# Patient Record
Sex: MALE | Race: BLACK OR AFRICAN AMERICAN | NOT HISPANIC OR LATINO | Employment: OTHER | URBAN - METROPOLITAN AREA
[De-identification: names, ages, dates, MRNs, and addresses within clinical notes are randomized per-mention and may not be internally consistent; named-entity substitution may affect disease eponyms.]

---

## 2017-05-11 PROBLEM — K11.1 SALIVARY GLAND ENLARGEMENT: Status: ACTIVE | Noted: 2017-05-11

## 2017-10-19 PROBLEM — M50.30 DEGENERATIVE CERVICAL DISC: Status: ACTIVE | Noted: 2017-10-19

## 2017-10-31 PROBLEM — M50.30 OTHER CERVICAL DISC DEGENERATION, UNSPECIFIED CERVICAL REGION: Status: ACTIVE | Noted: 2017-10-31

## 2018-11-08 PROBLEM — M51.36 DEGENERATION OF LUMBAR INTERVERTEBRAL DISC: Status: ACTIVE | Noted: 2018-11-08

## 2018-11-15 PROBLEM — Z01.818 PREOP TESTING: Status: ACTIVE | Noted: 2018-11-15

## 2020-05-01 PROBLEM — I73.9 PAD (PERIPHERAL ARTERY DISEASE): Status: ACTIVE | Noted: 2020-05-01

## 2020-05-07 PROBLEM — R06.02 SOB (SHORTNESS OF BREATH): Status: ACTIVE | Noted: 2020-05-07

## 2020-09-24 PROBLEM — I73.9 PVD (PERIPHERAL VASCULAR DISEASE) WITH CLAUDICATION: Status: ACTIVE | Noted: 2020-09-24

## 2021-08-31 ENCOUNTER — HOSPITAL ENCOUNTER (EMERGENCY)
Facility: HOSPITAL | Age: 66
Discharge: HOME | End: 2021-08-31
Attending: EMERGENCY MEDICINE
Payer: MEDICARE

## 2021-08-31 VITALS
DIASTOLIC BLOOD PRESSURE: 91 MMHG | HEART RATE: 92 BPM | WEIGHT: 216.06 LBS | OXYGEN SATURATION: 100 % | HEIGHT: 74 IN | SYSTOLIC BLOOD PRESSURE: 129 MMHG | TEMPERATURE: 98 F | BODY MASS INDEX: 27.73 KG/M2 | RESPIRATION RATE: 16 BRPM

## 2021-08-31 DIAGNOSIS — G89.29 CHRONIC BACK PAIN, UNSPECIFIED BACK LOCATION, UNSPECIFIED BACK PAIN LATERALITY: Primary | ICD-10-CM

## 2021-08-31 DIAGNOSIS — M54.9 CHRONIC BACK PAIN, UNSPECIFIED BACK LOCATION, UNSPECIFIED BACK PAIN LATERALITY: Primary | ICD-10-CM

## 2021-08-31 PROCEDURE — 25000003 PHARM REV CODE 250: Performed by: PHYSICIAN ASSISTANT

## 2021-08-31 PROCEDURE — 99283 EMERGENCY DEPT VISIT LOW MDM: CPT

## 2021-08-31 RX ORDER — HYDROCODONE BITARTRATE AND ACETAMINOPHEN 10; 325 MG/1; MG/1
1 TABLET ORAL
Status: COMPLETED | OUTPATIENT
Start: 2021-08-31 | End: 2021-08-31

## 2021-08-31 RX ADMIN — HYDROCODONE BITARTRATE AND ACETAMINOPHEN 1 TABLET: 10; 325 TABLET ORAL at 11:08

## 2021-09-01 ENCOUNTER — HOSPITAL ENCOUNTER (EMERGENCY)
Facility: HOSPITAL | Age: 66
Discharge: HOME | End: 2021-09-01
Attending: EMERGENCY MEDICINE
Payer: MEDICARE

## 2021-09-01 VITALS
WEIGHT: 216.06 LBS | RESPIRATION RATE: 18 BRPM | BODY MASS INDEX: 27.73 KG/M2 | TEMPERATURE: 99 F | HEART RATE: 87 BPM | DIASTOLIC BLOOD PRESSURE: 84 MMHG | SYSTOLIC BLOOD PRESSURE: 124 MMHG | HEIGHT: 74 IN | OXYGEN SATURATION: 99 %

## 2021-09-01 DIAGNOSIS — G89.29 CHRONIC MIDLINE LOW BACK PAIN WITHOUT SCIATICA: Primary | ICD-10-CM

## 2021-09-01 DIAGNOSIS — M54.50 CHRONIC MIDLINE LOW BACK PAIN WITHOUT SCIATICA: Primary | ICD-10-CM

## 2021-09-01 PROCEDURE — 99283 EMERGENCY DEPT VISIT LOW MDM: CPT

## 2021-09-01 PROCEDURE — 25000003 PHARM REV CODE 250: Performed by: EMERGENCY MEDICINE

## 2021-09-01 RX ORDER — HYDROCODONE BITARTRATE AND ACETAMINOPHEN 10; 325 MG/1; MG/1
1 TABLET ORAL EVERY 6 HOURS PRN
Qty: 15 TABLET | Refills: 0 | Status: ON HOLD | OUTPATIENT
Start: 2021-09-01 | End: 2021-10-28 | Stop reason: HOSPADM

## 2021-09-01 RX ORDER — HYDROCODONE BITARTRATE AND ACETAMINOPHEN 10; 325 MG/1; MG/1
1 TABLET ORAL
Status: COMPLETED | OUTPATIENT
Start: 2021-09-01 | End: 2021-09-01

## 2021-09-01 RX ADMIN — HYDROCODONE BITARTRATE AND ACETAMINOPHEN 1 TABLET: 10; 325 TABLET ORAL at 09:09

## 2021-10-13 PROBLEM — I77.1 STENOSIS OF RIGHT SUBCLAVIAN ARTERY: Status: ACTIVE | Noted: 2021-10-13

## 2021-12-27 PROBLEM — Z87.891 HISTORY OF TOBACCO ABUSE: Status: ACTIVE | Noted: 2021-12-27

## 2021-12-27 PROBLEM — I10 PRIMARY HYPERTENSION: Status: ACTIVE | Noted: 2021-12-27

## 2023-01-01 ENCOUNTER — ANESTHESIA (OUTPATIENT)
Dept: EMERGENCY MEDICINE | Facility: HOSPITAL | Age: 68
DRG: 231 | End: 2023-01-01
Payer: MEDICARE

## 2023-01-01 ENCOUNTER — CLINICAL SUPPORT (OUTPATIENT)
Dept: CARDIOLOGY | Facility: HOSPITAL | Age: 68
DRG: 231 | End: 2023-01-01
Attending: INTERNAL MEDICINE
Payer: MEDICARE

## 2023-01-01 ENCOUNTER — HOSPITAL ENCOUNTER (INPATIENT)
Facility: HOSPITAL | Age: 68
LOS: 31 days | Discharge: NURSING, REHAB OR LONG-TERM CARE | DRG: 231 | End: 2023-02-01
Attending: EMERGENCY MEDICINE | Admitting: FAMILY MEDICINE
Payer: MEDICARE

## 2023-01-01 ENCOUNTER — HOSPITAL ENCOUNTER (EMERGENCY)
Facility: HOSPITAL | Age: 68
Discharge: ANOTHER HOSPITAL | End: 2023-01-01
Attending: EMERGENCY MEDICINE | Admitting: INTERNAL MEDICINE
Payer: MEDICARE

## 2023-01-01 ENCOUNTER — ANESTHESIA EVENT (OUTPATIENT)
Dept: EMERGENCY MEDICINE | Facility: HOSPITAL | Age: 68
DRG: 231 | End: 2023-01-01
Payer: MEDICARE

## 2023-01-01 VITALS — WEIGHT: 220 LBS | BODY MASS INDEX: 28.23 KG/M2 | HEIGHT: 74 IN

## 2023-01-01 VITALS
DIASTOLIC BLOOD PRESSURE: 40 MMHG | OXYGEN SATURATION: 63 % | SYSTOLIC BLOOD PRESSURE: 80 MMHG | TEMPERATURE: 99 F | RESPIRATION RATE: 22 BRPM | HEART RATE: 123 BPM

## 2023-01-01 DIAGNOSIS — R07.9 CHEST PAIN: ICD-10-CM

## 2023-01-01 DIAGNOSIS — R94.31 EKG ABNORMALITIES: ICD-10-CM

## 2023-01-01 DIAGNOSIS — R06.02 SOB (SHORTNESS OF BREATH): ICD-10-CM

## 2023-01-01 DIAGNOSIS — I21.01 ST ELEVATION MYOCARDIAL INFARCTION INVOLVING LEFT MAIN CORONARY ARTERY: Primary | ICD-10-CM

## 2023-01-01 DIAGNOSIS — I21.3 STEMI (ST ELEVATION MYOCARDIAL INFARCTION): ICD-10-CM

## 2023-01-01 DIAGNOSIS — I46.9 CARDIAC ARREST: Primary | ICD-10-CM

## 2023-01-01 DIAGNOSIS — I48.91 A-FIB: ICD-10-CM

## 2023-01-01 DIAGNOSIS — I21.3 ST ELEVATION MYOCARDIAL INFARCTION (STEMI), UNSPECIFIED ARTERY: ICD-10-CM

## 2023-01-01 DIAGNOSIS — Z41.9 SURGERY, ELECTIVE: ICD-10-CM

## 2023-01-01 DIAGNOSIS — I21.11 STEMI INVOLVING RIGHT CORONARY ARTERY: ICD-10-CM

## 2023-01-01 DIAGNOSIS — Z01.810 PREOP CARDIOVASCULAR EXAM: ICD-10-CM

## 2023-01-01 DIAGNOSIS — Z95.1 S/P CABG (CORONARY ARTERY BYPASS GRAFT): ICD-10-CM

## 2023-01-01 PROBLEM — E87.1 HYPONATREMIA: Status: ACTIVE | Noted: 2023-01-01

## 2023-01-01 PROBLEM — R74.01 TRANSAMINITIS: Status: ACTIVE | Noted: 2023-01-01

## 2023-01-01 PROBLEM — D72.829 LEUKOCYTOSIS: Status: ACTIVE | Noted: 2023-01-01

## 2023-01-01 PROBLEM — D64.9 ANEMIA: Status: ACTIVE | Noted: 2023-01-01

## 2023-01-01 PROBLEM — J96.02 ACUTE RESPIRATORY FAILURE WITH HYPOXIA AND HYPERCARBIA: Status: ACTIVE | Noted: 2023-01-01

## 2023-01-01 PROBLEM — J96.01 ACUTE RESPIRATORY FAILURE WITH HYPOXIA AND HYPERCARBIA: Status: ACTIVE | Noted: 2023-01-01

## 2023-01-01 LAB
ALBUMIN SERPL BCP-MCNC: 2.6 G/DL (ref 3.5–5.2)
ALBUMIN SERPL BCP-MCNC: 3.8 G/DL (ref 3.5–5.2)
ALLENS TEST: ABNORMAL
ALP SERPL-CCNC: 102 U/L (ref 55–135)
ALP SERPL-CCNC: 88 U/L (ref 55–135)
ALT SERPL W/O P-5'-P-CCNC: 278 U/L (ref 10–44)
ALT SERPL W/O P-5'-P-CCNC: 37 U/L (ref 10–44)
ANION GAP SERPL CALC-SCNC: 13 MMOL/L (ref 8–16)
ANION GAP SERPL CALC-SCNC: 15 MMOL/L (ref 8–16)
ANION GAP SERPL CALC-SCNC: 9 MMOL/L (ref 8–16)
AORTIC ROOT ANNULUS: 3.72 CM
AORTIC VALVE CUSP SEPERATION: 1.72 CM
APTT BLDCRRT: 30.3 SEC (ref 21–32)
AST SERPL-CCNC: 294 U/L (ref 10–40)
AST SERPL-CCNC: 44 U/L (ref 10–40)
AV INDEX (PROSTH): 0.54
AV MEAN GRADIENT: 4 MMHG
AV PEAK GRADIENT: 7 MMHG
AV VALVE AREA: 1.68 CM2
AV VELOCITY RATIO: 0.53
BASOPHILS # BLD AUTO: 0.03 K/UL (ref 0–0.2)
BASOPHILS NFR BLD: 0 % (ref 0–1.9)
BASOPHILS NFR BLD: 0.5 % (ref 0–1.9)
BILIRUB SERPL-MCNC: 0.3 MG/DL (ref 0.1–1)
BILIRUB SERPL-MCNC: 0.8 MG/DL (ref 0.1–1)
BNP SERPL-MCNC: 76 PG/ML (ref 0–99)
BSA FOR ECHO PROCEDURE: 2.28 M2
BUN SERPL-MCNC: 20 MG/DL (ref 8–23)
BUN SERPL-MCNC: 24 MG/DL (ref 8–23)
BUN SERPL-MCNC: 26 MG/DL (ref 8–23)
CALCIUM SERPL-MCNC: 7.3 MG/DL (ref 8.7–10.5)
CALCIUM SERPL-MCNC: 7.8 MG/DL (ref 8.7–10.5)
CALCIUM SERPL-MCNC: 8.8 MG/DL (ref 8.7–10.5)
CHLORIDE SERPL-SCNC: 100 MMOL/L (ref 95–110)
CHLORIDE SERPL-SCNC: 101 MMOL/L (ref 95–110)
CHLORIDE SERPL-SCNC: 96 MMOL/L (ref 95–110)
CO2 SERPL-SCNC: 20 MMOL/L (ref 23–29)
CO2 SERPL-SCNC: 22 MMOL/L (ref 23–29)
CO2 SERPL-SCNC: 26 MMOL/L (ref 23–29)
CREAT SERPL-MCNC: 1 MG/DL (ref 0.5–1.4)
CREAT SERPL-MCNC: 1.4 MG/DL (ref 0.5–1.4)
CREAT SERPL-MCNC: 1.4 MG/DL (ref 0.5–1.4)
CREAT SERPL-MCNC: 1.5 MG/DL (ref 0.5–1.4)
CV ECHO LV RWT: 0.77 CM
DELSYS: ABNORMAL
DIFFERENTIAL METHOD: ABNORMAL
DIFFERENTIAL METHOD: ABNORMAL
DOP CALC AO PEAK VEL: 1.36 M/S
DOP CALC AO VTI: 20.5 CM
DOP CALC LVOT AREA: 3.1 CM2
DOP CALC LVOT DIAMETER: 2 CM
DOP CALC LVOT PEAK VEL: 0.72 M/S
DOP CALC LVOT STROKE VOLUME: 34.54 CM3
DOP CALCLVOT PEAK VEL VTI: 11 CM
E WAVE DECELERATION TIME: 208.6 MSEC
E/A RATIO: 0.65
E/E' RATIO: 9 M/S
ECHO LV POSTERIOR WALL: 1.57 CM (ref 0.6–1.1)
EJECTION FRACTION: 55 %
EOSINOPHIL # BLD AUTO: 0.1 K/UL (ref 0–0.5)
EOSINOPHIL NFR BLD: 0 % (ref 0–8)
EOSINOPHIL NFR BLD: 1.3 % (ref 0–8)
ERYTHROCYTE [DISTWIDTH] IN BLOOD BY AUTOMATED COUNT: 14.3 % (ref 11.5–14.5)
ERYTHROCYTE [DISTWIDTH] IN BLOOD BY AUTOMATED COUNT: 14.4 % (ref 11.5–14.5)
ERYTHROCYTE [SEDIMENTATION RATE] IN BLOOD BY WESTERGREN METHOD: 0 MM/H
ERYTHROCYTE [SEDIMENTATION RATE] IN BLOOD BY WESTERGREN METHOD: 30 MM/H
ERYTHROCYTE [SEDIMENTATION RATE] IN BLOOD BY WESTERGREN METHOD: 540 MM/H
EST. GFR  (NO RACE VARIABLE): 51 ML/MIN/1.73 M^2
EST. GFR  (NO RACE VARIABLE): 55 ML/MIN/1.73 M^2
EST. GFR  (NO RACE VARIABLE): 55.4 ML/MIN/1.73 M^2
ESTIMATED AVG GLUCOSE: 117 MG/DL (ref 68–131)
FIO2: 100
FIO2: 50
FIO2: 80
FLOW: 15
FRACTIONAL SHORTENING: 28 % (ref 28–44)
GLUCOSE SERPL-MCNC: 114 MG/DL (ref 70–110)
GLUCOSE SERPL-MCNC: 116 MG/DL (ref 70–110)
GLUCOSE SERPL-MCNC: 122 MG/DL (ref 70–110)
GLUCOSE SERPL-MCNC: 123 MG/DL (ref 70–110)
GLUCOSE SERPL-MCNC: 147 MG/DL (ref 70–110)
GLUCOSE SERPL-MCNC: 187 MG/DL (ref 70–110)
GLUCOSE SERPL-MCNC: 316 MG/DL (ref 70–110)
GLUCOSE SERPL-MCNC: 448 MG/DL (ref 70–110)
GLUCOSE SERPL-MCNC: 463 MG/DL (ref 70–110)
HBA1C MFR BLD: 5.7 % (ref 4.5–6.2)
HCO3 UR-SCNC: 15.9 MMOL/L (ref 24–28)
HCO3 UR-SCNC: 22.1 MMOL/L (ref 24–28)
HCO3 UR-SCNC: 23.9 MMOL/L (ref 24–28)
HCO3 UR-SCNC: 28 MMOL/L (ref 24–28)
HCO3 UR-SCNC: 29.4 MMOL/L (ref 24–28)
HCT VFR BLD AUTO: 34.6 % (ref 40–54)
HCT VFR BLD AUTO: 39.2 % (ref 40–54)
HCT VFR BLD CALC: 32 %PCV (ref 36–54)
HCT VFR BLD CALC: 35 %PCV (ref 36–54)
HCT VFR BLD CALC: 37 %PCV (ref 36–54)
HCT VFR BLD CALC: 38 %PCV (ref 36–54)
HGB BLD-MCNC: 11.4 G/DL (ref 14–18)
HGB BLD-MCNC: 13 G/DL (ref 14–18)
IMM GRANULOCYTES # BLD AUTO: 0.07 K/UL (ref 0–0.04)
IMM GRANULOCYTES # BLD AUTO: ABNORMAL K/UL (ref 0–0.04)
IMM GRANULOCYTES NFR BLD AUTO: 1.3 % (ref 0–0.5)
IMM GRANULOCYTES NFR BLD AUTO: ABNORMAL % (ref 0–0.5)
INR PPP: 1.2 (ref 0.8–1.2)
INTERVENTRICULAR SEPTUM: 1.9 CM (ref 0.6–1.1)
LACTATE SERPL-SCNC: 1.9 MMOL/L (ref 0.5–1.9)
LACTATE SERPL-SCNC: 7.4 MMOL/L (ref 0.5–1.9)
LEFT INTERNAL DIMENSION IN SYSTOLE: 2.94 CM (ref 2.1–4)
LEFT VENTRICLE DIASTOLIC VOLUME INDEX: 32.11 ML/M2
LEFT VENTRICLE DIASTOLIC VOLUME: 72.56 ML
LEFT VENTRICLE MASS INDEX: 133 G/M2
LEFT VENTRICLE SYSTOLIC VOLUME INDEX: 14.7 ML/M2
LEFT VENTRICLE SYSTOLIC VOLUME: 33.22 ML
LEFT VENTRICULAR INTERNAL DIMENSION IN DIASTOLE: 4.06 CM (ref 3.5–6)
LEFT VENTRICULAR MASS: 300.15 G
LV LATERAL E/E' RATIO: 7.2 M/S
LV SEPTAL E/E' RATIO: 12 M/S
LVOT MG: 1.05 MMHG
LVOT MV: 0.47 CM/S
LYMPHOCYTES # BLD AUTO: 2.6 K/UL (ref 1–4.8)
LYMPHOCYTES NFR BLD: 15 % (ref 18–48)
LYMPHOCYTES NFR BLD: 46.4 % (ref 18–48)
MAGNESIUM SERPL-MCNC: 1.8 MG/DL (ref 1.6–2.6)
MAGNESIUM SERPL-MCNC: 2 MG/DL (ref 1.6–2.6)
MAGNESIUM SERPL-MCNC: 2.2 MG/DL (ref 1.6–2.6)
MCH RBC QN AUTO: 31 PG (ref 27–31)
MCH RBC QN AUTO: 31.3 PG (ref 27–31)
MCHC RBC AUTO-ENTMCNC: 32.9 G/DL (ref 32–36)
MCHC RBC AUTO-ENTMCNC: 33.2 G/DL (ref 32–36)
MCV RBC AUTO: 94 FL (ref 82–98)
MCV RBC AUTO: 95 FL (ref 82–98)
METAMYELOCYTES NFR BLD MANUAL: 1 %
MIN VOL: 13
MIN VOL: 15.8
MIN VOL: 16.5
MODE: ABNORMAL
MONOCYTES # BLD AUTO: 0.7 K/UL (ref 0.3–1)
MONOCYTES NFR BLD: 12 % (ref 4–15)
MONOCYTES NFR BLD: 2 % (ref 4–15)
MV PEAK A VEL: 1.11 M/S
MV PEAK E VEL: 0.72 M/S
MV STENOSIS PRESSURE HALF TIME: 60.5 MS
MV VALVE AREA P 1/2 METHOD: 3.64 CM2
NEUTROPHILS # BLD AUTO: 2.1 K/UL (ref 1.8–7.7)
NEUTROPHILS NFR BLD: 38.5 % (ref 38–73)
NEUTROPHILS NFR BLD: 72 % (ref 38–73)
NEUTS BAND NFR BLD MANUAL: 10 %
NRBC BLD-RTO: 0 /100 WBC
NRBC BLD-RTO: 0 /100 WBC
PCO2 BLDA: 34.3 MMHG (ref 35–45)
PCO2 BLDA: 56.8 MMHG (ref 35–45)
PCO2 BLDA: 57 MMHG (ref 35–45)
PCO2 BLDA: 57.7 MMHG (ref 35–45)
PCO2 BLDA: 86.1 MMHG (ref 35–45)
PEEP: 12
PEEP: 12
PEEP: 8
PEEP: 8
PH SMN: 6.88 [PH] (ref 7.35–7.45)
PH SMN: 7.19 [PH] (ref 7.35–7.45)
PH SMN: 7.3 [PH] (ref 7.35–7.45)
PH SMN: 7.32 [PH] (ref 7.35–7.45)
PH SMN: 7.45 [PH] (ref 7.35–7.45)
PHOSPHATE SERPL-MCNC: 5.5 MG/DL (ref 2.7–4.5)
PIP: 30
PIP: 39
PIP: 46
PISA TR MAX VEL: 2.33 M/S
PLATELET # BLD AUTO: 145 K/UL (ref 150–450)
PLATELET # BLD AUTO: 196 K/UL (ref 150–450)
PLATELET BLD QL SMEAR: ABNORMAL
PMV BLD AUTO: 8.8 FL (ref 9.2–12.9)
PMV BLD AUTO: 9.1 FL (ref 9.2–12.9)
PO2 BLDA: 239 MMHG (ref 80–100)
PO2 BLDA: 28 MMHG (ref 40–60)
PO2 BLDA: 353 MMHG (ref 80–100)
PO2 BLDA: 71 MMHG (ref 80–100)
PO2 BLDA: 80 MMHG (ref 80–100)
POC ACTIVATED CLOTTING TIME K: 245 SEC (ref 74–137)
POC ACTIVATED CLOTTING TIME K: 245 SEC (ref 74–137)
POC BE: -17 MMOL/L
POC BE: -6 MMOL/L
POC BE: 0 MMOL/L
POC BE: 2 MMOL/L
POC BE: 3 MMOL/L
POC IONIZED CALCIUM: 0.94 MMOL/L (ref 1.06–1.42)
POC IONIZED CALCIUM: 1.12 MMOL/L (ref 1.06–1.42)
POC IONIZED CALCIUM: 1.16 MMOL/L (ref 1.06–1.42)
POC IONIZED CALCIUM: 1.3 MMOL/L (ref 1.06–1.42)
POC SATURATED O2: 100 % (ref 95–100)
POC SATURATED O2: 100 % (ref 95–100)
POC SATURATED O2: 23 % (ref 95–100)
POC SATURATED O2: 89 % (ref 95–100)
POC SATURATED O2: 96 % (ref 95–100)
POC TCO2: 18 MMOL/L (ref 24–29)
POC TCO2: 24 MMOL/L (ref 23–27)
POC TCO2: 25 MMOL/L (ref 23–27)
POC TCO2: 30 MMOL/L (ref 23–27)
POC TCO2: 31 MMOL/L (ref 23–27)
POTASSIUM BLD-SCNC: 3.1 MMOL/L (ref 3.5–5.1)
POTASSIUM BLD-SCNC: 3.2 MMOL/L (ref 3.5–5.1)
POTASSIUM BLD-SCNC: 4.7 MMOL/L (ref 3.5–5.1)
POTASSIUM BLD-SCNC: 5 MMOL/L (ref 3.5–5.1)
POTASSIUM SERPL-SCNC: 3.5 MMOL/L (ref 3.5–5.1)
POTASSIUM SERPL-SCNC: 4.2 MMOL/L (ref 3.5–5.1)
POTASSIUM SERPL-SCNC: 5 MMOL/L (ref 3.5–5.1)
PROT SERPL-MCNC: 5.2 G/DL (ref 6–8.4)
PROT SERPL-MCNC: 7.3 G/DL (ref 6–8.4)
PROTHROMBIN TIME: 12.7 SEC (ref 9–12.5)
PV MV: 0.51 M/S
PV PEAK VELOCITY: 0.73 CM/S
RBC # BLD AUTO: 3.64 M/UL (ref 4.6–6.2)
RBC # BLD AUTO: 4.19 M/UL (ref 4.6–6.2)
SAMPLE: ABNORMAL
SAMPLE: NORMAL
SITE: ABNORMAL
SODIUM BLD-SCNC: 134 MMOL/L (ref 136–145)
SODIUM BLD-SCNC: 135 MMOL/L (ref 136–145)
SODIUM BLD-SCNC: 141 MMOL/L (ref 136–145)
SODIUM BLD-SCNC: 143 MMOL/L (ref 136–145)
SODIUM SERPL-SCNC: 131 MMOL/L (ref 136–145)
SODIUM SERPL-SCNC: 135 MMOL/L (ref 136–145)
SODIUM SERPL-SCNC: 136 MMOL/L (ref 136–145)
SP02: 10
SP02: 100
SP02: 89
TDI LATERAL: 0.1 M/S
TDI SEPTAL: 0.06 M/S
TDI: 0.08 M/S
TR MAX PG: 22 MMHG
TRICUSPID ANNULAR PLANE SYSTOLIC EXCURSION: 1.07 CM
TROPONIN I SERPL DL<=0.01 NG/ML-MCNC: 1.1 NG/ML (ref 0–0.03)
TROPONIN I SERPL HS-MCNC: 1459 PG/ML (ref 0–14.9)
TROPONIN I SERPL HS-MCNC: ABNORMAL PG/ML (ref 0–14.9)
VT: 24
VT: 540
WBC # BLD AUTO: 16.6 K/UL (ref 3.9–12.7)
WBC # BLD AUTO: 5.49 K/UL (ref 3.9–12.7)

## 2023-01-01 PROCEDURE — 99900035 HC TECH TIME PER 15 MIN (STAT)

## 2023-01-01 PROCEDURE — 92950 HEART/LUNG RESUSCITATION CPR: CPT

## 2023-01-01 PROCEDURE — 93458 L HRT ARTERY/VENTRICLE ANGIO: CPT | Mod: XU | Performed by: INTERNAL MEDICINE

## 2023-01-01 PROCEDURE — 25500020 PHARM REV CODE 255: Performed by: INTERNAL MEDICINE

## 2023-01-01 PROCEDURE — 82803 BLOOD GASES ANY COMBINATION: CPT

## 2023-01-01 PROCEDURE — 85025 COMPLETE CBC W/AUTO DIFF WBC: CPT | Performed by: EMERGENCY MEDICINE

## 2023-01-01 PROCEDURE — 82330 ASSAY OF CALCIUM: CPT

## 2023-01-01 PROCEDURE — 96374 THER/PROPH/DIAG INJ IV PUSH: CPT

## 2023-01-01 PROCEDURE — 84132 ASSAY OF SERUM POTASSIUM: CPT

## 2023-01-01 PROCEDURE — 99223 1ST HOSP IP/OBS HIGH 75: CPT | Mod: 25,,, | Performed by: INTERNAL MEDICINE

## 2023-01-01 PROCEDURE — 93010 EKG 12-LEAD: ICD-10-PCS | Mod: ,,, | Performed by: INTERNAL MEDICINE

## 2023-01-01 PROCEDURE — 63600175 PHARM REV CODE 636 W HCPCS: Performed by: EMERGENCY MEDICINE

## 2023-01-01 PROCEDURE — 36415 COLL VENOUS BLD VENIPUNCTURE: CPT | Performed by: EMERGENCY MEDICINE

## 2023-01-01 PROCEDURE — 25000003 PHARM REV CODE 250: Performed by: INTERNAL MEDICINE

## 2023-01-01 PROCEDURE — C1725 CATH, TRANSLUMIN NON-LASER: HCPCS | Performed by: INTERNAL MEDICINE

## 2023-01-01 PROCEDURE — C1894 INTRO/SHEATH, NON-LASER: HCPCS | Performed by: INTERNAL MEDICINE

## 2023-01-01 PROCEDURE — 93458 PR CATH PLACE/CORON ANGIO, IMG SUPER/INTERP,W LEFT HEART VENTRICULOGRAPHY: ICD-10-PCS | Mod: 26,59,51, | Performed by: INTERNAL MEDICINE

## 2023-01-01 PROCEDURE — 51702 INSERT TEMP BLADDER CATH: CPT

## 2023-01-01 PROCEDURE — 84484 ASSAY OF TROPONIN QUANT: CPT | Mod: 91 | Performed by: FAMILY MEDICINE

## 2023-01-01 PROCEDURE — 94760 N-INVAS EAR/PLS OXIMETRY 1: CPT

## 2023-01-01 PROCEDURE — 25000003 PHARM REV CODE 250: Performed by: EMERGENCY MEDICINE

## 2023-01-01 PROCEDURE — 85027 COMPLETE CBC AUTOMATED: CPT | Performed by: EMERGENCY MEDICINE

## 2023-01-01 PROCEDURE — 92978 ENDOLUMINL IVUS OCT C 1ST: CPT | Mod: RC,52 | Performed by: INTERNAL MEDICINE

## 2023-01-01 PROCEDURE — 36415 COLL VENOUS BLD VENIPUNCTURE: CPT | Performed by: FAMILY MEDICINE

## 2023-01-01 PROCEDURE — 99291 CRITICAL CARE FIRST HOUR: CPT | Mod: 25

## 2023-01-01 PROCEDURE — 84484 ASSAY OF TROPONIN QUANT: CPT | Performed by: EMERGENCY MEDICINE

## 2023-01-01 PROCEDURE — 31500 INSERT EMERGENCY AIRWAY: CPT

## 2023-01-01 PROCEDURE — 94003 VENT MGMT INPAT SUBQ DAY: CPT

## 2023-01-01 PROCEDURE — 63600175 PHARM REV CODE 636 W HCPCS: Performed by: INTERNAL MEDICINE

## 2023-01-01 PROCEDURE — 84100 ASSAY OF PHOSPHORUS: CPT | Performed by: NURSE PRACTITIONER

## 2023-01-01 PROCEDURE — 33210 INSERT ELECTRD/PM CATH SNGL: CPT | Performed by: INTERNAL MEDICINE

## 2023-01-01 PROCEDURE — 83735 ASSAY OF MAGNESIUM: CPT | Mod: 91 | Performed by: EMERGENCY MEDICINE

## 2023-01-01 PROCEDURE — 63600175 PHARM REV CODE 636 W HCPCS

## 2023-01-01 PROCEDURE — 82962 GLUCOSE BLOOD TEST: CPT

## 2023-01-01 PROCEDURE — 83735 ASSAY OF MAGNESIUM: CPT | Mod: 91 | Performed by: INTERNAL MEDICINE

## 2023-01-01 PROCEDURE — 36415 COLL VENOUS BLD VENIPUNCTURE: CPT | Performed by: NURSE PRACTITIONER

## 2023-01-01 PROCEDURE — 85007 BL SMEAR W/DIFF WBC COUNT: CPT | Performed by: EMERGENCY MEDICINE

## 2023-01-01 PROCEDURE — 84295 ASSAY OF SERUM SODIUM: CPT

## 2023-01-01 PROCEDURE — 99152 MOD SED SAME PHYS/QHP 5/>YRS: CPT | Mod: ,,, | Performed by: INTERNAL MEDICINE

## 2023-01-01 PROCEDURE — 99291 CRITICAL CARE FIRST HOUR: CPT | Mod: ,,, | Performed by: INTERNAL MEDICINE

## 2023-01-01 PROCEDURE — 83880 ASSAY OF NATRIURETIC PEPTIDE: CPT | Performed by: EMERGENCY MEDICINE

## 2023-01-01 PROCEDURE — C1751 CATH, INF, PER/CENT/MIDLINE: HCPCS | Performed by: INTERNAL MEDICINE

## 2023-01-01 PROCEDURE — 83036 HEMOGLOBIN GLYCOSYLATED A1C: CPT | Performed by: FAMILY MEDICINE

## 2023-01-01 PROCEDURE — 83735 ASSAY OF MAGNESIUM: CPT | Performed by: NURSE PRACTITIONER

## 2023-01-01 PROCEDURE — 80053 COMPREHEN METABOLIC PANEL: CPT | Mod: 91 | Performed by: EMERGENCY MEDICINE

## 2023-01-01 PROCEDURE — 27800903 OPTIME MED/SURG SUP & DEVICES OTHER IMPLANTS: Performed by: INTERNAL MEDICINE

## 2023-01-01 PROCEDURE — C1887 CATHETER, GUIDING: HCPCS | Performed by: INTERNAL MEDICINE

## 2023-01-01 PROCEDURE — 85610 PROTHROMBIN TIME: CPT | Performed by: EMERGENCY MEDICINE

## 2023-01-01 PROCEDURE — 94002 VENT MGMT INPAT INIT DAY: CPT

## 2023-01-01 PROCEDURE — 25000003 PHARM REV CODE 250: Performed by: FAMILY MEDICINE

## 2023-01-01 PROCEDURE — 80048 BASIC METABOLIC PNL TOTAL CA: CPT | Performed by: INTERNAL MEDICINE

## 2023-01-01 PROCEDURE — 94761 N-INVAS EAR/PLS OXIMETRY MLT: CPT

## 2023-01-01 PROCEDURE — 99291 PR CRITICAL CARE, E/M 30-74 MINUTES: ICD-10-PCS | Mod: ,,, | Performed by: INTERNAL MEDICINE

## 2023-01-01 PROCEDURE — 85014 HEMATOCRIT: CPT

## 2023-01-01 PROCEDURE — 37799 UNLISTED PX VASCULAR SURGERY: CPT

## 2023-01-01 PROCEDURE — 36415 COLL VENOUS BLD VENIPUNCTURE: CPT | Performed by: INTERNAL MEDICINE

## 2023-01-01 PROCEDURE — C9606 PERC D-E COR REVASC W AMI S: HCPCS | Mod: RC | Performed by: INTERNAL MEDICINE

## 2023-01-01 PROCEDURE — 92941 PRQ TRLML REVSC TOT OCCL AMI: CPT | Mod: RC,,, | Performed by: INTERNAL MEDICINE

## 2023-01-01 PROCEDURE — 12000002 HC ACUTE/MED SURGE SEMI-PRIVATE ROOM

## 2023-01-01 PROCEDURE — 92941 PR AMI ANY METHOD: ICD-10-PCS | Mod: RC,,, | Performed by: INTERNAL MEDICINE

## 2023-01-01 PROCEDURE — 94799 UNLISTED PULMONARY SVC/PX: CPT

## 2023-01-01 PROCEDURE — 99900026 HC AIRWAY MAINTENANCE (STAT)

## 2023-01-01 PROCEDURE — 93010 ELECTROCARDIOGRAM REPORT: CPT | Mod: ,,, | Performed by: INTERNAL MEDICINE

## 2023-01-01 PROCEDURE — 93306 TTE W/DOPPLER COMPLETE: CPT

## 2023-01-01 PROCEDURE — 99152 MOD SED SAME PHYS/QHP 5/>YRS: CPT | Performed by: INTERNAL MEDICINE

## 2023-01-01 PROCEDURE — 27000221 HC OXYGEN, UP TO 24 HOURS

## 2023-01-01 PROCEDURE — 99291 CRITICAL CARE FIRST HOUR: CPT

## 2023-01-01 PROCEDURE — 36620 INSERTION CATHETER ARTERY: CPT | Performed by: STUDENT IN AN ORGANIZED HEALTH CARE EDUCATION/TRAINING PROGRAM

## 2023-01-01 PROCEDURE — 93458 L HRT ARTERY/VENTRICLE ANGIO: CPT | Mod: 26,59,51, | Performed by: INTERNAL MEDICINE

## 2023-01-01 PROCEDURE — C1753 CATH, INTRAVAS ULTRASOUND: HCPCS | Performed by: INTERNAL MEDICINE

## 2023-01-01 PROCEDURE — 93306 ECHO (CUPID ONLY): ICD-10-PCS | Mod: 26,,, | Performed by: INTERNAL MEDICINE

## 2023-01-01 PROCEDURE — 93005 ELECTROCARDIOGRAM TRACING: CPT | Performed by: INTERNAL MEDICINE

## 2023-01-01 PROCEDURE — C1874 STENT, COATED/COV W/DEL SYS: HCPCS | Performed by: INTERNAL MEDICINE

## 2023-01-01 PROCEDURE — 83605 ASSAY OF LACTIC ACID: CPT | Mod: 91 | Performed by: INTERNAL MEDICINE

## 2023-01-01 PROCEDURE — 85730 THROMBOPLASTIN TIME PARTIAL: CPT | Performed by: EMERGENCY MEDICINE

## 2023-01-01 PROCEDURE — 20000000 HC ICU ROOM

## 2023-01-01 PROCEDURE — 83605 ASSAY OF LACTIC ACID: CPT | Performed by: EMERGENCY MEDICINE

## 2023-01-01 PROCEDURE — 99152 PR MOD CONSCIOUS SEDATION, SAME PHYS, 5+ YRS, FIRST 15 MIN: ICD-10-PCS | Mod: ,,, | Performed by: INTERNAL MEDICINE

## 2023-01-01 PROCEDURE — 93306 TTE W/DOPPLER COMPLETE: CPT | Mod: 26,,, | Performed by: INTERNAL MEDICINE

## 2023-01-01 PROCEDURE — 84484 ASSAY OF TROPONIN QUANT: CPT | Mod: 91 | Performed by: EMERGENCY MEDICINE

## 2023-01-01 PROCEDURE — 36600 WITHDRAWAL OF ARTERIAL BLOOD: CPT | Mod: 59

## 2023-01-01 PROCEDURE — 99153 MOD SED SAME PHYS/QHP EA: CPT | Performed by: INTERNAL MEDICINE

## 2023-01-01 PROCEDURE — 99223 PR INITIAL HOSPITAL CARE,LEVL III: ICD-10-PCS | Mod: 25,,, | Performed by: INTERNAL MEDICINE

## 2023-01-01 PROCEDURE — 25000003 PHARM REV CODE 250

## 2023-01-01 PROCEDURE — 93005 ELECTROCARDIOGRAM TRACING: CPT

## 2023-01-01 PROCEDURE — 99900031 HC PATIENT EDUCATION (STAT)

## 2023-01-01 PROCEDURE — 80053 COMPREHEN METABOLIC PANEL: CPT | Performed by: EMERGENCY MEDICINE

## 2023-01-01 PROCEDURE — C1769 GUIDE WIRE: HCPCS | Performed by: INTERNAL MEDICINE

## 2023-01-01 DEVICE — STENT RONYX27522UX RESOLUTE ONYX 2.75X22
Type: IMPLANTABLE DEVICE | Site: CORONARY | Status: FUNCTIONAL
Brand: RESOLUTE ONYX™

## 2023-01-01 RX ORDER — MAGNESIUM SULFATE HEPTAHYDRATE 40 MG/ML
2 INJECTION, SOLUTION INTRAVENOUS
Status: DISCONTINUED | OUTPATIENT
Start: 2023-01-01 | End: 2023-01-24

## 2023-01-01 RX ORDER — INDOMETHACIN 25 MG/1
CAPSULE ORAL
Status: COMPLETED
Start: 2023-01-01 | End: 2023-01-01

## 2023-01-01 RX ORDER — FAMOTIDINE 20 MG/1
20 TABLET, FILM COATED ORAL 2 TIMES DAILY
Status: DISCONTINUED | OUTPATIENT
Start: 2023-01-01 | End: 2023-01-29

## 2023-01-01 RX ORDER — LIDOCAINE HYDROCHLORIDE 10 MG/ML
INJECTION INFILTRATION; PERINEURAL
Status: DISCONTINUED | OUTPATIENT
Start: 2023-01-01 | End: 2023-01-01 | Stop reason: HOSPADM

## 2023-01-01 RX ORDER — MORPHINE SULFATE 4 MG/ML
4 INJECTION, SOLUTION INTRAMUSCULAR; INTRAVENOUS EVERY 4 HOURS PRN
Status: DISCONTINUED | OUTPATIENT
Start: 2023-01-01 | End: 2023-01-08

## 2023-01-01 RX ORDER — EPINEPHRINE 0.1 MG/ML
INJECTION INTRAVENOUS CODE/TRAUMA/SEDATION MEDICATION
Status: DISCONTINUED | OUTPATIENT
Start: 2023-01-01 | End: 2023-02-01 | Stop reason: HOSPADM

## 2023-01-01 RX ORDER — EPINEPHRINE 0.1 MG/ML
INJECTION INTRAVENOUS CODE/TRAUMA/SEDATION MEDICATION
Status: COMPLETED | OUTPATIENT
Start: 2023-01-01 | End: 2023-01-01

## 2023-01-01 RX ORDER — ETOMIDATE 2 MG/ML
INJECTION INTRAVENOUS CODE/TRAUMA/SEDATION MEDICATION
Status: COMPLETED | OUTPATIENT
Start: 2023-01-01 | End: 2023-01-01

## 2023-01-01 RX ORDER — NOREPINEPHRINE BITARTRATE 1 MG/ML
INJECTION, SOLUTION INTRAVENOUS
Status: DISPENSED
Start: 2023-01-01 | End: 2023-01-01

## 2023-01-01 RX ORDER — AMOXICILLIN 250 MG
1 CAPSULE ORAL 2 TIMES DAILY PRN
Status: DISCONTINUED | OUTPATIENT
Start: 2023-01-01 | End: 2023-01-24

## 2023-01-01 RX ORDER — IBUPROFEN 200 MG
16 TABLET ORAL
Status: DISCONTINUED | OUTPATIENT
Start: 2023-01-01 | End: 2023-01-24

## 2023-01-01 RX ORDER — MAGNESIUM SULFATE HEPTAHYDRATE 40 MG/ML
4 INJECTION, SOLUTION INTRAVENOUS
Status: DISCONTINUED | OUTPATIENT
Start: 2023-01-01 | End: 2023-01-24

## 2023-01-01 RX ORDER — VASOPRESSIN 20 [USP'U]/ML
INJECTION, SOLUTION INTRAMUSCULAR; SUBCUTANEOUS
Status: COMPLETED
Start: 2023-01-01 | End: 2023-01-01

## 2023-01-01 RX ORDER — CALCIUM GLUCONATE 20 MG/ML
3 INJECTION, SOLUTION INTRAVENOUS
Status: DISCONTINUED | OUTPATIENT
Start: 2023-01-01 | End: 2023-01-24

## 2023-01-01 RX ORDER — ETOMIDATE 2 MG/ML
INJECTION INTRAVENOUS
Status: DISCONTINUED
Start: 2023-01-01 | End: 2023-01-01 | Stop reason: HOSPADM

## 2023-01-01 RX ORDER — SODIUM BICARBONATE 1 MEQ/ML
SYRINGE (ML) INTRAVENOUS CODE/TRAUMA/SEDATION MEDICATION
Status: COMPLETED | OUTPATIENT
Start: 2023-01-01 | End: 2023-01-01

## 2023-01-01 RX ORDER — ROCURONIUM BROMIDE 10 MG/ML
INJECTION, SOLUTION INTRAVENOUS
Status: DISCONTINUED
Start: 2023-01-01 | End: 2023-01-01 | Stop reason: WASHOUT

## 2023-01-01 RX ORDER — ONDANSETRON 2 MG/ML
4 INJECTION INTRAMUSCULAR; INTRAVENOUS EVERY 6 HOURS PRN
Status: DISCONTINUED | OUTPATIENT
Start: 2023-01-01 | End: 2023-01-24

## 2023-01-01 RX ORDER — DOPAMINE HYDROCHLORIDE 160 MG/100ML
0-20 INJECTION, SOLUTION INTRAVENOUS CONTINUOUS
Status: DISCONTINUED | OUTPATIENT
Start: 2023-01-01 | End: 2023-01-01

## 2023-01-01 RX ORDER — POLYETHYLENE GLYCOL 3350 17 G/17G
17 POWDER, FOR SOLUTION ORAL 2 TIMES DAILY PRN
Status: DISCONTINUED | OUTPATIENT
Start: 2023-01-01 | End: 2023-01-15

## 2023-01-01 RX ORDER — NALOXONE HCL 0.4 MG/ML
0.02 VIAL (ML) INJECTION
Status: DISCONTINUED | OUTPATIENT
Start: 2023-01-01 | End: 2023-01-24

## 2023-01-01 RX ORDER — ATROPINE SULFATE 0.1 MG/ML
INJECTION INTRAVENOUS CODE/TRAUMA/SEDATION MEDICATION
Status: DISCONTINUED | OUTPATIENT
Start: 2023-01-01 | End: 2023-01-01 | Stop reason: HOSPADM

## 2023-01-01 RX ORDER — POTASSIUM CHLORIDE 29.8 MG/ML
80 INJECTION INTRAVENOUS
Status: DISCONTINUED | OUTPATIENT
Start: 2023-01-01 | End: 2023-01-24

## 2023-01-01 RX ORDER — GLUCAGON 1 MG
1 KIT INJECTION
Status: DISCONTINUED | OUTPATIENT
Start: 2023-01-01 | End: 2023-01-24

## 2023-01-01 RX ORDER — ACETAMINOPHEN 325 MG/1
650 TABLET ORAL EVERY 8 HOURS PRN
Status: DISCONTINUED | OUTPATIENT
Start: 2023-01-01 | End: 2023-02-01 | Stop reason: HOSPADM

## 2023-01-01 RX ORDER — PROPOFOL 10 MG/ML
0-50 INJECTION, EMULSION INTRAVENOUS CONTINUOUS
Status: DISCONTINUED | OUTPATIENT
Start: 2023-01-01 | End: 2023-01-08

## 2023-01-01 RX ORDER — ATROPINE SULFATE 0.1 MG/ML
INJECTION INTRAVENOUS CODE/TRAUMA/SEDATION MEDICATION
Status: DISCONTINUED | OUTPATIENT
Start: 2023-01-01 | End: 2023-02-01 | Stop reason: HOSPADM

## 2023-01-01 RX ORDER — INSULIN ASPART 100 [IU]/ML
1-10 INJECTION, SOLUTION INTRAVENOUS; SUBCUTANEOUS
Status: DISCONTINUED | OUTPATIENT
Start: 2023-01-01 | End: 2023-01-07

## 2023-01-01 RX ORDER — DOBUTAMINE HYDROCHLORIDE 400 MG/100ML
2 INJECTION INTRAVENOUS CONTINUOUS
Status: DISCONTINUED | OUTPATIENT
Start: 2023-01-01 | End: 2023-01-01

## 2023-01-01 RX ORDER — IBUPROFEN 200 MG
24 TABLET ORAL
Status: DISCONTINUED | OUTPATIENT
Start: 2023-01-01 | End: 2023-01-24

## 2023-01-01 RX ORDER — FENTANYL CITRATE 50 UG/ML
50 INJECTION, SOLUTION INTRAMUSCULAR; INTRAVENOUS
Status: DISPENSED | OUTPATIENT
Start: 2023-01-01 | End: 2023-01-01

## 2023-01-01 RX ORDER — PROPOFOL 10 MG/ML
INJECTION, EMULSION INTRAVENOUS
Status: DISPENSED
Start: 2023-01-01 | End: 2023-01-01

## 2023-01-01 RX ORDER — POTASSIUM CHLORIDE 29.8 MG/ML
40 INJECTION INTRAVENOUS
Status: DISCONTINUED | OUTPATIENT
Start: 2023-01-01 | End: 2023-01-24

## 2023-01-01 RX ORDER — CALCIUM CHLORIDE INJECTION 100 MG/ML
INJECTION, SOLUTION INTRAVENOUS CODE/TRAUMA/SEDATION MEDICATION
Status: COMPLETED | OUTPATIENT
Start: 2023-01-01 | End: 2023-01-01

## 2023-01-01 RX ORDER — ATROPINE SULFATE 0.1 MG/ML
1 INJECTION INTRAVENOUS
Status: DISCONTINUED | OUTPATIENT
Start: 2023-01-01 | End: 2023-01-01 | Stop reason: HOSPADM

## 2023-01-01 RX ORDER — NOREPINEPHRINE BITARTRATE/D5W 4MG/250ML
PLASTIC BAG, INJECTION (ML) INTRAVENOUS
Status: DISCONTINUED
Start: 2023-01-01 | End: 2023-01-01 | Stop reason: HOSPADM

## 2023-01-01 RX ORDER — SODIUM CHLORIDE 0.9 % (FLUSH) 0.9 %
10 SYRINGE (ML) INJECTION
Status: DISCONTINUED | OUTPATIENT
Start: 2023-01-01 | End: 2023-01-24

## 2023-01-01 RX ORDER — CALCIUM GLUCONATE 20 MG/ML
1 INJECTION, SOLUTION INTRAVENOUS
Status: DISCONTINUED | OUTPATIENT
Start: 2023-01-01 | End: 2023-01-24

## 2023-01-01 RX ORDER — FENTANYL CITRATE 50 UG/ML
50 INJECTION, SOLUTION INTRAMUSCULAR; INTRAVENOUS
Status: DISCONTINUED | OUTPATIENT
Start: 2023-01-01 | End: 2023-01-08

## 2023-01-01 RX ORDER — TALC
6 POWDER (GRAM) TOPICAL NIGHTLY PRN
Status: DISCONTINUED | OUTPATIENT
Start: 2023-01-01 | End: 2023-02-01 | Stop reason: HOSPADM

## 2023-01-01 RX ORDER — CALCIUM GLUCONATE 20 MG/ML
2 INJECTION, SOLUTION INTRAVENOUS
Status: DISCONTINUED | OUTPATIENT
Start: 2023-01-01 | End: 2023-01-24

## 2023-01-01 RX ORDER — SUCCINYLCHOLINE CHLORIDE 20 MG/ML
INJECTION INTRAMUSCULAR; INTRAVENOUS CODE/TRAUMA/SEDATION MEDICATION
Status: COMPLETED | OUTPATIENT
Start: 2023-01-01 | End: 2023-01-01

## 2023-01-01 RX ORDER — SODIUM CHLORIDE 9 MG/ML
INJECTION, SOLUTION INTRAVENOUS CONTINUOUS
Status: DISCONTINUED | OUTPATIENT
Start: 2023-01-01 | End: 2023-01-03

## 2023-01-01 RX ORDER — IPRATROPIUM BROMIDE AND ALBUTEROL SULFATE 2.5; .5 MG/3ML; MG/3ML
3 SOLUTION RESPIRATORY (INHALATION) EVERY 4 HOURS PRN
Status: DISCONTINUED | OUTPATIENT
Start: 2023-01-01 | End: 2023-01-12

## 2023-01-01 RX ORDER — ASPIRIN 325 MG
325 TABLET ORAL
Status: DISCONTINUED | OUTPATIENT
Start: 2023-01-01 | End: 2023-01-01 | Stop reason: HOSPADM

## 2023-01-01 RX ORDER — HYDROCODONE BITARTRATE AND ACETAMINOPHEN 5; 325 MG/1; MG/1
1 TABLET ORAL EVERY 4 HOURS PRN
Status: DISCONTINUED | OUTPATIENT
Start: 2023-01-01 | End: 2023-01-09

## 2023-01-01 RX ORDER — LORAZEPAM 2 MG/ML
1 INJECTION INTRAMUSCULAR
Status: DISCONTINUED | OUTPATIENT
Start: 2023-01-01 | End: 2023-01-01 | Stop reason: HOSPADM

## 2023-01-01 RX ORDER — HEPARIN SODIUM 10000 [USP'U]/ML
INJECTION, SOLUTION INTRAVENOUS; SUBCUTANEOUS
Status: DISCONTINUED | OUTPATIENT
Start: 2023-01-01 | End: 2023-01-01 | Stop reason: HOSPADM

## 2023-01-01 RX ORDER — ROCURONIUM BROMIDE 10 MG/ML
1 INJECTION, SOLUTION INTRAVENOUS ONCE
Status: COMPLETED | OUTPATIENT
Start: 2023-01-01 | End: 2023-01-01

## 2023-01-01 RX ORDER — DOBUTAMINE HYDROCHLORIDE 400 MG/100ML
INJECTION INTRAVENOUS
Status: DISCONTINUED | OUTPATIENT
Start: 2023-01-01 | End: 2023-01-13

## 2023-01-01 RX ORDER — POTASSIUM CHLORIDE 14.9 MG/ML
60 INJECTION INTRAVENOUS
Status: DISCONTINUED | OUTPATIENT
Start: 2023-01-01 | End: 2023-01-24

## 2023-01-01 RX ORDER — SIMETHICONE 80 MG
1 TABLET,CHEWABLE ORAL 4 TIMES DAILY PRN
Status: DISCONTINUED | OUTPATIENT
Start: 2023-01-01 | End: 2023-01-24

## 2023-01-01 RX ORDER — ASPIRIN 81 MG/1
81 TABLET ORAL DAILY
Status: DISCONTINUED | OUTPATIENT
Start: 2023-01-01 | End: 2023-01-03

## 2023-01-01 RX ORDER — PROPOFOL 10 MG/ML
INJECTION, EMULSION INTRAVENOUS
Status: DISCONTINUED
Start: 2023-01-01 | End: 2023-01-01 | Stop reason: WASHOUT

## 2023-01-01 RX ORDER — SUCCINYLCHOLINE CHLORIDE 20 MG/ML
INJECTION INTRAMUSCULAR; INTRAVENOUS
Status: DISCONTINUED
Start: 2023-01-01 | End: 2023-01-01 | Stop reason: WASHOUT

## 2023-01-01 RX ORDER — LORAZEPAM 2 MG/ML
INJECTION INTRAMUSCULAR
Status: DISCONTINUED
Start: 2023-01-01 | End: 2023-01-01 | Stop reason: HOSPADM

## 2023-01-01 RX ADMIN — FENTANYL CITRATE 50 MCG: 0.05 INJECTION, SOLUTION INTRAMUSCULAR; INTRAVENOUS at 08:01

## 2023-01-01 RX ADMIN — ETOMIDATE 20 MG: 2 INJECTION, SOLUTION INTRAVENOUS at 02:01

## 2023-01-01 RX ADMIN — ASPIRIN 325 MG ORAL TABLET 325 MG: 325 PILL ORAL at 02:01

## 2023-01-01 RX ADMIN — SODIUM BICARBONATE 50 MEQ: 84 INJECTION, SOLUTION INTRAVENOUS at 03:01

## 2023-01-01 RX ADMIN — FENTANYL CITRATE 50 MCG: 50 INJECTION INTRAMUSCULAR; INTRAVENOUS at 10:01

## 2023-01-01 RX ADMIN — EPINEPHRINE 1 MG: 0.1 INJECTION, SOLUTION ENDOTRACHEAL; INTRACARDIAC; INTRAVENOUS at 03:01

## 2023-01-01 RX ADMIN — PROPOFOL 10 MCG/KG/MIN: 10 INJECTION, EMULSION INTRAVENOUS at 09:01

## 2023-01-01 RX ADMIN — EPINEPHRINE 1 MG: 0.1 INJECTION, SOLUTION ENDOTRACHEAL; INTRACARDIAC; INTRAVENOUS at 02:01

## 2023-01-01 RX ADMIN — SODIUM BICARBONATE: 84 INJECTION, SOLUTION INTRAVENOUS at 07:01

## 2023-01-01 RX ADMIN — ATROPINE SULFATE 1 MG: 0.1 INJECTION, SOLUTION INTRAVENOUS at 01:01

## 2023-01-01 RX ADMIN — FENTANYL CITRATE 50 MCG: 0.05 INJECTION, SOLUTION INTRAMUSCULAR; INTRAVENOUS at 09:01

## 2023-01-01 RX ADMIN — PROPOFOL 50 MCG/KG/MIN: 10 INJECTION, EMULSION INTRAVENOUS at 09:01

## 2023-01-01 RX ADMIN — NOREPINEPHRINE BITARTRATE 0.02 MCG/KG/MIN: 1 INJECTION, SOLUTION, CONCENTRATE INTRAVENOUS at 02:01

## 2023-01-01 RX ADMIN — SUCCINYLCHOLINE CHLORIDE 80 MG: 20 INJECTION, SOLUTION INTRAMUSCULAR; INTRAVENOUS at 02:01

## 2023-01-01 RX ADMIN — FAMOTIDINE 20 MG: 20 TABLET ORAL at 09:01

## 2023-01-01 RX ADMIN — NOREPINEPHRINE BITARTRATE 1 MCG/KG/MIN: 1 INJECTION, SOLUTION INTRAVENOUS at 04:01

## 2023-01-01 RX ADMIN — EPINEPHRINE 0.1 MG: 0.1 INJECTION, SOLUTION ENDOTRACHEAL; INTRACARDIAC; INTRAVENOUS at 03:01

## 2023-01-01 RX ADMIN — NOREPINEPHRINE BITARTRATE 0.7 MCG/KG/MIN: 1 INJECTION, SOLUTION, CONCENTRATE INTRAVENOUS at 01:01

## 2023-01-01 RX ADMIN — PROPOFOL 50 MCG/KG/MIN: 10 INJECTION, EMULSION INTRAVENOUS at 06:01

## 2023-01-01 RX ADMIN — LORAZEPAM 1 MG: 2 INJECTION INTRAMUSCULAR; INTRAVENOUS at 02:01

## 2023-01-01 RX ADMIN — LORAZEPAM 1 MG: 2 INJECTION INTRAMUSCULAR at 02:01

## 2023-01-01 RX ADMIN — Medication 100 MG: at 04:01

## 2023-01-01 RX ADMIN — SODIUM BICARBONATE 50 MEQ: 84 INJECTION INTRAVENOUS at 02:01

## 2023-01-01 RX ADMIN — SODIUM CHLORIDE: 0.9 INJECTION, SOLUTION INTRAVENOUS at 08:01

## 2023-01-01 RX ADMIN — TICAGRELOR 90 MG: 90 TABLET ORAL at 09:01

## 2023-01-01 RX ADMIN — SODIUM CHLORIDE: 0.9 INJECTION, SOLUTION INTRAVENOUS at 01:01

## 2023-01-01 RX ADMIN — CALCIUM CHLORIDE 1 G: 100 INJECTION, SOLUTION INTRAVENOUS at 03:01

## 2023-01-01 RX ADMIN — VASOPRESSIN 20 UNITS: 20 INJECTION INTRAVENOUS at 03:01

## 2023-01-01 RX ADMIN — POTASSIUM CHLORIDE 60 MEQ: 14.9 INJECTION, SOLUTION INTRAVENOUS at 11:01

## 2023-01-01 RX ADMIN — ATROPINE SULFATE 1 MG: 0.1 INJECTION, SOLUTION INTRAVENOUS at 03:01

## 2023-01-01 RX ADMIN — SODIUM BICARBONATE: 84 INJECTION, SOLUTION INTRAVENOUS at 11:01

## 2023-01-01 RX ADMIN — SODIUM CHLORIDE 500 ML: 0.9 INJECTION, SOLUTION INTRAVENOUS at 09:01

## 2023-01-01 RX ADMIN — EPINEPHRINE 0.4 MG: 0.1 INJECTION, SOLUTION ENDOTRACHEAL; INTRACARDIAC; INTRAVENOUS at 03:01

## 2023-01-01 RX ADMIN — SUCCINYLCHOLINE CHLORIDE 20 MG: 20 INJECTION, SOLUTION INTRAMUSCULAR; INTRAVENOUS at 02:01

## 2023-01-01 RX ADMIN — DOBUTAMINE HYDROCHLORIDE 2 MCG/KG/MIN: 400 INJECTION INTRAVENOUS at 03:01

## 2023-01-01 RX ADMIN — NOREPINEPHRINE BITARTRATE 1 MCG/KG/MIN: 1 INJECTION, SOLUTION, CONCENTRATE INTRAVENOUS at 08:01

## 2023-01-01 NOTE — ED PROVIDER NOTES
Encounter Date: 1/1/2023       History     Chief Complaint   Patient presents with    Cardiac Arrest     Pt presents to ER via EMS with STEMI with cardiac arrest with compressions being initiated upon arrival to ER.      67-year-old male with a history of hypertension, hyperlipidemia, peripheral vascular disease, chronic back pain, tobacco abuse transferred from [facility] secondary to ST-elevation MI and cardiac arrest.  The patient presented there with a 2 hour history of chest pain and shortness of breath.  Initial EKG revealed ST elevation MI.  The patient was hypotensive and bradycardic which deteriorated into cardiopulmonary arrest.  He was intubated at the outside facility and resuscitated.  ROSC was achieved and the patient was transferred to this facility for Interventional Cardiology.  The patient again had cardiopulmonary arrest EN route to this facility at approximately 3:12 a.m..  He arrived with chest compressions in progress.      Review of patient's allergies indicates:  No Known Allergies  Past Medical History:   Diagnosis Date    Arthritis     Chronic back pain     Chronic neck pain     Dry gangrene     RIGHT LITTLE TOE    Hypercholesteremia     Hypertension     Insomnia     Multiple falls     S/P BACK SURGERY- 1 FALL    PAD (peripheral artery disease)     Personal history of colonic polyps     PVD (peripheral vascular disease)      Past Surgical History:   Procedure Laterality Date    ANGIOGRAPHY OF LOWER EXTREMITY N/A 09/24/2020    Procedure: Angiogram Extremity Unilateral;  Surgeon: Luke Cabello MD;  Location: [facility] CATH;  Service: Cardiology;  Laterality: N/A;    BACK SURGERY      BUNIONECTOMY Bilateral     CARPAL TUNNEL RELEASE Bilateral     CHOLECYSTECTOMY      COLONOSCOPY N/A 5/9/2022    Procedure: COLONOSCOPY;  Surgeon: Braydon Durand MD;  Location: [facility] ENDO;  Service: Endoscopy;  Laterality: N/A;    COLONOSCOPY W/ POLYPECTOMY      CORONARY ARTERY BYPASS GRAFT      CREATION OF BYPASS FROM  INTERNAL CAROTID ARTERY TO SUBCLAVIAN ARTERY Right 2021    Procedure: CREATION, BYPASS, ARTERIAL, INTERNAL CAROTID TO SUBCLAVIAN;  Surgeon: Jeovany Goins MD;  Location: [facility] OR;  Service: Vascular;  Laterality: Right;    ELBOW ARTHROPLASTY Right     ELBOW SURGERY      MINIMALLY INVASIVE FORAMINOTOMY OF  SPINE N/A 11/15/2018    Procedure: FORAMINOTOMY, SPINE, MINIMALLY INVASIVE DECOMPRESSION L4-5;  Surgeon: Iván Stevenson Jr., MD;  Location: [facility] OR;  Service: Orthopedics;  Laterality: N/A;    NECK SURGERY      acf    PERCUTANEOUS TRANSLUMINAL ANGIOPLASTY (PTA) OF PERIPHERAL VESSEL Right 2020    Procedure: PTA, PERIPHERAL VESSEL of right lower extremity;  Surgeon: Luke Cabello MD;  Location: [facility] CATH;  Service: Cardiology;  Laterality: Right;    PERCUTANEOUS TRANSLUMINAL ANGIOPLASTY (PTA) OF PERIPHERAL VESSEL Left 09/10/2020    Procedure: PTA, PERIPHERAL VESSEL, Left lower extremity;  Surgeon: Luke Cabello MD;  Location: [facility] CATH;  Service: Cardiology;  Laterality: Left;    PERCUTANEOUS TRANSLUMINAL ANGIOPLASTY (PTA) OF PERIPHERAL VESSEL Left 2021    Profunda and SFA     Family History   Problem Relation Age of Onset    COPD Mother     Hypertension Father     Heart disease Father     Heart attack Father     COPD Sister     Other Sister     Kidney failure Brother     Hypertension Brother     Diabetes Brother      Social History     Tobacco Use    Smoking status: Former     Packs/day: 2.00     Years: 60.00     Pack years: 120.00     Types: Cigarettes     Quit date:      Years since quittin.0    Smokeless tobacco: Never   Substance Use Topics    Alcohol use: No    Drug use: No     Review of Systems   Unable to perform ROS: Intubated     Physical Exam     Initial Vitals   BP Pulse Resp Temp SpO2   23 0351 23 0336 23 0451 -- 23 0451   (!) 68/44 (!) 46 (!) 24  (!) 89 %      MAP       --                Physical Exam    Nursing note and vitals  reviewed.  Constitutional: He appears well-developed and well-nourished.   Unresponsive   HENT:   Head: Normocephalic and atraumatic.   7.5 ETT, 25 cm at teeth   Eyes:   Pupils 3 mm and minimally responsive   Neck: Neck supple.   Normal range of motion.  Cardiovascular:            Compressions in progress   Pulmonary/Chest:   Ventilated with bvm   Abdominal: He exhibits distension.   Genitourinary:    Penis normal.     Musculoskeletal:         General: No edema.      Cervical back: Normal range of motion and neck supple.     Neurological:   unresponsive   Skin:   Cold extremities   Psychiatric:   Unable to assess       ED Course   Critical Care    Date/Time: 1/1/2023 5:30 AM  Performed by: Giovana Plaza MD  Authorized by: Giovana Plaza MD   Direct patient critical care time: 30 minutes  Additional history critical care time: 5 minutes  Ordering / reviewing critical care time: 5 minutes  Documentation critical care time: 5 minutes  Consulting other physicians critical care time: 10 minutes  Consult with family critical care time: 10 minutes  Total critical care time (exclusive of procedural time) : 65 minutes  Critical care time was exclusive of separately billable procedures and treating other patients and teaching time.  Critical care was necessary to treat or prevent imminent or life-threatening deterioration of the following conditions: circulatory failure, cardiac failure, shock and respiratory failure.  Critical care was time spent personally by me on the following activities: development of treatment plan with patient or surrogate, discussions with consultants, interpretation of cardiac output measurements, evaluation of patient's response to treatment, examination of patient, obtaining history from patient or surrogate, ordering and performing treatments and interventions, ordering and review of laboratory studies, ordering and review of radiographic studies, pulse oximetry, re-evaluation of  patient's condition, review of old charts, ventilator management and transcutaneous pacing.      Labs Reviewed   CBC W/ AUTO DIFFERENTIAL - Abnormal; Notable for the following components:       Result Value    WBC 16.60 (*)     RBC 3.64 (*)     Hemoglobin 11.4 (*)     Hematocrit 34.6 (*)     MCH 31.3 (*)     Platelets 145 (*)     MPV 8.8 (*)     All other components within normal limits   COMPREHENSIVE METABOLIC PANEL - Abnormal; Notable for the following components:    Sodium 131 (*)     CO2 22 (*)     Glucose 463 (*)     BUN 24 (*)     Calcium 7.8 (*)     Total Protein 5.2 (*)     Albumin 2.6 (*)      (*)      (*)     eGFR 55.4 (*)     All other components within normal limits    Narrative:      glu critical result(s) repeated. Called and verbal readback obtained   from lisa anderson rn er  by NADYA 01/01/2023 05:29   ISTAT PROCEDURE - Abnormal; Notable for the following components:    POC PH 6.875 (*)     POC PCO2 86.1 (*)     POC PO2 28 (*)     POC HCO3 15.9 (*)     POC SATURATED O2 23 (*)     POC Glucose 316 (*)     POC Sodium 135 (*)     POC TCO2 18 (*)     All other components within normal limits   ISTAT PROCEDURE - Abnormal; Notable for the following components:    POC PH 7.192 (*)     POC PCO2 57.7 (*)     POC PO2 71 (*)     POC HCO3 22.1 (*)     POC SATURATED O2 89 (*)     POC Glucose 448 (*)     POC Sodium 134 (*)     POC Hematocrit 35 (*)     All other components within normal limits   MAGNESIUM   TROPONIN I HIGH SENSITIVITY   B-TYPE NATRIURETIC PEPTIDE   LACTIC ACID, PLASMA   TROPONIN I HIGH SENSITIVITY   ISTAT CREATININE     EKG Readings: (Independently Interpreted)   Initial Reading: STEMI. Previous EKG: Compared with most recent EKG   EKG with third-degree heart block at a rate of 32 beats per minute with ST elevation in anterior inferior leads, significant change from prior     Imaging Results              X-Ray Chest AP Portable (In process)                      X-Ray Chest AP  Portable (In process)                   X-Rays:   Independently Interpreted Readings:   Chest X-Ray: Cardiomegaly with increased vascular markings, no obvious pneumothorax   Medications   atropine injection (1 mg Intravenous Given 1/1/23 0337)   EPINEPHrine 0.1 mg/mL injection (0.1 mg Intravenous Given 1/1/23 0339)   sodium bicarbonate 150 mEq in dextrose 5 % 1,150 mL infusion (has no administration in time range)   NORepinephrine 1 mg/mL injection (has no administration in time range)   NORepinephrine 1 mg/mL injection (has no administration in time range)   NORepinephrine 1 mg/mL injection (has no administration in time range)   NORepinephrine 1 mg/mL injection (has no administration in time range)   NORepinephrine 32 mg in dextrose 5 % 250 mL infusion (1 mcg/kg/min × 99.8 kg (Dosing Weight) Intravenous New Bag 1/1/23 0426)   vasopressin (PITRESSIN) 0.2 Units/mL in dextrose 5 % 100 mL infusion (has no administration in time range)   DOPamine 400 mg in dextrose 5 % 250 mL infusion (premix) (has no administration in time range)   calcium chloride 100 mg/mL (10 %) injection (1 g Intravenous Given 1/1/23 0319)   EPINEPHrine 0.1 mg/mL injection (1 mg Intravenous Given 1/1/23 0335)   sodium bicarbonate 8.4 % (1 mEq/mL) injection (50 mEq Intravenous Given 1/1/23 0351)   vasopressin (PITRESSIN) 20 unit/mL injection (20 Units  Given 1/1/23 0351)   rocuronium injection 100 mg (100 mg Intravenous Given 1/1/23 0402)     Medical Decision Making:   ED Management:  67-year-old male transferred from outside facility in cardiac arrest.  Upon arrival chest compressions were in progress.  The patient was given multiple doses of epinephrine, bicarb and calcium.  After approximately 30 minutes of ACLS we did achieve ROSC.  The patient was profoundly bradycardic and was not responsive to 2 doses of atropine so we began transcutaneously pacing him.  He was started on Levophed, vasopressin and dobutamine.  He was also started on a bicarb  drip due to his initial profound metabolic acidosis.  I was not successful at placing a left radial art line so anesthesia was consulted.  Dr. De La Garza with anesthesia was successful in placing a right-sided arterial art line.  Blood pressures were 130s to 140 systolic.  Dr. Horner with Interventional Cardiology was at bedside during the entire resuscitation.  Once the patient had an appropriate blood pressure the decision was made to transfer the patient to the cath lab for angiogram and transvenous pacemaker.  The patient's family has been updated and they are in agreement with the plan of care at this time.  I consulted Hospital Medicine for admission to the ICU.  The patient remains in critical condition.    Giovana Plaza MD  Emergency Medicine  01/01/2023 5:35 AM                            Clinical Impression:   Final diagnoses:  [I46.9] Cardiac arrest (Primary)  [I21.3] ST elevation myocardial infarction (STEMI), unspecified artery        ED Disposition Condition    Admit Stable                Giovana Plaza MD  01/01/23 0512

## 2023-01-01 NOTE — CONSULTS
Pulmonary/Critical Care Consult      Patient name: Peyman Gr  MRN: [id number]  Date: 01/01/2023    Admit Date: 1/1/2023  Consult Requested By: Claudio Hoyos MD    Reason for Consult: respiratory failure    HPI:    1/1/2023 - 68 yo initially presented with chest pain about  2 AM, had bradycardia then VTVF arrest and had prolonged code - transferred to [facility] and was in cardiac arrest at arrival after multiple rounds of meds he had ROSC and taken to cath lab.  In Cath lab had occluded RCA and had successful PCI.  ALso has LM and OM disease.  He has been very unstable and moved to ICU.  He is unresponsive and cool.  He is ventilated.  On dobutrex and levophed, having some ectopy (contacting cardiology to see if they want to start lidocaine or amiodarone, QTc is 487), he is on bicarb drip and last ABG showed adequate oxygenation but a mixed metabolic and respiratory acidosis (vent adjused and will repeat).  No family was in the waiting room.  Chart has been reviewed and case d/w nursing, respiratory and Dr Hoyos.  BY report pt was resuscitated for > 60 minutes.    Review of Systems    Review of Systems   Unable to perform ROS: Mental acuity     Past Medical History    Past Medical History:   Diagnosis Date    Arthritis     Chronic back pain     Chronic neck pain     Dry gangrene     RIGHT LITTLE TOE    Hypercholesteremia     Hypertension     Insomnia     Multiple falls     S/P BACK SURGERY- 1 FALL    PAD (peripheral artery disease)     Personal history of colonic polyps     PVD (peripheral vascular disease)        Past Surgical History    Past Surgical History:   Procedure Laterality Date    ANGIOGRAPHY OF LOWER EXTREMITY N/A 09/24/2020    Procedure: Angiogram Extremity Unilateral;  Surgeon: Luke Cabello MD;  Location: [facility];  Service: Cardiology;  Laterality: N/A;    BACK SURGERY      BUNIONECTOMY Bilateral     CARPAL TUNNEL RELEASE Bilateral     CHOLECYSTECTOMY      COLONOSCOPY N/A 5/9/2022    Procedure:  COLONOSCOPY;  Surgeon: Braydon Durand MD;  Location: [facility] ENDO;  Service: Endoscopy;  Laterality: N/A;    COLONOSCOPY W/ POLYPECTOMY      CORONARY ARTERY BYPASS GRAFT      CREATION OF BYPASS FROM INTERNAL CAROTID ARTERY TO SUBCLAVIAN ARTERY Right 12/27/2021    Procedure: CREATION, BYPASS, ARTERIAL, INTERNAL CAROTID TO SUBCLAVIAN;  Surgeon: Jeovany Goins MD;  Location: [facility] OR;  Service: Vascular;  Laterality: Right;    ELBOW ARTHROPLASTY Right     ELBOW SURGERY      MINIMALLY INVASIVE FORAMINOTOMY OF  SPINE N/A 11/15/2018    Procedure: FORAMINOTOMY, SPINE, MINIMALLY INVASIVE DECOMPRESSION L4-5;  Surgeon: Iván Stevenson Jr., MD;  Location: [facility] OR;  Service: Orthopedics;  Laterality: N/A;    NECK SURGERY      acf    PERCUTANEOUS TRANSLUMINAL ANGIOPLASTY (PTA) OF PERIPHERAL VESSEL Right 05/01/2020    Procedure: PTA, PERIPHERAL VESSEL of right lower extremity;  Surgeon: Luke Cabello MD;  Location: [facility] CATH;  Service: Cardiology;  Laterality: Right;    PERCUTANEOUS TRANSLUMINAL ANGIOPLASTY (PTA) OF PERIPHERAL VESSEL Left 09/10/2020    Procedure: PTA, PERIPHERAL VESSEL, Left lower extremity;  Surgeon: Luke Cabello MD;  Location: [facility] CATH;  Service: Cardiology;  Laterality: Left;    PERCUTANEOUS TRANSLUMINAL ANGIOPLASTY (PTA) OF PERIPHERAL VESSEL Left 07/06/2021    Profunda and SFA       Medications (scheduled):      aspirin  81 mg Oral Daily    insulin regular  5 Units Intravenous Once    NORepinephrine        NORepinephrine        NORepinephrine        NORepinephrine        ticagrelor  90 mg Oral BID       Medications (infusions):      sodium chloride 0.9% 150 mL/hr at 01/01/23 0800    DOBUTamine 10 mcg/kg/min (01/01/23 0702)    DOPamine      NORepinephrine bitartrate-D5W 1 mcg/kg/min (01/01/23 0800)    sodium bicarbonate drip      vasopressin Stopped (01/01/23 0654)       Medications (prn):     acetaminophen, albuterol-ipratropium, atropine, dextrose 10%, dextrose 10%, DOBUTamine,  "EPINEPHrine, glucagon (human recombinant), glucose, glucose, HYDROcodone-acetaminophen, insulin aspart U-100, melatonin, morphine, naloxone, ondansetron, polyethylene glycol, senna-docusate 8.6-50 mg, simethicone, sodium chloride 0.9%    Family History:   Family History   Problem Relation Age of Onset    COPD Mother     Hypertension Father     Heart disease Father     Heart attack Father     COPD Sister     Other Sister     Kidney failure Brother     Hypertension Brother     Diabetes Brother        Social History: Tobacco:   Social History     Tobacco Use   Smoking Status Former    Packs/day: 2.00    Years: 60.00    Pack years: 120.00    Types: Cigarettes    Quit date:     Years since quittin.0   Smokeless Tobacco Never                                EtOH:   Social History     Substance and Sexual Activity   Alcohol Use No                                Drugs:   Social History     Substance and Sexual Activity   Drug Use No       Physical Exam    Vital signs:  Temp:  [92.6 °F (33.7 °C)-98.6 °F (37 °C)]   Pulse:  []   Resp:  [22-30]   BP: (68-86)/(40-52)   SpO2:  [63 %-100 %]     Intake/Output:   Intake/Output Summary (Last 24 hours) at 2023 0923  Last data filed at 2023 0800  Gross per 24 hour   Intake --   Output 1500 ml   Net -1500 ml        BMI: Estimated body mass index is 28.25 kg/m² as calculated from the following:    Height as of 22: 6' 2" (1.88 m).    Weight as of this encounter: 99.8 kg (220 lb).    Physical Exam  Vitals and nursing note reviewed.   Constitutional:       General: He is not in acute distress.     Appearance: Normal appearance. He is not ill-appearing, toxic-appearing or diaphoretic.      Comments: Intubated  Nonresponsive   HENT:      Head: Normocephalic and atraumatic.      Right Ear: External ear normal.      Left Ear: External ear normal.      Nose: Nose normal.      Mouth/Throat:      Mouth: Mucous membranes are moist.      Pharynx: Oropharynx is clear. No " oropharyngeal exudate.      Comments: Intubated   Eyes:      General: No scleral icterus.        Right eye: No discharge.         Left eye: No discharge.      Extraocular Movements: Extraocular movements intact.      Conjunctiva/sclera: Conjunctivae normal.      Comments: + corneals  Pupils unrequal L larger than right and minimally to nonresponsive  + sclera edema   Neck:      Vascular: No carotid bruit.   Cardiovascular:      Rate and Rhythm: Normal rate. Rhythm irregular.      Pulses: Normal pulses.      Heart sounds: Normal heart sounds. No murmur heard.    No friction rub. No gallop.      Comments: Atrial fibrillation, + PVC  Pulmonary:      Effort: Pulmonary effort is normal. No respiratory distress.      Breath sounds: Normal breath sounds. No stridor. No wheezing, rhonchi or rales.      Comments: Ventilated   Chest:      Chest wall: No tenderness.   Abdominal:      General: Bowel sounds are normal. There is no distension.      Tenderness: There is no abdominal tenderness. There is no guarding.   Genitourinary:     Comments: Duron   Musculoskeletal:         General: No swelling. Normal range of motion.      Cervical back: Normal range of motion and neck supple. No rigidity or tenderness.      Right lower leg: No edema.      Left lower leg: No edema.   Lymphadenopathy:      Cervical: No cervical adenopathy.   Skin:     General: Skin is dry.      Coloration: Skin is not jaundiced.      Findings: No bruising.      Comments: Cool to touch   Neurological:      Comments: No response to me   + spontaneous respiratory effort  No response to pain in UE  Some movement of extremities L > R (nonpurposeful)   Psychiatric:      Comments: Not able to assess       Laboratory    Recent Labs   Lab 01/01/23  0453 01/01/23  0852   WBC 16.60*  --    RBC 3.64*  --    HGB 11.4*  --    HCT 34.6* 37   *  --    MCV 95  --    MCH 31.3*  --    MCHC 32.9  --        Recent Labs   Lab 01/01/23  0453   CALCIUM 7.8*   PROT 5.2*   NA  131*   K 5.0   CO2 22*   CL 96   BUN 24*   CREATININE 1.4   ALKPHOS 102   *   *   BILITOT 0.8       Recent Labs   Lab 01/01/23  0453   INR 1.2   APTT 30.3       Recent Labs   Lab 01/01/23  0155   TROPONINI 1.105*       Additional labs: reviewed    Microbiology:       Microbiology Results (last 7 days)       Procedure Component Value Units Date/Time    Culture, Respiratory with Gram Stain [[id number]]     Order Status: No result Specimen: Respiratory from Tracheal Aspirate             Radiology    X-Ray Chest AP Portable  Chest, single view    HISTORY: Nasogastric tube placement.    In the interval, there has been introduction of a nasogastric tube which extends into the left upper quadrant of the abdomen just beyond the GE junction. An endotracheal tube remains in place with its tip positioned well above the level of the saurav.    Bilateral interstitial and mild groundglass opacities, greater on the right are again observed. There are no new confluent infiltrates, volume loss or effusions.    The cardiomediastinal silhouette is stable.    IMPRESSION:    Interval introduction of nasogastric tube extending into the left upper abdomen with its tip just beyond the GE junction.    Otherwise stable cardiopulmonary status.    Electronically signed by:  Elizabeth Sharp MD  1/1/2023 7:01 AM CST Workstation: [id number]  X-Ray Chest AP Portable  Chest, single view    HISTORY: Cardiac arrest.    Comparison 9/9/2022.    Endotracheal tube is in place with its tip positioned well above the level of the saurav.    The heart size is within normal limits. The central pulmonary vasculature is not acutely engorged.  There is arteriosclerotic calcification within the aortic arch.    There is scattered bilateral interstitial and groundglass pulmonary opacities with upper lung zone predominance, greater on the right. No effusion or extrapulmonary air identified.    Surgical changes are noted within the cervical spine and within  the soft tissues of the right side of the neck.    IMPRESSION:    Positioning of endotracheal tube as above.    Interval development of interstitial and groundglass pulmonary opacities with upper lung zone predominance.    Electronically signed by:  Elizabeth Sharp MD  1/1/2023 6:59 AM CST Workstation: [id number]        Additional Studies    reviewed    Ventilator Information    Vent Mode: A/C  Oxygen Concentration (%):  [100] 100  Resp Rate Total:  [24 br/min-30 br/min] 30 br/min  Vt Set:  [540 mL] 540 mL  PEEP/CPAP:  [12 cmH20] 12 cmH20  Pressure Support:  [0 cmH20] 0 cmH20  Mean Airway Pressure:  [19 lxL51-99 cmH20] 22 cmH20         Recent Labs     01/01/23  0852   PH 7.300*   PCO2 57.0*   PO2 353*   HCO3 28.0   POCSATURATED 100   BE 2         Impression    Active Hospital Problems    Diagnosis  POA    *Cardiac arrest [I46.9]  Yes    Acute respiratory failure with hypoxia and hypercarbia [J96.01, J96.02]  Unknown    Leukocytosis [D72.829]  Unknown    Anemia [D64.9]  Unknown    Hyponatremia [E87.1]  Unknown    Transaminitis [R74.01]  Unknown    History of tobacco abuse [Z87.891]  Not Applicable    Primary hypertension [I10]  Yes    PVD (peripheral vascular disease) with claudication [I73.9]  Yes    PAD (peripheral artery disease) [I73.9]  Yes      Resolved Hospital Problems   No resolved problems to display.       Plan    Ventilator, adjust as needed - repeat ABG pending  May be able to stop bicarb drip later  Wean pressors as able - will start with dobutrex  Treatment of ectopy per cardiology  Hold sedatives for now to try and assess neuro response - use sedatives if needed  Follow neuro exam   Pt currently hypothermic - will follow and avoid hyperthermia  Watch renal function and LFT  Monitor electrolytes and replace via SS  Watch H/H  Elevated WBC likely related to stress - follow - no antibiotics at this time    Thank you for this consult.  I will follow with you while the patient is hospitalized.      Critical  Care Time    I have spent > 35 minutes providing critical care services for this pt for the above diagnoses.  These services have included pt evaluation, pt exam, ventilator assessment, discussions with staff, chart review, data review, note preparation and .  Medications have been reviewed and adjusted as needed.  The patient has life threatening illness with a high risk of decompensation and/or death.      Nick Padgett MD  [facility] Pulmonary/Critical Care

## 2023-01-01 NOTE — ANESTHESIA PROCEDURE NOTES
Arterial    Diagnosis: MI    Patient location during procedure: done out of OR  Procedure start time: 1/1/2023 4:20 AM  Timeout: 1/1/2023 4:20 AM  Procedure end time: 1/1/2023 4:50 AM    Staffing  Authorizing Provider: Chester De La Garza MD  Performing Provider: Chester De La Garza MD    Anesthesiologist was present at the time of the procedure.    Preanesthetic Checklist  Completed: patient identified, IV checked, site marked, risks and benefits discussed, surgical consent, monitors and equipment checked, pre-op evaluation, timeout performed and anesthesia consent givenArterial  Skin Prep: chlorhexidine gluconate  Local Infiltration: none  Orientation: right  Location: radial    Catheter Size: 20 G  Catheter placement by Ultrasound guidance. Heme positive aspiration all ports.   Vessel Caliber: medium, patent, compressibility normal  Needle advanced into vessel with real time Ultrasound guidance.  Sterile sheath used.Insertion Attempts: 1  Assessment  Dressing: secured with tape and tegaderm and sutured in place and taped  Patient: Tolerated well

## 2023-01-01 NOTE — NURSING
[id number] Received to 3022 from cath lab. Right groin. arterial and venous sheaths in place. A-line pressure bag placed to arterial sheath. IVF's at 150cc hour to venous sheath.   Intubated and unresponsive. Temp 92.7 ax.  BP labile. Titrating Levo up to maintain adequate BP. Vasopressin resumed. Dobutamine remains at 10mcgs.  Having frequent PVC's and frequent runs VT.  Updated Dr. Elizondo. Orders to wean dobutamine and give another 500cc NS bolus.    and Dr. Hoyos at bedside. Updated. ABG repeated. Fio2 decreased to 80%.

## 2023-01-01 NOTE — Clinical Note
Diagnosis: ST elevation myocardial infarction involving left main coronary artery [212024]   Admitting Provider:: KAMINI FIGUEROA [[id number]]   Future Attending Provider: IRAM CLARKE [[id number]]   Reason for IP Medical Treatment  (Clinical interventions that can only be accomplished in the IP setting? ) :: STEMI   Estimated Length of Stay:: 2 midnights   I certify that Inpatient services for greater than or equal to 2 midnights are medically necessary:: Yes   Plans for Post-Acute care--if anticipated (pick the single best option):: A. No post acute care anticipated at this time

## 2023-01-01 NOTE — CARE UPDATE
01/01/23 0852   Patient Assessment/Suction   Level of Consciousness (AVPU) unresponsive   Respiratory Effort Unlabored;Normal   Expansion/Accessory Muscles/Retractions expansion symmetric   All Lung Fields Breath Sounds clear;diminished   Rhythm/Pattern, Respiratory assisted mechanically   PRE-TX-O2   Device (Oxygen Therapy) ventilator   Oxygen Concentration (%) 100   SpO2 100 %   $ Pulse Oximetry - Multiple Charge Pulse Oximetry - Multiple   Pulse 80   Resp (!) 30   BP (!) 96/59   Vent Select   Conventional Vent Y   $ Ventilator Subsequent 1   Charged w/in last 24h YES   Preset Conventional Ventilator Settings   Vent ID [id number]   Vent Type    Ventilation Type VC   Vent Mode A/C   Humidity HME   Set Rate 30 BPM   Vt Set 540 mL   PEEP/CPAP 12 cmH20   Peak Flow 60 L/min   Peak End Inspiratory Pressure 33 cmH20   I-Trigger Type  V-TRIG   Trigger Sensitivity Flow/I-Trigger 2 L/min   Patient Ventilator Parameters   Resp Rate Total 30 br/min   Peak Airway Pressure 47 cmH20   Mean Airway Pressure 27 cmH20   Plateau Pressure 32 cmH20   Exhaled Vt 528 mL   Total Ve 16.3 L/m   I:E Ratio Measured 1.00:1   Auto PEEP 0 cmH20   Conventional Ventilator Alarms   Resp Rate High Alarm 40 br/min   Press High Alarm 60 cmH2O   Apnea Rate 10   Apnea Volume (mL) 0 mL   Apnea Oxygen Concentration  100   Apnea Flow Rate (L/min) 65   T Apnea 20 sec(s)   Ready to Wean/Extubation Screen   FIO2<=50 (chart decimal) (!) 1   MV<16L (chart vol.) (!) 16.3   PEEP <=8 (chart #) (!) 12   Ready to Wean Parameters   F/VT Ratio<105 (RSBI) (!) 56.82   Vital Capacity (mL) 0   Education   $ Education Ventilator Oxygen;Other (see comment);30 min   Code Blue/Rapid Response   $ Was an ABG obtained? Arterial Blood Draw from Existing Line;ISTAT - Blood gas;ISTAT - Calcium;ISTAT - Hematocrit;ISTAT - Potassium;ISTAT - Sodium   Labs   $ Labs Tech Time 15 min   Critical Value Communication   Date Result Received 01/01/23   Time Result Received 0852   Resulting  Department of Critical Value RESP   Who communicated critical value from resulting department? KDAO RRT   Critical Test #1 PH   Critical Test #1 Result 7.300   Critical Test #2 PCO2   Critical Test #2 Result 57.0   Physician Directive   (DECREASED PEEP TO 8, FIO2 TO 80%, REPEAT ABG IN 1 HOUR)   Respiratory Evaluation   $ Care Plan Tech Time 30 min

## 2023-01-01 NOTE — CONSULTS
[facility]  Department of Cardiology  Consult Note      PATIENT NAME: Peyman Gr    MRN: [id number]  TODAY'S DATE: 01/01/2023  ADMIT DATE: 1/1/2023                          CONSULT REQUESTED BY: Claudio Hoyos MD    SUBJECTIVE     PRINCIPAL PROBLEM: Cardiac arrest      REASON FOR CONSULT:  STEMI      HPI:  67-year-old male with past medical history of peripheral vascular disease, right subclavian stenosis, chronic smoker, hypertension, hyperlipidemia presented to oxygen [facility] with 2 hour history of chest pain, shortness of breath.  ECG showed junctional bradycardia, complete heart block with inferior and anterolateral ST elevations.  STEMI code was called to which I responded immediately and agreed to transfer the patient to [facility] cath lab for emergent cardiac catheterization.  However, patient unfortunately went into cardiac arrest at [facility] with VT, VF, asystole requiring resuscitation and intubation.  Patient was resuscitated for about 40 minutes ROSC was achieved.  Patient was moving extremities at the time as per the ER.  Patient was then transferred to [facility] ER and patient again had cardiac arrest upon arrival to the ER which required resuscitation for 30 minutes.  Patient was in PEA initially and ROSC was achieved subsequently however patient was dependent on transcutaneous pacing.  Patient blood pressure was very low and could not be obtained with a BP cuff.  Patient was unable to be transferred to the cath lab at that point due to significant hemodynamic instability. Patient was started on pressors.  A-line was placed in the ED and blood pressure eventually improved with high doses of vasopressin, norepinephrine, sodium bicarbonate and dobutamine infusions.  Discussed with patient's family members regarding the patient's critical condition.  Once the blood pressure was stabilized on the pressors, patient was  brought to the cath lab for transvenous pacemaker placement and coronary angiogram.  Benefits and risks of the procedure explained to patient's family and patient's family  agreed for the procedure.      Review of patient's allergies indicates:  No Known Allergies    Past Medical History:   Diagnosis Date    Arthritis     Chronic back pain     Chronic neck pain     Dry gangrene     RIGHT LITTLE TOE    Hypercholesteremia     Hypertension     Insomnia     Multiple falls     S/P BACK SURGERY- 1 FALL    PAD (peripheral artery disease)     Personal history of colonic polyps     PVD (peripheral vascular disease)      Past Surgical History:   Procedure Laterality Date    ANGIOGRAPHY OF LOWER EXTREMITY N/A 09/24/2020    Procedure: Angiogram Extremity Unilateral;  Surgeon: Luke Cabello MD;  Location: [facility] CATH;  Service: Cardiology;  Laterality: N/A;    BACK SURGERY      BUNIONECTOMY Bilateral     CARPAL TUNNEL RELEASE Bilateral     CHOLECYSTECTOMY      COLONOSCOPY N/A 5/9/2022    Procedure: COLONOSCOPY;  Surgeon: Braydon Durand MD;  Location: [facility] ENDO;  Service: Endoscopy;  Laterality: N/A;    COLONOSCOPY W/ POLYPECTOMY      CORONARY ARTERY BYPASS GRAFT      CREATION OF BYPASS FROM INTERNAL CAROTID ARTERY TO SUBCLAVIAN ARTERY Right 12/27/2021    Procedure: CREATION, BYPASS, ARTERIAL, INTERNAL CAROTID TO SUBCLAVIAN;  Surgeon: Jeovany Goins MD;  Location: [facility] OR;  Service: Vascular;  Laterality: Right;    ELBOW ARTHROPLASTY Right     ELBOW SURGERY      MINIMALLY INVASIVE FORAMINOTOMY OF  SPINE N/A 11/15/2018    Procedure: FORAMINOTOMY, SPINE, MINIMALLY INVASIVE DECOMPRESSION L4-5;  Surgeon: Iván Stevenson Jr., MD;  Location: [facility] OR;  Service: Orthopedics;  Laterality: N/A;    NECK SURGERY      acf    PERCUTANEOUS TRANSLUMINAL ANGIOPLASTY (PTA) OF PERIPHERAL VESSEL Right 05/01/2020    Procedure: PTA, PERIPHERAL VESSEL of right lower extremity;  Surgeon: Luke Cabello MD;  Location: [facility] CATH;   Service: Cardiology;  Laterality: Right;    PERCUTANEOUS TRANSLUMINAL ANGIOPLASTY (PTA) OF PERIPHERAL VESSEL Left 09/10/2020    Procedure: PTA, PERIPHERAL VESSEL, Left lower extremity;  Surgeon: Luke Cabello MD;  [facility];  Service: Cardiology;  Laterality: Left;    PERCUTANEOUS TRANSLUMINAL ANGIOPLASTY (PTA) OF PERIPHERAL VESSEL Left 2021    Profunda and SFA     Social History     Tobacco Use    Smoking status: Former     Packs/day: 2.00     Years: 60.00     Pack years: 120.00     Types: Cigarettes     Quit date:      Years since quittin.0    Smokeless tobacco: Never   Substance Use Topics    Alcohol use: No    Drug use: No        REVIEW OF SYSTEMS  Unable to obtain.  Patient was intubated    OBJECTIVE     VITAL SIGNS (Most Recent)  Pulse: 96 (23)  Resp: (!) 30 (23)  BP: (!) 68/44 (23 035)  SpO2: 100 % (23)    VENTILATION STATUS  Resp: (!) 30 (23)  SpO2: 100 % (23)  Vent Mode: A/C  Oxygen Concentration (%):  [100] 100  Resp Rate Total:  [24 br/min-30 br/min] 30 br/min  Vt Set:  [540 mL] 540 mL  PEEP/CPAP:  [12 cmH20] 12 cmH20  Pressure Support:  [0 cmH20] 0 cmH20  Mean Airway Pressure:  [19 ruP76-50 cmH20] 22 cmH20    I & O (Last 24H):No intake or output data in the 24 hours ending 23 0751    WEIGHTS  Wt Readings from Last 1 Encounters:   23 0341 99.8 kg (220 lb)       PHYSICAL EXAM  GENERAL: well built, well nourished, well-developed.  Intubated HEENT: Normocephalic.   NECK: No JVD.   CARDIAC: irRegular rate and rhythm, Gabriel S1, S2   CHEST ANATOMY: normal.   LUNGS: + bilateral air entry   ABDOMEN: Soft.  Normal bowel sounds.   URINARY: No cohen catheter   EXTREMITIES:  No edema    CENTRAL NERVOUS SYSTEM:  Intubated  SKIN: Skin without lesions, moist, well perfused.     HOME MEDICATIONS:  Current Facility-Administered Medications on File Prior to Encounter   Medication Dose Route Frequency Provider Last Rate Last  Admin    0.9%  NaCl infusion   Intravenous Continuous Braydon Durand MD   Stopped at 05/09/22 1011    [COMPLETED] EPINEPHrine 0.1 mg/mL injection    Code/trauma/sedation Edwardo Gonsalves III, MD   1 mg at 01/01/23 0245    [COMPLETED] etomidate injection   Intravenous Code/trauma/sedation Edwardo Gonsalves III, MD   20 mg at 01/01/23 0215    [COMPLETED] NORepinephrine 4 mg in dextrose 5 % 250 mL infusion   Intravenous Code/Trauma/sedation Continuous Edwardo Gonsalves III, MD 6.8 mL/hr at 01/01/23 0255 0.02 mcg/kg/min at 01/01/23 0255    [COMPLETED] sodium bicarbonate 8.4 % (1 mEq/mL) injection   Intravenous Code/trauma/sedation Edwardo Gonsalves III, MD   50 mEq at 01/01/23 0252    [COMPLETED] succinylcholine injection   Intravenous Code/trauma/sedation Edwardo Gonsalves III, MD   20 mg at 01/01/23 0216    [COMPLETED] aspirin tablet 325 mg  325 mg Oral ED 1 Time Peyman Gonsalves III, MD   325 mg at 01/01/23 0200    [COMPLETED] atropine injection 1 mg  1 mg Intravenous ED 1 Time Peyman Gonsalves III, MD        [DISCONTINUED] atropine injection   Intravenous Code/trauma/sedation Edwardo Gonsalves III, MD   1 mg at 01/01/23 0157    [DISCONTINUED] etomidate (AMIDATE) 2 mg/mL injection             [DISCONTINUED] lactated ringers bolus 1,000 mL  1,000 mL Intravenous ED 1 Time Peyman Gonsalves III, MD        [COMPLETED] LORazepam injection 1 mg  1 mg Intravenous ED 1 Time Peyman Gonsalves III, MD   1 mg at 01/01/23 0215    [DISCONTINUED] NORepinephrine bitartrate-D5W 4 mg/250 mL (16 mcg/mL) infusion Soln             [DISCONTINUED] propofoL (DIPRIVAN) 10 mg/mL infusion             [DISCONTINUED] rocuronium 10 mg/mL injection             [DISCONTINUED] succinylcholine (ANECTINE) 20 mg/mL injection              Current Outpatient Medications on File Prior to Encounter   Medication Sig Dispense Refill    albuterol (PROVENTIL/VENTOLIN HFA) 90 mcg/actuation inhaler Inhale 1-2 puffs into the lungs every 6  (six) hours as needed for Shortness of Breath. Rescue 8 g 0    amLODIPine (NORVASC) 10 MG tablet Take 1 tablet (10 mg total) by mouth once daily. 30 tablet 1    amLODIPine (NORVASC) 5 MG tablet Take 5 mg by mouth once daily.      aspirin 81 MG Chew Take 81 mg by mouth once daily.      carvediloL (COREG) 25 MG tablet Take 25 mg by mouth 2 (two) times daily with meals.      clopidogreL (PLAVIX) 75 mg tablet Take 75 mg by mouth once daily. On hold for sx on 12/27/21      ezetimibe (ZETIA) 10 mg tablet Take 10 mg by mouth once daily.      hydroCHLOROthiazide (HYDRODIURIL) 12.5 MG Tab Take 1 tablet (12.5 mg total) by mouth once daily. 30 tablet 1    HYDROcodone-acetaminophen (NORCO)  mg per tablet Take 1 tablet by mouth every 8 (eight) hours as needed for Pain.      mupirocin (BACTROBAN) 2 % ointment Apply topically 3 (three) times daily. 22 g 0    oxyCODONE-acetaminophen (PERCOCET) 7.5-325 mg per tablet Take 1 tablet by mouth 2 (two) times daily as needed.      traMADoL (ULTRAM) 50 mg tablet Take 50 mg by mouth every 6 (six) hours as needed.      valsartan (DIOVAN) 160 MG tablet Take 160 mg by mouth once daily.         SCHEDULED MEDS:   aspirin  81 mg Oral Daily    insulin regular  5 Units Intravenous Once    NORepinephrine        NORepinephrine        NORepinephrine        NORepinephrine        ticagrelor  90 mg Oral BID       CONTINUOUS INFUSIONS:   sodium chloride 0.9%      DOBUTamine 10 mcg/kg/min (01/01/23 0702)    DOPamine      NORepinephrine bitartrate-D5W 1.5 mcg/kg/min (01/01/23 0655)    NORepinephrine bitartrate-D5W 2 mcg/kg/min (01/01/23 0645)    sodium bicarbonate drip      vasopressin Stopped (01/01/23 0654)       PRN MEDS:atropine, DOBUTamine, EPINEPHrine, heparin (porcine), insulin regular, iohexoL, LIDOcaine HCL 10 mg/ml (1%), ticagrelor    LABS AND DIAGNOSTICS     CBC LAST 3 DAYS  Recent Labs   Lab 01/01/23  0155 01/01/23  0330 01/01/23  0451 01/01/23  0453   WBC 5.49  --   --  16.60*   RBC 4.19*   --   --  3.64*   HGB 13.0*  --   --  11.4*   HCT 39.2* 38 35* 34.6*   MCV 94  --   --  95   MCH 31.0  --   --  31.3*   MCHC 33.2  --   --  32.9   RDW 14.4  --   --  14.3     --   --  145*   MPV 9.1*  --   --  8.8*   GRAN 38.5  2.1  --   --  72.0   LYMPH 46.4  2.6  --   --  15.0*   MONO 12.0  0.7  --   --  2.0*   BASO 0.03  --   --   --    NRBC 0  --   --  0       COAGULATION LAST 3 DAYS  Recent Labs   Lab 01/01/23 0453   INR 1.2   APTT 30.3       CHEMISTRY LAST 3 DAYS  Recent Labs   Lab 01/01/23 0155 01/01/23  0330 01/01/23 0451 01/01/23 0453   *  --   --  131*   K 4.2  --   --  5.0     --   --  96   CO2 20*  --   --  22*   ANIONGAP 15  --   --  13   BUN 20  --   --  24*   CREATININE 1.4  --   --  1.4   *  --   --  463*   CALCIUM 8.8  --   --  7.8*   PH  --  6.875* 7.192*  --    MG 2.0  --   --  2.2   ALBUMIN 3.8  --   --  2.6*   PROT 7.3  --   --  5.2*   ALKPHOS 88  --   --  102   ALT 37  --   --  278*   AST 44*  --   --  294*   BILITOT 0.3  --   --  0.8       CARDIAC PROFILE LAST 3 DAYS  Recent Labs   Lab 01/01/23 0155 01/01/23 0453   BNP  --  76   TROPONINI 1.105*  --    TROPONINIHS  --  1459.0*       ENDOCRINE LAST 3 DAYS  No results for input(s): TSH, PROCAL in the last 168 hours.    LAST ARTERIAL BLOOD GAS  ABG  Recent Labs   Lab 01/01/23 0451   PH 7.192*   PO2 71*   PCO2 57.7*   HCO3 22.1*   BE -6       LAST 7 DAYS MICROBIOLOGY   Microbiology Results (last 7 days)       Procedure Component Value Units Date/Time    Culture, Respiratory with Gram Stain [[id number]]     Order Status: No result Specimen: Respiratory from Tracheal Aspirate             MOST RECENT IMAGING  X-Ray Chest AP Portable  Chest, single view    HISTORY: Nasogastric tube placement.    In the interval, there has been introduction of a nasogastric tube which extends into the left upper quadrant of the abdomen just beyond the GE junction. An endotracheal tube remains in place with its tip positioned well above  the level of the saurav.    Bilateral interstitial and mild groundglass opacities, greater on the right are again observed. There are no new confluent infiltrates, volume loss or effusions.    The cardiomediastinal silhouette is stable.    IMPRESSION:    Interval introduction of nasogastric tube extending into the left upper abdomen with its tip just beyond the GE junction.    Otherwise stable cardiopulmonary status.    Electronically signed by:  Elizabeth Sharp MD  1/1/2023 7:01 AM CST Workstation: [id number]  X-Ray Chest AP Portable  Chest, single view    HISTORY: Cardiac arrest.    Comparison 9/9/2022.    Endotracheal tube is in place with its tip positioned well above the level of the saurav.    The heart size is within normal limits. The central pulmonary vasculature is not acutely engorged.  There is arteriosclerotic calcification within the aortic arch.    There is scattered bilateral interstitial and groundglass pulmonary opacities with upper lung zone predominance, greater on the right. No effusion or extrapulmonary air identified.    Surgical changes are noted within the cervical spine and within the soft tissues of the right side of the neck.    IMPRESSION:    Positioning of endotracheal tube as above.    Interval development of interstitial and groundglass pulmonary opacities with upper lung zone predominance.    Electronically signed by:  Elizabeth Sharp MD  1/1/2023 6:59 AM CST Workstation: [id number]      ECHOCARDIOGRAM RESULTS (last 5)  No results found for this or any previous visit.      CURRENT/PREVIOUS VISIT EKG  Results for orders placed or performed during the hospital encounter of 09/09/22   EKG 12-lead    Collection Time: 09/09/22  2:42 PM    Narrative    Test Reason : R07.9,    Vent. Rate : 065 BPM     Atrial Rate : 065 BPM     P-R Int : 156 ms          QRS Dur : 084 ms      QT Int : 392 ms       P-R-T Axes : 081 060 068 degrees     QTc Int : 407 ms    Normal sinus rhythm  Normal ECG  When  compared with ECG of 21-DEC-2021 10:48,  Nonspecific T wave abnormality no longer evident in Inferior leads  Confirmed by Nick Arce MD ([id number]) on 9/9/2022 3:11:40 PM    Referred By: PHOEBE OSCAR MD           Confirmed By:Nick Arce MD           ASSESSMENT/PLAN:     Active Hospital Problems    Diagnosis    *Cardiac arrest    History of tobacco abuse    Primary hypertension    PVD (peripheral vascular disease) with claudication    PAD (peripheral artery disease)       ASSESSMENT & PLAN:   STEMI- inferior wall MI complicated by complete heart block and RV shock  Cardiac arrest status post resuscitation and intubation (patient was resuscitated for 60-70 minutes in total)  Peripheral vascular disease  Chronic smoking  Hypertension  hyperlipidemia      RECOMMENDATIONS:  -Status post coronary angiogram ->  100% thrombotic flush occlusion of RCA at the ostium (culprit for STEMI).  Discrete 70% ostial left main stenosis.  Discrete 90% OM stenosis.   -status post successful PTCA and drug eluting stent placement to ostial RCA with re-establishment of DAIN 3 flow through the vessel.  -left ventriculography showed hyperdynamic LV function.  -TVP was placed prior to angiogram  -continue aspirin, Brilinta  -IV hydration  -patient's blood pressure was improving after the PCI.  We will titrate down the pressors as tolerated.  -keep TVP for now  -vent management per [facility]  -monitor urine output, BUN creatinine, monitor access site  -monitor lactate levels  -discussed with patient's family  -medical management of the left main lesion at this point given the hyperdynamic LV function.  -monitor neurological function  -we will follow  -patient condition:  Critical            Stefany Elizondo MD  [facility]  Department of Cardiology  Date of Service: 01/01/2023  7:51 AM

## 2023-01-01 NOTE — PLAN OF CARE
Counts include 234 beds at the [facility]  Initial Discharge Assessment       Primary Care Provider: Jamal Cornejo MD    Admission Diagnosis: Cardiac arrest [I46.9]    Admission Date: 1/1/2023  Expected Discharge Date:          Payor: [employer] MANAGED MEDICARE / Plan: [employer] MEDICARE HMO / Product Type: Capitation /     Extended Emergency Contact Information  Primary Emergency Contact: Thania Gr  Address: [address] [place] [address], [place] [id number] [place] [address]  Home Phone: [phone]  Mobile Phone: [phone]  Relation: Spouse  Preferred language: English   needed? No  Secondary Emergency Contact: Rachel Flores  Home Phone: [phone]  Mobile Phone: [phone]  Relation: Daughter    Discharge Plan A: Home with family  Discharge Plan B: Home with family      [facility] - [place], [place] - [address]  [address]  [place] [address]  Phone: [phone] Fax: [phone]    [id number] - [place], [place] - [address] AT [facility]  [address]  [place] [address]  Phone: [phone] Fax: [phone]    [facility] - [facility] [address] [facility]  [address] [facility] [address]  Phone: [phone] Fax: [phone]      Initial Assessment (most recent)       Adult Discharge Assessment - 01/01/23 1448          Discharge Assessment    Assessment Type Discharge Planning Assessment     Confirmed/corrected address, phone number and insurance Yes     Confirmed Demographics Correct on Facesheet     Source of Information family;health record     Reason For Admission Cardiac arrest     People in Home spouse;grandchild(michelle)     Facility Arrived From: Home     Do you expect to return to your current living situation? Other (see comments)     Do you have help at home or someone to help you manage your care at home? Yes     Who are your caregiver(s) and their phone number(s)? Thania Gr  (Spouse)   [phone] (Mobile)     Prior to hospitilization cognitive status: Alert/Oriented     Current cognitive status: Coma/Sedated/Intubated     Equipment Currently Used at Home none     Patient currently being followed by outpatient case management? No     Do you currently have service(s) that help you manage your care at home? No     Do you take prescription medications? Yes     Do you have prescription coverage? Yes     Coverage [employer]     Do you have any problems affording any of your prescribed medications? No     Is the patient taking medications as prescribed? yes     Who is going to help you get home at discharge? Thania Gr (Spouse)   [phone] (Mobile)     How do you get to doctors appointments? car, drives self     Are you on dialysis? No     Do you take coumadin? No     Discharge Plan A Home with family     Discharge Plan B Home with family     DME Needed Upon Discharge  none     Discharge Plan discussed with: Spouse/sig other     Name(s) and Number(s) Thania Gr (Spouse)   [phone] (Mobile)

## 2023-01-01 NOTE — PLAN OF CARE
Remains intubated and sedated. Nonprod cough. Minimal oral secretions. Able to wean off Dobutrex and Vasopressin. Titrating Levo as needed.   Continues to have frequent PVC's, runs of VT, paced at times. ECHO done.   Duron intact with adequate urine output.   Potassium 3.2 on ABG lytes.  60 meq KCL rider given.   Safety maintained. Soft wrist restraints in use.

## 2023-01-01 NOTE — ED PROVIDER NOTES
Encounter Date: 1/1/2023       History     Chief Complaint   Patient presents with    Chest Pain     SOB that began 3 hours ago     Chief complaint: Shortness of breath    HPI:  67-year-old male presents with a 2 hour history of shortness of breath and chest tightness.  He denies any known history of coronary artery disease or lung disease but repeatedly request breathing treatments.  Past medical history is significant for hypertension, hyperlipidemia, diabetes and peripheral artery disease.  Reportedly has a peripheral vascular stent.  He also has chronic back pain.    Review of patient's allergies indicates:  No Known Allergies  Past Medical History:   Diagnosis Date    Arthritis     Chronic back pain     Chronic neck pain     Dry gangrene     RIGHT LITTLE TOE    Hypercholesteremia     Hypertension     Insomnia     Multiple falls     S/P BACK SURGERY- 1 FALL    PAD (peripheral artery disease)     Personal history of colonic polyps     PVD (peripheral vascular disease)      Past Surgical History:   Procedure Laterality Date    ANGIOGRAPHY OF LOWER EXTREMITY N/A 09/24/2020    Procedure: Angiogram Extremity Unilateral;  Surgeon: Luke Cabello MD;  Location: [facility] CATH;  Service: Cardiology;  Laterality: N/A;    BACK SURGERY      BUNIONECTOMY Bilateral     CARPAL TUNNEL RELEASE Bilateral     CHOLECYSTECTOMY      COLONOSCOPY N/A 5/9/2022    Procedure: COLONOSCOPY;  Surgeon: Braydon Durand MD;  Location: [facility] ENDO;  Service: Endoscopy;  Laterality: N/A;    COLONOSCOPY W/ POLYPECTOMY      CORONARY ANGIOGRAPHY N/A 1/1/2023    Procedure: ANGIOGRAM, CORONARY ARTERY;  Surgeon: Stefany Elizondo MD;  Location: [facility] CATH/EP LAB;  Service: Cardiology;  Laterality: N/A;    CORONARY ARTERY BYPASS GRAFT      CREATION OF BYPASS FROM INTERNAL CAROTID ARTERY TO SUBCLAVIAN ARTERY Right 12/27/2021    Procedure: CREATION, BYPASS, ARTERIAL, INTERNAL CAROTID TO SUBCLAVIAN;  Surgeon: Jeovany Goins MD;  Location: [facility]  OR;  Service: Vascular;  Laterality: Right;    ELBOW ARTHROPLASTY Right     ELBOW SURGERY      INSERTION, PACEMAKER, TEMPORARY TRANSVENOUS  1/1/2023    Procedure: Insertion, Pacemaker, Temporary Transvenous;  Surgeon: Stefany Elizondo MD;  Location: [facility] CATH/EP LAB;  Service: Cardiology;;    IVUS, CORONARY  1/1/2023    Procedure: IVUS, Coronary;  Surgeon: Stefany Elizondo MD;  Location: [facility] CATH/EP LAB;  Service: Cardiology;;    LEFT HEART CATHETERIZATION Left 1/1/2023    Procedure: Left heart cath;  Surgeon: Stefany Elizondo MD;  Location: [facility] CATH/EP LAB;  Service: Cardiology;  Laterality: Left;    MINIMALLY INVASIVE FORAMINOTOMY OF  SPINE N/A 11/15/2018    Procedure: FORAMINOTOMY, SPINE, MINIMALLY INVASIVE DECOMPRESSION L4-5;  Surgeon: Iván Stevenson Jr., MD;  Location: [facility] OR;  Service: Orthopedics;  Laterality: N/A;    NECK SURGERY      acf    PERCUTANEOUS CORONARY INTERVENTION, ARTERY N/A 1/1/2023    Procedure: Percutaneous coronary intervention;  Surgeon: Stefany Elizondo MD;  Location: [facility] CATH/EP LAB;  Service: Cardiology;  Laterality: N/A;    PERCUTANEOUS TRANSLUMINAL ANGIOPLASTY (PTA) OF PERIPHERAL VESSEL Right 05/01/2020    Procedure: PTA, PERIPHERAL VESSEL of right lower extremity;  Surgeon: Luke Cabello MD;  Location: [facility] CATH;  Service: Cardiology;  Laterality: Right;    PERCUTANEOUS TRANSLUMINAL ANGIOPLASTY (PTA) OF PERIPHERAL VESSEL Left 09/10/2020    Procedure: PTA, PERIPHERAL VESSEL, Left lower extremity;  Surgeon: Luke Cabello MD;  Location: [facility] CATH;  Service: Cardiology;  Laterality: Left;    PERCUTANEOUS TRANSLUMINAL ANGIOPLASTY (PTA) OF PERIPHERAL VESSEL Left 07/06/2021    Profunda and SFA     Family History   Problem Relation Age of Onset    COPD Mother     Hypertension Father     Heart disease Father     Heart attack Father     COPD Sister     Other Sister     Kidney failure Brother     Hypertension Brother     Diabetes Brother       Social History     Tobacco Use    Smoking status: Former     Packs/day: 2.00     Years: 60.00     Pack years: 120.00     Types: Cigarettes     Quit date: 2017     Years since quittin.0    Smokeless tobacco: Never   Substance Use Topics    Alcohol use: No    Drug use: No     Review of Systems   Constitutional:  Negative for activity change, appetite change, chills, fatigue and fever.   Eyes:  Negative for visual disturbance.   Respiratory:  Positive for chest tightness and shortness of breath. Negative for apnea.    Cardiovascular:  Negative for chest pain and palpitations.   Gastrointestinal:  Negative for abdominal distention, abdominal pain, nausea and vomiting.   Genitourinary:  Negative for difficulty urinating.   Musculoskeletal:  Negative for neck pain.   Skin:  Negative for pallor and rash.   Neurological:  Negative for headaches.   Hematological:  Does not bruise/bleed easily.   Psychiatric/Behavioral:  Positive for agitation.         Anxious     Physical Exam     Initial Vitals   BP Pulse Resp Temp SpO2   23 0155 23 0155 23 0158 23 0158 23 0155   (!) 75/46 (!) 34 (!) 22 98.6 °F (37 °C) (!) 91 %      MAP       --                Physical Exam    Nursing note and vitals reviewed.  Constitutional: He appears well-developed and well-nourished.   HENT:   Head: Normocephalic and atraumatic.   Eyes: Conjunctivae are normal.   Neck: Neck supple.   JVD   Normal range of motion.  Cardiovascular:  Regular rhythm and normal heart sounds.     Exam reveals no gallop and no friction rub.       No murmur heard.  Bradycardic rate   Pulmonary/Chest: Breath sounds normal. No respiratory distress. He has no wheezes. He has no rhonchi. He has no rales.   Abdominal: Abdomen is soft. He exhibits no distension. There is no abdominal tenderness.   Musculoskeletal:         General: Normal range of motion.      Cervical back: Normal range of motion and neck supple.     Neurological: He is alert  and oriented to person, place, and time.   Skin: Skin is warm and dry.   Psychiatric:   Anxious       ED Course   Intubation    Date/Time: 1/1/2023 3:21 AM  Location procedure was performed: [facility] EMERGENCY DEPARTMENT  Performed by: Peyman Gonsalves III, MD  Authorized by: Peyman Gonsalves III, MD   Indications: respiratory failure, airway protection and hypoxemia  Intubation method: direct  Patient status: paralyzed (RSI)  Preoxygenation: bag valve mask  Sedatives: etomidate  Paralytic: succinylcholine  Laryngoscope size: Mac 4  Tube size: 7.5 mm  Tube type: cuffed  Number of attempts: 1  Cricoid pressure: yes  Cords visualized: yes  Post-procedure assessment: CO2 detector  Breath sounds: rales/crackles and equal and absent over the epigastrium  ETT to lip: 23 cm  Tube secured with: ETT domínguez  Chest x-ray interpreted by: Unable to obtain verification via x-ray due to cardiac arrest.      Critical Care    Date/Time: 1/12/2023 2:36 PM  Performed by: Peyman Gonsalves III, MD  Authorized by: Peyman Gonsalves III, MD   Direct patient critical care time: 120 minutes  Total critical care time (exclusive of procedural time) : 120 minutes  Critical care was necessary to treat or prevent imminent or life-threatening deterioration of the following conditions: cardiac failure, respiratory failure and circulatory failure.  Critical care was time spent personally by me on the following activities: development of treatment plan with patient or surrogate, discussions with consultants, discussions with primary provider, evaluation of patient's response to treatment, examination of patient, obtaining history from patient or surrogate, ordering and performing treatments and interventions, ordering and review of laboratory studies, ordering and review of radiographic studies, pulse oximetry, re-evaluation of patient's condition, review of old charts and ventilator management.      Labs Reviewed   CBC W/ AUTO DIFFERENTIAL - Abnormal;  Notable for the following components:       Result Value    RBC 4.19 (*)     Hemoglobin 13.0 (*)     Hematocrit 39.2 (*)     MPV 9.1 (*)     Immature Granulocytes 1.3 (*)     Immature Grans (Abs) 0.07 (*)     All other components within normal limits   COMPREHENSIVE METABOLIC PANEL - Abnormal; Notable for the following components:    Sodium 135 (*)     CO2 20 (*)     Glucose 147 (*)     AST 44 (*)     eGFR 55 (*)     All other components within normal limits   TROPONIN I - Abnormal; Notable for the following components:    Troponin I 1.105 (*)     All other components within normal limits   PHOSPHORUS - Abnormal; Notable for the following components:    Phosphorus 5.5 (*)     All other components within normal limits   ISTAT PROCEDURE - Abnormal; Notable for the following components:    POC PH 6.970 (*)     POC PCO2 84.2 (*)     POC PO2 12 (*)     POC HCO3 19.4 (*)     POC SATURATED O2 6 (*)     POC TCO2 22 (*)     All other components within normal limits   MAGNESIUM     EKG Readings: (Independently Interpreted)   Initial Reading: STEMI. Rhythm: Sinus Bradycardia. Heart Rate: 32. Ectopy: No Ectopy. Conduction: Normal. ST Segment Elevation: II, III, AVF, V1, V2, V3, V4, V5 and V6. Axis: Normal. Clinical Impression: Sinus Bradycardia   ECG Results              EKG 12-lead (Final result)  Result time 01/03/23 15:30:30      Final result by Interface, Lab In [facility] (01/03/23 15:30:30)                   Narrative:    Test Reason : I21.3,    Vent. Rate : 032 BPM     Atrial Rate : 036 BPM     P-R Int : 000 ms          QRS Dur : 114 ms      QT Int : 526 ms       P-R-T Axes : 065 081 102 degrees     QTc Int : 383 ms    Marked sinus bradycardia with A-V dissociation and Junctional bradycardia   with Sinus/atrial capture  Incomplete right bundle branch block  ST elevation consider anterolateral injury or acute infarct  ST elevation consider inferior injury or acute infarct    ACUTE MI / STEMI    Consider right ventricular  involvement in acute inferior infarct  Abnormal ECG  When compared with ECG of 09-SEP-2022 14:42,  Junctional rhythm has replaced Sinus rhythm  Vent. rate has decreased BY  33 BPM  Incomplete right bundle branch block is now Present  Confirmed by Messi Singh MD ([id number]) on 1/3/2023 3:30:22 PM    Referred By: [email or web address]   SELF           Confirmed By:Messi Singh MD                                     EKG 12-LEAD (Final result)  Result time 01/06/23 16:18:08      Final result by Unknown User (01/06/23 16:18:08)                                      Imaging Results    None          Medications   EPINEPHrine 0.1 mg/mL injection (1 mg Intravenous Given 1/1/23 0245)   succinylcholine injection (20 mg Intravenous Given 1/1/23 0216)   etomidate injection (20 mg Intravenous Given 1/1/23 0215)   sodium bicarbonate 8.4 % (1 mEq/mL) injection (50 mEq Intravenous Given 1/1/23 0252)   NORepinephrine 4 mg in dextrose 5 % 250 mL infusion (0.02 mcg/kg/min × 90 kg (Order-Specific) Intravenous New Bag 1/1/23 0255)     Medical Decision Making:   ED Management:  67-year-old male with a history of peripheral vascular disease, diabetes hypertension hyperlipidemia presents with a 2 hour history of shortness of breath and chest tightness.  EKG reveals a STEMI in the inferior and anterior leads.  He has refractory bradycardia.  He was given 2 mg of atropine and then received epinephrine every 5 minutes.  He developed asystole than ventricular tachycardia twice which was shocked.  Transdermal pacemaker was placed.  He did have spontaneous return of circulation.  After lengthy discussion although the patient is suboptimally stabilized he is transferred to [facility] for definitive treatment in the catheterization lab.  I placed a 16 gauge needle in his right external jugular.  Other:   I have discussed this case with another health care provider.                        Clinical Impression:   Final diagnoses:  [I21.3] STEMI (ST  elevation myocardial infarction)  [I21.01] ST elevation myocardial infarction involving left main coronary artery (Primary)        ED Disposition Condition    Transfer to Another Facility                 Peyman Gonsalves III, MD  01/01/23 0324       Peyman Gonsalves III, MD  01/03/23 1943       Peyman Gonsalves III, MD  01/09/23 0149       Peyman Gonsalves III, MD  01/12/23 [id number]

## 2023-01-01 NOTE — Clinical Note
The groin was prepped. The site was prepped with ChloraPrep. The site was clipped. The patient was draped. The patient was positioned supine. The patient was secured using safety straps and to an armboard.

## 2023-01-01 NOTE — H&P
Formerly [facility], later [facility]   History & Physical     Patient Name: Peyman Gr  MRN: [id number]  Admission Date: 1/1/2023  3:17 AM  Attending Physician: Ligia Fernandez MD  Face-to-Face encounter date: 01/01/2023 5:27 AM    Patient information was obtained from patient, past medical records, ER physician, and ER records.     HISTORY OF PRESENT ILLNESS:     Peyman Gr is a 67 y.o. Black or  male   With PMH of PVD, carotid artery disease,   Tobacco abuse, HTN, HLD,  who presents with chest pain.  Transferred from [facility] for STEMI.    Onset 2 hours before presentation to OSH ER.  Pt called EMS but ambulance not available  Pt's family eventually decided to transport the pt to OSH themselves  +associated SOB  Pt arrived at OSH with STEMI and 3rd degree HB with bradycardia  No pacing was started initially, for some reason  Pt became hypotensive  Then pt had VT/VF cardiac arrest    Reportedly pt would become alert during compressions  But immediately became obtunded when compressions stopped  Pt was intubated at OSH  Pt continued with bradycardia / HB  Pacer was started, atropine given  Pt went into cardiac arrest again - I heard this during ER physician sign out to me  EMS transport to [facility] was delayed due to lack of available transport  And I am not convinced that pt had achieved ROSC by time of EMS transport here    Pt arrived at [facility] in cardiac arrest  NP who transported with pt reported PEA on monitor during transport  ACLS again restarted when pt discovered pulseless  Pt arrives with chest compressions in progress  Pt is MAEW during chest compressions  --he is confirmed pulseless otherwise when compressions stop  Pt had to be restrained   Pt given multiple rounds epinephrine  +multiple amps bicarb  +calcium chloride    ROSC achieved  Bradycardia continues; atropine given, pacing started  Pt requiring 3 pressors to maintain BP  Sedation/Paralytic given as pt is MAEW    No known  hx CAD  However multiple presentations for SOB  No cardiac workup completed previously    Pt taken to cath lab.  Family updated by me personally.    REVIEW OF SYSTEMS:     All systems reviewed and are negative except as noted per above.    PAST MEDICAL HISTORY:     Past Medical History:   Diagnosis Date    Arthritis     Chronic back pain     Chronic neck pain     Dry gangrene     RIGHT LITTLE TOE    Hypercholesteremia     Hypertension     Insomnia     Multiple falls     S/P BACK SURGERY- 1 FALL    PAD (peripheral artery disease)     Personal history of colonic polyps     PVD (peripheral vascular disease)        PAST SURGICAL HISTORY:     Past Surgical History:   Procedure Laterality Date    ANGIOGRAPHY OF LOWER EXTREMITY N/A 09/24/2020    Procedure: Angiogram Extremity Unilateral;  Surgeon: Luke Cabello MD;  Location: [facility] CATH;  Service: Cardiology;  Laterality: N/A;    BACK SURGERY      BUNIONECTOMY Bilateral     CARPAL TUNNEL RELEASE Bilateral     CHOLECYSTECTOMY      COLONOSCOPY N/A 5/9/2022    Procedure: COLONOSCOPY;  Surgeon: Braydon Durand MD;  Location: [facility] ENDO;  Service: Endoscopy;  Laterality: N/A;    COLONOSCOPY W/ POLYPECTOMY      CORONARY ARTERY BYPASS GRAFT      CREATION OF BYPASS FROM INTERNAL CAROTID ARTERY TO SUBCLAVIAN ARTERY Right 12/27/2021    Procedure: CREATION, BYPASS, ARTERIAL, INTERNAL CAROTID TO SUBCLAVIAN;  Surgeon: Jeovany Goins MD;  Location: [facility] OR;  Service: Vascular;  Laterality: Right;    ELBOW ARTHROPLASTY Right     ELBOW SURGERY      MINIMALLY INVASIVE FORAMINOTOMY OF  SPINE N/A 11/15/2018    Procedure: FORAMINOTOMY, SPINE, MINIMALLY INVASIVE DECOMPRESSION L4-5;  Surgeon: Iván Stevenson Jr., MD;  Location: [facility] OR;  Service: Orthopedics;  Laterality: N/A;    NECK SURGERY      acf    PERCUTANEOUS TRANSLUMINAL ANGIOPLASTY (PTA) OF PERIPHERAL VESSEL Right 05/01/2020    Procedure: PTA, PERIPHERAL VESSEL of right lower extremity;  Surgeon: Luke Cabello MD;   [facility];  Service: Cardiology;  Laterality: Right;    PERCUTANEOUS TRANSLUMINAL ANGIOPLASTY (PTA) OF PERIPHERAL VESSEL Left 09/10/2020    Procedure: PTA, PERIPHERAL VESSEL, Left lower extremity;  Surgeon: Luke Cabello MD;  [facility];  Service: Cardiology;  Laterality: Left;    PERCUTANEOUS TRANSLUMINAL ANGIOPLASTY (PTA) OF PERIPHERAL VESSEL Left 2021    Profunda and SFA       ALLERGIES:   Patient has no known allergies.    FAMILY HISTORY:     Family History   Problem Relation Age of Onset    COPD Mother     Hypertension Father     Heart disease Father     Heart attack Father     COPD Sister     Other Sister     Kidney failure Brother     Hypertension Brother     Diabetes Brother        SOCIAL HISTORY:     Social History     Tobacco Use    Smoking status: Former     Packs/day: 2.00     Years: 60.00     Pack years: 120.00     Types: Cigarettes     Quit date:      Years since quittin.0    Smokeless tobacco: Never   Substance Use Topics    Alcohol use: No        Social History     Substance and Sexual Activity   Sexual Activity Yes    Partners: Female        HOME MEDICATIONS:     Prior to Admission medications    Medication Sig Start Date End Date Taking? Authorizing Provider   albuterol (PROVENTIL/VENTOLIN HFA) 90 mcg/actuation inhaler Inhale 1-2 puffs into the lungs every 6 (six) hours as needed for Shortness of Breath. Rescue 22   Jerardo Perez MD   amLODIPine (NORVASC) 10 MG tablet Take 1 tablet (10 mg total) by mouth once daily. 21   Aurelio Flores PA-C   amLODIPine (NORVASC) 5 MG tablet Take 5 mg by mouth once daily.    Historical Provider   aspirin 81 MG Chew Take 81 mg by mouth once daily.    Historical Provider   carvediloL (COREG) 25 MG tablet Take 25 mg by mouth 2 (two) times daily with meals.    Historical Provider   clopidogreL (PLAVIX) 75 mg tablet Take 75 mg by mouth once daily. On hold for sx on 21    Historical Provider   ezetimibe (ZETIA)  10 mg tablet Take 10 mg by mouth once daily.    Historical Provider   hydroCHLOROthiazide (HYDRODIURIL) 12.5 MG Tab Take 1 tablet (12.5 mg total) by mouth once daily. 12/30/21   Aurelio Flores PA-C   HYDROcodone-acetaminophen (NORCO)  mg per tablet Take 1 tablet by mouth every 8 (eight) hours as needed for Pain.    Historical Provider   mupirocin (BACTROBAN) 2 % ointment Apply topically 3 (three) times daily. 2/3/22   Gabriel Monahan NP   oxyCODONE-acetaminophen (PERCOCET) 7.5-325 mg per tablet Take 1 tablet by mouth 2 (two) times daily as needed. 11/8/21   Historical Provider   traMADoL (ULTRAM) 50 mg tablet Take 50 mg by mouth every 6 (six) hours as needed. 11/22/21   Historical Provider   valsartan (DIOVAN) 160 MG tablet Take 160 mg by mouth once daily.    Historical Provider       PHYSICAL EXAM:     BP (!) 68/44   Pulse (!) 59   Resp (!) 24   Wt 99.8 kg (220 lb)   SpO2 (!) 89%   BMI 28.25 kg/m²     Gen:  OBTUNDED  HEENT:  Eyes - no pallor  External ears with no lesions  Nares patent  Mouth - lips chapped  CV: PACED  Lungs: INTUBATED ON MV  Abd: +BS, soft, NT, ND  Ext: no atrophy or edema  Skin: warm, dry  Neuro: MAEW, FIGHTING WHEN NOT SEDATED/PARALYZED   Psych: OBTUNDED    LABS AND IMAGING:     Labs Reviewed   CBC W/ AUTO DIFFERENTIAL - Abnormal; Notable for the following components:       Result Value    WBC 16.60 (*)     RBC 3.64 (*)     Hemoglobin 11.4 (*)     Hematocrit 34.6 (*)     MCH 31.3 (*)     Platelets 145 (*)     MPV 8.8 (*)     All other components within normal limits   COMPREHENSIVE METABOLIC PANEL - Abnormal; Notable for the following components:    Sodium 131 (*)     CO2 22 (*)     Glucose 463 (*)     BUN 24 (*)     Calcium 7.8 (*)     Total Protein 5.2 (*)     Albumin 2.6 (*)      (*)      (*)     eGFR 55.4 (*)     All other components within normal limits    Narrative:      trop critical result(s) repeated. Called and verbal readback   obtained from lux flores rn  cath by [facility] 01/01/2023 05:39   glu critical result(s) repeated. Called and verbal readback obtained   from lisa anderson rn er  by [facility] 01/01/2023 05:29   ISTAT PROCEDURE - Abnormal; Notable for the following components:    POC PH 6.875 (*)     POC PCO2 86.1 (*)     POC PO2 28 (*)     POC HCO3 15.9 (*)     POC SATURATED O2 23 (*)     POC Glucose 316 (*)     POC Sodium 135 (*)     POC TCO2 18 (*)     All other components within normal limits   ISTAT PROCEDURE - Abnormal; Notable for the following components:    POC PH 7.192 (*)     POC PCO2 57.7 (*)     POC PO2 71 (*)     POC HCO3 22.1 (*)     POC SATURATED O2 89 (*)     POC Glucose 448 (*)     POC Sodium 134 (*)     POC Hematocrit 35 (*)     All other components within normal limits   MAGNESIUM   TROPONIN I HIGH SENSITIVITY   ISTAT CREATININE       Imaging Results              X-Ray Chest AP Portable (In process)                      X-Ray Chest AP Portable (In process)                     ASSESSMENT & PLAN:   Peyman Gr is a 67 y.o. male admitted for    Active Hospital Problems    Diagnosis  POA    *Cardiac arrest [I46.9]  Yes    History of tobacco abuse [Z87.891]  Not Applicable    Primary hypertension [I10]  Yes    PVD (peripheral vascular disease) with claudication [I73.9]  Yes    PAD (peripheral artery disease) [I73.9]  Yes      Resolved Hospital Problems   No resolved problems to display.        Plan    STEMI  CARDIAC ARREST  3RD DEGREE HB  ACUTE HYPOXEMIC RESPIRATORY FAILURE  - rosc achieved; pt intubated on MV  - pt going to cath lab now for angiogram and temp pacemaker  - repeat EKG   - trending troponin  - monitor in ICU  - continue dobutamine, vasopressin, levophed  - lactic acid in progress  - daily labs  - asa loaded before admission by OSH  - further plan per clinical course  - cardiology following, thank you  - pulmonology / critical care consulted, thank you    Chronic conditions as noted above/below; home medications reviewed personally by me  and restarted as appropriate  Electrolyte derangement:  Trending BMP; Mg; replacement prn  DVT ppx: to be decided by cardiology after angiogram  FULL CODE    Ligia Fernandez MD  [facility] Hospitalist  01/01/2023

## 2023-01-01 NOTE — CARE UPDATE
01/01/23 0600   Patient Assessment/Suction   Level of Consciousness (AVPU) unresponsive   Respiratory Effort Unlabored   Expansion/Accessory Muscles/Retractions no use of accessory muscles   All Lung Fields Breath Sounds coarse   SAKSHI Breath Sounds coarse   LLL Breath Sounds coarse   RUL Breath Sounds coarse   RML Breath Sounds coarse   RLL Breath Sounds coarse   Rhythm/Pattern, Respiratory assisted mechanically   Cough Frequency with stimulation   Cough Type assisted   Suction Method tracheal   Suction Pressure (mmHg) -120 mmHg   $ Suction Charges Inline Suction Procedure Stat Charge   PRE-TX-O2   Device (Oxygen Therapy) ventilator   $ Is the patient on Low Flow Oxygen? Yes   Oxygen Concentration (%) 100   SpO2 100 %   Pulse Oximetry Type Continuous   $ Pulse Oximetry - Single Charge Pulse Oximetry - Single   Pulse 96   Resp (!) 30        Airway - Non-Surgical 01/01/23 0217   Placement Date/Time: 01/01/23 0217   Inserted by: MD  Airway Device Size: 7.5  Style: Cuffed  Cuff Inflated With: Air  Placement Verified By: Capnometry;Auscultation;Colorimetric EtCO2 device  Depth of Insertion (cm): 23  Secured at: Teeth  Insertion ...   Secured at 23 cm   Measured At Teeth   Secured Location Left   Secured by Commercial tube domínguez   Site Condition Cool;Dry   Status Intact;Secured   Site Assessment Clean;Dry   Cuff Volume   (mlt)   Airway Safety   Ambu bag with the patient? Yes, Adult Ambu   Is mask with the patient? Yes, Adult Mask   Vent Select   Conventional Vent Y   Charged w/in last 24h YES   Preset Conventional Ventilator Settings   Vent ID [id number]   Vent Type    Ventilation Type VC   Vent Mode A/C   Humidity HME   Set Rate 30 BPM   Vt Set 540 mL   PEEP/CPAP 12 cmH20   Pressure Support 0 cmH20   Waveform RAMP   Peak Flow 60 L/min   Plateau Set/Insp. Hold (sec) 0   Trigger Sensitivity Flow/I-Trigger 3 L/min   Patient Ventilator Parameters   Resp Rate Total 30 br/min   Peak Airway Pressure 32 cmH20   Mean Airway  Pressure 22 cmH20   Plateau Pressure 0 cmH20   Exhaled Vt 548 mL   Total Ve 16.5 L/m   I:E Ratio Measured 1.00:1   Conventional Ventilator Alarms   Alarms On Y   Ve High Alarm 25 L/min   Ve Low Alarm 2 L/min   Vt High Alarm 1200 mL   Vt Low Alarm 200 mL   Resp Rate High Alarm 0 br/min   Press High Alarm 54 cmH2O   Apnea Rate 10   Apnea Volume (mL) 0 mL   Apnea Oxygen Concentration  100   Apnea Flow Rate (L/min) 44   T Apnea 20 sec(s)      Latest Reference Range & Units 01/01/23 04:51   Sample  ARTERIAL   POC PH 7.35 - 7.45  7.192 (LL)   POC PCO2 35 - 45 mmHg 57.7 (HH)   POC PO2 80 - 100 mmHg 71 (L)   POC HCO3 24 - 28 mmol/L 22.1 (L)   POC TCO2 23 - 27 mmol/L 24   POC SATURATED O2 95 - 100 % 89 (L)   POC Ionized Calcium 1.06 - 1.42 mmol/L 1.16   POC Sodium 136 - 145 mmol/L 134 (L)   POC Potassium 3.5 - 5.1 mmol/L 5.0   Allens Test  N/A   POC Glucose 70 - 110 mg/dL 448 (H)   POC Hematocrit 36 - 54 %PCV 35 (L)   POC BE -2 to 2 mmol/L -6   FiO2  100   Vt  24   PiP  30   PEEP  12

## 2023-01-01 NOTE — Clinical Note
Levophed @ 3 mcg/kg/min   Dobutamine @ 20 mcg/kg/min  Vasopressin @ 0.04     Prior to arrival. MD jenkins

## 2023-01-01 NOTE — Clinical Note
MD aware unable to get AO blood pressure from existing radial artery line at this time, will get femoral access/.

## 2023-01-01 NOTE — Clinical Note
The catheter was inserted into the left ventricle. Hemodynamics were performed.  and Pullback was recorded.  An angiography was performed of the lv. The angiography was performed via power injection. The injected amount was 8 mL contrast at 4 mL/s.

## 2023-01-01 NOTE — PROGRESS NOTES
67-year-old male with essential hypertension, hyperlipidemia, peripheral vascular disease and tobacco use admitted earlier today after undergoing emergent coronary catheterization for inferior wall STEMI.  Presentation complicated by cardiac arrest.    Admitted earlier today by overnight provider. Patient seen in the ICU after returning from cath lab after undergoing RCA intervention/stenting.  Discussed case with intensivist.  Remains critically ill. Updated family at bedside.    Claudio Hoyos MD  Internal Medicine

## 2023-01-01 NOTE — RESPIRATORY THERAPY
VBG results:    PH      6.97  PCO2    84  PO2      12  BE       -12  HCO3   19  SPO2     6    CPR in progress @ this time

## 2023-01-01 NOTE — Clinical Note
The catheter was inserted into the and was repositioned into the ostium   right coronary artery. An angiography was performed of the right coronary arteries. Multiple views were taken. The angiography was performed via power injection. The injected amount was 6 mL contrast at 3 mL/s.

## 2023-01-02 PROBLEM — E87.1 HYPONATREMIA: Status: RESOLVED | Noted: 2023-01-01 | Resolved: 2023-01-02

## 2023-01-02 PROBLEM — R57.0 CARDIOGENIC SHOCK: Status: ACTIVE | Noted: 2023-01-02

## 2023-01-02 PROBLEM — K72.00 SHOCK LIVER: Status: ACTIVE | Noted: 2023-01-02

## 2023-01-02 PROBLEM — I21.11 STEMI INVOLVING RIGHT CORONARY ARTERY: Status: ACTIVE | Noted: 2023-01-02

## 2023-01-02 LAB
ALBUMIN SERPL BCP-MCNC: 2.8 G/DL (ref 3.5–5.2)
ALLENS TEST: ABNORMAL
ALLENS TEST: ABNORMAL
ALP SERPL-CCNC: 102 U/L (ref 55–135)
ALT SERPL W/O P-5'-P-CCNC: 271 U/L (ref 10–44)
ANION GAP SERPL CALC-SCNC: 6 MMOL/L (ref 8–16)
AST SERPL-CCNC: 513 U/L (ref 10–40)
BASOPHILS # BLD AUTO: 0.02 K/UL (ref 0–0.2)
BASOPHILS # BLD AUTO: 0.02 K/UL (ref 0–0.2)
BASOPHILS NFR BLD: 0.2 % (ref 0–1.9)
BASOPHILS NFR BLD: 0.2 % (ref 0–1.9)
BILIRUB SERPL-MCNC: 0.7 MG/DL (ref 0.1–1)
BNP SERPL-MCNC: 376 PG/ML (ref 0–99)
BUN SERPL-MCNC: 24 MG/DL (ref 8–23)
CA-I BLDV-SCNC: 0.94 MMOL/L (ref 1.06–1.42)
CA-I BLDV-SCNC: 1.04 MMOL/L (ref 1.06–1.42)
CALCIUM SERPL-MCNC: 6.9 MG/DL (ref 8.7–10.5)
CHLORIDE SERPL-SCNC: 102 MMOL/L (ref 95–110)
CO2 SERPL-SCNC: 29 MMOL/L (ref 23–29)
CREAT SERPL-MCNC: 1.2 MG/DL (ref 0.5–1.4)
DELSYS: ABNORMAL
DELSYS: ABNORMAL
DIFFERENTIAL METHOD: ABNORMAL
DIFFERENTIAL METHOD: ABNORMAL
EOSINOPHIL # BLD AUTO: 0 K/UL (ref 0–0.5)
EOSINOPHIL # BLD AUTO: 0 K/UL (ref 0–0.5)
EOSINOPHIL NFR BLD: 0 % (ref 0–8)
EOSINOPHIL NFR BLD: 0.1 % (ref 0–8)
ERYTHROCYTE [DISTWIDTH] IN BLOOD BY AUTOMATED COUNT: 14 % (ref 11.5–14.5)
ERYTHROCYTE [DISTWIDTH] IN BLOOD BY AUTOMATED COUNT: 14.2 % (ref 11.5–14.5)
ERYTHROCYTE [SEDIMENTATION RATE] IN BLOOD BY WESTERGREN METHOD: 22 MM/H
ERYTHROCYTE [SEDIMENTATION RATE] IN BLOOD BY WESTERGREN METHOD: 22 MM/H
EST. GFR  (NO RACE VARIABLE): >60 ML/MIN/1.73 M^2
FIO2: 50
FIO2: 50
GLUCOSE SERPL-MCNC: 114 MG/DL (ref 70–110)
GLUCOSE SERPL-MCNC: 125 MG/DL (ref 70–110)
GLUCOSE SERPL-MCNC: 130 MG/DL (ref 70–110)
GLUCOSE SERPL-MCNC: 135 MG/DL (ref 70–110)
GLUCOSE SERPL-MCNC: 313 MG/DL (ref 70–110)
HCO3 UR-SCNC: 29.6 MMOL/L (ref 24–28)
HCO3 UR-SCNC: 30 MMOL/L (ref 24–28)
HCT VFR BLD AUTO: 30.5 % (ref 40–54)
HCT VFR BLD AUTO: 31.3 % (ref 40–54)
HCT VFR BLD CALC: 32 %PCV (ref 36–54)
HGB BLD-MCNC: 10.4 G/DL (ref 14–18)
HGB BLD-MCNC: 10.9 G/DL (ref 14–18)
IMM GRANULOCYTES # BLD AUTO: 0.07 K/UL (ref 0–0.04)
IMM GRANULOCYTES # BLD AUTO: 0.09 K/UL (ref 0–0.04)
IMM GRANULOCYTES NFR BLD AUTO: 0.8 % (ref 0–0.5)
IMM GRANULOCYTES NFR BLD AUTO: 1 % (ref 0–0.5)
LYMPHOCYTES # BLD AUTO: 1.1 K/UL (ref 1–4.8)
LYMPHOCYTES # BLD AUTO: 1.4 K/UL (ref 1–4.8)
LYMPHOCYTES NFR BLD: 12.7 % (ref 18–48)
LYMPHOCYTES NFR BLD: 14.5 % (ref 18–48)
MAGNESIUM SERPL-MCNC: 1.9 MG/DL (ref 1.6–2.6)
MAGNESIUM SERPL-MCNC: 1.9 MG/DL (ref 1.6–2.6)
MCH RBC QN AUTO: 31 PG (ref 27–31)
MCH RBC QN AUTO: 31.7 PG (ref 27–31)
MCHC RBC AUTO-ENTMCNC: 34.1 G/DL (ref 32–36)
MCHC RBC AUTO-ENTMCNC: 34.8 G/DL (ref 32–36)
MCV RBC AUTO: 91 FL (ref 82–98)
MCV RBC AUTO: 91 FL (ref 82–98)
MIN VOL: 12
MIN VOL: 12.5
MODE: ABNORMAL
MODE: ABNORMAL
MONOCYTES # BLD AUTO: 0.8 K/UL (ref 0.3–1)
MONOCYTES # BLD AUTO: 0.9 K/UL (ref 0.3–1)
MONOCYTES NFR BLD: 9.5 % (ref 4–15)
MONOCYTES NFR BLD: 9.7 % (ref 4–15)
NEUTROPHILS # BLD AUTO: 6.6 K/UL (ref 1.8–7.7)
NEUTROPHILS # BLD AUTO: 7 K/UL (ref 1.8–7.7)
NEUTROPHILS NFR BLD: 74.7 % (ref 38–73)
NEUTROPHILS NFR BLD: 76.6 % (ref 38–73)
NRBC BLD-RTO: 0 /100 WBC
NRBC BLD-RTO: 0 /100 WBC
PCO2 BLDA: 40.2 MMHG (ref 35–45)
PCO2 BLDA: 40.3 MMHG (ref 35–45)
PEEP: 5
PEEP: 8
PH SMN: 7.48 [PH] (ref 7.35–7.45)
PH SMN: 7.48 [PH] (ref 7.35–7.45)
PIP: 31
PIP: 31
PLATELET # BLD AUTO: 138 K/UL (ref 150–450)
PLATELET # BLD AUTO: 143 K/UL (ref 150–450)
PMV BLD AUTO: 8.7 FL (ref 9.2–12.9)
PMV BLD AUTO: 9.3 FL (ref 9.2–12.9)
PO2 BLDA: 101 MMHG (ref 80–100)
PO2 BLDA: 84 MMHG (ref 80–100)
POC BE: 6 MMOL/L
POC BE: 7 MMOL/L
POC IONIZED CALCIUM: 0.98 MMOL/L (ref 1.06–1.42)
POC SATURATED O2: 97 % (ref 95–100)
POC SATURATED O2: 98 % (ref 95–100)
POC TCO2: 31 MMOL/L (ref 23–27)
POC TCO2: 31 MMOL/L (ref 23–27)
POTASSIUM BLD-SCNC: 3.1 MMOL/L (ref 3.5–5.1)
POTASSIUM SERPL-SCNC: 3 MMOL/L (ref 3.5–5.1)
POTASSIUM SERPL-SCNC: 3.3 MMOL/L (ref 3.5–5.1)
PROT SERPL-MCNC: 5.2 G/DL (ref 6–8.4)
RBC # BLD AUTO: 3.36 M/UL (ref 4.6–6.2)
RBC # BLD AUTO: 3.44 M/UL (ref 4.6–6.2)
SAMPLE: ABNORMAL
SAMPLE: ABNORMAL
SITE: ABNORMAL
SITE: ABNORMAL
SODIUM BLD-SCNC: 141 MMOL/L (ref 136–145)
SODIUM SERPL-SCNC: 137 MMOL/L (ref 136–145)
SP02: 98
SP02: 99
TROPONIN I SERPL HS-MCNC: ABNORMAL PG/ML (ref 0–14.9)
TROPONIN I SERPL HS-MCNC: ABNORMAL PG/ML (ref 0–14.9)
VT: 540
VT: 540
WBC # BLD AUTO: 8.57 K/UL (ref 3.9–12.7)
WBC # BLD AUTO: 9.36 K/UL (ref 3.9–12.7)

## 2023-01-02 PROCEDURE — 82330 ASSAY OF CALCIUM: CPT

## 2023-01-02 PROCEDURE — 99291 CRITICAL CARE FIRST HOUR: CPT | Mod: ,,, | Performed by: INTERNAL MEDICINE

## 2023-01-02 PROCEDURE — 37799 UNLISTED PX VASCULAR SURGERY: CPT

## 2023-01-02 PROCEDURE — 25000003 PHARM REV CODE 250: Performed by: INTERNAL MEDICINE

## 2023-01-02 PROCEDURE — 99900031 HC PATIENT EDUCATION (STAT)

## 2023-01-02 PROCEDURE — 63600175 PHARM REV CODE 636 W HCPCS: Performed by: INTERNAL MEDICINE

## 2023-01-02 PROCEDURE — 87070 CULTURE OTHR SPECIMN AEROBIC: CPT | Performed by: FAMILY MEDICINE

## 2023-01-02 PROCEDURE — 85025 COMPLETE CBC W/AUTO DIFF WBC: CPT | Mod: 91 | Performed by: FAMILY MEDICINE

## 2023-01-02 PROCEDURE — 87205 SMEAR GRAM STAIN: CPT | Performed by: FAMILY MEDICINE

## 2023-01-02 PROCEDURE — 25000003 PHARM REV CODE 250: Performed by: FAMILY MEDICINE

## 2023-01-02 PROCEDURE — 82803 BLOOD GASES ANY COMBINATION: CPT

## 2023-01-02 PROCEDURE — 83735 ASSAY OF MAGNESIUM: CPT | Mod: 91 | Performed by: INTERNAL MEDICINE

## 2023-01-02 PROCEDURE — 25000003 PHARM REV CODE 250

## 2023-01-02 PROCEDURE — 82330 ASSAY OF CALCIUM: CPT | Mod: 91 | Performed by: INTERNAL MEDICINE

## 2023-01-02 PROCEDURE — 99291 PR CRITICAL CARE, E/M 30-74 MINUTES: ICD-10-PCS | Mod: ,,, | Performed by: INTERNAL MEDICINE

## 2023-01-02 PROCEDURE — 87186 SC STD MICRODIL/AGAR DIL: CPT | Performed by: FAMILY MEDICINE

## 2023-01-02 PROCEDURE — 82330 ASSAY OF CALCIUM: CPT | Performed by: INTERNAL MEDICINE

## 2023-01-02 PROCEDURE — 94003 VENT MGMT INPAT SUBQ DAY: CPT

## 2023-01-02 PROCEDURE — 84484 ASSAY OF TROPONIN QUANT: CPT | Mod: 91 | Performed by: FAMILY MEDICINE

## 2023-01-02 PROCEDURE — 84132 ASSAY OF SERUM POTASSIUM: CPT

## 2023-01-02 PROCEDURE — 84132 ASSAY OF SERUM POTASSIUM: CPT | Performed by: INTERNAL MEDICINE

## 2023-01-02 PROCEDURE — 85025 COMPLETE CBC W/AUTO DIFF WBC: CPT | Performed by: INTERNAL MEDICINE

## 2023-01-02 PROCEDURE — 82962 GLUCOSE BLOOD TEST: CPT

## 2023-01-02 PROCEDURE — 83735 ASSAY OF MAGNESIUM: CPT | Performed by: FAMILY MEDICINE

## 2023-01-02 PROCEDURE — 25000242 PHARM REV CODE 250 ALT 637 W/ HCPCS: Performed by: INTERNAL MEDICINE

## 2023-01-02 PROCEDURE — 94761 N-INVAS EAR/PLS OXIMETRY MLT: CPT

## 2023-01-02 PROCEDURE — 99233 SBSQ HOSP IP/OBS HIGH 50: CPT | Mod: ,,, | Performed by: INTERNAL MEDICINE

## 2023-01-02 PROCEDURE — 99900026 HC AIRWAY MAINTENANCE (STAT)

## 2023-01-02 PROCEDURE — 80053 COMPREHEN METABOLIC PANEL: CPT | Performed by: FAMILY MEDICINE

## 2023-01-02 PROCEDURE — 87147 CULTURE TYPE IMMUNOLOGIC: CPT | Performed by: FAMILY MEDICINE

## 2023-01-02 PROCEDURE — 84484 ASSAY OF TROPONIN QUANT: CPT | Performed by: FAMILY MEDICINE

## 2023-01-02 PROCEDURE — 94640 AIRWAY INHALATION TREATMENT: CPT

## 2023-01-02 PROCEDURE — S4991 NICOTINE PATCH NONLEGEND: HCPCS | Performed by: INTERNAL MEDICINE

## 2023-01-02 PROCEDURE — 99900035 HC TECH TIME PER 15 MIN (STAT)

## 2023-01-02 PROCEDURE — 83880 ASSAY OF NATRIURETIC PEPTIDE: CPT | Performed by: INTERNAL MEDICINE

## 2023-01-02 PROCEDURE — 94799 UNLISTED PULMONARY SVC/PX: CPT

## 2023-01-02 PROCEDURE — 20000000 HC ICU ROOM

## 2023-01-02 PROCEDURE — 87077 CULTURE AEROBIC IDENTIFY: CPT | Performed by: FAMILY MEDICINE

## 2023-01-02 PROCEDURE — 84295 ASSAY OF SERUM SODIUM: CPT

## 2023-01-02 PROCEDURE — 99233 PR SUBSEQUENT HOSPITAL CARE,LEVL III: ICD-10-PCS | Mod: ,,, | Performed by: INTERNAL MEDICINE

## 2023-01-02 PROCEDURE — 85014 HEMATOCRIT: CPT

## 2023-01-02 PROCEDURE — 27000221 HC OXYGEN, UP TO 24 HOURS

## 2023-01-02 RX ORDER — MUPIROCIN 20 MG/G
OINTMENT TOPICAL 2 TIMES DAILY
Status: COMPLETED | OUTPATIENT
Start: 2023-01-02 | End: 2023-01-06

## 2023-01-02 RX ORDER — FENTANYL CITRATE-0.9 % NACL/PF 10 MCG/ML
0-250 PLASTIC BAG, INJECTION (ML) INTRAVENOUS CONTINUOUS
Status: DISCONTINUED | OUTPATIENT
Start: 2023-01-02 | End: 2023-01-08

## 2023-01-02 RX ORDER — CHLORHEXIDINE GLUCONATE ORAL RINSE 1.2 MG/ML
15 SOLUTION DENTAL 2 TIMES DAILY
Status: COMPLETED | OUTPATIENT
Start: 2023-01-02 | End: 2023-01-06

## 2023-01-02 RX ORDER — LEVALBUTEROL INHALATION SOLUTION 0.63 MG/3ML
0.63 SOLUTION RESPIRATORY (INHALATION)
Status: DISCONTINUED | OUTPATIENT
Start: 2023-01-02 | End: 2023-01-12

## 2023-01-02 RX ORDER — IBUPROFEN 200 MG
1 TABLET ORAL DAILY
Status: DISCONTINUED | OUTPATIENT
Start: 2023-01-02 | End: 2023-01-24

## 2023-01-02 RX ADMIN — NICOTINE 1 PATCH: 14 PATCH, EXTENDED RELEASE TRANSDERMAL at 12:01

## 2023-01-02 RX ADMIN — FENTANYL CITRATE 50 MCG: 50 INJECTION INTRAMUSCULAR; INTRAVENOUS at 05:01

## 2023-01-02 RX ADMIN — PROPOFOL 50 MCG/KG/MIN: 10 INJECTION, EMULSION INTRAVENOUS at 12:01

## 2023-01-02 RX ADMIN — MUPIROCIN 2 G: 20 OINTMENT TOPICAL at 12:01

## 2023-01-02 RX ADMIN — FENTANYL CITRATE 500 MCG/HR: 50 INJECTION INTRAVENOUS at 05:01

## 2023-01-02 RX ADMIN — MUPIROCIN 1 G: 20 OINTMENT TOPICAL at 09:01

## 2023-01-02 RX ADMIN — LEVALBUTEROL HYDROCHLORIDE 0.63 MG: 0.63 SOLUTION RESPIRATORY (INHALATION) at 08:01

## 2023-01-02 RX ADMIN — FENTANYL CITRATE 50 MCG/HR: 50 INJECTION INTRAVENOUS at 11:01

## 2023-01-02 RX ADMIN — CHLORHEXIDINE GLUCONATE 15 ML: 1.2 RINSE ORAL at 09:01

## 2023-01-02 RX ADMIN — LEVALBUTEROL HYDROCHLORIDE 0.63 MG: 0.63 SOLUTION RESPIRATORY (INHALATION) at 03:01

## 2023-01-02 RX ADMIN — FAMOTIDINE 20 MG: 20 TABLET ORAL at 09:01

## 2023-01-02 RX ADMIN — POTASSIUM CHLORIDE 60 MEQ: 14.9 INJECTION, SOLUTION INTRAVENOUS at 05:01

## 2023-01-02 RX ADMIN — PROPOFOL 50 MCG/KG/MIN: 10 INJECTION, EMULSION INTRAVENOUS at 03:01

## 2023-01-02 RX ADMIN — CHLORHEXIDINE GLUCONATE 15 ML: 1.2 RINSE ORAL at 12:01

## 2023-01-02 RX ADMIN — FAMOTIDINE 20 MG: 20 TABLET ORAL at 08:01

## 2023-01-02 RX ADMIN — SODIUM CHLORIDE: 0.9 INJECTION, SOLUTION INTRAVENOUS at 02:01

## 2023-01-02 RX ADMIN — PROPOFOL 50 MCG/KG/MIN: 10 INJECTION, EMULSION INTRAVENOUS at 06:01

## 2023-01-02 RX ADMIN — PROPOFOL 50 MCG/KG/MIN: 10 INJECTION, EMULSION INTRAVENOUS at 10:01

## 2023-01-02 RX ADMIN — FENTANYL CITRATE 50 MCG: 50 INJECTION INTRAMUSCULAR; INTRAVENOUS at 12:01

## 2023-01-02 RX ADMIN — SODIUM CHLORIDE: 0.9 INJECTION, SOLUTION INTRAVENOUS at 11:01

## 2023-01-02 RX ADMIN — ASPIRIN 81 MG: 81 TABLET, COATED ORAL at 08:01

## 2023-01-02 RX ADMIN — PROPOFOL 50 MCG/KG/MIN: 10 INJECTION, EMULSION INTRAVENOUS at 07:01

## 2023-01-02 RX ADMIN — SODIUM CHLORIDE: 0.9 INJECTION, SOLUTION INTRAVENOUS at 06:01

## 2023-01-02 RX ADMIN — FENTANYL CITRATE 50 MCG/HR: 50 INJECTION INTRAVENOUS at 07:01

## 2023-01-02 RX ADMIN — FENTANYL CITRATE 50 MCG: 50 INJECTION INTRAMUSCULAR; INTRAVENOUS at 03:01

## 2023-01-02 RX ADMIN — TICAGRELOR 90 MG: 90 TABLET ORAL at 09:01

## 2023-01-02 RX ADMIN — CALCIUM GLUCONATE 3 G: 20 INJECTION, SOLUTION INTRAVENOUS at 08:01

## 2023-01-02 RX ADMIN — CALCIUM GLUCONATE 1 G: 20 INJECTION, SOLUTION INTRAVENOUS at 06:01

## 2023-01-02 RX ADMIN — TICAGRELOR 90 MG: 90 TABLET ORAL at 08:01

## 2023-01-02 RX ADMIN — PROPOFOL 50 MCG/KG/MIN: 10 INJECTION, EMULSION INTRAVENOUS at 09:01

## 2023-01-02 RX ADMIN — NOREPINEPHRINE BITARTRATE 0.04 MCG/KG/MIN: 1 INJECTION, SOLUTION, CONCENTRATE INTRAVENOUS at 11:01

## 2023-01-02 RX ADMIN — MAGNESIUM SULFATE HEPTAHYDRATE 2 G: 40 INJECTION, SOLUTION INTRAVENOUS at 12:01

## 2023-01-02 RX ADMIN — SODIUM BICARBONATE: 84 INJECTION, SOLUTION INTRAVENOUS at 03:01

## 2023-01-02 NOTE — NURSING
Frequent high peak pressures. Abdominal breathing. Suctioning thick, pink tinged/tan secretions. Requiring increased sedation.   Updated . Orders for Xoponex tx's TID and max Fentanyl drip to 500 mcgs/hr.   Versed drip if needed.

## 2023-01-02 NOTE — PROGRESS NOTES
[facility] Medicine  Progress Note    Patient name: Peyman Gr  MRN: [id number]  Admit Date: 1/1/2023   LOS: 1 day     SUBJECTIVE:     Principal problem: STEMI involving right coronary artery    Interval History:  No acute overnight events reported.  Meds critically ill and sedated.  Last night he required initiation of fentanyl due to pain/agitation.  Telemetry notable for frequent PVCs and intermittent nonsustained ventricular tachycardia.    Summary:   67-year-old male with peripheral vascular disease, essential hypertension, hyperlipidemia and ongoing tobacco use presented to outside facility with 2 hour onset of chest pain and shortness of breath.  EKG revealed inferior and anterolateral STEMI.  Quickly degenerated into cardiac arrest with runs of VT/VF and asystole requiring resuscitation for about 40 minutes.  He was subsequently intubated and transferred to our ED. En route he developed cardiac arrest again (PEA) which was continued into our ED and was resuscitated for another 30 minutes.  He required transcutaneous pacing.  He was taken emergently to the catheterization lab and underwent PCI with stenting of RCA which was felt to be the culprit lesion.  He was also found to have left main stenosis of about 70%.  A transvenous pacemaker was also placed.  Currently admitted to the ICU for post cardiac arrest care.  He remains intubated and critically ill.    Scheduled Meds:   aspirin  81 mg Oral Daily    chlorhexidine  15 mL Mouth/Throat BID    famotidine  20 mg Per OG tube BID    insulin regular  5 Units Intravenous Once    mupirocin   Nasal BID    ticagrelor  90 mg Oral BID     Continuous Infusions:   sodium chloride 0.9% 150 mL/hr at 01/02/23 0253    DOBUTamine Stopped (01/01/23 0945)    fentanyl 100 mcg/hr (01/02/23 0758)    NORepinephrine bitartrate-D5W 0.12 mcg/kg/min (01/02/23 0759)    propofoL 50 mcg/kg/min (01/02/23 0730)    vasopressin Stopped (01/01/23 0945)     PRN  Meds:acetaminophen, albuterol-ipratropium, atropine, calcium gluconate IVPB, calcium gluconate IVPB, calcium gluconate IVPB, dextrose 10%, dextrose 10%, DOBUTamine, EPINEPHrine, [] fentaNYL **FOLLOWED BY** fentaNYL, glucagon (human recombinant), glucose, glucose, HYDROcodone-acetaminophen, insulin aspart U-100, magnesium sulfate IVPB, magnesium sulfate IVPB, melatonin, morphine, naloxone, ondansetron, polyethylene glycol, potassium chloride in water **AND** potassium chloride in water **AND** potassium chloride in water, senna-docusate 8.6-50 mg, simethicone, sodium chloride 0.9%, sodium phosphate IVPB, sodium phosphate IVPB, sodium phosphate IVPB    Review of patient's allergies indicates:  No Known Allergies    Review of Systems:  Unable to obtain due to clinical condition  OBJECTIVE:     Vital Signs (Most Recent)  Temp: 99.5 °F (37.5 °C) (23 0715)  Pulse: 75 (23)  Resp: 18 (23)  BP: 138/69 (23)  SpO2: 99 % (23)    Vital Signs Range (Last 24H):  Temp:  [92.2 °F (33.4 °C)-99.5 °F (37.5 °C)]   Pulse:  []   Resp:  [8-31]   BP: ()/()   SpO2:  [93 %-100 %]   Arterial Line BP: ()/(44-85)     I & O (Last 24H):  Intake/Output Summary (Last 24 hours) at 2023 1027  Last data filed at 2023 1027  Gross per 24 hour   Intake 8068.1 ml   Output 2153 ml   Net 5915.1 ml       Physical Exam:  General: Patient intubated and sedated.  Appears stated age  Eyes: No conjunctival injection. No scleral icterus.  Scleral edema noted  ENT:  ET and OG tubes noted.  No discharge from ears. No nasal discharge.   Neck: Supple, trachea midline. No JVD  CVS: RRR. No LE edema BL  Lungs:  Mechanical breath sounds  Abdomen:  Soft, nontender and nondistended.  No organomegaly  Neuro:  Sedated.  Responds to painful stimuli  Skin:  No rash or erythema noted  : Duron catheter with yellow urine    Laboratory:  I have reviewed all pertinent lab results within the  past 24 hours.  CBC:   Recent Labs   Lab 01/02/23  0432   WBC 9.36   RBC 3.36*   HGB 10.4*   HCT 30.5*   *   MCV 91   MCH 31.0   MCHC 34.1     CMP:   Recent Labs   Lab 01/02/23  0432   *   CALCIUM 6.9*   ALBUMIN 2.8*   PROT 5.2*      K 3.0*   CO2 29      BUN 24*   CREATININE 1.2   ALKPHOS 102   *   *   BILITOT 0.7     Cardiac markers:   Recent Labs   Lab 01/01/23  0155   TROPONINI 1.105*       Diagnostic Results:  Labs: Reviewed  X-Ray: Reviewed    ASSESSMENT/PLAN:         Active Hospital Problems    Diagnosis  POA    *STEMI involving right coronary artery [I21.11]  Yes    Shock liver [K72.00]  Yes    Cardiac arrest [I46.9]  Yes    Acute respiratory failure with hypoxia and hypercarbia [J96.01, J96.02]  Yes    Leukocytosis [D72.829]  Yes    Anemia [D64.9]  Yes    History of tobacco abuse [Z87.891]  Not Applicable    Primary hypertension [I10]  Yes    PVD (peripheral vascular disease) with claudication [I73.9]  Yes    PAD (peripheral artery disease) [I73.9]  Yes      Resolved Hospital Problems    Diagnosis Date Resolved POA    Hyponatremia [E87.1] 01/02/2023 Yes         Plan:   Cardiac arrest secondary to STEMI involving RCA   Status post emergent cardiac catheterization with PCI and stenting   On aspirin and ticagrelor for dual antiplatelet therapy  Continue post cardiac arrest care in ICU   On norepinephrine for pressor support; wean as tolerated   Telemetry monitoring; high-risk for reperfusion arrhythmias  Mechanical ventilation for respiratory failure; appreciate input from pulmonology   Sedation as needed to maintain RASS of -2  Monitor electrolytes and replete per protocol   Monitoring function, urine output and avoid nephrotoxic agents, NSAIDs and iodinated contrast  Echocardiogram noted      Patient is at high risk for clinical decline.  Updated family members at bedside yesterday    VTE Risk Mitigation (From admission, onward)           Ordered     IP VTE HIGH RISK  PATIENT  Once         01/01/23 0817     Place sequential compression device  Until discontinued         01/01/23 0817                        Department Hospital Medicine  [facility]  Claudio Hoyos MD  Date of service: 01/02/2023

## 2023-01-02 NOTE — PROGRESS NOTES
Pulmonary/Critical Care  Progress Note      Patient name: Peyman Gr  MRN: [id number]  Date: 01/02/2023    Admit Date: 1/1/2023  Consult Requested By: Claudio Hoyos MD    Reason for Consult: respiratory failure    HPI:    1/1/2023 - 68 yo initially presented with chest pain about  2 AM, had bradycardia then VTVF arrest and had prolonged code - transferred to [facility] and was in cardiac arrest at arrival after multiple rounds of meds he had ROSC and taken to cath lab.  In Cath lab had occluded RCA and had successful PCI.  ALso has LM and OM disease.  He has been very unstable and moved to ICU.  He is unresponsive and cool.  He is ventilated.  On dobutrex and levophed, having some ectopy (contacting cardiology to see if they want to start lidocaine or amiodarone, QTc is 487), he is on bicarb drip and last ABG showed adequate oxygenation but a mixed metabolic and respiratory acidosis (vent adjused and will repeat).  No family was in the waiting room.  Chart has been reviewed and case d/w nursing, respiratory and Dr Hoyos.  BY report pt was resuscitated for > 60 minutes.    1/2/2023 - Remains critically ill, has been agitated at times and has needed increased sedation, he has not followed any commands.  Rhythm seems more stable but he has had ectopy.  Temp had increased from his hypothermia yesterday.  Electrolytes replaced by SS.  LFT have increased.  ABG this AM shows alkalosis (on bicarb drip).  CXR reviewed.  ECHO noted.    Review of Systems    Review of Systems   Unable to perform ROS: Mental acuity     Past Medical History    Past Medical History:   Diagnosis Date    Arthritis     Chronic back pain     Chronic neck pain     Dry gangrene     RIGHT LITTLE TOE    Hypercholesteremia     Hypertension     Insomnia     Multiple falls     S/P BACK SURGERY- 1 FALL    PAD (peripheral artery disease)     Personal history of colonic polyps     PVD (peripheral vascular disease)        Past Surgical History    Past Surgical  History:   Procedure Laterality Date    ANGIOGRAPHY OF LOWER EXTREMITY N/A 09/24/2020    Procedure: Angiogram Extremity Unilateral;  Surgeon: Luke Cabello MD;  Location: [facility] CATH;  Service: Cardiology;  Laterality: N/A;    BACK SURGERY      BUNIONECTOMY Bilateral     CARPAL TUNNEL RELEASE Bilateral     CHOLECYSTECTOMY      COLONOSCOPY N/A 5/9/2022    Procedure: COLONOSCOPY;  Surgeon: Braydon Durand MD;  Location: [facility] ENDO;  Service: Endoscopy;  Laterality: N/A;    COLONOSCOPY W/ POLYPECTOMY      CORONARY ARTERY BYPASS GRAFT      CREATION OF BYPASS FROM INTERNAL CAROTID ARTERY TO SUBCLAVIAN ARTERY Right 12/27/2021    Procedure: CREATION, BYPASS, ARTERIAL, INTERNAL CAROTID TO SUBCLAVIAN;  Surgeon: Jeovany Goins MD;  Location: [facility] OR;  Service: Vascular;  Laterality: Right;    ELBOW ARTHROPLASTY Right     ELBOW SURGERY      MINIMALLY INVASIVE FORAMINOTOMY OF  SPINE N/A 11/15/2018    Procedure: FORAMINOTOMY, SPINE, MINIMALLY INVASIVE DECOMPRESSION L4-5;  Surgeon: Iván Stevenson Jr., MD;  Location: [facility] OR;  Service: Orthopedics;  Laterality: N/A;    NECK SURGERY      acf    PERCUTANEOUS TRANSLUMINAL ANGIOPLASTY (PTA) OF PERIPHERAL VESSEL Right 05/01/2020    Procedure: PTA, PERIPHERAL VESSEL of right lower extremity;  Surgeon: Luke Cabello MD;  Location: [facility] CATH;  Service: Cardiology;  Laterality: Right;    PERCUTANEOUS TRANSLUMINAL ANGIOPLASTY (PTA) OF PERIPHERAL VESSEL Left 09/10/2020    Procedure: PTA, PERIPHERAL VESSEL, Left lower extremity;  Surgeon: Luke Cabello MD;  Location: [facility] CATH;  Service: Cardiology;  Laterality: Left;    PERCUTANEOUS TRANSLUMINAL ANGIOPLASTY (PTA) OF PERIPHERAL VESSEL Left 07/06/2021    Profunda and SFA       Medications (scheduled):      aspirin  81 mg Oral Daily    chlorhexidine  15 mL Mouth/Throat BID    famotidine  20 mg Per OG tube BID    insulin regular  5 Units Intravenous Once    mupirocin   Nasal BID    ticagrelor  90 mg Oral BID        Medications (infusions):      sodium chloride 0.9% 150 mL/hr at 23 1035    DOBUTamine Stopped (23)    fentanyl 150 mcg/hr (23)    NORepinephrine bitartrate-D5W 0.06 mcg/kg/min (23 1030)    propofoL 50 mcg/kg/min (23)    vasopressin Stopped (23)       Medications (prn):     acetaminophen, albuterol-ipratropium, atropine, calcium gluconate IVPB, calcium gluconate IVPB, calcium gluconate IVPB, dextrose 10%, dextrose 10%, DOBUTamine, EPINEPHrine, [] fentaNYL **FOLLOWED BY** fentaNYL, glucagon (human recombinant), glucose, glucose, HYDROcodone-acetaminophen, insulin aspart U-100, magnesium sulfate IVPB, magnesium sulfate IVPB, melatonin, morphine, naloxone, ondansetron, polyethylene glycol, potassium chloride in water **AND** potassium chloride in water **AND** potassium chloride in water, senna-docusate 8.6-50 mg, simethicone, sodium chloride 0.9%, sodium phosphate IVPB, sodium phosphate IVPB, sodium phosphate IVPB    Family History:   Family History   Problem Relation Age of Onset    COPD Mother     Hypertension Father     Heart disease Father     Heart attack Father     COPD Sister     Other Sister     Kidney failure Brother     Hypertension Brother     Diabetes Brother        Social History: Tobacco:   Social History     Tobacco Use   Smoking Status Former    Packs/day: 2.00    Years: 60.00    Pack years: 120.00    Types: Cigarettes    Quit date:     Years since quittin.0   Smokeless Tobacco Never                                EtOH:   Social History     Substance and Sexual Activity   Alcohol Use No                                Drugs:   Social History     Substance and Sexual Activity   Drug Use No       Physical Exam    Vital signs:  Temp:  [92.2 °F (33.4 °C)-99.5 °F (37.5 °C)]   Pulse:  []   Resp:  [8-31]   BP: ()/()   SpO2:  [93 %-100 %]   Arterial Line BP: ()/(44-85)     Intake/Output:   Intake/Output Summary  "(Last 24 hours) at 1/2/2023 1048  Last data filed at 1/2/2023 1036  Gross per 24 hour   Intake 9403.64 ml   Output 2153 ml   Net 7250.64 ml          BMI: Estimated body mass index is 29 kg/m² as calculated from the following:    Height as of an earlier encounter on 1/1/23: 6' 2.02" (1.88 m).    Weight as of this encounter: 102.5 kg (225 lb 15.5 oz).    Physical Exam  Vitals and nursing note reviewed.   Constitutional:       General: He is not in acute distress.     Appearance: Normal appearance. He is not ill-appearing, toxic-appearing or diaphoretic.      Comments: Intubated  Nonresponsive   HENT:      Head: Normocephalic and atraumatic.      Right Ear: External ear normal.      Left Ear: External ear normal.      Nose: Nose normal.      Mouth/Throat:      Mouth: Mucous membranes are moist.      Pharynx: Oropharynx is clear. No oropharyngeal exudate.      Comments: Intubated   Eyes:      General: No scleral icterus.        Right eye: No discharge.         Left eye: No discharge.      Extraocular Movements: Extraocular movements intact.      Conjunctiva/sclera: Conjunctivae normal.      Comments: + corneals  Pupils unrequal L larger than right and minimally to nonresponsive  + sclera edema   Neck:      Vascular: No carotid bruit.   Cardiovascular:      Rate and Rhythm: Normal rate. Rhythm irregular.      Pulses: Normal pulses.      Heart sounds: Normal heart sounds. No murmur heard.    No friction rub. No gallop.      Comments: Atrial fibrillation, + PVC  Pulmonary:      Effort: Pulmonary effort is normal. No respiratory distress.      Breath sounds: Normal breath sounds. No stridor. No wheezing, rhonchi or rales.      Comments: Ventilated   Chest:      Chest wall: No tenderness.   Abdominal:      General: Bowel sounds are normal. There is no distension.      Tenderness: There is no abdominal tenderness. There is no guarding.   Genitourinary:     Comments: Oliverio   Musculoskeletal:         General: No swelling. " Normal range of motion.      Cervical back: Normal range of motion and neck supple. No rigidity or tenderness.      Right lower leg: No edema.      Left lower leg: No edema.   Lymphadenopathy:      Cervical: No cervical adenopathy.   Skin:     General: Skin is dry.      Coloration: Skin is not jaundiced.      Findings: No bruising.      Comments: Cool to touch   Neurological:      Comments: No response to me   + spontaneous respiratory effort  No response to pain in UE  Some movement of extremities L > R (nonpurposeful)   Psychiatric:      Comments: Not able to assess       Laboratory    Recent Labs   Lab 01/02/23  0432   WBC 9.36   RBC 3.36*   HGB 10.4*   HCT 30.5*   *   MCV 91   MCH 31.0   MCHC 34.1         Recent Labs   Lab 01/02/23 0432   CALCIUM 6.9*   PROT 5.2*      K 3.0*   CO2 29      BUN 24*   CREATININE 1.2   ALKPHOS 102   *   *   BILITOT 0.7         No results for input(s): PT, INR, APTT in the last 24 hours.      No results for input(s): CPK, CPKMB, TROPONINI, MB in the last 24 hours.      Additional labs: reviewed    Microbiology:       Microbiology Results (last 7 days)       Procedure Component Value Units Date/Time    Culture, Respiratory with Gram Stain [[id number]]     Order Status: No result Specimen: Respiratory from Tracheal Aspirate             Radiology    X-Ray Chest AP Portable  HISTORY: resp fail    COMPARISON:January 1, 2023    FINDINGS:    Distal tip of ET tube resides at the medial ends of clavicles and the NG tube passes to the stomach.    Cardiomediastinal silhouette appears upper normal in size magnified by the poor inspiratory effort. Tortuosity thoracic aorta as a manifestation of systemic hypertension.    Diffuse interstitial opacities and ground glass opacity changes are again reestablished with minimal improvement upon comparison. No evidence of confluent infiltrate or significant pleural effusion or atelectasis.    Postoperative changes of  previous anterior cervical fusion as identified on the uppermost field of view and previous right carotid endarterectomy.    IMPRESSION:Stable appearance the chest when compared to previous. All support devices appear stable.    Electronically signed by:  Solo Harris MD  1/2/2023 6:54 AM CST Workstation: [id number]        Additional Studies    reviewed    Ventilator Information    Vent Mode: A/C  Oxygen Concentration (%):  [50-60] 50  Resp Rate Total:  [22 br/min-37 br/min] 23 br/min  Vt Set:  [540 mL] 540 mL  PEEP/CPAP:  [8 cmH20] 8 cmH20  Mean Airway Pressure:  [11 syN26-50 cmH20] 16 cmH20         Recent Labs     01/02/23  0417   PH 7.476*   PCO2 40.2   PO2 101*   HCO3 29.6*   POCSATURATED 98   BE 6           Impression    Active Hospital Problems    Diagnosis  POA    *STEMI involving right coronary artery [I21.11]  Yes    Shock liver [K72.00]  Yes    Cardiogenic shock [R57.0]  Yes    Cardiac arrest [I46.9]  Yes    Acute respiratory failure with hypoxia and hypercarbia [J96.01, J96.02]  Yes    Leukocytosis [D72.829]  Yes    Anemia [D64.9]  Yes    History of tobacco abuse [Z87.891]  Not Applicable    Primary hypertension [I10]  Yes    PVD (peripheral vascular disease) with claudication [I73.9]  Yes    PAD (peripheral artery disease) [I73.9]  Yes      Resolved Hospital Problems    Diagnosis Date Resolved POA    Hyponatremia [E87.1] 01/02/2023 Yes       Plan    Ventilator, adjust as needed - not ready to consider wenaing  Stop bicarb drip  Wean pressors as able   Treatment of ectopy per cardiology  Sedate as needed  - will need to decrease at times to assess neuro status  Follow neuro exam   Watch temperaure  Watch renal function and LFT  Monitor electrolytes and replace via SS  Watch H/H    Thank you for this consult.  I will follow with you while the patient is hospitalized.      Critical Care Time    I have spent > 35 minutes providing critical care services for this pt for the above diagnoses.  These services  have included pt evaluation, pt exam, ventilator assessment, discussions with staff, chart review, data review, note preparation and .  Medications have been reviewed and adjusted as needed.  The patient has life threatening illness with a high risk of decompensation and/or death.      Nick Padgett MD  [facility] Pulmonary/Critical Care

## 2023-01-02 NOTE — PROGRESS NOTES
"[facility]  Adult Nutrition   Progress Note (Initial Assessment)     SUMMARY     Recommendations  Recommendation/Intervention: 1) If patient remains NPO for another 24hrs, recommend starting enteral feeds. 2) RD to monitor days NPO, vent status, labs, weight and will make recommendations PRN.  Goals: Patient to receive some form of nutrition within 24-48hrs  Nutrition Goal Status: new    Dietitian Rounds Brief  Patient is intubated and sedated on Propofol. NPO x 1 day.     Diet order:   Current Diet Order: NPO     Evaluation of Received Nutrient/Fluid Intake  Other Calories (kcal): 789 (Propofol at 29.9ml/hr)  Energy Calories Required: not meeting needs  Protein Required: not meeting needs  Fluid Required: meeting needs     % Intake of Estimated Energy Needs: 25 - 50 % (Propofol)  % Meal Intake: NPO      Intake/Output Summary (Last 24 hours) at 1/2/2023 1621  Last data filed at 1/2/2023 1540  Gross per 24 hour   Intake 9853.64 ml   Output 2172 ml   Net 7681.64 ml        Anthropometrics  Temp: 98.1 °F (36.7 °C)  Height: 6' 2" (188 cm)  Height (inches): 74 in  Weight Method: Bed Scale  Weight: 102.5 kg (225 lb 15.5 oz)  Weight (lb): 225.97 lb  Ideal Body Weight (IBW), Male: 190 lb  % Ideal Body Weight, Male (lb): 115.8 %  BMI (Calculated): 29  BMI Grade: 25 - 29.9 - overweight       Estimated/Assessed Needs  Weight Used For Calorie Calculations: 103 kg (227 lb 1.2 oz)  Energy Calorie Requirements (kcal): 3572-8769 (20-25)  Energy Need Method: Kcal/kg  Protein Requirements: 124-155gm (1.2-1.5gm/kg)  Weight Used For Protein Calculations: 103 kg (227 lb 1.2 oz)     Estimated Fluid Requirement Method: RDA Method  RDA Method (mL): 2060       Reason for Assessment  Reason For Assessment: identified at risk by screening criteria  Diagnosis:  (STEMI involving right coronary artery)  Relevant Medical History: peripheral vascular disease, essential hypertension, hyperlipidemia and ongoing tobacco " use    Nutrition/Diet History  Food Allergies: NKFA  Factors Affecting Nutritional Intake: NPO, on mechanical ventilation    Nutrition Risk Screen  Nutrition Risk Screen: no indicators present     MST Score: 0  Have you recently lost weight without trying?: No  Weight loss score: 0  Have you been eating poorly because of a decreased appetite?: No  Appetite score: 0       Weight History:  Wt Readings from Last 10 Encounters:   23 102.5 kg (225 lb 15.5 oz)   23 99.8 kg (220 lb)   22 99.8 kg (220 lb)   22 95.7 kg (211 lb)   22 93.4 kg (206 lb)   21 93.7 kg (206 lb 8 oz)   21 98 kg (216 lb)   21 93 kg (205 lb)   10/21/21 98 kg (216 lb)   21 98 kg (216 lb 0.8 oz)        Lab/Procedures/Meds: Pertinent Labs/Meds Reviewed    Medications:Pertinent Medications Reviewed  Scheduled Meds:   aspirin  81 mg Oral Daily    chlorhexidine  15 mL Mouth/Throat BID    famotidine  20 mg Per OG tube BID    insulin regular  5 Units Intravenous Once    levalbuterol  0.63 mg Nebulization TID WAKE    mupirocin   Nasal BID    nicotine  1 patch Transdermal Daily    ticagrelor  90 mg Oral BID     Continuous Infusions:   sodium chloride 0.9% 100 mL/hr at 23 1107    DOBUTamine Stopped (23 0945)    fentanyl 350 mcg/hr (23 1449)    NORepinephrine bitartrate-D5W 0.04 mcg/kg/min (23 1237)    propofoL 50 mcg/kg/min (23 1233)    vasopressin Stopped (23 0945)     PRN Meds:.acetaminophen, albuterol-ipratropium, atropine, calcium gluconate IVPB, calcium gluconate IVPB, calcium gluconate IVPB, dextrose 10%, dextrose 10%, DOBUTamine, EPINEPHrine, [] fentaNYL **FOLLOWED BY** fentaNYL, glucagon (human recombinant), glucose, glucose, HYDROcodone-acetaminophen, insulin aspart U-100, magnesium sulfate IVPB, magnesium sulfate IVPB, melatonin, morphine, naloxone, ondansetron, polyethylene glycol, potassium chloride in water **AND** potassium chloride in water **AND**  potassium chloride in water, senna-docusate 8.6-50 mg, simethicone, sodium chloride 0.9%, sodium phosphate IVPB, sodium phosphate IVPB, sodium phosphate IVPB    Labs: Pertinent Labs Reviewed  Clinical Chemistry:  Recent Labs   Lab 01/01/23 0155 01/01/23 0453 01/02/23 0432 01/02/23  1400   *   < > 137  --    K 4.2   < > 3.0* 3.3*      < > 102  --    CO2 20*   < > 29  --    *   < > 135*  --    BUN 20   < > 24*  --    CREATININE 1.4   < > 1.2  --    CALCIUM 8.8   < > 6.9*  --    PROT 7.3   < > 5.2*  --    ALBUMIN 3.8   < > 2.8*  --    BILITOT 0.3   < > 0.7  --    ALKPHOS 88   < > 102  --    AST 44*   < > 513*  --    ALT 37   < > 271*  --    ANIONGAP 15   < > 6*  --    MG 2.0   < > 1.9 1.9   PHOS 5.5*  --   --   --     < > = values in this interval not displayed.     CBC:   Recent Labs   Lab 01/02/23 0432   WBC 9.36   RBC 3.36*   HGB 10.4*   HCT 30.5*   *   MCV 91   MCH 31.0   MCHC 34.1     Lipid Panel:  No results for input(s): CHOL, HDL, LDLCALC, TRIG, CHOLHDL in the last 168 hours.  Cardiac Profile:  Recent Labs   Lab 01/01/23 0155 01/01/23 0453 01/02/23  1400   BNP  --  76 376*   TROPONINI 1.105*  --   --      Inflammatory Labs:  No results for input(s): CRP in the last 168 hours.  Diabetes:  Recent Labs   Lab 01/01/23  0848   HGBA1C 5.7     Thyroid & Parathyroid:  No results for input(s): TSH, FREET4, X6DWRKV, E0WMPSM, THYROIDAB in the last 168 hours.    Monitor and Evaluation  Food and Nutrient Intake: energy intake  Food and Nutrient Adminstration: diet order  Anthropometric Measurements: weight change  Biochemical Data, Medical Tests and Procedures: electrolyte and renal panel, gastrointestinal profile, glucose/endocrine profile  Nutrition-Focused Physical Findings: overall appearance     Nutrition Risk  Level of Risk/Frequency of Follow-up: high     Nutrition Follow-Up  RD Follow-up?: Yes      Shelley Beebe RD 01/02/2023 4:21 PM

## 2023-01-02 NOTE — PLAN OF CARE
Remains intubated and sedated. Sedation vacation this am. Attempts to open eyes and turns head when name called, but flailing all exts and not following commands. Abdominal breathing and increased peak pressures at times. Diprivan and Fentanyl at max dose now. OGT intact to LIWS. Duron intact with dark yellow urine.   Right groin venous sheath intact. Site intact.  TV Pacer in place, set at 60. SR on monitor,few short runs of VT noted. paced intermittently.   Potassium and Calcium replaced per ss order.   Soft wrist restraints in use. Safety maintained.

## 2023-01-02 NOTE — PLAN OF CARE
01/02/23 0730   Patient Assessment/Suction   Level of Consciousness (AVPU) alert   Respiratory Effort Unlabored   Expansion/Accessory Muscles/Retractions no use of accessory muscles   All Lung Fields Breath Sounds coarse;Anterior:;Lateral:;diminished   SAKSHI Breath Sounds coarse   LLL Breath Sounds diminished   RUL Breath Sounds coarse   RML Breath Sounds coarse   RLL Breath Sounds diminished   Rhythm/Pattern, Respiratory assisted mechanically   Cough Frequency infrequent   Cough Type assisted   Suction Method oral;tracheal   Suction Pressure (mmHg) -120 mmHg   $ Suction Charges Inline Suction Procedure Stat Charge   Secretions Amount moderate   Secretions Color red-streaked;yellow;white   PRE-TX-O2   Device (Oxygen Therapy) ventilator   $ Is the patient on Low Flow Oxygen? Yes   Oxygen Concentration (%) 50   SpO2 98 %   Pulse Oximetry Type Continuous   $ Pulse Oximetry - Multiple Charge Pulse Oximetry - Multiple   Pulse 88   Resp (!) 30   BP (!) 147/83   Vent Select   Conventional Vent Y   $ Ventilator Subsequent 1   Charged w/in last 24h YES   Preset Conventional Ventilator Settings   Vent ID [id number]   Vent Type    Ventilation Type VC   Vent Mode A/C   Set Rate 22 BPM   Vt Set 540 mL   PEEP/CPAP 5 cmH20   Peak Flow 60 L/min   Peak End Inspiratory Pressure 29 cmH20   I-Trigger Type  V-TRIG   Trigger Sensitivity Flow/I-Trigger 2 L/min   Patient Ventilator Parameters   Resp Rate Total 34 br/min   Peak Airway Pressure 29 cmH20   Mean Airway Pressure 13 cmH20   Plateau Pressure 28 cmH20   Exhaled Vt 87 mL   Total Ve 9.58 L/m   I:E Ratio Measured 3.00:1   Auto PEEP 20 cmH20   Conventional Ventilator Alarms   Alarms On Y   Ve High Alarm 25 L/min   Ve Low Alarm 5 L/min   Vt High Alarm 1200 mL   Vt Low Alarm 200 mL   Resp Rate High Alarm 40 br/min   Press High Alarm 60 cmH2O   Apnea Rate 10   Apnea Volume (mL) 0 mL   Apnea Oxygen Concentration  100   Apnea Flow Rate (L/min) 65   T Apnea 20 sec(s)   IHI Ventilator  Associated Pneumonia Bundle (Required)   Daily Awakening Trials Performed Not applicable   Head of Bed Elevated  HOB 30   Oral Care Mouth swabbed;Mouth suctioned;Oral suctioning prior to turn  (Swabbed mouth found tooth on end of swab, placed in cup by nurse)   Vent Circut Breaks Minimized Yes   Ready to Wean/Extubation Screen   FIO2<=50 (chart decimal) 0.5   MV<16L (chart vol.) 9.58   PEEP <=8 (chart #) 5   Ready to Wean Parameters   F/VT Ratio<105 (RSBI) 344.83   Vital Capacity (mL) 0   Education   $ Education Ventilator Oxygen;15 min   Respiratory Evaluation   $ Care Plan Tech Time 15 min   $ Eval/Re-eval Charges Evaluation   Evaluation For New Orders

## 2023-01-03 LAB
ALBUMIN SERPL BCP-MCNC: 2.6 G/DL (ref 3.5–5.2)
ALLENS TEST: ABNORMAL
ALLENS TEST: ABNORMAL
ALP SERPL-CCNC: 90 U/L (ref 55–135)
ALT SERPL W/O P-5'-P-CCNC: 187 U/L (ref 10–44)
ANION GAP SERPL CALC-SCNC: 6 MMOL/L (ref 8–16)
AST SERPL-CCNC: 205 U/L (ref 10–40)
BASOPHILS # BLD AUTO: 0.02 K/UL (ref 0–0.2)
BASOPHILS NFR BLD: 0.2 % (ref 0–1.9)
BILIRUB SERPL-MCNC: 0.7 MG/DL (ref 0.1–1)
BUN SERPL-MCNC: 19 MG/DL (ref 8–23)
CALCIUM SERPL-MCNC: 7.6 MG/DL (ref 8.7–10.5)
CHLORIDE SERPL-SCNC: 103 MMOL/L (ref 95–110)
CO2 SERPL-SCNC: 26 MMOL/L (ref 23–29)
CREAT SERPL-MCNC: 1.1 MG/DL (ref 0.5–1.4)
DELSYS: ABNORMAL
DIFFERENTIAL METHOD: ABNORMAL
EOSINOPHIL # BLD AUTO: 0 K/UL (ref 0–0.5)
EOSINOPHIL NFR BLD: 0 % (ref 0–8)
ERYTHROCYTE [DISTWIDTH] IN BLOOD BY AUTOMATED COUNT: 15.2 % (ref 11.5–14.5)
ERYTHROCYTE [SEDIMENTATION RATE] IN BLOOD BY WESTERGREN METHOD: 20 MM/H
EST. GFR  (NO RACE VARIABLE): >60 ML/MIN/1.73 M^2
FIO2: 50
GLUCOSE SERPL-MCNC: 105 MG/DL (ref 70–110)
GLUCOSE SERPL-MCNC: 106 MG/DL (ref 70–110)
GLUCOSE SERPL-MCNC: 109 MG/DL (ref 70–110)
GLUCOSE SERPL-MCNC: 126 MG/DL (ref 70–110)
HCO3 UR-SCNC: 19.4 MMOL/L (ref 24–28)
HCO3 UR-SCNC: 28.1 MMOL/L (ref 24–28)
HCT VFR BLD AUTO: 29 % (ref 40–54)
HCT VFR BLD CALC: 30 %PCV (ref 36–54)
HGB BLD-MCNC: 9.4 G/DL (ref 14–18)
IMM GRANULOCYTES # BLD AUTO: 0.08 K/UL (ref 0–0.04)
IMM GRANULOCYTES NFR BLD AUTO: 0.9 % (ref 0–0.5)
LYMPHOCYTES # BLD AUTO: 1 K/UL (ref 1–4.8)
LYMPHOCYTES NFR BLD: 10.8 % (ref 18–48)
MAGNESIUM SERPL-MCNC: 2 MG/DL (ref 1.6–2.6)
MCH RBC QN AUTO: 31.1 PG (ref 27–31)
MCHC RBC AUTO-ENTMCNC: 32.4 G/DL (ref 32–36)
MCV RBC AUTO: 95 FL (ref 82–98)
MIN VOL: 10.9
MODE: ABNORMAL
MONOCYTES # BLD AUTO: 0.8 K/UL (ref 0.3–1)
MONOCYTES NFR BLD: 8.5 % (ref 4–15)
MRSA SCREEN BY PCR: NEGATIVE
NEUTROPHILS # BLD AUTO: 7.2 K/UL (ref 1.8–7.7)
NEUTROPHILS NFR BLD: 79.6 % (ref 38–73)
NRBC BLD-RTO: 0 /100 WBC
PCO2 BLDA: 48.5 MMHG (ref 35–45)
PCO2 BLDA: 84.2 MMHG (ref 35–45)
PEEP: 5
PH SMN: 6.97 [PH] (ref 7.35–7.45)
PH SMN: 7.37 [PH] (ref 7.35–7.45)
PIP: 33
PLATELET # BLD AUTO: 116 K/UL (ref 150–450)
PMV BLD AUTO: 9.7 FL (ref 9.2–12.9)
PO2 BLDA: 12 MMHG (ref 80–100)
PO2 BLDA: 65 MMHG (ref 80–100)
POC BE: -12 MMOL/L
POC BE: 3 MMOL/L
POC IONIZED CALCIUM: 1.15 MMOL/L (ref 1.06–1.42)
POC SATURATED O2: 6 % (ref 95–100)
POC SATURATED O2: 91 % (ref 95–100)
POC TCO2: 22 MMOL/L (ref 23–27)
POC TCO2: 30 MMOL/L (ref 23–27)
POTASSIUM BLD-SCNC: 3.9 MMOL/L (ref 3.5–5.1)
POTASSIUM SERPL-SCNC: 3.7 MMOL/L (ref 3.5–5.1)
POTASSIUM SERPL-SCNC: 4.6 MMOL/L (ref 3.5–5.1)
PROCALCITONIN SERPL IA-MCNC: 3.13 NG/ML (ref 0–0.5)
PROT SERPL-MCNC: 5.4 G/DL (ref 6–8.4)
RBC # BLD AUTO: 3.02 M/UL (ref 4.6–6.2)
SAMPLE: ABNORMAL
SAMPLE: ABNORMAL
SITE: ABNORMAL
SITE: ABNORMAL
SODIUM BLD-SCNC: 139 MMOL/L (ref 136–145)
SODIUM SERPL-SCNC: 135 MMOL/L (ref 136–145)
SP02: 92
VT: 520
WBC # BLD AUTO: 9.04 K/UL (ref 3.9–12.7)

## 2023-01-03 PROCEDURE — 94799 UNLISTED PULMONARY SVC/PX: CPT

## 2023-01-03 PROCEDURE — 25000003 PHARM REV CODE 250: Performed by: INTERNAL MEDICINE

## 2023-01-03 PROCEDURE — 80053 COMPREHEN METABOLIC PANEL: CPT | Performed by: FAMILY MEDICINE

## 2023-01-03 PROCEDURE — 87040 BLOOD CULTURE FOR BACTERIA: CPT | Mod: 59 | Performed by: INTERNAL MEDICINE

## 2023-01-03 PROCEDURE — 84132 ASSAY OF SERUM POTASSIUM: CPT | Performed by: INTERNAL MEDICINE

## 2023-01-03 PROCEDURE — 63600175 PHARM REV CODE 636 W HCPCS: Performed by: INTERNAL MEDICINE

## 2023-01-03 PROCEDURE — 20000000 HC ICU ROOM

## 2023-01-03 PROCEDURE — 87641 MR-STAPH DNA AMP PROBE: CPT | Performed by: INTERNAL MEDICINE

## 2023-01-03 PROCEDURE — 99233 SBSQ HOSP IP/OBS HIGH 50: CPT | Mod: ,,, | Performed by: INTERNAL MEDICINE

## 2023-01-03 PROCEDURE — 84145 PROCALCITONIN (PCT): CPT | Performed by: INTERNAL MEDICINE

## 2023-01-03 PROCEDURE — 27000221 HC OXYGEN, UP TO 24 HOURS

## 2023-01-03 PROCEDURE — 99233 PR SUBSEQUENT HOSPITAL CARE,LEVL III: ICD-10-PCS | Mod: ,,, | Performed by: INTERNAL MEDICINE

## 2023-01-03 PROCEDURE — 94003 VENT MGMT INPAT SUBQ DAY: CPT

## 2023-01-03 PROCEDURE — 25000003 PHARM REV CODE 250: Performed by: FAMILY MEDICINE

## 2023-01-03 PROCEDURE — 85025 COMPLETE CBC W/AUTO DIFF WBC: CPT | Performed by: FAMILY MEDICINE

## 2023-01-03 PROCEDURE — 94761 N-INVAS EAR/PLS OXIMETRY MLT: CPT

## 2023-01-03 PROCEDURE — 25000003 PHARM REV CODE 250

## 2023-01-03 PROCEDURE — 94640 AIRWAY INHALATION TREATMENT: CPT

## 2023-01-03 PROCEDURE — 99291 PR CRITICAL CARE, E/M 30-74 MINUTES: ICD-10-PCS | Mod: ,,, | Performed by: INTERNAL MEDICINE

## 2023-01-03 PROCEDURE — 99291 CRITICAL CARE FIRST HOUR: CPT | Mod: ,,, | Performed by: INTERNAL MEDICINE

## 2023-01-03 PROCEDURE — 83735 ASSAY OF MAGNESIUM: CPT | Performed by: FAMILY MEDICINE

## 2023-01-03 PROCEDURE — 99900026 HC AIRWAY MAINTENANCE (STAT)

## 2023-01-03 PROCEDURE — S4991 NICOTINE PATCH NONLEGEND: HCPCS | Performed by: INTERNAL MEDICINE

## 2023-01-03 PROCEDURE — 99900035 HC TECH TIME PER 15 MIN (STAT)

## 2023-01-03 PROCEDURE — 25000242 PHARM REV CODE 250 ALT 637 W/ HCPCS: Performed by: INTERNAL MEDICINE

## 2023-01-03 PROCEDURE — 99900031 HC PATIENT EDUCATION (STAT)

## 2023-01-03 RX ORDER — CEFEPIME HYDROCHLORIDE 1 G/50ML
2 INJECTION, SOLUTION INTRAVENOUS
Status: DISCONTINUED | OUTPATIENT
Start: 2023-01-03 | End: 2023-01-04

## 2023-01-03 RX ORDER — ENOXAPARIN SODIUM 100 MG/ML
40 INJECTION SUBCUTANEOUS
Status: DISCONTINUED | OUTPATIENT
Start: 2023-01-03 | End: 2023-01-07

## 2023-01-03 RX ORDER — NAPROXEN SODIUM 220 MG/1
81 TABLET, FILM COATED ORAL DAILY
Status: DISCONTINUED | OUTPATIENT
Start: 2023-01-03 | End: 2023-01-29

## 2023-01-03 RX ADMIN — VANCOMYCIN HYDROCHLORIDE 1750 MG: 500 INJECTION, POWDER, LYOPHILIZED, FOR SOLUTION INTRAVENOUS at 10:01

## 2023-01-03 RX ADMIN — FENTANYL CITRATE 50 MCG/HR: 50 INJECTION INTRAVENOUS at 04:01

## 2023-01-03 RX ADMIN — PROPOFOL 50 MCG/KG/MIN: 10 INJECTION, EMULSION INTRAVENOUS at 05:01

## 2023-01-03 RX ADMIN — NICOTINE 1 PATCH: 14 PATCH, EXTENDED RELEASE TRANSDERMAL at 09:01

## 2023-01-03 RX ADMIN — LEVALBUTEROL HYDROCHLORIDE 0.63 MG: 0.63 SOLUTION RESPIRATORY (INHALATION) at 08:01

## 2023-01-03 RX ADMIN — PROPOFOL 50 MCG/KG/MIN: 10 INJECTION, EMULSION INTRAVENOUS at 06:01

## 2023-01-03 RX ADMIN — FENTANYL CITRATE 500 MCG/HR: 50 INJECTION INTRAVENOUS at 09:01

## 2023-01-03 RX ADMIN — ACETAMINOPHEN 650 MG: 325 TABLET ORAL at 09:01

## 2023-01-03 RX ADMIN — VANCOMYCIN HYDROCHLORIDE 2000 MG: 500 INJECTION, POWDER, LYOPHILIZED, FOR SOLUTION INTRAVENOUS at 11:01

## 2023-01-03 RX ADMIN — MIDAZOLAM HYDROCHLORIDE 1 MG/HR: 5 INJECTION, SOLUTION INTRAMUSCULAR; INTRAVENOUS at 03:01

## 2023-01-03 RX ADMIN — ASPIRIN 81 MG CHEWABLE TABLET 81 MG: 81 TABLET CHEWABLE at 10:01

## 2023-01-03 RX ADMIN — FENTANYL CITRATE 500 MCG/HR: 50 INJECTION INTRAVENOUS at 08:01

## 2023-01-03 RX ADMIN — CEFEPIME HYDROCHLORIDE 2 G: 2 INJECTION, SOLUTION INTRAVENOUS at 10:01

## 2023-01-03 RX ADMIN — CHLORHEXIDINE GLUCONATE 15 ML: 1.2 RINSE ORAL at 09:01

## 2023-01-03 RX ADMIN — SODIUM CHLORIDE: 0.9 INJECTION, SOLUTION INTRAVENOUS at 04:01

## 2023-01-03 RX ADMIN — METHYLPREDNISOLONE SODIUM SUCCINATE 40 MG: 40 INJECTION, POWDER, FOR SOLUTION INTRAMUSCULAR; INTRAVENOUS at 09:01

## 2023-01-03 RX ADMIN — PROPOFOL 50 MCG/KG/MIN: 10 INJECTION, EMULSION INTRAVENOUS at 03:01

## 2023-01-03 RX ADMIN — CHLORHEXIDINE GLUCONATE 15 ML: 1.2 RINSE ORAL at 08:01

## 2023-01-03 RX ADMIN — PROPOFOL 50 MCG/KG/MIN: 10 INJECTION, EMULSION INTRAVENOUS at 09:01

## 2023-01-03 RX ADMIN — LEVALBUTEROL HYDROCHLORIDE 0.63 MG: 0.63 SOLUTION RESPIRATORY (INHALATION) at 07:01

## 2023-01-03 RX ADMIN — PROPOFOL 50 MCG/KG/MIN: 10 INJECTION, EMULSION INTRAVENOUS at 12:01

## 2023-01-03 RX ADMIN — TICAGRELOR 90 MG: 90 TABLET ORAL at 09:01

## 2023-01-03 RX ADMIN — PROPOFOL 50 MCG/KG/MIN: 10 INJECTION, EMULSION INTRAVENOUS at 08:01

## 2023-01-03 RX ADMIN — PROPOFOL 50 MCG/KG/MIN: 10 INJECTION, EMULSION INTRAVENOUS at 11:01

## 2023-01-03 RX ADMIN — TICAGRELOR 90 MG: 90 TABLET ORAL at 08:01

## 2023-01-03 RX ADMIN — MUPIROCIN 1 G: 20 OINTMENT TOPICAL at 09:01

## 2023-01-03 RX ADMIN — LEVALBUTEROL HYDROCHLORIDE 0.63 MG: 0.63 SOLUTION RESPIRATORY (INHALATION) at 01:01

## 2023-01-03 RX ADMIN — POTASSIUM CHLORIDE 40 MEQ: 29.8 INJECTION, SOLUTION INTRAVENOUS at 10:01

## 2023-01-03 RX ADMIN — MUPIROCIN 1 G: 20 OINTMENT TOPICAL at 08:01

## 2023-01-03 RX ADMIN — FAMOTIDINE 20 MG: 20 TABLET ORAL at 09:01

## 2023-01-03 RX ADMIN — ENOXAPARIN SODIUM 40 MG: 100 INJECTION SUBCUTANEOUS at 10:01

## 2023-01-03 RX ADMIN — PROPOFOL 50 MCG/KG/MIN: 10 INJECTION, EMULSION INTRAVENOUS at 02:01

## 2023-01-03 RX ADMIN — METHYLPREDNISOLONE SODIUM SUCCINATE 40 MG: 40 INJECTION, POWDER, FOR SOLUTION INTRAMUSCULAR; INTRAVENOUS at 03:01

## 2023-01-03 RX ADMIN — CEFEPIME HYDROCHLORIDE 2 G: 2 INJECTION, SOLUTION INTRAVENOUS at 09:01

## 2023-01-03 RX ADMIN — FENTANYL CITRATE 500 MCG/HR: 50 INJECTION INTRAVENOUS at 03:01

## 2023-01-03 RX ADMIN — FAMOTIDINE 20 MG: 20 TABLET ORAL at 08:01

## 2023-01-03 RX ADMIN — ACETAMINOPHEN 650 MG: 325 TABLET ORAL at 04:01

## 2023-01-03 NOTE — RESPIRATORY THERAPY
01/02/23 1915   Patient Assessment/Suction   Level of Consciousness (AVPU) responds to pain   Respiratory Effort Unlabored   Expansion/Accessory Muscles/Retractions no use of accessory muscles   All Lung Fields Breath Sounds coarse;diminished   Rhythm/Pattern, Respiratory assisted mechanically   Cough Frequency with stimulation   Cough Type assisted   Suction Method oral;tracheal   Suction Pressure (mmHg) -120 mmHg   $ Suction Charges Inline Suction Procedure Stat Charge   Secretions Amount small   Secretions Color yellow   Secretions Characteristics thick   PRE-TX-O2   Device (Oxygen Therapy) ventilator   $ Is the patient on Low Flow Oxygen? Yes   Oxygen Concentration (%) 50   SpO2 (!) 91 %   Pulse Oximetry Type Continuous   $ Pulse Oximetry - Multiple Charge Pulse Oximetry - Multiple   Pulse 76   Resp (!) 22   /65        Airway - Non-Surgical 01/01/23 0217   Placement Date/Time: 01/01/23 0217   Inserted by: MD  Airway Device Size: 7.5  Style: Cuffed  Cuff Inflated With: Air  Placement Verified By: Capnometry;Auscultation;Colorimetric EtCO2 device  Depth of Insertion (cm): 23  Secured at: Teeth  Insertion ...   Secured at 23 cm   Measured At Teeth   Secured Location Center   Secured by Commercial tube domínguez   Bite Block center;secure and patent   Status Intact;Secured;Patent   Airway Safety   Ambu bag with the patient? Yes, Adult Ambu   Is mask with the patient? Yes, Adult Mask   Suction set is at the bedside? Yes   Respiratory Interventions   Airway/Ventilation Management airway patency maintained   Vent Select   Conventional Vent Y   Charged w/in last 24h YES   Preset Conventional Ventilator Settings   Vent ID [id number]   Vent Type    Ventilation Type VC   Vent Mode A/C   Humidity HME   Set Rate 20 BPM   Vt Set 540 mL   PEEP/CPAP 5 cmH20   Peak Flow 60 L/min   Peak End Inspiratory Pressure 40 cmH20   I-Trigger Type  V-TRIG   Trigger Sensitivity Flow/I-Trigger 2 L/min   Patient Ventilator Parameters   Resp  Rate Total 23 br/min   Peak Airway Pressure 43 cmH20   Mean Airway Pressure 15 cmH20   Plateau Pressure 56 cmH20   Exhaled Vt 925 mL   Total Ve 12.4 L/m   I:E Ratio Measured 1:2.10   Auto PEEP 20 cmH20   Tubing ID (mm) 7.5 mm   Tube Type ET   Conventional Ventilator Alarms   Alarms On Y   Resp Rate High Alarm 40 br/min   Press High Alarm 62 cmH2O   Apnea Rate 22   Apnea Volume (mL) 0 mL   Apnea Oxygen Concentration  100   Apnea Flow Rate (L/min) 65   T Apnea 20 sec(s)   IHI Ventilator Associated Pneumonia Bundle (Required)   Daily Awakening Trials Performed Yes   Daily Assessment of Readiness to Extubate Yes   Oral Care Teeth brushed;Mouth swabbed;Mouth moisturizer;Mouth suctioned;with mouthwash   Vent Circut Breaks Minimized Yes   Ready to Wean/Extubation Screen   FIO2<=50 (chart decimal) 0.5   MV<16L (chart vol.) 12.4   PEEP <=8 (chart #) 5   Ready to Wean Parameters   F/VT Ratio<105 (RSBI) (!) 23.78   Vital Capacity (mL) 0   Education   $ Education Bronchodilator;DME Nebulizer;Ventilator Oxygen;15 min;Suction   Respiratory Evaluation   $ Care Plan Tech Time 15 min

## 2023-01-03 NOTE — PROGRESS NOTES
VANCOMYCIN PHARMACOKINETIC NOTE:  Vancomycin Day # 1    Objective/Assessment:    Diagnosis/Indication for Vancomycin: Pneumonia     67 y.o., male; Actual Body Weight = 105.4 kg (232 lb 5.8 oz).    The patient has the following labs:  1/3/2023 Estimated Creatinine Clearance: 85.5 mL/min (based on SCr of 1.1 mg/dL). Lab Results   Component Value Date    BUN 19 01/03/2023     Lab Results   Component Value Date    WBC 9.04 01/03/2023          Plan:  Adjust vancomycin dose and/or frequency based on the patient's actual weight and renal function:  Initiate Vancomycin 2000 mg IV once , then 1750 mg ivpb every 12 hours.  Orders have been entered into patient's chart.    Vancomycin dose = 16.7 mg/kg actual body weight    Vancomycin trough level has been ordered for 1/4 at 22:00..    Pharmacy will manage vancomycin therapy, monitor serum vancomycin levels, monitor renal function and adjust regimen as necessary.  Thank you for allowing us to participate in this patient's care.     Tova Mckeon 1/3/2023 10:00 AM  Department of Pharmacy  Ext [phone]

## 2023-01-03 NOTE — PROGRESS NOTES
[facility]  Department of Cardiology  Consult Note      PATIENT NAME: Peyman Gr    MRN: [id number]  TODAY'S DATE: 01/02/2023  ADMIT DATE: 1/1/2023                          CONSULT REQUESTED BY: Claudio Hoyos MD    SUBJECTIVE     PRINCIPAL PROBLEM: STEMI involving right coronary artery    Interval history:  01/02/2023:  Seen and examined patient in the ICU today.  Off bicarb drip.  Currently on 2 sedatives and Levophed.     REASON FOR CONSULT:  STEMI      HPI:  67-year-old male with past medical history of peripheral vascular disease, right subclavian stenosis, chronic smoker, hypertension, hyperlipidemia presented to oxygen [facility] with 2 hour history of chest pain, shortness of breath.  ECG showed junctional bradycardia, complete heart block with inferior and anterolateral ST elevations.  STEMI code was called to which I responded immediately and agreed to transfer the patient to [facility] cath lab for emergent cardiac catheterization.  However, patient unfortunately went into cardiac arrest at [facility] ER with VT, VF, asystole requiring resuscitation and intubation.  Patient was resuscitated for about 40 minutes ROSC was achieved.  Patient was moving extremities at the time as per the ER.  Patient was then transferred to [facility] ER and patient again had cardiac arrest upon arrival to the ER which required resuscitation for 30 minutes.  Patient was in PEA initially and ROSC was achieved subsequently however patient was dependent on transcutaneous pacing.  Patient blood pressure was very low and could not be obtained with a BP cuff.  Patient was unable to be transferred to the cath lab at that point due to significant hemodynamic instability. Patient was started on pressors.  A-line was placed in the ED and blood pressure eventually improved with high doses of vasopressin, norepinephrine, sodium bicarbonate and dobutamine infusions.   Discussed with patient's family members regarding the patient's critical condition.  Once the blood pressure was stabilized on the pressors, patient was brought to the cath lab for transvenous pacemaker placement and coronary angiogram.  Benefits and risks of the procedure explained to patient's family and patient's family  agreed for the procedure.      Review of patient's allergies indicates:  No Known Allergies    Past Medical History:   Diagnosis Date    Arthritis     Chronic back pain     Chronic neck pain     Dry gangrene     RIGHT LITTLE TOE    Hypercholesteremia     Hypertension     Insomnia     Multiple falls     S/P BACK SURGERY- 1 FALL    PAD (peripheral artery disease)     Personal history of colonic polyps     PVD (peripheral vascular disease)      Past Surgical History:   Procedure Laterality Date    ANGIOGRAPHY OF LOWER EXTREMITY N/A 09/24/2020    Procedure: Angiogram Extremity Unilateral;  Surgeon: Luke Cabello MD;  Location: [facility] CATH;  Service: Cardiology;  Laterality: N/A;    BACK SURGERY      BUNIONECTOMY Bilateral     CARPAL TUNNEL RELEASE Bilateral     CHOLECYSTECTOMY      COLONOSCOPY N/A 5/9/2022    Procedure: COLONOSCOPY;  Surgeon: Braydon Durand MD;  Location: [facility] ENDO;  Service: Endoscopy;  Laterality: N/A;    COLONOSCOPY W/ POLYPECTOMY      CORONARY ARTERY BYPASS GRAFT      CREATION OF BYPASS FROM INTERNAL CAROTID ARTERY TO SUBCLAVIAN ARTERY Right 12/27/2021    Procedure: CREATION, BYPASS, ARTERIAL, INTERNAL CAROTID TO SUBCLAVIAN;  Surgeon: Jeovany Goins MD;  Location: [facility] OR;  Service: Vascular;  Laterality: Right;    ELBOW ARTHROPLASTY Right     ELBOW SURGERY      MINIMALLY INVASIVE FORAMINOTOMY OF  SPINE N/A 11/15/2018    Procedure: FORAMINOTOMY, SPINE, MINIMALLY INVASIVE DECOMPRESSION L4-5;  Surgeon: Iván Stevenson Jr., MD;  Location: [facility] OR;  Service: Orthopedics;  Laterality: N/A;    NECK SURGERY      acf    PERCUTANEOUS TRANSLUMINAL ANGIOPLASTY (PTA) OF  PERIPHERAL VESSEL Right 2020    Procedure: PTA, PERIPHERAL VESSEL of right lower extremity;  Surgeon: Luke Cabello MD;  Location: Replaced by [facility] CATH;  Service: Cardiology;  Laterality: Right;    PERCUTANEOUS TRANSLUMINAL ANGIOPLASTY (PTA) OF PERIPHERAL VESSEL Left 09/10/2020    Procedure: PTA, PERIPHERAL VESSEL, Left lower extremity;  Surgeon: Luke Cabello MD;  Location: Replaced by [facility] CATH;  Service: Cardiology;  Laterality: Left;    PERCUTANEOUS TRANSLUMINAL ANGIOPLASTY (PTA) OF PERIPHERAL VESSEL Left 2021    Profunda and SFA     Social History     Tobacco Use    Smoking status: Former     Packs/day: 2.00     Years: 60.00     Pack years: 120.00     Types: Cigarettes     Quit date:      Years since quittin.0    Smokeless tobacco: Never   Substance Use Topics    Alcohol use: No    Drug use: No        REVIEW OF SYSTEMS  Unable to obtain.  Patient was intubated    OBJECTIVE     VITAL SIGNS (Most Recent)  Temp: 99.2 °F (37.3 °C) (23)  Pulse: 80 (23)  Resp: 20 (23)  BP: 128/62 (23)  SpO2: (!) 92 % (23)    VENTILATION STATUS  Resp: 20 (23)  SpO2: (!) 92 % (23)  Vent Mode: A/C  Oxygen Concentration (%):  [50] 50  Resp Rate Total:  [20 br/min-34 br/min] 22 br/min  Vt Set:  [540 mL] 540 mL  PEEP/CPAP:  [5 cmH20-8 cmH20] 5 cmH20  Mean Airway Pressure:  [11 nqG83-00 cmH20] 13 cmH20    I & O (Last 24H):  Intake/Output Summary (Last 24 hours) at 2023  Last data filed at 2023 1900  Gross per 24 hour   Intake 7278.97 ml   Output 1847 ml   Net 5431.97 ml       WEIGHTS  Wt Readings from Last 1 Encounters:   23 0400 102.5 kg (225 lb 15.5 oz)   23 0053 102.5 kg (225 lb 15.5 oz)   23 0800 99.8 kg (220 lb 0.3 oz)   23 0341 99.8 kg (220 lb)       PHYSICAL EXAM  GENERAL: well built, well nourished, well-developed.  Intubated HEENT: Normocephalic.   NECK: No JVD.   CARDIAC: Regular rate and rhythm, S1, S2  regular  CHEST ANATOMY: normal.   LUNGS: + bilateral air entry   ABDOMEN: Soft.  Normal bowel sounds.   URINARY: No cohen catheter   EXTREMITIES:  No edema    CENTRAL NERVOUS SYSTEM:  Intubated  SKIN: Skin without lesions, moist, well perfused.     HOME MEDICATIONS:  Current Facility-Administered Medications on File Prior to Encounter   Medication Dose Route Frequency Provider Last Rate Last Admin    0.9%  NaCl infusion   Intravenous Continuous Braydon Durand MD   Stopped at 05/09/22 1011     Current Outpatient Medications on File Prior to Encounter   Medication Sig Dispense Refill    albuterol (PROVENTIL/VENTOLIN HFA) 90 mcg/actuation inhaler Inhale 1-2 puffs into the lungs every 6 (six) hours as needed for Shortness of Breath. Rescue 8 g 0    amLODIPine (NORVASC) 10 MG tablet Take 1 tablet (10 mg total) by mouth once daily. 30 tablet 1    amLODIPine (NORVASC) 5 MG tablet Take 5 mg by mouth once daily.      aspirin 81 MG Chew Take 81 mg by mouth once daily.      carvediloL (COREG) 25 MG tablet Take 25 mg by mouth 2 (two) times daily with meals.      clopidogreL (PLAVIX) 75 mg tablet Take 75 mg by mouth once daily. On hold for sx on 12/27/21      ezetimibe (ZETIA) 10 mg tablet Take 10 mg by mouth once daily.      hydroCHLOROthiazide (HYDRODIURIL) 12.5 MG Tab Take 1 tablet (12.5 mg total) by mouth once daily. 30 tablet 1    HYDROcodone-acetaminophen (NORCO)  mg per tablet Take 1 tablet by mouth every 8 (eight) hours as needed for Pain.      mupirocin (BACTROBAN) 2 % ointment Apply topically 3 (three) times daily. 22 g 0    oxyCODONE-acetaminophen (PERCOCET) 7.5-325 mg per tablet Take 1 tablet by mouth 2 (two) times daily as needed.      traMADoL (ULTRAM) 50 mg tablet Take 50 mg by mouth every 6 (six) hours as needed.      valsartan (DIOVAN) 160 MG tablet Take 160 mg by mouth once daily.         SCHEDULED MEDS:   aspirin  81 mg Oral Daily    chlorhexidine  15 mL Mouth/Throat BID    famotidine  20 mg Per OG  tube BID    insulin regular  5 Units Intravenous Once    levalbuterol  0.63 mg Nebulization TID WAKE    mupirocin   Nasal BID    nicotine  1 patch Transdermal Daily    ticagrelor  90 mg Oral BID       CONTINUOUS INFUSIONS:   sodium chloride 0.9% 100 mL/hr at 23    DOBUTamine Stopped (23)    fentanyl 500 mcg/hr (23)    midazolam      NORepinephrine bitartrate-D5W 0.04 mcg/kg/min (23)    propofoL 50 mcg/kg/min (23)    vasopressin Stopped (23)       PRN MEDS:acetaminophen, albuterol-ipratropium, atropine, calcium gluconate IVPB, calcium gluconate IVPB, calcium gluconate IVPB, dextrose 10%, dextrose 10%, DOBUTamine, EPINEPHrine, [] fentaNYL **FOLLOWED BY** fentaNYL, glucagon (human recombinant), glucose, glucose, HYDROcodone-acetaminophen, insulin aspart U-100, magnesium sulfate IVPB, magnesium sulfate IVPB, melatonin, midazolam, morphine, naloxone, ondansetron, polyethylene glycol, potassium chloride in water **AND** potassium chloride in water **AND** potassium chloride in water, senna-docusate 8.6-50 mg, simethicone, sodium chloride 0.9%, sodium phosphate IVPB, sodium phosphate IVPB, sodium phosphate IVPB    LABS AND DIAGNOSTICS     CBC LAST 3 DAYS  Recent Labs   Lab 23  0155 23  0330 23  0453 23  0852 23  0118 23  0417 23  0432   WBC 5.49  --  16.60*  --  8.57  --  9.36   RBC 4.19*  --  3.64*  --  3.44*  --  3.36*   HGB 13.0*  --  11.4*  --  10.9*  --  10.4*   HCT 39.2*   < > 34.6*   < > 31.3* 32* 30.5*   MCV 94  --  95  --  91  --  91   MCH 31.0  --  31.3*  --  31.7*  --  31.0   MCHC 33.2  --  32.9  --  34.8  --  34.1   RDW 14.4  --  14.3  --  14.2  --  14.0     --  145*  --  143*  --  138*   MPV 9.1*  --  8.8*  --  8.7*  --  9.3   GRAN 38.5  2.1  --  72.0  --  76.6*  6.6  --  74.7*  7.0   LYMPH 46.4  2.6  --  15.0*  --  12.7*  1.1  --  14.5*  1.4   MONO 12.0  0.7  --  2.0*  --  9.7  0.8   --  9.5  0.9   BASO 0.03  --   --   --  0.02  --  0.02   NRBC 0  --  0  --  0  --  0    < > = values in this interval not displayed.       COAGULATION LAST 3 DAYS  Recent Labs   Lab 01/01/23  0453   INR 1.2   APTT 30.3       CHEMISTRY LAST 3 DAYS  Recent Labs   Lab 01/01/23  0155 01/01/23  0330 01/01/23  0453 01/01/23  0852 01/01/23  1815 01/01/23  1831 01/02/23  0417 01/02/23  0432 01/02/23  1210 01/02/23  1400   *  --  131*  --   --  136  --  137  --   --    K 4.2  --  5.0  --   --  3.5  --  3.0*  --  3.3*     --  96  --   --  101  --  102  --   --    CO2 20*  --  22*  --   --  26  --  29  --   --    ANIONGAP 15  --  13  --   --  9  --  6*  --   --    BUN 20  --  24*  --   --  26*  --  24*  --   --    CREATININE 1.4  --  1.4  --   --  1.5*  --  1.2  --   --    *  --  463*  --   --  114*  --  135*  --   --    CALCIUM 8.8  --  7.8*  --   --  7.3*  --  6.9*  --   --    PH  --    < >  --    < > 7.451*  --  7.476*  --  7.480*  --    MG 2.0  --  2.2  --   --  1.8  --  1.9  --  1.9   ALBUMIN 3.8  --  2.6*  --   --   --   --  2.8*  --   --    PROT 7.3  --  5.2*  --   --   --   --  5.2*  --   --    ALKPHOS 88  --  102  --   --   --   --  102  --   --    ALT 37  --  278*  --   --   --   --  271*  --   --    AST 44*  --  294*  --   --   --   --  513*  --   --    BILITOT 0.3  --  0.8  --   --   --   --  0.7  --   --     < > = values in this interval not displayed.       CARDIAC PROFILE LAST 3 DAYS  Recent Labs   Lab 01/01/23  0155 01/01/23  0453 01/01/23  0848 01/01/23  1831 01/02/23  0118 01/02/23  0546 01/02/23  1400   BNP  --  76  --   --   --   --  376*   TROPONINI 1.105*  --   --   --   --   --   --    TROPONINIHS  --  1459.0*   < > 616659.7* 378380.9* 208174.9*  --     < > = values in this interval not displayed.       ENDOCRINE LAST 3 DAYS  No results for input(s): TSH, PROCAL in the last 168 hours.    LAST ARTERIAL BLOOD GAS  ABG  Recent Labs   Lab 01/02/23  1210   PH 7.480*   PO2 84   PCO2 40.3    HCO3 30.0*   BE 7       LAST 7 DAYS MICROBIOLOGY   Microbiology Results (last 7 days)       Procedure Component Value Units Date/Time    Culture, Respiratory with Gram Stain [[id number]] Collected: 01/02/23 1431    Order Status: Sent Specimen: Respiratory from Tracheal Aspirate Updated: 01/02/23 1436            MOST RECENT IMAGING  X-Ray Chest AP Portable  HISTORY: resp fail    COMPARISON:January 1, 2023    FINDINGS:    Distal tip of ET tube resides at the medial ends of clavicles and the NG tube passes to the stomach.    Cardiomediastinal silhouette appears upper normal in size magnified by the poor inspiratory effort. Tortuosity thoracic aorta as a manifestation of systemic hypertension.    Diffuse interstitial opacities and ground glass opacity changes are again reestablished with minimal improvement upon comparison. No evidence of confluent infiltrate or significant pleural effusion or atelectasis.    Postoperative changes of previous anterior cervical fusion as identified on the uppermost field of view and previous right carotid endarterectomy.    IMPRESSION:Stable appearance the chest when compared to previous. All support devices appear stable.    Electronically signed by:  Solo Harris MD  1/2/2023 6:54 AM CST Workstation: [id number]      ECHOCARDIOGRAM RESULTS (last 5)  No results found for this or any previous visit.      CURRENT/PREVIOUS VISIT EKG  Results for orders placed or performed during the hospital encounter of 01/01/23   EKG 12-LEAD on arrival to floor    Collection Time: 01/01/23  9:59 AM    Narrative    Test Reason : I21.3,    Vent. Rate : 070 BPM     Atrial Rate : 000 BPM     P-R Int : 000 ms          QRS Dur : 078 ms      QT Int : 450 ms       P-R-T Axes : 000 027 -68 degrees     QTc Int : 486 ms    Atrial fibrillation with occasional ventricular-paced complexes and with  premature ventricular or aberrantly conducted complexes  Inferior infarct ,age undetermined  Abnormal ECG  When  compared with ECG of 01-JAN-2023 09:57,  Previous ECG has undetermined rhythm, needs review    Referred By: IRENE MOSER           Confirmed By:            ASSESSMENT/PLAN:     Active Hospital Problems    Diagnosis    *STEMI involving right coronary artery    Shock liver    Cardiogenic shock    Cardiac arrest    Acute respiratory failure with hypoxia and hypercarbia    Leukocytosis    Anemia    History of tobacco abuse    Primary hypertension    PVD (peripheral vascular disease) with claudication    PAD (peripheral artery disease)       ASSESSMENT & PLAN:   STEMI- inferior wall MI complicated by complete heart block and RV shock  Cardiac arrest status post resuscitation and intubation (patient was resuscitated for 60-70 minutes in total)  Peripheral vascular disease  Chronic smoking  Hypertension  hyperlipidemia      RECOMMENDATIONS:  -Status post coronary angiogram ->  100% thrombotic flush occlusion of RCA at the ostium (culprit for STEMI).  Discrete 70% ostial left main stenosis.  Discrete 90% OM stenosis.   -status post successful PTCA and drug eluting stent placement to ostial RCA with re-establishment of DAIN 3 flow through the vessel.  -left ventriculography showed hyperdynamic LV function.  -TVP was placed prior to angiogram  -continue aspirin, Brilinta  -IV hydration, serum creatinine stable, decent urine output  -patient is requiring much less pressor support compared to yesterday.  Currently on Levophed  -keep TVP for now  -vent management per [facility]  -lactate levels normalized  -discussed with patient's family  -medical management of the left main lesion at this point given the preserved LV function.  -monitor neurological function  -we will follow  -patient condition:  Critical            Stefany Elizondo MD  [facility]  Department of Cardiology  Date of Service: 01/02/2023

## 2023-01-03 NOTE — PROGRESS NOTES
[facility]  Progress Note    Patient name: Peyman Gr  MRN: [id number]  Admit Date: 1/1/2023   LOS: 2 days     SUBJECTIVE:     Principal problem: STEMI involving right coronary artery    Interval History:  Febrile this morning with temp of 101.3F. Requiring increasing sedation due to ventilator dyssynchrony. Thick yellow secretions from nose. Urine output is diminished.     Summary:   67-year-old male with peripheral vascular disease, essential hypertension, hyperlipidemia and ongoing tobacco use presented to outside facility with 2 hour onset of chest pain and shortness of breath.  EKG revealed inferior and anterolateral STEMI.  Quickly degenerated into cardiac arrest with runs of VT/VF and asystole requiring resuscitation for about 40 minutes.  He was subsequently intubated and transferred to our ED. En route he developed cardiac arrest again (PEA) which was continued into our ED and was resuscitated for another 30 minutes.  He required transcutaneous pacing.  He was taken emergently to the catheterization lab and underwent PCI with stenting of RCA which was felt to be the culprit lesion.  He was also found to have left main stenosis of about 70%.  A transvenous pacemaker was also placed.  Currently admitted to the ICU for post cardiac arrest care.  He remains intubated and critically ill. Hospital stay complicated by fever; initiated on broad spectrum antibiotics.     Scheduled Meds:   aspirin  81 mg Oral Daily    ceFEPime (MAXIPIME) IVPB  2 g Intravenous Q12H    chlorhexidine  15 mL Mouth/Throat BID    enoxparin  40 mg Subcutaneous Q24H    famotidine  20 mg Per OG tube BID    insulin regular  5 Units Intravenous Once    levalbuterol  0.63 mg Nebulization TID WAKE    mupirocin   Nasal BID    nicotine  1 patch Transdermal Daily    ticagrelor  90 mg Oral BID     Continuous Infusions:   DOBUTamine Stopped (01/01/23 0945)    fentanyl 500 mcg/hr (01/03/23 0908)    midazolam       NORepinephrine bitartrate-D5W 0.04 mcg/kg/min (23)    propofoL 50 mcg/kg/min (23)    vasopressin Stopped (23)     PRN Meds:acetaminophen, albuterol-ipratropium, atropine, calcium gluconate IVPB, calcium gluconate IVPB, calcium gluconate IVPB, dextrose 10%, dextrose 10%, DOBUTamine, EPINEPHrine, [] fentaNYL **FOLLOWED BY** fentaNYL, glucagon (human recombinant), glucose, glucose, HYDROcodone-acetaminophen, insulin aspart U-100, magnesium sulfate IVPB, magnesium sulfate IVPB, melatonin, midazolam, morphine, naloxone, ondansetron, polyethylene glycol, potassium chloride in water **AND** potassium chloride in water **AND** potassium chloride in water, senna-docusate 8.6-50 mg, simethicone, sodium chloride 0.9%, sodium phosphate IVPB, sodium phosphate IVPB, sodium phosphate IVPB, Pharmacy to dose Vancomycin consult **AND** vancomycin - pharmacy to dose    Review of patient's allergies indicates:  No Known Allergies    Review of Systems:  Unable to obtain due to clinical condition  OBJECTIVE:     Vital Signs (Most Recent)  Temp: (!) 101.3 °F (38.5 °C) (23 08)  Pulse: 76 (23)  Resp: 20 (23)  BP: 123/63 (23 0530)  SpO2: 95 % (23)    Vital Signs Range (Last 24H):  Temp:  [98.1 °F (36.7 °C)-101.3 °F (38.5 °C)]   Pulse:  [65-88]   Resp:  [15-32]   BP: ()/(54-88)   SpO2:  [88 %-99 %]   Arterial Line BP: (100-150)/(49-65)     I & O (Last 24H):  Intake/Output Summary (Last 24 hours) at 1/3/2023 0934  Last data filed at 1/3/2023 0933  Gross per 24 hour   Intake 5187.66 ml   Output 1177 ml   Net 4010.66 ml       Physical Exam:  General: Patient intubated and sedated.  Appears stated age  Eyes: No conjunctival injection. No scleral icterus.  Scleral edema noted  ENT:  ET and OG tubes noted.  No discharge from ears. Thick yellow nasal secretions from left nare  Neck: Supple, trachea midline. No JVD  CVS: RRR. No LE edema BL  Lungs:   Mechanical breath sounds  Abdomen:  Soft, nontender and nondistended.  No organomegaly  Neuro:  Sedated.  Responds to painful stimuli  Skin:  No rash or erythema noted  : Duron catheter with yellow urine    Laboratory:  I have reviewed all pertinent lab results within the past 24 hours.  CBC:   Recent Labs   Lab 01/03/23 0347 01/03/23  0417   WBC 9.04  --    RBC 3.02*  --    HGB 9.4*  --    HCT 29.0* 30*   *  --    MCV 95  --    MCH 31.1*  --    MCHC 32.4  --      CMP:   Recent Labs   Lab 01/03/23 0347      CALCIUM 7.6*   ALBUMIN 2.6*   PROT 5.4*   *   K 3.7   CO2 26      BUN 19   CREATININE 1.1   ALKPHOS 90   *   *   BILITOT 0.7     Cardiac markers:   Recent Labs   Lab 01/01/23  0155   TROPONINI 1.105*       Diagnostic Results:  Labs: Reviewed  X-Ray: Reviewed    X-Ray Chest AP Portable [[id number]] Resulted: 01/03/23 0558   Order Status: Completed Updated: 01/03/23 0600   Narrative:     EXAMINATION:   XR CHEST AP PORTABLE     CLINICAL HISTORY:   Respiratory failure     COMPARISON:   January 2, 2023     FINDINGS:   Heart size is upper normal.  The aortic arch is calcified.  Endotracheal tube tip is at the level of the clavicles.  Nasogastric tube extends to the stomach.     Pulmonary vascular congestion is similar to the prior study, with slightly increasing ground-glass opacity throughout the right lung.  There are no significant effusions.  There is no pneumothorax.    Impression:       1. Radiographic findings suggesting congestive failure and mild pulmonary edema, with slightly increasing ground-glass opacity on the right compared to January 2.       Electronically signed by: Juan Daniel Sun MD   Date: 01/03/2023   Time: 05:58       ASSESSMENT/PLAN:         Active Hospital Problems    Diagnosis  POA    *STEMI involving right coronary artery [I21.11]  Yes    Shock liver [K72.00]  Yes    Cardiogenic shock [R57.0]  Yes    Cardiac arrest [I46.9]  Yes    Acute  respiratory failure with hypoxia and hypercarbia [J96.01, J96.02]  Yes    Leukocytosis [D72.829]  Yes    Anemia [D64.9]  Yes    History of tobacco abuse [Z87.891]  Not Applicable    Primary hypertension [I10]  Yes    PVD (peripheral vascular disease) with claudication [I73.9]  Yes    PAD (peripheral artery disease) [I73.9]  Yes      Resolved Hospital Problems    Diagnosis Date Resolved POA    Hyponatremia [E87.1] 01/02/2023 Yes         Plan:   Cardiac arrest secondary to STEMI involving RCA   Status post emergent cardiac catheterization with PCI and stenting   On aspirin and ticagrelor for dual antiplatelet therapy  Continue post cardiac arrest care in ICU   On norepinephrine for pressor support; wean as tolerated   Telemetry monitoring; high-risk for reperfusion arrhythmias  Mechanical ventilation for respiratory failure; appreciate input from pulmonology   Sedation as needed to maintain RASS of -2  Monitor electrolytes and replete per protocol   CXR with worsening alveolar edema and in conjunction with elevated BNP; will hold off on further IV fluids  Check procalcitonin; obtain resp and blood cultures. Empiric vancomycin and cefepime   Monitoring function, urine output and avoid nephrotoxic agents, NSAIDs and iodinated contrast  Echocardiogram noted      Patient is at high risk for clinical decline    VTE Risk Mitigation (From admission, onward)           Ordered     enoxaparin injection 40 mg  Every 24 hours (non-standard times)         01/03/23 0934     IP VTE HIGH RISK PATIENT  Once         01/01/23 0817     Place sequential compression device  Until discontinued         01/01/23 0817                        Department Hospital Medicine  [facility]  Claudio Hoyos MD  Date of service: 01/03/2023

## 2023-01-03 NOTE — PROGRESS NOTES
Pulmonary/Critical Care  Progress Note      Patient name: Peyman Gr  MRN: [id number]  Date: 01/03/2023    Admit Date: 1/1/2023  Consult Requested By: Claudio Hoyos MD    Reason for Consult: respiratory failure    HPI:    1/1/2023 - 68 yo initially presented with chest pain about  2 AM, had bradycardia then VTVF arrest and had prolonged code - transferred to [facility] and was in cardiac arrest at arrival after multiple rounds of meds he had ROSC and taken to cath lab.  In Cath lab had occluded RCA and had successful PCI.  ALso has LM and OM disease.  He has been very unstable and moved to ICU.  He is unresponsive and cool.  He is ventilated.  On dobutrex and levophed, having some ectopy (contacting cardiology to see if they want to start lidocaine or amiodarone, QTc is 487), he is on bicarb drip and last ABG showed adequate oxygenation but a mixed metabolic and respiratory acidosis (vent adjused and will repeat).  No family was in the waiting room.  Chart has been reviewed and case d/w nursing, respiratory and Dr Hoyos.  BY report pt was resuscitated for > 60 minutes.    1/2/2023 - Remains critically ill, has been agitated at times and has needed increased sedation, he has not followed any commands.  Rhythm seems more stable but he has had ectopy.  Temp had increased from his hypothermia yesterday.  Electrolytes replaced by SS.  LFT have increased.  ABG this AM shows alkalosis (on bicarb drip).  CXR reviewed.  ECHO noted.    1/3/2023 - Remains critically ill, gets agitated at times and has required sedation.  He does not respond to voice or pain but does have spontaneous respirations and movement.  Pupils are sluggish to NR, minimal corneal reflex.  Had temp this AM.  CXR noted.    Review of Systems    Review of Systems   Unable to perform ROS: Mental acuity     Past Medical History    Past Medical History:   Diagnosis Date    Arthritis     Chronic back pain     Chronic neck pain     Dry gangrene     RIGHT LITTLE  TOE    Hypercholesteremia     Hypertension     Insomnia     Multiple falls     S/P BACK SURGERY- 1 FALL    PAD (peripheral artery disease)     Personal history of colonic polyps     PVD (peripheral vascular disease)        Past Surgical History    Past Surgical History:   Procedure Laterality Date    ANGIOGRAPHY OF LOWER EXTREMITY N/A 09/24/2020    Procedure: Angiogram Extremity Unilateral;  Surgeon: Luke Cabello MD;  Location: [facility] CATH;  Service: Cardiology;  Laterality: N/A;    BACK SURGERY      BUNIONECTOMY Bilateral     CARPAL TUNNEL RELEASE Bilateral     CHOLECYSTECTOMY      COLONOSCOPY N/A 5/9/2022    Procedure: COLONOSCOPY;  Surgeon: Braydon Durand MD;  Location: [facility] ENDO;  Service: Endoscopy;  Laterality: N/A;    COLONOSCOPY W/ POLYPECTOMY      CORONARY ARTERY BYPASS GRAFT      CREATION OF BYPASS FROM INTERNAL CAROTID ARTERY TO SUBCLAVIAN ARTERY Right 12/27/2021    Procedure: CREATION, BYPASS, ARTERIAL, INTERNAL CAROTID TO SUBCLAVIAN;  Surgeon: Jeovany Goins MD;  Location: [facility] OR;  Service: Vascular;  Laterality: Right;    ELBOW ARTHROPLASTY Right     ELBOW SURGERY      MINIMALLY INVASIVE FORAMINOTOMY OF  SPINE N/A 11/15/2018    Procedure: FORAMINOTOMY, SPINE, MINIMALLY INVASIVE DECOMPRESSION L4-5;  Surgeon: Iván Stevenson Jr., MD;  Location: [facility] OR;  Service: Orthopedics;  Laterality: N/A;    NECK SURGERY      acf    PERCUTANEOUS TRANSLUMINAL ANGIOPLASTY (PTA) OF PERIPHERAL VESSEL Right 05/01/2020    Procedure: PTA, PERIPHERAL VESSEL of right lower extremity;  Surgeon: Luke Cabello MD;  Location: [facility] CATH;  Service: Cardiology;  Laterality: Right;    PERCUTANEOUS TRANSLUMINAL ANGIOPLASTY (PTA) OF PERIPHERAL VESSEL Left 09/10/2020    Procedure: PTA, PERIPHERAL VESSEL, Left lower extremity;  Surgeon: Luke Cabello MD;  Location: [facility] CATH;  Service: Cardiology;  Laterality: Left;    PERCUTANEOUS TRANSLUMINAL ANGIOPLASTY (PTA) OF PERIPHERAL VESSEL Left 07/06/2021    Profunda  and SFA       Medications (scheduled):      aspirin  81 mg Oral Daily    ceFEPime (MAXIPIME) IVPB  2 g Intravenous Q12H    chlorhexidine  15 mL Mouth/Throat BID    enoxparin  40 mg Subcutaneous Q24H    famotidine  20 mg Per OG tube BID    insulin regular  5 Units Intravenous Once    levalbuterol  0.63 mg Nebulization TID WAKE    mupirocin   Nasal BID    nicotine  1 patch Transdermal Daily    ticagrelor  90 mg Oral BID    vancomycin (VANCOCIN) IVPB  1,750 mg Intravenous Q12H       Medications (infusions):      DOBUTamine Stopped (23)    fentanyl 500 mcg/hr (23 0908)    midazolam      NORepinephrine bitartrate-D5W 0.05 mcg/kg/min (23 1055)    propofoL 50 mcg/kg/min (23 1109)    vasopressin Stopped (23)       Medications (prn):     acetaminophen, albuterol-ipratropium, atropine, calcium gluconate IVPB, calcium gluconate IVPB, calcium gluconate IVPB, dextrose 10%, dextrose 10%, DOBUTamine, EPINEPHrine, [] fentaNYL **FOLLOWED BY** fentaNYL, glucagon (human recombinant), glucose, glucose, HYDROcodone-acetaminophen, insulin aspart U-100, magnesium sulfate IVPB, magnesium sulfate IVPB, melatonin, midazolam, morphine, naloxone, ondansetron, polyethylene glycol, potassium chloride in water **AND** potassium chloride in water **AND** potassium chloride in water, senna-docusate 8.6-50 mg, simethicone, sodium chloride 0.9%, sodium phosphate IVPB, sodium phosphate IVPB, sodium phosphate IVPB, Pharmacy to dose Vancomycin consult **AND** vancomycin - pharmacy to dose    Family History:   Family History   Problem Relation Age of Onset    COPD Mother     Hypertension Father     Heart disease Father     Heart attack Father     COPD Sister     Other Sister     Kidney failure Brother     Hypertension Brother     Diabetes Brother        Social History: Tobacco:   Social History     Tobacco Use   Smoking Status Former    Packs/day: 2.00    Years: 60.00    Pack years: 120.00    Types:  "Cigarettes    Quit date: 2017    Years since quittin.0   Smokeless Tobacco Never                                EtOH:   Social History     Substance and Sexual Activity   Alcohol Use No                                Drugs:   Social History     Substance and Sexual Activity   Drug Use No       Physical Exam    Vital signs:  Temp:  [98.1 °F (36.7 °C)-101.3 °F (38.5 °C)]   Pulse:  [61-88]   Resp:  [15-25]   BP: ()/(54-96)   SpO2:  [88 %-96 %]   Arterial Line BP: (100-214)/(49-83)     Intake/Output:   Intake/Output Summary (Last 24 hours) at 1/3/2023 1401  Last data filed at 1/3/2023 1232  Gross per 24 hour   Intake 3402.12 ml   Output 1080 ml   Net 2322.12 ml          BMI: Estimated body mass index is 29.82 kg/m² as calculated from the following:    Height as of an earlier encounter on 23: 6' 2.02" (1.88 m).    Weight as of this encounter: 105.4 kg (232 lb 5.8 oz).    Physical Exam  Vitals and nursing note reviewed.   Constitutional:       General: He is not in acute distress.     Appearance: Normal appearance. He is not ill-appearing, toxic-appearing or diaphoretic.      Comments: Intubated  Nonresponsive   HENT:      Head: Normocephalic and atraumatic.      Right Ear: External ear normal.      Left Ear: External ear normal.      Nose: Nose normal.      Mouth/Throat:      Mouth: Mucous membranes are moist.      Pharynx: Oropharynx is clear. No oropharyngeal exudate.      Comments: Intubated   Eyes:      General: No scleral icterus.        Right eye: No discharge.         Left eye: No discharge.      Extraocular Movements: Extraocular movements intact.      Conjunctiva/sclera: Conjunctivae normal.      Comments: + corneals  Pupils unrequal L larger than right and minimally to nonresponsive  + sclera edema   Neck:      Vascular: No carotid bruit.   Cardiovascular:      Rate and Rhythm: Normal rate. Rhythm irregular.      Pulses: Normal pulses.      Heart sounds: Normal heart sounds. No murmur heard.    No " friction rub. No gallop.      Comments: NSR  Pulmonary:      Effort: Pulmonary effort is normal. No respiratory distress.      Breath sounds: Normal breath sounds. No stridor. No wheezing, rhonchi or rales.      Comments: Ventilated   Chest:      Chest wall: No tenderness.   Abdominal:      General: Bowel sounds are normal. There is no distension.      Tenderness: There is no abdominal tenderness. There is no guarding.   Genitourinary:     Comments: Duron   Musculoskeletal:         General: No swelling. Normal range of motion.      Cervical back: Normal range of motion and neck supple. No rigidity or tenderness.      Right lower leg: No edema.      Left lower leg: No edema.   Lymphadenopathy:      Cervical: No cervical adenopathy.   Skin:     General: Skin is dry.      Coloration: Skin is not jaundiced.      Findings: No bruising.      Comments: Cool to touch   Neurological:      Comments: No response to me   + spontaneous respiratory effort  No response to pain in UE  Some movement of extremities L > R (nonpurposeful)   Psychiatric:      Comments: Not able to assess       Laboratory    Recent Labs   Lab 01/03/23 0347 01/03/23  0417   WBC 9.04  --    RBC 3.02*  --    HGB 9.4*  --    HCT 29.0* 30*   *  --    MCV 95  --    MCH 31.1*  --    MCHC 32.4  --          Recent Labs   Lab 01/03/23 0347   CALCIUM 7.6*   PROT 5.4*   *   K 3.7   CO2 26      BUN 19   CREATININE 1.1   ALKPHOS 90   *   *   BILITOT 0.7         No results for input(s): PT, INR, APTT in the last 24 hours.      No results for input(s): CPK, CPKMB, TROPONINI, MB in the last 24 hours.      Additional labs: reviewed    Microbiology:       Microbiology Results (last 7 days)       Procedure Component Value Units Date/Time    Blood culture [[id number]] Collected: 01/03/23 0954    Order Status: Sent Specimen: Blood Updated: 01/03/23 0959    Blood culture [[id number]] Collected: 01/03/23 0954    Order Status: Sent Specimen:  Blood Updated: 01/03/23 0959    Culture, Respiratory with Gram Stain [[id number]] Collected: 01/02/23 1431    Order Status: Completed Specimen: Respiratory from Tracheal Aspirate Updated: 01/03/23 0701     Respiratory Culture Reduced normal respiratory lorenzo     Gram Stain (Respiratory) <10 epithelial cells per low power field.     Gram Stain (Respiratory) Few WBC's     Gram Stain (Respiratory) Moderate Gram positive cocci            Radiology    X-Ray Chest AP Portable  Narrative: EXAMINATION:  XR CHEST AP PORTABLE    CLINICAL HISTORY:  Respiratory failure    COMPARISON:  January 2, 2023    FINDINGS:  Heart size is upper normal.  The aortic arch is calcified.  Endotracheal tube tip is at the level of the clavicles.  Nasogastric tube extends to the stomach.    Pulmonary vascular congestion is similar to the prior study, with slightly increasing ground-glass opacity throughout the right lung.  There are no significant effusions.  There is no pneumothorax.  Impression: 1. Radiographic findings suggesting congestive failure and mild pulmonary edema, with slightly increasing ground-glass opacity on the right compared to January 2.    Electronically signed by: Juan Daniel Sun MD  Date:    01/03/2023  Time:    05:58        Additional Studies    reviewed    Ventilator Information    Vent Mode: A/C  Oxygen Concentration (%):  [50-60] 60  Resp Rate Total:  [20 br/min-33 br/min] 20 br/min  Vt Set:  [540 mL] 540 mL  PEEP/CPAP:  [5 cmH20] 5 cmH20  Mean Airway Pressure:  [12 cmH20-15 cmH20] 14 cmH20         Recent Labs     01/03/23  0417   PH 7.372   PCO2 48.5*   PO2 65*   HCO3 28.1*   POCSATURATED 91*   BE 3           Impression    Active Hospital Problems    Diagnosis  POA    *STEMI involving right coronary artery [I21.11]  Yes    Shock liver [K72.00]  Yes    Cardiogenic shock [R57.0]  Yes    Cardiac arrest [I46.9]  Yes    Acute respiratory failure with hypoxia and hypercarbia [J96.01, J96.02]  Yes    Leukocytosis [D72.829]   Yes    Anemia [D64.9]  Yes    History of tobacco abuse [Z87.891]  Not Applicable    Primary hypertension [I10]  Yes    PVD (peripheral vascular disease) with claudication [I73.9]  Yes    PAD (peripheral artery disease) [I73.9]  Yes      Resolved Hospital Problems    Diagnosis Date Resolved POA    Hyponatremia [E87.1] 01/02/2023 Yes       Plan    Ventilator, adjust as needed - not ready to consider weaning  Wean pressors as able   Ectopy is better  Sedate as needed  - will need to decrease at times to assess neuro status  Follow neuro exam - if not improved by tomorrow will consult neuro to evaluate  Antibiotics started for fever  Check MRSA screen and if negative will stop vancomycin  Watch renal function and LFT  Monitor electrolytes and replace via SS  Watch H/H  Add steroids for wheezing  Tried to contact family, no answer    Thank you for this consult.  I will follow with you while the patient is hospitalized.      Critical Care Time    I have spent > 35 minutes providing critical care services for this pt for the above diagnoses.  These services have included pt evaluation, pt exam, ventilator assessment, discussions with staff, chart review, data review, note preparation and .  Medications have been reviewed and adjusted as needed.  The patient has life threatening illness with a high risk of decompensation and/or death.      Nick Padgett MD  [facility] Pulmonary/Critical Care

## 2023-01-03 NOTE — PLAN OF CARE
01/03/23 0753   Patient Assessment/Suction   Level of Consciousness (AVPU) responds to pain   Respiratory Effort Unlabored   Expansion/Accessory Muscles/Retractions no use of accessory muscles   All Lung Fields Breath Sounds coarse   LLL Breath Sounds diminished   RLL Breath Sounds diminished   Rhythm/Pattern, Respiratory assisted mechanically   Cough Frequency with stimulation   PRE-TX-O2   Device (Oxygen Therapy) ventilator   Oxygen Concentration (%) 60   SpO2 95 %   Pulse Oximetry Type Continuous   $ Pulse Oximetry - Multiple Charge Pulse Oximetry - Multiple   Pulse 76   Resp 20   Aerosol Therapy   $ Aerosol Therapy Charges Aerosol Treatment   Daily Review of Necessity (SVN) completed   Respiratory Treatment Status (SVN) given   Treatment Route (SVN) in-line   Patient Position (SVN) HOB elevated   Post Treatment Assessment (SVN) breath sounds improved   Signs of Intolerance (SVN) none   Breath Sounds Post-Respiratory Treatment   Throughout All Fields Post-Treatment All Fields   Throughout All Fields Post-Treatment aeration increased   Post-treatment Heart Rate (beats/min) 77   Post-treatment Resp Rate (breaths/min) 20        Airway - Non-Surgical 01/01/23 0217   Placement Date/Time: 01/01/23 0217   Inserted by: MD  Airway Device Size: 7.5  Style: Cuffed  Cuff Inflated With: Air  Placement Verified By: Capnometry;Auscultation;Colorimetric EtCO2 device  Depth of Insertion (cm): 23  Secured at: Teeth  Insertion ...   Secured at 23 cm   Measured At Teeth   Secured Location Center   Secured by Commercial tube domínguez   Bite Block center;secure and patent   Vent Select   Conventional Vent Y   $ Ventilator Subsequent 1   Charged w/in last 24h YES   Preset Conventional Ventilator Settings   Vent ID [id number]   Vent Type    Ventilation Type VC   Vent Mode A/C   Set Rate 20 BPM   Vt Set 540 mL   PEEP/CPAP 5 cmH20   Peak Flow 60 L/min   Peak End Inspiratory Pressure 18 cmH20   I-Trigger Type  V-TRIG   Trigger Sensitivity  Flow/I-Trigger 2 L/min   Patient Ventilator Parameters   Resp Rate Total 20 br/min   Peak Airway Pressure 28 cmH20   Mean Airway Pressure 12 cmH20   Plateau Pressure 56 cmH20   Exhaled Vt 556 mL   Total Ve 11.1 L/m   I:E Ratio Measured 1:2.10   Auto PEEP 20 cmH20   Conventional Ventilator Alarms   Resp Rate High Alarm 40 br/min   Press High Alarm 62 cmH2O   Apnea Rate 22   Apnea Volume (mL) 0 mL   Apnea Oxygen Concentration  100   Apnea Flow Rate (L/min) 65   T Apnea 20 sec(s)   IHI Ventilator Associated Pneumonia Bundle (Required)   Head of Bed Elevated  HOB less than 20   Oral Care Mouth swabbed;Mouth moisturizer   Vent Circut Breaks Minimized Yes   Ready to Wean/Extubation Screen   FIO2<=50 (chart decimal) (!) 0.6   MV<16L (chart vol.) 11.1   PEEP <=8 (chart #) 5   Ready to Wean Parameters   F/VT Ratio<105 (RSBI) (!) 35.97   Vital Capacity (mL) 0   Education   $ Education Bronchodilator;15 min   Respiratory Evaluation   $ Care Plan Tech Time 15 min   $ Eval/Re-eval Charges Evaluation   Evaluation For New Orders

## 2023-01-03 NOTE — PROGRESS NOTES
[facility]  Department of Cardiology  Consult Note      PATIENT NAME: Peyman Gr    MRN: [id number]  TODAY'S DATE: 01/03/2023  ADMIT DATE: 1/1/2023                          CONSULT REQUESTED BY: Claudio Hoyos MD    SUBJECTIVE     PRINCIPAL PROBLEM: STEMI involving right coronary artery    Interval history:  01/03/2023:    Patient remains intubated and sedated on mechanical ventilation. FIO2 is 60% with 5 PEEP. RN attempted to wean sedation but patient keeps triggering the vent.  He has cough and pupillary reflex.  RN states she has seen him move all 4 extremities but not to command.  He remains unresponsive. He is on Levophed, propofol, fentanyl, and vasopressin. He was intermittently paced for approximately 5 min this morning when he was coughing and gagging on the vent. He remains in critical condition.  UOP is 275 today.       01/02/2023:  Seen and examined patient in the ICU today.  Off bicarb drip.  Currently on 2 sedatives and Levophed.     REASON FOR CONSULT:  STEMI      HPI:  67-year-old male with past medical history of peripheral vascular disease, right subclavian stenosis, chronic smoker, hypertension, hyperlipidemia presented to oxygen [facility] with 2 hour history of chest pain, shortness of breath.  ECG showed junctional bradycardia, complete heart block with inferior and anterolateral ST elevations.  STEMI code was called to which I responded immediately and agreed to transfer the patient to [facility] cath lab for emergent cardiac catheterization.  However, patient unfortunately went into cardiac arrest at [facility] with VT, VF, asystole requiring resuscitation and intubation.  Patient was resuscitated for about 40 minutes ROSC was achieved.  Patient was moving extremities at the time as per the ER.  Patient was then transferred to [facility] ER and patient again had cardiac arrest upon arrival to the ER which required  resuscitation for 30 minutes.  Patient was in PEA initially and ROSC was achieved subsequently however patient was dependent on transcutaneous pacing.  Patient blood pressure was very low and could not be obtained with a BP cuff.  Patient was unable to be transferred to the cath lab at that point due to significant hemodynamic instability. Patient was started on pressors.  A-line was placed in the ED and blood pressure eventually improved with high doses of vasopressin, norepinephrine, sodium bicarbonate and dobutamine infusions.  Discussed with patient's family members regarding the patient's critical condition.  Once the blood pressure was stabilized on the pressors, patient was brought to the cath lab for transvenous pacemaker placement and coronary angiogram.  Benefits and risks of the procedure explained to patient's family and patient's family  agreed for the procedure.      Review of patient's allergies indicates:  No Known Allergies    Past Medical History:   Diagnosis Date    Arthritis     Chronic back pain     Chronic neck pain     Dry gangrene     RIGHT LITTLE TOE    Hypercholesteremia     Hypertension     Insomnia     Multiple falls     S/P BACK SURGERY- 1 FALL    PAD (peripheral artery disease)     Personal history of colonic polyps     PVD (peripheral vascular disease)      Past Surgical History:   Procedure Laterality Date    ANGIOGRAPHY OF LOWER EXTREMITY N/A 09/24/2020    Procedure: Angiogram Extremity Unilateral;  Surgeon: Luke Cabello MD;  Location: [facility] CATH;  Service: Cardiology;  Laterality: N/A;    BACK SURGERY      BUNIONECTOMY Bilateral     CARPAL TUNNEL RELEASE Bilateral     CHOLECYSTECTOMY      COLONOSCOPY N/A 5/9/2022    Procedure: COLONOSCOPY;  Surgeon: Braydon Durand MD;  Location: [facility] ENDO;  Service: Endoscopy;  Laterality: N/A;    COLONOSCOPY W/ POLYPECTOMY      CORONARY ARTERY BYPASS GRAFT      CREATION OF BYPASS FROM INTERNAL CAROTID ARTERY TO SUBCLAVIAN ARTERY Right  2021    Procedure: CREATION, BYPASS, ARTERIAL, INTERNAL CAROTID TO SUBCLAVIAN;  Surgeon: Jeovany Goins MD;  Location: Counts include 234 beds at the [facility] OR;  Service: Vascular;  Laterality: Right;    ELBOW ARTHROPLASTY Right     ELBOW SURGERY      MINIMALLY INVASIVE FORAMINOTOMY OF  SPINE N/A 11/15/2018    Procedure: FORAMINOTOMY, SPINE, MINIMALLY INVASIVE DECOMPRESSION L4-5;  Surgeon: Iván Stevenson Jr., MD;  Location: Counts include 234 beds at the [facility] OR;  Service: Orthopedics;  Laterality: N/A;    NECK SURGERY      acf    PERCUTANEOUS TRANSLUMINAL ANGIOPLASTY (PTA) OF PERIPHERAL VESSEL Right 2020    Procedure: PTA, PERIPHERAL VESSEL of right lower extremity;  Surgeon: Luke Cabello MD;  Location: Counts include 234 beds at the [facility] CATH;  Service: Cardiology;  Laterality: Right;    PERCUTANEOUS TRANSLUMINAL ANGIOPLASTY (PTA) OF PERIPHERAL VESSEL Left 09/10/2020    Procedure: PTA, PERIPHERAL VESSEL, Left lower extremity;  Surgeon: Luke Cabello MD;  Location: Counts include 234 beds at the [facility] CATH;  Service: Cardiology;  Laterality: Left;    PERCUTANEOUS TRANSLUMINAL ANGIOPLASTY (PTA) OF PERIPHERAL VESSEL Left 2021    Pro[employer] and SFA     Social History     Tobacco Use    Smoking status: Former     Packs/day: 2.00     Years: 60.00     Pack years: 120.00     Types: Cigarettes     Quit date:      Years since quittin.0    Smokeless tobacco: Never   Substance Use Topics    Alcohol use: No    Drug use: No        REVIEW OF SYSTEMS  Unable to obtain.  Patient was intubated    OBJECTIVE     VITAL SIGNS (Most Recent)  Temp: (!) 101.1 °F (38.4 °C) (23 1000)  Pulse: 76 (23 1200)  Resp: 20 (23 1200)  BP: 114/61 (23 1145)  SpO2: (!) 94 % (23 1200)    VENTILATION STATUS  Resp: 20 (23 1200)  SpO2: (!) 94 % (23 1200)  Vent Mode: A/C  Oxygen Concentration (%):  [50-60] 60  Resp Rate Total:  [20 br/min-33 br/min] 20 br/min  Vt Set:  [540 mL] 540 mL  PEEP/CPAP:  [5 cmH20] 5 cmH20  Mean Airway Pressure:  [12 cmH20-15 cmH20] 12 cmH20    I & O (Last  24H):  Intake/Output Summary (Last 24 hours) at 1/3/2023 1225  Last data filed at 1/3/2023 0933  Gross per 24 hour   Intake 3852.12 ml   Output 1042 ml   Net 2810.12 ml       WEIGHTS  Wt Readings from Last 1 Encounters:   01/03/23 0615 105.4 kg (232 lb 5.8 oz)   01/02/23 0400 102.5 kg (225 lb 15.5 oz)   01/02/23 0053 102.5 kg (225 lb 15.5 oz)   01/01/23 0800 99.8 kg (220 lb 0.3 oz)   01/01/23 0341 99.8 kg (220 lb)       PHYSICAL EXAM  GENERAL: well built, well nourished, well-developed.  Intubated, sedated. Unresponsive to physical and verbal stimuli  HEENT: Normocephalic.   NECK: No JVD.   CARDIAC: Regular rate and rhythm, S1, S2 regular  CHEST ANATOMY: normal.   LUNGS: + bilateral air entry   ABDOMEN: Soft.  Normal bowel sounds.   URINARY: cohen  EXTREMITIES:  No edema    CENTRAL NERVOUS SYSTEM:  Intubated, sedated. Unresponsive to physical and verbal stimuli  SKIN: Skin without lesions, moist, well perfused.     HOME MEDICATIONS:  Current Facility-Administered Medications on File Prior to Encounter   Medication Dose Route Frequency Provider Last Rate Last Admin    0.9%  NaCl infusion   Intravenous Continuous Braydon Durand MD   Stopped at 05/09/22 1011     Current Outpatient Medications on File Prior to Encounter   Medication Sig Dispense Refill    albuterol (PROVENTIL/VENTOLIN HFA) 90 mcg/actuation inhaler Inhale 1-2 puffs into the lungs every 6 (six) hours as needed for Shortness of Breath. Rescue 8 g 0    amLODIPine (NORVASC) 10 MG tablet Take 1 tablet (10 mg total) by mouth once daily. 30 tablet 1    amLODIPine (NORVASC) 5 MG tablet Take 5 mg by mouth once daily.      aspirin 81 MG Chew Take 81 mg by mouth once daily.      carvediloL (COREG) 25 MG tablet Take 25 mg by mouth 2 (two) times daily with meals.      clopidogreL (PLAVIX) 75 mg tablet Take 75 mg by mouth once daily. On hold for sx on 12/27/21      ezetimibe (ZETIA) 10 mg tablet Take 10 mg by mouth once daily.      hydroCHLOROthiazide  (HYDRODIURIL) 12.5 MG Tab Take 1 tablet (12.5 mg total) by mouth once daily. 30 tablet 1    HYDROcodone-acetaminophen (NORCO)  mg per tablet Take 1 tablet by mouth every 8 (eight) hours as needed for Pain.      mupirocin (BACTROBAN) 2 % ointment Apply topically 3 (three) times daily. 22 g 0    oxyCODONE-acetaminophen (PERCOCET) 7.5-325 mg per tablet Take 1 tablet by mouth 2 (two) times daily as needed.      traMADoL (ULTRAM) 50 mg tablet Take 50 mg by mouth every 6 (six) hours as needed.      valsartan (DIOVAN) 160 MG tablet Take 160 mg by mouth once daily.         SCHEDULED MEDS:   aspirin  81 mg Oral Daily    ceFEPime (MAXIPIME) IVPB  2 g Intravenous Q12H    chlorhexidine  15 mL Mouth/Throat BID    enoxparin  40 mg Subcutaneous Q24H    famotidine  20 mg Per OG tube BID    insulin regular  5 Units Intravenous Once    levalbuterol  0.63 mg Nebulization TID WAKE    mupirocin   Nasal BID    nicotine  1 patch Transdermal Daily    ticagrelor  90 mg Oral BID    vancomycin (VANCOCIN) IVPB  1,750 mg Intravenous Q12H    vancomycin (VANCOCIN) IVPB  2,000 mg Intravenous Once       CONTINUOUS INFUSIONS:   DOBUTamine Stopped (23)    fentanyl 500 mcg/hr (23 0908)    midazolam      NORepinephrine bitartrate-D5W 0.05 mcg/kg/min (23 1055)    propofoL 50 mcg/kg/min (23 1109)    vasopressin Stopped (23)       PRN MEDS:acetaminophen, albuterol-ipratropium, atropine, calcium gluconate IVPB, calcium gluconate IVPB, calcium gluconate IVPB, dextrose 10%, dextrose 10%, DOBUTamine, EPINEPHrine, [] fentaNYL **FOLLOWED BY** fentaNYL, glucagon (human recombinant), glucose, glucose, HYDROcodone-acetaminophen, insulin aspart U-100, magnesium sulfate IVPB, magnesium sulfate IVPB, melatonin, midazolam, morphine, naloxone, ondansetron, polyethylene glycol, potassium chloride in water **AND** potassium chloride in water **AND** potassium chloride in water, senna-docusate 8.6-50 mg, simethicone,  sodium chloride 0.9%, sodium phosphate IVPB, sodium phosphate IVPB, sodium phosphate IVPB, Pharmacy to dose Vancomycin consult **AND** vancomycin - pharmacy to dose    LABS AND DIAGNOSTICS     CBC LAST 3 DAYS  Recent Labs   Lab 01/02/23  0118 01/02/23 0417 01/02/23 0432 01/03/23 0347 01/03/23 0417   WBC 8.57  --  9.36 9.04  --    RBC 3.44*  --  3.36* 3.02*  --    HGB 10.9*  --  10.4* 9.4*  --    HCT 31.3*   < > 30.5* 29.0* 30*   MCV 91  --  91 95  --    MCH 31.7*  --  31.0 31.1*  --    MCHC 34.8  --  34.1 32.4  --    RDW 14.2  --  14.0 15.2*  --    *  --  138* 116*  --    MPV 8.7*  --  9.3 9.7  --    GRAN 76.6*  6.6  --  74.7*  7.0 79.6*  7.2  --    LYMPH 12.7*  1.1  --  14.5*  1.4 10.8*  1.0  --    MONO 9.7  0.8  --  9.5  0.9 8.5  0.8  --    BASO 0.02  --  0.02 0.02  --    NRBC 0  --  0 0  --     < > = values in this interval not displayed.       COAGULATION LAST 3 DAYS  Recent Labs   Lab 01/01/23 0453   INR 1.2   APTT 30.3       CHEMISTRY LAST 3 DAYS  Recent Labs   Lab 01/01/23  0453 01/01/23  0852 01/01/23  1831 01/02/23  0417 01/02/23 0432 01/02/23  1210 01/02/23  1400 01/03/23 0347 01/03/23 0417   *  --  136  --  137  --   --  135*  --    K 5.0  --  3.5  --  3.0*  --  3.3* 3.7  --    CL 96  --  101  --  102  --   --  103  --    CO2 22*  --  26  --  29  --   --  26  --    ANIONGAP 13  --  9  --  6*  --   --  6*  --    BUN 24*  --  26*  --  24*  --   --  19  --    CREATININE 1.4  --  1.5*  --  1.2  --   --  1.1  --    *  --  114*  --  135*  --   --  105  --    CALCIUM 7.8*  --  7.3*  --  6.9*  --   --  7.6*  --    PH  --    < >  --  7.476*  --  7.480*  --   --  7.372   MG 2.2  --  1.8  --  1.9  --  1.9 2.0  --    ALBUMIN 2.6*  --   --   --  2.8*  --   --  2.6*  --    PROT 5.2*  --   --   --  5.2*  --   --  5.4*  --    ALKPHOS 102  --   --   --  102  --   --  90  --    *  --   --   --  271*  --   --  187*  --    *  --   --   --  513*  --   --  205*  --    BILITOT  0.8  --   --   --  0.7  --   --  0.7  --     < > = values in this interval not displayed.       CARDIAC PROFILE LAST 3 DAYS  Recent Labs   Lab 01/01/23  0155 01/01/23  0453 01/01/23  0848 01/01/23  1831 01/02/23  0118 01/02/23  0546 01/02/23  1400   BNP  --  76  --   --   --   --  376*   TROPONINI 1.105*  --   --   --   --   --   --    TROPONINIHS  --  1459.0*   < > 289536.7* 572081.9* 070479.9*  --     < > = values in this interval not displayed.       ENDOCRINE LAST 3 DAYS  Recent Labs   Lab 01/03/23  0954   PROCAL 3.13*       LAST ARTERIAL BLOOD GAS  ABG  Recent Labs   Lab 01/03/23  0417   PH 7.372   PO2 65*   PCO2 48.5*   HCO3 28.1*   BE 3       LAST 7 DAYS MICROBIOLOGY   Microbiology Results (last 7 days)       Procedure Component Value Units Date/Time    Blood culture [[id number]] Collected: 01/03/23 0954    Order Status: Sent Specimen: Blood Updated: 01/03/23 0959    Blood culture [[id number]] Collected: 01/03/23 0954    Order Status: Sent Specimen: Blood Updated: 01/03/23 0959    Culture, Respiratory with Gram Stain [[id number]] Collected: 01/02/23 1431    Order Status: Completed Specimen: Respiratory from Tracheal Aspirate Updated: 01/03/23 0701     Respiratory Culture Reduced normal respiratory lorenzo     Gram Stain (Respiratory) <10 epithelial cells per low power field.     Gram Stain (Respiratory) Few WBC's     Gram Stain (Respiratory) Moderate Gram positive cocci            MOST RECENT IMAGING  X-Ray Chest AP Portable  Narrative: EXAMINATION:  XR CHEST AP PORTABLE    CLINICAL HISTORY:  Respiratory failure    COMPARISON:  January 2, 2023    FINDINGS:  Heart size is upper normal.  The aortic arch is calcified.  Endotracheal tube tip is at the level of the clavicles.  Nasogastric tube extends to the stomach.    Pulmonary vascular congestion is similar to the prior study, with slightly increasing ground-glass opacity throughout the right lung.  There are no significant effusions.  There is no  pneumothorax.  Impression: 1. Radiographic findings suggesting congestive failure and mild pulmonary edema, with slightly increasing ground-glass opacity on the right compared to January 2.    Electronically signed by: Juan Daniel Sun MD  Date:    01/03/2023  Time:    05:58      ECHOCARDIOGRAM RESULTS (last 5)  No results found for this or any previous visit.      CURRENT/PREVIOUS VISIT EKG  Results for orders placed or performed during the hospital encounter of 01/01/23   EKG 12-LEAD on arrival to floor    Collection Time: 01/01/23  9:59 AM    Narrative    Test Reason : I21.3,    Vent. Rate : 070 BPM     Atrial Rate : 000 BPM     P-R Int : 000 ms          QRS Dur : 078 ms      QT Int : 450 ms       P-R-T Axes : 000 027 -68 degrees     QTc Int : 486 ms    Atrial fibrillation with occasional ventricular-paced complexes and with  premature ventricular or aberrantly conducted complexes  Inferior infarct ,age undetermined  Abnormal ECG  When compared with ECG of 01-JAN-2023 09:57,  Previous ECG has undetermined rhythm, needs review    Referred By: IRENE MOSER           Confirmed By:            ASSESSMENT/PLAN:     Active Hospital Problems    Diagnosis    *STEMI involving right coronary artery    Shock liver    Cardiogenic shock    Cardiac arrest    Acute respiratory failure with hypoxia and hypercarbia    Leukocytosis    Anemia    History of tobacco abuse    Primary hypertension    PVD (peripheral vascular disease) with claudication    PAD (peripheral artery disease)       ASSESSMENT & PLAN:   STEMI- inferior wall MI complicated by complete heart block and RV shock  Cardiac arrest status post resuscitation and intubation (patient was resuscitated for 60-70 minutes in total)  Peripheral vascular disease  Chronic smoking  Hypertension  hyperlipidemia      RECOMMENDATIONS:  Coronary angiogram on 1/1/23 showed 100% thrombotic flush occlusion of RCA at the ostium (culprit for STEMI).  Discrete 70% ostial left main  stenosis.  Discrete 90% OM stenosis. Status post successful PTCA and drug eluting stent placement to ostial RCA with re-establishment of DAIN 3 flow through the vessel.  Left ventriculography showed hyperdynamic LV function.  TVP remains in place.  Patient was paced for approximately 5 min this morning when coughing and gagging on ventilator. Possible vasovagal response. If no bradycardia tonight, will consider dc temporary pacer tomorrow.   Continue aspirin 81 mg daily, Brilinta 90mg BID.   Wean from pressors when stable.   Low urinary output at this time. 275ml since midnight. Continue to closely monitor I's & O's. Continue to monitor renal function. Cr. 1.1 this am.   Patient remains in critical condition. Appreciate Critical Care's management and input.   Will continue to follow.       Mitra Moore NP  [facility]  Department of Cardiology  Date of Service: 01/03/2023         I have personally interviewed and examined the patient, I have reviewed the Nurse Practitioner's history and physical, assessment, and plan. I agree with the findings and plan.    1. Patient is still on ventilator unresponsive, patient's status has not changed significantly.  2. Continue aspirin and Brilinta.  3. Currently on pressor supports continue same.  And patient is on empiric vancomycin and cefepime for suspected sepsis.    4. Patient intermittently goes into paced rhythm.  Especially when he becomes hypotensive in starts coughing.      Sarthak Cardona M.D.  [facility]  Department of Cardiology  Date of Service: 01/03/2023

## 2023-01-04 PROBLEM — J15.211: Status: ACTIVE | Noted: 2023-01-04

## 2023-01-04 LAB
ALBUMIN SERPL BCP-MCNC: 2.3 G/DL (ref 3.5–5.2)
ALLENS TEST: ABNORMAL
ALP SERPL-CCNC: 81 U/L (ref 55–135)
ALT SERPL W/O P-5'-P-CCNC: 127 U/L (ref 10–44)
ANION GAP SERPL CALC-SCNC: 6 MMOL/L (ref 8–16)
AST SERPL-CCNC: 96 U/L (ref 10–40)
BACTERIA SPEC AEROBE CULT: ABNORMAL
BACTERIA SPEC AEROBE CULT: ABNORMAL
BASOPHILS # BLD AUTO: 0.01 K/UL (ref 0–0.2)
BASOPHILS NFR BLD: 0.1 % (ref 0–1.9)
BILIRUB SERPL-MCNC: 0.7 MG/DL (ref 0.1–1)
BUN SERPL-MCNC: 19 MG/DL (ref 8–23)
CALCIUM SERPL-MCNC: 8 MG/DL (ref 8.7–10.5)
CHLORIDE SERPL-SCNC: 104 MMOL/L (ref 95–110)
CO2 SERPL-SCNC: 25 MMOL/L (ref 23–29)
CREAT SERPL-MCNC: 1 MG/DL (ref 0.5–1.4)
DELSYS: ABNORMAL
DIFFERENTIAL METHOD: ABNORMAL
EOSINOPHIL # BLD AUTO: 0 K/UL (ref 0–0.5)
EOSINOPHIL NFR BLD: 0 % (ref 0–8)
ERYTHROCYTE [DISTWIDTH] IN BLOOD BY AUTOMATED COUNT: 14.9 % (ref 11.5–14.5)
ERYTHROCYTE [SEDIMENTATION RATE] IN BLOOD BY WESTERGREN METHOD: 20 MM/H
EST. GFR  (NO RACE VARIABLE): >60 ML/MIN/1.73 M^2
FIO2: 70
GLUCOSE SERPL-MCNC: 132 MG/DL (ref 70–110)
GLUCOSE SERPL-MCNC: 132 MG/DL (ref 70–110)
GLUCOSE SERPL-MCNC: 146 MG/DL (ref 70–110)
GLUCOSE SERPL-MCNC: 156 MG/DL (ref 70–110)
GLUCOSE SERPL-MCNC: 165 MG/DL (ref 70–110)
GRAM STN SPEC: ABNORMAL
HCO3 UR-SCNC: 26.6 MMOL/L (ref 24–28)
HCT VFR BLD AUTO: 30.3 % (ref 40–54)
HCT VFR BLD CALC: 30 %PCV (ref 36–54)
HGB BLD-MCNC: 9.8 G/DL (ref 14–18)
IMM GRANULOCYTES # BLD AUTO: 0.08 K/UL (ref 0–0.04)
IMM GRANULOCYTES NFR BLD AUTO: 1 % (ref 0–0.5)
LYMPHOCYTES # BLD AUTO: 0.2 K/UL (ref 1–4.8)
LYMPHOCYTES NFR BLD: 2.9 % (ref 18–48)
MAGNESIUM SERPL-MCNC: 2.1 MG/DL (ref 1.6–2.6)
MCH RBC QN AUTO: 31.3 PG (ref 27–31)
MCHC RBC AUTO-ENTMCNC: 32.3 G/DL (ref 32–36)
MCV RBC AUTO: 97 FL (ref 82–98)
MIN VOL: 11
MODE: ABNORMAL
MONOCYTES # BLD AUTO: 0.3 K/UL (ref 0.3–1)
MONOCYTES NFR BLD: 3.9 % (ref 4–15)
NEUTROPHILS # BLD AUTO: 7.4 K/UL (ref 1.8–7.7)
NEUTROPHILS NFR BLD: 92.1 % (ref 38–73)
NRBC BLD-RTO: 0 /100 WBC
PCO2 BLDA: 50.2 MMHG (ref 35–45)
PEEP: 5
PH SMN: 7.33 [PH] (ref 7.35–7.45)
PIP: 24
PLATELET # BLD AUTO: 131 K/UL (ref 150–450)
PMV BLD AUTO: 9.8 FL (ref 9.2–12.9)
PO2 BLDA: 81 MMHG (ref 80–100)
POC BE: 1 MMOL/L
POC IONIZED CALCIUM: 1.2 MMOL/L (ref 1.06–1.42)
POC SATURATED O2: 95 % (ref 95–100)
POC TCO2: 28 MMOL/L (ref 23–27)
POTASSIUM BLD-SCNC: 4.9 MMOL/L (ref 3.5–5.1)
POTASSIUM SERPL-SCNC: 4.8 MMOL/L (ref 3.5–5.1)
PROT SERPL-MCNC: 5.6 G/DL (ref 6–8.4)
RBC # BLD AUTO: 3.13 M/UL (ref 4.6–6.2)
SAMPLE: ABNORMAL
SITE: ABNORMAL
SODIUM BLD-SCNC: 136 MMOL/L (ref 136–145)
SODIUM SERPL-SCNC: 135 MMOL/L (ref 136–145)
SP02: 98
VT: 540
WBC # BLD AUTO: 8.05 K/UL (ref 3.9–12.7)

## 2023-01-04 PROCEDURE — 94640 AIRWAY INHALATION TREATMENT: CPT

## 2023-01-04 PROCEDURE — 31500 INSERT EMERGENCY AIRWAY: CPT

## 2023-01-04 PROCEDURE — 63600175 PHARM REV CODE 636 W HCPCS: Performed by: INTERNAL MEDICINE

## 2023-01-04 PROCEDURE — 99900031 HC PATIENT EDUCATION (STAT)

## 2023-01-04 PROCEDURE — 84132 ASSAY OF SERUM POTASSIUM: CPT

## 2023-01-04 PROCEDURE — 51702 INSERT TEMP BLADDER CATH: CPT

## 2023-01-04 PROCEDURE — 99233 SBSQ HOSP IP/OBS HIGH 50: CPT | Mod: ,,, | Performed by: INTERNAL MEDICINE

## 2023-01-04 PROCEDURE — 80053 COMPREHEN METABOLIC PANEL: CPT | Performed by: FAMILY MEDICINE

## 2023-01-04 PROCEDURE — 20000000 HC ICU ROOM

## 2023-01-04 PROCEDURE — 99900035 HC TECH TIME PER 15 MIN (STAT)

## 2023-01-04 PROCEDURE — 99291 CRITICAL CARE FIRST HOUR: CPT | Mod: ,,, | Performed by: INTERNAL MEDICINE

## 2023-01-04 PROCEDURE — 25000003 PHARM REV CODE 250

## 2023-01-04 PROCEDURE — 94003 VENT MGMT INPAT SUBQ DAY: CPT

## 2023-01-04 PROCEDURE — 25000242 PHARM REV CODE 250 ALT 637 W/ HCPCS: Performed by: INTERNAL MEDICINE

## 2023-01-04 PROCEDURE — 27000221 HC OXYGEN, UP TO 24 HOURS

## 2023-01-04 PROCEDURE — 99233 PR SUBSEQUENT HOSPITAL CARE,LEVL III: ICD-10-PCS | Mod: ,,, | Performed by: INTERNAL MEDICINE

## 2023-01-04 PROCEDURE — 94610 INTRAPULM SURFACTANT ADMN: CPT

## 2023-01-04 PROCEDURE — 99291 PR CRITICAL CARE, E/M 30-74 MINUTES: ICD-10-PCS | Mod: ,,, | Performed by: INTERNAL MEDICINE

## 2023-01-04 PROCEDURE — 85014 HEMATOCRIT: CPT

## 2023-01-04 PROCEDURE — 94761 N-INVAS EAR/PLS OXIMETRY MLT: CPT

## 2023-01-04 PROCEDURE — 25000003 PHARM REV CODE 250: Performed by: INTERNAL MEDICINE

## 2023-01-04 PROCEDURE — 95819 EEG AWAKE AND ASLEEP: CPT

## 2023-01-04 PROCEDURE — 37799 UNLISTED PX VASCULAR SURGERY: CPT

## 2023-01-04 PROCEDURE — 84295 ASSAY OF SERUM SODIUM: CPT

## 2023-01-04 PROCEDURE — 82330 ASSAY OF CALCIUM: CPT

## 2023-01-04 PROCEDURE — 83735 ASSAY OF MAGNESIUM: CPT | Performed by: FAMILY MEDICINE

## 2023-01-04 PROCEDURE — S4991 NICOTINE PATCH NONLEGEND: HCPCS | Performed by: INTERNAL MEDICINE

## 2023-01-04 PROCEDURE — 85025 COMPLETE CBC W/AUTO DIFF WBC: CPT | Performed by: FAMILY MEDICINE

## 2023-01-04 PROCEDURE — 36620 INSERTION CATHETER ARTERY: CPT

## 2023-01-04 PROCEDURE — 99900026 HC AIRWAY MAINTENANCE (STAT)

## 2023-01-04 PROCEDURE — 82803 BLOOD GASES ANY COMBINATION: CPT

## 2023-01-04 PROCEDURE — 63600175 PHARM REV CODE 636 W HCPCS: Performed by: FAMILY MEDICINE

## 2023-01-04 RX ORDER — HYDRALAZINE HYDROCHLORIDE 20 MG/ML
10 INJECTION INTRAMUSCULAR; INTRAVENOUS EVERY 8 HOURS PRN
Status: DISCONTINUED | OUTPATIENT
Start: 2023-01-04 | End: 2023-01-10

## 2023-01-04 RX ADMIN — MIDAZOLAM HYDROCHLORIDE 1 MG/HR: 5 INJECTION, SOLUTION INTRAMUSCULAR; INTRAVENOUS at 04:01

## 2023-01-04 RX ADMIN — ASPIRIN 81 MG CHEWABLE TABLET 81 MG: 81 TABLET CHEWABLE at 09:01

## 2023-01-04 RX ADMIN — PROPOFOL 50 MCG/KG/MIN: 10 INJECTION, EMULSION INTRAVENOUS at 03:01

## 2023-01-04 RX ADMIN — CEFEPIME HYDROCHLORIDE 2 G: 2 INJECTION, SOLUTION INTRAVENOUS at 10:01

## 2023-01-04 RX ADMIN — PROPOFOL 50 MCG/KG/MIN: 10 INJECTION, EMULSION INTRAVENOUS at 08:01

## 2023-01-04 RX ADMIN — FAMOTIDINE 20 MG: 20 TABLET ORAL at 09:01

## 2023-01-04 RX ADMIN — METHYLPREDNISOLONE SODIUM SUCCINATE 40 MG: 40 INJECTION, POWDER, FOR SOLUTION INTRAMUSCULAR; INTRAVENOUS at 05:01

## 2023-01-04 RX ADMIN — METHYLPREDNISOLONE SODIUM SUCCINATE 40 MG: 40 INJECTION, POWDER, FOR SOLUTION INTRAMUSCULAR; INTRAVENOUS at 02:01

## 2023-01-04 RX ADMIN — FENTANYL CITRATE 500 MCG/HR: 50 INJECTION INTRAVENOUS at 03:01

## 2023-01-04 RX ADMIN — ENOXAPARIN SODIUM 40 MG: 100 INJECTION SUBCUTANEOUS at 10:01

## 2023-01-04 RX ADMIN — LEVALBUTEROL HYDROCHLORIDE 0.63 MG: 0.63 SOLUTION RESPIRATORY (INHALATION) at 02:01

## 2023-01-04 RX ADMIN — LEVALBUTEROL HYDROCHLORIDE 0.63 MG: 0.63 SOLUTION RESPIRATORY (INHALATION) at 07:01

## 2023-01-04 RX ADMIN — FENTANYL CITRATE 500 MCG/HR: 50 INJECTION INTRAVENOUS at 02:01

## 2023-01-04 RX ADMIN — HYDRALAZINE HYDROCHLORIDE 10 MG: 20 INJECTION INTRAMUSCULAR; INTRAVENOUS at 02:01

## 2023-01-04 RX ADMIN — NICOTINE 1 PATCH: 14 PATCH, EXTENDED RELEASE TRANSDERMAL at 09:01

## 2023-01-04 RX ADMIN — CEFTRIAXONE 1 G: 1 INJECTION, SOLUTION INTRAVENOUS at 02:01

## 2023-01-04 RX ADMIN — PROPOFOL 48.43 MCG/KG/MIN: 10 INJECTION, EMULSION INTRAVENOUS at 09:01

## 2023-01-04 RX ADMIN — METHYLPREDNISOLONE SODIUM SUCCINATE 40 MG: 40 INJECTION, POWDER, FOR SOLUTION INTRAMUSCULAR; INTRAVENOUS at 09:01

## 2023-01-04 RX ADMIN — CHLORHEXIDINE GLUCONATE 15 ML: 1.2 RINSE ORAL at 09:01

## 2023-01-04 RX ADMIN — TICAGRELOR 90 MG: 90 TABLET ORAL at 09:01

## 2023-01-04 RX ADMIN — PROPOFOL 50 MCG/KG/MIN: 10 INJECTION, EMULSION INTRAVENOUS at 05:01

## 2023-01-04 RX ADMIN — PROPOFOL 50 MCG/KG/MIN: 10 INJECTION, EMULSION INTRAVENOUS at 06:01

## 2023-01-04 RX ADMIN — INSULIN ASPART 1 UNITS: 100 INJECTION, SOLUTION INTRAVENOUS; SUBCUTANEOUS at 02:01

## 2023-01-04 RX ADMIN — FENTANYL CITRATE 500 MCG/HR: 50 INJECTION INTRAVENOUS at 09:01

## 2023-01-04 RX ADMIN — MUPIROCIN 1 G: 20 OINTMENT TOPICAL at 09:01

## 2023-01-04 RX ADMIN — PROPOFOL 50 MCG/KG/MIN: 10 INJECTION, EMULSION INTRAVENOUS at 11:01

## 2023-01-04 NOTE — NURSING
Back to room after ct of head complete.call Dr Hoyos and asked for something for high BP.hydralazine obtained.

## 2023-01-04 NOTE — PLAN OF CARE
Problem: Infection  Goal: Absence of Infection Signs and Symptoms  Outcome: Ongoing, Progressing     Problem: Adult Inpatient Plan of Care  Goal: Plan of Care Review  Outcome: Ongoing, Progressing  Goal: Patient-Specific Goal (Individualized)  Outcome: Ongoing, Progressing  Goal: Absence of Hospital-Acquired Illness or Injury  Outcome: Ongoing, Progressing  Goal: Optimal Comfort and Wellbeing  Outcome: Ongoing, Progressing  Goal: Readiness for Transition of Care  Outcome: Ongoing, Progressing     Problem: Communication Impairment (Mechanical Ventilation, Invasive)  Goal: Effective Communication  Outcome: Ongoing, Progressing     Problem: Device-Related Complication Risk (Mechanical Ventilation, Invasive)  Goal: Optimal Device Function  Outcome: Ongoing, Progressing     Problem: Inability to Wean (Mechanical Ventilation, Invasive)  Goal: Mechanical Ventilation Liberation  Outcome: Ongoing, Progressing     Problem: Nutrition Impairment (Mechanical Ventilation, Invasive)  Goal: Optimal Nutrition Delivery  Outcome: Ongoing, Progressing     Problem: Skin and Tissue Injury (Mechanical Ventilation, Invasive)  Goal: Absence of Device-Related Skin and Tissue Injury  Outcome: Ongoing, Progressing     Problem: Ventilator-Induced Lung Injury (Mechanical Ventilation, Invasive)  Goal: Absence of Ventilator-Induced Lung Injury  Outcome: Ongoing, Progressing     Problem: Communication Impairment (Artificial Airway)  Goal: Effective Communication  Outcome: Ongoing, Progressing     Problem: Device-Related Complication Risk (Artificial Airway)  Goal: Optimal Device Function  Outcome: Ongoing, Progressing     Problem: Skin and Tissue Injury (Artificial Airway)  Goal: Absence of Device-Related Skin or Tissue Injury  Outcome: Ongoing, Progressing     Problem: Noninvasive Ventilation Acute  Goal: Effective Unassisted Ventilation and Oxygenation  Outcome: Ongoing, Progressing     Problem: Skin Injury Risk Increased  Goal: Skin Health and  Integrity  Outcome: Ongoing, Progressing     Problem: Fall Injury Risk  Goal: Absence of Fall and Fall-Related Injury  Outcome: Ongoing, Progressing     Problem: Restraint, Nonbehavioral (Nonviolent)  Goal: Absence of Harm or Injury  Outcome: Ongoing, Progressing

## 2023-01-04 NOTE — PLAN OF CARE
Problem: Nutrition Impairment (Mechanical Ventilation, Invasive)  Goal: Optimal Nutrition Delivery  Intervention: Optimize Nutrition Delivery  Flowsheets (Taken 1/4/2023 1618)  Nutrition Support Management: (TF ordered.) --

## 2023-01-04 NOTE — RESPIRATORY THERAPY
01/03/23 2027   Patient Assessment/Suction   Level of Consciousness (AVPU) responds to pain   Respiratory Effort Unlabored   Expansion/Accessory Muscles/Retractions no use of accessory muscles   All Lung Fields Breath Sounds Anterior:;coarse;diminished   Rhythm/Pattern, Respiratory assisted mechanically   Cough Frequency no cough   Cough Type assisted   Suction Method oral;tracheal   Suction Pressure (mmHg) -120 mmHg   $ Suction Charges Inline Suction Procedure Stat Charge   Secretions Amount large   Secretions Color creamy;pink   Secretions Characteristics thick   $ Swab or suction? Suction   Aspirate Toleration JOSE (no adverse reactions)   PRE-TX-O2   Device (Oxygen Therapy) ventilator   Oxygen Concentration (%) 70   SpO2 96 %   Pulse Oximetry Type Continuous   $ Pulse Oximetry - Multiple Charge Pulse Oximetry - Multiple   Pulse 75   Resp 20   /66   Aerosol Therapy   $ Aerosol Therapy Charges Aerosol Treatment   Daily Review of Necessity (SVN) completed   Respiratory Treatment Status (SVN) given   Treatment Route (SVN) in-line;ventilator   Patient Position (SVN) HOB elevated   Signs of Intolerance (SVN) none   Breath Sounds Post-Respiratory Treatment   Post-treatment Heart Rate (beats/min) 75   Post-treatment Resp Rate (breaths/min) 20        Airway - Non-Surgical 01/01/23 0217   Placement Date/Time: 01/01/23 0217   Inserted by: MD  Airway Device Size: 7.5  Style: Cuffed  Cuff Inflated With: Air  Placement Verified By: Capnometry;Auscultation;Colorimetric EtCO2 device  Depth of Insertion (cm): 23  Secured at: Teeth  Insertion ...   Secured at 23 cm   Measured At Teeth   Secured Location Center   Secured by Commercial tube domínguez   Bite Block secure and patent   Site Condition Cool;Dry   Status Intact;Secured;Patent   Site Assessment Clean;Dry   Airway Safety   Ambu bag with the patient? Yes, Adult Ambu   Is mask with the patient? Yes, Adult Mask   Suction set is at the bedside? Yes   Respiratory  Interventions   Airway/Ventilation Management airway patency maintained   Vent Select   Conventional Vent Y   Charged w/in last 24h YES   Preset Conventional Ventilator Settings   Vent ID [id number]   Vent Type    Ventilation Type VC   Vent Mode A/C   Humidity HME   Set Rate 20 BPM   Vt Set 540 mL   PEEP/CPAP 5 cmH20   Peak Flow 60 L/min   Peak End Inspiratory Pressure 17 cmH20   I-Trigger Type  V-TRIG   Trigger Sensitivity Flow/I-Trigger 2 L/min   Patient Ventilator Parameters   Resp Rate Total 20 br/min   Peak Airway Pressure 24 cmH20   Mean Airway Pressure 11 cmH20   Plateau Pressure 56 cmH20   Exhaled Vt 546 mL   Total Ve 10.9 L/m   I:E Ratio Measured 1:2.10   Auto PEEP 20 cmH20   Conventional Ventilator Alarms   Alarms On Y   Resp Rate High Alarm 40 br/min   Press High Alarm 62 cmH2O   Apnea Rate 22   Apnea Volume (mL) 0 mL   Apnea Oxygen Concentration  100   Apnea Flow Rate (L/min) 65   T Apnea 20 sec(s)   IHI Ventilator Associated Pneumonia Bundle (Required)   Head of Bed Elevated  HOB less than 20   Oral Care Mouth suctioned;Mouth moisturizer;Mouth swabbed;Lip moisturizer applied   Vent Circut Breaks Minimized Yes   Ready to Wean/Extubation Screen   FIO2<=50 (chart decimal) (!) 0.7   MV<16L (chart vol.) 10.9   PEEP <=8 (chart #) 5   Ready to Wean Parameters   F/VT Ratio<105 (RSBI) (!) 36.63   Vital Capacity (mL) 0   Education   $ Education Bronchodilator;DME Nebulizer;Suction;Ventilator Oxygen;15 min   Respiratory Evaluation   $ Care Plan Tech Time 15 min   $ Eval/Re-eval Charges Re-evaluation

## 2023-01-04 NOTE — PLAN OF CARE
01/04/23 1023   Discharge Reassessment   Assessment Type Discharge Planning Reassessment   Did the patient's condition or plan change since previous assessment? No   Discharge Plan discussed with: Spouse/sig other   Name(s) and Number(s) Thania Gr [phone]   Communicated TOBIAS with patient/caregiver Date not available/Unable to determine   Discharge Plan A Home with family   Discharge Plan B Long-term acute care facility (LTAC);Rehab;Skilled Nursing Facility   DME Needed Upon Discharge  other (see comments)  (TBD)   Discharge Barriers Identified None   Why the patient remains in the hospital Requires continued medical care   Post-Acute Status   Discharge Delays None known at this time

## 2023-01-04 NOTE — CONSULTS
[facility]  Neurology Consult    Patient Name: Peyman Gr  MRN: [id number]  : 1955  TODAY'S DATE: 2023  ADMIT DATE: 2023  3:17 AM                                          CONSULTED PROVIDER: Brigette Welsh MD, Neurologist. Cellphone: [phone]  CONSULT REQUESTED BY: Claudio Hoyos MD     Chief Complaint   Patient presents with    Cardiac Arrest     Pt presents to ER via EMS with STEMI with cardiac arrest with compressions being initiated upon arrival to ER.        HPI per EMR:  67-year-old male with peripheral vascular disease, essential hypertension, hyperlipidemia and ongoing tobacco use presented to outside facility with 2 hour onset of chest pain and shortness of breath.  EKG revealed inferior and anterolateral STEMI.  Quickly degenerated into cardiac arrest with runs of VT/VF and asystole requiring resuscitation for about 40 minutes.  He was subsequently intubated and transferred to our ED. En route he developed cardiac arrest again (PEA) which was continued into our ED and was resuscitated for another 30 minutes.  He required transcutaneous pacing.  He was taken emergently to the catheterization lab and underwent PCI with stenting of RCA which was felt to be the culprit lesion.  He was also found to have left main stenosis of about 70%.  A transvenous pacemaker was also placed.  Currently admitted to the ICU for post cardiac arrest care.  He remains intubated and critically ill. Hospital stay complicated by fever; initiated on broad spectrum antibiotics.  Respiratory culture growing MSSA.    Neurology Consult:  Patient was seen and examined by me today.  He is a 67-year-old man with history of peripheral vascular disease, hypertension, hyperlipidemia and tobacco abuse who presented to outside facility with 2 hours of chest pain and shortness of breath, EKG showed STEMI then patient degenerated intracardiac arrest with rounds of V-tach and VFib as well as asystole  requiring prolonged resuscitation for at least 40 minutes.  He was intubated and then transferred to our facility for escalation of care.  On route to the hospital he developed PA arrest again and was resuscitated for another 30 minutes, and required transcutaneous pacing.  He was then taken to the cath lab and underwent PCI with stenting of right coronary artery, was then admitted to the ICU for management.  Patient is still intubated, unresponsive, so neurology consulted for neuro prognostication.    Review of Systems:    Unable to obtain in intubated and unresponsive patient.    Past Medical History:  has a past medical history of Arthritis, Chronic back pain, Chronic neck pain, Dry gangrene, Hypercholesteremia, Hypertension, Insomnia, Multiple falls, PAD (peripheral artery disease), Personal history of colonic polyps, and PVD (peripheral vascular disease).    Past Surgical History:  has a past surgical history that includes Elbow Arthroplasty (Right); Carpal tunnel release (Bilateral); Neck surgery; Elbow surgery; Cholecystectomy; Colonoscopy w/ polypectomy; minimally invasive foraminotomy of  spine (N/A, 11/15/2018); Back surgery; Percutaneous transluminal angioplasty (PTA) of peripheral vessel (Right, 05/01/2020); Percutaneous transluminal angioplasty (PTA) of peripheral vessel (Left, 09/10/2020); Angiography of lower extremity (N/A, 09/24/2020); Bunionectomy (Bilateral); Percutaneous transluminal angioplasty (PTA) of peripheral vessel (Left, 07/06/2021); Creation of bypass from internal carotid artery to subclavian artery (Right, 12/27/2021); Coronary artery bypass graft; and Colonoscopy (N/A, 5/9/2022).    Family Medical History: family history includes COPD in his mother and sister; Diabetes in his brother; Heart attack in his father; Heart disease in his father; Hypertension in his brother and father; Kidney failure in his brother; Other in his sister.    Social History:  reports that he quit smoking about  6 years ago. His smoking use included cigarettes. He has a 120.00 pack-year smoking history. He has never used smokeless tobacco. He reports that he does not drink alcohol and does not use drugs.    PCP: Jamal Cornejo MD    Allergies: Review of patient's allergies indicates:  No Known Allergies    Medications:   Current Facility-Administered Medications on File Prior to Encounter   Medication Dose Route Frequency Provider Last Rate Last Admin    0.9%  NaCl infusion   Intravenous Continuous Braydon Durand MD   Stopped at 05/09/22 1011     Current Outpatient Medications on File Prior to Encounter   Medication Sig Dispense Refill    albuterol (PROVENTIL/VENTOLIN HFA) 90 mcg/actuation inhaler Inhale 1-2 puffs into the lungs every 6 (six) hours as needed for Shortness of Breath. Rescue 8 g 0    amLODIPine (NORVASC) 10 MG tablet Take 1 tablet (10 mg total) by mouth once daily. 30 tablet 1    amLODIPine (NORVASC) 5 MG tablet Take 5 mg by mouth once daily.      aspirin 81 MG Chew Take 81 mg by mouth once daily.      carvediloL (COREG) 25 MG tablet Take 25 mg by mouth 2 (two) times daily with meals.      clopidogreL (PLAVIX) 75 mg tablet Take 75 mg by mouth once daily. On hold for sx on 12/27/21      ezetimibe (ZETIA) 10 mg tablet Take 10 mg by mouth once daily.      hydroCHLOROthiazide (HYDRODIURIL) 12.5 MG Tab Take 1 tablet (12.5 mg total) by mouth once daily. 30 tablet 1    HYDROcodone-acetaminophen (NORCO)  mg per tablet Take 1 tablet by mouth every 8 (eight) hours as needed for Pain.      mupirocin (BACTROBAN) 2 % ointment Apply topically 3 (three) times daily. 22 g 0    oxyCODONE-acetaminophen (PERCOCET) 7.5-325 mg per tablet Take 1 tablet by mouth 2 (two) times daily as needed.      traMADoL (ULTRAM) 50 mg tablet Take 50 mg by mouth every 6 (six) hours as needed.      valsartan (DIOVAN) 160 MG tablet Take 160 mg by mouth once daily.         Scheduled Meds:   aspirin  81 mg Oral Daily    cefTRIAXone  (ROCEPHIN) IVPB  1 g Intravenous Q24H    chlorhexidine  15 mL Mouth/Throat BID    enoxparin  40 mg Subcutaneous Q24H    famotidine  20 mg Per OG tube BID    insulin regular  5 Units Intravenous Once    levalbuterol  0.63 mg Nebulization TID WAKE    methylPREDNISolone sodium succinate injection  40 mg Intravenous Q8H    mupirocin   Nasal BID    nicotine  1 patch Transdermal Daily    ticagrelor  90 mg Oral BID     Continuous Infusions:   DOBUTamine Stopped (23 0945)    fentanyl 500 mcg/hr (23 1524)    midazolam 1 mg/hr (23 1640)    NORepinephrine bitartrate-D5W Stopped (23 0500)    propofoL 50 mcg/kg/min (23 1721)    vasopressin Stopped (23)     PRN Meds:.acetaminophen, albuterol-ipratropium, atropine, calcium gluconate IVPB, calcium gluconate IVPB, calcium gluconate IVPB, dextrose 10%, dextrose 10%, DOBUTamine, EPINEPHrine, [] fentaNYL **FOLLOWED BY** fentaNYL, glucagon (human recombinant), glucose, glucose, hydrALAZINE, HYDROcodone-acetaminophen, insulin aspart U-100, magnesium sulfate IVPB, magnesium sulfate IVPB, melatonin, midazolam, morphine, naloxone, ondansetron, polyethylene glycol, potassium chloride in water **AND** potassium chloride in water **AND** potassium chloride in water, senna-docusate 8.6-50 mg, simethicone, sodium chloride 0.9%, sodium phosphate IVPB, sodium phosphate IVPB, sodium phosphate IVPB    Physical Exam  Current Vitals:  Vitals:    23 1031   BP: 127/69   Pulse: 61   Resp: (!) 24   Temp:      General appearance:  Intubated, unresponsive  Cardiovascular:  Regular rhythm, paced  Pulmonary:  Intubated, on mechanical ventilation  GI:  OG tube in place  MSK:  Increased tone throughout  Skin:  No lesions in exposed skin    NEUROLOGICAL EXAM:    Mental status:  Unresponsive, when sedation was weaned of he seemed to open eyes spontaneously partially but did not track around the room, and was also seen having some twitching of the forehead  concerning for myoclonus.          Intubated:  Yes          Sedated:  Yes  Response to noxious stimulation:  Mild flexion to intense painful stimulation on lower extremities, does not respond in upper  Breathes above ventilator:  Yes  Opens eyes:  Partially  Pupils: symmetric          Left: reactive          Right: reactive  Eye movements: No pathologic movements such as nystagmus, ocular bobbing, dipping or roving.  Corneal reflex:  Present  Oculocephalic reflex:  Present  Cough:  Present  Gag:  Absent  Motor exam:  Increased muscle tone in upper and lower extremities, no abnormal movements in extremities there is myoclonus observed in forehead.  Triple flexion to pain in lower extremities.  No response in upper extremities.  DTRs:  1+ throughout.    Laboratory Data & Studies    Recent Labs   Lab 01/02/23 0432 01/03/23 0347 01/04/23 0345   WBC 9.36 9.04 8.05   HGB 10.4* 9.4* 9.8*   * 116* 131*   MCV 91 95 97       Recent Labs   Lab 01/01/23  0155 01/01/23  0453 01/02/23  0432 01/02/23  1400 01/03/23 0347 01/03/23  1643 01/04/23 0345   *   < > 137  --  135*  --  135*   K 4.2   < > 3.0* 3.3* 3.7 4.6 4.8      < > 102  --  103  --  104   CO2 20*   < > 29  --  26  --  25   BUN 20   < > 24*  --  19  --  19   *   < > 135*  --  105  --  146*   CALCIUM 8.8   < > 6.9*  --  7.6*  --  8.0*   MG 2.0   < > 1.9 1.9 2.0  --  2.1   PHOS 5.5*  --   --   --   --   --   --     < > = values in this interval not displayed.       Recent Labs   Lab 01/02/23  0432 01/03/23 0347 01/04/23 0345   PROT 5.2* 5.4* 5.6*   ALBUMIN 2.8* 2.6* 2.3*   BILITOT 0.7 0.7 0.7   * 205* 96*   * 187* 127*   ALKPHOS 102 90 81       Recent Labs   Lab 01/01/23  0453   INR 1.2   APTT 30.3       Recent Labs   Lab 01/01/23  0848   HGBA1C 5.7         Microbiology:  Microbiology Results (last 7 days)       Procedure Component Value Units Date/Time    Blood culture [[id number]] Collected: 01/03/23 0954    Order Status:  Completed Specimen: Blood Updated: 01/04/23 1032     Blood Culture, Routine No Growth to date      No Growth to date    Blood culture [[id number]] Collected: 01/03/23 0954    Order Status: Completed Specimen: Blood Updated: 01/04/23 1032     Blood Culture, Routine No Growth to date      No Growth to date    Culture, Respiratory with Gram Stain [[id number]]  (Abnormal)  (Susceptibility) Collected: 01/02/23 1431    Order Status: Completed Specimen: Respiratory from Tracheal Aspirate Updated: 01/04/23 0856     Respiratory Culture Reduced normal respiratory lorenzo      STAPHYLOCOCCUS AUREUS  Moderate       Gram Stain (Respiratory) <10 epithelial cells per low power field.     Gram Stain (Respiratory) Few WBC's     Gram Stain (Respiratory) Moderate Gram positive cocci    MRSA Screen by PCR [[id number]] Collected: 01/03/23 1412    Order Status: Completed Specimen: Nasopharyngeal Swab from Nasal Updated: 01/03/23 1554     MRSA SCREEN BY PCR Negative              Imaging:  X-Ray Chest AP Portable    Result Date: 1/4/2023  HISTORY: Respiratory failure, cardiac arrest. FINDINGS: Portable chest radiograph at 0521 hours compared to 01/03/2023 shows ET and NG tubes, and transvenous pacing lead via IVC approach, unchanged in position. The cardiomediastinal silhouette and pulmonary vasculature are stable, with aortic vascular calcifications. The lungs are symmetrically expanded, with mild scattered reticulonodular densities in both lungs, similar to the prior exam. There is no new pleural or parenchymal abnormality. No pneumothorax. IMPRESSION: No significant interval change. Electronically signed by:  Hema Tafoya MD  1/4/2023 6:52 AM CST Workstation: [id number]    X-Ray Chest AP Portable    Result Date: 1/3/2023  EXAMINATION: XR CHEST AP PORTABLE CLINICAL HISTORY: Respiratory failure COMPARISON: January 2, 2023 FINDINGS: Heart size is upper normal.  The aortic arch is calcified.  Endotracheal tube tip is at the level of the  clavicles.  Nasogastric tube extends to the stomach. Pulmonary vascular congestion is similar to the prior study, with slightly increasing ground-glass opacity throughout the right lung.  There are no significant effusions.  There is no pneumothorax.     1. Radiographic findings suggesting congestive failure and mild pulmonary edema, with slightly increasing ground-glass opacity on the right compared to January 2. Electronically signed by: Juan Daniel Sun MD Date:    01/03/2023 Time:    05:58    X-Ray Chest AP Portable    Result Date: 1/2/2023  HISTORY: resp fail COMPARISON:January 1, 2023 FINDINGS: Distal tip of ET tube resides at the medial ends of clavicles and the NG tube passes to the stomach. Cardiomediastinal silhouette appears upper normal in size magnified by the poor inspiratory effort. Tortuosity thoracic aorta as a manifestation of systemic hypertension. Diffuse interstitial opacities and ground glass opacity changes are again reestablished with minimal improvement upon comparison. No evidence of confluent infiltrate or significant pleural effusion or atelectasis. Postoperative changes of previous anterior cervical fusion as identified on the uppermost field of view and previous right carotid endarterectomy. IMPRESSION:Stable appearance the chest when compared to previous. All support devices appear stable. Electronically signed by:  Solo Harris MD  1/2/2023 6:54 AM CST Workstation: [id number]    X-Ray Chest AP Portable    Result Date: 1/1/2023  Chest, single view HISTORY: Nasogastric tube placement. In the interval, there has been introduction of a nasogastric tube which extends into the left upper quadrant of the abdomen just beyond the GE junction. An endotracheal tube remains in place with its tip positioned well above the level of the saurav. Bilateral interstitial and mild groundglass opacities, greater on the right are again observed. There are no new confluent infiltrates, volume loss or  effusions. The cardiomediastinal silhouette is stable. IMPRESSION: Interval introduction of nasogastric tube extending into the left upper abdomen with its tip just beyond the GE junction. Otherwise stable cardiopulmonary status. Electronically signed by:  Elizabeth Sharp MD  1/1/2023 7:01 AM CST Workstation: [id number]    X-Ray Chest AP Portable    Result Date: 1/1/2023  Chest, single view HISTORY: Cardiac arrest. Comparison 9/9/2022. Endotracheal tube is in place with its tip positioned well above the level of the saurav. The heart size is within normal limits. The central pulmonary vasculature is not acutely engorged.  There is arteriosclerotic calcification within the aortic arch. There is scattered bilateral interstitial and groundglass pulmonary opacities with upper lung zone predominance, greater on the right. No effusion or extrapulmonary air identified. Surgical changes are noted within the cervical spine and within the soft tissues of the right side of the neck. IMPRESSION: Positioning of endotracheal tube as above. Interval development of interstitial and groundglass pulmonary opacities with upper lung zone predominance. Electronically signed by:  Elizabeth Sharp MD  1/1/2023 6:59 AM [facility] Workstation: [id number]    Echo Saline Bubble? No    Result Date: 1/1/2023  · The estimated ejection fraction is 55%. · The left ventricle is normal in size with concentric hypertrophy. · Mild to moderate tricuspid regurgitation. · Moderate right ventricular enlargement with moderately reduced right ventricular systolic function. · Grade I left ventricular diastolic dysfunction. · There is mild aortic valve stenosis. · Aortic valve area is 1.68 cm2; peak velocity is 1.36 m/s; mean gradient is 4 mmHg. · There is abnormal septal wall motion consistent with right ventricular pacemaker. · There are segmental left ventricular wall motion abnormalities.          Assessment and Plan:    Concern for anoxic brain injury status post  cardiac arrest with prolonged resuscitation  STEMI status post PCI and stenting  V-tach and VFib requiring external pacing  67-year-old man with history of peripheral vascular disease, hypertension, hyperlipidemia and tobacco abuse who presented to outside facility with 2 hours of chest pain and shortness of breath, EKG showed STEMI then patient degenerated into cardiac arrest with rounds of V-tach and VFib as well as asystole requiring prolonged resuscitation Patient is still intubated, unresponsive, so neurology consulted for neuro prognostication. He was not cooled.  When sedation was weaned down, patient became agitated and hypertensive, but he is still unresponsive.  - admitted to the ICU, with q.4 hours neuro checks, continuous telemetry monitoring, external pacer present  - patient is at least 72 hours post arrest at this time, still unresponsive but requiring sedation for hypertension and agitation  - will attempt to wean off sedation as much as possible to better assess neurological status  - EEG was performed and showed suppression of voltage concerning for anoxic brain injury, but patient was on high dose of sedatives at the time, so will consider repeating study  - CT brain showed some scattered hypodensities in cortical and subcortical areas concerning for stroke or white matter disease.  No evidence of severe edema or effacement of ventricles was seen.  Patient can not undergo MRI in the setting of external pacer present.  - management of infectious and metabolic derangements as per primary team and Cardiology  - seizure precautions while inpatient, we will consider starting Keppra patient persists with myoclonus  - will continue to follow along    DVT prophylaxis with chemo/SCD prophylaxis  Extensively discussed lifestyle modifications as prophylactic measures for stroke prevention including smoking cessation, adequate blood pressure management, healthy diet and regular exercise.    [phone] 3 days from discharge.     Stroke education was provided including stroke risk factors modification and any acute neurological changes including weakness, confusion, visual changes to come straight to the ER.     All questions were answered.                              Thank you kindly for including us in the care of this patient. Please do not hesitate to contact us with any questions.       Critical Care:  64 minutes of critical care time has been spent evaluating with the patient. Time includes chart review not limited to diagnostic imaging, labs, and vitals, patient assessment, discussion with family and nursing, current order evaluations, and new order entries.      Brigette Welsh MD  Neurology

## 2023-01-04 NOTE — CONSULTS
"[facility]  Adult Nutrition   Consult Note (Nutrition Support Management)    SUMMARY     Recommendations  Recommendation/Intervention:   1) Start TF with Vital HP, goal rate at 55ml/hr to provide 1320kcal, 116gm Protein and 1104ml free fl.   2) Water flushes: 30ml every 4 hours.   3) RD will monitor TF rate/tolerance, propofol rate, labs, weight, NPO/vent status and will make recommendations PRN.    Goals: Patient will tolerate TF at goal rate  Nutrition Goal Status: new  Communication of RD Recs: reviewed with physician    Dietitian Rounds Brief  Patient remains intubated and sedated, NPO x 2 days. MD agreed to start TF.     Diet order:   Current Diet Order: NPO     Evaluation of Received Nutrient/Fluid Intake  Other Calories (kcal): 789 (Propofol at 29.9ml/hr)  Energy Calories Required: not meeting needs  Protein Required: not meeting needs  Fluid Required: meeting needs     % Intake of Estimated Energy Needs: 25 - 50 %  % Meal Intake: NPO      Intake/Output Summary (Last 24 hours) at 1/4/2023 1616  Last data filed at 1/4/2023 0600  Gross per 24 hour   Intake 750 ml   Output 1805 ml   Net -1055 ml        Anthropometrics  Temp: 97.3 °F (36.3 °C)  Height: 6' 2" (188 cm)  Height (inches): 74 in  Weight Method: Bed Scale  Weight: 105.4 kg (232 lb 5.8 oz)  Weight (lb): 232.37 lb  Ideal Body Weight (IBW), Male: 190 lb  % Ideal Body Weight, Male (lb): 115.8 %  BMI (Calculated): 29.8  BMI Grade: 25 - 29.9 - overweight       Estimated/Assessed Needs  Weight Used For Calorie Calculations: 103 kg (227 lb 1.2 oz)  Energy Calorie Requirements (kcal): 5355-6687 (20-25)  Energy Need Method: Kcal/kg  Protein Requirements: 124-155gm (1.2-1.5gm/kg)  Weight Used For Protein Calculations: 103 kg (227 lb 1.2 oz)     Estimated Fluid Requirement Method: RDA Method  RDA Method (mL): 2060       Reason for Assessment  Reason For Assessment: consult, new tube feeding  Diagnosis:  (STEMI involving right coronary " artery)  Relevant Medical History: peripheral vascular disease, essential hypertension, hyperlipidemia and ongoing tobacco use    Nutrition/Diet History  Food Allergies: NKFA  Factors Affecting Nutritional Intake: NPO, on mechanical ventilation    Nutrition Risk Screen  Nutrition Risk Screen: tube feeding or parenteral nutrition     MST Score: 0  Have you recently lost weight without trying?: No  Weight loss score: 0  Have you been eating poorly because of a decreased appetite?: No  Appetite score: 0       Weight History:  Wt Readings from Last 10 Encounters:   23 105.4 kg (232 lb 5.8 oz)   23 99.8 kg (220 lb)   22 99.8 kg (220 lb)   22 95.7 kg (211 lb)   22 93.4 kg (206 lb)   21 93.7 kg (206 lb 8 oz)   21 98 kg (216 lb)   21 93 kg (205 lb)   10/21/21 98 kg (216 lb)   21 98 kg (216 lb 0.8 oz)        Lab/Procedures/Meds: Pertinent Labs/Meds Reviewed    Medications:Pertinent Medications Reviewed  Scheduled Meds:   aspirin  81 mg Oral Daily    cefTRIAXone (ROCEPHIN) IVPB  1 g Intravenous Q24H    chlorhexidine  15 mL Mouth/Throat BID    enoxparin  40 mg Subcutaneous Q24H    famotidine  20 mg Per OG tube BID    insulin regular  5 Units Intravenous Once    levalbuterol  0.63 mg Nebulization TID WAKE    methylPREDNISolone sodium succinate injection  40 mg Intravenous Q8H    mupirocin   Nasal BID    nicotine  1 patch Transdermal Daily    ticagrelor  90 mg Oral BID     Continuous Infusions:   DOBUTamine Stopped (23)    fentanyl 500 mcg/hr (23 1524)    midazolam 2 mg/hr (23 0600)    NORepinephrine bitartrate-D5W Stopped (23 0500)    propofoL 50 mcg/kg/min (23 1138)    vasopressin Stopped (23)     PRN Meds:.acetaminophen, albuterol-ipratropium, atropine, calcium gluconate IVPB, calcium gluconate IVPB, calcium gluconate IVPB, dextrose 10%, dextrose 10%, DOBUTamine, EPINEPHrine, [] fentaNYL **FOLLOWED BY** fentaNYL,  glucagon (human recombinant), glucose, glucose, hydrALAZINE, HYDROcodone-acetaminophen, insulin aspart U-100, magnesium sulfate IVPB, magnesium sulfate IVPB, melatonin, midazolam, morphine, naloxone, ondansetron, polyethylene glycol, potassium chloride in water **AND** potassium chloride in water **AND** potassium chloride in water, senna-docusate 8.6-50 mg, simethicone, sodium chloride 0.9%, sodium phosphate IVPB, sodium phosphate IVPB, sodium phosphate IVPB    Labs: Pertinent Labs Reviewed  Clinical Chemistry:  Recent Labs   Lab 01/01/23  0155 01/01/23 0453 01/04/23 0345   *   < > 135*   K 4.2   < > 4.8      < > 104   CO2 20*   < > 25   *   < > 146*   BUN 20   < > 19   CREATININE 1.4   < > 1.0   CALCIUM 8.8   < > 8.0*   PROT 7.3   < > 5.6*   ALBUMIN 3.8   < > 2.3*   BILITOT 0.3   < > 0.7   ALKPHOS 88   < > 81   AST 44*   < > 96*   ALT 37   < > 127*   ANIONGAP 15   < > 6*   MG 2.0   < > 2.1   PHOS 5.5*  --   --     < > = values in this interval not displayed.     CBC:   Recent Labs   Lab 01/04/23 0345 01/04/23  0346   WBC 8.05  --    RBC 3.13*  --    HGB 9.8*  --    HCT 30.3* 30*   *  --    MCV 97  --    MCH 31.3*  --    MCHC 32.3  --      Lipid Panel:  No results for input(s): CHOL, HDL, LDLCALC, TRIG, CHOLHDL in the last 168 hours.  Cardiac Profile:  Recent Labs   Lab 01/01/23  0155 01/01/23 0453 01/02/23  1400   BNP  --  76 376*   TROPONINI 1.105*  --   --      Inflammatory Labs:  No results for input(s): CRP in the last 168 hours.  Diabetes:  Recent Labs   Lab 01/01/23  0848   HGBA1C 5.7     Thyroid & Parathyroid:  No results for input(s): TSH, FREET4, H4DSRZC, X3TRCQC, THYROIDAB in the last 168 hours.    Monitor and Evaluation  Food and Nutrient Intake: enteral nutrition intake  Food and Nutrient Adminstration: enteral and parenteral nutrition administration  Anthropometric Measurements: weight change  Biochemical Data, Medical Tests and Procedures: electrolyte and renal panel,  gastrointestinal profile, glucose/endocrine profile  Nutrition-Focused Physical Findings: overall appearance     Nutrition Risk  Level of Risk/Frequency of Follow-up: high     Nutrition Follow-Up  RD Follow-up?: Yes      Shelley Beebe RD 01/04/2023 4:16 PM

## 2023-01-04 NOTE — PROCEDURES
EEG REPORT    NAME: Peyman Gr  : 1955  MRN: [id number]    DATE of EE2023    CLINICAL INDICATION: This is a 67 y.o. male with history of cardiac arrest with prolonged resuscitation being evaluated for anoxic brain injury.    MEDICATIONS:  Scheduled Meds:   aspirin  81 mg Oral Daily    cefTRIAXone (ROCEPHIN) IVPB  1 g Intravenous Q24H    chlorhexidine  15 mL Mouth/Throat BID    enoxparin  40 mg Subcutaneous Q24H    famotidine  20 mg Per OG tube BID    insulin regular  5 Units Intravenous Once    levalbuterol  0.63 mg Nebulization TID WAKE    methylPREDNISolone sodium succinate injection  40 mg Intravenous Q8H    mupirocin   Nasal BID    nicotine  1 patch Transdermal Daily    ticagrelor  90 mg Oral BID     Continuous Infusions:   DOBUTamine Stopped (23 0945)    fentanyl 500 mcg/hr (23 0936)    midazolam 2 mg/hr (23 0600)    NORepinephrine bitartrate-D5W Stopped (23 0500)    propofoL 50 mcg/kg/min (23 1138)    vasopressin Stopped (23)     PRN Meds:.acetaminophen, albuterol-ipratropium, atropine, calcium gluconate IVPB, calcium gluconate IVPB, calcium gluconate IVPB, dextrose 10%, dextrose 10%, DOBUTamine, EPINEPHrine, [] fentaNYL **FOLLOWED BY** fentaNYL, glucagon (human recombinant), glucose, glucose, HYDROcodone-acetaminophen, insulin aspart U-100, magnesium sulfate IVPB, magnesium sulfate IVPB, melatonin, midazolam, morphine, naloxone, ondansetron, polyethylene glycol, potassium chloride in water **AND** potassium chloride in water **AND** potassium chloride in water, senna-docusate 8.6-50 mg, simethicone, sodium chloride 0.9%, sodium phosphate IVPB, sodium phosphate IVPB, sodium phosphate IVPB    EEG DESCRIPTION:  A 16-channel EEG was performed with electrode placement in 10/20 International System. Longitudinal bipolar and referential montages were utilized in analysis. Total duration of this study was 25 minutes.    This is a technically adequate  study with minor muscle and motion artifacts.    The background consists of a suppressed low-voltage delta rhythm, with no posterior dominant rhythm seen. Stage II sleep structures are not seen.     Photic stimulation is performed with no abnormal reactivity noted. Hyperventilation is not performed.     No epileptiform discharges or seizures are captured.    Impression:  This is a severely abnormal EEG due to diffuse voltage suppression and slowing with no reactivity concerning for anoxic brain injury in patient with history of cardiac arrest. Further clinical correlation is advised.      Brigette Welsh MD  Neurology

## 2023-01-04 NOTE — PROGRESS NOTES
Pulmonary/Critical Care  Progress Note      Patient name: Peyman Gr  MRN: [id number]  Date: 01/04/2023    Admit Date: 1/1/2023  Consult Requested By: Claudio Hoyos MD    Reason for Consult: respiratory failure    HPI:    1/1/2023 - 66 yo initially presented with chest pain about  2 AM, had bradycardia then VTVF arrest and had prolonged code - transferred to [facility] and was in cardiac arrest at arrival after multiple rounds of meds he had ROSC and taken to cath lab.  In Cath lab had occluded RCA and had successful PCI.  ALso has LM and OM disease.  He has been very unstable and moved to ICU.  He is unresponsive and cool.  He is ventilated.  On dobutrex and levophed, having some ectopy (contacting cardiology to see if they want to start lidocaine or amiodarone, QTc is 487), he is on bicarb drip and last ABG showed adequate oxygenation but a mixed metabolic and respiratory acidosis (vent adjused and will repeat).  No family was in the waiting room.  Chart has been reviewed and case d/w nursing, respiratory and Dr Hoyos.  BY report pt was resuscitated for > 60 minutes.    1/2/2023 - Remains critically ill, has been agitated at times and has needed increased sedation, he has not followed any commands.  Rhythm seems more stable but he has had ectopy.  Temp had increased from his hypothermia yesterday.  Electrolytes replaced by SS.  LFT have increased.  ABG this AM shows alkalosis (on bicarb drip).  CXR reviewed.  ECHO noted.    1/3/2023 - Remains critically ill, gets agitated at times and has required sedation.  He does not respond to voice or pain but does have spontaneous respirations and movement.  Pupils are sluggish to NR, minimal corneal reflex.  Had temp this AM.  CXR noted.    1/4/2023 - Had issues yesterday - HTN, bradycardia, a ? Of seizure activity and I was not notified of any of this.  Overnight things have been more stable.  He remains unresponsive.  Temp has decreased, renal function good, LFT better.   I tried to call family yesterday but got no answer.  Family has been at bedside per nursing.  SC + for stap    Review of Systems    Review of Systems   Unable to perform ROS: Mental acuity     Past Medical History    Past Medical History:   Diagnosis Date    Arthritis     Chronic back pain     Chronic neck pain     Dry gangrene     RIGHT LITTLE TOE    Hypercholesteremia     Hypertension     Insomnia     Multiple falls     S/P BACK SURGERY- 1 FALL    PAD (peripheral artery disease)     Personal history of colonic polyps     PVD (peripheral vascular disease)        Past Surgical History    Past Surgical History:   Procedure Laterality Date    ANGIOGRAPHY OF LOWER EXTREMITY N/A 09/24/2020    Procedure: Angiogram Extremity Unilateral;  Surgeon: Luke Cabello MD;  Location: [facility] CATH;  Service: Cardiology;  Laterality: N/A;    BACK SURGERY      BUNIONECTOMY Bilateral     CARPAL TUNNEL RELEASE Bilateral     CHOLECYSTECTOMY      COLONOSCOPY N/A 5/9/2022    Procedure: COLONOSCOPY;  Surgeon: Braydon Durand MD;  Location: [facility] ENDO;  Service: Endoscopy;  Laterality: N/A;    COLONOSCOPY W/ POLYPECTOMY      CORONARY ARTERY BYPASS GRAFT      CREATION OF BYPASS FROM INTERNAL CAROTID ARTERY TO SUBCLAVIAN ARTERY Right 12/27/2021    Procedure: CREATION, BYPASS, ARTERIAL, INTERNAL CAROTID TO SUBCLAVIAN;  Surgeon: Jeovany Goins MD;  Location: [facility] OR;  Service: Vascular;  Laterality: Right;    ELBOW ARTHROPLASTY Right     ELBOW SURGERY      MINIMALLY INVASIVE FORAMINOTOMY OF  SPINE N/A 11/15/2018    Procedure: FORAMINOTOMY, SPINE, MINIMALLY INVASIVE DECOMPRESSION L4-5;  Surgeon: Iván Stevenson Jr., MD;  Location: [facility] OR;  Service: Orthopedics;  Laterality: N/A;    NECK SURGERY      acf    PERCUTANEOUS TRANSLUMINAL ANGIOPLASTY (PTA) OF PERIPHERAL VESSEL Right 05/01/2020    Procedure: PTA, PERIPHERAL VESSEL of right lower extremity;  Surgeon: Luke Cabello MD;  Location: [facility] CATH;  Service: Cardiology;   Laterality: Right;    PERCUTANEOUS TRANSLUMINAL ANGIOPLASTY (PTA) OF PERIPHERAL VESSEL Left 09/10/2020    Procedure: PTA, PERIPHERAL VESSEL, Left lower extremity;  Surgeon: Luke Cabello MD;  Location: [facility] CATH;  Service: Cardiology;  Laterality: Left;    PERCUTANEOUS TRANSLUMINAL ANGIOPLASTY (PTA) OF PERIPHERAL VESSEL Left 2021    Profunda and SFA       Medications (scheduled):      aspirin  81 mg Oral Daily    ceFEPime (MAXIPIME) IVPB  2 g Intravenous Q12H    chlorhexidine  15 mL Mouth/Throat BID    enoxparin  40 mg Subcutaneous Q24H    famotidine  20 mg Per OG tube BID    insulin regular  5 Units Intravenous Once    levalbuterol  0.63 mg Nebulization TID WAKE    methylPREDNISolone sodium succinate injection  40 mg Intravenous Q8H    mupirocin   Nasal BID    nicotine  1 patch Transdermal Daily    ticagrelor  90 mg Oral BID       Medications (infusions):      DOBUTamine Stopped (23)    fentanyl 500 mcg/hr (23 0936)    midazolam 2 mg/hr (23 0600)    NORepinephrine bitartrate-D5W Stopped (23 0500)    propofoL 48.43 mcg/kg/min (23 0914)    vasopressin Stopped (23)       Medications (prn):     acetaminophen, albuterol-ipratropium, atropine, calcium gluconate IVPB, calcium gluconate IVPB, calcium gluconate IVPB, dextrose 10%, dextrose 10%, DOBUTamine, EPINEPHrine, [] fentaNYL **FOLLOWED BY** fentaNYL, glucagon (human recombinant), glucose, glucose, HYDROcodone-acetaminophen, insulin aspart U-100, magnesium sulfate IVPB, magnesium sulfate IVPB, melatonin, midazolam, morphine, naloxone, ondansetron, polyethylene glycol, potassium chloride in water **AND** potassium chloride in water **AND** potassium chloride in water, senna-docusate 8.6-50 mg, simethicone, sodium chloride 0.9%, sodium phosphate IVPB, sodium phosphate IVPB, sodium phosphate IVPB    Family History:   Family History   Problem Relation Age of Onset    COPD Mother     Hypertension Father     Heart  "disease Father     Heart attack Father     COPD Sister     Other Sister     Kidney failure Brother     Hypertension Brother     Diabetes Brother        Social History: Tobacco:   Social History     Tobacco Use   Smoking Status Former    Packs/day: 2.00    Years: 60.00    Pack years: 120.00    Types: Cigarettes    Quit date:     Years since quittin.0   Smokeless Tobacco Never                                EtOH:   Social History     Substance and Sexual Activity   Alcohol Use No                                Drugs:   Social History     Substance and Sexual Activity   Drug Use No       Physical Exam    Vital signs:  Temp:  [97 °F (36.1 °C)-101.1 °F (38.4 °C)]   Pulse:  [60-96]   Resp:  [11-24]   BP: (108-225)/(58-96)   SpO2:  [90 %-100 %]   Arterial Line BP: (102-214)/(49-83)     Intake/Output:   Intake/Output Summary (Last 24 hours) at 2023 0944  Last data filed at 2023 0600  Gross per 24 hour   Intake 750 ml   Output 1925 ml   Net -1175 ml          BMI: Estimated body mass index is 29.82 kg/m² as calculated from the following:    Height as of an earlier encounter on 23: 6' 2.02" (1.88 m).    Weight as of this encounter: 105.4 kg (232 lb 5.8 oz).    Physical Exam  Vitals and nursing note reviewed.   Constitutional:       General: He is not in acute distress.     Appearance: Normal appearance. He is not ill-appearing, toxic-appearing or diaphoretic.      Comments: Intubated  Nonresponsive   HENT:      Head: Normocephalic and atraumatic.      Right Ear: External ear normal.      Left Ear: External ear normal.      Nose: Nose normal.      Mouth/Throat:      Mouth: Mucous membranes are moist.      Pharynx: Oropharynx is clear. No oropharyngeal exudate.      Comments: Intubated   Eyes:      General: No scleral icterus.        Right eye: No discharge.         Left eye: No discharge.      Extraocular Movements: Extraocular movements intact.      Conjunctiva/sclera: Conjunctivae normal.      Comments: + " corneals  Pupils unrequal L larger than right and minimally to nonresponsive  + sclera edema   Neck:      Vascular: No carotid bruit.   Cardiovascular:      Rate and Rhythm: Normal rate. Rhythm irregular.      Pulses: Normal pulses.      Heart sounds: Normal heart sounds. No murmur heard.    No friction rub. No gallop.      Comments: NSR  Pulmonary:      Effort: Pulmonary effort is normal. No respiratory distress.      Breath sounds: Normal breath sounds. No stridor. No wheezing, rhonchi or rales.      Comments: Ventilated   Chest:      Chest wall: No tenderness.   Abdominal:      General: Bowel sounds are normal. There is no distension.      Tenderness: There is no abdominal tenderness. There is no guarding.   Genitourinary:     Comments: Duron   Musculoskeletal:         General: No swelling. Normal range of motion.      Cervical back: Normal range of motion and neck supple. No rigidity or tenderness.      Right lower leg: No edema.      Left lower leg: No edema.   Lymphadenopathy:      Cervical: No cervical adenopathy.   Skin:     General: Skin is dry.      Coloration: Skin is not jaundiced.      Findings: No bruising.      Comments: Cool to touch   Neurological:      Comments: No response to me   + spontaneous respiratory effort  No response to pain in UE  Some movement of extremities L > R (nonpurposeful)   Psychiatric:      Comments: Not able to assess       Laboratory    Recent Labs   Lab 01/04/23  0345 01/04/23  0346   WBC 8.05  --    RBC 3.13*  --    HGB 9.8*  --    HCT 30.3* 30*   *  --    MCV 97  --    MCH 31.3*  --    MCHC 32.3  --          Recent Labs   Lab 01/04/23  0345   CALCIUM 8.0*   PROT 5.6*   *   K 4.8   CO2 25      BUN 19   CREATININE 1.0   ALKPHOS 81   *   AST 96*   BILITOT 0.7         No results for input(s): PT, INR, APTT in the last 24 hours.      No results for input(s): CPK, CPKMB, TROPONINI, MB in the last 24 hours.      Additional labs:  reviewed    Microbiology:       Microbiology Results (last 7 days)       Procedure Component Value Units Date/Time    Culture, Respiratory with Gram Stain [[id number]]  (Abnormal)  (Susceptibility) Collected: 01/02/23 1431    Order Status: Completed Specimen: Respiratory from Tracheal Aspirate Updated: 01/04/23 0856     Respiratory Culture Reduced normal respiratory lorenzo      STAPHYLOCOCCUS AUREUS  Moderate       Gram Stain (Respiratory) <10 epithelial cells per low power field.     Gram Stain (Respiratory) Few WBC's     Gram Stain (Respiratory) Moderate Gram positive cocci    Blood culture [[id number]] Collected: 01/03/23 0954    Order Status: Completed Specimen: Blood Updated: 01/03/23 1717     Blood Culture, Routine No Growth to date    Blood culture [[id number]] Collected: 01/03/23 0954    Order Status: Completed Specimen: Blood Updated: 01/03/23 1717     Blood Culture, Routine No Growth to date    MRSA Screen by PCR [[id number]] Collected: 01/03/23 1412    Order Status: Completed Specimen: Nasopharyngeal Swab from Nasal Updated: 01/03/23 1554     MRSA SCREEN BY PCR Negative            Radiology    X-Ray Chest AP Portable  HISTORY: Respiratory failure, cardiac arrest.    FINDINGS: Portable chest radiograph at 0521 hours compared to 01/03/2023 shows ET and NG tubes, and transvenous pacing lead via IVC approach, unchanged in position. The cardiomediastinal silhouette and pulmonary vasculature are stable, with aortic vascular calcifications.    The lungs are symmetrically expanded, with mild scattered reticulonodular densities in both lungs, similar to the prior exam. There is no new pleural or parenchymal abnormality. No pneumothorax.    IMPRESSION: No significant interval change.    Electronically signed by:  Hema Tafoya MD  1/4/2023 6:52 AM CST Workstation: [id number]        Additional Studies    reviewed    Ventilator Information    Vent Mode: A/C  Oxygen Concentration (%):  [] 70  Resp Rate Total:  [20  br/min-24 br/min] 24 br/min  Vt Set:  [540 mL] 540 mL  PEEP/CPAP:  [5 cmH20] 5 cmH20  Mean Airway Pressure:  [11 mrA23-40 cmH20] 13 cmH20         Recent Labs     01/04/23  0346   PH 7.332*   PCO2 50.2*   PO2 81   HCO3 26.6   POCSATURATED 95   BE 1           Impression    Active Hospital Problems    Diagnosis  POA    *STEMI involving right coronary artery [I21.11]  Yes    Shock liver [K72.00]  Yes    Cardiogenic shock [R57.0]  Yes    Cardiac arrest [I46.9]  Yes    Acute respiratory failure with hypoxia and hypercarbia [J96.01, J96.02]  Yes    Leukocytosis [D72.829]  Yes    Anemia [D64.9]  Yes    History of tobacco abuse [Z87.891]  Not Applicable    Primary hypertension [I10]  Yes    PVD (peripheral vascular disease) with claudication [I73.9]  Yes    PAD (peripheral artery disease) [I73.9]  Yes      Resolved Hospital Problems    Diagnosis Date Resolved POA    Hyponatremia [E87.1] 01/02/2023 Yes       Plan    Ventilator, adjust as needed - not ready to consider weaning  Wean pressors as able   Sedate as needed  - will need to decrease at times to assess neuro status  Follow neuro exam - not improved and now ? Of seizure activity - will order EEG, CT head and neurlogy consult - I suspect that neuro prognosis will not be good  Continue antibiotics  Watch renal function and LFT  Monitor electrolytes and replace via SS  Watch H/H  Added steroids for wheezing  Prognosis seems poor but need to continue to follow neuro status    Thank you for this consult.  I will follow with you while the patient is hospitalized.      Critical Care Time    I have spent > 35 minutes providing critical care services for this pt for the above diagnoses.  These services have included pt evaluation, pt exam, ventilator assessment, discussions with staff, chart review, data review, note preparation and .  Medications have been reviewed and adjusted as needed.  The patient has life threatening illness with a high risk of decompensation  and/or death.      Nick Padgett MD  [facility] Pulmonary/Critical Care

## 2023-01-04 NOTE — PROGRESS NOTES
[facility]  Department of Cardiology  Consult Note      PATIENT NAME: Peyman Gr    MRN: [id number]  TODAY'S DATE: 01/04/2023  ADMIT DATE: 1/1/2023                          CONSULT REQUESTED BY: Claudio Hoyos MD    SUBJECTIVE     PRINCIPAL PROBLEM: STEMI involving right coronary artery    Interval history:    1/4/23:  Patient seen lying in bed on sedation this morning.  No distress noted.  Vitals were stable at that time.  Of note the patient has become hypertensive and tachycardic this afternoon.  Patient is currently receiving full sedation due to jerking and sweating.    01/03/2023:    Patient remains intubated and sedated on mechanical ventilation. FIO2 is 60% with 5 PEEP. RN attempted to wean sedation but patient keeps triggering the vent.  He has cough and pupillary reflex.  RN states she has seen him move all 4 extremities but not to command.  He remains unresponsive. He is on Levophed, propofol, fentanyl, and vasopressin. He was intermittently paced for approximately 5 min this morning when he was coughing and gagging on the vent. He remains in critical condition.  UOP is 275 today.       01/02/2023:  Seen and examined patient in the ICU today.  Off bicarb drip.  Currently on 2 sedatives and Levophed.     REASON FOR CONSULT:  STEMI      HPI:  67-year-old male with past medical history of peripheral vascular disease, right subclavian stenosis, chronic smoker, hypertension, hyperlipidemia presented to oxygen [facility] with 2 hour history of chest pain, shortness of breath.  ECG showed junctional bradycardia, complete heart block with inferior and anterolateral ST elevations.  STEMI code was called to which I responded immediately and agreed to transfer the patient to [facility] cath lab for emergent cardiac catheterization.  However, patient unfortunately went into cardiac arrest at [facility] with VT, VF, asystole requiring resuscitation and  intubation.  Patient was resuscitated for about 40 minutes ROSC was achieved.  Patient was moving extremities at the time as per the ER.  Patient was then transferred to [facility] ER and patient again had cardiac arrest upon arrival to the ER which required resuscitation for 30 minutes.  Patient was in PEA initially and ROSC was achieved subsequently however patient was dependent on transcutaneous pacing.  Patient blood pressure was very low and could not be obtained with a BP cuff.  Patient was unable to be transferred to the cath lab at that point due to significant hemodynamic instability. Patient was started on pressors.  A-line was placed in the ED and blood pressure eventually improved with high doses of vasopressin, norepinephrine, sodium bicarbonate and dobutamine infusions.  Discussed with patient's family members regarding the patient's critical condition.  Once the blood pressure was stabilized on the pressors, patient was brought to the cath lab for transvenous pacemaker placement and coronary angiogram.  Benefits and risks of the procedure explained to patient's family and patient's family  agreed for the procedure.      Review of patient's allergies indicates:  No Known Allergies    Past Medical History:   Diagnosis Date    Arthritis     Chronic back pain     Chronic neck pain     Dry gangrene     RIGHT LITTLE TOE    Hypercholesteremia     Hypertension     Insomnia     Multiple falls     S/P BACK SURGERY- 1 FALL    PAD (peripheral artery disease)     Personal history of colonic polyps     PVD (peripheral vascular disease)      Past Surgical History:   Procedure Laterality Date    ANGIOGRAPHY OF LOWER EXTREMITY N/A 09/24/2020    Procedure: Angiogram Extremity Unilateral;  Surgeon: Luke Cabello MD;  Location: [facility] CATH;  Service: Cardiology;  Laterality: N/A;    BACK SURGERY      BUNIONECTOMY Bilateral     CARPAL TUNNEL RELEASE Bilateral     CHOLECYSTECTOMY      COLONOSCOPY N/A  5/9/2022    Procedure: COLONOSCOPY;  Surgeon: Braydon Durand MD;  Location: [facility] ENDO;  Service: Endoscopy;  Laterality: N/A;    COLONOSCOPY W/ POLYPECTOMY      CORONARY ANGIOGRAPHY N/A 1/1/2023    Procedure: ANGIOGRAM, CORONARY ARTERY;  Surgeon: Stefany Elizondo MD;  Location: [facility] CATH/EP LAB;  Service: Cardiology;  Laterality: N/A;    CORONARY ARTERY BYPASS GRAFT      CREATION OF BYPASS FROM INTERNAL CAROTID ARTERY TO SUBCLAVIAN ARTERY Right 12/27/2021    Procedure: CREATION, BYPASS, ARTERIAL, INTERNAL CAROTID TO SUBCLAVIAN;  Surgeon: Jeovany Goins MD;  Location: [facility] OR;  Service: Vascular;  Laterality: Right;    ELBOW ARTHROPLASTY Right     ELBOW SURGERY      INSERTION, PACEMAKER, TEMPORARY TRANSVENOUS  1/1/2023    Procedure: Insertion, Pacemaker, Temporary Transvenous;  Surgeon: Stefany Elizondo MD;  Location: [facility] CATH/EP LAB;  Service: Cardiology;;    IVUS, CORONARY  1/1/2023    Procedure: IVUS, Coronary;  Surgeon: Stefany Elizondo MD;  Location: [facility] CATH/EP LAB;  Service: Cardiology;;    LEFT HEART CATHETERIZATION Left 1/1/2023    Procedure: Left heart cath;  Surgeon: Stefany Elizondo MD;  Location: [facility] CATH/EP LAB;  Service: Cardiology;  Laterality: Left;    MINIMALLY INVASIVE FORAMINOTOMY OF  SPINE N/A 11/15/2018    Procedure: FORAMINOTOMY, SPINE, MINIMALLY INVASIVE DECOMPRESSION L4-5;  Surgeon: Iván Stevenson Jr., MD;  Location: [facility] OR;  Service: Orthopedics;  Laterality: N/A;    NECK SURGERY      acf    PERCUTANEOUS CORONARY INTERVENTION, ARTERY N/A 1/1/2023    Procedure: Percutaneous coronary intervention;  Surgeon: Stefany Elizondo MD;  Location: [facility] CATH/EP LAB;  Service: Cardiology;  Laterality: N/A;    PERCUTANEOUS TRANSLUMINAL ANGIOPLASTY (PTA) OF PERIPHERAL VESSEL Right 05/01/2020    Procedure: PTA, PERIPHERAL VESSEL of right lower extremity;  Surgeon: Luke Cabello MD;  Location: [facility] CATH;  Service: Cardiology;  Laterality:  Right;    PERCUTANEOUS TRANSLUMINAL ANGIOPLASTY (PTA) OF PERIPHERAL VESSEL Left 09/10/2020    Procedure: PTA, PERIPHERAL VESSEL, Left lower extremity;  Surgeon: Luke Cabello MD;  Location: [facility] CATH;  Service: Cardiology;  Laterality: Left;    PERCUTANEOUS TRANSLUMINAL ANGIOPLASTY (PTA) OF PERIPHERAL VESSEL Left 2021    Profunda and SFA     Social History     Tobacco Use    Smoking status: Former     Packs/day: 2.00     Years: 60.00     Pack years: 120.00     Types: Cigarettes     Quit date: 2017     Years since quittin.0    Smokeless tobacco: Never   Substance Use Topics    Alcohol use: No    Drug use: No        REVIEW OF SYSTEMS  Unable to obtain.  Patient was intubated    OBJECTIVE     VITAL SIGNS (Most Recent)  Temp: 97.3 °F (36.3 °C) (23 150)  Pulse: 105 (23 153)  Resp: 14 (23)  BP: (!) 157/75 (23 1216)  SpO2: 96 % (23 153)    VENTILATION STATUS  Resp: 14 (23 153)  SpO2: 96 % (23 153)  Vent Mode: A/C  Oxygen Concentration (%):  [] 80  Resp Rate Total:  [20 br/min-39 br/min] 30 br/min  Vt Set:  [540 mL] 540 mL  PEEP/CPAP:  [5 cmH20] 5 cmH20  Mean Airway Pressure:  [6 vuB44-60 cmH20] 6 cmH20    I & O (Last 24H):  Intake/Output Summary (Last 24 hours) at 2023 1546  Last data filed at 2023 0600  Gross per 24 hour   Intake 750 ml   Output 1805 ml   Net -1055 ml         WEIGHTS  Wt Readings from Last 1 Encounters:   23 0615 105.4 kg (232 lb 5.8 oz)   23 0400 102.5 kg (225 lb 15.5 oz)   23 0053 102.5 kg (225 lb 15.5 oz)   23 0800 99.8 kg (220 lb 0.3 oz)   23 0341 99.8 kg (220 lb)       PHYSICAL EXAM  GENERAL: well built, well nourished, well-developed.  Intubated, sedated. Unresponsive to physical and verbal stimuli  HEENT: Normocephalic.   NECK: No JVD.   CARDIAC: Regular rate and rhythm, S1, S2 regular  CHEST ANATOMY: normal.   LUNGS: + bilateral air entry   ABDOMEN: Soft.  Normal bowel sounds.   URINARY:  cohen  EXTREMITIES:  No edema    CENTRAL NERVOUS SYSTEM:  Intubated, sedated. Unresponsive to physical and verbal stimuli  SKIN: Skin without lesions, moist, well perfused.     HOME MEDICATIONS:  Current Facility-Administered Medications on File Prior to Encounter   Medication Dose Route Frequency Provider Last Rate Last Admin    0.9%  NaCl infusion   Intravenous Continuous Braydon Durand MD   Stopped at 05/09/22 1011     Current Outpatient Medications on File Prior to Encounter   Medication Sig Dispense Refill    albuterol (PROVENTIL/VENTOLIN HFA) 90 mcg/actuation inhaler Inhale 1-2 puffs into the lungs every 6 (six) hours as needed for Shortness of Breath. Rescue 8 g 0    amLODIPine (NORVASC) 10 MG tablet Take 1 tablet (10 mg total) by mouth once daily. 30 tablet 1    amLODIPine (NORVASC) 5 MG tablet Take 5 mg by mouth once daily.      aspirin 81 MG Chew Take 81 mg by mouth once daily.      carvediloL (COREG) 25 MG tablet Take 25 mg by mouth 2 (two) times daily with meals.      clopidogreL (PLAVIX) 75 mg tablet Take 75 mg by mouth once daily. On hold for sx on 12/27/21      ezetimibe (ZETIA) 10 mg tablet Take 10 mg by mouth once daily.      hydroCHLOROthiazide (HYDRODIURIL) 12.5 MG Tab Take 1 tablet (12.5 mg total) by mouth once daily. 30 tablet 1    HYDROcodone-acetaminophen (NORCO)  mg per tablet Take 1 tablet by mouth every 8 (eight) hours as needed for Pain.      mupirocin (BACTROBAN) 2 % ointment Apply topically 3 (three) times daily. 22 g 0    oxyCODONE-acetaminophen (PERCOCET) 7.5-325 mg per tablet Take 1 tablet by mouth 2 (two) times daily as needed.      traMADoL (ULTRAM) 50 mg tablet Take 50 mg by mouth every 6 (six) hours as needed.      valsartan (DIOVAN) 160 MG tablet Take 160 mg by mouth once daily.         SCHEDULED MEDS:   aspirin  81 mg Oral Daily    cefTRIAXone (ROCEPHIN) IVPB  1 g Intravenous Q24H    chlorhexidine  15 mL Mouth/Throat BID    enoxparin  40 mg Subcutaneous Q24H     famotidine  20 mg Per OG tube BID    insulin regular  5 Units Intravenous Once    levalbuterol  0.63 mg Nebulization TID WAKE    methylPREDNISolone sodium succinate injection  40 mg Intravenous Q8H    mupirocin   Nasal BID    nicotine  1 patch Transdermal Daily    ticagrelor  90 mg Oral BID       CONTINUOUS INFUSIONS:   DOBUTamine Stopped (23 0945)    fentanyl 500 mcg/hr (23 1524)    midazolam 2 mg/hr (23 0600)    NORepinephrine bitartrate-D5W Stopped (23 0500)    propofoL 50 mcg/kg/min (23 1138)    vasopressin Stopped (23 0945)       PRN MEDS:acetaminophen, albuterol-ipratropium, atropine, calcium gluconate IVPB, calcium gluconate IVPB, calcium gluconate IVPB, dextrose 10%, dextrose 10%, DOBUTamine, EPINEPHrine, [] fentaNYL **FOLLOWED BY** fentaNYL, glucagon (human recombinant), glucose, glucose, hydrALAZINE, HYDROcodone-acetaminophen, insulin aspart U-100, magnesium sulfate IVPB, magnesium sulfate IVPB, melatonin, midazolam, morphine, naloxone, ondansetron, polyethylene glycol, potassium chloride in water **AND** potassium chloride in water **AND** potassium chloride in water, senna-docusate 8.6-50 mg, simethicone, sodium chloride 0.9%, sodium phosphate IVPB, sodium phosphate IVPB, sodium phosphate IVPB    LABS AND DIAGNOSTICS     CBC LAST 3 DAYS  Recent Labs   Lab 23  0432 23  0347 23  0417 23  0345 23  0346   WBC 9.36 9.04  --  8.05  --    RBC 3.36* 3.02*  --  3.13*  --    HGB 10.4* 9.4*  --  9.8*  --    HCT 30.5* 29.0* 30* 30.3* 30*   MCV 91 95  --  97  --    MCH 31.0 31.1*  --  31.3*  --    MCHC 34.1 32.4  --  32.3  --    RDW 14.0 15.2*  --  14.9*  --    * 116*  --  131*  --    MPV 9.3 9.7  --  9.8  --    GRAN 74.7*  7.0 79.6*  7.2  --  92.1*  7.4  --    LYMPH 14.5*  1.4 10.8*  1.0  --  2.9*  0.2*  --    MONO 9.5  0.9 8.5  0.8  --  3.9*  0.3  --    BASO 0.02 0.02  --  0.01  --    NRBC 0 0  --  0  --          COAGULATION  LAST 3 DAYS  Recent Labs   Lab 01/01/23  0453   INR 1.2   APTT 30.3         CHEMISTRY LAST 3 DAYS  Recent Labs   Lab 01/02/23  0432 01/02/23  1210 01/02/23  1400 01/03/23  0347 01/03/23  0417 01/03/23  1643 01/04/23  0345 01/04/23  0346     --   --  135*  --   --  135*  --    K 3.0*  --  3.3* 3.7  --  4.6 4.8  --      --   --  103  --   --  104  --    CO2 29  --   --  26  --   --  25  --    ANIONGAP 6*  --   --  6*  --   --  6*  --    BUN 24*  --   --  19  --   --  19  --    CREATININE 1.2  --   --  1.1  --   --  1.0  --    *  --   --  105  --   --  146*  --    CALCIUM 6.9*  --   --  7.6*  --   --  8.0*  --    PH  --  7.480*  --   --  7.372  --   --  7.332*   MG 1.9  --  1.9 2.0  --   --  2.1  --    ALBUMIN 2.8*  --   --  2.6*  --   --  2.3*  --    PROT 5.2*  --   --  5.4*  --   --  5.6*  --    ALKPHOS 102  --   --  90  --   --  81  --    *  --   --  187*  --   --  127*  --    *  --   --  205*  --   --  96*  --    BILITOT 0.7  --   --  0.7  --   --  0.7  --          CARDIAC PROFILE LAST 3 DAYS  Recent Labs   Lab 01/01/23  0155 01/01/23  0453 01/01/23  0848 01/01/23  1831 01/02/23  0118 01/02/23  0546 01/02/23  1400   BNP  --  76  --   --   --   --  376*   TROPONINI 1.105*  --   --   --   --   --   --    TROPONINIHS  --  1459.0*   < > 566721.7* 142795.9* 252893.9*  --     < > = values in this interval not displayed.         ENDOCRINE LAST 3 DAYS  Recent Labs   Lab 01/03/23 0954   PROCAL 3.13*         LAST ARTERIAL BLOOD GAS  ABG  Recent Labs   Lab 01/04/23  0346   PH 7.332*   PO2 81   PCO2 50.2*   HCO3 26.6   BE 1         LAST 7 DAYS MICROBIOLOGY   Microbiology Results (last 7 days)       Procedure Component Value Units Date/Time    Blood culture [[id number]] Collected: 01/03/23 0954    Order Status: Completed Specimen: Blood Updated: 01/04/23 1032     Blood Culture, Routine No Growth to date      No Growth to date    Blood culture [[id number]] Collected: 01/03/23 0954    Order Status:  Completed Specimen: Blood Updated: 01/04/23 1032     Blood Culture, Routine No Growth to date      No Growth to date    Culture, Respiratory with Gram Stain [[id number]]  (Abnormal)  (Susceptibility) Collected: 01/02/23 1431    Order Status: Completed Specimen: Respiratory from Tracheal Aspirate Updated: 01/04/23 0856     Respiratory Culture Reduced normal respiratory lorenzo      STAPHYLOCOCCUS AUREUS  Moderate       Gram Stain (Respiratory) <10 epithelial cells per low power field.     Gram Stain (Respiratory) Few WBC's     Gram Stain (Respiratory) Moderate Gram positive cocci    MRSA Screen by PCR [[id number]] Collected: 01/03/23 1412    Order Status: Completed Specimen: Nasopharyngeal Swab from Nasal Updated: 01/03/23 1554     MRSA SCREEN BY PCR Negative            MOST RECENT IMAGING  CT Head Without Contrast  All CT scans at this facility used dose modulation, iterative reconstruction and/or weight-based dosing when appropriate to reduce radiation doses  as low as reasonably achievable.    CLINICAL INFORMATION:  s/p code, r/o anoxic injury    FINDINGS: The ventricles and sulci are normal in size and configuration for age. There is no hemorrhage, mass or midline shift. There are no extra-axial fluid collections. The gray-white differentiation is maintained.    There is pansinusitis. Patient is intubated. The mastoid air cells are clear. There is diffuse subcutaneous edema within the scalp.    IMPRESSION: No acute intracranial process    Pansinusitis    Diffuse scalp edema    Electronically signed by:  Malu River MD  1/4/2023 2:37 PM CST Workstation: [id number]  X-Ray Chest AP Portable  HISTORY: Respiratory failure, cardiac arrest.    FINDINGS: Portable chest radiograph at 0521 hours compared to 01/03/2023 shows ET and NG tubes, and transvenous pacing lead via IVC approach, unchanged in position. The cardiomediastinal silhouette and pulmonary vasculature are stable, with aortic vascular calcifications.    The  lungs are symmetrically expanded, with mild scattered reticulonodular densities in both lungs, similar to the prior exam. There is no new pleural or parenchymal abnormality. No pneumothorax.    IMPRESSION: No significant interval change.    Electronically signed by:  Hema aTfoya MD  1/4/2023 6:52 AM [facility] Workstation: [id number]      ECHOCARDIOGRAM RESULTS (last 5)  No results found for this or any previous visit.      CURRENT/PREVIOUS VISIT EKG  Results for orders placed or performed during the hospital encounter of 01/01/23   EKG 12-LEAD on arrival to floor    Collection Time: 01/01/23  9:59 AM    Narrative    Test Reason : I21.3,    Vent. Rate : 070 BPM     Atrial Rate : 000 BPM     P-R Int : 000 ms          QRS Dur : 078 ms      QT Int : 450 ms       P-R-T Axes : 000 027 -68 degrees     QTc Int : 486 ms    Atrial fibrillation with occasional ventricular-paced complexes and with  premature ventricular or aberrantly conducted complexes  Inferior infarct ,age undetermined  Abnormal ECG  When compared with ECG of 01-JAN-2023 09:57,  Previous ECG has undetermined rhythm, needs review    Referred By: IRENE MOSER           Confirmed By:            ASSESSMENT/PLAN:     Active Hospital Problems    Diagnosis    *STEMI involving right coronary artery    Pneumonia of right lung due to methicillin susceptible Staphylococcus aureus (MSSA)    Shock liver    Cardiogenic shock    Cardiac arrest    Acute respiratory failure with hypoxia and hypercarbia    Leukocytosis    Anemia    History of tobacco abuse    Primary hypertension    PVD (peripheral vascular disease) with claudication    PAD (peripheral artery disease)       ASSESSMENT & PLAN:   STEMI- inferior wall MI complicated by complete heart block and RV shock  Cardiac arrest status post resuscitation and intubation (patient was resuscitated for 60-70 minutes in total)  Peripheral vascular disease  Chronic  smoking  Hypertension  hyperlipidemia      RECOMMENDATIONS:  Continue Brilinta 90 mg p.o. b.i.d. and aspirin 81 mg p.o. daily.  Continue sedation as needed and as indicated.  Appreciate Neurology input.  Consider adding metoprolol IV p.r.n. for systolic blood pressure greater than 170 and or heart rate greater than 110.  Will follow.    Mary Lou Turpin NP  [facility]  Department of Cardiology  Date of Service: 01/04/2023         I have personally interviewed and examined the patient, I have reviewed the Nurse Practitioner's history and physical, assessment, and plan. I agree with the findings and plan.  1. Patient had EEG and had a severely abnormal EEG due to diffuse voltage suppression and slowing with no reactivity concerning for anoxic brain injury in the patient with history of cardiac arrest-as per eeg report per Neurology.  2. Continue current management.  Including Brilinta and aspirin.  3. Will use IV metoprolol to control heart rate.      Sarthak Cardona M.D.  [facility]  Department of Cardiology  Date of Service: 01/03/2023

## 2023-01-04 NOTE — NURSING
All sedation on at this time, due to jerking and sweating , , bp 200's, will decrease when pt more controlled.

## 2023-01-04 NOTE — PROGRESS NOTES
Therapy with Vancomycin complete and / or consult / order discontinued by Dr. Hoyos on 1/4/2023 @ 0841     Pharmacy will sign off, please re-consult as needed.    Miquel Grier 1/4/2023 8:49 AM  Dept of Pharmacy  Ext [phone]

## 2023-01-04 NOTE — PROGRESS NOTES
[facility]  Progress Note    Patient name: Peyman Gr  MRN: [id number]  Admit Date: 1/1/2023   LOS: 3 days     SUBJECTIVE:     Principal problem: STEMI involving right coronary artery    Interval History:  No acute overnight events reported.  Low-grade fever overnight.  Also patient having intermittent spontaneous spells of bradycardia couple with hypertensive crisis which resolved spontaneously.  Also having intermittent extremity jerking.    Summary:   67-year-old male with peripheral vascular disease, essential hypertension, hyperlipidemia and ongoing tobacco use presented to outside facility with 2 hour onset of chest pain and shortness of breath.  EKG revealed inferior and anterolateral STEMI.  Quickly degenerated into cardiac arrest with runs of VT/VF and asystole requiring resuscitation for about 40 minutes.  He was subsequently intubated and transferred to our ED. En route he developed cardiac arrest again (PEA) which was continued into our ED and was resuscitated for another 30 minutes.  He required transcutaneous pacing.  He was taken emergently to the catheterization lab and underwent PCI with stenting of RCA which was felt to be the culprit lesion.  He was also found to have left main stenosis of about 70%.  A transvenous pacemaker was also placed.  Currently admitted to the ICU for post cardiac arrest care.  He remains intubated and critically ill. Hospital stay complicated by fever; initiated on broad spectrum antibiotics.  Respiratory culture growing MSSA.    Scheduled Meds:   aspirin  81 mg Oral Daily    ceFEPime (MAXIPIME) IVPB  2 g Intravenous Q12H    chlorhexidine  15 mL Mouth/Throat BID    enoxparin  40 mg Subcutaneous Q24H    famotidine  20 mg Per OG tube BID    insulin regular  5 Units Intravenous Once    levalbuterol  0.63 mg Nebulization TID WAKE    methylPREDNISolone sodium succinate injection  40 mg Intravenous Q8H    mupirocin   Nasal BID    nicotine  1  patch Transdermal Daily    ticagrelor  90 mg Oral BID     Continuous Infusions:   DOBUTamine Stopped (23)    fentanyl 500 mcg/hr (23 0936)    midazolam 2 mg/hr (23 0600)    NORepinephrine bitartrate-D5W Stopped (23 0500)    propofoL 48.43 mcg/kg/min (2314)    vasopressin Stopped (23)     PRN Meds:acetaminophen, albuterol-ipratropium, atropine, calcium gluconate IVPB, calcium gluconate IVPB, calcium gluconate IVPB, dextrose 10%, dextrose 10%, DOBUTamine, EPINEPHrine, [] fentaNYL **FOLLOWED BY** fentaNYL, glucagon (human recombinant), glucose, glucose, HYDROcodone-acetaminophen, insulin aspart U-100, magnesium sulfate IVPB, magnesium sulfate IVPB, melatonin, midazolam, morphine, naloxone, ondansetron, polyethylene glycol, potassium chloride in water **AND** potassium chloride in water **AND** potassium chloride in water, senna-docusate 8.6-50 mg, simethicone, sodium chloride 0.9%, sodium phosphate IVPB, sodium phosphate IVPB, sodium phosphate IVPB    Review of patient's allergies indicates:  No Known Allergies    Review of Systems:  Unable to obtain due to clinical condition  OBJECTIVE:     Vital Signs (Most Recent)  Temp: 97 °F (36.1 °C) (23 0700)  Pulse: 61 (23 1031)  Resp: (!) 24 (23 103)  BP: 127/69 (23 1031)  SpO2: 97 % (23 103)    Vital Signs Range (Last 24H):  Temp:  [97 °F (36.1 °C)-101.1 °F (38.4 °C)]   Pulse:  [60-96]   Resp:  [11-24]   BP: (112-149)/(58-86)   SpO2:  [90 %-100 %]   Arterial Line BP: (103-156)/(51-72)     I & O (Last 24H):  Intake/Output Summary (Last 24 hours) at 2023 1058  Last data filed at 2023 0600  Gross per 24 hour   Intake 750 ml   Output 1925 ml   Net -1175 ml         Physical Exam:  General: Patient intubated and sedated.  Appears stated age  Eyes: No conjunctival injection. No scleral icterus.  Scleral edema noted  ENT:  ET and OG tubes noted.  No discharge from ears. Thick yellow nasal  secretions from left nare  Neck: Supple, trachea midline. No JVD  CVS: RRR. No LE edema BL  Lungs:  Mechanical breath sounds  Abdomen:  Soft, nontender and nondistended.  No organomegaly  Neuro:  Sedated.  Responds to painful stimuli  Skin:  No rash or erythema noted  : Duron catheter with yellow urine    Laboratory:  I have reviewed all pertinent lab results within the past 24 hours.  CBC:   Recent Labs   Lab 01/04/23 0345 01/04/23 0346   WBC 8.05  --    RBC 3.13*  --    HGB 9.8*  --    HCT 30.3* 30*   *  --    MCV 97  --    MCH 31.3*  --    MCHC 32.3  --        CMP:   Recent Labs   Lab 01/04/23 0345   *   CALCIUM 8.0*   ALBUMIN 2.3*   PROT 5.6*   *   K 4.8   CO2 25      BUN 19   CREATININE 1.0   ALKPHOS 81   *   AST 96*   BILITOT 0.7       Cardiac markers:   Recent Labs   Lab 01/01/23  0155   TROPONINI 1.105*         Diagnostic Results:  Labs: Reviewed  X-Ray: Reviewed    X-Ray Chest AP Portable [[id number]] Resulted: 01/03/23 0558   Order Status: Completed Updated: 01/03/23 0600   Narrative:     EXAMINATION:   XR CHEST AP PORTABLE     CLINICAL HISTORY:   Respiratory failure     COMPARISON:   January 2, 2023     FINDINGS:   Heart size is upper normal.  The aortic arch is calcified.  Endotracheal tube tip is at the level of the clavicles.  Nasogastric tube extends to the stomach.     Pulmonary vascular congestion is similar to the prior study, with slightly increasing ground-glass opacity throughout the right lung.  There are no significant effusions.  There is no pneumothorax.    Impression:       1. Radiographic findings suggesting congestive failure and mild pulmonary edema, with slightly increasing ground-glass opacity on the right compared to January 2.       Electronically signed by: Juan Daniel Sun MD   Date: 01/03/2023   Time: 05:58       ASSESSMENT/PLAN:         Active Hospital Problems    Diagnosis  POA    *STEMI involving right coronary artery [I21.11]  Yes     Shock liver [K72.00]  Yes    Cardiogenic shock [R57.0]  Yes    Cardiac arrest [I46.9]  Yes    Acute respiratory failure with hypoxia and hypercarbia [J96.01, J96.02]  Yes    Leukocytosis [D72.829]  Yes    Anemia [D64.9]  Yes    History of tobacco abuse [Z87.891]  Not Applicable    Primary hypertension [I10]  Yes    PVD (peripheral vascular disease) with claudication [I73.9]  Yes    PAD (peripheral artery disease) [I73.9]  Yes      Resolved Hospital Problems    Diagnosis Date Resolved POA    Hyponatremia [E87.1] 01/02/2023 Yes         Plan:   Cardiac arrest secondary to STEMI involving RCA   Status post emergent cardiac catheterization with PCI and stenting   On aspirin and ticagrelor for dual antiplatelet therapy  Continue post cardiac arrest care in ICU   On norepinephrine for pressor support; wean as tolerated   Telemetry monitoring; high-risk for reperfusion arrhythmias  Mechanical ventilation for respiratory failure; appreciate input from pulmonology   Sedation as needed to maintain RASS of -2  Monitor electrolytes and replete per protocol   MSSA pneumonia - D/c vancomycin. Continue cefepime   Monitoring function, urine output and avoid nephrotoxic agents, NSAIDs and iodinated contrast  Echocardiogram noted  Neurology consultation - for neuroprognostication. EEG and CT head     Patient is at high risk for clinical decline.  Updated family at bedside    VTE Risk Mitigation (From admission, onward)           Ordered     enoxaparin injection 40 mg  Every 24 hours (non-standard times)         01/03/23 0934     IP VTE HIGH RISK PATIENT  Once         01/01/23 0817     Place sequential compression device  Until discontinued         01/01/23 0817                        Department Hospital Medicine  [facility] hospital  Claudio Hoyos MD  Date of service: 01/04/2023

## 2023-01-04 NOTE — PLAN OF CARE
Patient remains intubated at this time--FiO2 ranging from 60 to 100% depending on patient condition to maintain SpO2 >92%; currently on 70%. Copious secretions noted and frequent suctioning/oral care provided. PERRLA noted. Movement noted to all extremities and they all withdrawal from pain if sedation is weaned/held. Does not follow commands at this time. Rhythmic movement noted to lower extremities at times today--EEG ordered/pending.    NSR on monitor for most of shift. Patient had 2 episodes today of SBP 200s, HR 50s requiring pacemaker to pace 100%, and SpO2 85-90%, rhythmic movement noted to lower extremities as well. Each episode lasted approx 5 minutes and vitals returned to baseline. Patient is not currently pacing; however pacemaker wires remain connected to pacemaker box. Soha ZAFAR at bedside for first event. This RN notified cardiology of events as well   Versed infusion initiated after second episode due to increased WOB and patient grimacing/appearing uncomfortable--this did seem to improve with addition of versed infusion. Rhythmic movement to lower extremities subsided as well. Updated Soha ZAFAR of patient condition and EEG was ordered at this time. Updated Herberth ZAFAR of patient status as well, she spoke with family at the bedside regarding patient condition.    Tmax: 101.3--PRN tylenol, ice packs, fan utilized.    OG tube to LIWS--minimal output. Duron in place--490cc urine measured. No BM this shift--hypoactive bowel sounds noted to all quadrants.    PIV x3, right groin venous sheath, right radial arterial line maintained. Infusions: propofol , fentanyl, versed, levophed (ranging from off to max dose 0.07mcg/kg/min).  Potassium replaced today. Started on vancomycin and cefepime.      POC reviewed with the patient's family at bedside and they verbalized understanding. All comments and concerns addressed. Bed locked in lowest position with bed alarm set, call light within reach. Bed in reverse  trendelenburg to maintain HOB approx 30 degrees due to right groin sheath present. Safety precautions maintained.

## 2023-01-04 NOTE — CARE UPDATE
01/04/23 0734   Patient Assessment/Suction   Level of Consciousness (AVPU) responds to pain   Respiratory Effort Unlabored   Expansion/Accessory Muscles/Retractions no use of accessory muscles   All Lung Fields Breath Sounds coarse   Rhythm/Pattern, Respiratory assisted mechanically   Cough Frequency with stimulation   Cough Type assisted   Suction Method nasal;oral;tracheal   Suction Pressure (mmHg) -120 mmHg   $ Suction Charges Inline Suction Procedure Stat Charge   Secretions Amount small   Secretions Color creamy;white   Secretions Characteristics thick   PRE-TX-O2   Device (Oxygen Therapy) ventilator   Oxygen Concentration (%) 70   SpO2 99 %   Pulse Oximetry Type Continuous   $ Pulse Oximetry - Multiple Charge Pulse Oximetry - Multiple   Pulse 62   Resp (!) 24   BP (!) 149/81   Positioning   Body Position supine   Head of Bed (HOB) Positioning HOB flat   Positioning/Transfer Devices pillows;in use   Aerosol Therapy   $ Aerosol Therapy Charges Aerosol Treatment   Daily Review of Necessity (SVN) completed   Respiratory Treatment Status (SVN) given   Treatment Route (SVN) in-line;ventilator   Patient Position (SVN) other (see comments)  (HOB Flat)   Post Treatment Assessment (SVN) increased aeration   Signs of Intolerance (SVN) none   Breath Sounds Post-Respiratory Treatment   Throughout All Fields Post-Treatment All Fields   Throughout All Fields Post-Treatment aeration increased   Post-treatment Heart Rate (beats/min) 63   Post-treatment Resp Rate (breaths/min) 24   Vent Select   Charged w/in last 24h YES   Preset Conventional Ventilator Settings   Ventilation Type VC   Vent Mode A/C   Set Rate 24 BPM   Vt Set 540 mL   PEEP/CPAP 5 cmH20   Peak Flow 60 L/min   Peak End Inspiratory Pressure 19 cmH20   I-Trigger Type  V-TRIG   Trigger Sensitivity Flow/I-Trigger 2 L/min   Patient Ventilator Parameters   Resp Rate Total 24 br/min   Peak Airway Pressure 25 cmH20   Mean Airway Pressure 13 cmH20   Plateau Pressure 56  cmH20   Exhaled Vt 557 mL   Total Ve 13.4 L/m   I:E Ratio Measured 1:1.60   Auto PEEP 20 cmH20   Conventional Ventilator Alarms   Resp Rate High Alarm 45 br/min   Press High Alarm 60 cmH2O   Apnea Rate 24   Apnea Volume (mL) 1 mL   Apnea Oxygen Concentration  100   Apnea Flow Rate (L/min) 60   T Apnea 20 sec(s)   Ready to Wean/Extubation Screen   FIO2<=50 (chart decimal) (!) 0.7   MV<16L (chart vol.) 13.4   PEEP <=8 (chart #) 5   Ready to Wean Parameters   F/VT Ratio<105 (RSBI) (!) 43.09   Vital Capacity (mL) 0

## 2023-01-05 LAB
ALBUMIN SERPL BCP-MCNC: 2.4 G/DL (ref 3.5–5.2)
ALLENS TEST: ABNORMAL
ALP SERPL-CCNC: 80 U/L (ref 55–135)
ALT SERPL W/O P-5'-P-CCNC: 97 U/L (ref 10–44)
ANION GAP SERPL CALC-SCNC: 6 MMOL/L (ref 8–16)
AST SERPL-CCNC: 55 U/L (ref 10–40)
BASOPHILS # BLD AUTO: 0.01 K/UL (ref 0–0.2)
BASOPHILS NFR BLD: 0.1 % (ref 0–1.9)
BILIRUB SERPL-MCNC: 0.5 MG/DL (ref 0.1–1)
BUN SERPL-MCNC: 26 MG/DL (ref 8–23)
CALCIUM SERPL-MCNC: 8.1 MG/DL (ref 8.7–10.5)
CATH EF QUANTITATIVE: 65 %
CHLORIDE SERPL-SCNC: 106 MMOL/L (ref 95–110)
CO2 SERPL-SCNC: 24 MMOL/L (ref 23–29)
CREAT SERPL-MCNC: 0.9 MG/DL (ref 0.5–1.4)
DELSYS: ABNORMAL
DIFFERENTIAL METHOD: ABNORMAL
EOSINOPHIL # BLD AUTO: 0 K/UL (ref 0–0.5)
EOSINOPHIL NFR BLD: 0 % (ref 0–8)
ERYTHROCYTE [DISTWIDTH] IN BLOOD BY AUTOMATED COUNT: 14.5 % (ref 11.5–14.5)
ERYTHROCYTE [SEDIMENTATION RATE] IN BLOOD BY WESTERGREN METHOD: 24 MM/H
EST. GFR  (NO RACE VARIABLE): >60 ML/MIN/1.73 M^2
FIO2: 80
GLUCOSE SERPL-MCNC: 119 MG/DL (ref 70–110)
GLUCOSE SERPL-MCNC: 120 MG/DL (ref 70–110)
GLUCOSE SERPL-MCNC: 126 MG/DL (ref 70–110)
GLUCOSE SERPL-MCNC: 140 MG/DL (ref 70–110)
HCO3 UR-SCNC: 25.2 MMOL/L (ref 24–28)
HCT VFR BLD AUTO: 30.3 % (ref 40–54)
HCT VFR BLD CALC: 32 %PCV (ref 36–54)
HGB BLD-MCNC: 10.4 G/DL (ref 14–18)
IMM GRANULOCYTES # BLD AUTO: 0.14 K/UL (ref 0–0.04)
IMM GRANULOCYTES NFR BLD AUTO: 1.3 % (ref 0–0.5)
LYMPHOCYTES # BLD AUTO: 0.4 K/UL (ref 1–4.8)
LYMPHOCYTES NFR BLD: 3.5 % (ref 18–48)
MAGNESIUM SERPL-MCNC: 2.2 MG/DL (ref 1.6–2.6)
MCH RBC QN AUTO: 31.7 PG (ref 27–31)
MCHC RBC AUTO-ENTMCNC: 34.3 G/DL (ref 32–36)
MCV RBC AUTO: 94 FL (ref 82–98)
MIN VOL: 13.3
MODE: ABNORMAL
MONOCYTES # BLD AUTO: 0.9 K/UL (ref 0.3–1)
MONOCYTES NFR BLD: 8.6 % (ref 4–15)
NEUTROPHILS # BLD AUTO: 9.5 K/UL (ref 1.8–7.7)
NEUTROPHILS NFR BLD: 86.5 % (ref 38–73)
NRBC BLD-RTO: 0 /100 WBC
PCO2 BLDA: 42.1 MMHG (ref 35–45)
PEEP: 5
PH SMN: 7.38 [PH] (ref 7.35–7.45)
PIP: 42
PLATELET # BLD AUTO: 176 K/UL (ref 150–450)
PMV BLD AUTO: 9.9 FL (ref 9.2–12.9)
PO2 BLDA: 72 MMHG (ref 80–100)
POC BE: 0 MMOL/L
POC IONIZED CALCIUM: 1.24 MMOL/L (ref 1.06–1.42)
POC SATURATED O2: 94 % (ref 95–100)
POC TCO2: 26 MMOL/L (ref 23–27)
POTASSIUM BLD-SCNC: 4.3 MMOL/L (ref 3.5–5.1)
POTASSIUM SERPL-SCNC: 4.2 MMOL/L (ref 3.5–5.1)
PROT SERPL-MCNC: 5.8 G/DL (ref 6–8.4)
RBC # BLD AUTO: 3.28 M/UL (ref 4.6–6.2)
SAMPLE: ABNORMAL
SITE: ABNORMAL
SODIUM BLD-SCNC: 138 MMOL/L (ref 136–145)
SODIUM SERPL-SCNC: 136 MMOL/L (ref 136–145)
SP02: 97
VT: 540
WBC # BLD AUTO: 10.98 K/UL (ref 3.9–12.7)

## 2023-01-05 PROCEDURE — 99232 SBSQ HOSP IP/OBS MODERATE 35: CPT | Mod: ,,, | Performed by: INTERNAL MEDICINE

## 2023-01-05 PROCEDURE — 99291 PR CRITICAL CARE, E/M 30-74 MINUTES: ICD-10-PCS | Mod: ,,, | Performed by: INTERNAL MEDICINE

## 2023-01-05 PROCEDURE — 25000003 PHARM REV CODE 250: Performed by: INTERNAL MEDICINE

## 2023-01-05 PROCEDURE — 37799 UNLISTED PX VASCULAR SURGERY: CPT

## 2023-01-05 PROCEDURE — 20000000 HC ICU ROOM

## 2023-01-05 PROCEDURE — 82803 BLOOD GASES ANY COMBINATION: CPT

## 2023-01-05 PROCEDURE — 99900026 HC AIRWAY MAINTENANCE (STAT)

## 2023-01-05 PROCEDURE — 84132 ASSAY OF SERUM POTASSIUM: CPT

## 2023-01-05 PROCEDURE — 80053 COMPREHEN METABOLIC PANEL: CPT | Performed by: FAMILY MEDICINE

## 2023-01-05 PROCEDURE — 83735 ASSAY OF MAGNESIUM: CPT | Performed by: FAMILY MEDICINE

## 2023-01-05 PROCEDURE — S4991 NICOTINE PATCH NONLEGEND: HCPCS | Performed by: INTERNAL MEDICINE

## 2023-01-05 PROCEDURE — 99900035 HC TECH TIME PER 15 MIN (STAT)

## 2023-01-05 PROCEDURE — 94003 VENT MGMT INPAT SUBQ DAY: CPT

## 2023-01-05 PROCEDURE — 25000242 PHARM REV CODE 250 ALT 637 W/ HCPCS: Performed by: INTERNAL MEDICINE

## 2023-01-05 PROCEDURE — 84295 ASSAY OF SERUM SODIUM: CPT

## 2023-01-05 PROCEDURE — 94799 UNLISTED PULMONARY SVC/PX: CPT

## 2023-01-05 PROCEDURE — 85014 HEMATOCRIT: CPT

## 2023-01-05 PROCEDURE — 99900031 HC PATIENT EDUCATION (STAT)

## 2023-01-05 PROCEDURE — 63600175 PHARM REV CODE 636 W HCPCS: Performed by: INTERNAL MEDICINE

## 2023-01-05 PROCEDURE — 82330 ASSAY OF CALCIUM: CPT

## 2023-01-05 PROCEDURE — 85025 COMPLETE CBC W/AUTO DIFF WBC: CPT | Performed by: FAMILY MEDICINE

## 2023-01-05 PROCEDURE — 25000003 PHARM REV CODE 250

## 2023-01-05 PROCEDURE — 99232 PR SUBSEQUENT HOSPITAL CARE,LEVL II: ICD-10-PCS | Mod: ,,, | Performed by: INTERNAL MEDICINE

## 2023-01-05 PROCEDURE — 94640 AIRWAY INHALATION TREATMENT: CPT

## 2023-01-05 PROCEDURE — 99291 CRITICAL CARE FIRST HOUR: CPT | Mod: ,,, | Performed by: INTERNAL MEDICINE

## 2023-01-05 PROCEDURE — 94761 N-INVAS EAR/PLS OXIMETRY MLT: CPT

## 2023-01-05 PROCEDURE — 27000221 HC OXYGEN, UP TO 24 HOURS

## 2023-01-05 RX ADMIN — PROPOFOL 50 MCG/KG/MIN: 10 INJECTION, EMULSION INTRAVENOUS at 03:01

## 2023-01-05 RX ADMIN — LEVALBUTEROL HYDROCHLORIDE 0.63 MG: 0.63 SOLUTION RESPIRATORY (INHALATION) at 02:01

## 2023-01-05 RX ADMIN — PROPOFOL 50 MCG/KG/MIN: 10 INJECTION, EMULSION INTRAVENOUS at 09:01

## 2023-01-05 RX ADMIN — ASPIRIN 81 MG CHEWABLE TABLET 81 MG: 81 TABLET CHEWABLE at 08:01

## 2023-01-05 RX ADMIN — HYDRALAZINE HYDROCHLORIDE 10 MG: 20 INJECTION INTRAMUSCULAR; INTRAVENOUS at 10:01

## 2023-01-05 RX ADMIN — NICOTINE 1 PATCH: 14 PATCH, EXTENDED RELEASE TRANSDERMAL at 08:01

## 2023-01-05 RX ADMIN — MUPIROCIN 1 G: 20 OINTMENT TOPICAL at 09:01

## 2023-01-05 RX ADMIN — PROPOFOL 50 MCG/KG/MIN: 10 INJECTION, EMULSION INTRAVENOUS at 06:01

## 2023-01-05 RX ADMIN — HYDRALAZINE HYDROCHLORIDE 10 MG: 20 INJECTION INTRAMUSCULAR; INTRAVENOUS at 12:01

## 2023-01-05 RX ADMIN — PROPOFOL 50 MCG/KG/MIN: 10 INJECTION, EMULSION INTRAVENOUS at 12:01

## 2023-01-05 RX ADMIN — LEVALBUTEROL HYDROCHLORIDE 0.63 MG: 0.63 SOLUTION RESPIRATORY (INHALATION) at 07:01

## 2023-01-05 RX ADMIN — MUPIROCIN 1 G: 20 OINTMENT TOPICAL at 08:01

## 2023-01-05 RX ADMIN — ENOXAPARIN SODIUM 40 MG: 100 INJECTION SUBCUTANEOUS at 10:01

## 2023-01-05 RX ADMIN — PROPOFOL 46 MCG/KG/MIN: 10 INJECTION, EMULSION INTRAVENOUS at 01:01

## 2023-01-05 RX ADMIN — FENTANYL CITRATE 200 MCG/HR: 50 INJECTION INTRAVENOUS at 11:01

## 2023-01-05 RX ADMIN — METHYLPREDNISOLONE SODIUM SUCCINATE 40 MG: 40 INJECTION, POWDER, FOR SOLUTION INTRAMUSCULAR; INTRAVENOUS at 06:01

## 2023-01-05 RX ADMIN — CEFTRIAXONE 1 G: 1 INJECTION, SOLUTION INTRAVENOUS at 02:01

## 2023-01-05 RX ADMIN — FENTANYL CITRATE 250 MCG/HR: 50 INJECTION INTRAVENOUS at 10:01

## 2023-01-05 RX ADMIN — CHLORHEXIDINE GLUCONATE 15 ML: 1.2 RINSE ORAL at 08:01

## 2023-01-05 RX ADMIN — METHYLPREDNISOLONE SODIUM SUCCINATE 40 MG: 40 INJECTION, POWDER, FOR SOLUTION INTRAMUSCULAR; INTRAVENOUS at 02:01

## 2023-01-05 RX ADMIN — PROPOFOL 44 MCG/KG/MIN: 10 INJECTION, EMULSION INTRAVENOUS at 01:01

## 2023-01-05 RX ADMIN — FENTANYL CITRATE 300 MCG/HR: 50 INJECTION INTRAVENOUS at 04:01

## 2023-01-05 RX ADMIN — FAMOTIDINE 20 MG: 20 TABLET ORAL at 08:01

## 2023-01-05 RX ADMIN — TICAGRELOR 90 MG: 90 TABLET ORAL at 09:01

## 2023-01-05 RX ADMIN — PROPOFOL 50 MCG/KG/MIN: 10 INJECTION, EMULSION INTRAVENOUS at 08:01

## 2023-01-05 RX ADMIN — METHYLPREDNISOLONE SODIUM SUCCINATE 40 MG: 40 INJECTION, POWDER, FOR SOLUTION INTRAMUSCULAR; INTRAVENOUS at 09:01

## 2023-01-05 RX ADMIN — CHLORHEXIDINE GLUCONATE 15 ML: 1.2 RINSE ORAL at 09:01

## 2023-01-05 RX ADMIN — FAMOTIDINE 20 MG: 20 TABLET ORAL at 09:01

## 2023-01-05 RX ADMIN — PROPOFOL 20 MCG/KG/MIN: 10 INJECTION, EMULSION INTRAVENOUS at 11:01

## 2023-01-05 RX ADMIN — TICAGRELOR 90 MG: 90 TABLET ORAL at 08:01

## 2023-01-05 NOTE — PROGRESS NOTES
[facility]  Neurology Progress Note    Patient Name: Peyman Gr  MRN: [id number]  : 1955  TODAY'S DATE: 2023  ADMIT DATE: 2023  3:17 AM                                          CONSULTED PROVIDER: Brigette Welsh MD, Neurologist. Cellphone: [phone]  CONSULT REQUESTED BY: Claudio Hoyos MD     Chief Complaint   Patient presents with    Cardiac Arrest     Pt presents to ER via EMS with STEMI with cardiac arrest with compressions being initiated upon arrival to ER.        HPI per EMR:  67-year-old male with peripheral vascular disease, essential hypertension, hyperlipidemia and ongoing tobacco use presented to outside facility with 2 hour onset of chest pain and shortness of breath.  EKG revealed inferior and anterolateral STEMI.  Quickly degenerated into cardiac arrest with runs of VT/VF and asystole requiring resuscitation for about 40 minutes.  He was subsequently intubated and transferred to our ED. En route he developed cardiac arrest again (PEA) which was continued into our ED and was resuscitated for another 30 minutes.  He required transcutaneous pacing.  He was taken emergently to the catheterization lab and underwent PCI with stenting of RCA which was felt to be the culprit lesion.  He was also found to have left main stenosis of about 70%.  A transvenous pacemaker was also placed.  Currently admitted to the ICU for post cardiac arrest care.  He remains intubated and critically ill. Hospital stay complicated by fever; initiated on broad spectrum antibiotics.  Respiratory culture growing MSSA.    Neurology Consult:  Patient was seen and examined by me today.  He is a 67-year-old man with history of peripheral vascular disease, hypertension, hyperlipidemia and tobacco abuse who presented to outside facility with 2 hours of chest pain and shortness of breath, EKG showed STEMI then patient degenerated intracardiac arrest with rounds of V-tach and VFib as well as  "asystole requiring prolonged resuscitation for at least 40 minutes.  He was intubated and then transferred to our facility for escalation of care.  On route to the hospital he developed PA arrest again and was resuscitated for another 30 minutes, and required transcutaneous pacing.  He was then taken to the cath lab and underwent PCI with stenting of right coronary artery, was then admitted to the ICU for management.  Patient is still intubated, unresponsive, so neurology consulted for neuro prognostication.    23: Patient was seen and examined by me today. Sedation has been off since last night, and patient is opening eyes spontaneously and moving all extremities. He was able to follow the command "move your feet" 3 times, which shows some purposeful movement. Sedation had to be restarted in the setting of tachycardia and hypertension due to agitation.    Scheduled Meds:   aspirin  81 mg Oral Daily    cefTRIAXone (ROCEPHIN) IVPB  1 g Intravenous Q24H    chlorhexidine  15 mL Mouth/Throat BID    enoxparin  40 mg Subcutaneous Q24H    famotidine  20 mg Per OG tube BID    insulin regular  5 Units Intravenous Once    levalbuterol  0.63 mg Nebulization TID WAKE    methylPREDNISolone sodium succinate injection  40 mg Intravenous Q8H    mupirocin   Nasal BID    nicotine  1 patch Transdermal Daily    ticagrelor  90 mg Oral BID     Continuous Infusions:   DOBUTamine Stopped (23)    fentanyl 300 mcg/hr (23)    midazolam Stopped (23 1901)    NORepinephrine bitartrate-D5W Stopped (23 0500)    propofoL 50 mcg/kg/min (23)    vasopressin Stopped (23)     PRN Meds:.acetaminophen, albuterol-ipratropium, atropine, calcium gluconate IVPB, calcium gluconate IVPB, calcium gluconate IVPB, dextrose 10%, dextrose 10%, DOBUTamine, EPINEPHrine, [] fentaNYL **FOLLOWED BY** fentaNYL, glucagon (human recombinant), glucose, glucose, hydrALAZINE, HYDROcodone-acetaminophen, insulin " "aspart U-100, magnesium sulfate IVPB, magnesium sulfate IVPB, melatonin, midazolam, morphine, naloxone, ondansetron, polyethylene glycol, potassium chloride in water **AND** potassium chloride in water **AND** potassium chloride in water, senna-docusate 8.6-50 mg, simethicone, sodium chloride 0.9%, sodium phosphate IVPB, sodium phosphate IVPB, sodium phosphate IVPB    Physical Exam  Current Vitals:  Vitals:    01/05/23 0746   BP:    Pulse: 67   Resp: (!) 24   Temp:      General appearance:  Intubated, minimally responsive  Cardiovascular:  Regular rhythm, paced, tachycardic  Pulmonary:  Intubated, on mechanical ventilation  GI:  OG tube in place  MSK:  Increased tone throughout  Skin:  No lesions in exposed skin    NEUROLOGICAL EXAM:    Mental status:  minimally responsive, when sedation was weaned of he seemed to open eyes spontaneously but not tracking. No myoclonus seen on exam. Followed the command "move your feet" 3 times, which seems purposeful movement.          Intubated:  Yes          Sedated:  Yes  Response to noxious stimulation:  moving all extremities spontaneously intermittently, moves feet to command but does not follow others.  Breathes above ventilator:  Yes  Opens eyes:  yes  Pupils: symmetric          Left: reactive          Right: reactive  Eye movements: No pathologic movements such as nystagmus, ocular bobbing, dipping or roving.  Corneal reflex:  Present  Oculocephalic reflex:  Present  Cough:  Present  Gag:  present  Motor exam:  Increased muscle tone in upper and lower extremities, no abnormal movements in extremities . Moving all extremities intermittently spontaneously, able to move feet to command.  DTRs:  1+ throughout.    Laboratory Data & Studies    Recent Labs   Lab 01/03/23  0347 01/04/23  0345 01/05/23  0430   WBC 9.04 8.05 10.98   HGB 9.4* 9.8* 10.4*   * 131* 176   MCV 95 97 94         Recent Labs   Lab 01/01/23  0155 01/01/23  0453 01/03/23  0347 01/03/23  1643 " 01/04/23  0345 01/05/23  0430   *   < > 135*  --  135* 136   K 4.2   < > 3.7 4.6 4.8 4.2      < > 103  --  104 106   CO2 20*   < > 26  --  25 24   BUN 20   < > 19  --  19 26*   *   < > 105  --  146* 119*   CALCIUM 8.8   < > 7.6*  --  8.0* 8.1*   MG 2.0   < > 2.0  --  2.1 2.2   PHOS 5.5*  --   --   --   --   --     < > = values in this interval not displayed.         Recent Labs   Lab 01/03/23 0347 01/04/23 0345 01/05/23  0430   PROT 5.4* 5.6* 5.8*   ALBUMIN 2.6* 2.3* 2.4*   BILITOT 0.7 0.7 0.5   * 96* 55*   * 127* 97*   ALKPHOS 90 81 80         Recent Labs   Lab 01/01/23  0453   INR 1.2   APTT 30.3         Recent Labs   Lab 01/01/23  0848   HGBA1C 5.7           Microbiology:  Microbiology Results (last 7 days)       Procedure Component Value Units Date/Time    Blood culture [[id number]] Collected: 01/03/23 0954    Order Status: Completed Specimen: Blood Updated: 01/04/23 1032     Blood Culture, Routine No Growth to date      No Growth to date    Blood culture [[id number]] Collected: 01/03/23 0954    Order Status: Completed Specimen: Blood Updated: 01/04/23 1032     Blood Culture, Routine No Growth to date      No Growth to date    Culture, Respiratory with Gram Stain [[id number]]  (Abnormal)  (Susceptibility) Collected: 01/02/23 1431    Order Status: Completed Specimen: Respiratory from Tracheal Aspirate Updated: 01/04/23 0856     Respiratory Culture Reduced normal respiratory lorenzo      STAPHYLOCOCCUS AUREUS  Moderate       Gram Stain (Respiratory) <10 epithelial cells per low power field.     Gram Stain (Respiratory) Few WBC's     Gram Stain (Respiratory) Moderate Gram positive cocci    MRSA Screen by PCR [[id number]] Collected: 01/03/23 1412    Order Status: Completed Specimen: Nasopharyngeal Swab from Nasal Updated: 01/03/23 1554     MRSA SCREEN BY PCR Negative              Imaging:  X-Ray Chest AP Portable    Result Date: 1/4/2023  HISTORY: Respiratory failure, cardiac arrest.  FINDINGS: Portable chest radiograph at 0521 hours compared to 01/03/2023 shows ET and NG tubes, and transvenous pacing lead via IVC approach, unchanged in position. The cardiomediastinal silhouette and pulmonary vasculature are stable, with aortic vascular calcifications. The lungs are symmetrically expanded, with mild scattered reticulonodular densities in both lungs, similar to the prior exam. There is no new pleural or parenchymal abnormality. No pneumothorax. IMPRESSION: No significant interval change. Electronically signed by:  Hema Tafoya MD  1/4/2023 6:52 AM CST Workstation: [id number]    X-Ray Chest AP Portable    Result Date: 1/3/2023  EXAMINATION: XR CHEST AP PORTABLE CLINICAL HISTORY: Respiratory failure COMPARISON: January 2, 2023 FINDINGS: Heart size is upper normal.  The aortic arch is calcified.  Endotracheal tube tip is at the level of the clavicles.  Nasogastric tube extends to the stomach. Pulmonary vascular congestion is similar to the prior study, with slightly increasing ground-glass opacity throughout the right lung.  There are no significant effusions.  There is no pneumothorax.     1. Radiographic findings suggesting congestive failure and mild pulmonary edema, with slightly increasing ground-glass opacity on the right compared to January 2. Electronically signed by: Juan Daniel Sun MD Date:    01/03/2023 Time:    05:58    X-Ray Chest AP Portable    Result Date: 1/2/2023  HISTORY: resp fail COMPARISON:January 1, 2023 FINDINGS: Distal tip of ET tube resides at the medial ends of clavicles and the NG tube passes to the stomach. Cardiomediastinal silhouette appears upper normal in size magnified by the poor inspiratory effort. Tortuosity thoracic aorta as a manifestation of systemic hypertension. Diffuse interstitial opacities and ground glass opacity changes are again reestablished with minimal improvement upon comparison. No evidence of confluent infiltrate or significant pleural effusion  or atelectasis. Postoperative changes of previous anterior cervical fusion as identified on the uppermost field of view and previous right carotid endarterectomy. IMPRESSION:Stable appearance the chest when compared to previous. All support devices appear stable. Electronically signed by:  Solo Harris MD  1/2/2023 6:54 AM CST Workstation: [id number]    X-Ray Chest AP Portable    Result Date: 1/1/2023  Chest, single view HISTORY: Nasogastric tube placement. In the interval, there has been introduction of a nasogastric tube which extends into the left upper quadrant of the abdomen just beyond the GE junction. An endotracheal tube remains in place with its tip positioned well above the level of the saurav. Bilateral interstitial and mild groundglass opacities, greater on the right are again observed. There are no new confluent infiltrates, volume loss or effusions. The cardiomediastinal silhouette is stable. IMPRESSION: Interval introduction of nasogastric tube extending into the left upper abdomen with its tip just beyond the GE junction. Otherwise stable cardiopulmonary status. Electronically signed by:  Elizabeth Sharp MD  1/1/2023 7:01 AM CST Workstation: [id number]    X-Ray Chest AP Portable    Result Date: 1/1/2023  Chest, single view HISTORY: Cardiac arrest. Comparison 9/9/2022. Endotracheal tube is in place with its tip positioned well above the level of the saurav. The heart size is within normal limits. The central pulmonary vasculature is not acutely engorged.  There is arteriosclerotic calcification within the aortic arch. There is scattered bilateral interstitial and groundglass pulmonary opacities with upper lung zone predominance, greater on the right. No effusion or extrapulmonary air identified. Surgical changes are noted within the cervical spine and within the soft tissues of the right side of the neck. IMPRESSION: Positioning of endotracheal tube as above. Interval development of interstitial and  groundglass pulmonary opacities with upper lung zone predominance. Electronically signed by:  Elizabeth Sharp MD  1/1/2023 6:59 AM CST Workstation: [id number]    Echo Saline Bubble? No    Result Date: 1/1/2023  · The estimated ejection fraction is 55%. · The left ventricle is normal in size with concentric hypertrophy. · Mild to moderate tricuspid regurgitation. · Moderate right ventricular enlargement with moderately reduced right ventricular systolic function. · Grade I left ventricular diastolic dysfunction. · There is mild aortic valve stenosis. · Aortic valve area is 1.68 cm2; peak velocity is 1.36 m/s; mean gradient is 4 mmHg. · There is abnormal septal wall motion consistent with right ventricular pacemaker. · There are segmental left ventricular wall motion abnormalities.          Assessment and Plan:    Concern for anoxic brain injury status post cardiac arrest with prolonged resuscitation  STEMI status post PCI and stenting  V-tach and VFib requiring external pacing  67-year-old man with history of peripheral vascular disease, hypertension, hyperlipidemia and tobacco abuse who presented to outside facility with 2 hours of chest pain and shortness of breath, EKG showed STEMI then patient degenerated into cardiac arrest with rounds of V-tach and VFib as well as asystole requiring prolonged resuscitation. Patient was still intubated, unresponsive, so neurology consulted for neuro prognostication. He was not cooled.  When sedation was weaned down, patient became agitated and hypertensive, but his examination seems improved from yesterday.   - admitted to the ICU, with q.4 hours neuro checks, continuous telemetry monitoring, external pacer present  - patient is at least 72 hours post arrest at this time, exam slowly improving, but requiring sedation for hypertension and agitation  - will attempt to wean off sedation as much as possible to better assess neurological status daily  - EEG was performed and showed  suppression of voltage concerning for anoxic brain injury, but patient was on high dose of sedatives at the time, so will consider repeating study if no further improvement seen.  - CT brain showed some scattered hypodensities in cortical and subcortical areas concerning for stroke or white matter disease.  No evidence of severe edema or effacement of ventricles was seen.  Patient can not undergo MRI in the setting of external pacer present.  - management of infectious and metabolic derangements as per primary team and Cardiology  - seizure precautions while inpatient  - Neuro prognosis is indeterminate at this time, but patient has improved on examination, so will reassess daily and document progress. However, prognosis is still guarded.  - will continue to follow along    DVT prophylaxis with chemo/SCD prophylaxis      [phone] 3 days from discharge.       All nursing questions were answered.                              Thank you kindly for including us in the care of this patient. Please do not hesitate to contact us with any questions.       Critical Care:  45 minutes of critical care time has been spent evaluating with the patient. Time includes chart review not limited to diagnostic imaging, labs, and vitals, patient assessment, discussion with family and nursing, current order evaluations, and new order entries.      Brigette Welsh MD  Neurology

## 2023-01-05 NOTE — PROGRESS NOTES
[facility] Medicine  Progress Note    Patient name: Peyman Gr  MRN: [id number]  Admit Date: 1/1/2023   LOS: 4 days     SUBJECTIVE:     Principal problem: STEMI involving right coronary artery    Interval History:  No acute overnight events reported.  Yesterday sedation was briefly turned off for obtaining EEG however patient became tachycardic.  Having spontaneous jerking movements involving lower extremities; no purposeful movements noted.    Summary:   67-year-old male with peripheral vascular disease, essential hypertension, hyperlipidemia and ongoing tobacco use presented to outside facility with 2 hour onset of chest pain and shortness of breath.  EKG revealed inferior and anterolateral STEMI.  Quickly degenerated into cardiac arrest with runs of VT/VF and asystole requiring resuscitation for about 40 minutes.  He was subsequently intubated and transferred to our ED. En route he developed cardiac arrest again (PEA) which was continued into our ED and was resuscitated for another 30 minutes.  He required transcutaneous pacing.  He was taken emergently to the catheterization lab and underwent PCI with stenting of RCA which was felt to be the culprit lesion.  He was also found to have left main stenosis of about 70%.  A transvenous pacemaker was also placed.  Currently admitted to the ICU for post cardiac arrest care.  He remains intubated and critically ill. Hospital stay complicated by fever; initiated on broad spectrum antibiotics.  Respiratory culture growing MSSA - de-escalated to ceftriaxone.    Scheduled Meds:   aspirin  81 mg Oral Daily    cefTRIAXone (ROCEPHIN) IVPB  1 g Intravenous Q24H    chlorhexidine  15 mL Mouth/Throat BID    enoxparin  40 mg Subcutaneous Q24H    famotidine  20 mg Per OG tube BID    insulin regular  5 Units Intravenous Once    levalbuterol  0.63 mg Nebulization TID WAKE    methylPREDNISolone sodium succinate injection  40 mg Intravenous Q8H    mupirocin    Nasal BID    nicotine  1 patch Transdermal Daily    ticagrelor  90 mg Oral BID     Continuous Infusions:   DOBUTamine Stopped (23 0945)    fentanyl 200 mcg/hr (23 112)    midazolam Stopped (23 190)    NORepinephrine bitartrate-D5W Stopped (23 0500)    propofoL 20 mcg/kg/min (23 1128)    vasopressin Stopped (23)     PRN Meds:acetaminophen, albuterol-ipratropium, atropine, calcium gluconate IVPB, calcium gluconate IVPB, calcium gluconate IVPB, dextrose 10%, dextrose 10%, DOBUTamine, EPINEPHrine, [] fentaNYL **FOLLOWED BY** fentaNYL, glucagon (human recombinant), glucose, glucose, hydrALAZINE, HYDROcodone-acetaminophen, insulin aspart U-100, magnesium sulfate IVPB, magnesium sulfate IVPB, melatonin, midazolam, morphine, naloxone, ondansetron, polyethylene glycol, potassium chloride in water **AND** potassium chloride in water **AND** potassium chloride in water, senna-docusate 8.6-50 mg, simethicone, sodium chloride 0.9%, sodium phosphate IVPB, sodium phosphate IVPB, sodium phosphate IVPB    Review of patient's allergies indicates:  No Known Allergies    Review of Systems:  Unable to obtain due to clinical condition  OBJECTIVE:     Vital Signs (Most Recent)  Temp: 97.8 °F (36.6 °C) (23 0701)  Pulse: (!) 145 (23)  Resp: (!) 28 (23)  BP: 134/69 (23 0422)  SpO2: (!) 92 % (23)    Vital Signs Range (Last 24H):  Temp:  [97.3 °F (36.3 °C)-98.8 °F (37.1 °C)]   Pulse:  []   Resp:  [14-56]   BP: (134)/(69)   SpO2:  [89 %-100 %]   Arterial Line BP: (116-256)/()     I & O (Last 24H):  Intake/Output Summary (Last 24 hours) at 2023 1403  Last data filed at 2023 0613  Gross per 24 hour   Intake 2357.18 ml   Output 2235 ml   Net 122.18 ml         Physical Exam:  General: Patient intubated and sedated.  Appears stated age  Eyes: No conjunctival injection. No scleral icterus.  Scleral edema noted  ENT:  ET and OG tubes  noted.  No discharge from ears. Nasal secretions  Neck: Supple, trachea midline. No JVD  CVS: RRR. No LE edema BL  Lungs:  Mechanical breath sounds  Abdomen:  Soft, nontender and nondistended.  No organomegaly  Neuro:  Sedated.  Responds to painful stimuli  Skin:  No rash or erythema noted  : Duron catheter with yellow urine    Laboratory:  I have reviewed all pertinent lab results within the past 24 hours.  CBC:   Recent Labs   Lab 01/05/23  0430   WBC 10.98   RBC 3.28*   HGB 10.4*   HCT 30.3*      MCV 94   MCH 31.7*   MCHC 34.3       CMP:   Recent Labs   Lab 01/05/23  0430   *   CALCIUM 8.1*   ALBUMIN 2.4*   PROT 5.8*      K 4.2   CO2 24      BUN 26*   CREATININE 0.9   ALKPHOS 80   ALT 97*   AST 55*   BILITOT 0.5       Cardiac markers:   Recent Labs   Lab 01/01/23  0155   TROPONINI 1.105*         Diagnostic Results:  Labs: Reviewed  X-Ray: Reviewed    Procedure Component Value Units Date/Time   X-Ray Chest AP Portable [[id number]] Collected: 01/05/23 0417   Order Status: Completed Updated: 01/05/23 0649   Narrative:     Reason: eval and compare to previous evals.     FINDINGS:     Portable chest with comparison chest x-ray January 4, 2023 show unchanged cardiomediastinal silhouette. There is unchanged prominence of the central hilar vascular structures with bilateral interstitial lung opacities. No new confluent airspace opacity. No pneumothorax. There is blunting of the left costophrenic angle.   Nasogastric tube and endotracheal tube are unchanged. No acute osseous abnormality.     IMPRESSION:     Blunting of left costophrenic angle could reflect a small pleural effusion or atelectasis. Otherwise no significant change from prior exam.     Electronically signed by:  Julio C Davis DO  1/5/2023 6:47 AM CST Workstation: [id number]       ASSESSMENT/PLAN:         Active Hospital Problems    Diagnosis  POA    *STEMI involving right coronary artery [I21.11]  Yes    Pneumonia of right lung  due to methicillin susceptible Staphylococcus aureus (MSSA) [J15.211]  No    Shock liver [K72.00]  Yes    Cardiogenic shock [R57.0]  Yes    Cardiac arrest [I46.9]  Yes    Acute respiratory failure with hypoxia and hypercarbia [J96.01, J96.02]  Yes    Leukocytosis [D72.829]  Yes    Anemia [D64.9]  Yes    History of tobacco abuse [Z87.891]  Not Applicable    Primary hypertension [I10]  Yes    PVD (peripheral vascular disease) with claudication [I73.9]  Yes    PAD (peripheral artery disease) [I73.9]  Yes      Resolved Hospital Problems    Diagnosis Date Resolved POA    Hyponatremia [E87.1] 01/02/2023 Yes         Plan:   Cardiac arrest secondary to STEMI involving RCA   Status post emergent cardiac catheterization with PCI and stenting   On aspirin and ticagrelor for dual antiplatelet therapy  Continue post cardiac arrest care in ICU   Telemetry monitoring; high-risk for reperfusion arrhythmias  Mechanical ventilation for respiratory failure; appreciate input from pulmonology   Sedation as needed to maintain RASS of -2  Monitor electrolytes and replete per protocol   MSSA pneumonia - deescalate antibiotics to ceftriaxone  Monitor urine output and avoid nephrotoxic agents, NSAIDs and iodinated contrast  Echocardiogram noted - normal LVEF and grade 1 diastolic dysfunction  Neurology consultation - for neuroprognostication.  EEG and CT head noted.  No purposeful movements noted.    Patient is at high risk for clinical decline.  Updated family at bedside and discussed poor prognosis.    VTE Risk Mitigation (From admission, onward)           Ordered     enoxaparin injection 40 mg  Every 24 hours (non-standard times)         01/03/23 0934     IP VTE HIGH RISK PATIENT  Once         01/01/23 0817     Place sequential compression device  Until discontinued         01/01/23 0817                        Department Hospital Medicine  [facility]  Claudio Hoyos MD  Date of service: 01/05/2023

## 2023-01-05 NOTE — PROGRESS NOTES
"[facility]  Adult Nutrition   Progress Note (Nutrition Support Management)    SUMMARY     Recommendations  Recommendation/Intervention: 1) Continue current TF regimen. 2) RD will continue to monitor TF rate/tolerance, propofol rate, labs, weight, NPO/vent status and will make recommendations PRN.  Goals: Patient will tolerate TF at goal rate  Nutrition Goal Status: goal met  Communication of RD Recs: reviewed with physician    Dietitian Rounds Brief  Patient remains intubated and sedated. TF running at goal rate.     Diet order:   Current Diet Order: NPO      Current Nutrition Support Formula Ordered: Vital High Protein  Current Nutrition Support Rate Ordered: 55 (ml)  Current Nutrition Support Frequency Ordered: ml/hr    Evaluation of Received Nutrient/Fluid Intake  Enteral Calories (kcal): 1320  Enteral Protein (gm): 116  Enteral (Free Water) Fluid (mL): 1104  Free Water Flush Fluid (mL): 180  Other Calories (kcal): 789 (Propofol at 29.9ml/hr)  Energy Calories Required: meeting needs  Protein Required: meeting needs  Fluid Required: meeting needs  Tolerance: tolerating     % Intake of Estimated Energy Needs: 75 - 100 %  % Meal Intake: NPO      Intake/Output Summary (Last 24 hours) at 1/5/2023 1520  Last data filed at 1/5/2023 0613  Gross per 24 hour   Intake 2257.18 ml   Output 2235 ml   Net 22.18 ml        Anthropometrics  Temp: 97.8 °F (36.6 °C)  Height: 6' 2" (188 cm)  Height (inches): 74 in  Weight Method: Bed Scale  Weight: 115.3 kg (254 lb 3.1 oz)  Weight (lb): 254.19 lb  Ideal Body Weight (IBW), Male: 190 lb  % Ideal Body Weight, Male (lb): 115.8 %  BMI (Calculated): 32.6  BMI Grade: 25 - 29.9 - overweight       Estimated/Assessed Needs  Weight Used For Calorie Calculations: 103 kg (227 lb 1.2 oz)  Energy Calorie Requirements (kcal): 8215-2004 (20-25)  Energy Need Method: Kcal/kg  Protein Requirements: 124-155gm (1.2-1.5gm/kg)  Weight Used For Protein Calculations: 103 kg (227 lb 1.2 oz)   "   Estimated Fluid Requirement Method: RDA Method  RDA Method (mL): 2060       Reason for Assessment  Reason For Assessment: RD follow-up  Diagnosis:  (STEMI involving right coronary artery)  Relevant Medical History: peripheral vascular disease, essential hypertension, hyperlipidemia and ongoing tobacco use    Nutrition/Diet History  Food Allergies: NKFA  Factors Affecting Nutritional Intake: NPO, on mechanical ventilation    Nutrition Risk Screen  Nutrition Risk Screen: tube feeding or parenteral nutrition     MST Score: 0  Have you recently lost weight without trying?: No  Weight loss score: 0  Have you been eating poorly because of a decreased appetite?: No  Appetite score: 0       Weight History:  Wt Readings from Last 10 Encounters:   01/05/23 115.3 kg (254 lb 3.1 oz)   01/01/23 99.8 kg (220 lb)   09/09/22 99.8 kg (220 lb)   05/06/22 95.7 kg (211 lb)   01/31/22 93.4 kg (206 lb)   12/29/21 93.7 kg (206 lb 8 oz)   12/21/21 98 kg (216 lb)   12/01/21 93 kg (205 lb)   10/21/21 98 kg (216 lb)   09/01/21 98 kg (216 lb 0.8 oz)        Lab/Procedures/Meds: Pertinent Labs/Meds Reviewed    Medications:Pertinent Medications Reviewed  Scheduled Meds:   aspirin  81 mg Oral Daily    cefTRIAXone (ROCEPHIN) IVPB  1 g Intravenous Q24H    chlorhexidine  15 mL Mouth/Throat BID    enoxparin  40 mg Subcutaneous Q24H    famotidine  20 mg Per OG tube BID    insulin regular  5 Units Intravenous Once    levalbuterol  0.63 mg Nebulization TID WAKE    methylPREDNISolone sodium succinate injection  40 mg Intravenous Q8H    mupirocin   Nasal BID    nicotine  1 patch Transdermal Daily    ticagrelor  90 mg Oral BID     Continuous Infusions:   DOBUTamine Stopped (01/01/23 0945)    fentanyl 200 mcg/hr (01/05/23 1121)    midazolam Stopped (01/04/23 1901)    NORepinephrine bitartrate-D5W Stopped (01/04/23 0500)    propofoL 50 mcg/kg/min (01/05/23 1510)    vasopressin Stopped (01/01/23 0945)     PRN Meds:.acetaminophen, albuterol-ipratropium,  atropine, calcium gluconate IVPB, calcium gluconate IVPB, calcium gluconate IVPB, dextrose 10%, dextrose 10%, DOBUTamine, EPINEPHrine, [] fentaNYL **FOLLOWED BY** fentaNYL, glucagon (human recombinant), glucose, glucose, hydrALAZINE, HYDROcodone-acetaminophen, insulin aspart U-100, magnesium sulfate IVPB, magnesium sulfate IVPB, melatonin, midazolam, morphine, naloxone, ondansetron, polyethylene glycol, potassium chloride in water **AND** potassium chloride in water **AND** potassium chloride in water, senna-docusate 8.6-50 mg, simethicone, sodium chloride 0.9%, sodium phosphate IVPB, sodium phosphate IVPB, sodium phosphate IVPB    Labs: Pertinent Labs Reviewed  Clinical Chemistry:  Recent Labs   Lab 23  043   *   < > 136   K 4.2   < > 4.2      < > 106   CO2 20*   < > 24   *   < > 119*   BUN 20   < > 26*   CREATININE 1.4   < > 0.9   CALCIUM 8.8   < > 8.1*   PROT 7.3   < > 5.8*   ALBUMIN 3.8   < > 2.4*   BILITOT 0.3   < > 0.5   ALKPHOS 88   < > 80   AST 44*   < > 55*   ALT 37   < > 97*   ANIONGAP 15   < > 6*   MG 2.0   < > 2.2   PHOS 5.5*  --   --     < > = values in this interval not displayed.     CBC:   Recent Labs   Lab 23  0430   WBC 10.98   RBC 3.28*   HGB 10.4*   HCT 30.3*      MCV 94   MCH 31.7*   MCHC 34.3     Lipid Panel:  No results for input(s): CHOL, HDL, LDLCALC, TRIG, CHOLHDL in the last 168 hours.  Cardiac Profile:  Recent Labs   Lab 23  1400   BNP  --  76 376*   TROPONINI 1.105*  --   --      Inflammatory Labs:  No results for input(s): CRP in the last 168 hours.  Diabetes:  Recent Labs   Lab 23  0848   HGBA1C 5.7     Thyroid & Parathyroid:  No results for input(s): TSH, FREET4, U6KMRCL, M0FDFZM, THYROIDAB in the last 168 hours.    Monitor and Evaluation  Food and Nutrient Intake: enteral nutrition intake  Food and Nutrient Adminstration: enteral and parenteral nutrition  administration  Anthropometric Measurements: weight change  Biochemical Data, Medical Tests and Procedures: electrolyte and renal panel, gastrointestinal profile, glucose/endocrine profile  Nutrition-Focused Physical Findings: overall appearance     Nutrition Risk  Level of Risk/Frequency of Follow-up: high     Nutrition Follow-Up  RD Follow-up?: Yes      Shelley Beebe RD 01/05/2023 3:20 PM

## 2023-01-05 NOTE — CARE UPDATE
01/04/23 1944   Patient Assessment/Suction   Level of Consciousness (AVPU) responds to pain   Respiratory Effort Unlabored   Expansion/Accessory Muscles/Retractions no use of accessory muscles   All Lung Fields Breath Sounds Anterior:;coarse;diminished   Rhythm/Pattern, Respiratory assisted mechanically   Cough Frequency infrequent   Cough Type assisted   Suction Method oral;tracheal   Suction Pressure (mmHg) -120 mmHg   $ Suction Charges Inline Suction Procedure Stat Charge   Secretions Amount moderate   Secretions Color creamy;tan   Secretions Characteristics thick   Skin Integrity   $ Wound Care Tech Time 15 min   Area Observed Upper lip;Lower lip;Corner lip   Skin Appearance without discoloration   PRE-TX-O2   Device (Oxygen Therapy) ventilator   $ Is the patient on Low Flow Oxygen? Yes   Oxygen Concentration (%) 80   SpO2 96 %   Pulse Oximetry Type Continuous   $ Pulse Oximetry - Multiple Charge Pulse Oximetry - Multiple   Pulse 68   Resp (!) 22   Aerosol Therapy   $ Aerosol Therapy Charges Aerosol Treatment   Daily Review of Necessity (SVN) completed   Respiratory Treatment Status (SVN) given   Treatment Route (SVN) in-line   Patient Position (SVN) semi-Doan's   Post Treatment Assessment (SVN) increased aeration   Signs of Intolerance (SVN) none   Breath Sounds Post-Respiratory Treatment   Post-treatment Heart Rate (beats/min) 64   Post-treatment Resp Rate (breaths/min) 22   Vent Select   Conventional Vent Y   Charged w/in last 24h YES   Preset Conventional Ventilator Settings   Vent ID [id number]   Vent Type    Ventilation Type VC   Vent Mode A/C   Humidity HME   Set Rate 24 BPM   Vt Set 540 mL   PEEP/CPAP 5 cmH20   Peak Flow 60 L/min   Peak End Inspiratory Pressure 17 cmH20   I-Trigger Type  V-TRIG   Trigger Sensitivity Flow/I-Trigger 2 L/min   Patient Ventilator Parameters   Resp Rate Total 24 br/min   Peak Airway Pressure 24 cmH20   Mean Airway Pressure 12 cmH20   Plateau Pressure 56 cmH20   Exhaled Vt  546 mL   Total Ve 13.1 L/m   I:E Ratio Measured 1:1.60   Auto PEEP 20 cmH20   Tubing ID (mm) 7.5 mm   Tube Type ET   Conventional Ventilator Alarms   Alarms On Y   Resp Rate High Alarm 45 br/min   Press High Alarm 60 cmH2O   Apnea Rate 24   Apnea Volume (mL) 1 mL   Apnea Oxygen Concentration  100   Apnea Flow Rate (L/min) 60   T Apnea 20 sec(s)   IHI Ventilator Associated Pneumonia Bundle (Required)   Head of Bed Elevated  HOB 30   Oral Care Mouth suctioned;Mouth swabbed   Ready to Wean/Extubation Screen   FIO2<=50 (chart decimal) (!) 0.8   MV<16L (chart vol.) 13.1   PEEP <=8 (chart #) 5   Ready to Wean Parameters   F/VT Ratio<105 (RSBI) (!) 40.29   Vital Capacity (mL) 0   Education   $ Education Bronchodilator;Suction;Ventilator Oxygen;15 min  (oral care)   Respiratory Evaluation   $ Care Plan Tech Time 15 min

## 2023-01-05 NOTE — CARE UPDATE
01/05/23 0720   Patient Assessment/Suction   Level of Consciousness (AVPU) responds to pain   Respiratory Effort Labored   Expansion/Accessory Muscles/Retractions no use of accessory muscles   All Lung Fields Breath Sounds coarse   Rhythm/Pattern, Respiratory assisted mechanically   Cough Frequency infrequent   Cough Type assisted   Suction Method nasal;oral;tracheal   Suction Pressure (mmHg) -120 mmHg   $ Suction Charges Inline Suction Procedure Stat Charge   Secretions Amount small   Secretions Color creamy   Secretions Characteristics thick   PRE-TX-O2   Device (Oxygen Therapy) ventilator   Oxygen Concentration (%) 80   SpO2 99 %   Pulse Oximetry Type Continuous   $ Pulse Oximetry - Multiple Charge Pulse Oximetry - Multiple   Pulse 61   Resp (!) 24   Aerosol Therapy   $ Aerosol Therapy Charges Aerosol Treatment   Daily Review of Necessity (SVN) completed   Respiratory Treatment Status (SVN) given   Treatment Route (SVN) in-line;ventilator   Patient Position (SVN) semi-Doan's   Post Treatment Assessment (SVN) increased aeration   Signs of Intolerance (SVN) none   Breath Sounds Post-Respiratory Treatment   Throughout All Fields Post-Treatment All Fields   Throughout All Fields Post-Treatment aeration increased   Post-treatment Heart Rate (beats/min) 67   Post-treatment Resp Rate (breaths/min) 26        Airway - Non-Surgical 01/01/23 0217   Placement Date/Time: 01/01/23 0217   Inserted by: MD  Airway Device Size: 7.5  Style: Cuffed  Cuff Inflated With: Air  Placement Verified By: Capnometry;Auscultation;Colorimetric EtCO2 device  Depth of Insertion (cm): 23  Secured at: Teeth  Insertion ...   Secured at 25 cm   Measured At Lips   Secured Location Center   Secured by Commercial tube domínguez   Bite Block secure and patent   Site Condition Cool;Dry   Status Intact;Secured;Patent   Site Assessment Clean;Dry   General Safety Checklist   Safety Promotion/Fall Prevention side rails raised   Airway Safety   Ambu bag  with the patient? Yes, Adult Ambu   Is mask with the patient? Yes, Adult Mask   Suction set is at the bedside? Yes   Equipment Change   $ RT Equipment HME   Closed Suction System Changed? Yes   Vent Select   Conventional Vent Y   $ Ventilator Subsequent 1   Charged w/in last 24h YES   Preset Conventional Ventilator Settings   Ventilation Type VC   Vent Mode A/C   Humidity HME   Set Rate 24 BPM   Vt Set 540 mL   PEEP/CPAP 5 cmH20   Waveform RAMP   Peak Flow 75 L/min   Peak End Inspiratory Pressure 18 cmH20   I-Trigger Type  V-TRIG   Trigger Sensitivity Flow/I-Trigger 2 L/min   Patient Ventilator Parameters   Resp Rate Total 24 br/min   Peak Airway Pressure 30 cmH20   Mean Airway Pressure 12 cmH20   Plateau Pressure 56 cmH20   Exhaled Vt 552 mL   Total Ve 13.2 L/m   I:E Ratio Measured 1:2.20   Auto PEEP 20 cmH20   Conventional Ventilator Alarms   Alarms On Y   Ve High Alarm 25 L/min   Ve Low Alarm 3 L/min   Vt High Alarm 1200 mL   Vt Low Alarm 200 mL   Resp Rate High Alarm 45 br/min   Press High Alarm 65 cmH2O   Apnea Rate 24   Apnea Volume (mL) 1 mL   Apnea Oxygen Concentration  100   Apnea Flow Rate (L/min) 60   T Apnea 20 sec(s)   IHI Ventilator Associated Pneumonia Bundle (Required)   Head of Bed Elevated  HOB 30   Oral Care Mouth suctioned;with mouthwash   Vent Circut Breaks Minimized Yes   Ready to Wean/Extubation Screen   FIO2<=50 (chart decimal) (!) 0.8   MV<16L (chart vol.) 13.2   PEEP <=8 (chart #) 5   Ready to Wean Parameters   F/VT Ratio<105 (RSBI) (!) 43.48   Vital Capacity (mL) 0

## 2023-01-05 NOTE — PROGRESS NOTES
[facility]  Department of Cardiology  Consult Note      PATIENT NAME: Peyman Gr    MRN: [id number]  TODAY'S DATE: 01/05/2023  ADMIT DATE: 1/1/2023                          CONSULT REQUESTED BY: Claudio Hoyos MD    SUBJECTIVE     PRINCIPAL PROBLEM: STEMI involving right coronary artery    Interval history:    1/5/23:  Patient seen lying in bed off sedation. Patient noted to be moving and appeared agitated. Neuro at bedside for exam. He is hypertensive and tachycardic without sedation.     1/4/23:  Patient seen lying in bed on sedation this morning.  No distress noted.  Vitals were stable at that time.  Of note the patient has become hypertensive and tachycardic this afternoon.  Patient is currently receiving full sedation due to jerking and sweating.    01/03/2023:    Patient remains intubated and sedated on mechanical ventilation. FIO2 is 60% with 5 PEEP. RN attempted to wean sedation but patient keeps triggering the vent.  He has cough and pupillary reflex.  RN states she has seen him move all 4 extremities but not to command.  He remains unresponsive. He is on Levophed, propofol, fentanyl, and vasopressin. He was intermittently paced for approximately 5 min this morning when he was coughing and gagging on the vent. He remains in critical condition.  UOP is 275 today.       01/02/2023:  Seen and examined patient in the ICU today.  Off bicarb drip.  Currently on 2 sedatives and Levophed.     REASON FOR CONSULT:  STEMI      HPI:  67-year-old male with past medical history of peripheral vascular disease, right subclavian stenosis, chronic smoker, hypertension, hyperlipidemia presented to oxygen [facility] with 2 hour history of chest pain, shortness of breath.  ECG showed junctional bradycardia, complete heart block with inferior and anterolateral ST elevations.  STEMI code was called to which I responded immediately and agreed to transfer the patient to [facility]  cath lab for emergent cardiac catheterization.  However, patient unfortunately went into cardiac arrest at [facility] with VT, VF, asystole requiring resuscitation and intubation.  Patient was resuscitated for about 40 minutes ROSC was achieved.  Patient was moving extremities at the time as per the ER.  Patient was then transferred to [facility] ER and patient again had cardiac arrest upon arrival to the ER which required resuscitation for 30 minutes.  Patient was in PEA initially and ROSC was achieved subsequently however patient was dependent on transcutaneous pacing.  Patient blood pressure was very low and could not be obtained with a BP cuff.  Patient was unable to be transferred to the cath lab at that point due to significant hemodynamic instability. Patient was started on pressors.  A-line was placed in the ED and blood pressure eventually improved with high doses of vasopressin, norepinephrine, sodium bicarbonate and dobutamine infusions.  Discussed with patient's family members regarding the patient's critical condition.  Once the blood pressure was stabilized on the pressors, patient was brought to the cath lab for transvenous pacemaker placement and coronary angiogram.  Benefits and risks of the procedure explained to patient's family and patient's family  agreed for the procedure.      Review of patient's allergies indicates:  No Known Allergies    Past Medical History:   Diagnosis Date    Arthritis     Chronic back pain     Chronic neck pain     Dry gangrene     RIGHT LITTLE TOE    Hypercholesteremia     Hypertension     Insomnia     Multiple falls     S/P BACK SURGERY- 1 FALL    PAD (peripheral artery disease)     Personal history of colonic polyps     PVD (peripheral vascular disease)      Past Surgical History:   Procedure Laterality Date    ANGIOGRAPHY OF LOWER EXTREMITY N/A 09/24/2020    Procedure: Angiogram Extremity Unilateral;  Surgeon: Luke Cabello MD;  Location:  [facility] CATH;  Service: Cardiology;  Laterality: N/A;    BACK SURGERY      BUNIONECTOMY Bilateral     CARPAL TUNNEL RELEASE Bilateral     CHOLECYSTECTOMY      COLONOSCOPY N/A 5/9/2022    Procedure: COLONOSCOPY;  Surgeon: Braydon Durand MD;  Location: [facility] ENDO;  Service: Endoscopy;  Laterality: N/A;    COLONOSCOPY W/ POLYPECTOMY      CORONARY ANGIOGRAPHY N/A 1/1/2023    Procedure: ANGIOGRAM, CORONARY ARTERY;  Surgeon: Stefany Elizondo MD;  Location: [facility] CATH/EP LAB;  Service: Cardiology;  Laterality: N/A;    CORONARY ARTERY BYPASS GRAFT      CREATION OF BYPASS FROM INTERNAL CAROTID ARTERY TO SUBCLAVIAN ARTERY Right 12/27/2021    Procedure: CREATION, BYPASS, ARTERIAL, INTERNAL CAROTID TO SUBCLAVIAN;  Surgeon: Jeovany Goins MD;  Location: [facility] OR;  Service: Vascular;  Laterality: Right;    ELBOW ARTHROPLASTY Right     ELBOW SURGERY      INSERTION, PACEMAKER, TEMPORARY TRANSVENOUS  1/1/2023    Procedure: Insertion, Pacemaker, Temporary Transvenous;  Surgeon: Stefany Elizondo MD;  Location: [facility] CATH/EP LAB;  Service: Cardiology;;    IVUS, CORONARY  1/1/2023    Procedure: IVUS, Coronary;  Surgeon: Stefany Elizondo MD;  Location: [facility] CATH/EP LAB;  Service: Cardiology;;    LEFT HEART CATHETERIZATION Left 1/1/2023    Procedure: Left heart cath;  Surgeon: Stefany Elizondo MD;  Location: [facility] CATH/EP LAB;  Service: Cardiology;  Laterality: Left;    MINIMALLY INVASIVE FORAMINOTOMY OF  SPINE N/A 11/15/2018    Procedure: FORAMINOTOMY, SPINE, MINIMALLY INVASIVE DECOMPRESSION L4-5;  Surgeon: Iván Stevenson Jr., MD;  Location: [facility] OR;  Service: Orthopedics;  Laterality: N/A;    NECK SURGERY      acf    PERCUTANEOUS CORONARY INTERVENTION, ARTERY N/A 1/1/2023    Procedure: Percutaneous coronary intervention;  Surgeon: Stefany Elizondo MD;  Location: [facility] CATH/EP LAB;  Service: Cardiology;  Laterality: N/A;    PERCUTANEOUS TRANSLUMINAL ANGIOPLASTY (PTA) OF PERIPHERAL VESSEL  Right 2020    Procedure: PTA, PERIPHERAL VESSEL of right lower extremity;  Surgeon: Luke Cabello MD;  Location: [facility] CATH;  Service: Cardiology;  Laterality: Right;    PERCUTANEOUS TRANSLUMINAL ANGIOPLASTY (PTA) OF PERIPHERAL VESSEL Left 09/10/2020    Procedure: PTA, PERIPHERAL VESSEL, Left lower extremity;  Surgeon: Luke Cabello MD;  Location: [facility] CATH;  Service: Cardiology;  Laterality: Left;    PERCUTANEOUS TRANSLUMINAL ANGIOPLASTY (PTA) OF PERIPHERAL VESSEL Left 2021    Profunda and SFA     Social History     Tobacco Use    Smoking status: Former     Packs/day: 2.00     Years: 60.00     Pack years: 120.00     Types: Cigarettes     Quit date: 2017     Years since quittin.0    Smokeless tobacco: Never   Substance Use Topics    Alcohol use: No    Drug use: No        REVIEW OF SYSTEMS  Unable to obtain.  Patient was intubated    OBJECTIVE     VITAL SIGNS (Most Recent)  Temp: 97.8 °F (36.6 °C) (23 0701)  Pulse: (!) 145 (23 112)  Resp: (!) 28 (23)  BP: 134/69 (23 0422)  SpO2: (!) 92 % (23)    VENTILATION STATUS  Resp: (!) 28 (23)  SpO2: (!) 92 % (23)  Vent Mode: A/C  Oxygen Concentration (%):  [70-80] 80  Resp Rate Total:  [24 br/min-39 br/min] 28 br/min  Vt Set:  [540 mL] 540 mL  PEEP/CPAP:  [5 cmH20] 5 cmH20  Mean Airway Pressure:  [3.5 epR91-94 cmH20] 3.5 cmH20    I & O (Last 24H):  Intake/Output Summary (Last 24 hours) at 2023 1301  Last data filed at 2023 0613  Gross per 24 hour   Intake 2357.18 ml   Output 2235 ml   Net 122.18 ml         WEIGHTS  Wt Readings from Last 1 Encounters:   23 0500 115.3 kg (254 lb 3.1 oz)   23 0615 105.4 kg (232 lb 5.8 oz)   23 0400 102.5 kg (225 lb 15.5 oz)   23 0053 102.5 kg (225 lb 15.5 oz)   23 0800 99.8 kg (220 lb 0.3 oz)   23 0341 99.8 kg (220 lb)       PHYSICAL EXAM  GENERAL: well built, well nourished, well-developed.  Intubated  NECK: No JVD.    CARDIAC: Regular rate and rhythm, S1, S2 regular  CHEST ANATOMY: normal.   LUNGS: + bilateral air entry   ABDOMEN: Soft.  Normal bowel sounds.   URINARY: cohen  EXTREMITIES:  No edema    SKIN: Skin without lesions, moist, well perfused.     HOME MEDICATIONS:  Current Facility-Administered Medications on File Prior to Encounter   Medication Dose Route Frequency Provider Last Rate Last Admin    0.9%  NaCl infusion   Intravenous Continuous Braydon Durand MD   Stopped at 05/09/22 1011     Current Outpatient Medications on File Prior to Encounter   Medication Sig Dispense Refill    albuterol (PROVENTIL/VENTOLIN HFA) 90 mcg/actuation inhaler Inhale 1-2 puffs into the lungs every 6 (six) hours as needed for Shortness of Breath. Rescue 8 g 0    amLODIPine (NORVASC) 10 MG tablet Take 1 tablet (10 mg total) by mouth once daily. 30 tablet 1    amLODIPine (NORVASC) 5 MG tablet Take 5 mg by mouth once daily.      aspirin 81 MG Chew Take 81 mg by mouth once daily.      carvediloL (COREG) 25 MG tablet Take 25 mg by mouth 2 (two) times daily with meals.      clopidogreL (PLAVIX) 75 mg tablet Take 75 mg by mouth once daily. On hold for sx on 12/27/21      ezetimibe (ZETIA) 10 mg tablet Take 10 mg by mouth once daily.      hydroCHLOROthiazide (HYDRODIURIL) 12.5 MG Tab Take 1 tablet (12.5 mg total) by mouth once daily. 30 tablet 1    HYDROcodone-acetaminophen (NORCO)  mg per tablet Take 1 tablet by mouth every 8 (eight) hours as needed for Pain.      mupirocin (BACTROBAN) 2 % ointment Apply topically 3 (three) times daily. 22 g 0    oxyCODONE-acetaminophen (PERCOCET) 7.5-325 mg per tablet Take 1 tablet by mouth 2 (two) times daily as needed.      traMADoL (ULTRAM) 50 mg tablet Take 50 mg by mouth every 6 (six) hours as needed.      valsartan (DIOVAN) 160 MG tablet Take 160 mg by mouth once daily.         SCHEDULED MEDS:   aspirin  81 mg Oral Daily    cefTRIAXone (ROCEPHIN) IVPB  1 g Intravenous Q24H    chlorhexidine  15  mL Mouth/Throat BID    enoxparin  40 mg Subcutaneous Q24H    famotidine  20 mg Per OG tube BID    insulin regular  5 Units Intravenous Once    levalbuterol  0.63 mg Nebulization TID WAKE    methylPREDNISolone sodium succinate injection  40 mg Intravenous Q8H    mupirocin   Nasal BID    nicotine  1 patch Transdermal Daily    ticagrelor  90 mg Oral BID       CONTINUOUS INFUSIONS:   DOBUTamine Stopped (23 0945)    fentanyl 200 mcg/hr (23 112)    midazolam Stopped (23 190)    NORepinephrine bitartrate-D5W Stopped (23 0500)    propofoL 20 mcg/kg/min (23 1128)    vasopressin Stopped (23)       PRN MEDS:acetaminophen, albuterol-ipratropium, atropine, calcium gluconate IVPB, calcium gluconate IVPB, calcium gluconate IVPB, dextrose 10%, dextrose 10%, DOBUTamine, EPINEPHrine, [] fentaNYL **FOLLOWED BY** fentaNYL, glucagon (human recombinant), glucose, glucose, hydrALAZINE, HYDROcodone-acetaminophen, insulin aspart U-100, magnesium sulfate IVPB, magnesium sulfate IVPB, melatonin, midazolam, morphine, naloxone, ondansetron, polyethylene glycol, potassium chloride in water **AND** potassium chloride in water **AND** potassium chloride in water, senna-docusate 8.6-50 mg, simethicone, sodium chloride 0.9%, sodium phosphate IVPB, sodium phosphate IVPB, sodium phosphate IVPB    LABS AND DIAGNOSTICS     CBC LAST 3 DAYS  Recent Labs   Lab 23  0347 23  0417 23  0345 23  0346 23  0428 23  0430   WBC 9.04  --  8.05  --   --  10.98   RBC 3.02*  --  3.13*  --   --  3.28*   HGB 9.4*  --  9.8*  --   --  10.4*   HCT 29.0*   < > 30.3* 30* 32* 30.3*   MCV 95  --  97  --   --  94   MCH 31.1*  --  31.3*  --   --  31.7*   MCHC 32.4  --  32.3  --   --  34.3   RDW 15.2*  --  14.9*  --   --  14.5   *  --  131*  --   --  176   MPV 9.7  --  9.8  --   --  9.9   GRAN 79.6*  7.2  --  92.1*  7.4  --   --  86.5*  9.5*   LYMPH 10.8*  1.0  --  2.9*  0.2*  --   --   3.5*  0.4*   MONO 8.5  0.8  --  3.9*  0.3  --   --  8.6  0.9   BASO 0.02  --  0.01  --   --  0.01   NRBC 0  --  0  --   --  0    < > = values in this interval not displayed.         COAGULATION LAST 3 DAYS  Recent Labs   Lab 01/01/23 0453   INR 1.2   APTT 30.3         CHEMISTRY LAST 3 DAYS  Recent Labs   Lab 01/03/23  0347 01/03/23  0417 01/03/23  1643 01/04/23  0345 01/04/23  0346 01/05/23  0428 01/05/23  0430   *  --   --  135*  --   --  136   K 3.7  --  4.6 4.8  --   --  4.2     --   --  104  --   --  106   CO2 26  --   --  25  --   --  24   ANIONGAP 6*  --   --  6*  --   --  6*   BUN 19  --   --  19  --   --  26*   CREATININE 1.1  --   --  1.0  --   --  0.9     --   --  146*  --   --  119*   CALCIUM 7.6*  --   --  8.0*  --   --  8.1*   PH  --  7.372  --   --  7.332* 7.385  --    MG 2.0  --   --  2.1  --   --  2.2   ALBUMIN 2.6*  --   --  2.3*  --   --  2.4*   PROT 5.4*  --   --  5.6*  --   --  5.8*   ALKPHOS 90  --   --  81  --   --  80   *  --   --  127*  --   --  97*   *  --   --  96*  --   --  55*   BILITOT 0.7  --   --  0.7  --   --  0.5         CARDIAC PROFILE LAST 3 DAYS  Recent Labs   Lab 01/01/23  0155 01/01/23  0453 01/01/23  0848 01/01/23  1831 01/02/23  0118 01/02/23  0546 01/02/23  1400   BNP  --  76  --   --   --   --  376*   TROPONINI 1.105*  --   --   --   --   --   --    TROPONINIHS  --  1459.0*   < > 946100.7* 212841.9* 617162.9*  --     < > = values in this interval not displayed.         ENDOCRINE LAST 3 DAYS  Recent Labs   Lab 01/03/23  0954   PROCAL 3.13*         LAST ARTERIAL BLOOD GAS  ABG  Recent Labs   Lab 01/05/23  0428   PH 7.385   PO2 72*   PCO2 42.1   HCO3 25.2   BE 0         LAST 7 DAYS MICROBIOLOGY   Microbiology Results (last 7 days)       Procedure Component Value Units Date/Time    Blood culture [[id number]] Collected: 01/03/23 0954    Order Status: Completed Specimen: Blood Updated: 01/05/23 1032     Blood Culture, Routine No Growth to date       No Growth to date      No Growth to date    Blood culture [[id number]] Collected: 01/03/23 0954    Order Status: Completed Specimen: Blood Updated: 01/05/23 1032     Blood Culture, Routine No Growth to date      No Growth to date      No Growth to date    Culture, Respiratory with Gram Stain [[id number]]  (Abnormal)  (Susceptibility) Collected: 01/02/23 1431    Order Status: Completed Specimen: Respiratory from Tracheal Aspirate Updated: 01/04/23 0856     Respiratory Culture Reduced normal respiratory lorenzo      STAPHYLOCOCCUS AUREUS  Moderate       Gram Stain (Respiratory) <10 epithelial cells per low power field.     Gram Stain (Respiratory) Few WBC's     Gram Stain (Respiratory) Moderate Gram positive cocci    MRSA Screen by PCR [[id number]] Collected: 01/03/23 1412    Order Status: Completed Specimen: Nasopharyngeal Swab from Nasal Updated: 01/03/23 1554     MRSA SCREEN BY PCR Negative            MOST RECENT IMAGING  X-Ray Chest AP Portable  Reason: eval and compare to previous evals.    FINDINGS:    Portable chest with comparison chest x-ray January 4, 2023 show unchanged cardiomediastinal silhouette. There is unchanged prominence of the central hilar vascular structures with bilateral interstitial lung opacities. No new confluent airspace opacity. No pneumothorax. There is blunting of the left costophrenic angle.  Nasogastric tube and endotracheal tube are unchanged. No acute osseous abnormality.    IMPRESSION:    Blunting of left costophrenic angle could reflect a small pleural effusion or atelectasis. Otherwise no significant change from prior exam.    Electronically signed by:  Julio C Davis DO  1/5/2023 6:47 AM CST Workstation: [id number]      ECHOCARDIOGRAM RESULTS (last 5)  No results found for this or any previous visit.      CURRENT/PREVIOUS VISIT EKG  Results for orders placed or performed during the hospital encounter of 01/01/23   EKG 12-LEAD on arrival to floor    Collection Time: 01/01/23   9:59 AM    Narrative    Test Reason : I21.3,    Vent. Rate : 070 BPM     Atrial Rate : 000 BPM     P-R Int : 000 ms          QRS Dur : 078 ms      QT Int : 450 ms       P-R-T Axes : 000 027 -68 degrees     QTc Int : 486 ms    Atrial fibrillation with occasional ventricular-paced complexes and with  premature ventricular or aberrantly conducted complexes  Inferior infarct ,age undetermined  Abnormal ECG  When compared with ECG of 01-JAN-2023 09:57,  Previous ECG has undetermined rhythm, needs review  Confirmed by Sarthak Cardona MD ([id number]) on 1/4/2023 6:57:56 PM    Referred By: IRENE MOSER           Confirmed By:Sarthak Cardona MD           ASSESSMENT/PLAN:     Active Hospital Problems    Diagnosis    *STEMI involving right coronary artery    Pneumonia of right lung due to methicillin susceptible Staphylococcus aureus (MSSA)    Shock liver    Cardiogenic shock    Cardiac arrest    Acute respiratory failure with hypoxia and hypercarbia    Leukocytosis    Anemia    History of tobacco abuse    Primary hypertension    PVD (peripheral vascular disease) with claudication    PAD (peripheral artery disease)       ASSESSMENT & PLAN:   STEMI- inferior wall MI complicated by complete heart block and RV shock  Cardiac arrest status post resuscitation and intubation (patient was resuscitated for 60-70 minutes in total)  Peripheral vascular disease  Chronic smoking  Hypertension  hyperlipidemia      RECOMMENDATIONS:    Continue DAPT with Brilinta 90 mg po BID and aspirin 81 mg po daily.   Resume sedation as he is very agitated. Appreciate neuro input.   Continue IV metoprolol PRN. Can add esmolol drip if needed.   Plan to remove temp pacer tomorrow as he has not required it much.     Mary Lou Turpin NP  [facility]  Department of Cardiology  Date of Service: 01/05/2023         I have personally interviewed and examined the patient, I have reviewed the Nurse Practitioner's history and physical, assessment, and plan. I  agree with the findings and plan.  1. As a neurologist was examining him patient seemed to respond to verbal commands.  Was able to move his right leg on command.  2. Patient has remained in sinus rhythm not requiring pacemaker.      Sarthak Cardona M.D.  [facility]  Department of Cardiology  Date of Service: 01/03/2023

## 2023-01-05 NOTE — PROGRESS NOTES
Pulmonary/Critical Care  Progress Note      Patient name: Peyman Gr  MRN: [id number]  Date: 01/05/2023    Admit Date: 1/1/2023  Consult Requested By: Claudio Hoyos MD    Reason for Consult: respiratory failure    HPI:    1/1/2023 - 68 yo initially presented with chest pain about  2 AM, had bradycardia then VTVF arrest and had prolonged code - transferred to [facility] and was in cardiac arrest at arrival after multiple rounds of meds he had ROSC and taken to cath lab.  In Cath lab had occluded RCA and had successful PCI.  ALso has LM and OM disease.  He has been very unstable and moved to ICU.  He is unresponsive and cool.  He is ventilated.  On dobutrex and levophed, having some ectopy (contacting cardiology to see if they want to start lidocaine or amiodarone, QTc is 487), he is on bicarb drip and last ABG showed adequate oxygenation but a mixed metabolic and respiratory acidosis (vent adjused and will repeat).  No family was in the waiting room.  Chart has been reviewed and case d/w nursing, respiratory and Dr Hoyos.  BY report pt was resuscitated for > 60 minutes.    1/2/2023 - Remains critically ill, has been agitated at times and has needed increased sedation, he has not followed any commands.  Rhythm seems more stable but he has had ectopy.  Temp had increased from his hypothermia yesterday.  Electrolytes replaced by SS.  LFT have increased.  ABG this AM shows alkalosis (on bicarb drip).  CXR reviewed.  ECHO noted.    1/3/2023 - Remains critically ill, gets agitated at times and has required sedation.  He does not respond to voice or pain but does have spontaneous respirations and movement.  Pupils are sluggish to NR, minimal corneal reflex.  Had temp this AM.  CXR noted.    1/4/2023 - Had issues yesterday - HTN, bradycardia, a ? Of seizure activity and I was not notified of any of this.  Overnight things have been more stable.  He remains unresponsive.  Temp has decreased, renal function good, LFT better.   I tried to call family yesterday but got no answer.  Family has been at bedside per nursing.  SC + for [facility]    1/5/2023 - Remains critically ill and is not responsive to me.  When sedation decreased he does have some movement (not purposeful).  EEG report reviewed and neuro note seen.  Neuro issues preclude any thoughts of weaning ventilator support  Tachycardic today.  O2 needs have increased.    Review of Systems    Review of Systems   Unable to perform ROS: Mental acuity     Past Medical History    Past Medical History:   Diagnosis Date    Arthritis     Chronic back pain     Chronic neck pain     Dry gangrene     RIGHT LITTLE TOE    Hypercholesteremia     Hypertension     Insomnia     Multiple falls     S/P BACK SURGERY- 1 FALL    PAD (peripheral artery disease)     Personal history of colonic polyps     PVD (peripheral vascular disease)        Past Surgical History    Past Surgical History:   Procedure Laterality Date    ANGIOGRAPHY OF LOWER EXTREMITY N/A 09/24/2020    Procedure: Angiogram Extremity Unilateral;  Surgeon: Luke Cabello MD;  [facility];  Service: Cardiology;  Laterality: N/A;    BACK SURGERY      BUNIONECTOMY Bilateral     CARPAL TUNNEL RELEASE Bilateral     CHOLECYSTECTOMY      COLONOSCOPY N/A 5/9/2022    Procedure: COLONOSCOPY;  Surgeon: Braydon Durand MD;  [facility] ENDO;  Service: Endoscopy;  Laterality: N/A;    COLONOSCOPY W/ POLYPECTOMY      CORONARY ANGIOGRAPHY N/A 1/1/2023    Procedure: ANGIOGRAM, CORONARY ARTERY;  Surgeon: Stefany Elizondo MD;  Location: [facility] CATH/EP LAB;  Service: Cardiology;  Laterality: N/A;    CORONARY ARTERY BYPASS GRAFT      CREATION OF BYPASS FROM INTERNAL CAROTID ARTERY TO SUBCLAVIAN ARTERY Right 12/27/2021    Procedure: CREATION, BYPASS, ARTERIAL, INTERNAL CAROTID TO SUBCLAVIAN;  Surgeon: Jeovany Goins MD;  [facility];  Service: Vascular;  Laterality: Right;    ELBOW ARTHROPLASTY Right     ELBOW SURGERY       INSERTION, PACEMAKER, TEMPORARY TRANSVENOUS  1/1/2023    Procedure: Insertion, Pacemaker, Temporary Transvenous;  Surgeon: Stefany Elizondo MD;  Location: [facility] CATH/EP LAB;  Service: Cardiology;;    IVUS, CORONARY  1/1/2023    Procedure: IVUS, Coronary;  Surgeon: Stefany Elizondo MD;  Location: [facility] CATH/EP LAB;  Service: Cardiology;;    LEFT HEART CATHETERIZATION Left 1/1/2023    Procedure: Left heart cath;  Surgeon: Stefany Elizondo MD;  Location: [facility] CATH/EP LAB;  Service: Cardiology;  Laterality: Left;    MINIMALLY INVASIVE FORAMINOTOMY OF  SPINE N/A 11/15/2018    Procedure: FORAMINOTOMY, SPINE, MINIMALLY INVASIVE DECOMPRESSION L4-5;  Surgeon: Iván Stevenson Jr., MD;  Location: [facility] OR;  Service: Orthopedics;  Laterality: N/A;    NECK SURGERY      acf    PERCUTANEOUS CORONARY INTERVENTION, ARTERY N/A 1/1/2023    Procedure: Percutaneous coronary intervention;  Surgeon: Stefany Elizondo MD;  Location: [facility] CATH/EP LAB;  Service: Cardiology;  Laterality: N/A;    PERCUTANEOUS TRANSLUMINAL ANGIOPLASTY (PTA) OF PERIPHERAL VESSEL Right 05/01/2020    Procedure: PTA, PERIPHERAL VESSEL of right lower extremity;  Surgeon: Luke Cabello MD;  Location: [facility] CATH;  Service: Cardiology;  Laterality: Right;    PERCUTANEOUS TRANSLUMINAL ANGIOPLASTY (PTA) OF PERIPHERAL VESSEL Left 09/10/2020    Procedure: PTA, PERIPHERAL VESSEL, Left lower extremity;  Surgeon: Luke Cabello MD;  Location: [facility] CATH;  Service: Cardiology;  Laterality: Left;    PERCUTANEOUS TRANSLUMINAL ANGIOPLASTY (PTA) OF PERIPHERAL VESSEL Left 07/06/2021    Profunda and SFA       Medications (scheduled):      aspirin  81 mg Oral Daily    cefTRIAXone (ROCEPHIN) IVPB  1 g Intravenous Q24H    chlorhexidine  15 mL Mouth/Throat BID    enoxparin  40 mg Subcutaneous Q24H    famotidine  20 mg Per OG tube BID    insulin regular  5 Units Intravenous Once    levalbuterol  0.63 mg Nebulization TID WAKE    methylPREDNISolone  sodium succinate injection  40 mg Intravenous Q8H    mupirocin   Nasal BID    nicotine  1 patch Transdermal Daily    ticagrelor  90 mg Oral BID       Medications (infusions):      DOBUTamine Stopped (23)    fentanyl 200 mcg/hr (23 112)    midazolam Stopped (23 190)    NORepinephrine bitartrate-D5W Stopped (23 0500)    propofoL 20 mcg/kg/min (23 1128)    vasopressin Stopped (23)       Medications (prn):     acetaminophen, albuterol-ipratropium, atropine, calcium gluconate IVPB, calcium gluconate IVPB, calcium gluconate IVPB, dextrose 10%, dextrose 10%, DOBUTamine, EPINEPHrine, [] fentaNYL **FOLLOWED BY** fentaNYL, glucagon (human recombinant), glucose, glucose, hydrALAZINE, HYDROcodone-acetaminophen, insulin aspart U-100, magnesium sulfate IVPB, magnesium sulfate IVPB, melatonin, midazolam, morphine, naloxone, ondansetron, polyethylene glycol, potassium chloride in water **AND** potassium chloride in water **AND** potassium chloride in water, senna-docusate 8.6-50 mg, simethicone, sodium chloride 0.9%, sodium phosphate IVPB, sodium phosphate IVPB, sodium phosphate IVPB    Family History:   Family History   Problem Relation Age of Onset    COPD Mother     Hypertension Father     Heart disease Father     Heart attack Father     COPD Sister     Other Sister     Kidney failure Brother     Hypertension Brother     Diabetes Brother        Social History: Tobacco:   Social History     Tobacco Use   Smoking Status Former    Packs/day: 2.00    Years: 60.00    Pack years: 120.00    Types: Cigarettes    Quit date:     Years since quittin.0   Smokeless Tobacco Never                                EtOH:   Social History     Substance and Sexual Activity   Alcohol Use No                                Drugs:   Social History     Substance and Sexual Activity   Drug Use No       Physical Exam    Vital signs:  Temp:  [97.3 °F (36.3 °C)-98.8 °F (37.1 °C)]   Pulse:   "[]   Resp:  [14-56]   BP: (134)/(69)   SpO2:  [89 %-100 %]   Arterial Line BP: (116-256)/()     Intake/Output:   Intake/Output Summary (Last 24 hours) at 1/5/2023 1256  Last data filed at 1/5/2023 0613  Gross per 24 hour   Intake 2357.18 ml   Output 2235 ml   Net 122.18 ml          BMI: Estimated body mass index is 32.62 kg/m² as calculated from the following:    Height as of an earlier encounter on 1/1/23: 6' 2.02" (1.88 m).    Weight as of this encounter: 115.3 kg (254 lb 3.1 oz).    Physical Exam  Vitals and nursing note reviewed.   Constitutional:       General: He is not in acute distress.     Appearance: Normal appearance. He is not ill-appearing, toxic-appearing or diaphoretic.      Comments: Intubated  Nonresponsive   HENT:      Head: Normocephalic and atraumatic.      Right Ear: External ear normal.      Left Ear: External ear normal.      Nose: Nose normal.      Mouth/Throat:      Mouth: Mucous membranes are moist.      Pharynx: Oropharynx is clear. No oropharyngeal exudate.      Comments: Intubated   Eyes:      General: No scleral icterus.        Right eye: No discharge.         Left eye: No discharge.      Extraocular Movements: Extraocular movements intact.      Conjunctiva/sclera: Conjunctivae normal.      Comments: + corneals  Pupils unrequal L larger than right and minimally to nonresponsive  + sclera edema   Neck:      Vascular: No carotid bruit.   Cardiovascular:      Rate and Rhythm: Normal rate. Rhythm irregular.      Pulses: Normal pulses.      Heart sounds: Normal heart sounds. No murmur heard.    No friction rub. No gallop.      Comments: NSR  Pulmonary:      Effort: Pulmonary effort is normal. No respiratory distress.      Breath sounds: Normal breath sounds. No stridor. No wheezing, rhonchi or rales.      Comments: Ventilated   Chest:      Chest wall: No tenderness.   Abdominal:      General: Bowel sounds are normal. There is no distension.      Tenderness: There is no abdominal " tenderness. There is no guarding.   Genitourinary:     Comments: Duron   Musculoskeletal:         General: No swelling. Normal range of motion.      Cervical back: Normal range of motion and neck supple. No rigidity or tenderness.      Right lower leg: No edema.      Left lower leg: No edema.   Lymphadenopathy:      Cervical: No cervical adenopathy.   Skin:     General: Skin is dry.      Coloration: Skin is not jaundiced.      Findings: No bruising.      Comments: Cool to touch   Neurological:      Comments: No response to me   + spontaneous respiratory effort  No response to pain in UE  Some movement of extremities L > R (nonpurposeful)   Psychiatric:      Comments: Not able to assess       Laboratory    Recent Labs   Lab 01/05/23  0430   WBC 10.98   RBC 3.28*   HGB 10.4*   HCT 30.3*      MCV 94   MCH 31.7*   MCHC 34.3         Recent Labs   Lab 01/05/23 0430   CALCIUM 8.1*   PROT 5.8*      K 4.2   CO2 24      BUN 26*   CREATININE 0.9   ALKPHOS 80   ALT 97*   AST 55*   BILITOT 0.5         No results for input(s): PT, INR, APTT in the last 24 hours.      No results for input(s): CPK, CPKMB, TROPONINI, MB in the last 24 hours.      Additional labs: reviewed    Microbiology:       Microbiology Results (last 7 days)       Procedure Component Value Units Date/Time    Blood culture [[id number]] Collected: 01/03/23 0954    Order Status: Completed Specimen: Blood Updated: 01/05/23 1032     Blood Culture, Routine No Growth to date      No Growth to date      No Growth to date    Blood culture [[id number]] Collected: 01/03/23 0954    Order Status: Completed Specimen: Blood Updated: 01/05/23 1032     Blood Culture, Routine No Growth to date      No Growth to date      No Growth to date    Culture, Respiratory with Gram Stain [[id number]]  (Abnormal)  (Susceptibility) Collected: 01/02/23 1431    Order Status: Completed Specimen: Respiratory from Tracheal Aspirate Updated: 01/04/23 0856     Respiratory Culture  Reduced normal respiratory lorenzo      STAPHYLOCOCCUS AUREUS  Moderate       Gram Stain (Respiratory) <10 epithelial cells per low power field.     Gram Stain (Respiratory) Few WBC's     Gram Stain (Respiratory) Moderate Gram positive cocci    MRSA Screen by PCR [[id number]] Collected: 01/03/23 1412    Order Status: Completed Specimen: Nasopharyngeal Swab from Nasal Updated: 01/03/23 1554     MRSA SCREEN BY PCR Negative            Radiology    X-Ray Chest AP Portable  Reason: eval and compare to previous evals.    FINDINGS:    Portable chest with comparison chest x-ray January 4, 2023 show unchanged cardiomediastinal silhouette. There is unchanged prominence of the central hilar vascular structures with bilateral interstitial lung opacities. No new confluent airspace opacity. No pneumothorax. There is blunting of the left costophrenic angle.  Nasogastric tube and endotracheal tube are unchanged. No acute osseous abnormality.    IMPRESSION:    Blunting of left costophrenic angle could reflect a small pleural effusion or atelectasis. Otherwise no significant change from prior exam.    Electronically signed by:  Julio C Davis DO  1/5/2023 6:47 AM CST Workstation: [id number]        Additional Studies    reviewed    Ventilator Information    Vent Mode: A/C  Oxygen Concentration (%):  [70-80] 80  Resp Rate Total:  [24 br/min-39 br/min] 28 br/min  Vt Set:  [540 mL] 540 mL  PEEP/CPAP:  [5 cmH20] 5 cmH20  Mean Airway Pressure:  [3.5 feA79-03 cmH20] 3.5 cmH20         Recent Labs     01/05/23  0428   PH 7.385   PCO2 42.1   PO2 72*   HCO3 25.2   POCSATURATED 94*   BE 0           Impression    Active Hospital Problems    Diagnosis  POA    *STEMI involving right coronary artery [I21.11]  Yes    Pneumonia of right lung due to methicillin susceptible Staphylococcus aureus (MSSA) [J15.211]  No    Shock liver [K72.00]  Yes    Cardiogenic shock [R57.0]  Yes    Cardiac arrest [I46.9]  Yes    Acute respiratory failure with hypoxia and  hypercarbia [J96.01, J96.02]  Yes    Leukocytosis [D72.829]  Yes    Anemia [D64.9]  Yes    History of tobacco abuse [Z87.891]  Not Applicable    Primary hypertension [I10]  Yes    PVD (peripheral vascular disease) with claudication [I73.9]  Yes    PAD (peripheral artery disease) [I73.9]  Yes      Resolved Hospital Problems    Diagnosis Date Resolved POA    Hyponatremia [E87.1] 01/02/2023 Yes       Plan    Ventilator, adjust as needed - not ready to consider weaning, decrease O2 as able   Wean pressors as able   Sedate as needed  - try to minimize to help with neuro evaluation  EEG noted and neuro following  Continue antibiotics  Watch renal function and LFT  Monitor electrolytes and replace via SS  Watch H/H  Added steroids for wheezing  Prognosis is poor but need to continue to follow neuro status    Thank you for this consult.  I will follow with you while the patient is hospitalized.      Critical Care Time    I have spent > 35 minutes providing critical care services for this pt for the above diagnoses.  These services have included pt evaluation, pt exam, ventilator assessment, discussions with staff, chart review, data review, note preparation and .  Medications have been reviewed and adjusted as needed.  The patient has life threatening illness with a high risk of decompensation and/or death.      Nick Padgett MD  [facility] Pulmonary/Critical Care

## 2023-01-06 LAB
ALBUMIN SERPL BCP-MCNC: 2.3 G/DL (ref 3.5–5.2)
ALLENS TEST: ABNORMAL
ALP SERPL-CCNC: 69 U/L (ref 55–135)
ALT SERPL W/O P-5'-P-CCNC: 76 U/L (ref 10–44)
ANION GAP SERPL CALC-SCNC: 5 MMOL/L (ref 8–16)
AST SERPL-CCNC: 40 U/L (ref 10–40)
BASOPHILS # BLD AUTO: 0.02 K/UL (ref 0–0.2)
BASOPHILS NFR BLD: 0.2 % (ref 0–1.9)
BILIRUB SERPL-MCNC: 0.6 MG/DL (ref 0.1–1)
BUN SERPL-MCNC: 36 MG/DL (ref 8–23)
CALCIUM SERPL-MCNC: 8.1 MG/DL (ref 8.7–10.5)
CHLORIDE SERPL-SCNC: 107 MMOL/L (ref 95–110)
CO2 SERPL-SCNC: 25 MMOL/L (ref 23–29)
CREAT SERPL-MCNC: 1 MG/DL (ref 0.5–1.4)
DELSYS: ABNORMAL
DIFFERENTIAL METHOD: ABNORMAL
EOSINOPHIL # BLD AUTO: 0 K/UL (ref 0–0.5)
EOSINOPHIL NFR BLD: 0 % (ref 0–8)
ERYTHROCYTE [DISTWIDTH] IN BLOOD BY AUTOMATED COUNT: 14.6 % (ref 11.5–14.5)
ERYTHROCYTE [SEDIMENTATION RATE] IN BLOOD BY WESTERGREN METHOD: 24 MM/H
EST. GFR  (NO RACE VARIABLE): >60 ML/MIN/1.73 M^2
FIO2: 70
GLUCOSE SERPL-MCNC: 114 MG/DL (ref 70–110)
GLUCOSE SERPL-MCNC: 134 MG/DL (ref 70–110)
GLUCOSE SERPL-MCNC: 137 MG/DL (ref 70–110)
HCO3 UR-SCNC: 25.5 MMOL/L (ref 24–28)
HCT VFR BLD AUTO: 29.1 % (ref 40–54)
HCT VFR BLD CALC: 29 %PCV (ref 36–54)
HGB BLD-MCNC: 9.7 G/DL (ref 14–18)
IMM GRANULOCYTES # BLD AUTO: 0.21 K/UL (ref 0–0.04)
IMM GRANULOCYTES NFR BLD AUTO: 2 % (ref 0–0.5)
LYMPHOCYTES # BLD AUTO: 0.4 K/UL (ref 1–4.8)
LYMPHOCYTES NFR BLD: 4.1 % (ref 18–48)
MAGNESIUM SERPL-MCNC: 2.3 MG/DL (ref 1.6–2.6)
MCH RBC QN AUTO: 31.3 PG (ref 27–31)
MCHC RBC AUTO-ENTMCNC: 33.3 G/DL (ref 32–36)
MCV RBC AUTO: 94 FL (ref 82–98)
MIN VOL: 14.3
MODE: ABNORMAL
MONOCYTES # BLD AUTO: 0.6 K/UL (ref 0.3–1)
MONOCYTES NFR BLD: 6.1 % (ref 4–15)
NEUTROPHILS # BLD AUTO: 9.1 K/UL (ref 1.8–7.7)
NEUTROPHILS NFR BLD: 87.6 % (ref 38–73)
NRBC BLD-RTO: 0 /100 WBC
PCO2 BLDA: 41.2 MMHG (ref 35–45)
PEEP: 5
PH SMN: 7.4 [PH] (ref 7.35–7.45)
PIP: 26
PLATELET # BLD AUTO: 189 K/UL (ref 150–450)
PMV BLD AUTO: 9.3 FL (ref 9.2–12.9)
PO2 BLDA: 103 MMHG (ref 80–100)
POC BE: 1 MMOL/L
POC IONIZED CALCIUM: 1.22 MMOL/L (ref 1.06–1.42)
POC SATURATED O2: 98 % (ref 95–100)
POC TCO2: 27 MMOL/L (ref 23–27)
POTASSIUM BLD-SCNC: 4.3 MMOL/L (ref 3.5–5.1)
POTASSIUM SERPL-SCNC: 4.2 MMOL/L (ref 3.5–5.1)
PROT SERPL-MCNC: 5.5 G/DL (ref 6–8.4)
RBC # BLD AUTO: 3.1 M/UL (ref 4.6–6.2)
SAMPLE: ABNORMAL
SITE: ABNORMAL
SODIUM BLD-SCNC: 140 MMOL/L (ref 136–145)
SODIUM SERPL-SCNC: 137 MMOL/L (ref 136–145)
SP02: 100
VT: 540
WBC # BLD AUTO: 10.33 K/UL (ref 3.9–12.7)

## 2023-01-06 PROCEDURE — 80053 COMPREHEN METABOLIC PANEL: CPT | Performed by: FAMILY MEDICINE

## 2023-01-06 PROCEDURE — 99232 PR SUBSEQUENT HOSPITAL CARE,LEVL II: ICD-10-PCS | Mod: ,,, | Performed by: INTERNAL MEDICINE

## 2023-01-06 PROCEDURE — 82330 ASSAY OF CALCIUM: CPT

## 2023-01-06 PROCEDURE — 63600175 PHARM REV CODE 636 W HCPCS: Performed by: INTERNAL MEDICINE

## 2023-01-06 PROCEDURE — 94761 N-INVAS EAR/PLS OXIMETRY MLT: CPT

## 2023-01-06 PROCEDURE — 99900026 HC AIRWAY MAINTENANCE (STAT)

## 2023-01-06 PROCEDURE — 82803 BLOOD GASES ANY COMBINATION: CPT

## 2023-01-06 PROCEDURE — 25000003 PHARM REV CODE 250: Performed by: INTERNAL MEDICINE

## 2023-01-06 PROCEDURE — 84295 ASSAY OF SERUM SODIUM: CPT

## 2023-01-06 PROCEDURE — 94640 AIRWAY INHALATION TREATMENT: CPT

## 2023-01-06 PROCEDURE — S4991 NICOTINE PATCH NONLEGEND: HCPCS | Performed by: INTERNAL MEDICINE

## 2023-01-06 PROCEDURE — 85014 HEMATOCRIT: CPT

## 2023-01-06 PROCEDURE — 94799 UNLISTED PULMONARY SVC/PX: CPT

## 2023-01-06 PROCEDURE — 99291 PR CRITICAL CARE, E/M 30-74 MINUTES: ICD-10-PCS | Mod: ,,, | Performed by: INTERNAL MEDICINE

## 2023-01-06 PROCEDURE — 20000000 HC ICU ROOM

## 2023-01-06 PROCEDURE — 99900031 HC PATIENT EDUCATION (STAT)

## 2023-01-06 PROCEDURE — 84132 ASSAY OF SERUM POTASSIUM: CPT

## 2023-01-06 PROCEDURE — 94003 VENT MGMT INPAT SUBQ DAY: CPT

## 2023-01-06 PROCEDURE — 99291 CRITICAL CARE FIRST HOUR: CPT | Mod: ,,, | Performed by: INTERNAL MEDICINE

## 2023-01-06 PROCEDURE — 25000003 PHARM REV CODE 250

## 2023-01-06 PROCEDURE — 99900035 HC TECH TIME PER 15 MIN (STAT)

## 2023-01-06 PROCEDURE — 83735 ASSAY OF MAGNESIUM: CPT | Performed by: FAMILY MEDICINE

## 2023-01-06 PROCEDURE — 99232 SBSQ HOSP IP/OBS MODERATE 35: CPT | Mod: ,,, | Performed by: INTERNAL MEDICINE

## 2023-01-06 PROCEDURE — 85025 COMPLETE CBC W/AUTO DIFF WBC: CPT | Performed by: FAMILY MEDICINE

## 2023-01-06 PROCEDURE — 37799 UNLISTED PX VASCULAR SURGERY: CPT

## 2023-01-06 PROCEDURE — 25000242 PHARM REV CODE 250 ALT 637 W/ HCPCS: Performed by: INTERNAL MEDICINE

## 2023-01-06 RX ADMIN — PROPOFOL 50 MCG/KG/MIN: 10 INJECTION, EMULSION INTRAVENOUS at 03:01

## 2023-01-06 RX ADMIN — CHLORHEXIDINE GLUCONATE 15 ML: 1.2 RINSE ORAL at 08:01

## 2023-01-06 RX ADMIN — MUPIROCIN 1 G: 20 OINTMENT TOPICAL at 08:01

## 2023-01-06 RX ADMIN — LEVALBUTEROL HYDROCHLORIDE 0.63 MG: 0.63 SOLUTION RESPIRATORY (INHALATION) at 02:01

## 2023-01-06 RX ADMIN — NICOTINE 1 PATCH: 14 PATCH, EXTENDED RELEASE TRANSDERMAL at 08:01

## 2023-01-06 RX ADMIN — FAMOTIDINE 20 MG: 20 TABLET ORAL at 08:01

## 2023-01-06 RX ADMIN — LEVALBUTEROL HYDROCHLORIDE 0.63 MG: 0.63 SOLUTION RESPIRATORY (INHALATION) at 07:01

## 2023-01-06 RX ADMIN — CEFTRIAXONE 1 G: 1 INJECTION, SOLUTION INTRAVENOUS at 02:01

## 2023-01-06 RX ADMIN — PROPOFOL 50 MCG/KG/MIN: 10 INJECTION, EMULSION INTRAVENOUS at 06:01

## 2023-01-06 RX ADMIN — PROPOFOL 50 MCG/KG/MIN: 10 INJECTION, EMULSION INTRAVENOUS at 12:01

## 2023-01-06 RX ADMIN — PROPOFOL 50 MCG/KG/MIN: 10 INJECTION, EMULSION INTRAVENOUS at 08:01

## 2023-01-06 RX ADMIN — FENTANYL CITRATE 250 MCG/HR: 50 INJECTION INTRAVENOUS at 06:01

## 2023-01-06 RX ADMIN — PROPOFOL 50 MCG/KG/MIN: 10 INJECTION, EMULSION INTRAVENOUS at 09:01

## 2023-01-06 RX ADMIN — METHYLPREDNISOLONE SODIUM SUCCINATE 40 MG: 40 INJECTION, POWDER, FOR SOLUTION INTRAMUSCULAR; INTRAVENOUS at 02:01

## 2023-01-06 RX ADMIN — ASPIRIN 81 MG CHEWABLE TABLET 81 MG: 81 TABLET CHEWABLE at 08:01

## 2023-01-06 RX ADMIN — PROPOFOL 50 MCG/KG/MIN: 10 INJECTION, EMULSION INTRAVENOUS at 02:01

## 2023-01-06 RX ADMIN — FENTANYL CITRATE 250 MCG/HR: 50 INJECTION INTRAVENOUS at 08:01

## 2023-01-06 RX ADMIN — ENOXAPARIN SODIUM 40 MG: 100 INJECTION SUBCUTANEOUS at 10:01

## 2023-01-06 RX ADMIN — TICAGRELOR 90 MG: 90 TABLET ORAL at 08:01

## 2023-01-06 RX ADMIN — METHYLPREDNISOLONE SODIUM SUCCINATE 40 MG: 40 INJECTION, POWDER, FOR SOLUTION INTRAMUSCULAR; INTRAVENOUS at 05:01

## 2023-01-06 RX ADMIN — METHYLPREDNISOLONE SODIUM SUCCINATE 40 MG: 40 INJECTION, POWDER, FOR SOLUTION INTRAMUSCULAR; INTRAVENOUS at 09:01

## 2023-01-06 NOTE — PROGRESS NOTES
[facility] Medicine  Progress Note    Patient name: Peyman Gr  MRN: [id number]  Admit Date: 1/1/2023   LOS: 5 days     SUBJECTIVE:     Principal problem: STEMI involving right coronary artery    Interval History:  No acute overnight events reported.  FiO2 requirements improving.  Having spontaneous movements involving lower extremities when off sedation but nothing purposeful.    Summary:   67-year-old male with peripheral vascular disease, essential hypertension, hyperlipidemia and ongoing tobacco use presented to outside facility with 2 hour onset of chest pain and shortness of breath.  EKG revealed inferior and anterolateral STEMI.  Quickly degenerated into cardiac arrest with runs of VT/VF and asystole requiring resuscitation for about 40 minutes.  He was subsequently intubated and transferred to our ED. En route he developed cardiac arrest again (PEA) which was continued into our ED and was resuscitated for another 30 minutes.  He required transcutaneous pacing.  He was taken emergently to the catheterization lab and underwent PCI with stenting of RCA which was felt to be the culprit lesion.  He was also found to have left main stenosis of about 70%.  A transvenous pacemaker was also placed.  Currently admitted to the ICU for post cardiac arrest care.  He remains intubated and critically ill. Hospital stay complicated by fever; initiated on broad spectrum antibiotics.  Respiratory culture growing MSSA - de-escalated to ceftriaxone.    Scheduled Meds:   aspirin  81 mg Oral Daily    cefTRIAXone (ROCEPHIN) IVPB  1 g Intravenous Q24H    chlorhexidine  15 mL Mouth/Throat BID    enoxparin  40 mg Subcutaneous Q24H    famotidine  20 mg Per OG tube BID    insulin regular  5 Units Intravenous Once    levalbuterol  0.63 mg Nebulization TID WAKE    methylPREDNISolone sodium succinate injection  40 mg Intravenous Q8H    mupirocin   Nasal BID    nicotine  1 patch Transdermal Daily    ticagrelor  90  mg Oral BID     Continuous Infusions:   DOBUTamine Stopped (23 0945)    fentanyl 250 mcg/hr (23 0849)    midazolam Stopped (23 1901)    NORepinephrine bitartrate-D5W Stopped (23 0500)    propofoL 50 mcg/kg/min (23 1424)    vasopressin Stopped (23 0945)     PRN Meds:acetaminophen, albuterol-ipratropium, atropine, calcium gluconate IVPB, calcium gluconate IVPB, calcium gluconate IVPB, dextrose 10%, dextrose 10%, DOBUTamine, EPINEPHrine, [] fentaNYL **FOLLOWED BY** fentaNYL, glucagon (human recombinant), glucose, glucose, hydrALAZINE, HYDROcodone-acetaminophen, insulin aspart U-100, magnesium sulfate IVPB, magnesium sulfate IVPB, melatonin, midazolam, morphine, naloxone, ondansetron, polyethylene glycol, potassium chloride in water **AND** potassium chloride in water **AND** potassium chloride in water, senna-docusate 8.6-50 mg, simethicone, sodium chloride 0.9%, sodium phosphate IVPB, sodium phosphate IVPB, sodium phosphate IVPB    Review of patient's allergies indicates:  No Known Allergies    Review of Systems:  Unable to obtain due to clinical condition  OBJECTIVE:     Vital Signs (Most Recent)  Temp: 98.4 °F (36.9 °C) (23 1101)  Pulse: (!) 57 (23 1417)  Resp: (!) 24 (23 1417)  BP: 134/66 (23 1400)  SpO2: 95 % (23 1417)    Vital Signs Range (Last 24H):  Temp:  [98.2 °F (36.8 °C)-98.4 °F (36.9 °C)]   Pulse:  []   Resp:  [11-42]   BP: (122-196)/(63-95)   SpO2:  [93 %-100 %]   Arterial Line BP: (110-169)/(53-87)     I & O (Last 24H):  Intake/Output Summary (Last 24 hours) at 2023 1620  Last data filed at 2023 0701  Gross per 24 hour   Intake 3471.89 ml   Output 1950 ml   Net 1521.89 ml         Physical Exam:  General: Patient intubated and sedated.  Appears stated age  Eyes: No conjunctival injection. No scleral icterus.  Scleral edema noted  ENT:  ET and OG tubes noted.  No discharge from ears or nose  Neck: Supple, trachea  midline. No JVD  CVS: RRR. No LE edema BL  Lungs:  Mechanical breath sounds  Abdomen:  Soft, nontender and nondistended.  No organomegaly  Neuro:  Sedated.  Responds to painful stimuli  Skin:  No rash or erythema noted  : Duron catheter with yellow urine    Laboratory:  I have reviewed all pertinent lab results within the past 24 hours.  CBC:   Recent Labs   Lab 01/06/23  0328 01/06/23  0430   WBC 10.33  --    RBC 3.10*  --    HGB 9.7*  --    HCT 29.1* 29*     --    MCV 94  --    MCH 31.3*  --    MCHC 33.3  --        CMP:   Recent Labs   Lab 01/06/23 0328   *   CALCIUM 8.1*   ALBUMIN 2.3*   PROT 5.5*      K 4.2   CO2 25      BUN 36*   CREATININE 1.0   ALKPHOS 69   ALT 76*   AST 40   BILITOT 0.6       Cardiac markers:   Recent Labs   Lab 01/01/23  0155   TROPONINI 1.105*         Diagnostic Results:  Labs: Reviewed  X-Ray: Reviewed    X-Ray Chest AP Portable [[id number]] Resulted: 01/06/23 0639   Order Status: Completed Updated: 01/06/23 0642   Narrative:     EXAMINATION:   XR CHEST AP PORTABLE     CLINICAL HISTORY:   eval and compare to previous evals.;  coronary artery disease, STEMI     FINDINGS:   Portable chest at 439 compared with 01/05/2023 shows unchanged endotracheal tube and enteric catheter.  Cardiomediastinal silhouette unchanged.  Increased degree of patient rotation is noted.     Development of left basilar pleuroparenchymal opacity is evident.  Hazy opacity overlying the right lower lung zones suggest layering pleural effusion.  Central pulmonary vasculature is unchanged.  No pneumothorax.    Impression:       Developing left basilar pleuroparenchymal opacity either due to atelectasis, consolidation, pleural effusion, or some combination thereof.       Electronically signed by: Devendra Cassidy MD   Date: 01/06/2023   Time: 06:39       ASSESSMENT/PLAN:         Active Hospital Problems    Diagnosis  POA    *STEMI involving right coronary artery [I21.11]  Yes    Pneumonia of right  lung due to methicillin susceptible Staphylococcus aureus (MSSA) [J15.211]  No    Shock liver [K72.00]  Yes    Cardiogenic shock [R57.0]  Yes    Cardiac arrest [I46.9]  Yes    Acute respiratory failure with hypoxia and hypercarbia [J96.01, J96.02]  Yes    Leukocytosis [D72.829]  Yes    Anemia [D64.9]  Yes    History of tobacco abuse [Z87.891]  Not Applicable    Primary hypertension [I10]  Yes    PVD (peripheral vascular disease) with claudication [I73.9]  Yes    PAD (peripheral artery disease) [I73.9]  Yes      Resolved Hospital Problems    Diagnosis Date Resolved POA    Hyponatremia [E87.1] 01/02/2023 Yes         Plan:   Cardiac arrest secondary to STEMI involving RCA complicated by complete heart block  Status post emergent cardiac catheterization with PCI and stenting  - 01/01/2023  On aspirin and ticagrelor for dual antiplatelet therapy  Continue post cardiac arrest care in ICU   Mechanical ventilation for respiratory failure; appreciate input from pulmonology   Sedation as needed to maintain RASS of -2  Monitor electrolytes and replete per protocol   MSSA pneumonia - deescalate antibiotics to ceftriaxone  Monitor urine output and avoid nephrotoxic agents, NSAIDs and iodinated contrast  Echocardiogram noted - normal LVEF and grade 1 diastolic dysfunction  Neurology following for neuroprognostication.  EEG and CT head noted.  No purposeful movements noted.      VTE Risk Mitigation (From admission, onward)           Ordered     enoxaparin injection 40 mg  Every 24 hours (non-standard times)         01/03/23 0934     IP VTE HIGH RISK PATIENT  Once         01/01/23 0817     Place sequential compression device  Until discontinued         01/01/23 0817                        Department Hospital Medicine  [facility]  Claudio Hoyos MD  Date of service: 01/06/2023

## 2023-01-06 NOTE — PROGRESS NOTES
[facility]  Department of Cardiology  Progress Note      PATIENT NAME: Peyman Gr    MRN: [id number]  TODAY'S DATE: 01/06/2023  ADMIT DATE: 1/1/2023                          CONSULT REQUESTED BY: Claudio Hoyos MD    SUBJECTIVE     PRINCIPAL PROBLEM: STEMI involving right coronary artery    Interval history:    1/6/23:  Patient lying in bed intubated and sedated. Vitals are stable.     1/5/23:  Patient seen lying in bed off sedation. Patient noted to be moving and appeared agitated. Neuro at bedside for exam. He is hypertensive and tachycardic without sedation.     1/4/23:  Patient seen lying in bed on sedation this morning.  No distress noted.  Vitals were stable at that time.  Of note the patient has become hypertensive and tachycardic this afternoon.  Patient is currently receiving full sedation due to jerking and sweating.    01/03/2023:    Patient remains intubated and sedated on mechanical ventilation. FIO2 is 60% with 5 PEEP. RN attempted to wean sedation but patient keeps triggering the vent.  He has cough and pupillary reflex.  RN states she has seen him move all 4 extremities but not to command.  He remains unresponsive. He is on Levophed, propofol, fentanyl, and vasopressin. He was intermittently paced for approximately 5 min this morning when he was coughing and gagging on the vent. He remains in critical condition.  UOP is 275 today.       01/02/2023:  Seen and examined patient in the ICU today.  Off bicarb drip.  Currently on 2 sedatives and Levophed.     REASON FOR CONSULT:  STEMI      HPI:  67-year-old male with past medical history of peripheral vascular disease, right subclavian stenosis, chronic smoker, hypertension, hyperlipidemia presented to oxygen [facility] with 2 hour history of chest pain, shortness of breath.  ECG showed junctional bradycardia, complete heart block with inferior and anterolateral ST elevations.  STEMI code was called to which I responded  immediately and agreed to transfer the patient to [facility] cath lab for emergent cardiac catheterization.  However, patient unfortunately went into cardiac arrest at [facility] with VT, VF, asystole requiring resuscitation and intubation.  Patient was resuscitated for about 40 minutes ROSC was achieved.  Patient was moving extremities at the time as per the ER.  Patient was then transferred to [facility] ER and patient again had cardiac arrest upon arrival to the ER which required resuscitation for 30 minutes.  Patient was in PEA initially and ROSC was achieved subsequently however patient was dependent on transcutaneous pacing.  Patient blood pressure was very low and could not be obtained with a BP cuff.  Patient was unable to be transferred to the cath lab at that point due to significant hemodynamic instability. Patient was started on pressors.  A-line was placed in the ED and blood pressure eventually improved with high doses of vasopressin, norepinephrine, sodium bicarbonate and dobutamine infusions.  Discussed with patient's family members regarding the patient's critical condition.  Once the blood pressure was stabilized on the pressors, patient was brought to the cath lab for transvenous pacemaker placement and coronary angiogram.  Benefits and risks of the procedure explained to patient's family and patient's family  agreed for the procedure.      Review of patient's allergies indicates:  No Known Allergies    Past Medical History:   Diagnosis Date    Arthritis     Chronic back pain     Chronic neck pain     Dry gangrene     RIGHT LITTLE TOE    Hypercholesteremia     Hypertension     Insomnia     Multiple falls     S/P BACK SURGERY- 1 FALL    PAD (peripheral artery disease)     Personal history of colonic polyps     PVD (peripheral vascular disease)      Past Surgical History:   Procedure Laterality Date    ANGIOGRAPHY OF LOWER EXTREMITY N/A 09/24/2020     Procedure: Angiogram Extremity Unilateral;  Surgeon: Luke Cabello MD;  Location: [facility] CATH;  Service: Cardiology;  Laterality: N/A;    BACK SURGERY      BUNIONECTOMY Bilateral     CARPAL TUNNEL RELEASE Bilateral     CHOLECYSTECTOMY      COLONOSCOPY N/A 5/9/2022    Procedure: COLONOSCOPY;  Surgeon: Braydon Durand MD;  Location: [facility] ENDO;  Service: Endoscopy;  Laterality: N/A;    COLONOSCOPY W/ POLYPECTOMY      CORONARY ANGIOGRAPHY N/A 1/1/2023    Procedure: ANGIOGRAM, CORONARY ARTERY;  Surgeon: Stefany Elizondo MD;  Location: [facility] CATH/EP LAB;  Service: Cardiology;  Laterality: N/A;    CORONARY ARTERY BYPASS GRAFT      CREATION OF BYPASS FROM INTERNAL CAROTID ARTERY TO SUBCLAVIAN ARTERY Right 12/27/2021    Procedure: CREATION, BYPASS, ARTERIAL, INTERNAL CAROTID TO SUBCLAVIAN;  Surgeon: Jeovany Goins MD;  Location: [facility] OR;  Service: Vascular;  Laterality: Right;    ELBOW ARTHROPLASTY Right     ELBOW SURGERY      INSERTION, PACEMAKER, TEMPORARY TRANSVENOUS  1/1/2023    Procedure: Insertion, Pacemaker, Temporary Transvenous;  Surgeon: Stefany Elizondo MD;  Location: [facility] CATH/EP LAB;  Service: Cardiology;;    IVUS, CORONARY  1/1/2023    Procedure: IVUS, Coronary;  Surgeon: Stefany Elizondo MD;  Location: [facility] CATH/EP LAB;  Service: Cardiology;;    LEFT HEART CATHETERIZATION Left 1/1/2023    Procedure: Left heart cath;  Surgeon: Stefany Elizondo MD;  Location: [facility] CATH/EP LAB;  Service: Cardiology;  Laterality: Left;    MINIMALLY INVASIVE FORAMINOTOMY OF  SPINE N/A 11/15/2018    Procedure: FORAMINOTOMY, SPINE, MINIMALLY INVASIVE DECOMPRESSION L4-5;  Surgeon: Iván Stevenson Jr., MD;  Location: [facility] OR;  Service: Orthopedics;  Laterality: N/A;    NECK SURGERY      acf    PERCUTANEOUS CORONARY INTERVENTION, ARTERY N/A 1/1/2023    Procedure: Percutaneous coronary intervention;  Surgeon: Stefany Elizondo MD;  Location: [facility] CATH/EP LAB;  Service: Cardiology;   Laterality: N/A;    PERCUTANEOUS TRANSLUMINAL ANGIOPLASTY (PTA) OF PERIPHERAL VESSEL Right 2020    Procedure: PTA, PERIPHERAL VESSEL of right lower extremity;  Surgeon: Luke Cabello MD;  Location: [facility] CATH;  Service: Cardiology;  Laterality: Right;    PERCUTANEOUS TRANSLUMINAL ANGIOPLASTY (PTA) OF PERIPHERAL VESSEL Left 09/10/2020    Procedure: PTA, PERIPHERAL VESSEL, Left lower extremity;  Surgeon: Luke Cabello MD;  Location: [facility] CATH;  Service: Cardiology;  Laterality: Left;    PERCUTANEOUS TRANSLUMINAL ANGIOPLASTY (PTA) OF PERIPHERAL VESSEL Left 2021    Profunda and SFA     Social History     Tobacco Use    Smoking status: Former     Packs/day: 2.00     Years: 60.00     Pack years: 120.00     Types: Cigarettes     Quit date:      Years since quittin.0    Smokeless tobacco: Never   Substance Use Topics    Alcohol use: No    Drug use: No        REVIEW OF SYSTEMS  Unable to obtain.  Patient was intubated    OBJECTIVE     VITAL SIGNS (Most Recent)  Temp: 98.2 °F (36.8 °C) (23 0701)  Pulse: 63 (23 1024)  Resp: (!) 21 (23 1024)  BP: 139/79 (23 0900)  SpO2: 98 % (23 1024)    VENTILATION STATUS  Resp: (!) 21 (23 1024)  SpO2: 98 % (23 1024)  Vent Mode: A/C  Oxygen Concentration (%):  [70-80] 70  Resp Rate Total:  [24 br/min-35 br/min] 24 br/min  Vt Set:  [540 mL] 540 mL  PEEP/CPAP:  [5 cmH20] 5 cmH20  Mean Airway Pressure:  [2.9 ocV99-48 cmH20] 10 cmH20    I & O (Last 24H):  Intake/Output Summary (Last 24 hours) at 2023 1056  Last data filed at 2023 0601  Gross per 24 hour   Intake 3441.89 ml   Output 1950 ml   Net 1491.89 ml         WEIGHTS  Wt Readings from Last 1 Encounters:   23 0400 117.6 kg (259 lb 4.2 oz)   23 0500 115.3 kg (254 lb 3.1 oz)   23 0615 105.4 kg (232 lb 5.8 oz)   23 0400 102.5 kg (225 lb 15.5 oz)   23 0053 102.5 kg (225 lb 15.5 oz)   23 0800 99.8 kg (220 lb 0.3 oz)   23 0341 99.8 kg  (220 lb)       PHYSICAL EXAM  GENERAL: well built, well nourished, well-developed.  Intubated  NECK: No JVD.   CARDIAC: Regular rate and rhythm, S1, S2 regular  CHEST ANATOMY: normal.   LUNGS: + bilateral air entry   ABDOMEN: Soft.  Normal bowel sounds.   URINARY: cohen  EXTREMITIES:  No edema    SKIN: Skin without lesions, moist, well perfused.     HOME MEDICATIONS:  Current Facility-Administered Medications on File Prior to Encounter   Medication Dose Route Frequency Provider Last Rate Last Admin    0.9%  NaCl infusion   Intravenous Continuous Braydon Durand MD   Stopped at 05/09/22 1011     Current Outpatient Medications on File Prior to Encounter   Medication Sig Dispense Refill    albuterol (PROVENTIL/VENTOLIN HFA) 90 mcg/actuation inhaler Inhale 1-2 puffs into the lungs every 6 (six) hours as needed for Shortness of Breath. Rescue 8 g 0    amLODIPine (NORVASC) 10 MG tablet Take 1 tablet (10 mg total) by mouth once daily. 30 tablet 1    amLODIPine (NORVASC) 5 MG tablet Take 5 mg by mouth once daily.      aspirin 81 MG Chew Take 81 mg by mouth once daily.      carvediloL (COREG) 25 MG tablet Take 25 mg by mouth 2 (two) times daily with meals.      clopidogreL (PLAVIX) 75 mg tablet Take 75 mg by mouth once daily. On hold for sx on 12/27/21      ezetimibe (ZETIA) 10 mg tablet Take 10 mg by mouth once daily.      hydroCHLOROthiazide (HYDRODIURIL) 12.5 MG Tab Take 1 tablet (12.5 mg total) by mouth once daily. 30 tablet 1    HYDROcodone-acetaminophen (NORCO)  mg per tablet Take 1 tablet by mouth every 8 (eight) hours as needed for Pain.      mupirocin (BACTROBAN) 2 % ointment Apply topically 3 (three) times daily. 22 g 0    oxyCODONE-acetaminophen (PERCOCET) 7.5-325 mg per tablet Take 1 tablet by mouth 2 (two) times daily as needed.      traMADoL (ULTRAM) 50 mg tablet Take 50 mg by mouth every 6 (six) hours as needed.      valsartan (DIOVAN) 160 MG tablet Take 160 mg by mouth once daily.         SCHEDULED  MEDS:   aspirin  81 mg Oral Daily    cefTRIAXone (ROCEPHIN) IVPB  1 g Intravenous Q24H    chlorhexidine  15 mL Mouth/Throat BID    enoxparin  40 mg Subcutaneous Q24H    famotidine  20 mg Per OG tube BID    insulin regular  5 Units Intravenous Once    levalbuterol  0.63 mg Nebulization TID WAKE    methylPREDNISolone sodium succinate injection  40 mg Intravenous Q8H    mupirocin   Nasal BID    nicotine  1 patch Transdermal Daily    ticagrelor  90 mg Oral BID       CONTINUOUS INFUSIONS:   DOBUTamine Stopped (23)    fentanyl 250 mcg/hr (23 0849)    midazolam Stopped (23 190)    NORepinephrine bitartrate-D5W Stopped (23 0500)    propofoL 50 mcg/kg/min (23)    vasopressin Stopped (23)       PRN MEDS:acetaminophen, albuterol-ipratropium, atropine, calcium gluconate IVPB, calcium gluconate IVPB, calcium gluconate IVPB, dextrose 10%, dextrose 10%, DOBUTamine, EPINEPHrine, [] fentaNYL **FOLLOWED BY** fentaNYL, glucagon (human recombinant), glucose, glucose, hydrALAZINE, HYDROcodone-acetaminophen, insulin aspart U-100, magnesium sulfate IVPB, magnesium sulfate IVPB, melatonin, midazolam, morphine, naloxone, ondansetron, polyethylene glycol, potassium chloride in water **AND** potassium chloride in water **AND** potassium chloride in water, senna-docusate 8.6-50 mg, simethicone, sodium chloride 0.9%, sodium phosphate IVPB, sodium phosphate IVPB, sodium phosphate IVPB    LABS AND DIAGNOSTICS     CBC LAST 3 DAYS  Recent Labs   Lab 23  0345 23  0346 23  0430 23  0328 23  0430   WBC 8.05  --  10.98 10.33  --    RBC 3.13*  --  3.28* 3.10*  --    HGB 9.8*  --  10.4* 9.7*  --    HCT 30.3*   < > 30.3* 29.1* 29*   MCV 97  --  94 94  --    MCH 31.3*  --  31.7* 31.3*  --    MCHC 32.3  --  34.3 33.3  --    RDW 14.9*  --  14.5 14.6*  --    *  --  176 189  --    MPV 9.8  --  9.9 9.3  --    GRAN 92.1*  7.4  --  86.5*  9.5* 87.6*  9.1*  --     LYMPH 2.9*  0.2*  --  3.5*  0.4* 4.1*  0.4*  --    MONO 3.9*  0.3  --  8.6  0.9 6.1  0.6  --    BASO 0.01  --  0.01 0.02  --    NRBC 0  --  0 0  --     < > = values in this interval not displayed.         COAGULATION LAST 3 DAYS  Recent Labs   Lab 01/01/23 0453   INR 1.2   APTT 30.3         CHEMISTRY LAST 3 DAYS  Recent Labs   Lab 01/04/23 0345 01/04/23 0346 01/05/23 0428 01/05/23 0430 01/06/23 0328 01/06/23 0430   *  --   --  136 137  --    K 4.8  --   --  4.2 4.2  --      --   --  106 107  --    CO2 25  --   --  24 25  --    ANIONGAP 6*  --   --  6* 5*  --    BUN 19  --   --  26* 36*  --    CREATININE 1.0  --   --  0.9 1.0  --    *  --   --  119* 137*  --    CALCIUM 8.0*  --   --  8.1* 8.1*  --    PH  --  7.332* 7.385  --   --  7.399   MG 2.1  --   --  2.2 2.3  --    ALBUMIN 2.3*  --   --  2.4* 2.3*  --    PROT 5.6*  --   --  5.8* 5.5*  --    ALKPHOS 81  --   --  80 69  --    *  --   --  97* 76*  --    AST 96*  --   --  55* 40  --    BILITOT 0.7  --   --  0.5 0.6  --          CARDIAC PROFILE LAST 3 DAYS  Recent Labs   Lab 01/01/23  0155 01/01/23  0453 01/01/23  0848 01/01/23  1831 01/02/23  0118 01/02/23  0546 01/02/23  1400   BNP  --  76  --   --   --   --  376*   TROPONINI 1.105*  --   --   --   --   --   --    TROPONINIHS  --  1459.0*   < > 419762.7* 289344.9* 410115.9*  --     < > = values in this interval not displayed.         ENDOCRINE LAST 3 DAYS  Recent Labs   Lab 01/03/23  0954   PROCAL 3.13*         LAST ARTERIAL BLOOD GAS  ABG  Recent Labs   Lab 01/06/23  0430   PH 7.399   PO2 103*   PCO2 41.2   HCO3 25.5   BE 1         LAST 7 DAYS MICROBIOLOGY   Microbiology Results (last 7 days)       Procedure Component Value Units Date/Time    Blood culture [[id number]] Collected: 01/03/23 0954    Order Status: Completed Specimen: Blood Updated: 01/06/23 1032     Blood Culture, Routine No Growth to date      No Growth to date      No Growth to date      No Growth to date     Blood culture [[id number]] Collected: 01/03/23 0954    Order Status: Completed Specimen: Blood Updated: 01/06/23 1032     Blood Culture, Routine No Growth to date      No Growth to date      No Growth to date      No Growth to date    Culture, Respiratory with Gram Stain [[id number]]  (Abnormal)  (Susceptibility) Collected: 01/02/23 1431    Order Status: Completed Specimen: Respiratory from Tracheal Aspirate Updated: 01/04/23 0856     Respiratory Culture Reduced normal respiratory lorenzo      STAPHYLOCOCCUS AUREUS  Moderate       Gram Stain (Respiratory) <10 epithelial cells per low power field.     Gram Stain (Respiratory) Few WBC's     Gram Stain (Respiratory) Moderate Gram positive cocci    MRSA Screen by PCR [[id number]] Collected: 01/03/23 1412    Order Status: Completed Specimen: Nasopharyngeal Swab from Nasal Updated: 01/03/23 1554     MRSA SCREEN BY PCR Negative            MOST RECENT IMAGING  X-Ray Chest AP Portable  Narrative: EXAMINATION:  XR CHEST AP PORTABLE    CLINICAL HISTORY:  eval and compare to previous evals.;  coronary artery disease, STEMI    FINDINGS:  Portable chest at 439 compared with 01/05/2023 shows unchanged endotracheal tube and enteric catheter.  Cardiomediastinal silhouette unchanged.  Increased degree of patient rotation is noted.    Development of left basilar pleuroparenchymal opacity is evident.  Hazy opacity overlying the right lower lung zones suggest layering pleural effusion.  Central pulmonary vasculature is unchanged.  No pneumothorax.  Impression: Developing left basilar pleuroparenchymal opacity either due to atelectasis, consolidation, pleural effusion, or some combination thereof.    Electronically signed by: Devendra Cassidy MD  Date:    01/06/2023  Time:    06:39      ECHOCARDIOGRAM RESULTS (last 5)  No results found for this or any previous visit.      CURRENT/PREVIOUS VISIT EKG  Results for orders placed or performed during the hospital encounter of 01/01/23   EKG 12-LEAD on  arrival to floor    Collection Time: 01/01/23  9:59 AM    Narrative    Test Reason : I21.3,    Vent. Rate : 070 BPM     Atrial Rate : 000 BPM     P-R Int : 000 ms          QRS Dur : 078 ms      QT Int : 450 ms       P-R-T Axes : 000 027 -68 degrees     QTc Int : 486 ms    Atrial fibrillation with occasional ventricular-paced complexes and with  premature ventricular or aberrantly conducted complexes  Inferior infarct ,age undetermined  Abnormal ECG  When compared with ECG of 01-JAN-2023 09:57,  Previous ECG has undetermined rhythm, needs review  Confirmed by Sarthak Cardona MD ([id number]) on 1/4/2023 6:57:56 PM    Referred By: IRENE MOSER           Confirmed By:Sarthak Cardona MD           ASSESSMENT/PLAN:     Active Hospital Problems    Diagnosis    *STEMI involving right coronary artery    Pneumonia of right lung due to methicillin susceptible Staphylococcus aureus (MSSA)    Shock liver    Cardiogenic shock    Cardiac arrest    Acute respiratory failure with hypoxia and hypercarbia    Leukocytosis    Anemia    History of tobacco abuse    Primary hypertension    PVD (peripheral vascular disease) with claudication    PAD (peripheral artery disease)       ASSESSMENT & PLAN:   STEMI- inferior wall MI complicated by complete heart block and RV shock  Cardiac arrest status post resuscitation and intubation (patient was resuscitated for 60-70 minutes in total)  Peripheral vascular disease  Chronic smoking  Hypertension  hyperlipidemia      RECOMMENDATIONS:    Discussed with nurse to decrease pacer settings to 40 bpm. Will monitor for a bit longer then remove pacer if he is not requiring it.   Continue DAPT with Brilinta 90 mg po BID and aspirin 81 mg po daily without interruption due to new stent placement.   Resume home valsartan prior to removing sedation to assist with BP control.   Dr. Saleh to follow this weekend.     Mary Lou Turpin NP  [facility]  Department of Cardiology  Date of Service: 01/06/2023          I have personally interviewed and examined the patient, I have reviewed the Nurse Practitioner's history and physical, assessment, and plan. I agree with the findings and plan.    1. Patient has not had any more episodes of sinus pauses.  Consideration to remove the temporary transvenous pacemaker.      Sarthak Cardona M.D.  [facility]  Department of Cardiology  Date of Service: 01/03/2023

## 2023-01-06 NOTE — NURSING
Diprivan paused for wake-up/neuro exam. After approx 20 min, pt opened eyes spontaneously and attempted to track me, however, seems to have a left gaze preference. When asked to follow commands, pt was unable to do so- if anything, he may have SLIGHTLY squeezed my hand with his left hand x 2. Pupils equal/round/reactive. Minimal response to noxious stimuli in BLE at this time. Gag/cough/corneals positive. Breathing spontaneously over vent. Sedation restarted at previous settings due to vent asynchrony, hypertension, tachycardia, and agitation.

## 2023-01-06 NOTE — PROGRESS NOTES
[facility]  Neurology Progress Note    Patient Name: Peyman Gr  MRN: [id number]  : 1955  TODAY'S DATE: 2023  ADMIT DATE: 2023  3:17 AM                                          CONSULTED PROVIDER: Brigette Welsh MD, Neurologist. Cellphone: [phone]  CONSULT REQUESTED BY: Claudio Hoyos MD     Chief Complaint   Patient presents with    Cardiac Arrest     Pt presents to ER via EMS with STEMI with cardiac arrest with compressions being initiated upon arrival to ER.        HPI per EMR:  67-year-old male with peripheral vascular disease, essential hypertension, hyperlipidemia and ongoing tobacco use presented to outside facility with 2 hour onset of chest pain and shortness of breath.  EKG revealed inferior and anterolateral STEMI.  Quickly degenerated into cardiac arrest with runs of VT/VF and asystole requiring resuscitation for about 40 minutes.  He was subsequently intubated and transferred to our ED. En route he developed cardiac arrest again (PEA) which was continued into our ED and was resuscitated for another 30 minutes.  He required transcutaneous pacing.  He was taken emergently to the catheterization lab and underwent PCI with stenting of RCA which was felt to be the culprit lesion.  He was also found to have left main stenosis of about 70%.  A transvenous pacemaker was also placed.  Currently admitted to the ICU for post cardiac arrest care.  He remains intubated and critically ill. Hospital stay complicated by fever; initiated on broad spectrum antibiotics.  Respiratory culture growing MSSA.    Neurology Consult:  Patient was seen and examined by me today.  He is a 67-year-old man with history of peripheral vascular disease, hypertension, hyperlipidemia and tobacco abuse who presented to outside facility with 2 hours of chest pain and shortness of breath, EKG showed STEMI then patient degenerated intracardiac arrest with rounds of V-tach and VFib as well as  "asystole requiring prolonged resuscitation for at least 40 minutes.  He was intubated and then transferred to our facility for escalation of care.  On route to the hospital he developed PA arrest again and was resuscitated for another 30 minutes, and required transcutaneous pacing.  He was then taken to the cath lab and underwent PCI with stenting of right coronary artery, was then admitted to the ICU for management.  Patient is still intubated, unresponsive, so neurology consulted for neuro prognostication.    1/5/23: Patient was seen and examined by me today. Sedation has been off since last night, and patient is opening eyes spontaneously and moving all extremities. He was able to follow the command "move your feet" 3 times, which shows some purposeful movement. Sedation had to be restarted in the setting of tachycardia and hypertension due to agitation.    1/6/23: Patient was seen and examined by me today. When sedation was off, he seemed to regard his daughter when she was calling for him. He squeezed with his left hand to command, but seems right side is weak, and he has preference for the left side. He also wiggled his feet.     Scheduled Meds:   aspirin  81 mg Oral Daily    cefTRIAXone (ROCEPHIN) IVPB  1 g Intravenous Q24H    chlorhexidine  15 mL Mouth/Throat BID    enoxparin  40 mg Subcutaneous Q24H    famotidine  20 mg Per OG tube BID    insulin regular  5 Units Intravenous Once    levalbuterol  0.63 mg Nebulization TID WAKE    methylPREDNISolone sodium succinate injection  40 mg Intravenous Q8H    mupirocin   Nasal BID    nicotine  1 patch Transdermal Daily    ticagrelor  90 mg Oral BID     Continuous Infusions:   DOBUTamine Stopped (01/01/23 0945)    fentanyl 250 mcg/hr (01/06/23 0849)    midazolam Stopped (01/04/23 1901)    NORepinephrine bitartrate-D5W Stopped (01/04/23 0500)    propofoL 50 mcg/kg/min (01/06/23 0831)    vasopressin Stopped (01/01/23 0945)     PRN Meds:.acetaminophen, " "albuterol-ipratropium, atropine, calcium gluconate IVPB, calcium gluconate IVPB, calcium gluconate IVPB, dextrose 10%, dextrose 10%, DOBUTamine, EPINEPHrine, [] fentaNYL **FOLLOWED BY** fentaNYL, glucagon (human recombinant), glucose, glucose, hydrALAZINE, HYDROcodone-acetaminophen, insulin aspart U-100, magnesium sulfate IVPB, magnesium sulfate IVPB, melatonin, midazolam, morphine, naloxone, ondansetron, polyethylene glycol, potassium chloride in water **AND** potassium chloride in water **AND** potassium chloride in water, senna-docusate 8.6-50 mg, simethicone, sodium chloride 0.9%, sodium phosphate IVPB, sodium phosphate IVPB, sodium phosphate IVPB    Physical Exam  Current Vitals:  Vitals:    23 0930   BP:    Pulse: 74   Resp: 17   Temp:      General appearance:  Intubated, minimally responsive when sedation on  Cardiovascular:  Regular rhythm, paced, tachycardic  Pulmonary:  Intubated, on mechanical ventilation  GI:  OG tube in place  MSK:  Increased tone throughout  Skin:  No lesions in exposed skin    NEUROLOGICAL EXAM:    Mental status:  minimally responsive, when sedation was weaned off he opened eyes spontaneously but not tracking. However, seemed to turn head around when his daughter called him. No myoclonus seen on exam. Followed the commands "move your feet" and "squeeze my hand" 2 times each.          Intubated:  Yes          Sedated:  Yes  Response to noxious stimulation:  moving all extremities spontaneously intermittently more the left side than the right, moves feet and squeezing with left hand to command.  Breathes above ventilator:  Yes  Opens eyes:  yes  Pupils: symmetric          Left: reactive          Right: reactive  Eye movements: No pathologic movements such as nystagmus, ocular bobbing, dipping or roving.  Corneal reflex:  Present  Oculocephalic reflex:  Present  Cough:  Present  Gag:  present  Motor exam:  Increased muscle tone in upper and lower extremities, no abnormal " movements in extremities . Moving all extremities intermittently spontaneously, able to move feet to command.  DTRs:  1+ throughout.    Laboratory Data & Studies    Recent Labs   Lab 01/04/23  0345 01/05/23  0430 01/06/23  0328   WBC 8.05 10.98 10.33   HGB 9.8* 10.4* 9.7*   * 176 189   MCV 97 94 94         Recent Labs   Lab 01/01/23  0155 01/01/23  0453 01/04/23  0345 01/05/23  0430 01/06/23  0328   *   < > 135* 136 137   K 4.2   < > 4.8 4.2 4.2      < > 104 106 107   CO2 20*   < > 25 24 25   BUN 20   < > 19 26* 36*   *   < > 146* 119* 137*   CALCIUM 8.8   < > 8.0* 8.1* 8.1*   MG 2.0   < > 2.1 2.2 2.3   PHOS 5.5*  --   --   --   --     < > = values in this interval not displayed.         Recent Labs   Lab 01/04/23  0345 01/05/23  0430 01/06/23 0328   PROT 5.6* 5.8* 5.5*   ALBUMIN 2.3* 2.4* 2.3*   BILITOT 0.7 0.5 0.6   AST 96* 55* 40   * 97* 76*   ALKPHOS 81 80 69         Recent Labs   Lab 01/01/23  0453   INR 1.2   APTT 30.3         Recent Labs   Lab 01/01/23  0848   HGBA1C 5.7           Microbiology:  Microbiology Results (last 7 days)       Procedure Component Value Units Date/Time    Blood culture [[id number]] Collected: 01/03/23 0954    Order Status: Completed Specimen: Blood Updated: 01/05/23 1032     Blood Culture, Routine No Growth to date      No Growth to date      No Growth to date    Blood culture [[id number]] Collected: 01/03/23 0954    Order Status: Completed Specimen: Blood Updated: 01/05/23 1032     Blood Culture, Routine No Growth to date      No Growth to date      No Growth to date    Culture, Respiratory with Gram Stain [[id number]]  (Abnormal)  (Susceptibility) Collected: 01/02/23 1431    Order Status: Completed Specimen: Respiratory from Tracheal Aspirate Updated: 01/04/23 0856     Respiratory Culture Reduced normal respiratory lorenzo      STAPHYLOCOCCUS AUREUS  Moderate       Gram Stain (Respiratory) <10 epithelial cells per low power field.     Gram Stain  (Respiratory) Few WBC's     Gram Stain (Respiratory) Moderate Gram positive cocci    MRSA Screen by PCR [[id number]] Collected: 01/03/23 1412    Order Status: Completed Specimen: Nasopharyngeal Swab from Nasal Updated: 01/03/23 1554     MRSA SCREEN BY PCR Negative              Imaging:  X-Ray Chest AP Portable    Result Date: 1/4/2023  HISTORY: Respiratory failure, cardiac arrest. FINDINGS: Portable chest radiograph at 0521 hours compared to 01/03/2023 shows ET and NG tubes, and transvenous pacing lead via IVC approach, unchanged in position. The cardiomediastinal silhouette and pulmonary vasculature are stable, with aortic vascular calcifications. The lungs are symmetrically expanded, with mild scattered reticulonodular densities in both lungs, similar to the prior exam. There is no new pleural or parenchymal abnormality. No pneumothorax. IMPRESSION: No significant interval change. Electronically signed by:  Hema Tafoya MD  1/4/2023 6:52 AM CST Workstation: [id number]    X-Ray Chest AP Portable    Result Date: 1/3/2023  EXAMINATION: XR CHEST AP PORTABLE CLINICAL HISTORY: Respiratory failure COMPARISON: January 2, 2023 FINDINGS: Heart size is upper normal.  The aortic arch is calcified.  Endotracheal tube tip is at the level of the clavicles.  Nasogastric tube extends to the stomach. Pulmonary vascular congestion is similar to the prior study, with slightly increasing ground-glass opacity throughout the right lung.  There are no significant effusions.  There is no pneumothorax.     1. Radiographic findings suggesting congestive failure and mild pulmonary edema, with slightly increasing ground-glass opacity on the right compared to January 2. Electronically signed by: Juan Daniel Sun MD Date:    01/03/2023 Time:    05:58    X-Ray Chest AP Portable    Result Date: 1/2/2023  HISTORY: resp fail COMPARISON:January 1, 2023 FINDINGS: Distal tip of ET tube resides at the medial ends of clavicles and the NG tube passes to  the stomach. Cardiomediastinal silhouette appears upper normal in size magnified by the poor inspiratory effort. Tortuosity thoracic aorta as a manifestation of systemic hypertension. Diffuse interstitial opacities and ground glass opacity changes are again reestablished with minimal improvement upon comparison. No evidence of confluent infiltrate or significant pleural effusion or atelectasis. Postoperative changes of previous anterior cervical fusion as identified on the uppermost field of view and previous right carotid endarterectomy. IMPRESSION:Stable appearance the chest when compared to previous. All support devices appear stable. Electronically signed by:  Solo Harris MD  1/2/2023 6:54 AM CST Workstation: [id number]    X-Ray Chest AP Portable    Result Date: 1/1/2023  Chest, single view HISTORY: Nasogastric tube placement. In the interval, there has been introduction of a nasogastric tube which extends into the left upper quadrant of the abdomen just beyond the GE junction. An endotracheal tube remains in place with its tip positioned well above the level of the saurav. Bilateral interstitial and mild groundglass opacities, greater on the right are again observed. There are no new confluent infiltrates, volume loss or effusions. The cardiomediastinal silhouette is stable. IMPRESSION: Interval introduction of nasogastric tube extending into the left upper abdomen with its tip just beyond the GE junction. Otherwise stable cardiopulmonary status. Electronically signed by:  Elizabeth Sharp MD  1/1/2023 7:01 AM CST Workstation: [id number]    X-Ray Chest AP Portable    Result Date: 1/1/2023  Chest, single view HISTORY: Cardiac arrest. Comparison 9/9/2022. Endotracheal tube is in place with its tip positioned well above the level of the saurav. The heart size is within normal limits. The central pulmonary vasculature is not acutely engorged.  There is arteriosclerotic calcification within the aortic arch. There is  scattered bilateral interstitial and groundglass pulmonary opacities with upper lung zone predominance, greater on the right. No effusion or extrapulmonary air identified. Surgical changes are noted within the cervical spine and within the soft tissues of the right side of the neck. IMPRESSION: Positioning of endotracheal tube as above. Interval development of interstitial and groundglass pulmonary opacities with upper lung zone predominance. Electronically signed by:  Elizabeth Sharp MD  1/1/2023 6:59 AM CST Workstation: [id number]    Echo Saline Bubble? No    Result Date: 1/1/2023  · The estimated ejection fraction is 55%. · The left ventricle is normal in size with concentric hypertrophy. · Mild to moderate tricuspid regurgitation. · Moderate right ventricular enlargement with moderately reduced right ventricular systolic function. · Grade I left ventricular diastolic dysfunction. · There is mild aortic valve stenosis. · Aortic valve area is 1.68 cm2; peak velocity is 1.36 m/s; mean gradient is 4 mmHg. · There is abnormal septal wall motion consistent with right ventricular pacemaker. · There are segmental left ventricular wall motion abnormalities.          Assessment and Plan:    Concern for anoxic brain injury status post cardiac arrest with prolonged resuscitation  STEMI status post PCI and stenting  V-tach and VFib requiring external pacing  67-year-old man with history of peripheral vascular disease, hypertension, hyperlipidemia and tobacco abuse who presented to outside facility with 2 hours of chest pain and shortness of breath, EKG showed STEMI then patient degenerated into cardiac arrest with rounds of V-tach and VFib as well as asystole requiring prolonged resuscitation. Patient was still intubated, unresponsive, so neurology consulted for neuro prognostication. He was not cooled.  When sedation was weaned down, patient became agitated and hypertensive, but his neurological examination showed some  improvement.  - admitted to the ICU, with q.4 hours neuro checks, continuous telemetry monitoring, external pacer present  - patient is at least 72 hours post arrest at this time, exam slowly improving, but requiring sedation for hypertension and agitation  - will attempt to wean off sedation as much as possible to better assess neurological status daily  - EEG was performed and showed suppression of voltage concerning for anoxic brain injury, but patient was on high dose of sedatives at the time, so will consider repeating study if no further improvement seen.  - CT brain showed some scattered hypodensities in cortical and subcortical areas concerning for stroke or white matter disease.  No evidence of severe edema or effacement of ventricles was seen.  Patient can not undergo MRI in the setting of external pacer present.  - management of infectious and metabolic derangements as per primary team and Cardiology  - seizure precautions while inpatient  - Neuro prognosis is indeterminate at this time, but patient has improved on examination, so will reassess daily and document progress. However, prognosis is still guarded.  - will continue to follow along    DVT prophylaxis with chemo/SCD prophylaxis      [phone] within 3 days from discharge.       All nursing questions were answered.                              Thank you kindly for including us in the care of this patient. Please do not hesitate to contact us with any questions.       Critical Care:  42 minutes of critical care time has been spent evaluating with the patient. Time includes chart review not limited to diagnostic imaging, labs, and vitals, patient assessment, discussion with family and nursing, current order evaluations, and new order entries.      Brigette Welsh MD  Neurology

## 2023-01-06 NOTE — PROGRESS NOTES
Pulmonary/Critical Care  Progress Note      Patient name: Peyman Gr  MRN: [id number]  Date: 01/06/2023    Admit Date: 1/1/2023  Consult Requested By: Claudio Hoyos MD    Reason for Consult: respiratory failure    HPI:    1/1/2023 - 68 yo initially presented with chest pain about  2 AM, had bradycardia then VTVF arrest and had prolonged code - transferred to [facility] and was in cardiac arrest at arrival after multiple rounds of meds he had ROSC and taken to cath lab.  In Cath lab had occluded RCA and had successful PCI.  ALso has LM and OM disease.  He has been very unstable and moved to ICU.  He is unresponsive and cool.  He is ventilated.  On dobutrex and levophed, having some ectopy (contacting cardiology to see if they want to start lidocaine or amiodarone, QTc is 487), he is on bicarb drip and last ABG showed adequate oxygenation but a mixed metabolic and respiratory acidosis (vent adjused and will repeat).  No family was in the waiting room.  Chart has been reviewed and case d/w nursing, respiratory and Dr Hoyos.  BY report pt was resuscitated for > 60 minutes.    1/2/2023 - Remains critically ill, has been agitated at times and has needed increased sedation, he has not followed any commands.  Rhythm seems more stable but he has had ectopy.  Temp had increased from his hypothermia yesterday.  Electrolytes replaced by SS.  LFT have increased.  ABG this AM shows alkalosis (on bicarb drip).  CXR reviewed.  ECHO noted.    1/3/2023 - Remains critically ill, gets agitated at times and has required sedation.  He does not respond to voice or pain but does have spontaneous respirations and movement.  Pupils are sluggish to NR, minimal corneal reflex.  Had temp this AM.  CXR noted.    1/4/2023 - Had issues yesterday - HTN, bradycardia, a ? Of seizure activity and I was not notified of any of this.  Overnight things have been more stable.  He remains unresponsive.  Temp has decreased, renal function good, LFT better.   I tried to call family yesterday but got no answer.  Family has been at bedside per nursing.  SC + for [facility]    1/5/2023 - Remains critically ill and is not responsive to me.  When sedation decreased he does have some movement (not purposeful).  EEG report reviewed and neuro note seen.  Neuro issues preclude any thoughts of weaning ventilator support  Tachycardic today.  O2 needs have increased.    1/6/23: Off sedation for an hour today and moved spontaneously, shaking head around, but did not appear to follow commands. Oxygen requirements going down.    Review of Systems    Review of Systems   Unable to perform ROS: Mental acuity     Past Medical History    Past Medical History:   Diagnosis Date    Arthritis     Chronic back pain     Chronic neck pain     Dry gangrene     RIGHT LITTLE TOE    Hypercholesteremia     Hypertension     Insomnia     Multiple falls     S/P BACK SURGERY- 1 FALL    PAD (peripheral artery disease)     Personal history of colonic polyps     PVD (peripheral vascular disease)        Past Surgical History    Past Surgical History:   Procedure Laterality Date    ANGIOGRAPHY OF LOWER EXTREMITY N/A 09/24/2020    Procedure: Angiogram Extremity Unilateral;  Surgeon: Luke Cabello MD;  Location: [facility] CATH;  Service: Cardiology;  Laterality: N/A;    BACK SURGERY      BUNIONECTOMY Bilateral     CARPAL TUNNEL RELEASE Bilateral     CHOLECYSTECTOMY      COLONOSCOPY N/A 5/9/2022    Procedure: COLONOSCOPY;  Surgeon: Braydon Durand MD;  Location: [facility] ENDO;  Service: Endoscopy;  Laterality: N/A;    COLONOSCOPY W/ POLYPECTOMY      CORONARY ANGIOGRAPHY N/A 1/1/2023    Procedure: ANGIOGRAM, CORONARY ARTERY;  Surgeon: Stefany Elizondo MD;  Location: [facility] CATH/EP LAB;  Service: Cardiology;  Laterality: N/A;    CORONARY ARTERY BYPASS GRAFT      CREATION OF BYPASS FROM INTERNAL CAROTID ARTERY TO SUBCLAVIAN ARTERY Right 12/27/2021    Procedure: CREATION, BYPASS, ARTERIAL, INTERNAL CAROTID TO  SUBCLAVIAN;  Surgeon: Jeovany Goins MD;  Location: [facility] OR;  Service: Vascular;  Laterality: Right;    ELBOW ARTHROPLASTY Right     ELBOW SURGERY      INSERTION, PACEMAKER, TEMPORARY TRANSVENOUS  1/1/2023    Procedure: Insertion, Pacemaker, Temporary Transvenous;  Surgeon: Stefany Elizondo MD;  Location: [facility] CATH/EP LAB;  Service: Cardiology;;    IVUS, CORONARY  1/1/2023    Procedure: IVUS, Coronary;  Surgeon: Stefany Elizondo MD;  Location: [facility] CATH/EP LAB;  Service: Cardiology;;    LEFT HEART CATHETERIZATION Left 1/1/2023    Procedure: Left heart cath;  Surgeon: Stefany Elizondo MD;  Location: [facility] CATH/EP LAB;  Service: Cardiology;  Laterality: Left;    MINIMALLY INVASIVE FORAMINOTOMY OF  SPINE N/A 11/15/2018    Procedure: FORAMINOTOMY, SPINE, MINIMALLY INVASIVE DECOMPRESSION L4-5;  Surgeon: Iván Stevenson Jr., MD;  Location: [facility] OR;  Service: Orthopedics;  Laterality: N/A;    NECK SURGERY      acf    PERCUTANEOUS CORONARY INTERVENTION, ARTERY N/A 1/1/2023    Procedure: Percutaneous coronary intervention;  Surgeon: Stefany Elizondo MD;  Location: [facility] CATH/EP LAB;  Service: Cardiology;  Laterality: N/A;    PERCUTANEOUS TRANSLUMINAL ANGIOPLASTY (PTA) OF PERIPHERAL VESSEL Right 05/01/2020    Procedure: PTA, PERIPHERAL VESSEL of right lower extremity;  Surgeon: Luke Cabello MD;  Location: [facility] CATH;  Service: Cardiology;  Laterality: Right;    PERCUTANEOUS TRANSLUMINAL ANGIOPLASTY (PTA) OF PERIPHERAL VESSEL Left 09/10/2020    Procedure: PTA, PERIPHERAL VESSEL, Left lower extremity;  Surgeon: Luke Cabello MD;  Location: [facility] CATH;  Service: Cardiology;  Laterality: Left;    PERCUTANEOUS TRANSLUMINAL ANGIOPLASTY (PTA) OF PERIPHERAL VESSEL Left 07/06/2021    Profunda and SFA       Medications (scheduled):      aspirin  81 mg Oral Daily    cefTRIAXone (ROCEPHIN) IVPB  1 g Intravenous Q24H    chlorhexidine  15 mL Mouth/Throat BID    enoxparin  40 mg Subcutaneous  Q24H    famotidine  20 mg Per OG tube BID    insulin regular  5 Units Intravenous Once    levalbuterol  0.63 mg Nebulization TID WAKE    methylPREDNISolone sodium succinate injection  40 mg Intravenous Q8H    mupirocin   Nasal BID    nicotine  1 patch Transdermal Daily    ticagrelor  90 mg Oral BID       Medications (infusions):      DOBUTamine Stopped (23)    fentanyl 250 mcg/hr (23 0849)    midazolam Stopped (23 1901)    NORepinephrine bitartrate-D5W Stopped (23 0500)    propofoL 50 mcg/kg/min (23 0831)    vasopressin Stopped (23)       Medications (prn):     acetaminophen, albuterol-ipratropium, atropine, calcium gluconate IVPB, calcium gluconate IVPB, calcium gluconate IVPB, dextrose 10%, dextrose 10%, DOBUTamine, EPINEPHrine, [] fentaNYL **FOLLOWED BY** fentaNYL, glucagon (human recombinant), glucose, glucose, hydrALAZINE, HYDROcodone-acetaminophen, insulin aspart U-100, magnesium sulfate IVPB, magnesium sulfate IVPB, melatonin, midazolam, morphine, naloxone, ondansetron, polyethylene glycol, potassium chloride in water **AND** potassium chloride in water **AND** potassium chloride in water, senna-docusate 8.6-50 mg, simethicone, sodium chloride 0.9%, sodium phosphate IVPB, sodium phosphate IVPB, sodium phosphate IVPB    Family History:   Family History   Problem Relation Age of Onset    COPD Mother     Hypertension Father     Heart disease Father     Heart attack Father     COPD Sister     Other Sister     Kidney failure Brother     Hypertension Brother     Diabetes Brother        Social History: Tobacco:   Social History     Tobacco Use   Smoking Status Former    Packs/day: 2.00    Years: 60.00    Pack years: 120.00    Types: Cigarettes    Quit date:     Years since quittin.0   Smokeless Tobacco Never                                EtOH:   Social History     Substance and Sexual Activity   Alcohol Use No                                Drugs:  "  Social History     Substance and Sexual Activity   Drug Use No       Physical Exam    Vital signs:  Temp:  [98.2 °F (36.8 °C)-98.7 °F (37.1 °C)]   Pulse:  []   Resp:  [12-42]   BP: (122-196)/(62-95)   SpO2:  [89 %-100 %]   Arterial Line BP: (110-234)/(53-98)     Intake/Output:   Intake/Output Summary (Last 24 hours) at 1/6/2023 1043  Last data filed at 1/6/2023 0601  Gross per 24 hour   Intake 3441.89 ml   Output 1950 ml   Net 1491.89 ml          BMI: Estimated body mass index is 33.27 kg/m² as calculated from the following:    Height as of an earlier encounter on 1/1/23: 6' 2.02" (1.88 m).    Weight as of this encounter: 117.6 kg (259 lb 4.2 oz).    PHYSICAL EXAM  GENERAL:  sedated, intubated  HEENT: PERRLA.  NECK: No cervical adenopathy palpated.  HEART: Regular rate and rhythm. No murmur or gallop auscultated.  LUNGS: Clear to auscultation. Lung excursion symmetrical.   ABDOMEN: Bowel sounds present. Soft, non-tender, non-distended.  EXTREMITIES: Normal muscle tone and joint movement, no cyanosis or clubbing.   LYMPHATICS: No adenopathy palpated, no edema.  SKIN: Dry, intact, no lesions.   NEURO: sedated on vent; opens eyes and moves head to sternal rub      Laboratory    Recent Labs   Lab 01/06/23  0328 01/06/23  0430   WBC 10.33  --    RBC 3.10*  --    HGB 9.7*  --    HCT 29.1* 29*     --    MCV 94  --    MCH 31.3*  --    MCHC 33.3  --          Recent Labs   Lab 01/06/23  0328   CALCIUM 8.1*   PROT 5.5*      K 4.2   CO2 25      BUN 36*   CREATININE 1.0   ALKPHOS 69   ALT 76*   AST 40   BILITOT 0.6         No results for input(s): PT, INR, APTT in the last 24 hours.      No results for input(s): CPK, CPKMB, TROPONINI, MB in the last 24 hours.      Additional labs: reviewed    Microbiology:       Microbiology Results (last 7 days)       Procedure Component Value Units Date/Time    Blood culture [[id number]] Collected: 01/03/23 0954    Order Status: Completed Specimen: Blood Updated: " 01/06/23 1032     Blood Culture, Routine No Growth to date      No Growth to date      No Growth to date      No Growth to date    Blood culture [[id number]] Collected: 01/03/23 0954    Order Status: Completed Specimen: Blood Updated: 01/06/23 1032     Blood Culture, Routine No Growth to date      No Growth to date      No Growth to date      No Growth to date    Culture, Respiratory with Gram Stain [[id number]]  (Abnormal)  (Susceptibility) Collected: 01/02/23 1431    Order Status: Completed Specimen: Respiratory from Tracheal Aspirate Updated: 01/04/23 0856     Respiratory Culture Reduced normal respiratory lorenzo      STAPHYLOCOCCUS AUREUS  Moderate       Gram Stain (Respiratory) <10 epithelial cells per low power field.     Gram Stain (Respiratory) Few WBC's     Gram Stain (Respiratory) Moderate Gram positive cocci    MRSA Screen by PCR [[id number]] Collected: 01/03/23 1412    Order Status: Completed Specimen: Nasopharyngeal Swab from Nasal Updated: 01/03/23 1554     MRSA SCREEN BY PCR Negative            Radiology    X-Ray Chest AP Portable  Narrative: EXAMINATION:  XR CHEST AP PORTABLE    CLINICAL HISTORY:  eval and compare to previous evals.;  coronary artery disease, STEMI    FINDINGS:  Portable chest at 439 compared with 01/05/2023 shows unchanged endotracheal tube and enteric catheter.  Cardiomediastinal silhouette unchanged.  Increased degree of patient rotation is noted.    Development of left basilar pleuroparenchymal opacity is evident.  Hazy opacity overlying the right lower lung zones suggest layering pleural effusion.  Central pulmonary vasculature is unchanged.  No pneumothorax.  Impression: Developing left basilar pleuroparenchymal opacity either due to atelectasis, consolidation, pleural effusion, or some combination thereof.    Electronically signed by: Devendra Cassidy MD  Date:    01/06/2023  Time:    06:39        Additional Studies    reviewed    Ventilator Information    Vent Mode: A/C  Oxygen  Concentration (%):  [70-80] 70  Resp Rate Total:  [24 br/min-35 br/min] 24 br/min  Vt Set:  [540 mL] 540 mL  PEEP/CPAP:  [5 cmH20] 5 cmH20  Mean Airway Pressure:  [2.9 yuS66-41 cmH20] 10 cmH20         Recent Labs     01/06/23  0430   PH 7.399   PCO2 41.2   PO2 103*   HCO3 25.5   POCSATURATED 98   BE 1           Impression    Active Hospital Problems    Diagnosis  POA    *STEMI involving right coronary artery [I21.11]  Yes    Pneumonia of right lung due to methicillin susceptible Staphylococcus aureus (MSSA) [J15.211]  No    Shock liver [K72.00]  Yes    Cardiogenic shock [R57.0]  Yes    Cardiac arrest [I46.9]  Yes    Acute respiratory failure with hypoxia and hypercarbia [J96.01, J96.02]  Yes    Leukocytosis [D72.829]  Yes    Anemia [D64.9]  Yes    History of tobacco abuse [Z87.891]  Not Applicable    Primary hypertension [I10]  Yes    PVD (peripheral vascular disease) with claudication [I73.9]  Yes    PAD (peripheral artery disease) [I73.9]  Yes      Resolved Hospital Problems    Diagnosis Date Resolved POA    Hyponatremia [E87.1] 01/02/2023 Yes   EEG on 1/4 showing voltage suppression and slowing.  CT head on 1/4 w/o acute abnormality.    Plan    Ventilator, adjust as needed - not ready to consider weaning, decrease O2 as able   SAT/SBT in AM  Wean pressors as able   Sedate as needed  - try to minimize to help with neuro evaluation  Neuro following  Continue ceftriaxone  Watch renal function and LFT  Monitor electrolytes and replace via SS  Watch H/H  Added steroids for wheezing  Prognosis is poor but need to continue to follow neuro status    Upon my evaluation, this patient had a high probability of imminent or life-threatening deterioration, which required my direct attention, intervention, and personal management.    I have personally provided 35 minutes of critical care time exclusive of time spent on separately billable procedures. Over 50% of the time of this encounter was spent in direct care at the  bedside. Time includes review of laboratory data, radiology results, discussion with consultants, and monitoring for potential decompensation. Interventions were performed as documented above.    Jasvir Nails MD  Pulmonary and Critical Care Medicine  Formerly [facility]  Date of Service: 01/06/2023  10:44 AM

## 2023-01-06 NOTE — NURSING
Diprivan paused for neuro assessment (Dr. Manzo to return when pt is more awake). Family at bedside; update given.

## 2023-01-06 NOTE — RESPIRATORY THERAPY
01/1955   Patient Assessment/Suction   Level of Consciousness (AVPU) responds to voice   Respiratory Effort Normal;Unlabored   Expansion/Accessory Muscles/Retractions no use of accessory muscles   All Lung Fields Breath Sounds equal bilaterally;coarse   Rhythm/Pattern, Respiratory assisted mechanically   Cough Frequency with stimulation   Cough Type assisted   Suction Method tracheal   $ Suction Charges Inline Suction Procedure Stat Charge   Secretions Amount small   Secretions Color cloudy;white   Secretions Characteristics thick   PRE-TX-O2   Device (Oxygen Therapy) ventilator   Oxygen Concentration (%) 70   SpO2 97 %   Pulse Oximetry Type Continuous   $ Pulse Oximetry - Multiple Charge Pulse Oximetry - Multiple   Pulse 82   Resp (!) 26   Aerosol Therapy   $ Aerosol Therapy Charges Aerosol Treatment   Daily Review of Necessity (SVN) completed   Respiratory Treatment Status (SVN) given   Treatment Route (SVN) in-line;ventilator   Patient Position (SVN) semi-Doan's   Post Treatment Assessment (SVN) breath sounds improved;congestion decreased   Signs of Intolerance (SVN) none   Breath Sounds Post-Respiratory Treatment   Post-treatment Heart Rate (beats/min) 81   Post-treatment Resp Rate (breaths/min) 29        Airway - Non-Surgical 01/01/23 0217   Placement Date/Time: 01/01/23 0217   Inserted by: MD  Airway Device Size: 7.5  Style: Cuffed  Cuff Inflated With: Air  Placement Verified By: Capnometry;Auscultation;Colorimetric EtCO2 device  Depth of Insertion (cm): 23  Secured at: Teeth  Insertion ...   Secured at 25 cm   Measured At Lips   Secured Location Left   Secured by Commercial tube domínguez   Bite Block none   Site Condition Cool;Dry   Status Intact;Secured;Patent   Site Assessment Clean;Dry   Cuff Volume   (MLT)   Airway Safety   Ambu bag with the patient? Yes, Adult Ambu   Is mask with the patient? Yes, Adult Mask   Vent Select   Conventional Vent Y   Charged w/in last 24h YES   Preset Conventional  Ventilator Settings   Vent ID [id number]   Vent Type    Ventilation Type VC   Vent Mode A/C   Humidity HME   Set Rate 24 BPM   Vt Set 540 mL   PEEP/CPAP 5 cmH20   Peak Flow 75 L/min   Peak End Inspiratory Pressure 25 cmH20   I-Trigger Type  V-TRIG   Trigger Sensitivity Flow/I-Trigger 2 L/min   Patient Ventilator Parameters   Resp Rate Total 24 br/min   Peak Airway Pressure 27 cmH20   Mean Airway Pressure 11 cmH20   Plateau Pressure 56 cmH20   Exhaled Vt 563 mL   Total Ve 13.6 L/m   I:E Ratio Measured 1:2.20   Auto PEEP 20 cmH20   Tubing ID (mm) 7.5 mm   Tube Type ET   Conventional Ventilator Alarms   Alarms On Y   Resp Rate High Alarm 45 br/min   Press High Alarm 65 cmH2O   Apnea Rate 24   Apnea Volume (mL) 1 mL   Apnea Oxygen Concentration  100   Apnea Flow Rate (L/min) 60   T Apnea 20 sec(s)   Ready to Wean/Extubation Screen   FIO2<=50 (chart decimal) (!) 0.7   MV<16L (chart vol.) 13.6   PEEP <=8 (chart #) 5   Ready to Wean Parameters   F/VT Ratio<105 (RSBI) (!) 46.18   Vital Capacity (mL) 0   Education   $ Education Bronchodilator;Suction;Ventilator Oxygen;15 min   Respiratory Evaluation   $ Care Plan Tech Time 15 min   $ Eval/Re-eval Charges Re-evaluation   Evaluation For Re-Eval 5+ day

## 2023-01-06 NOTE — CARE UPDATE
01/06/23 0748   Patient Assessment/Suction   Level of Consciousness (AVPU) responds to pain   Respiratory Effort Normal   Expansion/Accessory Muscles/Retractions no use of accessory muscles   All Lung Fields Breath Sounds coarse   Rhythm/Pattern, Respiratory assisted mechanically   Cough Frequency with stimulation   Cough Type assisted   Suction Method oral;tracheal   Suction Pressure (mmHg) -120 mmHg   $ Suction Charges Inline Suction Procedure Stat Charge   Secretions Amount moderate   Secretions Color brown   Secretions Characteristics thick   PRE-TX-O2   Device (Oxygen Therapy) ventilator   Oxygen Concentration (%) 70   SpO2 97 %   Pulse Oximetry Type Continuous   $ Pulse Oximetry - Multiple Charge Pulse Oximetry - Multiple   Pulse 92   Resp (!) 32   Aerosol Therapy   $ Aerosol Therapy Charges Aerosol Treatment   Daily Review of Necessity (SVN) completed   Respiratory Treatment Status (SVN) given   Treatment Route (SVN) in-line;ventilator   Patient Position (SVN) semi-Doan's   Post Treatment Assessment (SVN) increased aeration   Signs of Intolerance (SVN) none   Breath Sounds Post-Respiratory Treatment   Throughout All Fields Post-Treatment All Fields   Throughout All Fields Post-Treatment aeration increased   Post-treatment Heart Rate (beats/min) 104   Post-treatment Resp Rate (breaths/min) 31        Airway - Non-Surgical 01/01/23 0217   Placement Date/Time: 01/01/23 0217   Inserted by: MD  Airway Device Size: 7.5  Style: Cuffed  Cuff Inflated With: Air  Placement Verified By: Capnometry;Auscultation;Colorimetric EtCO2 device  Depth of Insertion (cm): 23  Secured at: Teeth  Insertion ...   Secured at 25 cm   Measured At Lips   Secured Location Center   Secured by Commercial tube domínguez   Bite Block center   Site Condition Cool;Dry   Status Intact;Secured;Patent   Site Assessment Clean;Dry   General Safety Checklist   Safety Promotion/Fall Prevention side rails raised   Airway Safety   Ambu bag with the  patient? Yes, Adult Ambu   Is mask with the patient? Yes, Adult Mask   Suction set is at the bedside? Yes   Vent Select   Conventional Vent Y   $ Ventilator Subsequent 1   Charged w/in last 24h YES   Preset Conventional Ventilator Settings   Vent ID [id number]   Vent Type    Ventilation Type VC   Vent Mode A/C   Humidity HME   Set Rate 24 BPM   Vt Set 540 mL   PEEP/CPAP 5 cmH20   Waveform RAMP   Peak Flow 75 L/min   Peak End Inspiratory Pressure 17 cmH20   I-Trigger Type  V-TRIG   Trigger Sensitivity Flow/I-Trigger 2 L/min   Patient Ventilator Parameters   Resp Rate Total 32 br/min   Peak Airway Pressure 19 cmH20   Mean Airway Pressure 2.9 cmH20   Plateau Pressure 56 cmH20   Exhaled Vt 215 mL   Total Ve 16 L/m   I:E Ratio Measured 2.20:1   Auto PEEP 20 cmH20   Conventional Ventilator Alarms   Alarms On Y   Ve High Alarm 25 L/min   Ve Low Alarm 3 L/min   Vt High Alarm 1200 mL   Vt Low Alarm 200 mL   Resp Rate High Alarm 45 br/min   Press High Alarm 65 cmH2O   Apnea Rate 24   Apnea Volume (mL) 1 mL   Apnea Oxygen Concentration  100   Apnea Flow Rate (L/min) 60   T Apnea 20 sec(s)   IHI Ventilator Associated Pneumonia Bundle (Required)   Head of Bed Elevated  HOB 30   Oral Care Mouth suctioned;with mouthwash   Vent Circut Breaks Minimized Yes   Ready to Wean/Extubation Screen   FIO2<=50 (chart decimal) (!) 0.7   MV<16L (chart vol.) 16   PEEP <=8 (chart #) 5   Ready to Wean Parameters   F/VT Ratio<105 (RSBI) 148.84   Vital Capacity (mL) 0

## 2023-01-07 LAB
ALBUMIN SERPL BCP-MCNC: 2.3 G/DL (ref 3.5–5.2)
ALLENS TEST: ABNORMAL
ALP SERPL-CCNC: 65 U/L (ref 55–135)
ALT SERPL W/O P-5'-P-CCNC: 60 U/L (ref 10–44)
ANION GAP SERPL CALC-SCNC: 5 MMOL/L (ref 8–16)
AST SERPL-CCNC: 28 U/L (ref 10–40)
BASOPHILS # BLD AUTO: 0.02 K/UL (ref 0–0.2)
BASOPHILS NFR BLD: 0.2 % (ref 0–1.9)
BILIRUB SERPL-MCNC: 0.5 MG/DL (ref 0.1–1)
BUN SERPL-MCNC: 48 MG/DL (ref 8–23)
CALCIUM SERPL-MCNC: 8.1 MG/DL (ref 8.7–10.5)
CHLORIDE SERPL-SCNC: 106 MMOL/L (ref 95–110)
CO2 SERPL-SCNC: 26 MMOL/L (ref 23–29)
CREAT SERPL-MCNC: 0.8 MG/DL (ref 0.5–1.4)
DELSYS: ABNORMAL
DIFFERENTIAL METHOD: ABNORMAL
EOSINOPHIL # BLD AUTO: 0 K/UL (ref 0–0.5)
EOSINOPHIL NFR BLD: 0 % (ref 0–8)
ERYTHROCYTE [DISTWIDTH] IN BLOOD BY AUTOMATED COUNT: 14.6 % (ref 11.5–14.5)
ERYTHROCYTE [SEDIMENTATION RATE] IN BLOOD BY WESTERGREN METHOD: 22 MM/H
EST. GFR  (NO RACE VARIABLE): >60 ML/MIN/1.73 M^2
FIO2: 60
GLUCOSE SERPL-MCNC: 115 MG/DL (ref 70–110)
GLUCOSE SERPL-MCNC: 115 MG/DL (ref 70–110)
HCO3 UR-SCNC: 27.2 MMOL/L (ref 24–28)
HCT VFR BLD AUTO: 30.6 % (ref 40–54)
HCT VFR BLD CALC: 31 %PCV (ref 36–54)
HGB BLD-MCNC: 10.3 G/DL (ref 14–18)
IMM GRANULOCYTES # BLD AUTO: 0.58 K/UL (ref 0–0.04)
IMM GRANULOCYTES NFR BLD AUTO: 4.9 % (ref 0–0.5)
LYMPHOCYTES # BLD AUTO: 0.6 K/UL (ref 1–4.8)
LYMPHOCYTES NFR BLD: 5.3 % (ref 18–48)
MAGNESIUM SERPL-MCNC: 2.4 MG/DL (ref 1.6–2.6)
MCH RBC QN AUTO: 31.5 PG (ref 27–31)
MCHC RBC AUTO-ENTMCNC: 33.7 G/DL (ref 32–36)
MCV RBC AUTO: 94 FL (ref 82–98)
MIN VOL: 12
MODE: ABNORMAL
MONOCYTES # BLD AUTO: 1 K/UL (ref 0.3–1)
MONOCYTES NFR BLD: 8.1 % (ref 4–15)
NEUTROPHILS # BLD AUTO: 9.6 K/UL (ref 1.8–7.7)
NEUTROPHILS NFR BLD: 81.5 % (ref 38–73)
NRBC BLD-RTO: 0 /100 WBC
PCO2 BLDA: 43 MMHG (ref 35–45)
PEEP: 5
PH SMN: 7.41 [PH] (ref 7.35–7.45)
PIP: 33
PLATELET # BLD AUTO: 215 K/UL (ref 150–450)
PLATELET BLD QL SMEAR: ABNORMAL
PMV BLD AUTO: 9.5 FL (ref 9.2–12.9)
PO2 BLDA: 78 MMHG (ref 80–100)
POC BE: 3 MMOL/L
POC IONIZED CALCIUM: 1.22 MMOL/L (ref 1.06–1.42)
POC SATURATED O2: 95 % (ref 95–100)
POC TCO2: 28 MMOL/L (ref 23–27)
POTASSIUM BLD-SCNC: 4.6 MMOL/L (ref 3.5–5.1)
POTASSIUM SERPL-SCNC: 4.6 MMOL/L (ref 3.5–5.1)
PROT SERPL-MCNC: 5.7 G/DL (ref 6–8.4)
RBC # BLD AUTO: 3.27 M/UL (ref 4.6–6.2)
SAMPLE: ABNORMAL
SITE: ABNORMAL
SODIUM BLD-SCNC: 140 MMOL/L (ref 136–145)
SODIUM SERPL-SCNC: 137 MMOL/L (ref 136–145)
SP02: 97
VT: 540
WBC # BLD AUTO: 11.79 K/UL (ref 3.9–12.7)

## 2023-01-07 PROCEDURE — 83735 ASSAY OF MAGNESIUM: CPT | Performed by: FAMILY MEDICINE

## 2023-01-07 PROCEDURE — 25000003 PHARM REV CODE 250: Performed by: FAMILY MEDICINE

## 2023-01-07 PROCEDURE — 80053 COMPREHEN METABOLIC PANEL: CPT | Performed by: FAMILY MEDICINE

## 2023-01-07 PROCEDURE — 94761 N-INVAS EAR/PLS OXIMETRY MLT: CPT

## 2023-01-07 PROCEDURE — 94799 UNLISTED PULMONARY SVC/PX: CPT

## 2023-01-07 PROCEDURE — 82330 ASSAY OF CALCIUM: CPT

## 2023-01-07 PROCEDURE — 99900031 HC PATIENT EDUCATION (STAT)

## 2023-01-07 PROCEDURE — 20000000 HC ICU ROOM

## 2023-01-07 PROCEDURE — 84295 ASSAY OF SERUM SODIUM: CPT

## 2023-01-07 PROCEDURE — S4991 NICOTINE PATCH NONLEGEND: HCPCS | Performed by: INTERNAL MEDICINE

## 2023-01-07 PROCEDURE — 94640 AIRWAY INHALATION TREATMENT: CPT

## 2023-01-07 PROCEDURE — 63600175 PHARM REV CODE 636 W HCPCS: Performed by: INTERNAL MEDICINE

## 2023-01-07 PROCEDURE — 94003 VENT MGMT INPAT SUBQ DAY: CPT

## 2023-01-07 PROCEDURE — 25000003 PHARM REV CODE 250: Performed by: INTERNAL MEDICINE

## 2023-01-07 PROCEDURE — 99900035 HC TECH TIME PER 15 MIN (STAT)

## 2023-01-07 PROCEDURE — 85025 COMPLETE CBC W/AUTO DIFF WBC: CPT | Performed by: FAMILY MEDICINE

## 2023-01-07 PROCEDURE — 82803 BLOOD GASES ANY COMBINATION: CPT

## 2023-01-07 PROCEDURE — 99900026 HC AIRWAY MAINTENANCE (STAT)

## 2023-01-07 PROCEDURE — 84132 ASSAY OF SERUM POTASSIUM: CPT

## 2023-01-07 PROCEDURE — 25000242 PHARM REV CODE 250 ALT 637 W/ HCPCS: Performed by: INTERNAL MEDICINE

## 2023-01-07 PROCEDURE — 99291 CRITICAL CARE FIRST HOUR: CPT | Mod: ,,, | Performed by: INTERNAL MEDICINE

## 2023-01-07 PROCEDURE — 85014 HEMATOCRIT: CPT

## 2023-01-07 PROCEDURE — 37799 UNLISTED PX VASCULAR SURGERY: CPT

## 2023-01-07 PROCEDURE — 27000221 HC OXYGEN, UP TO 24 HOURS

## 2023-01-07 PROCEDURE — 99291 PR CRITICAL CARE, E/M 30-74 MINUTES: ICD-10-PCS | Mod: ,,, | Performed by: INTERNAL MEDICINE

## 2023-01-07 RX ORDER — CHLORHEXIDINE GLUCONATE ORAL RINSE 1.2 MG/ML
15 SOLUTION DENTAL 2 TIMES DAILY
Status: DISCONTINUED | OUTPATIENT
Start: 2023-01-08 | End: 2023-02-01 | Stop reason: HOSPADM

## 2023-01-07 RX ORDER — ENOXAPARIN SODIUM 100 MG/ML
40 INJECTION SUBCUTANEOUS
Status: DISCONTINUED | OUTPATIENT
Start: 2023-01-07 | End: 2023-01-19

## 2023-01-07 RX ORDER — DEXMEDETOMIDINE HYDROCHLORIDE 4 UG/ML
0-1.4 INJECTION, SOLUTION INTRAVENOUS CONTINUOUS
Status: DISCONTINUED | OUTPATIENT
Start: 2023-01-07 | End: 2023-01-09

## 2023-01-07 RX ADMIN — LEVALBUTEROL HYDROCHLORIDE 0.63 MG: 0.63 SOLUTION RESPIRATORY (INHALATION) at 07:01

## 2023-01-07 RX ADMIN — TICAGRELOR 90 MG: 90 TABLET ORAL at 10:01

## 2023-01-07 RX ADMIN — FAMOTIDINE 20 MG: 20 TABLET ORAL at 10:01

## 2023-01-07 RX ADMIN — FENTANYL CITRATE 50 MCG: 50 INJECTION INTRAMUSCULAR; INTRAVENOUS at 09:01

## 2023-01-07 RX ADMIN — LEVALBUTEROL HYDROCHLORIDE 0.63 MG: 0.63 SOLUTION RESPIRATORY (INHALATION) at 01:01

## 2023-01-07 RX ADMIN — FENTANYL CITRATE 50 MCG: 50 INJECTION INTRAMUSCULAR; INTRAVENOUS at 07:01

## 2023-01-07 RX ADMIN — POLYETHYLENE GLYCOL 3350 17 G: 17 POWDER, FOR SOLUTION ORAL at 10:01

## 2023-01-07 RX ADMIN — METHYLPREDNISOLONE SODIUM SUCCINATE 40 MG: 40 INJECTION, POWDER, FOR SOLUTION INTRAMUSCULAR; INTRAVENOUS at 03:01

## 2023-01-07 RX ADMIN — PROPOFOL 40 MCG/KG/MIN: 10 INJECTION, EMULSION INTRAVENOUS at 06:01

## 2023-01-07 RX ADMIN — METHYLPREDNISOLONE SODIUM SUCCINATE 40 MG: 40 INJECTION, POWDER, FOR SOLUTION INTRAMUSCULAR; INTRAVENOUS at 06:01

## 2023-01-07 RX ADMIN — FENTANYL CITRATE 250 MCG/HR: 50 INJECTION INTRAVENOUS at 05:01

## 2023-01-07 RX ADMIN — PROPOFOL 45 MCG/KG/MIN: 10 INJECTION, EMULSION INTRAVENOUS at 10:01

## 2023-01-07 RX ADMIN — CEFTRIAXONE 1 G: 1 INJECTION, SOLUTION INTRAVENOUS at 02:01

## 2023-01-07 RX ADMIN — HYDRALAZINE HYDROCHLORIDE 10 MG: 20 INJECTION INTRAMUSCULAR; INTRAVENOUS at 11:01

## 2023-01-07 RX ADMIN — PROPOFOL 50 MCG/KG/MIN: 10 INJECTION, EMULSION INTRAVENOUS at 03:01

## 2023-01-07 RX ADMIN — DEXMEDETOMIDINE HYDROCHLORIDE 1.4 MCG/KG/HR: 4 INJECTION, SOLUTION INTRAVENOUS at 10:01

## 2023-01-07 RX ADMIN — TICAGRELOR 90 MG: 90 TABLET ORAL at 08:01

## 2023-01-07 RX ADMIN — ASPIRIN 81 MG CHEWABLE TABLET 81 MG: 81 TABLET CHEWABLE at 10:01

## 2023-01-07 RX ADMIN — FENTANYL CITRATE 200 MCG/HR: 50 INJECTION INTRAVENOUS at 10:01

## 2023-01-07 RX ADMIN — FAMOTIDINE 20 MG: 20 TABLET ORAL at 08:01

## 2023-01-07 RX ADMIN — PROPOFOL 50 MCG/KG/MIN: 10 INJECTION, EMULSION INTRAVENOUS at 12:01

## 2023-01-07 RX ADMIN — FENTANYL CITRATE 50 MCG: 50 INJECTION INTRAMUSCULAR; INTRAVENOUS at 11:01

## 2023-01-07 RX ADMIN — DEXMEDETOMIDINE HYDROCHLORIDE 1.2 MCG/KG/HR: 4 INJECTION, SOLUTION INTRAVENOUS at 08:01

## 2023-01-07 RX ADMIN — MIDAZOLAM HYDROCHLORIDE 3 MG/HR: 5 INJECTION, SOLUTION INTRAMUSCULAR; INTRAVENOUS at 04:01

## 2023-01-07 RX ADMIN — FENTANYL CITRATE 50 MCG: 50 INJECTION INTRAMUSCULAR; INTRAVENOUS at 10:01

## 2023-01-07 RX ADMIN — DEXMEDETOMIDINE HYDROCHLORIDE 0.2 MCG/KG/HR: 4 INJECTION, SOLUTION INTRAVENOUS at 05:01

## 2023-01-07 RX ADMIN — NICOTINE 1 PATCH: 14 PATCH, EXTENDED RELEASE TRANSDERMAL at 10:01

## 2023-01-07 RX ADMIN — FENTANYL CITRATE 200 MCG/HR: 50 INJECTION INTRAVENOUS at 04:01

## 2023-01-07 RX ADMIN — ENOXAPARIN SODIUM 40 MG: 100 INJECTION SUBCUTANEOUS at 10:01

## 2023-01-07 RX ADMIN — FENTANYL CITRATE 250 MCG/HR: 50 INJECTION INTRAVENOUS at 06:01

## 2023-01-07 RX ADMIN — FENTANYL CITRATE 50 MCG: 50 INJECTION INTRAMUSCULAR; INTRAVENOUS at 08:01

## 2023-01-07 NOTE — PROGRESS NOTES
[facility] Medicine  Progress Note    Patient name: Peyman Gr  MRN: [id number]  Admit Date: 1/1/2023   LOS: 6 days     SUBJECTIVE:     Principal problem: STEMI involving right coronary artery    Interval History:  No acute overnight events reported.  Stable ventilator requirements.  Reportedly patient had purposeful left upper extremity movement.     Summary:   67-year-old male with peripheral vascular disease, essential hypertension, hyperlipidemia and ongoing tobacco use presented to outside facility with 2 hour onset of chest pain and shortness of breath.  EKG revealed inferior and anterolateral STEMI.  Quickly degenerated into cardiac arrest with runs of VT/VF and asystole requiring resuscitation for about 40 minutes.  He was subsequently intubated and transferred to our ED. En route he developed cardiac arrest again (PEA) which was continued into our ED and was resuscitated for another 30 minutes.  He required transcutaneous pacing.  He was taken emergently to the catheterization lab and underwent PCI with stenting of RCA which was felt to be the culprit lesion.  He was also found to have left main stenosis of about 70%.  A transvenous pacemaker was also placed.  Currently admitted to the ICU for post cardiac arrest care.  He remains intubated and critically ill. Hospital stay complicated by fever; initiated on broad spectrum antibiotics.  Respiratory culture growing MSSA - de-escalated to ceftriaxone.    Scheduled Meds:   aspirin  81 mg Oral Daily    cefTRIAXone (ROCEPHIN) IVPB  1 g Intravenous Q24H    [START ON 1/8/2023] chlorhexidine  15 mL Mouth/Throat BID    enoxparin  40 mg Subcutaneous Q24H    famotidine  20 mg Per OG tube BID    levalbuterol  0.63 mg Nebulization TID WAKE    methylPREDNISolone sodium succinate injection  40 mg Intravenous Q8H    nicotine  1 patch Transdermal Daily    ticagrelor  90 mg Oral BID     Continuous Infusions:   DOBUTamine Stopped (01/01/23 0945)     fentanyl 200 mcg/hr (23 1028)    midazolam 3 mg/hr (23 0620)    NORepinephrine bitartrate-D5W Stopped (23 0500)    propofoL 45 mcg/kg/min (23 1027)    vasopressin Stopped (23 0945)     PRN Meds:acetaminophen, albuterol-ipratropium, atropine, calcium gluconate IVPB, calcium gluconate IVPB, calcium gluconate IVPB, dextrose 10%, dextrose 10%, DOBUTamine, EPINEPHrine, [] fentaNYL **FOLLOWED BY** fentaNYL, glucagon (human recombinant), glucose, glucose, hydrALAZINE, HYDROcodone-acetaminophen, magnesium sulfate IVPB, magnesium sulfate IVPB, melatonin, midazolam, morphine, naloxone, ondansetron, polyethylene glycol, potassium chloride in water **AND** potassium chloride in water **AND** potassium chloride in water, senna-docusate 8.6-50 mg, simethicone, sodium chloride 0.9%, sodium phosphate IVPB, sodium phosphate IVPB, sodium phosphate IVPB    Review of patient's allergies indicates:  No Known Allergies    Review of Systems:  Unable to obtain due to clinical condition    OBJECTIVE:     Vital Signs (Most Recent)  Temp: 98.2 °F (36.8 °C) (23 1101)  Pulse: (!) 48 (23 1353)  Resp: (!) 22 (23 1353)  BP: (!) 155/81 (23 1300)  SpO2: 97 % (23 1353)    Vital Signs Range (Last 24H):  Temp:  [97.7 °F (36.5 °C)-98.3 °F (36.8 °C)]   Pulse:  []   Resp:  [10-42]   BP: (126-189)/(67-85)   SpO2:  [90 %-100 %]   Arterial Line BP: (108-227)/(51-97)     I & O (Last 24H):  Intake/Output Summary (Last 24 hours) at 2023 1522  Last data filed at 2023 0655  Gross per 24 hour   Intake 2007.75 ml   Output 1350 ml   Net 657.75 ml         Physical Exam:  General: Patient intubated and sedated.  Appears stated age  Eyes: No conjunctival injection. No scleral icterus.  Scleral edema noted  ENT:  ET and OG tubes noted.  No discharge from ears or nose  Neck: Supple, trachea midline. No JVD  CVS:  Bradycardic.  1+ pitting edema of lower extremities  Lungs:  Mechanical breath  sounds  Abdomen:  Soft, nontender and nondistended.  No organomegaly  Neuro:  Sedated.  Pupils are equal, round and minimally reactive to light.  Skin:  No rash or erythema noted  : Duron catheter with yellow urine    Laboratory:  I have reviewed all pertinent lab results within the past 24 hours.  CBC:   Recent Labs   Lab 01/07/23 0345 01/07/23  0358   WBC 11.79  --    RBC 3.27*  --    HGB 10.3*  --    HCT 30.6* 31*     --    MCV 94  --    MCH 31.5*  --    MCHC 33.7  --        CMP:   Recent Labs   Lab 01/07/23  0345   *   CALCIUM 8.1*   ALBUMIN 2.3*   PROT 5.7*      K 4.6   CO2 26      BUN 48*   CREATININE 0.8   ALKPHOS 65   ALT 60*   AST 28   BILITOT 0.5       Cardiac markers:   Recent Labs   Lab 01/01/23  0155   TROPONINI 1.105*         Diagnostic Results:  Labs: Reviewed  X-Ray: Reviewed    X-Ray Chest AP Portable [[id number]] Collected: 01/07/23 0535   Order Status: Completed Updated: 01/07/23 0712   Narrative:     HISTORY: eval and compare to previous evals.     COMPARISON:1/6/2023     FINDINGS: Tip of ET tube resides at the medial ends of clavicles in optimal position. The NG tube passes into the stomach. The heart is prominent size with moderate biventricular dominance. Patient is slightly shifted towards the left.     Persistent homogeneous airlessness barely in the basilar segments is noted unchanged upon comparison with the changes in the left is attributed the R2 to layering pleural effusion. Persistent prominence of the central pulmonary vascularity. No evidence of pneumothorax. There are clips in the right supraclavicular region noted.     IMPRESSION:   1. Stable appearance the chest when compared to previous.   2. Homogeneous airlessness left lung base secondary pleural effusion, atelectasis, pneumonic infiltrate, or combination thereof.     Electronically signed by:  Solo Harris MD  1/7/2023 7:10 AM CST Workstation: [id number]       ASSESSMENT/PLAN:         Active  Hospital Problems    Diagnosis  POA    *STEMI involving right coronary artery [I21.11]  Yes    Pneumonia of right lung due to methicillin susceptible Staphylococcus aureus (MSSA) [J15.211]  No    Shock liver [K72.00]  Yes    Cardiogenic shock [R57.0]  Yes    Cardiac arrest [I46.9]  Yes    Acute respiratory failure with hypoxia and hypercarbia [J96.01, J96.02]  Yes    Leukocytosis [D72.829]  Yes    Anemia [D64.9]  Yes    History of tobacco abuse [Z87.891]  Not Applicable    Primary hypertension [I10]  Yes    PVD (peripheral vascular disease) with claudication [I73.9]  Yes    PAD (peripheral artery disease) [I73.9]  Yes      Resolved Hospital Problems    Diagnosis Date Resolved POA    Hyponatremia [E87.1] 01/02/2023 Yes         Plan:   Cardiac arrest secondary to STEMI involving RCA complicated by complete heart block  Status post emergent cardiac catheterization with PCI and stenting  - 01/01/2023  On aspirin and ticagrelor for dual antiplatelet therapy  Continue post cardiac arrest care in ICU   Mechanical ventilation for respiratory failure; appreciate input from pulmonology   Sedation as needed to maintain RASS of -2  Monitor electrolytes and replete per protocol   MSSA pneumonia - deescalate antibiotics to ceftriaxone  Monitor urine output and avoid nephrotoxic agents, NSAIDs and iodinated contrast  Echocardiogram noted - normal LVEF and grade 1 diastolic dysfunction  Neurology following for neuroprognostication.  EEG and CT head noted.  No purposeful movements noted. Plan for MRI brain once off temporary pacemaker     Prognosis remains guarded     VTE Risk Mitigation (From admission, onward)           Ordered     enoxaparin injection 40 mg  Every 24 hours (non-standard times)         01/07/23 0902     IP VTE HIGH RISK PATIENT  Once         01/01/23 0817     Place sequential compression device  Until discontinued         01/01/23 0817                        Department Hospital Medicine  [facility]  [facility]  Claudio Hoyos MD  Date of service: 01/07/2023

## 2023-01-07 NOTE — RESPIRATORY THERAPY
01/06/23 1916   Patient Assessment/Suction   Level of Consciousness (AVPU) responds to voice   Respiratory Effort Normal;Unlabored   Expansion/Accessory Muscles/Retractions no use of accessory muscles   All Lung Fields Breath Sounds equal bilaterally;coarse   Rhythm/Pattern, Respiratory assisted mechanically   Cough Frequency with stimulation   Cough Type assisted   Suction Method tracheal   $ Suction Charges Inline Suction Procedure Stat Charge   Secretions Amount moderate   Secretions Color capri   Secretions Characteristics thick   Skin Integrity   $ Wound Care Tech Time 15 min   Area Observed Left;Right;Cheek;Upper lip;Lower lip;Corner lip   Skin Appearance without discoloration   PRE-TX-O2   Device (Oxygen Therapy) ventilator   Oxygen Concentration (%) 60   SpO2 95 %   Pulse Oximetry Type Continuous   $ Pulse Oximetry - Multiple Charge Pulse Oximetry - Multiple   Pulse 64   Resp (!) 24   Aerosol Therapy   $ Aerosol Therapy Charges Aerosol Treatment   Daily Review of Necessity (SVN) completed   Respiratory Treatment Status (SVN) given   Treatment Route (SVN) in-line;ventilator   Patient Position (SVN) semi-Doan's   Post Treatment Assessment (SVN) breath sounds unchanged   Signs of Intolerance (SVN) none   Breath Sounds Post-Respiratory Treatment   Post-treatment Heart Rate (beats/min) 62   Post-treatment Resp Rate (breaths/min) 26        Airway - Non-Surgical 01/01/23 0217   Placement Date/Time: 01/01/23 0217   Inserted by: MD  Airway Device Size: 7.5  Style: Cuffed  Cuff Inflated With: Air  Placement Verified By: Capnometry;Auscultation;Colorimetric EtCO2 device  Depth of Insertion (cm): 23  Secured at: Teeth  Insertion ...   Secured at 25 cm   Measured At Lips   Secured Location Left   Secured by Commercial tube domínguez   Bite Block right   Site Condition Cool;Dry   Status Intact;Secured;Patent   Site Assessment Clean;Dry   Cuff Volume   (MLT)   Airway Safety   Ambu bag with the patient? Yes, Adult Ambu    Is mask with the patient? Yes, Adult Mask   Respiratory Interventions   Airway/Ventilation Management airway patency maintained   Vent Select   Conventional Vent Y   Charged w/in last 24h YES   Preset Conventional Ventilator Settings   Vent ID [id number]   Vent Type    Ventilation Type VC   Vent Mode A/C   Humidity HME   Set Rate 22 BPM   Vt Set 540 mL   PEEP/CPAP 5 cmH20   Peak Flow 75 L/min   Peak End Inspiratory Pressure 21 cmH20   I-Trigger Type  V-TRIG   Trigger Sensitivity Flow/I-Trigger 2 L/min   Patient Ventilator Parameters   Resp Rate Total 26 br/min   Peak Airway Pressure 36 cmH20   Mean Airway Pressure 11 cmH20   Plateau Pressure 56 cmH20   Exhaled Vt 924 mL   Total Ve 14.6 L/m   I:E Ratio Measured 1:2.50   Auto PEEP 20 cmH20   Conventional Ventilator Alarms   Alarms On Y   Resp Rate High Alarm 45 br/min   Press High Alarm 65 cmH2O   Apnea Rate 24   Apnea Volume (mL) 1 mL   Apnea Oxygen Concentration  100   Apnea Flow Rate (L/min) 60   T Apnea 20 sec(s)   Ready to Wean/Extubation Screen   FIO2<=50 (chart decimal) (!) 0.6   MV<16L (chart vol.) 14.6   PEEP <=8 (chart #) 5   Ready to Wean Parameters   F/VT Ratio<105 (RSBI) (!) 25.97   Vital Capacity (mL) 0   Education   $ Education Bronchodilator;Suction;Ventilator Oxygen;15 min   Respiratory Evaluation   $ Care Plan Tech Time 15 min   $ Eval/Re-eval Charges Re-evaluation   Evaluation For Re-Eval 5+ day

## 2023-01-07 NOTE — PLAN OF CARE
01/07/23 0721   Patient Assessment/Suction   Level of Consciousness (AVPU) unresponsive   Respiratory Effort Unlabored   Expansion/Accessory Muscles/Retractions no use of accessory muscles   All Lung Fields Breath Sounds coarse   SAKSHI Breath Sounds coarse   LLL Breath Sounds coarse;diminished   RUL Breath Sounds coarse   RML Breath Sounds coarse   RLL Breath Sounds coarse   Rhythm/Pattern, Respiratory assisted mechanically   Cough Frequency no cough   Suction Method oral;tracheal   Suction Pressure (mmHg) -120 mmHg   $ Suction Charges Inline Suction Procedure Stat Charge   Secretions Amount small   Secretions Color clear   Secretions Characteristics thin   PRE-TX-O2   Device (Oxygen Therapy) ventilator   $ Is the patient on Low Flow Oxygen? Yes   Oxygen Concentration (%) 60   SpO2 100 %   Pulse Oximetry Type Continuous   $ Pulse Oximetry - Multiple Charge Pulse Oximetry - Multiple   Pulse (!) 48   Resp (!) 22   Aerosol Therapy   $ Aerosol Therapy Charges Aerosol Treatment   Daily Review of Necessity (SVN) completed   Respiratory Treatment Status (SVN) given   Treatment Route (SVN) in-line;ventilator   Patient Position (SVN) semi-Doan's   Post Treatment Assessment (SVN) breath sounds improved   Signs of Intolerance (SVN) none   Breath Sounds Post-Respiratory Treatment   Throughout All Fields Post-Treatment All Fields   Throughout All Fields Post-Treatment aeration increased   Post-treatment Heart Rate (beats/min) 50   Post-treatment Resp Rate (breaths/min) 22        Airway - Non-Surgical 01/01/23 0217   Placement Date/Time: 01/01/23 0217   Inserted by: MD  Airway Device Size: 7.5  Style: Cuffed  Cuff Inflated With: Air  Placement Verified By: Capnometry;Auscultation;Colorimetric EtCO2 device  Depth of Insertion (cm): 23  Secured at: Teeth  Insertion ...   Secured at 25 cm   Measured At Lips   Secured Location Right   Secured by Commercial tube domínguez   Bite Block right   Site Condition Cool;Dry   Status  Intact;Secured;Patent   Site Assessment Clean;Dry   General Safety Checklist   Safety Promotion/Fall Prevention side rails raised   Airway Safety   Ambu bag with the patient? Yes, Adult Ambu   Is mask with the patient? Yes, Adult Mask   Suction set is at the bedside? Yes   Vent Select   Conventional Vent Y   $ Ventilator Subsequent 1   Charged w/in last 24h YES   Preset Conventional Ventilator Settings   Vent ID [id number]   Vent Type    Ventilation Type VC   Vent Mode A/C   Humidity HME   Set Rate 22 BPM   Vt Set 540 mL   PEEP/CPAP 5 cmH20   Peak Flow 75 L/min   Peak End Inspiratory Pressure 19 cmH20   I-Trigger Type  V-TRIG   Trigger Sensitivity Flow/I-Trigger 2 L/min   Patient Ventilator Parameters   Resp Rate Total 22 br/min   Peak Airway Pressure 31 cmH20   Mean Airway Pressure 12 cmH20   Plateau Pressure 56 cmH20   Exhaled Vt 550 mL   Total Ve 12.1 L/m   I:E Ratio Measured 1:2.50   Auto PEEP 20 cmH20   Conventional Ventilator Alarms   Alarms On Y   Ve High Alarm 25 L/min   Ve Low Alarm 4 L/min   Vt High Alarm 1200 mL   Vt Low Alarm 200 mL   Resp Rate High Alarm 45 br/min   Press High Alarm 65 cmH2O   Apnea Rate 24   Apnea Volume (mL) 1 mL   Apnea Oxygen Concentration  100   Apnea Flow Rate (L/min) 60   T Apnea 20 sec(s)   IHI Ventilator Associated Pneumonia Bundle (Required)   Head of Bed Elevated  HOB 30   Oral Care Mouth suctioned   Vent Circut Breaks Minimized Yes   Ready to Wean/Extubation Screen   FIO2<=50 (chart decimal) (!) 0.6   MV<16L (chart vol.) 12.1   PEEP <=8 (chart #) 5   Ready to Wean Parameters   F/VT Ratio<105 (RSBI) (!) 40   Vital Capacity (mL) 0   Education   $ Education Bronchodilator;15 min   Respiratory Evaluation   $ Care Plan Tech Time 15 min   $ Eval/Re-eval Charges Re-evaluation   Evaluation For Re-Eval 5+ day

## 2023-01-07 NOTE — PROGRESS NOTES
[facility]  Department of Cardiology  Progress Note      PATIENT NAME: Peyman Gr    MRN: [id number]  TODAY'S DATE: 01/07/2023  ADMIT DATE: 1/1/2023                          CONSULT REQUESTED BY: Claudio Hoyos MD    SUBJECTIVE     PRINCIPAL PROBLEM: STEMI involving right coronary artery    Interval history:  1/7/23  Patient was extubated today around noon.  He is on BiPAP arousable and responsive to commands.  Cardene drip has been started due to hypertension.  Nurse reports patient moves all extremities with right-sided weakness noted.      1/6/23:  Patient lying in bed intubated and sedated. Vitals are stable.     1/5/23:  Patient seen lying in bed off sedation. Patient noted to be moving and appeared agitated. Neuro at bedside for exam. He is hypertensive and tachycardic without sedation.     1/4/23:  Patient seen lying in bed on sedation this morning.  No distress noted.  Vitals were stable at that time.  Of note the patient has become hypertensive and tachycardic this afternoon.  Patient is currently receiving full sedation due to jerking and sweating.    01/03/2023:    Patient remains intubated and sedated on mechanical ventilation. FIO2 is 60% with 5 PEEP. RN attempted to wean sedation but patient keeps triggering the vent.  He has cough and pupillary reflex.  RN states she has seen him move all 4 extremities but not to command.  He remains unresponsive. He is on Levophed, propofol, fentanyl, and vasopressin. He was intermittently paced for approximately 5 min this morning when he was coughing and gagging on the vent. He remains in critical condition.  UOP is 275 today.       01/02/2023:  Seen and examined patient in the ICU today.  Off bicarb drip.  Currently on 2 sedatives and Levophed.     REASON FOR CONSULT:  STEMI      HPI:  67-year-old male with past medical history of peripheral vascular disease, right subclavian stenosis, chronic smoker, hypertension, hyperlipidemia presented to  oxygen [facility] with 2 hour history of chest pain, shortness of breath.  ECG showed junctional bradycardia, complete heart block with inferior and anterolateral ST elevations.  STEMI code was called to which I responded immediately and agreed to transfer the patient to [facility] cath lab for emergent cardiac catheterization.  However, patient unfortunately went into cardiac arrest at [facility] ER with VT, VF, asystole requiring resuscitation and intubation.  Patient was resuscitated for about 40 minutes ROSC was achieved.  Patient was moving extremities at the time as per the ER.  Patient was then transferred to [facility] ER and patient again had cardiac arrest upon arrival to the ER which required resuscitation for 30 minutes.  Patient was in PEA initially and ROSC was achieved subsequently however patient was dependent on transcutaneous pacing.  Patient blood pressure was very low and could not be obtained with a BP cuff.  Patient was unable to be transferred to the cath lab at that point due to significant hemodynamic instability. Patient was started on pressors.  A-line was placed in the ED and blood pressure eventually improved with high doses of vasopressin, norepinephrine, sodium bicarbonate and dobutamine infusions.  Discussed with patient's family members regarding the patient's critical condition.  Once the blood pressure was stabilized on the pressors, patient was brought to the cath lab for transvenous pacemaker placement and coronary angiogram.  Benefits and risks of the procedure explained to patient's family and patient's family  agreed for the procedure.      Review of patient's allergies indicates:  No Known Allergies    Past Medical History:   Diagnosis Date    Arthritis     Chronic back pain     Chronic neck pain     Dry gangrene     RIGHT LITTLE TOE    Hypercholesteremia     Hypertension     Insomnia     Multiple falls     S/P BACK  SURGERY- 1 FALL    PAD (peripheral artery disease)     Personal history of colonic polyps     PVD (peripheral vascular disease)      Past Surgical History:   Procedure Laterality Date    ANGIOGRAPHY OF LOWER EXTREMITY N/A 09/24/2020    Procedure: Angiogram Extremity Unilateral;  Surgeon: Luke Cabello MD;  Location: [facility] CATH;  Service: Cardiology;  Laterality: N/A;    BACK SURGERY      BUNIONECTOMY Bilateral     CARPAL TUNNEL RELEASE Bilateral     CHOLECYSTECTOMY      COLONOSCOPY N/A 5/9/2022    Procedure: COLONOSCOPY;  Surgeon: Braydon Durand MD;  Location: [facility] ENDO;  Service: Endoscopy;  Laterality: N/A;    COLONOSCOPY W/ POLYPECTOMY      CORONARY ANGIOGRAPHY N/A 1/1/2023    Procedure: ANGIOGRAM, CORONARY ARTERY;  Surgeon: Stefany Elizondo MD;  Location: [facility] CATH/EP LAB;  Service: Cardiology;  Laterality: N/A;    CORONARY ARTERY BYPASS GRAFT      CREATION OF BYPASS FROM INTERNAL CAROTID ARTERY TO SUBCLAVIAN ARTERY Right 12/27/2021    Procedure: CREATION, BYPASS, ARTERIAL, INTERNAL CAROTID TO SUBCLAVIAN;  Surgeon: Jeovany Goins MD;  Location: [facility] OR;  Service: Vascular;  Laterality: Right;    ELBOW ARTHROPLASTY Right     ELBOW SURGERY      INSERTION, PACEMAKER, TEMPORARY TRANSVENOUS  1/1/2023    Procedure: Insertion, Pacemaker, Temporary Transvenous;  Surgeon: Stefany Elizondo MD;  Location: [facility] CATH/EP LAB;  Service: Cardiology;;    IVUS, CORONARY  1/1/2023    Procedure: IVUS, Coronary;  Surgeon: Stefany Elizondo MD;  Location: [facility] CATH/EP LAB;  Service: Cardiology;;    LEFT HEART CATHETERIZATION Left 1/1/2023    Procedure: Left heart cath;  Surgeon: Stefany Elizondo MD;  Location: [facility] CATH/EP LAB;  Service: Cardiology;  Laterality: Left;    MINIMALLY INVASIVE FORAMINOTOMY OF  SPINE N/A 11/15/2018    Procedure: FORAMINOTOMY, SPINE, MINIMALLY INVASIVE DECOMPRESSION L4-5;  Surgeon: Iván Stevenson Jr., MD;  Location: [facility] OR;  Service: Orthopedics;   Laterality: N/A;    NECK SURGERY      acf    PERCUTANEOUS CORONARY INTERVENTION, ARTERY N/A 2023    Procedure: Percutaneous coronary intervention;  Surgeon: Stefany Elizondo MD;  Location: [facility] CATH/EP LAB;  Service: Cardiology;  Laterality: N/A;    PERCUTANEOUS TRANSLUMINAL ANGIOPLASTY (PTA) OF PERIPHERAL VESSEL Right 2020    Procedure: PTA, PERIPHERAL VESSEL of right lower extremity;  Surgeon: Luke Cabello MD;  Location: [facility] CATH;  Service: Cardiology;  Laterality: Right;    PERCUTANEOUS TRANSLUMINAL ANGIOPLASTY (PTA) OF PERIPHERAL VESSEL Left 09/10/2020    Procedure: PTA, PERIPHERAL VESSEL, Left lower extremity;  Surgeon: Luke Cabello MD;  Location: [facility]placed [facility] CATH;  Service: Cardiology;  Laterality: Left;    PERCUTANEOUS TRANSLUMINAL ANGIOPLASTY (PTA) OF PERIPHERAL VESSEL Left 2021    Profunda and SFA     Social History     Tobacco Use    Smoking status: Former     Packs/day: 2.00     Years: 60.00     Pack years: 120.00     Types: Cigarettes     Quit date:      Years since quittin.0    Smokeless tobacco: Never   Substance Use Topics    Alcohol use: No    Drug use: No        REVIEW OF SYSTEMS  CV:  Denies chest pain  Respiratory: wife states he feels like he isn't getting enough air on bipap  Ext: swelling    OBJECTIVE     VITAL SIGNS (Most Recent)  Temp: 97.7 °F (36.5 °C) (23 2300)  Pulse: (!) 51 (23 08)  Resp: (!) 25 (23)  BP: (!) 152/75 (23)  SpO2: 98 % (23)    VENTILATION STATUS  Resp: (!) 25 (23)  SpO2: 98 % (23)  Vent Mode: A/C  Oxygen Concentration (%):  [] 60  Resp Rate Total:  [22 br/min-42 br/min] 22 br/min  Vt Set:  [540 mL] 540 mL  PEEP/CPAP:  [5 cmH20] 5 cmH20  Mean Airway Pressure:  [7.9 cmH20-15 cmH20] 12 cmH20    I & O (Last 24H):  Intake/Output Summary (Last 24 hours) at 2023 1051  Last data filed at 2023 0655  Gross per 24 hour   Intake 2007.75 ml   Output 1350 ml   Net 657.75 ml          WEIGHTS  Wt Readings from Last 1 Encounters:   01/07/23 0400 115.4 kg (254 lb 6.6 oz)   01/06/23 0400 117.6 kg (259 lb 4.2 oz)   01/05/23 0500 115.3 kg (254 lb 3.1 oz)   01/03/23 0615 105.4 kg (232 lb 5.8 oz)   01/02/23 0400 102.5 kg (225 lb 15.5 oz)   01/02/23 0053 102.5 kg (225 lb 15.5 oz)   01/01/23 0800 99.8 kg (220 lb 0.3 oz)   01/01/23 0341 99.8 kg (220 lb)       PHYSICAL EXAM  GENERAL: well built, well nourished, well-developed. Resting w/o distress on bipap  NECK: No JVD.   CARDIAC: Regular rate and rhythm, S1, S2 regular; No paced beats noted  CHEST ANATOMY: normal.   LUNGS: CTA  ABDOMEN: Soft.  Normal bowel sounds.   URINARY: cohen  EXTREMITIES: right groin pacer site soft, no swelling   SKIN: Skin without lesions, moist, well perfused.     HOME MEDICATIONS:  Current Facility-Administered Medications on File Prior to Encounter   Medication Dose Route Frequency Provider Last Rate Last Admin    0.9%  NaCl infusion   Intravenous Continuous Braydon Durand MD   Stopped at 05/09/22 1011     Current Outpatient Medications on File Prior to Encounter   Medication Sig Dispense Refill    albuterol (PROVENTIL/VENTOLIN HFA) 90 mcg/actuation inhaler Inhale 1-2 puffs into the lungs every 6 (six) hours as needed for Shortness of Breath. Rescue 8 g 0    amLODIPine (NORVASC) 10 MG tablet Take 1 tablet (10 mg total) by mouth once daily. 30 tablet 1    amLODIPine (NORVASC) 5 MG tablet Take 5 mg by mouth once daily.      aspirin 81 MG Chew Take 81 mg by mouth once daily.      carvediloL (COREG) 25 MG tablet Take 25 mg by mouth 2 (two) times daily with meals.      clopidogreL (PLAVIX) 75 mg tablet Take 75 mg by mouth once daily. On hold for sx on 12/27/21      ezetimibe (ZETIA) 10 mg tablet Take 10 mg by mouth once daily.      hydroCHLOROthiazide (HYDRODIURIL) 12.5 MG Tab Take 1 tablet (12.5 mg total) by mouth once daily. 30 tablet 1    HYDROcodone-acetaminophen (NORCO)  mg per tablet Take 1 tablet by mouth  every 8 (eight) hours as needed for Pain.      mupirocin (BACTROBAN) 2 % ointment Apply topically 3 (three) times daily. 22 g 0    oxyCODONE-acetaminophen (PERCOCET) 7.5-325 mg per tablet Take 1 tablet by mouth 2 (two) times daily as needed.      traMADoL (ULTRAM) 50 mg tablet Take 50 mg by mouth every 6 (six) hours as needed.      valsartan (DIOVAN) 160 MG tablet Take 160 mg by mouth once daily.         SCHEDULED MEDS:   aspirin  81 mg Oral Daily    cefTRIAXone (ROCEPHIN) IVPB  1 g Intravenous Q24H    [START ON 2023] chlorhexidine  15 mL Mouth/Throat BID    enoxparin  40 mg Subcutaneous Q24H    famotidine  20 mg Per OG tube BID    levalbuterol  0.63 mg Nebulization TID WAKE    methylPREDNISolone sodium succinate injection  40 mg Intravenous Q8H    nicotine  1 patch Transdermal Daily    ticagrelor  90 mg Oral BID       CONTINUOUS INFUSIONS:   DOBUTamine Stopped (2345)    fentanyl 200 mcg/hr (23 1028)    midazolam 3 mg/hr (23 0620)    NORepinephrine bitartrate-D5W Stopped (23 0500)    propofoL 45 mcg/kg/min (23 1027)    vasopressin Stopped (23)       PRN MEDS:acetaminophen, albuterol-ipratropium, atropine, calcium gluconate IVPB, calcium gluconate IVPB, calcium gluconate IVPB, dextrose 10%, dextrose 10%, DOBUTamine, EPINEPHrine, [] fentaNYL **FOLLOWED BY** fentaNYL, glucagon (human recombinant), glucose, glucose, hydrALAZINE, HYDROcodone-acetaminophen, magnesium sulfate IVPB, magnesium sulfate IVPB, melatonin, midazolam, morphine, naloxone, ondansetron, polyethylene glycol, potassium chloride in water **AND** potassium chloride in water **AND** potassium chloride in water, senna-docusate 8.6-50 mg, simethicone, sodium chloride 0.9%, sodium phosphate IVPB, sodium phosphate IVPB, sodium phosphate IVPB    LABS AND DIAGNOSTICS     CBC LAST 3 DAYS  Recent Labs   Lab 23  0430 23  0328 23  0430 23  0345 23  0358   WBC 10.98 10.33  --  11.79   --    RBC 3.28* 3.10*  --  3.27*  --    HGB 10.4* 9.7*  --  10.3*  --    HCT 30.3* 29.1* 29* 30.6* 31*   MCV 94 94  --  94  --    MCH 31.7* 31.3*  --  31.5*  --    MCHC 34.3 33.3  --  33.7  --    RDW 14.5 14.6*  --  14.6*  --     189  --  215  --    MPV 9.9 9.3  --  9.5  --    GRAN 86.5*  9.5* 87.6*  9.1*  --  81.5*  9.6*  --    LYMPH 3.5*  0.4* 4.1*  0.4*  --  5.3*  0.6*  --    MONO 8.6  0.9 6.1  0.6  --  8.1  1.0  --    BASO 0.01 0.02  --  0.02  --    NRBC 0 0  --  0  --          COAGULATION LAST 3 DAYS  Recent Labs   Lab 01/01/23  0453   INR 1.2   APTT 30.3         CHEMISTRY LAST 3 DAYS  Recent Labs   Lab 01/05/23  0428 01/05/23  0430 01/06/23  0328 01/06/23  0430 01/07/23  0345 01/07/23  0358   NA  --  136 137  --  137  --    K  --  4.2 4.2  --  4.6  --    CL  --  106 107  --  106  --    CO2  --  24 25  --  26  --    ANIONGAP  --  6* 5*  --  5*  --    BUN  --  26* 36*  --  48*  --    CREATININE  --  0.9 1.0  --  0.8  --    GLU  --  119* 137*  --  115*  --    CALCIUM  --  8.1* 8.1*  --  8.1*  --    PH 7.385  --   --  7.399  --  7.409   MG  --  2.2 2.3  --  2.4  --    ALBUMIN  --  2.4* 2.3*  --  2.3*  --    PROT  --  5.8* 5.5*  --  5.7*  --    ALKPHOS  --  80 69  --  65  --    ALT  --  97* 76*  --  60*  --    AST  --  55* 40  --  28  --    BILITOT  --  0.5 0.6  --  0.5  --          CARDIAC PROFILE LAST 3 DAYS  Recent Labs   Lab 01/01/23  0155 01/01/23  0453 01/01/23  0848 01/01/23  1831 01/02/23  0118 01/02/23  0546 01/02/23  1400   BNP  --  76  --   --   --   --  376*   TROPONINI 1.105*  --   --   --   --   --   --    TROPONINIHS  --  1459.0*   < > 347613.7* 372898.9* 264480.9*  --     < > = values in this interval not displayed.         ENDOCRINE LAST 3 DAYS  Recent Labs   Lab 01/03/23  0954   PROCAL 3.13*         LAST ARTERIAL BLOOD GAS  ABG  Recent Labs   Lab 01/07/23  0358   PH 7.409   PO2 78*   PCO2 43.0   HCO3 27.2   BE 3         LAST 7 DAYS MICROBIOLOGY   Microbiology Results (last 7 days)        Procedure Component Value Units Date/Time    Blood culture [[id number]] Collected: 01/03/23 0954    Order Status: Completed Specimen: Blood Updated: 01/07/23 1032     Blood Culture, Routine No Growth to date      No Growth to date      No Growth to date      No Growth to date      No Growth to date    Blood culture [[id number]] Collected: 01/03/23 0954    Order Status: Completed Specimen: Blood Updated: 01/07/23 1032     Blood Culture, Routine No Growth to date      No Growth to date      No Growth to date      No Growth to date      No Growth to date    Culture, Respiratory with Gram Stain [[id number]]  (Abnormal)  (Susceptibility) Collected: 01/02/23 1431    Order Status: Completed Specimen: Respiratory from Tracheal Aspirate Updated: 01/04/23 0856     Respiratory Culture Reduced normal respiratory lorenzo      STAPHYLOCOCCUS AUREUS  Moderate       Gram Stain (Respiratory) <10 epithelial cells per low power field.     Gram Stain (Respiratory) Few WBC's     Gram Stain (Respiratory) Moderate Gram positive cocci    MRSA Screen by PCR [[id number]] Collected: 01/03/23 1412    Order Status: Completed Specimen: Nasopharyngeal Swab from Nasal Updated: 01/03/23 1554     MRSA SCREEN BY PCR Negative            MOST RECENT IMAGING  X-Ray Chest AP Portable  HISTORY: eval and compare to previous evals.    COMPARISON:1/6/2023    FINDINGS: Tip of ET tube resides at the medial ends of clavicles in optimal position. The NG tube passes into the stomach. The heart is prominent size with moderate biventricular dominance. Patient is slightly shifted towards the left.    Persistent homogeneous airlessness barely in the basilar segments is noted unchanged upon comparison with the changes in the left is attributed the R2 to layering pleural effusion. Persistent prominence of the central pulmonary vascularity. No evidence of pneumothorax. There are clips in the right supraclavicular region noted.    IMPRESSION:  1. Stable appearance the  chest when compared to previous.  2. Homogeneous airlessness left lung base secondary pleural effusion, atelectasis, pneumonic infiltrate, or combination thereof.    Electronically signed by:  Solo Harris MD  1/7/2023 7:10 AM [facility] Workstation: [id number]      ECHOCARDIOGRAM RESULTS (last 5)  No results found for this or any previous visit.      CURRENT/PREVIOUS VISIT EKG  Results for orders placed or performed during the hospital encounter of 01/01/23   EKG 12-LEAD on arrival to floor    Collection Time: 01/01/23  9:59 AM    Narrative    Test Reason : I21.3,    Vent. Rate : 070 BPM     Atrial Rate : 000 BPM     P-R Int : 000 ms          QRS Dur : 078 ms      QT Int : 450 ms       P-R-T Axes : 000 027 -68 degrees     QTc Int : 486 ms    Atrial fibrillation with occasional ventricular-paced complexes and with  premature ventricular or aberrantly conducted complexes  Inferior infarct ,age undetermined  Abnormal ECG  When compared with ECG of 01-JAN-2023 09:57,  Previous ECG has undetermined rhythm, needs review  Confirmed by Sarthak Cardona MD ([id number]) on 1/4/2023 6:57:56 PM    Referred By: IRENE MOSER           Confirmed By:Sarthak Cardona MD           ASSESSMENT/PLAN:     Active Hospital Problems    Diagnosis    *STEMI involving right coronary artery    Pneumonia of right lung due to methicillin susceptible Staphylococcus aureus (MSSA)    Shock liver    Cardiogenic shock    Cardiac arrest    Acute respiratory failure with hypoxia and hypercarbia    Leukocytosis    Anemia    History of tobacco abuse    Primary hypertension    PVD (peripheral vascular disease) with claudication    PAD (peripheral artery disease)       ASSESSMENT & PLAN:   STEMI- inferior wall MI complicated by complete heart block and RV shock  Cardiac arrest status post resuscitation and intubation (patient was resuscitated for 60-70 minutes in total)  Peripheral vascular disease  Chronic  smoking  Hypertension  hyperlipidemia      RECOMMENDATIONS:    Temporary pacer removal per Dr. Saleh  Continue DAPT with Brilinta 90 mg po BID and aspirin 81 mg po daily without interruption due to new stent placement.   3.  Cardene drip started for hypertension    Jes Tay NP  [facility]  Department of Cardiology  Date of Service: 01/07/2023         I have personally interviewed and examined the patient, I have reviewed the Nurse Practitioner's history and physical, assessment, and plan. I agree with the findings and plan.  PATIENT IS EXTUBATED FOLLOWING COMMANDS.  HE HAS RIGHT-SIDED WEAKNESS  HE IS ON CARDENE FOR HYPERTENSION   HE HAS GENERALIZED EDEMA IN HIS EXTREMITIES  HE TEMPORARY PACEMAKER WAS REMOVED  GROIN SITE IS STABLE    Dr. Dougie Saleh M.D.  [facility]  Department of Cardiology  Date of Service: 01/03/2023

## 2023-01-07 NOTE — PROGRESS NOTES
Pulmonary/Critical Care  Progress Note      Patient name: Peyman Gr  MRN: [id number]  Date: 01/07/2023    Admit Date: 1/1/2023  Consult Requested By: Claudio Hoyos MD    Reason for Consult: respiratory failure    HPI:    1/1/2023 - 66 yo initially presented with chest pain about  2 AM, had bradycardia then VTVF arrest and had prolonged code - transferred to [facility] and was in cardiac arrest at arrival after multiple rounds of meds he had ROSC and taken to cath lab.  In Cath lab had occluded RCA and had successful PCI.  ALso has LM and OM disease.  He has been very unstable and moved to ICU.  He is unresponsive and cool.  He is ventilated.  On dobutrex and levophed, having some ectopy (contacting cardiology to see if they want to start lidocaine or amiodarone, QTc is 487), he is on bicarb drip and last ABG showed adequate oxygenation but a mixed metabolic and respiratory acidosis (vent adjused and will repeat).  No family was in the waiting room.  Chart has been reviewed and case d/w nursing, respiratory and Dr Hoyos.  BY report pt was resuscitated for > 60 minutes.    1/2/2023 - Remains critically ill, has been agitated at times and has needed increased sedation, he has not followed any commands.  Rhythm seems more stable but he has had ectopy.  Temp had increased from his hypothermia yesterday.  Electrolytes replaced by SS.  LFT have increased.  ABG this AM shows alkalosis (on bicarb drip).  CXR reviewed.  ECHO noted.    1/3/2023 - Remains critically ill, gets agitated at times and has required sedation.  He does not respond to voice or pain but does have spontaneous respirations and movement.  Pupils are sluggish to NR, minimal corneal reflex.  Had temp this AM.  CXR noted.    1/4/2023 - Had issues yesterday - HTN, bradycardia, a ? Of seizure activity and I was not notified of any of this.  Overnight things have been more stable.  He remains unresponsive.  Temp has decreased, renal function good, LFT better.   I tried to call family yesterday but got no answer.  Family has been at bedside per nursing.  SC + for [facility]    1/5/2023 - Remains critically ill and is not responsive to me.  When sedation decreased he does have some movement (not purposeful).  EEG report reviewed and neuro note seen.  Neuro issues preclude any thoughts of weaning ventilator support  Tachycardic today.  O2 needs have increased.    1/6/23: Off sedation for an hour today and moved spontaneously, shaking head around, but did not appear to follow commands. Oxygen requirements going down.    1/7/23: Off sedation for >30 minutes, he is moving his head spontaneously and may attend to his name, but hard to tell. Does not follow commands. He is respiring well on PS 5/5. Will keep on pressure support and Precedex with fentanyl boluses for now.    Review of Systems    Review of Systems   Unable to perform ROS: Mental acuity     Past Medical History    Past Medical History:   Diagnosis Date    Arthritis     Chronic back pain     Chronic neck pain     Dry gangrene     RIGHT LITTLE TOE    Hypercholesteremia     Hypertension     Insomnia     Multiple falls     S/P BACK SURGERY- 1 FALL    PAD (peripheral artery disease)     Personal history of colonic polyps     PVD (peripheral vascular disease)        Past Surgical History    Past Surgical History:   Procedure Laterality Date    ANGIOGRAPHY OF LOWER EXTREMITY N/A 09/24/2020    Procedure: Angiogram Extremity Unilateral;  Surgeon: Luke Cabello MD;  Location: [facility] CATH;  Service: Cardiology;  Laterality: N/A;    BACK SURGERY      BUNIONECTOMY Bilateral     CARPAL TUNNEL RELEASE Bilateral     CHOLECYSTECTOMY      COLONOSCOPY N/A 5/9/2022    Procedure: COLONOSCOPY;  Surgeon: Braydon Durand MD;  Location: [facility] ENDO;  Service: Endoscopy;  Laterality: N/A;    COLONOSCOPY W/ POLYPECTOMY      CORONARY ANGIOGRAPHY N/A 1/1/2023    Procedure: ANGIOGRAM, CORONARY ARTERY;  Surgeon: Stefany Elizondo MD;   Location: [facility] CATH/EP LAB;  Service: Cardiology;  Laterality: N/A;    CORONARY ARTERY BYPASS GRAFT      CREATION OF BYPASS FROM INTERNAL CAROTID ARTERY TO SUBCLAVIAN ARTERY Right 12/27/2021    Procedure: CREATION, BYPASS, ARTERIAL, INTERNAL CAROTID TO SUBCLAVIAN;  Surgeon: Jeovany Goins MD;  Location: [facility] OR;  Service: Vascular;  Laterality: Right;    ELBOW ARTHROPLASTY Right     ELBOW SURGERY      INSERTION, PACEMAKER, TEMPORARY TRANSVENOUS  1/1/2023    Procedure: Insertion, Pacemaker, Temporary Transvenous;  Surgeon: Stefany Elizondo MD;  Location: [facility] CATH/EP LAB;  Service: Cardiology;;    IVUS, CORONARY  1/1/2023    Procedure: IVUS, Coronary;  Surgeon: Stefany Elizondo MD;  Location: [facility] CATH/EP LAB;  Service: Cardiology;;    LEFT HEART CATHETERIZATION Left 1/1/2023    Procedure: Left heart cath;  Surgeon: Stefany Elizondo MD;  Location: [facility] CATH/EP LAB;  Service: Cardiology;  Laterality: Left;    MINIMALLY INVASIVE FORAMINOTOMY OF  SPINE N/A 11/15/2018    Procedure: FORAMINOTOMY, SPINE, MINIMALLY INVASIVE DECOMPRESSION L4-5;  Surgeon: Iván Stevenson Jr., MD;  Location: [facility] OR;  Service: Orthopedics;  Laterality: N/A;    NECK SURGERY      acf    PERCUTANEOUS CORONARY INTERVENTION, ARTERY N/A 1/1/2023    Procedure: Percutaneous coronary intervention;  Surgeon: Stefany Elizondo MD;  Location: [facility] CATH/EP LAB;  Service: Cardiology;  Laterality: N/A;    PERCUTANEOUS TRANSLUMINAL ANGIOPLASTY (PTA) OF PERIPHERAL VESSEL Right 05/01/2020    Procedure: PTA, PERIPHERAL VESSEL of right lower extremity;  Surgeon: Luke Cabello MD;  Location: [facility] CATH;  Service: Cardiology;  Laterality: Right;    PERCUTANEOUS TRANSLUMINAL ANGIOPLASTY (PTA) OF PERIPHERAL VESSEL Left 09/10/2020    Procedure: PTA, PERIPHERAL VESSEL, Left lower extremity;  Surgeon: Luke Cabello MD;  Location: [facility] CATH;  Service: Cardiology;  Laterality: Left;    PERCUTANEOUS TRANSLUMINAL ANGIOPLASTY  (PTA) OF PERIPHERAL VESSEL Left 2021    Profunda and SFA       Medications (scheduled):      aspirin  81 mg Oral Daily    cefTRIAXone (ROCEPHIN) IVPB  1 g Intravenous Q24H    [START ON 2023] chlorhexidine  15 mL Mouth/Throat BID    enoxparin  40 mg Subcutaneous Q24H    famotidine  20 mg Per OG tube BID    levalbuterol  0.63 mg Nebulization TID WAKE    methylPREDNISolone sodium succinate injection  40 mg Intravenous Q8H    nicotine  1 patch Transdermal Daily    ticagrelor  90 mg Oral BID       Medications (infusions):      DOBUTamine Stopped (2345)    fentanyl 200 mcg/hr (23 1028)    midazolam 3 mg/hr (23 0620)    NORepinephrine bitartrate-D5W Stopped (23 0500)    propofoL 50 mcg/kg/min (23 1537)    vasopressin Stopped (23)       Medications (prn):     acetaminophen, albuterol-ipratropium, atropine, calcium gluconate IVPB, calcium gluconate IVPB, calcium gluconate IVPB, dextrose 10%, dextrose 10%, DOBUTamine, EPINEPHrine, [] fentaNYL **FOLLOWED BY** fentaNYL, glucagon (human recombinant), glucose, glucose, hydrALAZINE, HYDROcodone-acetaminophen, magnesium sulfate IVPB, magnesium sulfate IVPB, melatonin, midazolam, morphine, naloxone, ondansetron, polyethylene glycol, potassium chloride in water **AND** potassium chloride in water **AND** potassium chloride in water, senna-docusate 8.6-50 mg, simethicone, sodium chloride 0.9%, sodium phosphate IVPB, sodium phosphate IVPB, sodium phosphate IVPB    Family History:   Family History   Problem Relation Age of Onset    COPD Mother     Hypertension Father     Heart disease Father     Heart attack Father     COPD Sister     Other Sister     Kidney failure Brother     Hypertension Brother     Diabetes Brother        Social History: Tobacco:   Social History     Tobacco Use   Smoking Status Former    Packs/day: 2.00    Years: 60.00    Pack years: 120.00    Types: Cigarettes    Quit date:     Years since quitting:  "6.0   Smokeless Tobacco Never                                EtOH:   Social History     Substance and Sexual Activity   Alcohol Use No                                Drugs:   Social History     Substance and Sexual Activity   Drug Use No       Physical Exam    Vital signs:  Temp:  [97.7 °F (36.5 °C)-98.3 °F (36.8 °C)]   Pulse:  [48-67]   Resp:  [10-42]   BP: (126-177)/(67-85)   SpO2:  [92 %-100 %]   Arterial Line BP: (108-162)/(51-83)     Intake/Output:   Intake/Output Summary (Last 24 hours) at 1/7/2023 1639  Last data filed at 1/7/2023 0655  Gross per 24 hour   Intake 2007.75 ml   Output 1350 ml   Net 657.75 ml          BMI: Estimated body mass index is 32.65 kg/m² as calculated from the following:    Height as of an earlier encounter on 1/1/23: 6' 2.02" (1.88 m).    Weight as of this encounter: 115.4 kg (254 lb 6.6 oz).    PHYSICAL EXAM  GENERAL:  sedated, intubated  HEENT: PERRLA.  NECK: No cervical adenopathy palpated.  HEART: Regular rate and rhythm. No murmur or gallop auscultated.  LUNGS: Clear to auscultation. Lung excursion symmetrical.   ABDOMEN: Bowel sounds present. Soft, non-tender, non-distended.  EXTREMITIES: Normal muscle tone and joint movement, no cyanosis or clubbing.   LYMPHATICS: No adenopathy palpated, no edema.  SKIN: Dry, intact, no lesions.   NEURO: sedated on vent; opens eyes and moves head to sternal rub      Laboratory    Recent Labs   Lab 01/07/23 0345 01/07/23  0358   WBC 11.79  --    RBC 3.27*  --    HGB 10.3*  --    HCT 30.6* 31*     --    MCV 94  --    MCH 31.5*  --    MCHC 33.7  --          Recent Labs   Lab 01/07/23  0345   CALCIUM 8.1*   PROT 5.7*      K 4.6   CO2 26      BUN 48*   CREATININE 0.8   ALKPHOS 65   ALT 60*   AST 28   BILITOT 0.5         No results for input(s): PT, INR, APTT in the last 24 hours.      No results for input(s): CPK, CPKMB, TROPONINI, MB in the last 24 hours.      Additional labs: reviewed    Microbiology:       Microbiology Results " (last 7 days)       Procedure Component Value Units Date/Time    Blood culture [[id number]] Collected: 01/03/23 0954    Order Status: Completed Specimen: Blood Updated: 01/07/23 1032     Blood Culture, Routine No Growth to date      No Growth to date      No Growth to date      No Growth to date      No Growth to date    Blood culture [[id number]] Collected: 01/03/23 0954    Order Status: Completed Specimen: Blood Updated: 01/07/23 1032     Blood Culture, Routine No Growth to date      No Growth to date      No Growth to date      No Growth to date      No Growth to date    Culture, Respiratory with Gram Stain [[id number]]  (Abnormal)  (Susceptibility) Collected: 01/02/23 1431    Order Status: Completed Specimen: Respiratory from Tracheal Aspirate Updated: 01/04/23 0856     Respiratory Culture Reduced normal respiratory lorenzo      STAPHYLOCOCCUS AUREUS  Moderate       Gram Stain (Respiratory) <10 epithelial cells per low power field.     Gram Stain (Respiratory) Few WBC's     Gram Stain (Respiratory) Moderate Gram positive cocci    MRSA Screen by PCR [[id number]] Collected: 01/03/23 1412    Order Status: Completed Specimen: Nasopharyngeal Swab from Nasal Updated: 01/03/23 1554     MRSA SCREEN BY PCR Negative            Radiology    X-Ray Chest AP Portable  HISTORY: eval and compare to previous evals.    COMPARISON:1/6/2023    FINDINGS: Tip of ET tube resides at the medial ends of clavicles in optimal position. The NG tube passes into the stomach. The heart is prominent size with moderate biventricular dominance. Patient is slightly shifted towards the left.    Persistent homogeneous airlessness barely in the basilar segments is noted unchanged upon comparison with the changes in the left is attributed the R2 to layering pleural effusion. Persistent prominence of the central pulmonary vascularity. No evidence of pneumothorax. There are clips in the right supraclavicular region noted.    IMPRESSION:  1. Stable appearance  the chest when compared to previous.  2. Homogeneous airlessness left lung base secondary pleural effusion, atelectasis, pneumonic infiltrate, or combination thereof.    Electronically signed by:  Solo Harris MD  1/7/2023 7:10 AM CST Workstation: [id number]        Additional Studies    reviewed    Ventilator Information    Vent Mode: A/C  Oxygen Concentration (%):  [60] 60  Resp Rate Total:  [22 br/min-31 br/min] 22 br/min  Vt Set:  [540 mL] 540 mL  PEEP/CPAP:  [5 cmH20] 5 cmH20  Mean Airway Pressure:  [11 cmH20-15 cmH20] 12 cmH20         Recent Labs     01/07/23  0358   PH 7.409   PCO2 43.0   PO2 78*   HCO3 27.2   POCSATURATED 95   BE 3           Impression    Active Hospital Problems    Diagnosis  POA    *STEMI involving right coronary artery [I21.11]  Yes    Pneumonia of right lung due to methicillin susceptible Staphylococcus aureus (MSSA) [J15.211]  No    Shock liver [K72.00]  Yes    Cardiogenic shock [R57.0]  Yes    Cardiac arrest [I46.9]  Yes    Acute respiratory failure with hypoxia and hypercarbia [J96.01, J96.02]  Yes    Leukocytosis [D72.829]  Yes    Anemia [D64.9]  Yes    History of tobacco abuse [Z87.891]  Not Applicable    Primary hypertension [I10]  Yes    PVD (peripheral vascular disease) with claudication [I73.9]  Yes    PAD (peripheral artery disease) [I73.9]  Yes      Resolved Hospital Problems    Diagnosis Date Resolved POA    Hyponatremia [E87.1] 01/02/2023 Yes   EEG on 1/4 showing voltage suppression and slowing.  CT head on 1/4 w/o acute abnormality.    Plan    Maintain pressure support vent w/ Precedex gtt and fentanyl pushes overnight  if possible.  If needed, restart AC/VC and increase sedation as needed.  Ventilator, adjust as needed   SAT/SBT in AM  Wean pressors as able   Sedate as needed  - try to minimize to help with neuro evaluation  Neuro following  Continue ceftriaxone  Watch renal function and LFT  Monitor electrolytes and replace via SS  Watch H/H  steroids for  wheezing  Prognosis is poor but need to continue to follow neuro status    Upon my evaluation, this patient had a high probability of imminent or life-threatening deterioration, which required my direct attention, intervention, and personal management.    I have personally provided 35 minutes of critical care time exclusive of time spent on separately billable procedures. Over 50% of the time of this encounter was spent in direct care at the bedside. Time includes review of laboratory data, radiology results, discussion with consultants, and monitoring for potential decompensation. Interventions were performed as documented above.    Jasvir Nails MD  Pulmonary and Critical Care Medicine  [facility]  Date of Service: 01/07/2023  5:52 PM

## 2023-01-08 LAB
ALBUMIN SERPL BCP-MCNC: 2.4 G/DL (ref 3.5–5.2)
ALLENS TEST: ABNORMAL
ALLENS TEST: ABNORMAL
ALP SERPL-CCNC: 68 U/L (ref 55–135)
ALT SERPL W/O P-5'-P-CCNC: 50 U/L (ref 10–44)
ANION GAP SERPL CALC-SCNC: 6 MMOL/L (ref 8–16)
AST SERPL-CCNC: 24 U/L (ref 10–40)
BACTERIA BLD CULT: NORMAL
BACTERIA BLD CULT: NORMAL
BASOPHILS # BLD AUTO: ABNORMAL K/UL (ref 0–0.2)
BASOPHILS NFR BLD: 0 % (ref 0–1.9)
BILIRUB SERPL-MCNC: 0.4 MG/DL (ref 0.1–1)
BUN SERPL-MCNC: 47 MG/DL (ref 8–23)
CALCIUM SERPL-MCNC: 8.3 MG/DL (ref 8.7–10.5)
CHLORIDE SERPL-SCNC: 110 MMOL/L (ref 95–110)
CO2 SERPL-SCNC: 25 MMOL/L (ref 23–29)
CREAT SERPL-MCNC: 0.9 MG/DL (ref 0.5–1.4)
DELSYS: ABNORMAL
DELSYS: ABNORMAL
DIFFERENTIAL METHOD: ABNORMAL
EOSINOPHIL # BLD AUTO: ABNORMAL K/UL (ref 0–0.5)
EOSINOPHIL NFR BLD: 0 % (ref 0–8)
EP: 6
ERYTHROCYTE [DISTWIDTH] IN BLOOD BY AUTOMATED COUNT: 14.4 % (ref 11.5–14.5)
ERYTHROCYTE [SEDIMENTATION RATE] IN BLOOD BY WESTERGREN METHOD: 14 MM/H
ERYTHROCYTE [SEDIMENTATION RATE] IN BLOOD BY WESTERGREN METHOD: 22 MM/H
EST. GFR  (NO RACE VARIABLE): >60 ML/MIN/1.73 M^2
FIO2: 60
FIO2: 60
GLUCOSE SERPL-MCNC: 109 MG/DL (ref 70–110)
GLUCOSE SERPL-MCNC: 112 MG/DL (ref 70–110)
HCO3 UR-SCNC: 25.6 MMOL/L (ref 24–28)
HCO3 UR-SCNC: 27 MMOL/L (ref 24–28)
HCT VFR BLD AUTO: 35.8 % (ref 40–54)
HCT VFR BLD CALC: 36 %PCV (ref 36–54)
HGB BLD-MCNC: 12 G/DL (ref 14–18)
IMM GRANULOCYTES # BLD AUTO: ABNORMAL K/UL (ref 0–0.04)
IMM GRANULOCYTES NFR BLD AUTO: ABNORMAL % (ref 0–0.5)
IP: 14
LYMPHOCYTES # BLD AUTO: ABNORMAL K/UL (ref 1–4.8)
LYMPHOCYTES NFR BLD: 7 % (ref 18–48)
MAGNESIUM SERPL-MCNC: 2.3 MG/DL (ref 1.6–2.6)
MCH RBC QN AUTO: 30.8 PG (ref 27–31)
MCHC RBC AUTO-ENTMCNC: 33.5 G/DL (ref 32–36)
MCV RBC AUTO: 92 FL (ref 82–98)
METAMYELOCYTES NFR BLD MANUAL: 1 %
MIN VOL: 13.1
MIN VOL: 24
MODE: ABNORMAL
MODE: ABNORMAL
MONOCYTES # BLD AUTO: ABNORMAL K/UL (ref 0.3–1)
MONOCYTES NFR BLD: 5 % (ref 4–15)
NEUTROPHILS NFR BLD: 83 % (ref 38–73)
NEUTS BAND NFR BLD MANUAL: 4 %
NRBC BLD-RTO: 0 /100 WBC
PCO2 BLDA: 33.7 MMHG (ref 35–45)
PCO2 BLDA: 36.3 MMHG (ref 35–45)
PEEP: 5
PH SMN: 7.46 [PH] (ref 7.35–7.45)
PH SMN: 7.51 [PH] (ref 7.35–7.45)
PIP: 26
PLATELET # BLD AUTO: 245 K/UL (ref 150–450)
PLATELET BLD QL SMEAR: ABNORMAL
PMV BLD AUTO: 9.2 FL (ref 9.2–12.9)
PO2 BLDA: 103 MMHG (ref 80–100)
PO2 BLDA: 69 MMHG (ref 80–100)
POC BE: 2 MMOL/L
POC BE: 4 MMOL/L
POC IONIZED CALCIUM: 1.22 MMOL/L (ref 1.06–1.42)
POC SATURATED O2: 94 % (ref 95–100)
POC SATURATED O2: 99 % (ref 95–100)
POC TCO2: 27 MMOL/L (ref 23–27)
POC TCO2: 28 MMOL/L (ref 23–27)
POTASSIUM BLD-SCNC: 3.9 MMOL/L (ref 3.5–5.1)
POTASSIUM SERPL-SCNC: 4 MMOL/L (ref 3.5–5.1)
PROT SERPL-MCNC: 6.1 G/DL (ref 6–8.4)
RBC # BLD AUTO: 3.89 M/UL (ref 4.6–6.2)
SAMPLE: ABNORMAL
SAMPLE: ABNORMAL
SITE: ABNORMAL
SITE: ABNORMAL
SODIUM BLD-SCNC: 143 MMOL/L (ref 136–145)
SODIUM SERPL-SCNC: 141 MMOL/L (ref 136–145)
SP02: 100
SP02: 96
SPONT RATE: 36
VT: 540
WBC # BLD AUTO: 13.16 K/UL (ref 3.9–12.7)

## 2023-01-08 PROCEDURE — 99900035 HC TECH TIME PER 15 MIN (STAT)

## 2023-01-08 PROCEDURE — 84132 ASSAY OF SERUM POTASSIUM: CPT

## 2023-01-08 PROCEDURE — 99291 CRITICAL CARE FIRST HOUR: CPT | Mod: ,,, | Performed by: INTERNAL MEDICINE

## 2023-01-08 PROCEDURE — 99233 PR SUBSEQUENT HOSPITAL CARE,LEVL III: ICD-10-PCS | Mod: ,,, | Performed by: GENERAL PRACTICE

## 2023-01-08 PROCEDURE — 82803 BLOOD GASES ANY COMBINATION: CPT

## 2023-01-08 PROCEDURE — 27000221 HC OXYGEN, UP TO 24 HOURS

## 2023-01-08 PROCEDURE — 25000003 PHARM REV CODE 250: Performed by: FAMILY MEDICINE

## 2023-01-08 PROCEDURE — 99900031 HC PATIENT EDUCATION (STAT)

## 2023-01-08 PROCEDURE — 94640 AIRWAY INHALATION TREATMENT: CPT

## 2023-01-08 PROCEDURE — 94660 CPAP INITIATION&MGMT: CPT

## 2023-01-08 PROCEDURE — 85014 HEMATOCRIT: CPT

## 2023-01-08 PROCEDURE — 94003 VENT MGMT INPAT SUBQ DAY: CPT

## 2023-01-08 PROCEDURE — 37799 UNLISTED PX VASCULAR SURGERY: CPT

## 2023-01-08 PROCEDURE — 85027 COMPLETE CBC AUTOMATED: CPT | Performed by: FAMILY MEDICINE

## 2023-01-08 PROCEDURE — 85007 BL SMEAR W/DIFF WBC COUNT: CPT | Performed by: FAMILY MEDICINE

## 2023-01-08 PROCEDURE — S4991 NICOTINE PATCH NONLEGEND: HCPCS | Performed by: INTERNAL MEDICINE

## 2023-01-08 PROCEDURE — 94799 UNLISTED PULMONARY SVC/PX: CPT

## 2023-01-08 PROCEDURE — 82330 ASSAY OF CALCIUM: CPT

## 2023-01-08 PROCEDURE — 99233 SBSQ HOSP IP/OBS HIGH 50: CPT | Mod: ,,, | Performed by: GENERAL PRACTICE

## 2023-01-08 PROCEDURE — 94761 N-INVAS EAR/PLS OXIMETRY MLT: CPT

## 2023-01-08 PROCEDURE — 25000003 PHARM REV CODE 250: Performed by: INTERNAL MEDICINE

## 2023-01-08 PROCEDURE — 83735 ASSAY OF MAGNESIUM: CPT | Performed by: FAMILY MEDICINE

## 2023-01-08 PROCEDURE — 80053 COMPREHEN METABOLIC PANEL: CPT | Performed by: FAMILY MEDICINE

## 2023-01-08 PROCEDURE — 99291 PR CRITICAL CARE, E/M 30-74 MINUTES: ICD-10-PCS | Mod: ,,, | Performed by: INTERNAL MEDICINE

## 2023-01-08 PROCEDURE — 20000000 HC ICU ROOM

## 2023-01-08 PROCEDURE — 63600175 PHARM REV CODE 636 W HCPCS: Performed by: INTERNAL MEDICINE

## 2023-01-08 PROCEDURE — 25000242 PHARM REV CODE 250 ALT 637 W/ HCPCS: Performed by: INTERNAL MEDICINE

## 2023-01-08 PROCEDURE — 99900026 HC AIRWAY MAINTENANCE (STAT)

## 2023-01-08 PROCEDURE — 84295 ASSAY OF SERUM SODIUM: CPT

## 2023-01-08 RX ORDER — HYDROCHLOROTHIAZIDE 12.5 MG/1
12.5 TABLET ORAL DAILY
Status: DISCONTINUED | OUTPATIENT
Start: 2023-01-09 | End: 2023-01-12

## 2023-01-08 RX ORDER — FUROSEMIDE 10 MG/ML
20 INJECTION INTRAMUSCULAR; INTRAVENOUS ONCE
Status: COMPLETED | OUTPATIENT
Start: 2023-01-08 | End: 2023-01-08

## 2023-01-08 RX ORDER — HYDROCODONE BITARTRATE AND ACETAMINOPHEN 10; 325 MG/1; MG/1
1 TABLET ORAL EVERY 8 HOURS PRN
Status: DISCONTINUED | OUTPATIENT
Start: 2023-01-08 | End: 2023-01-09

## 2023-01-08 RX ORDER — NICARDIPINE HYDROCHLORIDE 0.2 MG/ML
0-15 INJECTION INTRAVENOUS CONTINUOUS
Status: DISCONTINUED | OUTPATIENT
Start: 2023-01-08 | End: 2023-01-11

## 2023-01-08 RX ORDER — MORPHINE SULFATE 2 MG/ML
2 INJECTION, SOLUTION INTRAMUSCULAR; INTRAVENOUS
Status: DISCONTINUED | OUTPATIENT
Start: 2023-01-08 | End: 2023-01-13

## 2023-01-08 RX ORDER — MORPHINE SULFATE 2 MG/ML
2 INJECTION, SOLUTION INTRAMUSCULAR; INTRAVENOUS EVERY 4 HOURS PRN
Status: DISCONTINUED | OUTPATIENT
Start: 2023-01-08 | End: 2023-01-08

## 2023-01-08 RX ORDER — AMLODIPINE BESYLATE 5 MG/1
10 TABLET ORAL DAILY
Status: DISCONTINUED | OUTPATIENT
Start: 2023-01-08 | End: 2023-01-14

## 2023-01-08 RX ORDER — CARVEDILOL 25 MG/1
25 TABLET ORAL 2 TIMES DAILY WITH MEALS
Status: DISCONTINUED | OUTPATIENT
Start: 2023-01-08 | End: 2023-01-25

## 2023-01-08 RX ADMIN — HYDRALAZINE HYDROCHLORIDE 10 MG: 20 INJECTION INTRAMUSCULAR; INTRAVENOUS at 10:01

## 2023-01-08 RX ADMIN — FENTANYL CITRATE 50 MCG: 50 INJECTION INTRAMUSCULAR; INTRAVENOUS at 12:01

## 2023-01-08 RX ADMIN — LEVALBUTEROL HYDROCHLORIDE 0.63 MG: 0.63 SOLUTION RESPIRATORY (INHALATION) at 08:01

## 2023-01-08 RX ADMIN — MORPHINE SULFATE 2 MG: 2 INJECTION, SOLUTION INTRAMUSCULAR; INTRAVENOUS at 01:01

## 2023-01-08 RX ADMIN — FAMOTIDINE 20 MG: 20 TABLET ORAL at 08:01

## 2023-01-08 RX ADMIN — PROPOFOL 50 MCG/KG/MIN: 10 INJECTION, EMULSION INTRAVENOUS at 07:01

## 2023-01-08 RX ADMIN — METHYLPREDNISOLONE SODIUM SUCCINATE 40 MG: 40 INJECTION, POWDER, FOR SOLUTION INTRAMUSCULAR; INTRAVENOUS at 08:01

## 2023-01-08 RX ADMIN — NICOTINE 1 PATCH: 14 PATCH, EXTENDED RELEASE TRANSDERMAL at 08:01

## 2023-01-08 RX ADMIN — MORPHINE SULFATE 2 MG: 2 INJECTION, SOLUTION INTRAMUSCULAR; INTRAVENOUS at 11:01

## 2023-01-08 RX ADMIN — DEXMEDETOMIDINE HYDROCHLORIDE 1.4 MCG/KG/HR: 4 INJECTION, SOLUTION INTRAVENOUS at 10:01

## 2023-01-08 RX ADMIN — MORPHINE SULFATE 2 MG: 2 INJECTION, SOLUTION INTRAMUSCULAR; INTRAVENOUS at 08:01

## 2023-01-08 RX ADMIN — FUROSEMIDE 20 MG: 10 INJECTION, SOLUTION INTRAMUSCULAR; INTRAVENOUS at 01:01

## 2023-01-08 RX ADMIN — TICAGRELOR 90 MG: 90 TABLET ORAL at 08:01

## 2023-01-08 RX ADMIN — HYDROCODONE BITARTRATE AND ACETAMINOPHEN 1 TABLET: 10; 325 TABLET ORAL at 07:01

## 2023-01-08 RX ADMIN — CHLORHEXIDINE GLUCONATE 15 ML: 1.2 RINSE ORAL at 08:01

## 2023-01-08 RX ADMIN — HYDRALAZINE HYDROCHLORIDE 10 MG: 20 INJECTION INTRAMUSCULAR; INTRAVENOUS at 08:01

## 2023-01-08 RX ADMIN — HYDROCODONE BITARTRATE AND ACETAMINOPHEN 1 TABLET: 5; 325 TABLET ORAL at 10:01

## 2023-01-08 RX ADMIN — MORPHINE SULFATE 2 MG: 2 INJECTION, SOLUTION INTRAMUSCULAR; INTRAVENOUS at 05:01

## 2023-01-08 RX ADMIN — PROPOFOL 50 MCG/KG/MIN: 10 INJECTION, EMULSION INTRAVENOUS at 04:01

## 2023-01-08 RX ADMIN — FENTANYL CITRATE 50 MCG: 50 INJECTION INTRAMUSCULAR; INTRAVENOUS at 08:01

## 2023-01-08 RX ADMIN — DEXMEDETOMIDINE HYDROCHLORIDE 1.4 MCG/KG/HR: 4 INJECTION, SOLUTION INTRAVENOUS at 07:01

## 2023-01-08 RX ADMIN — DEXMEDETOMIDINE HYDROCHLORIDE 1.4 MCG/KG/HR: 4 INJECTION, SOLUTION INTRAVENOUS at 01:01

## 2023-01-08 RX ADMIN — DEXMEDETOMIDINE HYDROCHLORIDE 1.4 MCG/KG/HR: 4 INJECTION, SOLUTION INTRAVENOUS at 04:01

## 2023-01-08 RX ADMIN — LEVALBUTEROL HYDROCHLORIDE 0.63 MG: 0.63 SOLUTION RESPIRATORY (INHALATION) at 03:01

## 2023-01-08 RX ADMIN — DEXMEDETOMIDINE HYDROCHLORIDE 1.4 MCG/KG/HR: 4 INJECTION, SOLUTION INTRAVENOUS at 02:01

## 2023-01-08 RX ADMIN — PROPOFOL 50 MCG/KG/MIN: 10 INJECTION, EMULSION INTRAVENOUS at 10:01

## 2023-01-08 RX ADMIN — ENOXAPARIN SODIUM 40 MG: 100 INJECTION SUBCUTANEOUS at 09:01

## 2023-01-08 RX ADMIN — NICARDIPINE HYDROCHLORIDE 5 MG/HR: 0.2 INJECTION INTRAVENOUS at 12:01

## 2023-01-08 RX ADMIN — DEXMEDETOMIDINE HYDROCHLORIDE 1.4 MCG/KG/HR: 4 INJECTION, SOLUTION INTRAVENOUS at 09:01

## 2023-01-08 RX ADMIN — PROPOFOL 50 MCG/KG/MIN: 10 INJECTION, EMULSION INTRAVENOUS at 01:01

## 2023-01-08 RX ADMIN — NICARDIPINE HYDROCHLORIDE 10 MG/HR: 0.2 INJECTION INTRAVENOUS at 01:01

## 2023-01-08 RX ADMIN — MORPHINE SULFATE 2 MG: 2 INJECTION, SOLUTION INTRAMUSCULAR; INTRAVENOUS at 03:01

## 2023-01-08 RX ADMIN — DEXMEDETOMIDINE HYDROCHLORIDE 1.4 MCG/KG/HR: 4 INJECTION, SOLUTION INTRAVENOUS at 05:01

## 2023-01-08 RX ADMIN — CEFTRIAXONE 1 G: 1 INJECTION, SOLUTION INTRAVENOUS at 01:01

## 2023-01-08 RX ADMIN — ASPIRIN 81 MG CHEWABLE TABLET 81 MG: 81 TABLET CHEWABLE at 08:01

## 2023-01-08 NOTE — RESPIRATORY THERAPY
01/07/23 1946   Patient Assessment/Suction   Level of Consciousness (AVPU) responds to voice   Respiratory Effort Normal;Unlabored   Expansion/Accessory Muscles/Retractions no use of accessory muscles   All Lung Fields Breath Sounds equal bilaterally;diminished;coarse   Rhythm/Pattern, Respiratory assisted mechanically   Cough Frequency with stimulation   Cough Type assisted   Suction Method tracheal   $ Suction Charges Inline Suction Procedure Stat Charge   Secretions Amount small   Secretions Color capri;pale;yellow   Secretions Characteristics thick   Skin Integrity   $ Wound Care Tech Time 15 min   Area Observed Left;Right;Cheek;Upper lip;Lower lip;Corner lip   Skin Appearance without discoloration   PRE-TX-O2   Device (Oxygen Therapy) ventilator   Oxygen Concentration (%) 60   SpO2 97 %   Pulse Oximetry Type Continuous   $ Pulse Oximetry - Multiple Charge Pulse Oximetry - Multiple   Pulse 63   Resp 19   Aerosol Therapy   $ Aerosol Therapy Charges Aerosol Treatment   Daily Review of Necessity (SVN) completed   Respiratory Treatment Status (SVN) given   Treatment Route (SVN) in-line;ventilator   Patient Position (SVN) semi-Doan's   Post Treatment Assessment (SVN) breath sounds unchanged   Signs of Intolerance (SVN) none   Breath Sounds Post-Respiratory Treatment   Post-treatment Heart Rate (beats/min) 62   Post-treatment Resp Rate (breaths/min) 20        Airway - Non-Surgical 01/01/23 0217   Placement Date/Time: 01/01/23 0217   Inserted by: MD  Airway Device Size: 7.5  Style: Cuffed  Cuff Inflated With: Air  Placement Verified By: Capnometry;Auscultation;Colorimetric EtCO2 device  Depth of Insertion (cm): 23  Secured at: Teeth  Insertion ...   Secured at 25 cm   Measured At Lips   Secured Location Center   Secured by Commercial tube domínguez   Bite Block center   Site Condition Cool;Dry   Status Intact;Secured;Patent   Site Assessment Clean;Dry   Cuff Volume   (MLT)   Airway Safety   Ambu bag with the patient?  HISTORY OF PRESENT ILLNESS:  Tiffanie Summers is an 87-year-old woman that I was asked to see at the request of Dr. 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  The patient has a previous history of stage II breast cancer treated in 1994 with a lumpectomy, chemotherapy and radiation for 10 years.  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  She underwent a resection by Dr. Anuel Hollis in [place].  I saw her at that time for a postoperative wound infection.  She has done well over the past 10 years with no evidence of recurrence; however, she recently noticed a change in her scar.  She had a mammogram and ultrasound, which demonstrated a 2.5 cm mass just below her skin.  A biopsy was performed, which revealed a high-grade spindle cell neoplasm.  She had a chest x-ray, which did not show any evidence of metastatic disease.  Presently, she does not really have much symptoms from this mass.      PAST MEDICAL HISTORY:  Negative for cardiac, pulmonary, renal or hepatic diseases.  She does not have diabetes.  She has intermittent hypertension.      PHYSICAL EXAMINATION:  She is a well-appearing woman in no apparent distress.  Examination of her left chest reveals radiation changes and breast surgery changes secondary to reconstruction.  She does have a 2.5 cm, visible and palpable mass just below her skin, which is close to her ribs and chest wall.      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      PLAN:  I have recommended a CT scan of her chest to determine the depth of invasion and help plan her surgery.  In addition, I will have a better idea of her lung metastasis.  I will have her see Plastic Surgery as well.      TT:  30 minutes.  CT:  20 minutes.      cc:   Cherrie Sandra MD   [facility]   [address] [place] [id number]      Eduin Mills MD   [facility]    [address] [place] [address]  [id number]        Yes, Adult Ambu   Is mask with the patient? Yes, Adult Mask   Respiratory Interventions   Airway/Ventilation Management airway patency maintained   Vent Select   Conventional Vent Y   Charged w/in last 24h YES   Preset Conventional Ventilator Settings   Vent ID [id number]   Vent Type    Ventilation Type VC   Vent Mode Spont   Humidity HME   PEEP/CPAP 5 cmH20   Pressure Support 5 cmH20   Peak End Inspiratory Pressure 12 cmH20   Insp Rise Time  70 %   I-Trigger Type  V-TRIG   Trigger Sensitivity Flow/I-Trigger 3 L/min   Patient Ventilator Parameters   Resp Rate Total 18 br/min   Peak Airway Pressure 12 cmH20   Mean Airway Pressure 7 cmH20   Plateau Pressure 56 cmH20   Exhaled Vt 858 mL   Total Ve 10.6 L/m   Spont Ve 10.6 L   I:E Ratio Measured 1:2.30   Auto PEEP 20 cmH20   Tubing ID (mm) 7.5 mm   Tube Type ET   Inspired Tidal Volume (VTI) 1 mL   Conventional Ventilator Alarms   Alarms On Y   Resp Rate High Alarm 45 br/min   Press High Alarm 65 cmH2O   Apnea Rate 24   Apnea Volume (mL) 1 mL   Apnea Oxygen Concentration  100   Apnea Flow Rate (L/min) 60   T Apnea 20 sec(s)   Ready to Wean/Extubation Screen   FIO2<=50 (chart decimal) (!) 0.6   MV<16L (chart vol.) 10.6   PEEP <=8 (chart #) 5   Ready to Wean Parameters   F/VT Ratio<105 (RSBI) (!) 22.14   Vital Capacity (mL) 0   Education   $ Education Bronchodilator;Suction;Ventilator Oxygen;15 min   Respiratory Evaluation   $ Care Plan Tech Time 15 min   $ Eval/Re-eval Charges Re-evaluation   Evaluation For New Orders

## 2023-01-08 NOTE — NURSING
Cardiology notified that pt's HOB is elevated > 15 degrees despite having transvenous pacer, due to the fact that he is on spontaneous breathing trial

## 2023-01-08 NOTE — PROGRESS NOTES
Pulmonary/Critical Care  Progress Note      Patient name: Peyman Gr  MRN: [id number]  Date: 01/08/2023    Admit Date: 1/1/2023  Consult Requested By: Claudio Hoyos MD    Reason for Consult: respiratory failure    HPI:    1/1/2023 - 68 yo initially presented with chest pain about  2 AM, had bradycardia then VTVF arrest and had prolonged code - transferred to [facility] and was in cardiac arrest at arrival after multiple rounds of meds he had ROSC and taken to cath lab.  In Cath lab had occluded RCA and had successful PCI.  ALso has LM and OM disease.  He has been very unstable and moved to ICU.  He is unresponsive and cool.  He is ventilated.  On dobutrex and levophed, having some ectopy (contacting cardiology to see if they want to start lidocaine or amiodarone, QTc is 487), he is on bicarb drip and last ABG showed adequate oxygenation but a mixed metabolic and respiratory acidosis (vent adjused and will repeat).  No family was in the waiting room.  Chart has been reviewed and case d/w nursing, respiratory and Dr Hoyos.  BY report pt was resuscitated for > 60 minutes.    1/2/2023 - Remains critically ill, has been agitated at times and has needed increased sedation, he has not followed any commands.  Rhythm seems more stable but he has had ectopy.  Temp had increased from his hypothermia yesterday.  Electrolytes replaced by SS.  LFT have increased.  ABG this AM shows alkalosis (on bicarb drip).  CXR reviewed.  ECHO noted.    1/3/2023 - Remains critically ill, gets agitated at times and has required sedation.  He does not respond to voice or pain but does have spontaneous respirations and movement.  Pupils are sluggish to NR, minimal corneal reflex.  Had temp this AM.  CXR noted.    1/4/2023 - Had issues yesterday - HTN, bradycardia, a ? Of seizure activity and I was not notified of any of this.  Overnight things have been more stable.  He remains unresponsive.  Temp has decreased, renal function good, LFT better.   I tried to call family yesterday but got no answer.  Family has been at bedside per nursing.  SC + for [facility]    1/5/2023 - Remains critically ill and is not responsive to me.  When sedation decreased he does have some movement (not purposeful).  EEG report reviewed and neuro note seen.  Neuro issues preclude any thoughts of weaning ventilator support  Tachycardic today.  O2 needs have increased.    1/6/23: Off sedation for an hour today and moved spontaneously, shaking head around, but did not appear to follow commands. Oxygen requirements going down.    1/7/23: Off sedation for >30 minutes, he is moving his head spontaneously and may attend to his name, but hard to tell. Does not follow commands. He is respiring well on PS 5/5. Will keep on pressure support and Precedex with fentanyl boluses for now.    1/8/23: Doing well off sedation, following commands, intact respiratory drive, ventilating well. Extubating now.    Review of Systems    Review of Systems   Unable to perform ROS: Mental acuity     Past Medical History    Past Medical History:   Diagnosis Date    Arthritis     Chronic back pain     Chronic neck pain     Dry gangrene     RIGHT LITTLE TOE    Hypercholesteremia     Hypertension     Insomnia     Multiple falls     S/P BACK SURGERY- 1 FALL    PAD (peripheral artery disease)     Personal history of colonic polyps     PVD (peripheral vascular disease)        Past Surgical History    Past Surgical History:   Procedure Laterality Date    ANGIOGRAPHY OF LOWER EXTREMITY N/A 09/24/2020    Procedure: Angiogram Extremity Unilateral;  Surgeon: Luke Cabello MD;  Location: [facility] CATH;  Service: Cardiology;  Laterality: N/A;    BACK SURGERY      BUNIONECTOMY Bilateral     CARPAL TUNNEL RELEASE Bilateral     CHOLECYSTECTOMY      COLONOSCOPY N/A 5/9/2022    Procedure: COLONOSCOPY;  Surgeon: Braydon Durand MD;  Location: [facility] ENDO;  Service: Endoscopy;  Laterality: N/A;    COLONOSCOPY W/ POLYPECTOMY       CORONARY ANGIOGRAPHY N/A 1/1/2023    Procedure: ANGIOGRAM, CORONARY ARTERY;  Surgeon: Stefany Elizondo MD;  Location: [facility] CATH/EP LAB;  Service: Cardiology;  Laterality: N/A;    CORONARY ARTERY BYPASS GRAFT      CREATION OF BYPASS FROM INTERNAL CAROTID ARTERY TO SUBCLAVIAN ARTERY Right 12/27/2021    Procedure: CREATION, BYPASS, ARTERIAL, INTERNAL CAROTID TO SUBCLAVIAN;  Surgeon: Jeovany Goins MD;  Location: [facility] OR;  Service: Vascular;  Laterality: Right;    ELBOW ARTHROPLASTY Right     ELBOW SURGERY      INSERTION, PACEMAKER, TEMPORARY TRANSVENOUS  1/1/2023    Procedure: Insertion, Pacemaker, Temporary Transvenous;  Surgeon: Stefany Elizondo MD;  Location: [facility] CATH/EP LAB;  Service: Cardiology;;    IVUS, CORONARY  1/1/2023    Procedure: IVUS, Coronary;  Surgeon: Stefany Elizondo MD;  Location: [facility] CATH/EP LAB;  Service: Cardiology;;    LEFT HEART CATHETERIZATION Left 1/1/2023    Procedure: Left heart cath;  Surgeon: Stefany Elizondo MD;  Location: [facility] CATH/EP LAB;  Service: Cardiology;  Laterality: Left;    MINIMALLY INVASIVE FORAMINOTOMY OF  SPINE N/A 11/15/2018    Procedure: FORAMINOTOMY, SPINE, MINIMALLY INVASIVE DECOMPRESSION L4-5;  Surgeon: Iván Stevenson Jr., MD;  Location: [facility] OR;  Service: Orthopedics;  Laterality: N/A;    NECK SURGERY      acf    PERCUTANEOUS CORONARY INTERVENTION, ARTERY N/A 1/1/2023    Procedure: Percutaneous coronary intervention;  Surgeon: Stefany Elizondo MD;  Location: [facility] CATH/EP LAB;  Service: Cardiology;  Laterality: N/A;    PERCUTANEOUS TRANSLUMINAL ANGIOPLASTY (PTA) OF PERIPHERAL VESSEL Right 05/01/2020    Procedure: PTA, PERIPHERAL VESSEL of right lower extremity;  Surgeon: Luke Cabello MD;  Location: [facility] CATH;  Service: Cardiology;  Laterality: Right;    PERCUTANEOUS TRANSLUMINAL ANGIOPLASTY (PTA) OF PERIPHERAL VESSEL Left 09/10/2020    Procedure: PTA, PERIPHERAL VESSEL, Left lower extremity;  Surgeon: Luke BEY  MD Destiney;  Location: [facility] CATH;  Service: Cardiology;  Laterality: Left;    PERCUTANEOUS TRANSLUMINAL ANGIOPLASTY (PTA) OF PERIPHERAL VESSEL Left 2021    Profunda and SFA       Medications (scheduled):      aspirin  81 mg Oral Daily    cefTRIAXone (ROCEPHIN) IVPB  1 g Intravenous Q24H    chlorhexidine  15 mL Mouth/Throat BID    enoxparin  40 mg Subcutaneous Q24H    famotidine  20 mg Per OG tube BID    levalbuterol  0.63 mg Nebulization TID WAKE    methylPREDNISolone sodium succinate injection  40 mg Intravenous Daily    nicotine  1 patch Transdermal Daily    ticagrelor  90 mg Oral BID       Medications (infusions):      dexmedetomidine (PRECEDEX) infusion 1.4 mcg/kg/hr (23 1009)    DOBUTamine Stopped (23 0945)    fentanyl Stopped (23 1805)    midazolam 3 mg/hr (23 1600)    NORepinephrine bitartrate-D5W Stopped (23 0500)    propofoL 50 mcg/kg/min (23 1007)    vasopressin Stopped (23 0945)       Medications (prn):     acetaminophen, albuterol-ipratropium, atropine, calcium gluconate IVPB, calcium gluconate IVPB, calcium gluconate IVPB, dextrose 10%, dextrose 10%, DOBUTamine, EPINEPHrine, [] fentaNYL **FOLLOWED BY** fentaNYL, glucagon (human recombinant), glucose, glucose, hydrALAZINE, HYDROcodone-acetaminophen, magnesium sulfate IVPB, magnesium sulfate IVPB, melatonin, midazolam, morphine, naloxone, ondansetron, polyethylene glycol, potassium chloride in water **AND** potassium chloride in water **AND** potassium chloride in water, senna-docusate 8.6-50 mg, simethicone, sodium chloride 0.9%, sodium phosphate IVPB, sodium phosphate IVPB, sodium phosphate IVPB    Family History:   Family History   Problem Relation Age of Onset    COPD Mother     Hypertension Father     Heart disease Father     Heart attack Father     COPD Sister     Other Sister     Kidney failure Brother     Hypertension Brother     Diabetes Brother        Social History: Tobacco:   Social History  "    Tobacco Use   Smoking Status Former    Packs/day: 2.00    Years: 60.00    Pack years: 120.00    Types: Cigarettes    Quit date: 2017    Years since quittin.0   Smokeless Tobacco Never                                EtOH:   Social History     Substance and Sexual Activity   Alcohol Use No                                Drugs:   Social History     Substance and Sexual Activity   Drug Use No       Physical Exam    Vital signs:  Temp:  [98.1 °F (36.7 °C)-98.8 °F (37.1 °C)]   Pulse:  [48-94]   Resp:  [5-40]   BP: (141-213)/()   SpO2:  [91 %-98 %]   Arterial Line BP: (121-332)/()     Intake/Output:   Intake/Output Summary (Last 24 hours) at 2023 1228  Last data filed at 2023 0701  Gross per 24 hour   Intake 1632.57 ml   Output 2650 ml   Net -1017.43 ml          BMI: Estimated body mass index is 32.65 kg/m² as calculated from the following:    Height as of an earlier encounter on 23: 6' 2.02" (1.88 m).    Weight as of this encounter: 115.4 kg (254 lb 6.6 oz).    PHYSICAL EXAM  GENERAL:   intubated; moving, following commands  HEENT: PERRLA.  NECK: No cervical adenopathy palpated.  HEART: Regular rate and rhythm. No murmur or gallop auscultated.  LUNGS: Clear to auscultation. Lung excursion symmetrical.   ABDOMEN: Bowel sounds present. Soft, non-tender, non-distended.  EXTREMITIES: Normal muscle tone and joint movement, no cyanosis or clubbing.   LYMPHATICS: No adenopathy palpated, no edema.  SKIN: Dry, intact, no lesions.   NEURO: following commands, lifts head off bed      Laboratory    Recent Labs   Lab 23  0346 23  0513   WBC 13.16*  --    RBC 3.89*  --    HGB 12.0*  --    HCT 35.8* 36     --    MCV 92  --    MCH 30.8  --    MCHC 33.5  --          Recent Labs   Lab 23  0346   CALCIUM 8.3*   PROT 6.1      K 4.0   CO2 25      BUN 47*   CREATININE 0.9   ALKPHOS 68   ALT 50*   AST 24   BILITOT 0.4         No results for input(s): PT, INR, APTT in the last " 24 hours.      No results for input(s): CPK, CPKMB, TROPONINI, MB in the last 24 hours.      Additional labs: reviewed    Microbiology:       Microbiology Results (last 7 days)       Procedure Component Value Units Date/Time    Blood culture [[id number]] Collected: 01/03/23 0954    Order Status: Completed Specimen: Blood Updated: 01/08/23 1032     Blood Culture, Routine No growth after 5 days.    Blood culture [[id number]] Collected: 01/03/23 0954    Order Status: Completed Specimen: Blood Updated: 01/08/23 1032     Blood Culture, Routine No growth after 5 days.    Culture, Respiratory with Gram Stain [[id number]]  (Abnormal)  (Susceptibility) Collected: 01/02/23 1431    Order Status: Completed Specimen: Respiratory from Tracheal Aspirate Updated: 01/04/23 0856     Respiratory Culture Reduced normal respiratory lorenzo      STAPHYLOCOCCUS AUREUS  Moderate       Gram Stain (Respiratory) <10 epithelial cells per low power field.     Gram Stain (Respiratory) Few WBC's     Gram Stain (Respiratory) Moderate Gram positive cocci    MRSA Screen by PCR [[id number]] Collected: 01/03/23 1412    Order Status: Completed Specimen: Nasopharyngeal Swab from Nasal Updated: 01/03/23 1554     MRSA SCREEN BY PCR Negative            Radiology    X-Ray Chest AP Portable  HISTORY: eval and compare to previous evals.    COMPARISON:1/6/2023    FINDINGS: Tip of ET tube resides at the medial ends of clavicles in optimal position. The NG tube passes into the stomach. The heart is prominent size with moderate biventricular dominance. Patient is slightly shifted towards the left.    Persistent homogeneous airlessness barely in the basilar segments is noted unchanged upon comparison with the changes in the left is attributed the R2 to layering pleural effusion. Persistent prominence of the central pulmonary vascularity. No evidence of pneumothorax. There are clips in the right supraclavicular region noted.    IMPRESSION:  1. Stable appearance the chest  when compared to previous.  2. Homogeneous airlessness left lung base secondary pleural effusion, atelectasis, pneumonic infiltrate, or combination thereof.    Electronically signed by:  Solo Harris MD  1/7/2023 7:10 AM CST Workstation: [id number]        Additional Studies    reviewed    Ventilator Information    Vent Mode: A/C  Oxygen Concentration (%):  [50-60] 50  Resp Rate Total:  [16 br/min-43 br/min] 26 br/min  Vt Set:  [540 mL] 540 mL  PEEP/CPAP:  [5 cmH20] 5 cmH20  Pressure Support:  [5 cmH20] 5 cmH20  Mean Airway Pressure:  [6.8 rbS36-68 cmH20] 12 cmH20         Recent Labs     01/08/23  0513   PH 7.455*   PCO2 36.3   PO2 69*   HCO3 25.6   POCSATURATED 94*   BE 2           Impression    Active Hospital Problems    Diagnosis  POA    *STEMI involving right coronary artery [I21.11]  Yes    Pneumonia of right lung due to methicillin susceptible Staphylococcus aureus (MSSA) [J15.211]  No    Shock liver [K72.00]  Yes    Cardiogenic shock [R57.0]  Yes    Cardiac arrest [I46.9]  Yes    Acute respiratory failure with hypoxia and hypercarbia [J96.01, J96.02]  Yes    Leukocytosis [D72.829]  Yes    Anemia [D64.9]  Yes    History of tobacco abuse [Z87.891]  Not Applicable    Primary hypertension [I10]  Yes    PVD (peripheral vascular disease) with claudication [I73.9]  Yes    PAD (peripheral artery disease) [I73.9]  Yes      Resolved Hospital Problems    Diagnosis Date Resolved POA    Hyponatremia [E87.1] 01/02/2023 Yes   EEG on 1/4 showing voltage suppression and slowing.  CT head on 1/4 w/o acute abnormality.  #Severe hypertension  #Pulmonary edema  #Chronic pain and opiate use    Extubate now  Start BiPAP  Furosemide 20 mg IV now  Nicardipine drip, wean as tolerated for SBP <165  Restart home carvedilol and amlodipine today  Restart home hydrochlorothiazide tomorrow  Home percocet restarted  PRN IV morphine 2 mg  Neuro following  Continue ceftriaxone through today  Watch renal function and LFT  Monitor  electrolytes and replace via SS  Watch H/H  steroids for wheezing      Upon my evaluation, this patient had a high probability of imminent or life-threatening deterioration, which required my direct attention, intervention, and personal management.    I have personally provided 35 minutes of critical care time exclusive of time spent on separately billable procedures. Over 50% of the time of this encounter was spent in direct care at the bedside. Time includes review of laboratory data, radiology results, discussion with consultants, and monitoring for potential decompensation. Interventions were performed as documented above.    Jasvir Nails MD  Pulmonary and Critical Care Medicine  [facility] / [facility]  Date of Service: 01/08/2023  5:52 PM

## 2023-01-08 NOTE — PROGRESS NOTES
[facility] Medicine  Progress Note    Patient name: Peyman Gr  MRN: [id number]  Admit Date: 1/1/2023   LOS: 7 days     SUBJECTIVE:     Principal problem: STEMI involving right coronary artery    Interval History:  Several intermittent runs of VT last night.  He also became bradycardic into the 40s.  He was able to follow commands last night as per report.  He is currently back on sedation secondary to increased work of breathing.     Summary:   67-year-old male with peripheral vascular disease, essential hypertension, hyperlipidemia and ongoing tobacco use presented to outside facility with 2 hour onset of chest pain and shortness of breath.  EKG revealed inferior and anterolateral STEMI.  Quickly degenerated into cardiac arrest with runs of VT/VF and asystole requiring resuscitation for about 40 minutes.  He was subsequently intubated and transferred to our ED. En route he developed cardiac arrest again (PEA) which was continued into our ED and was resuscitated for another 30 minutes.  He required transcutaneous pacing.  He was taken emergently to the catheterization lab and underwent PCI with stenting of RCA which was felt to be the culprit lesion.  He was also found to have left main stenosis of about 70%.  A transvenous pacemaker was also placed.  Currently admitted to the ICU for post cardiac arrest care.  He remains intubated and critically ill. Hospital stay complicated by fever; initiated on broad spectrum antibiotics.  Respiratory culture growing MSSA - de-escalated to ceftriaxone.    Scheduled Meds:   aspirin  81 mg Oral Daily    cefTRIAXone (ROCEPHIN) IVPB  1 g Intravenous Q24H    chlorhexidine  15 mL Mouth/Throat BID    enoxparin  40 mg Subcutaneous Q24H    famotidine  20 mg Per OG tube BID    levalbuterol  0.63 mg Nebulization TID WAKE    methylPREDNISolone sodium succinate injection  40 mg Intravenous Daily    nicotine  1 patch Transdermal Daily    ticagrelor  90 mg Oral BID      Continuous Infusions:   dexmedetomidine (PRECEDEX) infusion 1.4 mcg/kg/hr (23)    DOBUTamine Stopped (23 0945)    fentanyl Stopped (23 1805)    midazolam 3 mg/hr (23 1600)    NORepinephrine bitartrate-D5W Stopped (23 0500)    propofoL 50 mcg/kg/min (23)    vasopressin Stopped (23)     PRN Meds:acetaminophen, albuterol-ipratropium, atropine, calcium gluconate IVPB, calcium gluconate IVPB, calcium gluconate IVPB, dextrose 10%, dextrose 10%, DOBUTamine, EPINEPHrine, [] fentaNYL **FOLLOWED BY** fentaNYL, glucagon (human recombinant), glucose, glucose, hydrALAZINE, HYDROcodone-acetaminophen, magnesium sulfate IVPB, magnesium sulfate IVPB, melatonin, midazolam, morphine, naloxone, ondansetron, polyethylene glycol, potassium chloride in water **AND** potassium chloride in water **AND** potassium chloride in water, senna-docusate 8.6-50 mg, simethicone, sodium chloride 0.9%, sodium phosphate IVPB, sodium phosphate IVPB, sodium phosphate IVPB    Review of patient's allergies indicates:  No Known Allergies    Review of Systems:  Unable to obtain due to clinical condition    OBJECTIVE:     Vital Signs (Most Recent)  Temp: 98.7 °F (37.1 °C) (23 2300)  Pulse: (!) 49 (23)  Resp: (!) 25 (23)  BP: (!) 191/93 (23)  SpO2: 98 % (23)    Vital Signs Range (Last 24H):  Temp:  [98.1 °F (36.7 °C)-98.7 °F (37.1 °C)]   Pulse:  [48-94]   Resp:  [5-40]   BP: (141-213)/()   SpO2:  [91 %-98 %]   Arterial Line BP: (121-332)/()     I & O (Last 24H):  Intake/Output Summary (Last 24 hours) at 2023 0840  Last data filed at 2023 0553  Gross per 24 hour   Intake 1572.57 ml   Output 2650 ml   Net -1077.43 ml         Physical Exam:  General: Patient intubated and sedated.  Appears stated age  Eyes: No conjunctival injection. No scleral icterus.  Scleral edema noted  ENT:  ET and OG tubes noted.  No discharge from ears  or nose  Neck: Supple, trachea midline. No JVD  CVS:  Bradycardic.  1+ pitting edema of lower extremities  Lungs:  Mechanical breath sounds  Abdomen:  Soft, nontender and nondistended.  No organomegaly  Neuro:  Sedated.  Pupils are equal, round and minimally reactive to light.  Skin:  No rash or erythema noted  : Duron catheter with yellow urine    Laboratory:  I have reviewed all pertinent lab results within the past 24 hours.  CBC:   Recent Labs   Lab 01/08/23  0346 01/08/23  0513   WBC 13.16*  --    RBC 3.89*  --    HGB 12.0*  --    HCT 35.8* 36     --    MCV 92  --    MCH 30.8  --    MCHC 33.5  --        CMP:   Recent Labs   Lab 01/08/23  0346      CALCIUM 8.3*   ALBUMIN 2.4*   PROT 6.1      K 4.0   CO2 25      BUN 47*   CREATININE 0.9   ALKPHOS 68   ALT 50*   AST 24   BILITOT 0.4             Diagnostic Results:  Labs: Reviewed      ASSESSMENT/PLAN:     Active Hospital Problems    Diagnosis  POA    *STEMI involving right coronary artery [I21.11]  Yes    Pneumonia of right lung due to methicillin susceptible Staphylococcus aureus (MSSA) [J15.211]  No    Shock liver [K72.00]  Yes    Cardiogenic shock [R57.0]  Yes    Cardiac arrest [I46.9]  Yes    Acute respiratory failure with hypoxia and hypercarbia [J96.01, J96.02]  Yes    Leukocytosis [D72.829]  Yes    Anemia [D64.9]  Yes    History of tobacco abuse [Z87.891]  Not Applicable    Primary hypertension [I10]  Yes    PVD (peripheral vascular disease) with claudication [I73.9]  Yes    PAD (peripheral artery disease) [I73.9]  Yes      Resolved Hospital Problems    Diagnosis Date Resolved POA    Hyponatremia [E87.1] 01/02/2023 Yes         Plan:   Cardiac arrest secondary to STEMI involving RCA complicated by complete heart block  Status post emergent cardiac catheterization with PCI and stenting  - 01/01/2023  On aspirin and ticagrelor for dual antiplatelet therapy  Continue post cardiac arrest care in ICU   Mechanical ventilation for  respiratory failure; appreciate input from pulmonology   Sedation as needed to maintain RASS of 0  Monitor electrolytes and replete per protocol   MSSA pneumonia - deescalate antibiotics to ceftriaxone - last dose today  Monitor urine output and avoid nephrotoxic agents, NSAIDs and iodinated contrast  Echocardiogram noted - normal LVEF and grade 1 diastolic dysfunction  Neurology following for neuroprognostication.  EEG and CT head noted. Plan for MRI brain once off temporary pacemaker     Prognosis remains guarded     VTE Risk Mitigation (From admission, onward)           Ordered     enoxaparin injection 40 mg  Every 24 hours (non-standard times)         01/07/23 0902     IP VTE HIGH RISK PATIENT  Once         01/01/23 0817     Place sequential compression device  Until discontinued         01/01/23 0817                        Department Hospital Medicine  [facility]  Claudio Hoyos MD  Date of service: 01/08/2023

## 2023-01-08 NOTE — NURSING
Pt extubated to NC, then HFNC, and eventually to Bipap due to tachypnea, decreased sats, and hypertension. Pt resting well on bipap; 2 mg morphine given and available prn for SOB/agitation/etc. Pt following commands and answering questions appropriately. Right sided hemiparesis still noted, but hard to get a good assessment due to pt's deconditioning.

## 2023-01-08 NOTE — PLAN OF CARE
01/08/23 0839   PRE-TX-O2   Device (Oxygen Therapy) ventilator   Oxygen Concentration (%) 50   SpO2 97 %   Pulse (!) 57   Resp (!) 30   Aerosol Therapy   $ Aerosol Therapy Charges Aerosol Treatment   Daily Review of Necessity (SVN) completed   Respiratory Treatment Status (SVN) given   Treatment Route (SVN) ventilator   Patient Position (SVN) semi-Doan's   Post Treatment Assessment (SVN) breath sounds improved   Signs of Intolerance (SVN) none   Breath Sounds Post-Respiratory Treatment   Throughout All Fields Post-Treatment All Fields   Throughout All Fields Post-Treatment aeration increased   Post-treatment Heart Rate (beats/min) 64   Post-treatment Resp Rate (breaths/min) 20        Airway - Non-Surgical 01/01/23 0217   Placement Date/Time: 01/01/23 0217   Inserted by: MD  Airway Device Size: 7.5  Style: Cuffed  Cuff Inflated With: Air  Placement Verified By: Capnometry;Auscultation;Colorimetric EtCO2 device  Depth of Insertion (cm): 23  Secured at: Teeth  Insertion ...   Secured at 25 cm   Measured At Lips   Secured Location Center   Secured by Commercial tube domínguez   Tube Securement Device Changed? Yes   Bite Block center   Site Condition Cool   Status Intact;Secured;Patent   Site Assessment Clean   Cuff Pressure   (mlt)   General Safety Checklist   Safety Promotion/Fall Prevention side rails raised   Airway Safety   Ambu bag with the patient? Yes, Adult Ambu   Is mask with the patient? Yes, Adult Mask   Suction set is at the bedside? Yes   Vent Select   Conventional Vent Y   $ Ventilator Subsequent 1   Charged w/in last 24h YES   Preset Conventional Ventilator Settings   Vent ID [id number]   Vent Type    Ventilation Type VC   Vent Mode A/C   Set Rate 22 BPM   Vt Set 540 mL   PEEP/CPAP 5 cmH20   Peak Flow 75 L/min   Peak End Inspiratory Pressure 24 cmH20   I-Trigger Type  V-TRIG   Trigger Sensitivity Flow/I-Trigger 3 L/min   Patient Ventilator Parameters   Resp Rate Total 25 br/min   Peak Airway Pressure 27  cmH20   Mean Airway Pressure 12 cmH20   Plateau Pressure 56 cmH20   Exhaled Vt 332 mL   Total Ve 13.1 L/m   I:E Ratio Measured 1:1.10   Auto PEEP 20 cmH20   Tubing ID (mm) 7.5 mm   Conventional Ventilator Alarms   Alarms On Y   Ve High Alarm 25 L/min   Ve Low Alarm 5 L/min   Vt High Alarm 1200 mL   Vt Low Alarm 200 mL   Resp Rate High Alarm 45 br/min   Press High Alarm 65 cmH2O   Apnea Rate 24   Apnea Volume (mL) 1 mL   Apnea Oxygen Concentration  100   Apnea Flow Rate (L/min) 60   T Apnea 20 sec(s)   IHI Ventilator Associated Pneumonia Bundle (Required)   Daily Awakening Trials Performed Yes   Head of Bed Elevated  HOB 30   Oral Care Mouth moisturizer;Mouth swabbed   Vent Circut Breaks Minimized Yes   Ready to Wean/Extubation Screen   FIO2<=50 (chart decimal) 0.5   MV<16L (chart vol.) 13.1   PEEP <=8 (chart #) 5   Ready to Wean Parameters   F/VT Ratio<105 (RSBI) (!) 90.36   Vital Capacity (mL) 0   Education   $ Education Bronchodilator;15 min   Respiratory Evaluation   $ Care Plan Tech Time 15 min   $ Eval/Re-eval Charges Re-evaluation

## 2023-01-08 NOTE — NURSING
While on vent setting of spontaneous with Precedex at 1.4ml/hr and hourly bolus of Fentanyl, pt was able to follow commands to open eyes, raise eye brows, and weakly wiggle several finger bilaterally, raise MARIUSZ arms weakly as well.  Pt was unable to raise MARIUSZ legs nor wiggle toes.     Pt had several runs of Vt and one episode where the external pacemaker set on 40 was required to pace.    Pt tiring from WOB, tachypenia, HTN, arrhthymias,and agitation requiring pt to be placed back on ac/vc mode and sedation restarted.      Monitoring to continue.

## 2023-01-08 NOTE — PROGRESS NOTES
[facility]  Department of Cardiology  Progress Note    PATIENT NAME: Peyman Gr  MRN: [id number]  TODAY'S DATE: 01/07/2023  ADMIT DATE: 1/1/2023    SUBJECTIVE     PRINCIPLE PROBLEM: STEMI involving right coronary artery    INTERVAL HISTORY:    1/6/23:  Patient lying in bed intubated and sedated. Vitals are stable.      1/5/23:  Patient seen lying in bed off sedation. Patient noted to be moving and appeared agitated. Neuro at bedside for exam. He is hypertensive and tachycardic without sedation.      1/4/23:  Patient seen lying in bed on sedation this morning.  No distress noted.  Vitals were stable at that time.  Of note the patient has become hypertensive and tachycardic this afternoon.  Patient is currently receiving full sedation due to jerking and sweating.     01/03/2023:     Patient remains intubated and sedated on mechanical ventilation. FIO2 is 60% with 5 PEEP. RN attempted to wean sedation but patient keeps triggering the vent.  He has cough and pupillary reflex.  RN states she has seen him move all 4 extremities but not to command.  He remains unresponsive. He is on Levophed, propofol, fentanyl, and vasopressin. He was intermittently paced for approximately 5 min this morning when he was coughing and gagging on the vent. He remains in critical condition.  UOP is 275 today.         01/02/2023:  Seen and examined patient in the ICU today.  Off bicarb drip.  Currently on 2 sedatives and Levophed.      REASON FOR CONSULT:  STEMI        HPI:  67-year-old male with past medical history of peripheral vascular disease, right subclavian stenosis, chronic smoker, hypertension, hyperlipidemia presented to oxygen [facility] with 2 hour history of chest pain, shortness of breath.  ECG showed junctional bradycardia, complete heart block with inferior and anterolateral ST elevations.  STEMI code was called to which I responded immediately and agreed to transfer the patient to [place]  [facility] cath lab for emergent cardiac catheterization.  However, patient unfortunately went into cardiac arrest at [facility] ER with VT, VF, asystole requiring resuscitation and intubation.  Patient was resuscitated for about 40 minutes ROSC was achieved.  Patient was moving extremities at the time as per the ER.  Patient was then transferred to [facility] ER and patient again had cardiac arrest upon arrival to the ER which required resuscitation for 30 minutes.  Patient was in PEA initially and ROSC was achieved subsequently however patient was dependent on transcutaneous pacing.  Patient blood pressure was very low and could not be obtained with a BP cuff.  Patient was unable to be transferred to the cath lab at that point due to significant hemodynamic instability. Patient was started on pressors.  A-line was placed in the ED and blood pressure eventually improved with high doses of vasopressin, norepinephrine, sodium bicarbonate and dobutamine infusions.  Discussed with patient's family members regarding the patient's critical condition.  Once the blood pressure was stabilized on the pressors, patient was brought to the cath lab for transvenous pacemaker placement and coronary angiogram.  Benefits and risks of the procedure explained to patient's family and patient's family  agreed for the procedure.            1/7/2023  PATIENT REMAINS INTUBATED OCCASIONALLY HAS TEMPORARY PACEMAKER PACING  SOME MOVEMENT OF THE LEFT SIDE BUT NONE ON THE RIGHT  HE IS ON NO DRIPS ATTEMPTING TO GIVE PRECEDEX FOR SPONTANEOUS BREATHING TRIAL  VITAL SIGNS OTHERWISE STABLE    Review of patient's allergies indicates:  No Known Allergies    REVIEW OF SYSTEMS  CARDIOVASCULAR:  NOT OBTAINABLE OCCASIONALLY PACING  OBJECTIVE     VITAL SIGNS (Most Recent)  Temp: 98.1 °F (36.7 °C) (01/07/23 1900)  Pulse: 63 (01/07/23 1946)  Resp: 19 (01/07/23 1946)  BP: (!) 196/98 (01/07/23 1900)  SpO2: 97 % (01/07/23  1946)    VENTILATION STATUS  Resp: 19 (01/07/23 1946)  SpO2: 97 % (01/07/23 1946)  Vent Mode: Spont  Oxygen Concentration (%):  [50-60] 60  Resp Rate Total:  [16 br/min-43 br/min] 18 br/min  Vt Set:  [540 mL] 540 mL  PEEP/CPAP:  [5 cmH20] 5 cmH20  Pressure Support:  [5 cmH20] 5 cmH20  Mean Airway Pressure:  [7 cmH20-15 cmH20] 7 cmH20    I & O (Last 24H):  Intake/Output Summary (Last 24 hours) at 1/7/2023 2123  Last data filed at 1/7/2023 1817  Gross per 24 hour   Intake 1327.5 ml   Output 1000 ml   Net 327.5 ml       WEIGHTS  Wt Readings from Last 3 Encounters:   01/07/23 0400 115.4 kg (254 lb 6.6 oz)   01/06/23 0400 117.6 kg (259 lb 4.2 oz)   01/05/23 0500 115.3 kg (254 lb 3.1 oz)   01/03/23 0615 105.4 kg (232 lb 5.8 oz)   01/02/23 0400 102.5 kg (225 lb 15.5 oz)   01/02/23 0053 102.5 kg (225 lb 15.5 oz)   01/01/23 0800 99.8 kg (220 lb 0.3 oz)   01/01/23 0341 99.8 kg (220 lb)   01/01/23 1706 99.8 kg (220 lb)   09/09/22 1447 99.8 kg (220 lb)       PHYSICAL EXAM  CONSTITUTIONAL: Well built, well nourished in no apparent distress  NECK: no carotid bruit, no JVD  LUNGS: CTA  CHEST WALL: no tenderness  HEART: regular rate and rhythm, S1, S2 normal, no murmur, click, rub or gallop   ABDOMEN: soft, non-tender; bowel sounds normal; no masses,  no organomegaly  EXTREMITIES: Extremities normal, no edema  NEURO: AAO X 3    SCHEDULED MEDS:   aspirin  81 mg Oral Daily    cefTRIAXone (ROCEPHIN) IVPB  1 g Intravenous Q24H    [START ON 1/8/2023] chlorhexidine  15 mL Mouth/Throat BID    enoxparin  40 mg Subcutaneous Q24H    famotidine  20 mg Per OG tube BID    levalbuterol  0.63 mg Nebulization TID WAKE    [START ON 1/8/2023] methylPREDNISolone sodium succinate injection  40 mg Intravenous Daily    nicotine  1 patch Transdermal Daily    ticagrelor  90 mg Oral BID       CONTINUOUS INFUSIONS:   dexmedetomidine (PRECEDEX) infusion 1.4 mcg/kg/hr (01/07/23 2030)    DOBUTamine Stopped (01/01/23 0910)    fentanyl Stopped (01/07/23 9865)     midazolam 3 mg/hr (23 1600)    NORepinephrine bitartrate-D5W Stopped (23 0500)    propofoL 50 mcg/kg/min (23 1537)    vasopressin Stopped (23 0945)       PRN MEDS:acetaminophen, albuterol-ipratropium, atropine, calcium gluconate IVPB, calcium gluconate IVPB, calcium gluconate IVPB, dextrose 10%, dextrose 10%, DOBUTamine, EPINEPHrine, [] fentaNYL **FOLLOWED BY** fentaNYL, glucagon (human recombinant), glucose, glucose, hydrALAZINE, HYDROcodone-acetaminophen, magnesium sulfate IVPB, magnesium sulfate IVPB, melatonin, midazolam, morphine, naloxone, ondansetron, polyethylene glycol, potassium chloride in water **AND** potassium chloride in water **AND** potassium chloride in water, senna-docusate 8.6-50 mg, simethicone, sodium chloride 0.9%, sodium phosphate IVPB, sodium phosphate IVPB, sodium phosphate IVPB    LABS AND DIAGNOSTICS     CBC LAST 3 DAYS  Recent Labs   Lab 23  0430 23  0328 23  0430 23  0345 23  0358   WBC 10.98 10.33  --  11.79  --    RBC 3.28* 3.10*  --  3.27*  --    HGB 10.4* 9.7*  --  10.3*  --    HCT 30.3* 29.1* 29* 30.6* 31*   MCV 94 94  --  94  --    MCH 31.7* 31.3*  --  31.5*  --    MCHC 34.3 33.3  --  33.7  --    RDW 14.5 14.6*  --  14.6*  --     189  --  215  --    MPV 9.9 9.3  --  9.5  --    GRAN 86.5*  9.5* 87.6*  9.1*  --  81.5*  9.6*  --    LYMPH 3.5*  0.4* 4.1*  0.4*  --  5.3*  0.6*  --    MONO 8.6  0.9 6.1  0.6  --  8.1  1.0  --    BASO 0.01 0.02  --  0.02  --    NRBC 0 0  --  0  --        COAGULATION LAST 3 DAYS  Recent Labs   Lab 23  0453   INR 1.2   APTT 30.3       CHEMISTRY LAST 3 DAYS  Recent Labs   Lab 23  0428 23  0430 23  0328 23  0430 23  0345 23  0358   NA  --  136 137  --  137  --    K  --  4.2 4.2  --  4.6  --    CL  --  106 107  --  106  --    CO2  --  24 25  --  26  --    ANIONGAP  --  6* 5*  --  5*  --    BUN  --  26* 36*  --  48*  --    CREATININE  --  0.9  1.0  --  0.8  --    GLU  --  119* 137*  --  115*  --    CALCIUM  --  8.1* 8.1*  --  8.1*  --    PH 7.385  --   --  7.399  --  7.409   MG  --  2.2 2.3  --  2.4  --    ALBUMIN  --  2.4* 2.3*  --  2.3*  --    PROT  --  5.8* 5.5*  --  5.7*  --    ALKPHOS  --  80 69  --  65  --    ALT  --  97* 76*  --  60*  --    AST  --  55* 40  --  28  --    BILITOT  --  0.5 0.6  --  0.5  --        CARDIAC PROFILE LAST 3 DAYS  Recent Labs   Lab 01/01/23  0155 01/01/23  0453 01/02/23  1400   BNP  --  76 376*   TROPONINI 1.105*  --   --        ENDOCRINE LAST 3 DAYS  Recent Labs   Lab 01/03/23  0954   PROCAL 3.13*       LAST ARTERIAL BLOOD GAS  ABG  Recent Labs   Lab 01/07/23  0358   PH 7.409   PO2 78*   PCO2 43.0   HCO3 27.2   BE 3       LAST 7 DAYS MICROBIOLOGY   Microbiology Results (last 7 days)       Procedure Component Value Units Date/Time    Blood culture [[id number]] Collected: 01/03/23 0954    Order Status: Completed Specimen: Blood Updated: 01/07/23 1032     Blood Culture, Routine No Growth to date      No Growth to date      No Growth to date      No Growth to date      No Growth to date    Blood culture [[id number]] Collected: 01/03/23 0954    Order Status: Completed Specimen: Blood Updated: 01/07/23 1032     Blood Culture, Routine No Growth to date      No Growth to date      No Growth to date      No Growth to date      No Growth to date    Culture, Respiratory with Gram Stain [[id number]]  (Abnormal)  (Susceptibility) Collected: 01/02/23 1431    Order Status: Completed Specimen: Respiratory from Tracheal Aspirate Updated: 01/04/23 0856     Respiratory Culture Reduced normal respiratory lorenzo      STAPHYLOCOCCUS AUREUS  Moderate       Gram Stain (Respiratory) <10 epithelial cells per low power field.     Gram Stain (Respiratory) Few WBC's     Gram Stain (Respiratory) Moderate Gram positive cocci    MRSA Screen by PCR [[id number]] Collected: 01/03/23 1412    Order Status: Completed Specimen: Nasopharyngeal Swab from Nasal  Updated: 01/03/23 1554     MRSA SCREEN BY PCR Negative            MOST RECENT IMAGING  X-Ray Chest AP Portable  HISTORY: eval and compare to previous evals.    COMPARISON:1/6/2023    FINDINGS: Tip of ET tube resides at the medial ends of clavicles in optimal position. The NG tube passes into the stomach. The heart is prominent size with moderate biventricular dominance. Patient is slightly shifted towards the left.    Persistent homogeneous airlessness barely in the basilar segments is noted unchanged upon comparison with the changes in the left is attributed the R2 to layering pleural effusion. Persistent prominence of the central pulmonary vascularity. No evidence of pneumothorax. There are clips in the right supraclavicular region noted.    IMPRESSION:  1. Stable appearance the chest when compared to previous.  2. Homogeneous airlessness left lung base secondary pleural effusion, atelectasis, pneumonic infiltrate, or combination thereof.    Electronically signed by:  Solo Harris MD  1/7/2023 7:10 AM CST Workstation: [id number]      [facility]  Results for orders placed during the hospital encounter of 01/01/23    Echo Saline Bubble? No    Interpretation Summary  · The estimated ejection fraction is 55%.  · The left ventricle is normal in size with concentric hypertrophy.  · Mild to moderate tricuspid regurgitation.  · Moderate right ventricular enlargement with moderately reduced right ventricular systolic function.  · Grade I left ventricular diastolic dysfunction.  · There is mild aortic valve stenosis.  · Aortic valve area is 1.68 cm2; peak velocity is 1.36 m/s; mean gradient is 4 mmHg.  · There is abnormal septal wall motion consistent with right ventricular pacemaker.  · There are segmental left ventricular wall motion abnormalities.      CURRENT/PREVIOUS VISIT EKG  Results for orders placed or performed during the hospital encounter of 01/01/23   EKG 12-LEAD on arrival to floor    Collection Time:  01/01/23  9:59 AM    Narrative    Test Reason : I21.3,    Vent. Rate : 070 BPM     Atrial Rate : 000 BPM     P-R Int : 000 ms          QRS Dur : 078 ms      QT Int : 450 ms       P-R-T Axes : 000 027 -68 degrees     QTc Int : 486 ms    Atrial fibrillation with occasional ventricular-paced complexes and with  premature ventricular or aberrantly conducted complexes  Inferior infarct ,age undetermined  Abnormal ECG  When compared with ECG of 01-JAN-2023 09:57,  Previous ECG has undetermined rhythm, needs review  Confirmed by Sarthak Cardona MD ([id number]) on 1/4/2023 6:57:56 PM    Referred By: IRENE MOSER           Confirmed By:Sarthak Cardona MD       ASSESSMENT/PLAN:     Active Hospital Problems    Diagnosis    *STEMI involving right coronary artery    Pneumonia of right lung due to methicillin susceptible Staphylococcus aureus (MSSA)    Shock liver    Cardiogenic shock    Cardiac arrest    Acute respiratory failure with hypoxia and hypercarbia    Leukocytosis    Anemia    History of tobacco abuse    Primary hypertension    PVD (peripheral vascular disease) with claudication    PAD (peripheral artery disease)       ASSESSMENT & PLAN:   STATUS POST MYOCARDIAL INFARCTION INVOLVING RIGHT CORONARY  BRADYCARDIA WITH TEMPORARY PACEMAKER IN PLACE  CARDIOGENIC SHOCK, PE  ANOXIC ENCEPHALOPATHY  RESPIRATORY FAILURE  RECOMMENDATIONS:  CONTINUE TEMPORARY PACEMAKER TODAY  WEANING FOR VENT PER PULMONARY  BLOOD PRESSURE MANAGEMENT PER PULMONARY    DISCUSSED WITH FAMILY AT THE BEDSIDE NEEDS MRI FOR ANOXIC ENCEPHALOPATHY        Dougie Saleh MD  [facility]  Department of Cardiology  Date of Service: 01/07/2023  9:23 PM

## 2023-01-08 NOTE — PT/OT/SLP PROGRESS
Speech Language Pathology      Peyman Gr  MRN: [id number]    Order received same day as extubation after extended intubation. Pt not appropriate for swallowing evaluation w/in 24 hours of extubation after prolonged intubation. Will follow-up 1/9/23.

## 2023-01-08 NOTE — CARE UPDATE
Pt placed back on AC mode and sedation due to anxiety, increased B/P, tachypneic and increased WOB.

## 2023-01-09 LAB
ALBUMIN SERPL BCP-MCNC: 2.4 G/DL (ref 3.5–5.2)
ALLENS TEST: ABNORMAL
ALP SERPL-CCNC: 80 U/L (ref 55–135)
ALT SERPL W/O P-5'-P-CCNC: 52 U/L (ref 10–44)
ANION GAP SERPL CALC-SCNC: 9 MMOL/L (ref 8–16)
AST SERPL-CCNC: 27 U/L (ref 10–40)
BASOPHILS # BLD AUTO: ABNORMAL K/UL (ref 0–0.2)
BASOPHILS NFR BLD: 0 % (ref 0–1.9)
BILIRUB SERPL-MCNC: 0.9 MG/DL (ref 0.1–1)
BUN SERPL-MCNC: 44 MG/DL (ref 8–23)
CALCIUM SERPL-MCNC: 8.2 MG/DL (ref 8.7–10.5)
CHLORIDE SERPL-SCNC: 107 MMOL/L (ref 95–110)
CO2 SERPL-SCNC: 24 MMOL/L (ref 23–29)
CREAT SERPL-MCNC: 0.7 MG/DL (ref 0.5–1.4)
DELSYS: ABNORMAL
DIFFERENTIAL METHOD: ABNORMAL
EOSINOPHIL # BLD AUTO: ABNORMAL K/UL (ref 0–0.5)
EOSINOPHIL NFR BLD: 1 % (ref 0–8)
ERYTHROCYTE [DISTWIDTH] IN BLOOD BY AUTOMATED COUNT: 14.6 % (ref 11.5–14.5)
EST. GFR  (NO RACE VARIABLE): >60 ML/MIN/1.73 M^2
FLOW: 7
GLUCOSE SERPL-MCNC: 98 MG/DL (ref 70–110)
HCO3 UR-SCNC: 25.5 MMOL/L (ref 24–28)
HCT VFR BLD AUTO: 37.6 % (ref 40–54)
HGB BLD-MCNC: 12.8 G/DL (ref 14–18)
IMM GRANULOCYTES # BLD AUTO: ABNORMAL K/UL (ref 0–0.04)
IMM GRANULOCYTES NFR BLD AUTO: ABNORMAL % (ref 0–0.5)
LYMPHOCYTES # BLD AUTO: ABNORMAL K/UL (ref 1–4.8)
LYMPHOCYTES NFR BLD: 9 % (ref 18–48)
MAGNESIUM SERPL-MCNC: 2.2 MG/DL (ref 1.6–2.6)
MCH RBC QN AUTO: 30.8 PG (ref 27–31)
MCHC RBC AUTO-ENTMCNC: 34 G/DL (ref 32–36)
MCV RBC AUTO: 91 FL (ref 82–98)
METAMYELOCYTES NFR BLD MANUAL: 2 %
MODE: ABNORMAL
MONOCYTES # BLD AUTO: ABNORMAL K/UL (ref 0.3–1)
MONOCYTES NFR BLD: 7 % (ref 4–15)
NEUTROPHILS NFR BLD: 77 % (ref 38–73)
NEUTS BAND NFR BLD MANUAL: 4 %
NRBC BLD-RTO: 0 /100 WBC
PCO2 BLDA: 29.7 MMHG (ref 35–45)
PH SMN: 7.54 [PH] (ref 7.35–7.45)
PLATELET # BLD AUTO: 261 K/UL (ref 150–450)
PLATELET BLD QL SMEAR: ABNORMAL
PMV BLD AUTO: 9.3 FL (ref 9.2–12.9)
PO2 BLDA: 59 MMHG (ref 80–100)
POC BE: 3 MMOL/L
POC SATURATED O2: 93 % (ref 95–100)
POC TCO2: 26 MMOL/L (ref 23–27)
POTASSIUM SERPL-SCNC: 3.7 MMOL/L (ref 3.5–5.1)
PROT SERPL-MCNC: 6.2 G/DL (ref 6–8.4)
RBC # BLD AUTO: 4.15 M/UL (ref 4.6–6.2)
SAMPLE: ABNORMAL
SITE: ABNORMAL
SODIUM SERPL-SCNC: 140 MMOL/L (ref 136–145)
WBC # BLD AUTO: 11.66 K/UL (ref 3.9–12.7)

## 2023-01-09 PROCEDURE — 99291 PR CRITICAL CARE, E/M 30-74 MINUTES: ICD-10-PCS | Mod: ,,, | Performed by: INTERNAL MEDICINE

## 2023-01-09 PROCEDURE — 97530 THERAPEUTIC ACTIVITIES: CPT

## 2023-01-09 PROCEDURE — 99233 SBSQ HOSP IP/OBS HIGH 50: CPT | Mod: ,,, | Performed by: INTERNAL MEDICINE

## 2023-01-09 PROCEDURE — 99900031 HC PATIENT EDUCATION (STAT)

## 2023-01-09 PROCEDURE — 37799 UNLISTED PX VASCULAR SURGERY: CPT

## 2023-01-09 PROCEDURE — 99900035 HC TECH TIME PER 15 MIN (STAT)

## 2023-01-09 PROCEDURE — 82803 BLOOD GASES ANY COMBINATION: CPT

## 2023-01-09 PROCEDURE — 94660 CPAP INITIATION&MGMT: CPT

## 2023-01-09 PROCEDURE — 63600175 PHARM REV CODE 636 W HCPCS: Performed by: INTERNAL MEDICINE

## 2023-01-09 PROCEDURE — 25000242 PHARM REV CODE 250 ALT 637 W/ HCPCS: Performed by: INTERNAL MEDICINE

## 2023-01-09 PROCEDURE — 99233 PR SUBSEQUENT HOSPITAL CARE,LEVL III: ICD-10-PCS | Mod: ,,, | Performed by: INTERNAL MEDICINE

## 2023-01-09 PROCEDURE — 92610 EVALUATE SWALLOWING FUNCTION: CPT

## 2023-01-09 PROCEDURE — 63600175 PHARM REV CODE 636 W HCPCS: Performed by: FAMILY MEDICINE

## 2023-01-09 PROCEDURE — 85007 BL SMEAR W/DIFF WBC COUNT: CPT | Performed by: FAMILY MEDICINE

## 2023-01-09 PROCEDURE — 25000003 PHARM REV CODE 250: Performed by: INTERNAL MEDICINE

## 2023-01-09 PROCEDURE — 20000000 HC ICU ROOM

## 2023-01-09 PROCEDURE — 94761 N-INVAS EAR/PLS OXIMETRY MLT: CPT

## 2023-01-09 PROCEDURE — 97161 PT EVAL LOW COMPLEX 20 MIN: CPT

## 2023-01-09 PROCEDURE — 99291 CRITICAL CARE FIRST HOUR: CPT | Mod: ,,, | Performed by: INTERNAL MEDICINE

## 2023-01-09 PROCEDURE — 27000221 HC OXYGEN, UP TO 24 HOURS

## 2023-01-09 PROCEDURE — S4991 NICOTINE PATCH NONLEGEND: HCPCS | Performed by: INTERNAL MEDICINE

## 2023-01-09 PROCEDURE — 80053 COMPREHEN METABOLIC PANEL: CPT | Performed by: FAMILY MEDICINE

## 2023-01-09 PROCEDURE — 85027 COMPLETE CBC AUTOMATED: CPT | Performed by: FAMILY MEDICINE

## 2023-01-09 PROCEDURE — 94640 AIRWAY INHALATION TREATMENT: CPT

## 2023-01-09 PROCEDURE — 83735 ASSAY OF MAGNESIUM: CPT | Performed by: FAMILY MEDICINE

## 2023-01-09 RX ORDER — OXYCODONE AND ACETAMINOPHEN 7.5; 325 MG/1; MG/1
1 TABLET ORAL EVERY 4 HOURS PRN
Status: DISCONTINUED | OUTPATIENT
Start: 2023-01-09 | End: 2023-01-13

## 2023-01-09 RX ADMIN — TICAGRELOR 90 MG: 90 TABLET ORAL at 08:01

## 2023-01-09 RX ADMIN — CHLORHEXIDINE GLUCONATE 15 ML: 1.2 RINSE ORAL at 08:01

## 2023-01-09 RX ADMIN — CEFTRIAXONE 1 G: 1 INJECTION, SOLUTION INTRAVENOUS at 12:01

## 2023-01-09 RX ADMIN — CARVEDILOL 25 MG: 25 TABLET, FILM COATED ORAL at 08:01

## 2023-01-09 RX ADMIN — LEVALBUTEROL HYDROCHLORIDE 0.63 MG: 0.63 SOLUTION RESPIRATORY (INHALATION) at 01:01

## 2023-01-09 RX ADMIN — ENOXAPARIN SODIUM 40 MG: 100 INJECTION SUBCUTANEOUS at 09:01

## 2023-01-09 RX ADMIN — DEXMEDETOMIDINE HYDROCHLORIDE 1.4 MCG/KG/HR: 4 INJECTION, SOLUTION INTRAVENOUS at 12:01

## 2023-01-09 RX ADMIN — DEXMEDETOMIDINE HYDROCHLORIDE 1 MCG/KG/HR: 4 INJECTION, SOLUTION INTRAVENOUS at 05:01

## 2023-01-09 RX ADMIN — OXYCODONE AND ACETAMINOPHEN 1 TABLET: 7.5; 325 TABLET ORAL at 08:01

## 2023-01-09 RX ADMIN — METHYLPREDNISOLONE SODIUM SUCCINATE 40 MG: 40 INJECTION, POWDER, FOR SOLUTION INTRAMUSCULAR; INTRAVENOUS at 08:01

## 2023-01-09 RX ADMIN — NICOTINE 1 PATCH: 14 PATCH, EXTENDED RELEASE TRANSDERMAL at 08:01

## 2023-01-09 RX ADMIN — FAMOTIDINE 20 MG: 20 TABLET ORAL at 08:01

## 2023-01-09 RX ADMIN — DEXMEDETOMIDINE HYDROCHLORIDE 1 MCG/KG/HR: 4 INJECTION, SOLUTION INTRAVENOUS at 09:01

## 2023-01-09 RX ADMIN — ONDANSETRON 4 MG: 2 INJECTION INTRAMUSCULAR; INTRAVENOUS at 11:01

## 2023-01-09 RX ADMIN — MORPHINE SULFATE 2 MG: 2 INJECTION, SOLUTION INTRAMUSCULAR; INTRAVENOUS at 01:01

## 2023-01-09 RX ADMIN — ASPIRIN 81 MG CHEWABLE TABLET 81 MG: 81 TABLET CHEWABLE at 08:01

## 2023-01-09 RX ADMIN — AMLODIPINE BESYLATE 10 MG: 5 TABLET ORAL at 08:01

## 2023-01-09 RX ADMIN — HYDRALAZINE HYDROCHLORIDE 10 MG: 20 INJECTION INTRAMUSCULAR; INTRAVENOUS at 05:01

## 2023-01-09 RX ADMIN — OXYCODONE AND ACETAMINOPHEN 1 TABLET: 7.5; 325 TABLET ORAL at 12:01

## 2023-01-09 RX ADMIN — CARVEDILOL 25 MG: 25 TABLET, FILM COATED ORAL at 04:01

## 2023-01-09 RX ADMIN — MORPHINE SULFATE 2 MG: 2 INJECTION, SOLUTION INTRAMUSCULAR; INTRAVENOUS at 05:01

## 2023-01-09 RX ADMIN — MORPHINE SULFATE 2 MG: 2 INJECTION, SOLUTION INTRAMUSCULAR; INTRAVENOUS at 09:01

## 2023-01-09 RX ADMIN — HYDROCODONE BITARTRATE AND ACETAMINOPHEN 1 TABLET: 10; 325 TABLET ORAL at 03:01

## 2023-01-09 RX ADMIN — HYDROCHLOROTHIAZIDE 12.5 MG: 12.5 TABLET ORAL at 08:01

## 2023-01-09 RX ADMIN — OXYCODONE AND ACETAMINOPHEN 1 TABLET: 7.5; 325 TABLET ORAL at 04:01

## 2023-01-09 RX ADMIN — POTASSIUM CHLORIDE 40 MEQ: 29.8 INJECTION, SOLUTION INTRAVENOUS at 04:01

## 2023-01-09 RX ADMIN — DEXMEDETOMIDINE HYDROCHLORIDE 1.4 MCG/KG/HR: 4 INJECTION, SOLUTION INTRAVENOUS at 02:01

## 2023-01-09 RX ADMIN — LEVALBUTEROL HYDROCHLORIDE 0.63 MG: 0.63 SOLUTION RESPIRATORY (INHALATION) at 07:01

## 2023-01-09 RX ADMIN — LEVALBUTEROL HYDROCHLORIDE 0.63 MG: 0.63 SOLUTION RESPIRATORY (INHALATION) at 08:01

## 2023-01-09 NOTE — NURSING
Patient received oral care and tolerated sips of water and PO meds well. Patient placed back on BiPAP

## 2023-01-09 NOTE — RESPIRATORY THERAPY
01/08/23 2000   Patient Assessment/Suction   Level of Consciousness (AVPU) responds to voice   Respiratory Effort Unlabored   Expansion/Accessory Muscles/Retractions no use of accessory muscles   All Lung Fields Breath Sounds coarse;diminished   Skin Integrity   $ Wound Care Tech Time 15 min   Area Observed Bridge of nose   Skin Appearance without discoloration   PRE-TX-O2   Device (Oxygen Therapy) BIPAP   $ Is the patient on Low Flow Oxygen? Yes   Oxygen Concentration (%) 50   SpO2 99 %   Pulse Oximetry Type Continuous   $ Pulse Oximetry - Multiple Charge Pulse Oximetry - Multiple   Pulse 61   Resp 18   Aerosol Therapy   $ Aerosol Therapy Charges Aerosol Treatment   Daily Review of Necessity (SVN) completed   Respiratory Treatment Status (SVN) given   Treatment Route (SVN) in-line  (BIPAP)   Patient Position (SVN) semi-Doan's   Post Treatment Assessment (SVN) breath sounds unchanged   Signs of Intolerance (SVN) none   Breath Sounds Post-Respiratory Treatment   Throughout All Fields Post-Treatment All Fields   Throughout All Fields Post-Treatment aeration increased   Post-treatment Heart Rate (beats/min) 57   Post-treatment Resp Rate (breaths/min) 28   Ready to Wean/Extubation Screen   FIO2<=50 (chart decimal) 0.5   Preset CPAP/BiPAP Settings   Mode Of Delivery BiPAP   $ Initial CPAP/BiPAP Setup? No   $ Is patient using? Yes   Size of Mask Extra Large   Sized Appropriately? Yes   Equipment Type V60   Ipap 12   EPAP (cm H2O) 6   Pressure Support (cm H2O) 6   Set Rate (Breaths/Min) 20   ITime (sec) 0.85   Rise Time (sec) 2   Patient CPAP/BiPAP Settings   RR Total (Breaths/Min) 29   Tidal Volume (mL) 712   VE Minute Ventilation (L/min) 26.6 L/min   Peak Inspiratory Pressure (cm H2O) 14   TiTOT (%) 35   Total Leak (L/Min) 22   Patient Trigger - ST Mode Only (%) 88   Education   $ Education BiPAP;Bronchodilator;DME Nebulizer;DME Oxygen;15 min   Respiratory Evaluation   $ Care Plan Tech Time 15 min

## 2023-01-09 NOTE — PROGRESS NOTES
[facility] Medicine  Progress Note    Patient name: Peyman Gr  MRN: [id number]  Admit Date: 1/1/2023   LOS: 8 days     SUBJECTIVE:     Principal problem: STEMI involving right coronary artery    Interval History:  Successful extubated yesterday afternoon and was on BiPAP last night.  Reports acute on chronic back pain.  Generalized weakness noted.  Following simple commands.  Vital signs notable for elevated blood pressure.    Summary:   67-year-old male with peripheral vascular disease, essential hypertension, hyperlipidemia and ongoing tobacco use presented to outside facility with 2 hour onset of chest pain and shortness of breath.  EKG revealed inferior and anterolateral STEMI.  Quickly degenerated into cardiac arrest with runs of VT/VF and asystole requiring resuscitation for about 40 minutes.  He was subsequently intubated and transferred to our ED. En route he developed cardiac arrest again (PEA) which was continued into our ED and was resuscitated for another 30 minutes.  He required transcutaneous pacing.  He was taken emergently to the catheterization lab and underwent PCI with stenting of RCA which was felt to be the culprit lesion.  He was also found to have left main stenosis of about 70%.  A transvenous pacemaker was also placed.  Currently admitted to the ICU for post cardiac arrest care.  He remains intubated and critically ill. Hospital stay complicated by fever; initiated on broad spectrum antibiotics.  Respiratory culture growing MSSA - de-escalated to ceftriaxone.  Extubated successfully on 1/8.    Scheduled Meds:   amLODIPine  10 mg Oral Daily    aspirin  81 mg Oral Daily    carvediloL  25 mg Oral BID WM    chlorhexidine  15 mL Mouth/Throat BID    enoxparin  40 mg Subcutaneous Q24H    famotidine  20 mg Per OG tube BID    hydroCHLOROthiazide  12.5 mg Oral Daily    levalbuterol  0.63 mg Nebulization TID WAKE    nicotine  1 patch Transdermal Daily    ticagrelor  90 mg  Oral BID     Continuous Infusions:   DOBUTamine Stopped (01/01/23 0945)    midazolam Stopped (01/07/23 1830)    nicardipine Stopped (01/09/23 0432)     PRN Meds:acetaminophen, albuterol-ipratropium, atropine, calcium gluconate IVPB, calcium gluconate IVPB, calcium gluconate IVPB, dextrose 10%, dextrose 10%, DOBUTamine, EPINEPHrine, glucagon (human recombinant), glucose, glucose, hydrALAZINE, magnesium sulfate IVPB, magnesium sulfate IVPB, melatonin, midazolam, morphine, naloxone, ondansetron, oxyCODONE-acetaminophen, polyethylene glycol, potassium chloride in water **AND** potassium chloride in water **AND** potassium chloride in water, senna-docusate 8.6-50 mg, simethicone, sodium chloride 0.9%, sodium phosphate IVPB, sodium phosphate IVPB, sodium phosphate IVPB    Review of patient's allergies indicates:  No Known Allergies    Review of Systems:  Limited due to clinical condition.  Endorses back pain.    OBJECTIVE:     Vital Signs (Most Recent)  Temp: 97.8 °F (36.6 °C) (01/09/23 1103)  Pulse: 76 (01/09/23 1353)  Resp: 15 (01/09/23 1353)  BP: (!) 140/72 (01/09/23 1301)  SpO2: (!) 94 % (01/09/23 1353)    Vital Signs Range (Last 24H):  Temp:  [97.8 °F (36.6 °C)-98.4 °F (36.9 °C)]   Pulse:  [57-83]   Resp:  [7-59]   BP: (125-214)/()   SpO2:  [91 %-99 %]   Arterial Line BP: (111-206)/()     I & O (Last 24H):  Intake/Output Summary (Last 24 hours) at 1/9/2023 1440  Last data filed at 1/9/2023 0604  Gross per 24 hour   Intake 1413.78 ml   Output 4550 ml   Net -3136.22 ml         Physical Exam:  General: Patient alert.  No acute distress.  Eyes: No conjunctival injection. No scleral icterus  ENT:  Hearing intact.  No discharge from ears or nose  Neck: Supple, trachea midline. No JVD  CVS:  Regular in rate and rhythm.  2+ pitting edema of upper extremities   Lungs:  No tachypnea or accessory muscle use.  Diminished breath sounds at the bases  Abdomen:  Soft, nontender and nondistended.  No organomegaly  Neuro:   Alert.  Follows simple commands.  Skin:  No rash or erythema noted  : Duron catheter with yellow urine    Laboratory:  I have reviewed all pertinent lab results within the past 24 hours.  CBC:   Recent Labs   Lab 01/09/23 0310   WBC 11.66   RBC 4.15*   HGB 12.8*   HCT 37.6*      MCV 91   MCH 30.8   MCHC 34.0       CMP:   Recent Labs   Lab 01/09/23 0310   GLU 98   CALCIUM 8.2*   ALBUMIN 2.4*   PROT 6.2      K 3.7   CO2 24      BUN 44*   CREATININE 0.7   ALKPHOS 80   ALT 52*   AST 27   BILITOT 0.9       No results for input(s): CKMB, CPKMB, TROPONINT, TROPONINI, MYOGLOBIN in the last 168 hours.      Diagnostic Results:  Labs: Reviewed      ASSESSMENT/PLAN:     Active Hospital Problems    Diagnosis  POA    *STEMI involving right coronary artery [I21.11]  Yes    Pneumonia of right lung due to methicillin susceptible Staphylococcus aureus (MSSA) [J15.211]  No    Shock liver [K72.00]  Yes    Cardiogenic shock [R57.0]  Yes    Cardiac arrest [I46.9]  Yes    Acute respiratory failure with hypoxia and hypercarbia [J96.01, J96.02]  Yes    Leukocytosis [D72.829]  Yes    Anemia [D64.9]  Yes    History of tobacco abuse [Z87.891]  Not Applicable    Primary hypertension [I10]  Yes    PVD (peripheral vascular disease) with claudication [I73.9]  Yes    PAD (peripheral artery disease) [I73.9]  Yes      Resolved Hospital Problems    Diagnosis Date Resolved POA    Hyponatremia [E87.1] 01/02/2023 Yes         Plan:   Cardiac arrest secondary to STEMI involving RCA complicated by complete heart block  Status post emergent cardiac catheterization with PCI and stenting  - 01/01/2023  On aspirin and ticagrelor for dual antiplatelet therapy  Continue post cardiac arrest care in ICU   Mechanical ventilation for respiratory failure; successfully extubated on 01/08  Monitor electrolytes and replete per protocol   MSSA pneumonia - s/p ceftriaxone  Monitor urine output and avoid nephrotoxic agents, NSAIDs and iodinated  contrast  Echocardiogram noted - normal LVEF and grade 1 diastolic dysfunction  Nicardipine drip for hypertensive emergency.  Restart home carvedilol, HCTZ and amlodipine    PT/OT/SLP eval   NPO pending further recommendations     VTE Risk Mitigation (From admission, onward)           Ordered     enoxaparin injection 40 mg  Every 24 hours (non-standard times)         01/07/23 0902     IP VTE HIGH RISK PATIENT  Once         01/01/23 0817     Place sequential compression device  Until discontinued         01/01/23 0817                        Department Hospital Medicine  [facility]  Claudio Hoyos MD  Date of service: 01/09/2023

## 2023-01-09 NOTE — NURSING
Patient resting quietly on BiPAP, respirations even and non labored. Will continue to monitor closely.

## 2023-01-09 NOTE — PT/OT/SLP EVAL
Physical Therapy Evaluation    Patient Name:  Peyman Gr   MRN:  [id number]    Recommendations:     Discharge Recommendations: rehabilitation facility   Discharge Equipment Recommendations:  (TBD at next level of care)   Barriers to discharge:  Increased caregiver buren of care    Assessment:     Peyman Gr is a 67 y.o. male admitted with a medical diagnosis of STEMI involving right coronary artery.  He presents with the following impairments/functional limitations: weakness, impaired endurance, impaired self care skills, impaired functional mobility, gait instability, impaired balance, decreased lower extremity function, decreased upper extremity function, decreased safety awareness, impaired cardiopulmonary response to activity .    Pt presented with HOB slightly elevated. He was alert and oriented x3 but  has no recall of events leading to this admit.  Pt has decreased insight . His speech was muffled but he expressed his needs. He requested his suction device  twice and used it with Min  A as pt has decreased strength and ROM  to his dominant R hand . Pt t/f'ed supine to sit with Mod/MaxA requiring Mod A for balance as pt leaned to the R. He participated in sitting balance training requiring verbal and tactile cueing.He returned to supine with Max A x2.     Rehab Prognosis: Good; patient would benefit from acute skilled PT services to address these deficits and reach maximum level of function.    Recent Surgery: Procedure(s) (LRB):  Left heart cath (Left)  Insertion, Pacemaker, Temporary Transvenous  ANGIOGRAM, CORONARY ARTERY (N/A)  Percutaneous coronary intervention (N/A)  IVUS, Coronary 8 Days Post-Op    Plan:     During this hospitalization, patient to be seen daily to address the identified rehab impairments via gait training, therapeutic activities, therapeutic exercises and progress toward the following goals:    Plan of Care Expires:  02/09/23    Subjective     Chief Complaint:  "weakness  Patient/Family Comments/goals: " to walk."  Pain/Comfort:  Pain Rating 1: 0/10    Patients cultural, spiritual, [employer]al conflicts given the current situation:      Living Environment:  Pt lives at home with his wife in a H w/o entry steps  Prior to admission, patients level of function was independent .  Equipment used at home: none.  DME owned (not currently used): none.  Upon discharge, patient will have assistance from his wife.    Objective:     Communicated with nurse prior to session.  Patient found HOB elevated with bed alarm, arterial line, peripheral IV, telemetry  upon PT entry to room.    General Precautions: Standard, aspiration, fall  Orthopedic Precautions:    Braces:    Respiratory Status: Nasal cannula, flow 10  L/min    Exams:  Cognitive Exam:  Patient is oriented to Person, Place, and Time  RLE ROM: WFL for PROM  RLE Strength: 2+/5 exceopt for hip flexion 2-/5  LLE ROM: WFL for PROM   LLE Strength: 2+/5 except for hip flexion 2-/5    Functional Mobility:  Bed Mobility:     Rolling Left:  moderate assistance  Rolling Right: moderate assistance  Supine to Sit: moderate assistance and maximal assistance  Sit to Supine: maximal assistance and of 2 persons  Balance: Min/Mod A for sitting balance      AM-PAC 6 CLICK MOBILITY  Total Score:8       Treatment & Education:  Pt was educated on the role of PT  for assessment, treatment with emphasis on safety and increased mobility and D/C  recommendations.     Patient left HOB elevated with all lines intact, call button in reach, and bed alarm on.    GOALS:   Multidisciplinary Problems       Physical Therapy Goals          Problem: Physical Therapy    Goal Priority Disciplines Outcome Goal Variances Interventions   Physical Therapy Goal     PT, PT/OT      Description: Goals to be met by: 2023     Patient will increase functional independence with mobility by performin. Supine to sit with MInimal Assistance  2. Sit to stand transfer " with Moderate Assistance  3. Bed to chair with Mod A  w/ol AD  3. Gait  x 10  feet with Minimal Assistance using Rolling Walker.                          History:     Past Medical History:   Diagnosis Date    Arthritis     Chronic back pain     Chronic neck pain     Dry gangrene     RIGHT LITTLE TOE    Hypercholesteremia     Hypertension     Insomnia     Multiple falls     S/P BACK SURGERY- 1 FALL    PAD (peripheral artery disease)     Personal history of colonic polyps     PVD (peripheral vascular disease)        Past Surgical History:   Procedure Laterality Date    ANGIOGRAPHY OF LOWER EXTREMITY N/A 09/24/2020    Procedure: Angiogram Extremity Unilateral;  Surgeon: Luke Cabello MD;  Location: [facility] CATH;  Service: Cardiology;  Laterality: N/A;    BACK SURGERY      BUNIONECTOMY Bilateral     CARPAL TUNNEL RELEASE Bilateral     CHOLECYSTECTOMY      COLONOSCOPY N/A 5/9/2022    Procedure: COLONOSCOPY;  Surgeon: Braydon Durand MD;  Location: [facility] ENDO;  Service: Endoscopy;  Laterality: N/A;    COLONOSCOPY W/ POLYPECTOMY      CORONARY ANGIOGRAPHY N/A 1/1/2023    Procedure: ANGIOGRAM, CORONARY ARTERY;  Surgeon: Stefany Elizondo MD;  Location: [facility] CATH/EP LAB;  Service: Cardiology;  Laterality: N/A;    CORONARY ARTERY BYPASS GRAFT      CREATION OF BYPASS FROM INTERNAL CAROTID ARTERY TO SUBCLAVIAN ARTERY Right 12/27/2021    Procedure: CREATION, BYPASS, ARTERIAL, INTERNAL CAROTID TO SUBCLAVIAN;  Surgeon: Jeovany Goins MD;  Location: [facility] OR;  Service: Vascular;  Laterality: Right;    ELBOW ARTHROPLASTY Right     ELBOW SURGERY      INSERTION, PACEMAKER, TEMPORARY TRANSVENOUS  1/1/2023    Procedure: Insertion, Pacemaker, Temporary Transvenous;  Surgeon: Stefany Elizondo MD;  Location: [facility] CATH/EP LAB;  Service: Cardiology;;    IVUS, CORONARY  1/1/2023    Procedure: IVUS, Coronary;  Surgeon: Stefany Elizondo MD;  Location: [facility] CATH/EP LAB;  Service: Cardiology;;    LEFT HEART  CATHETERIZATION Left 1/1/2023    Procedure: Left heart cath;  Surgeon: Stefany Elizondo MD;  Location: [facility] CATH/EP LAB;  Service: Cardiology;  Laterality: Left;    MINIMALLY INVASIVE FORAMINOTOMY OF  SPINE N/A 11/15/2018    Procedure: FORAMINOTOMY, SPINE, MINIMALLY INVASIVE DECOMPRESSION L4-5;  Surgeon: Iván Stevenson Jr., MD;  Location: [facility] OR;  Service: Orthopedics;  Laterality: N/A;    NECK SURGERY      acf    PERCUTANEOUS CORONARY INTERVENTION, ARTERY N/A 1/1/2023    Procedure: Percutaneous coronary intervention;  Surgeon: Stefany Elizondo MD;  Location: [facility] CATH/EP LAB;  Service: Cardiology;  Laterality: N/A;    PERCUTANEOUS TRANSLUMINAL ANGIOPLASTY (PTA) OF PERIPHERAL VESSEL Right 05/01/2020    Procedure: PTA, PERIPHERAL VESSEL of right lower extremity;  Surgeon: Luke Cabello MD;  Location: [facility] CATH;  Service: Cardiology;  Laterality: Right;    PERCUTANEOUS TRANSLUMINAL ANGIOPLASTY (PTA) OF PERIPHERAL VESSEL Left 09/10/2020    Procedure: PTA, PERIPHERAL VESSEL, Left lower extremity;  Surgeon: Luke Cabello MD;  Location: [facility] CATH;  Service: Cardiology;  Laterality: Left;    PERCUTANEOUS TRANSLUMINAL ANGIOPLASTY (PTA) OF PERIPHERAL VESSEL Left 07/06/2021    Pro[employer]da and SFA       Time Tracking:     PT Received On: 01/09/23  PT Start Time: 1313     PT Stop Time: 1344  PT Total Time (min): 31 min     Billable Minutes: Evaluation 7 minutes  and Therapeutic Activity 24 minutes      01/09/2023

## 2023-01-09 NOTE — NURSING
Patient assessment completed at this time. No needs or complaints. Patient voiced understanding of room and call light. Will continue to monitor closely. Removed BiPAP to check patient's ability to swallow per NP request. Patient passed and able to swallow pills. PO meds given and patient voiced understanding of BiPAP compliance. Patient placed back on BiPAP.

## 2023-01-09 NOTE — PHYSICIAN QUERY
PT Name: Peyman Gr  MR #: [id number]     DOCUMENTATION CLARIFICATION     CDS/: Livier Garza               Contact information:[phone] or through epic messenger  This form is a permanent document in the medical record.     Query Date: January 9, 2023    By submitting this query, we are merely seeking further clarification of documentation.  Please utilize your independent clinical judgment when addressing the question(s) below.    The Medical Record contains the following   Indicators Supporting Clinical Findings Location in Medical Record    BNP BNP 76 on 1/1   on 1/3 Labs EPIC    EF/Echo Echocardiogram noted - normal LVEF and grade 1 diastolic dysfunction  Hospitalist's progress note 1/5    Radiology findings Chest xray 1/3-  Radiographic findings suggesting congestive failure and mild pulmonary edema, with slightly increasing ground-glass opacity on the right compared to January 2. Radiology Epic 1/3    Subjective/Objective Respiratory Conditions Acute Respiratory failure, Cardiopulmonary arrest HP    Recent/Current MI STEMI 1/1 HP    Edema, JVD Pulmonary edema  Pulmonlogy progress note 1/8    Diuretics/Meds Furosemide 20 mg IV now  Pulmonology progress note 1/8         Provider, please specify the diagnosis associated with the above clinical findings.    [ x  ]  Acute Diastolic Heart Failure (HFpEF) - new diagnosis   [   ]  Acute Pulmonary Edema, DISAGREE WITH HEART FAILURE DIAGNOSIS   [   ]       [   ] Acute Pulmonary Edema, Non-cardiogenic, Pt DOES have chronic diastolic heart failure    CHRONIC Pulmonary Edema     Please document in your progress notes daily for the duration of treatment until resolved and include in your discharge summary.

## 2023-01-09 NOTE — NURSING
Patient resting quietly on BiPAP, respirations even and non labored. Will continue to monitor closely. Patient refused bath at this time.

## 2023-01-09 NOTE — PROGRESS NOTES
[facility]  Neurology Progress Note    Patient Name: Peyman Gr  MRN: [id number]  : 1955  TODAY'S DATE: 2023  ADMIT DATE: 2023  3:17 AM                                          CONSULTED PROVIDER: Brigette Welsh MD, Neurologist. Cellphone: [phone]  CONSULT REQUESTED BY: Claudio Hoyos MD     Chief Complaint   Patient presents with    Cardiac Arrest     Pt presents to ER via EMS with STEMI with cardiac arrest with compressions being initiated upon arrival to ER.        HPI per EMR:  67-year-old male with peripheral vascular disease, essential hypertension, hyperlipidemia and ongoing tobacco use presented to outside facility with 2 hour onset of chest pain and shortness of breath.  EKG revealed inferior and anterolateral STEMI.  Quickly degenerated into cardiac arrest with runs of VT/VF and asystole requiring resuscitation for about 40 minutes.  He was subsequently intubated and transferred to our ED. En route he developed cardiac arrest again (PEA) which was continued into our ED and was resuscitated for another 30 minutes.  He required transcutaneous pacing.  He was taken emergently to the catheterization lab and underwent PCI with stenting of RCA which was felt to be the culprit lesion.  He was also found to have left main stenosis of about 70%.  A transvenous pacemaker was also placed.  Currently admitted to the ICU for post cardiac arrest care.  He remains intubated and critically ill. Hospital stay complicated by fever; initiated on broad spectrum antibiotics.  Respiratory culture growing MSSA.    Neurology Consult:  Patient was seen and examined by me today.  He is a 67-year-old man with history of peripheral vascular disease, hypertension, hyperlipidemia and tobacco abuse who presented to outside facility with 2 hours of chest pain and shortness of breath, EKG showed STEMI then patient degenerated intracardiac arrest with rounds of V-tach and VFib as well as  "asystole requiring prolonged resuscitation for at least 40 minutes.  He was intubated and then transferred to our facility for escalation of care.  On route to the hospital he developed PA arrest again and was resuscitated for another 30 minutes, and required transcutaneous pacing.  He was then taken to the cath lab and underwent PCI with stenting of right coronary artery, was then admitted to the ICU for management.  Patient is still intubated, unresponsive, so neurology consulted for neuro prognostication.    1/5/23: Patient was seen and examined by me today. Sedation has been off since last night, and patient is opening eyes spontaneously and moving all extremities. He was able to follow the command "move your feet" 3 times, which shows some purposeful movement. Sedation had to be restarted in the setting of tachycardia and hypertension due to agitation.    1/6/23: Patient was seen and examined by me today. When sedation was off, he seemed to regard his daughter when she was calling for him. He squeezed with his left hand to command, but seems right side is weak, and he has preference for the left side. He also wiggled his feet.     1/8/23: Patient was seen and examined by me today. He was extubated today, still requiring BiPAP. However, he follows commands, answers questions and was seen moving all extremities.    Scheduled Meds:   amLODIPine  10 mg Oral Daily    aspirin  81 mg Oral Daily    carvediloL  25 mg Oral BID WM    cefTRIAXone (ROCEPHIN) IVPB  1 g Intravenous Q24H    chlorhexidine  15 mL Mouth/Throat BID    enoxparin  40 mg Subcutaneous Q24H    famotidine  20 mg Per OG tube BID    [START ON 1/9/2023] hydroCHLOROthiazide  12.5 mg Oral Daily    levalbuterol  0.63 mg Nebulization TID WAKE    methylPREDNISolone sodium succinate injection  40 mg Intravenous Daily    nicotine  1 patch Transdermal Daily    ticagrelor  90 mg Oral BID     Continuous Infusions:   dexmedetomidine (PRECEDEX) infusion 1.4 mcg/kg/hr " (01/08/23 2142)    DOBUTamine Stopped (01/01/23 0945)    midazolam Stopped (01/07/23 1830)    nicardipine Stopped (01/08/23 1815)     PRN Meds:.acetaminophen, albuterol-ipratropium, atropine, calcium gluconate IVPB, calcium gluconate IVPB, calcium gluconate IVPB, dextrose 10%, dextrose 10%, DOBUTamine, EPINEPHrine, glucagon (human recombinant), glucose, glucose, hydrALAZINE, HYDROcodone-acetaminophen, HYDROcodone-acetaminophen, magnesium sulfate IVPB, magnesium sulfate IVPB, melatonin, midazolam, morphine, naloxone, ondansetron, polyethylene glycol, potassium chloride in water **AND** potassium chloride in water **AND** potassium chloride in water, senna-docusate 8.6-50 mg, simethicone, sodium chloride 0.9%, sodium phosphate IVPB, sodium phosphate IVPB, sodium phosphate IVPB    Physical Exam  Current Vitals:  Vitals:    01/08/23 2256   BP:    Pulse:    Resp: (!) 23   Temp:      General appearance: Awake but drowsy, on BiPAP  Cardiovascular:  Regular rhythm, paced, tachycardic  Pulmonary:  on BiPAP  GI:  abdomen soft  MSK:  normal tone throughout  Skin:  No lesions in exposed skin    NEUROLOGICAL EXAM:    Mental status:  awake but drowsy, able to say name and answer simple questions. Currently on BiPAP after extubation.          Intubated:  no          Sedated:  no  Response to noxious stimulation:  moving all extremities spontaneously, also moves to command  Opens eyes:  yes  Pupils: symmetric          Left: reactive          Right: reactive  Eye movements: No pathologic movements such as nystagmus, ocular bobbing, dipping or roving.  Corneal reflex:  Present  Oculocephalic reflex:  Present  Cough:  Present  Gag:  present  Motor exam: no abnormal movements in extremities . Moving all extremities intermittently spontaneously, able to move to command.  DTRs:  1+ throughout.    Laboratory Data & Studies    Recent Labs   Lab 01/06/23  0328 01/07/23  0345 01/08/23  0346   WBC 10.33 11.79 13.16*   HGB 9.7* 10.3* 12.0*     215 245   MCV 94 94 92         Recent Labs   Lab 01/06/23  0328 01/07/23 0345 01/08/23  0346    137 141   K 4.2 4.6 4.0    106 110   CO2 25 26 25   BUN 36* 48* 47*   * 115* 109   CALCIUM 8.1* 8.1* 8.3*   MG 2.3 2.4 2.3         Recent Labs   Lab 01/06/23 0328 01/07/23 0345 01/08/23 0346   PROT 5.5* 5.7* 6.1   ALBUMIN 2.3* 2.3* 2.4*   BILITOT 0.6 0.5 0.4   AST 40 28 24   ALT 76* 60* 50*   ALKPHOS 69 65 68         No results for input(s): LABPT, INR, APTT in the last 168 hours.      No results for input(s): HGBA1C, CHOL, TRIG, LDLCALC, HDL, TSH in the last 168 hours.        Microbiology:  Microbiology Results (last 7 days)       Procedure Component Value Units Date/Time    Blood culture [[id number]] Collected: 01/03/23 0954    Order Status: Completed Specimen: Blood Updated: 01/08/23 1032     Blood Culture, Routine No growth after 5 days.    Blood culture [[id number]] Collected: 01/03/23 0954    Order Status: Completed Specimen: Blood Updated: 01/08/23 1032     Blood Culture, Routine No growth after 5 days.    Culture, Respiratory with Gram Stain [[id number]]  (Abnormal)  (Susceptibility) Collected: 01/02/23 1431    Order Status: Completed Specimen: Respiratory from Tracheal Aspirate Updated: 01/04/23 0856     Respiratory Culture Reduced normal respiratory lorenzo      STAPHYLOCOCCUS AUREUS  Moderate       Gram Stain (Respiratory) <10 epithelial cells per low power field.     Gram Stain (Respiratory) Few WBC's     Gram Stain (Respiratory) Moderate Gram positive cocci    MRSA Screen by PCR [[id number]] Collected: 01/03/23 1412    Order Status: Completed Specimen: Nasopharyngeal Swab from Nasal Updated: 01/03/23 1554     MRSA SCREEN BY PCR Negative              Imaging:  X-Ray Chest AP Portable    Result Date: 1/4/2023  HISTORY: Respiratory failure, cardiac arrest. FINDINGS: Portable chest radiograph at 0521 hours compared to 01/03/2023 shows ET and NG tubes, and transvenous pacing lead via IVC  approach, unchanged in position. The cardiomediastinal silhouette and pulmonary vasculature are stable, with aortic vascular calcifications. The lungs are symmetrically expanded, with mild scattered reticulonodular densities in both lungs, similar to the prior exam. There is no new pleural or parenchymal abnormality. No pneumothorax. IMPRESSION: No significant interval change. Electronically signed by:  Hema Tafoya MD  1/4/2023 6:52 AM CST Workstation: [id number]    X-Ray Chest AP Portable    Result Date: 1/3/2023  EXAMINATION: XR CHEST AP PORTABLE CLINICAL HISTORY: Respiratory failure COMPARISON: January 2, 2023 FINDINGS: Heart size is upper normal.  The aortic arch is calcified.  Endotracheal tube tip is at the level of the clavicles.  Nasogastric tube extends to the stomach. Pulmonary vascular congestion is similar to the prior study, with slightly increasing ground-glass opacity throughout the right lung.  There are no significant effusions.  There is no pneumothorax.     1. Radiographic findings suggesting congestive failure and mild pulmonary edema, with slightly increasing ground-glass opacity on the right compared to January 2. Electronically signed by: Juan Daniel Sun MD Date:    01/03/2023 Time:    05:58    X-Ray Chest AP Portable    Result Date: 1/2/2023  HISTORY: resp fail COMPARISON:January 1, 2023 FINDINGS: Distal tip of ET tube resides at the medial ends of clavicles and the NG tube passes to the stomach. Cardiomediastinal silhouette appears upper normal in size magnified by the poor inspiratory effort. Tortuosity thoracic aorta as a manifestation of systemic hypertension. Diffuse interstitial opacities and ground glass opacity changes are again reestablished with minimal improvement upon comparison. No evidence of confluent infiltrate or significant pleural effusion or atelectasis. Postoperative changes of previous anterior cervical fusion as identified on the uppermost field of view and previous  right carotid endarterectomy. IMPRESSION:Stable appearance the chest when compared to previous. All support devices appear stable. Electronically signed by:  Solo Harris MD  1/2/2023 6:54 AM CST Workstation: [id number]    X-Ray Chest AP Portable    Result Date: 1/1/2023  Chest, single view HISTORY: Nasogastric tube placement. In the interval, there has been introduction of a nasogastric tube which extends into the left upper quadrant of the abdomen just beyond the GE junction. An endotracheal tube remains in place with its tip positioned well above the level of the saurav. Bilateral interstitial and mild groundglass opacities, greater on the right are again observed. There are no new confluent infiltrates, volume loss or effusions. The cardiomediastinal silhouette is stable. IMPRESSION: Interval introduction of nasogastric tube extending into the left upper abdomen with its tip just beyond the GE junction. Otherwise stable cardiopulmonary status. Electronically signed by:  Elizabeth Sharp MD  1/1/2023 7:01 AM CST Workstation: [id number]    X-Ray Chest AP Portable    Result Date: 1/1/2023  Chest, single view HISTORY: Cardiac arrest. Comparison 9/9/2022. Endotracheal tube is in place with its tip positioned well above the level of the saurav. The heart size is within normal limits. The central pulmonary vasculature is not acutely engorged.  There is arteriosclerotic calcification within the aortic arch. There is scattered bilateral interstitial and groundglass pulmonary opacities with upper lung zone predominance, greater on the right. No effusion or extrapulmonary air identified. Surgical changes are noted within the cervical spine and within the soft tissues of the right side of the neck. IMPRESSION: Positioning of endotracheal tube as above. Interval development of interstitial and groundglass pulmonary opacities with upper lung zone predominance. Electronically signed by:  Elizabeth Sharp MD  1/1/2023 6:59 AM CST  Workstation: [id number]    Echo Saline Bubble? No    Result Date: 1/1/2023  · The estimated ejection fraction is 55%. · The left ventricle is normal in size with concentric hypertrophy. · Mild to moderate tricuspid regurgitation. · Moderate right ventricular enlargement with moderately reduced right ventricular systolic function. · Grade I left ventricular diastolic dysfunction. · There is mild aortic valve stenosis. · Aortic valve area is 1.68 cm2; peak velocity is 1.36 m/s; mean gradient is 4 mmHg. · There is abnormal septal wall motion consistent with right ventricular pacemaker. · There are segmental left ventricular wall motion abnormalities.          Assessment and Plan:    Concern for anoxic brain injury status post cardiac arrest with prolonged resuscitation  STEMI status post PCI and stenting  V-tach and VFib requiring external pacing  67-year-old man with history of peripheral vascular disease, hypertension, hyperlipidemia and tobacco abuse who presented to outside facility with 2 hours of chest pain and shortness of breath, EKG showed STEMI then patient degenerated into cardiac arrest with rounds of V-tach and VFib as well as asystole requiring prolonged resuscitation. Patient was still intubated, unresponsive, so neurology consulted for neuro prognostication. He was not cooled.  When sedation was weaned down, patient became agitated and hypertensive, but his neurological examination showed some improvement. He was extubated today but is requiring BiPAP. Mental status improving.  - admitted to the ICU, with q.4 hours neuro checks, continuous telemetry monitoring, external pacer present  - patient is at least 72 hours post arrest at this time, exam improved and patient now extubated  - EEG was performed and showed suppression of voltage concerning for anoxic brain injury, but patient was on high dose of sedatives at the time, so will consider repeating study if no further improvement seen.  - CT brain  showed some scattered hypodensities in cortical and subcortical areas concerning for stroke or white matter disease.  No evidence of severe edema or effacement of ventricles was seen.  Patient can not undergo MRI in the setting of external pacer present.  - management of infectious and metabolic derangements as per primary team and Cardiology  - seizure precautions while inpatient  - Neuro prognosis appears good at this time, as patient's neuro exam is non focal and he is able to follow commands consistently  - If pacer removed, please obtain brain MRI  - will follow results of imaging and patient status peripherally. Please call with questions, concerns or neurological decline      DVT prophylaxis with chemo/SCD prophylaxis      [phone] 3 days from discharge.       All nursing questions were answered.                              Thank you kindly for including us in the care of this patient. Please do not hesitate to contact us with any questions.       Critical Care:  45 minutes of critical care time has been spent evaluating with the patient. Time includes chart review not limited to diagnostic imaging, labs, and vitals, patient assessment, discussion with family and nursing, current order evaluations, and new order entries.      Brigette Welsh MD  Neurology

## 2023-01-09 NOTE — NURSING
Transvenous pacer discontinued at bedside by Dr. Saleh. Pressure applied and hemostasis achieved. Will continue to monitor groin for any signs of bleeding. VSS.

## 2023-01-09 NOTE — PT/OT/SLP EVAL
Speech Language Pathology Evaluation  Bedside Swallow    Patient Name:  Peyman Gr   MRN:  [id number]  Admitting Diagnosis: STEMI involving right coronary artery    Recommendations:                 General Recommendations:  Dysphagia therapy  Diet recommendations:  NPO, NPO except for meds crushed in puree  Aspiration Precautions: Frequent oral care, HOB to 90 degrees, Ice chips sparingly, Meds crushed in puree, Strict aspiration precautions, and Wear oxygen during intake   General Precautions: Standard, aspiration  Communication strategies:  none    History:     Past Medical History:   Diagnosis Date    Arthritis     Chronic back pain     Chronic neck pain     Dry gangrene     RIGHT LITTLE TOE    Hypercholesteremia     Hypertension     Insomnia     Multiple falls     S/P BACK SURGERY- 1 FALL    PAD (peripheral artery disease)     Personal history of colonic polyps     PVD (peripheral vascular disease)        Past Surgical History:   Procedure Laterality Date    ANGIOGRAPHY OF LOWER EXTREMITY N/A 09/24/2020    Procedure: Angiogram Extremity Unilateral;  Surgeon: Luke Cabello MD;  Location: [facility] CATH;  Service: Cardiology;  Laterality: N/A;    BACK SURGERY      BUNIONECTOMY Bilateral     CARPAL TUNNEL RELEASE Bilateral     CHOLECYSTECTOMY      COLONOSCOPY N/A 5/9/2022    Procedure: COLONOSCOPY;  Surgeon: Braydon Durand MD;  Location: [facility] ENDO;  Service: Endoscopy;  Laterality: N/A;    COLONOSCOPY W/ POLYPECTOMY      CORONARY ANGIOGRAPHY N/A 1/1/2023    Procedure: ANGIOGRAM, CORONARY ARTERY;  Surgeon: Stefany Elizondo MD;  Location: [facility] CATH/EP LAB;  Service: Cardiology;  Laterality: N/A;    CORONARY ARTERY BYPASS GRAFT      CREATION OF BYPASS FROM INTERNAL CAROTID ARTERY TO SUBCLAVIAN ARTERY Right 12/27/2021    Procedure: CREATION, BYPASS, ARTERIAL, INTERNAL CAROTID TO SUBCLAVIAN;  Surgeon: Jeovany Goins MD;  Location: [facility] OR;  Service: Vascular;  Laterality: Right;    ELBOW ARTHROPLASTY  Right     ELBOW SURGERY      INSERTION, PACEMAKER, TEMPORARY TRANSVENOUS  1/1/2023    Procedure: Insertion, Pacemaker, Temporary Transvenous;  Surgeon: Stefany Elizondo MD;  Location: [facility] CATH/EP LAB;  Service: Cardiology;;    IVUS, CORONARY  1/1/2023    Procedure: IVUS, Coronary;  Surgeon: Stefany Elizondo MD;  Location: [facility] CATH/EP LAB;  Service: Cardiology;;    LEFT HEART CATHETERIZATION Left 1/1/2023    Procedure: Left heart cath;  Surgeon: Stefany Elizondo MD;  Location: [facility] CATH/EP LAB;  Service: Cardiology;  Laterality: Left;    MINIMALLY INVASIVE FORAMINOTOMY OF  SPINE N/A 11/15/2018    Procedure: FORAMINOTOMY, SPINE, MINIMALLY INVASIVE DECOMPRESSION L4-5;  Surgeon: Iván Stevenson Jr., MD;  Location: [facility] OR;  Service: Orthopedics;  Laterality: N/A;    NECK SURGERY      acf    PERCUTANEOUS CORONARY INTERVENTION, ARTERY N/A 1/1/2023    Procedure: Percutaneous coronary intervention;  Surgeon: Stefany Elizondo MD;  Location: [facility] CATH/EP LAB;  Service: Cardiology;  Laterality: N/A;    PERCUTANEOUS TRANSLUMINAL ANGIOPLASTY (PTA) OF PERIPHERAL VESSEL Right 05/01/2020    Procedure: PTA, PERIPHERAL VESSEL of right lower extremity;  Surgeon: Luke Cabello MD;  Location: [facility] CATH;  Service: Cardiology;  Laterality: Right;    PERCUTANEOUS TRANSLUMINAL ANGIOPLASTY (PTA) OF PERIPHERAL VESSEL Left 09/10/2020    Procedure: PTA, PERIPHERAL VESSEL, Left lower extremity;  Surgeon: Luke Cabello MD;  Location: [facility] CATH;  Service: Cardiology;  Laterality: Left;    PERCUTANEOUS TRANSLUMINAL ANGIOPLASTY (PTA) OF PERIPHERAL VESSEL Left 07/06/2021    Profunda and SFA       Social History: deferred.    Prior Intubation HX:  1/1/23-1/8/23    Modified Barium Swallow: none found in epic    Chest X-Rays:   Imaging Results              X-Ray Chest AP Portable (Final result)  Result time 01/01/23 07:01:46      Final result by Elizabeth Sharp IV, MD (01/01/23 07:01:46)                    Narrative:    Chest, single view    HISTORY: Nasogastric tube placement.    In the interval, there has been introduction of a nasogastric tube which extends into the left upper quadrant of the abdomen just beyond the GE junction. An endotracheal tube remains in place with its tip positioned well above the level of the saurav.    Bilateral interstitial and mild groundglass opacities, greater on the right are again observed. There are no new confluent infiltrates, volume loss or effusions.    The cardiomediastinal silhouette is stable.    IMPRESSION:    Interval introduction of nasogastric tube extending into the left upper abdomen with its tip just beyond the GE junction.    Otherwise stable cardiopulmonary status.    Electronically signed by:  Elizabeth Sharp MD  1/1/2023 7:01 AM CST Workstation: [id number]                                     X-Ray Chest AP Portable (Final result)  Result time 01/01/23 06:59:55      Final result by Elizabeth Sharp IV, MD (01/01/23 06:59:55)                   Narrative:    Chest, single view    HISTORY: Cardiac arrest.    Comparison 9/9/2022.    Endotracheal tube is in place with its tip positioned well above the level of the saurav.    The heart size is within normal limits. The central pulmonary vasculature is not acutely engorged.  There is arteriosclerotic calcification within the aortic arch.    There is scattered bilateral interstitial and groundglass pulmonary opacities with upper lung zone predominance, greater on the right. No effusion or extrapulmonary air identified.    Surgical changes are noted within the cervical spine and within the soft tissues of the right side of the neck.    IMPRESSION:    Positioning of endotracheal tube as above.    Interval development of interstitial and groundglass pulmonary opacities with upper lung zone predominance.    Electronically signed by:  Elizabeth Sharp MD  1/1/2023 6:59 AM CST Workstation: [id number]                               "        Prior diet: presumably regular; NPO since admit.    Occupation/hobbies/homemaking: none stated.    Subjective     SLP spoke w/ nursing prior to room visit; nursing reporting pt did well w/ water and meds this AM.  Pt awake laying in bed upon entering room. Pt agreeable to BSE.  Patient goals: "I want more water"     Pain/Comfort:       Respiratory Status: High flow, flow 10 L/min, concentration  %    Objective:   Pt oriented to person, place, year. Extensive oral care provided prior to PO trials and oral [facility] exam; pt w/ thick mucus and saliva coating lingual and palatal surfaces. Pt utilizing suction to remove coughed up mucus/secretions; occasional coughing noted prior to PO trials. Pt w/ open mouth posture and oral respiration; difficulty w/ mandibular elevation and labial closure. General weakness noted in addition to pt appearing weak/lethargic, though responding to commands and questions appropriately. Speech was noted to be imprecise.    Oral Musculature Evaluation  Oral Musculature: general weakness  Dentition: scattered dentition, teeth in poor condition  Secretion Management: problems swallowing secretions (thick mucus and saliva coating mouth)  Mucosal Quality: sticky, coated tongue  Mandibular Strength and Mobility: impaired  Oral Labial Strength and Mobility: impaired retraction, impaired pursing, impaired seal  Lingual Strength and Mobility: impaired strength, impaired protrusion, impaired right lateral movement, impaired left lateral movement  Velar Elevation: WFL  Buccal Strength and Mobility: impaired  Volitional Cough: elicited; adequate  Volitional Swallow: palpated; adequate laryngeal elevation/excursion; delayed initation  Voice Prior to PO Intake: wet    Bedside Swallow Eval:   Consistencies Assessed:  Thin liquids ice chip, tsp sips water, single/consecutive straw sips water  Puree tsp bites pudding      Oral Phase:   Leakage, mouth breathing  Mild Oral residue w/ pudding trials  Slow " oral transit time    Pharyngeal Phase:   No changes in vocal quality noted; no wet voice after PO trials  delayed swallow initiation; decreased coordination of pharyngeal swallow  multiple spontaneous swallows  No changes in oxygen saturation across trials; no coughing noted    Compensatory Strategies  Multiple swallows    Treatment: Pt educated re purpose of BSE, role of SLP, results of BSE, diet recs, and swallowing precautions. Pt expressed understanding of recs. MD and nursing provided w/ pt's diet/swallowing recs as well.    Assessment:     Peyman Gr is a 67 y.o. male with an SLP diagnosis of oropharyngeal dysphagia.  He presents with decreased endurance and general strength, oral respiration during PO trials/at rest, decreased dentition for adequate mastication, poor secretion management, decreased mandibular strength and labial seal, impaired orofacial muscular ROM/strength, and decreased lingual strength/ROM. Delayed swallow initiation noted in addition to multiple spontaneous swallows during PO trials. Rec pt remain NPO at this time; small tsp sips water and ice chips can be given to pt sparingly by nursing staff and SLP only. Frequent, aggressive oral care should be provided prior to PO trials. ST to f/u re diet readiness.    Goals:   Multidisciplinary Problems       SLP Goals          Problem: SLP    Goal Priority Disciplines Outcome   SLP Goal     SLP    Description: 1. Pt will tolerate PO trials of puree and thin liquid w/o overt s/s aspiration and w/ adequate oral clearance during >90% of trials  2. Pt will participate in ongoing diagnostic dysphagia tx in order to determine LRD                       Plan:     Patient to be seen:  5 x/week   Plan of Care expires:     Plan of Care reviewed with:  patient, other (see comments) (nursing, MD)   SLP Follow-Up:  Yes       Discharge recommendations:      Barriers to Discharge:   TBD    Time Tracking:     SLP Treatment Date:   01/09/23  Speech Start Time:   0910  Speech Stop Time:  0934     Speech Total Time (min):  24 min    Billable Minutes: Eval Swallow and Oral Function 24 min    01/09/2023

## 2023-01-09 NOTE — NURSING
Patient is resting quietly on BiPAP, respirations even and non labored. No needs or complaints at this time. All lab specimens collected. Report given to next shift RN.

## 2023-01-09 NOTE — CARE UPDATE
01/09/23 0712   Patient Assessment/Suction   Level of Consciousness (AVPU) alert   All Lung Fields Breath Sounds coarse   Skin Integrity   $ Wound Care Tech Time 15 min   Area Observed Bridge of nose   Skin Appearance without discoloration   PRE-TX-O2   Device (Oxygen Therapy) BIPAP   $ Is the patient on Low Flow Oxygen? Yes   Oxygen Concentration (%) 45   SpO2 97 %   Pulse Oximetry Type Continuous   $ Pulse Oximetry - Multiple Charge Pulse Oximetry - Multiple   Pulse 66   Resp (!) 28   Aerosol Therapy   $ Aerosol Therapy Charges Aerosol Treatment   Daily Review of Necessity (SVN) completed   Respiratory Treatment Status (SVN) given   Treatment Route (SVN) in-line   Patient Position (SVN) semi-Doan's   Post Treatment Assessment (SVN) increased aeration   Signs of Intolerance (SVN) none   Breath Sounds Post-Respiratory Treatment   Throughout All Fields Post-Treatment aeration increased   Post-treatment Heart Rate (beats/min) 66   Post-treatment Resp Rate (breaths/min) 24   Ready to Wean/Extubation Screen   FIO2<=50 (chart decimal) 0.45   Preset CPAP/BiPAP Settings   Mode Of Delivery BiPAP   $ CPAP/BiPAP Daily Charge BiPAP/CPAP Daily   $ Is patient using? Yes   Size of Mask Medium/Large   Equipment Type V60   Ipap 12   EPAP (cm H2O) 6   Pressure Support (cm H2O) 6   Set Rate (Breaths/Min) 20   Patient CPAP/BiPAP Settings   RR Total (Breaths/Min) 23   Tidal Volume (mL) 880   Education   $ Education Bronchodilator;15 min   Respiratory Evaluation   $ Care Plan Tech Time 15 min

## 2023-01-09 NOTE — PROGRESS NOTES
Pulmonary/Critical Care  Progress Note      Patient name: Peyman Gr  MRN: [id number]  Date: 01/09/2023    Admit Date: 1/1/2023  Consult Requested By: Claudio Hoyos MD    Reason for Consult: respiratory failure    HPI:    1/1/2023 - 66 yo initially presented with chest pain about  2 AM, had bradycardia then VTVF arrest and had prolonged code - transferred to [facility] and was in cardiac arrest at arrival after multiple rounds of meds he had ROSC and taken to cath lab.  In Cath lab had occluded RCA and had successful PCI.  ALso has LM and OM disease.  He has been very unstable and moved to ICU.  He is unresponsive and cool.  He is ventilated.  On dobutrex and levophed, having some ectopy (contacting cardiology to see if they want to start lidocaine or amiodarone, QTc is 487), he is on bicarb drip and last ABG showed adequate oxygenation but a mixed metabolic and respiratory acidosis (vent adjused and will repeat).  No family was in the waiting room.  Chart has been reviewed and case d/w nursing, respiratory and Dr Hoyos.  BY report pt was resuscitated for > 60 minutes.    1/2/2023 - Remains critically ill, has been agitated at times and has needed increased sedation, he has not followed any commands.  Rhythm seems more stable but he has had ectopy.  Temp had increased from his hypothermia yesterday.  Electrolytes replaced by SS.  LFT have increased.  ABG this AM shows alkalosis (on bicarb drip).  CXR reviewed.  ECHO noted.    1/3/2023 - Remains critically ill, gets agitated at times and has required sedation.  He does not respond to voice or pain but does have spontaneous respirations and movement.  Pupils are sluggish to NR, minimal corneal reflex.  Had temp this AM.  CXR noted.    1/4/2023 - Had issues yesterday - HTN, bradycardia, a ? Of seizure activity and I was not notified of any of this.  Overnight things have been more stable.  He remains unresponsive.  Temp has decreased, renal function good, LFT better.   I tried to call family yesterday but got no answer.  Family has been at bedside per nursing.  SC + for [facility]    1/5/2023 - Remains critically ill and is not responsive to me.  When sedation decreased he does have some movement (not purposeful).  EEG report reviewed and neuro note seen.  Neuro issues preclude any thoughts of weaning ventilator support  Tachycardic today.  O2 needs have increased.    1/6/23: Off sedation for an hour today and moved spontaneously, shaking head around, but did not appear to follow commands. Oxygen requirements going down.    1/7/23: Off sedation for >30 minutes, he is moving his head spontaneously and may attend to his name, but hard to tell. Does not follow commands. He is respiring well on PS 5/5. Will keep on pressure support and Precedex with fentanyl boluses for now.    1/8/23: Doing well off sedation, following commands, intact respiratory drive, ventilating well. Extubating now.    1/9 The patient is mumbly mouthed but conversing.  He states he is in the patient is mumbling mouth but interacting well. He states he is in 6 of 10 pain with his back at this time.    Review of Systems    Review of Systems   Unable to perform ROS: Mental acuity     Past Medical History    Past Medical History:   Diagnosis Date    Arthritis     Chronic back pain     Chronic neck pain     Dry gangrene     RIGHT LITTLE TOE    Hypercholesteremia     Hypertension     Insomnia     Multiple falls     S/P BACK SURGERY- 1 FALL    PAD (peripheral artery disease)     Personal history of colonic polyps     PVD (peripheral vascular disease)        Past Surgical History    Past Surgical History:   Procedure Laterality Date    ANGIOGRAPHY OF LOWER EXTREMITY N/A 09/24/2020    Procedure: Angiogram Extremity Unilateral;  Surgeon: Luke Cabello MD;  Location: [facility] CATH;  Service: Cardiology;  Laterality: N/A;    BACK SURGERY      BUNIONECTOMY Bilateral     CARPAL TUNNEL RELEASE Bilateral     CHOLECYSTECTOMY       COLONOSCOPY N/A 5/9/2022    Procedure: COLONOSCOPY;  Surgeon: Braydon Durand MD;  Location: [facility] ENDO;  Service: Endoscopy;  Laterality: N/A;    COLONOSCOPY W/ POLYPECTOMY      CORONARY ANGIOGRAPHY N/A 1/1/2023    Procedure: ANGIOGRAM, CORONARY ARTERY;  Surgeon: Stefany Elizondo MD;  Location: [facility] CATH/EP LAB;  Service: Cardiology;  Laterality: N/A;    CORONARY ARTERY BYPASS GRAFT      CREATION OF BYPASS FROM INTERNAL CAROTID ARTERY TO SUBCLAVIAN ARTERY Right 12/27/2021    Procedure: CREATION, BYPASS, ARTERIAL, INTERNAL CAROTID TO SUBCLAVIAN;  Surgeon: Jeovany Goins MD;  Location: [facility] OR;  Service: Vascular;  Laterality: Right;    ELBOW ARTHROPLASTY Right     ELBOW SURGERY      INSERTION, PACEMAKER, TEMPORARY TRANSVENOUS  1/1/2023    Procedure: Insertion, Pacemaker, Temporary Transvenous;  Surgeon: Stefany Elizondo MD;  Location: [facility] CATH/EP LAB;  Service: Cardiology;;    IVUS, CORONARY  1/1/2023    Procedure: IVUS, Coronary;  Surgeon: Stefany Elizondo MD;  Location: [facility] CATH/EP LAB;  Service: Cardiology;;    LEFT HEART CATHETERIZATION Left 1/1/2023    Procedure: Left heart cath;  Surgeon: Stefany Elizondo MD;  Location: [facility] CATH/EP LAB;  Service: Cardiology;  Laterality: Left;    MINIMALLY INVASIVE FORAMINOTOMY OF  SPINE N/A 11/15/2018    Procedure: FORAMINOTOMY, SPINE, MINIMALLY INVASIVE DECOMPRESSION L4-5;  Surgeon: Iván Stevenson Jr., MD;  Location: [facility] OR;  Service: Orthopedics;  Laterality: N/A;    NECK SURGERY      acf    PERCUTANEOUS CORONARY INTERVENTION, ARTERY N/A 1/1/2023    Procedure: Percutaneous coronary intervention;  Surgeon: Stefany Elizondo MD;  Location: [facility] CATH/EP LAB;  Service: Cardiology;  Laterality: N/A;    PERCUTANEOUS TRANSLUMINAL ANGIOPLASTY (PTA) OF PERIPHERAL VESSEL Right 05/01/2020    Procedure: PTA, PERIPHERAL VESSEL of right lower extremity;  Surgeon: Luke Cabello MD;  Location: [facility] CATH;  Service: Cardiology;   Laterality: Right;    PERCUTANEOUS TRANSLUMINAL ANGIOPLASTY (PTA) OF PERIPHERAL VESSEL Left 09/10/2020    Procedure: PTA, PERIPHERAL VESSEL, Left lower extremity;  Surgeon: Luke Cabello MD;  Location: [facility] CATH;  Service: Cardiology;  Laterality: Left;    PERCUTANEOUS TRANSLUMINAL ANGIOPLASTY (PTA) OF PERIPHERAL VESSEL Left 07/06/2021    Profunda and SFA       Medications (scheduled):      amLODIPine  10 mg Oral Daily    aspirin  81 mg Oral Daily    carvediloL  25 mg Oral BID WM    cefTRIAXone (ROCEPHIN) IVPB  1 g Intravenous Q24H    chlorhexidine  15 mL Mouth/Throat BID    enoxparin  40 mg Subcutaneous Q24H    famotidine  20 mg Per OG tube BID    hydroCHLOROthiazide  12.5 mg Oral Daily    levalbuterol  0.63 mg Nebulization TID WAKE    methylPREDNISolone sodium succinate injection  40 mg Intravenous Daily    nicotine  1 patch Transdermal Daily    ticagrelor  90 mg Oral BID       Medications (infusions):      dexmedetomidine (PRECEDEX) infusion 1 mcg/kg/hr (01/09/23 0952)    DOBUTamine Stopped (01/01/23 0945)    midazolam Stopped (01/07/23 1830)    nicardipine Stopped (01/09/23 0432)       Medications (prn):     acetaminophen, albuterol-ipratropium, atropine, calcium gluconate IVPB, calcium gluconate IVPB, calcium gluconate IVPB, dextrose 10%, dextrose 10%, DOBUTamine, EPINEPHrine, glucagon (human recombinant), glucose, glucose, hydrALAZINE, magnesium sulfate IVPB, magnesium sulfate IVPB, melatonin, midazolam, morphine, naloxone, ondansetron, oxyCODONE-acetaminophen, polyethylene glycol, potassium chloride in water **AND** potassium chloride in water **AND** potassium chloride in water, senna-docusate 8.6-50 mg, simethicone, sodium chloride 0.9%, sodium phosphate IVPB, sodium phosphate IVPB, sodium phosphate IVPB    Family History:   Family History   Problem Relation Age of Onset    COPD Mother     Hypertension Father     Heart disease Father     Heart attack Father     COPD Sister     Other Sister     Kidney  "failure Brother     Hypertension Brother     Diabetes Brother        Social History: Tobacco:   Social History     Tobacco Use   Smoking Status Former    Packs/day: 2.00    Years: 60.00    Pack years: 120.00    Types: Cigarettes    Quit date: 2017    Years since quittin.0   Smokeless Tobacco Never                                EtOH:   Social History     Substance and Sexual Activity   Alcohol Use No                                Drugs:   Social History     Substance and Sexual Activity   Drug Use No       Physical Exam    Vital signs:  Temp:  [98 °F (36.7 °C)-98.4 °F (36.9 °C)]   Pulse:  [51-85]   Resp:  [0-59]   BP: (125-214)/()   SpO2:  [91 %-99 %]   Arterial Line BP: (111-251)/()     Intake/Output:   Intake/Output Summary (Last 24 hours) at 2023 1042  Last data filed at 2023 0604  Gross per 24 hour   Intake 1413.78 ml   Output 6650 ml   Net -5236.22 ml          BMI: Estimated body mass index is 32.65 kg/m² as calculated from the following:    Height as of an earlier encounter on 23: 6' 2.02" (1.88 m).    Weight as of this encounter: 115.4 kg (254 lb 6.6 oz).    PHYSICAL EXAM  GENERAL:   Lethargic but following commands  HEENT:  Pupils equal and reactive.  Mouth dry.  NECK: No cervical adenopathy palpated.  HEART: Regular rate and rhythm. No murmur or gallop auscultated.  LUNGS:  There are rhonchi on the left.  The patient has a poor cough effort.. Lung excursion symmetrical.   ABDOMEN: Bowel sounds present. Soft, non-tender, non-distended.  EXTREMITIES: Normal muscle tone and joint movement, no cyanosis or clubbing.  Right arterial line is positional.  Right midline and peripheral  LYMPHATICS: No adenopathy palpated, no edema.  SKIN: Dry, intact, no lesions.   NEURO: following commands, speech is difficult to understand  Psych:  Calm    Laboratory    Recent Labs   Lab 23  0310   WBC 11.66   RBC 4.15*   HGB 12.8*   HCT 37.6*      MCV 91   MCH 30.8   MCHC 34.0 "         Recent Labs   Lab 01/09/23  0310   CALCIUM 8.2*   PROT 6.2      K 3.7   CO2 24      BUN 44*   CREATININE 0.7   ALKPHOS 80   ALT 52*   AST 27   BILITOT 0.9         Microbiology:       Microbiology Results (last 7 days)       Procedure Component Value Units Date/Time    Blood culture [[id number]] Collected: 01/03/23 0954    Order Status: Completed Specimen: Blood Updated: 01/08/23 1032     Blood Culture, Routine No growth after 5 days.    Blood culture [[id number]] Collected: 01/03/23 0954    Order Status: Completed Specimen: Blood Updated: 01/08/23 1032     Blood Culture, Routine No growth after 5 days.    Culture, Respiratory with Gram Stain [[id number]]  (Abnormal)  (Susceptibility) Collected: 01/02/23 1431    Order Status: Completed Specimen: Respiratory from Tracheal Aspirate Updated: 01/04/23 0856     Respiratory Culture Reduced normal respiratory lorenzo      STAPHYLOCOCCUS AUREUS  Moderate       Gram Stain (Respiratory) <10 epithelial cells per low power field.     Gram Stain (Respiratory) Few WBC's     Gram Stain (Respiratory) Moderate Gram positive cocci    MRSA Screen by PCR [[id number]] Collected: 01/03/23 1412    Order Status: Completed Specimen: Nasopharyngeal Swab from Nasal Updated: 01/03/23 1554     MRSA SCREEN BY PCR Negative            Radiology    X-Ray Chest AP Portable  HISTORY: eval and compare to previous evals.    COMPARISON:1/6/2023    FINDINGS: Tip of ET tube resides at the medial ends of clavicles in optimal position. The NG tube passes into the stomach. The heart is prominent size with moderate biventricular dominance. Patient is slightly shifted towards the left.    Persistent homogeneous airlessness barely in the basilar segments is noted unchanged upon comparison with the changes in the left is attributed the R2 to layering pleural effusion. Persistent prominence of the central pulmonary vascularity. No evidence of pneumothorax. There are clips in the right supraclavicular  region noted.    IMPRESSION:  1. Stable appearance the chest when compared to previous.  2. Homogeneous airlessness left lung base secondary pleural effusion, atelectasis, pneumonic infiltrate, or combination thereof.    Electronically signed by:  Solo Harris MD  1/7/2023 7:10 AM CST Workstation: [id number]        Ventilator Information    Oxygen Concentration (%):  [45-60] 45  Resp Rate Total:  [28 br/min-39 br/min] 39 br/min         Recent Labs     01/08/23  1619   PH 7.511*   PCO2 33.7*   PO2 103*   HCO3 27.0   POCSATURATED 99   BE 4           Impression    Active Hospital Problems    Diagnosis  POA    *STEMI involving right coronary artery [I21.11]  Yes    Pneumonia of right lung due to methicillin susceptible Staphylococcus aureus (MSSA) [J15.211]  No    Shock liver [K72.00]  Yes    Cardiogenic shock [R57.0]  Yes    Cardiac arrest [I46.9]  Yes    Acute respiratory failure with hypoxia and hypercarbia [J96.01, J96.02]  Yes    Leukocytosis [D72.829]  Yes    Anemia [D64.9]  Yes    History of tobacco abuse [Z87.891]  Not Applicable    Primary hypertension [I10]  Yes    PVD (peripheral vascular disease) with claudication [I73.9]  Yes    PAD (peripheral artery disease) [I73.9]  Yes      Resolved Hospital Problems    Diagnosis Date Resolved POA    Hyponatremia [E87.1] 01/02/2023 Yes   EEG on 1/4 showing voltage suppression and slowing.  CT head on 1/4 w/o acute abnormality.  Severe hypertension  Pulmonary edema  Chronic pain and opiate use  Left lower lobe atelectasis  Chronic pain  Inability to clear secretions  Anemia, chronic  Inability to clear secretions  Prerenal azotemia  Improving transaminitis    Continue home carvedilol and amlodipine   Restart home hydrochlorothiazide   Home percocet restarted  PRN IV morphine 2 mg  Neuro following  Trend labs  Monitor electrolytes and replace  Discontinue steroids    Critical care time spent reviewing the chart, examining the patient, reviewing the labs, reviewing the  radiological findings, discussing care with nursing, physicians, and respiratory and creating the note and  has been >35 minutes.      Traci Kevin MD  Pulmonary and Critical Care Medicine  [facility]  Date of Service: 01/09/2023  5:52 PM

## 2023-01-10 LAB
ALBUMIN SERPL BCP-MCNC: 2.3 G/DL (ref 3.5–5.2)
ALP SERPL-CCNC: 79 U/L (ref 55–135)
ALT SERPL W/O P-5'-P-CCNC: 51 U/L (ref 10–44)
ANION GAP SERPL CALC-SCNC: 6 MMOL/L (ref 8–16)
AST SERPL-CCNC: 32 U/L (ref 10–40)
BASOPHILS # BLD AUTO: 0.03 K/UL (ref 0–0.2)
BASOPHILS NFR BLD: 0.3 % (ref 0–1.9)
BILIRUB SERPL-MCNC: 0.8 MG/DL (ref 0.1–1)
BUN SERPL-MCNC: 47 MG/DL (ref 8–23)
CALCIUM SERPL-MCNC: 8.1 MG/DL (ref 8.7–10.5)
CHLORIDE SERPL-SCNC: 106 MMOL/L (ref 95–110)
CO2 SERPL-SCNC: 27 MMOL/L (ref 23–29)
CREAT SERPL-MCNC: 0.9 MG/DL (ref 0.5–1.4)
DIFFERENTIAL METHOD: ABNORMAL
EOSINOPHIL # BLD AUTO: 0 K/UL (ref 0–0.5)
EOSINOPHIL NFR BLD: 0.3 % (ref 0–8)
ERYTHROCYTE [DISTWIDTH] IN BLOOD BY AUTOMATED COUNT: 14.3 % (ref 11.5–14.5)
EST. GFR  (NO RACE VARIABLE): >60 ML/MIN/1.73 M^2
GLUCOSE SERPL-MCNC: 88 MG/DL (ref 70–110)
HCT VFR BLD AUTO: 33.7 % (ref 40–54)
HGB BLD-MCNC: 11.2 G/DL (ref 14–18)
IMM GRANULOCYTES # BLD AUTO: 0.54 K/UL (ref 0–0.04)
IMM GRANULOCYTES NFR BLD AUTO: 4.8 % (ref 0–0.5)
LYMPHOCYTES # BLD AUTO: 1.1 K/UL (ref 1–4.8)
LYMPHOCYTES NFR BLD: 9.9 % (ref 18–48)
MAGNESIUM SERPL-MCNC: 2.1 MG/DL (ref 1.6–2.6)
MCH RBC QN AUTO: 30.9 PG (ref 27–31)
MCHC RBC AUTO-ENTMCNC: 33.2 G/DL (ref 32–36)
MCV RBC AUTO: 93 FL (ref 82–98)
MONOCYTES # BLD AUTO: 1.2 K/UL (ref 0.3–1)
MONOCYTES NFR BLD: 10.8 % (ref 4–15)
NEUTROPHILS # BLD AUTO: 8.3 K/UL (ref 1.8–7.7)
NEUTROPHILS NFR BLD: 73.9 % (ref 38–73)
NRBC BLD-RTO: 0 /100 WBC
PLATELET # BLD AUTO: 292 K/UL (ref 150–450)
PLATELET BLD QL SMEAR: ABNORMAL
PMV BLD AUTO: 10 FL (ref 9.2–12.9)
POTASSIUM SERPL-SCNC: 3.6 MMOL/L (ref 3.5–5.1)
PROT SERPL-MCNC: 5.9 G/DL (ref 6–8.4)
RBC # BLD AUTO: 3.63 M/UL (ref 4.6–6.2)
SODIUM SERPL-SCNC: 139 MMOL/L (ref 136–145)
WBC # BLD AUTO: 11.23 K/UL (ref 3.9–12.7)

## 2023-01-10 PROCEDURE — 25000003 PHARM REV CODE 250

## 2023-01-10 PROCEDURE — 97110 THERAPEUTIC EXERCISES: CPT

## 2023-01-10 PROCEDURE — 63600175 PHARM REV CODE 636 W HCPCS: Performed by: INTERNAL MEDICINE

## 2023-01-10 PROCEDURE — 99900031 HC PATIENT EDUCATION (STAT)

## 2023-01-10 PROCEDURE — 99233 PR SUBSEQUENT HOSPITAL CARE,LEVL III: ICD-10-PCS | Mod: ,,, | Performed by: INTERNAL MEDICINE

## 2023-01-10 PROCEDURE — S4991 NICOTINE PATCH NONLEGEND: HCPCS | Performed by: INTERNAL MEDICINE

## 2023-01-10 PROCEDURE — 27000221 HC OXYGEN, UP TO 24 HOURS

## 2023-01-10 PROCEDURE — 97530 THERAPEUTIC ACTIVITIES: CPT

## 2023-01-10 PROCEDURE — 99233 SBSQ HOSP IP/OBS HIGH 50: CPT | Mod: ,,, | Performed by: INTERNAL MEDICINE

## 2023-01-10 PROCEDURE — 25000242 PHARM REV CODE 250 ALT 637 W/ HCPCS: Performed by: INTERNAL MEDICINE

## 2023-01-10 PROCEDURE — 25000003 PHARM REV CODE 250: Performed by: INTERNAL MEDICINE

## 2023-01-10 PROCEDURE — 83735 ASSAY OF MAGNESIUM: CPT | Performed by: FAMILY MEDICINE

## 2023-01-10 PROCEDURE — 94761 N-INVAS EAR/PLS OXIMETRY MLT: CPT

## 2023-01-10 PROCEDURE — 99900035 HC TECH TIME PER 15 MIN (STAT)

## 2023-01-10 PROCEDURE — 94660 CPAP INITIATION&MGMT: CPT

## 2023-01-10 PROCEDURE — 92526 ORAL FUNCTION THERAPY: CPT

## 2023-01-10 PROCEDURE — 94640 AIRWAY INHALATION TREATMENT: CPT

## 2023-01-10 PROCEDURE — 80053 COMPREHEN METABOLIC PANEL: CPT | Performed by: FAMILY MEDICINE

## 2023-01-10 PROCEDURE — 20000000 HC ICU ROOM

## 2023-01-10 PROCEDURE — 85025 COMPLETE CBC W/AUTO DIFF WBC: CPT | Performed by: FAMILY MEDICINE

## 2023-01-10 PROCEDURE — 97166 OT EVAL MOD COMPLEX 45 MIN: CPT

## 2023-01-10 PROCEDURE — 25000003 PHARM REV CODE 250: Performed by: FAMILY MEDICINE

## 2023-01-10 RX ORDER — ATORVASTATIN CALCIUM 20 MG/1
20 TABLET, FILM COATED ORAL NIGHTLY
Status: DISCONTINUED | OUTPATIENT
Start: 2023-01-10 | End: 2023-01-28

## 2023-01-10 RX ORDER — LOSARTAN POTASSIUM 25 MG/1
25 TABLET ORAL DAILY
Status: DISCONTINUED | OUTPATIENT
Start: 2023-01-10 | End: 2023-01-11

## 2023-01-10 RX ORDER — HYDRALAZINE HYDROCHLORIDE 20 MG/ML
5 INJECTION INTRAMUSCULAR; INTRAVENOUS EVERY 4 HOURS PRN
Status: DISCONTINUED | OUTPATIENT
Start: 2023-01-10 | End: 2023-01-24

## 2023-01-10 RX ORDER — HYDRALAZINE HYDROCHLORIDE 20 MG/ML
10 INJECTION INTRAMUSCULAR; INTRAVENOUS EVERY 4 HOURS PRN
Status: DISCONTINUED | OUTPATIENT
Start: 2023-01-10 | End: 2023-01-24

## 2023-01-10 RX ADMIN — OXYCODONE AND ACETAMINOPHEN 1 TABLET: 7.5; 325 TABLET ORAL at 01:01

## 2023-01-10 RX ADMIN — MORPHINE SULFATE 2 MG: 2 INJECTION, SOLUTION INTRAMUSCULAR; INTRAVENOUS at 11:01

## 2023-01-10 RX ADMIN — CHLORHEXIDINE GLUCONATE 15 ML: 1.2 RINSE ORAL at 09:01

## 2023-01-10 RX ADMIN — ACETAMINOPHEN 650 MG: 325 TABLET ORAL at 03:01

## 2023-01-10 RX ADMIN — LEVALBUTEROL HYDROCHLORIDE 0.63 MG: 0.63 SOLUTION RESPIRATORY (INHALATION) at 07:01

## 2023-01-10 RX ADMIN — LOSARTAN POTASSIUM 25 MG: 25 TABLET, FILM COATED ORAL at 01:01

## 2023-01-10 RX ADMIN — OXYCODONE AND ACETAMINOPHEN 1 TABLET: 7.5; 325 TABLET ORAL at 04:01

## 2023-01-10 RX ADMIN — CARVEDILOL 25 MG: 25 TABLET, FILM COATED ORAL at 04:01

## 2023-01-10 RX ADMIN — TICAGRELOR 90 MG: 90 TABLET ORAL at 09:01

## 2023-01-10 RX ADMIN — LEVALBUTEROL HYDROCHLORIDE 0.63 MG: 0.63 SOLUTION RESPIRATORY (INHALATION) at 08:01

## 2023-01-10 RX ADMIN — CARVEDILOL 25 MG: 25 TABLET, FILM COATED ORAL at 07:01

## 2023-01-10 RX ADMIN — MORPHINE SULFATE 2 MG: 2 INJECTION, SOLUTION INTRAMUSCULAR; INTRAVENOUS at 03:01

## 2023-01-10 RX ADMIN — TICAGRELOR 90 MG: 90 TABLET ORAL at 08:01

## 2023-01-10 RX ADMIN — MELATONIN 6 MG: at 09:01

## 2023-01-10 RX ADMIN — POTASSIUM CHLORIDE 40 MEQ: 29.8 INJECTION, SOLUTION INTRAVENOUS at 05:01

## 2023-01-10 RX ADMIN — ATORVASTATIN CALCIUM 20 MG: 20 TABLET, FILM COATED ORAL at 09:01

## 2023-01-10 RX ADMIN — HYDROCHLOROTHIAZIDE 12.5 MG: 12.5 TABLET ORAL at 08:01

## 2023-01-10 RX ADMIN — FAMOTIDINE 20 MG: 20 TABLET ORAL at 08:01

## 2023-01-10 RX ADMIN — AMLODIPINE BESYLATE 10 MG: 5 TABLET ORAL at 08:01

## 2023-01-10 RX ADMIN — LEVALBUTEROL HYDROCHLORIDE 0.63 MG: 0.63 SOLUTION RESPIRATORY (INHALATION) at 01:01

## 2023-01-10 RX ADMIN — OXYCODONE AND ACETAMINOPHEN 1 TABLET: 7.5; 325 TABLET ORAL at 09:01

## 2023-01-10 RX ADMIN — FAMOTIDINE 20 MG: 20 TABLET ORAL at 09:01

## 2023-01-10 RX ADMIN — OXYCODONE AND ACETAMINOPHEN 1 TABLET: 7.5; 325 TABLET ORAL at 12:01

## 2023-01-10 RX ADMIN — OXYCODONE AND ACETAMINOPHEN 1 TABLET: 7.5; 325 TABLET ORAL at 08:01

## 2023-01-10 RX ADMIN — NICOTINE 1 PATCH: 14 PATCH, EXTENDED RELEASE TRANSDERMAL at 08:01

## 2023-01-10 RX ADMIN — ASPIRIN 81 MG CHEWABLE TABLET 81 MG: 81 TABLET CHEWABLE at 08:01

## 2023-01-10 RX ADMIN — CHLORHEXIDINE GLUCONATE 15 ML: 1.2 RINSE ORAL at 08:01

## 2023-01-10 RX ADMIN — POLYETHYLENE GLYCOL 3350 17 G: 17 POWDER, FOR SOLUTION ORAL at 04:01

## 2023-01-10 RX ADMIN — ENOXAPARIN SODIUM 40 MG: 100 INJECTION SUBCUTANEOUS at 10:01

## 2023-01-10 NOTE — RESPIRATORY THERAPY
01/09/23 2012   Patient Assessment/Suction   Level of Consciousness (AVPU) alert   Respiratory Effort Unlabored   Expansion/Accessory Muscles/Retractions no use of accessory muscles   All Lung Fields Breath Sounds coarse;diminished   PRE-TX-O2   Device (Oxygen Therapy) high flow nasal cannula   $ Is the patient on Low Flow Oxygen? Yes   Flow (L/min) 8   SpO2 97 %   Pulse Oximetry Type Continuous   $ Pulse Oximetry - Multiple Charge Pulse Oximetry - Multiple   Pulse 72   Resp (!) 22   Aerosol Therapy   $ Aerosol Therapy Charges Aerosol Treatment   Daily Review of Necessity (SVN) completed   Respiratory Treatment Status (SVN) given   Treatment Route (SVN) mask;oxygen   Patient Position (SVN) semi-Doan's   Post Treatment Assessment (SVN) increased aeration   Signs of Intolerance (SVN) none   Breath Sounds Post-Respiratory Treatment   Throughout All Fields Post-Treatment aeration increased   Post-treatment Heart Rate (beats/min) 73   Post-treatment Resp Rate (breaths/min) 18   Education   $ Education Bronchodilator;DME Nebulizer;DME Oxygen;15 min   Respiratory Evaluation   $ Care Plan Tech Time 15 min

## 2023-01-10 NOTE — PT/OT/SLP PROGRESS
Physical Therapy Treatment    Patient Name:  Peyman Gr   MRN:  [id number]    Recommendations:     Discharge Recommendations: rehabilitation facility  Discharge Equipment Recommendations:  (TBD at next level of care)  Barriers to discharge:  Increased caregiver burden of care    Assessment:     Peyman Gr is a 67 y.o. male admitted with a medical diagnosis of STEMI involving right coronary artery.  He presents with the following impairments/functional limitations: weakness, impaired endurance, impaired self care skills, impaired functional mobility, gait instability, impaired balance, decreased lower extremity function, decreased upper extremity function, decreased safety awareness, impaired cardiopulmonary response to activity .    Pt was alert and oriented x4 and appeared to have increased insight of his present medical status and functional limitations. His R hand appeared weaker and more edematous with pt mostly using his  left hand with impaired fine motor control. Pt had wasted pudding on his tray and his bedding. Pt t/f'ed supine to sit with Max A  with minimal ability to advance B LE closer to EOB. Pt participated in sitting balance training showing decreased lean to the R.  He had difficulty with weight shifting of his hips. He participated in AAROM to B LE in sitting.    Rehab Prognosis: Good; patient would benefit from acute skilled PT services to address these deficits and reach maximum level of function.    Recent Surgery: Procedure(s) (LRB):  Left heart cath (Left)  Insertion, Pacemaker, Temporary Transvenous  ANGIOGRAM, CORONARY ARTERY (N/A)  Percutaneous coronary intervention (N/A)  IVUS, Coronary 9 Days Post-Op    Plan:     During this hospitalization, patient to be seen daily to address the identified rehab impairments via gait training, therapeutic activities, therapeutic exercises and progress toward the following goals:    Plan of Care Expires:  02/09/23    Subjective     Chief Complaint:  impaired functional mobility  Patient/Family Comments/goals: Get stronger  Pain/Comfort:         Objective:     Communicated with nurse prior to session.  Patient found supine with bed alarm, arterial line, peripheral IV, telemetry upon PT entry to room.     General Precautions: Standard, aspiration, fall  Orthopedic Precautions:    Braces:    Respiratory Status: Nasal cannula, flow 6 L/min     Functional Mobility:  Bed Mobility:     Scooting: maximal assistance  Supine to Sit: maximal assistance  Sit to Supine: maximal assistance and of 2 persons  Balance: min A       AM-PAC 6 CLICK MOBILITY          Treatment & Education:  Educated on safety, PT POC.  In sitting pt participated in B LE AAROM 1x10 reps of  knee flex/ext, hip flexion, and AP's    Patient left supine with all lines intact, call button in reach, bed alarm on, and OT present..    GOALS:   Multidisciplinary Problems       Physical Therapy Goals          Problem: Physical Therapy    Goal Priority Disciplines Outcome Goal Variances Interventions   Physical Therapy Goal     PT, PT/OT Ongoing, Progressing     Description: Goals to be met by: 2023     Patient will increase functional independence with mobility by performin. Supine to sit with MInimal Assistance  2. Sit to stand transfer with Moderate Assistance  3. Bed to chair with Mod A  w/ol AD  3. Gait  x 10  feet with Minimal Assistance using Rolling Walker.                          Time Tracking:     PT Received On: 01/10/23  PT Start Time: 1040     PT Stop Time: 1105  PT Total Time (min): 25 min     Billable Minutes: Therapeutic Activity 15 minutes and Therapeutic Exercise 10 Minutes    Treatment Type: Treatment  PT/PTA: PT           01/10/2023

## 2023-01-10 NOTE — PROGRESS NOTES
[facility]  Department of Cardiology  Progress Note      PATIENT NAME: Peyman Gr    MRN: [id number]  TODAY'S DATE: 01/10/2023  ADMIT DATE: 1/1/2023                          CONSULT REQUESTED BY: Ligia Fernandez MD    SUBJECTIVE     PRINCIPAL PROBLEM: STEMI involving right coronary artery    Interval history:  1/10/22  Patient is resting comfortably in bed.  He remains extubated.  BP elevated overnight.   Patient sat on side of the bed with Physical Therapy today.     1/9/23  Seen and examined patient in ICU today. Pt is extubated. Awake alert. Denies any chest pain.     1/7/23  Patient was extubated today around noon.  He is on BiPAP arousable and responsive to commands.  Cardene drip has been started due to hypertension.  Nurse reports patient moves all extremities with right-sided weakness noted.      1/6/23:  Patient lying in bed intubated and sedated. Vitals are stable.     1/5/23:  Patient seen lying in bed off sedation. Patient noted to be moving and appeared agitated. Neuro at bedside for exam. He is hypertensive and tachycardic without sedation.     1/4/23:  Patient seen lying in bed on sedation this morning.  No distress noted.  Vitals were stable at that time.  Of note the patient has become hypertensive and tachycardic this afternoon.  Patient is currently receiving full sedation due to jerking and sweating.    01/03/2023:    Patient remains intubated and sedated on mechanical ventilation. FIO2 is 60% with 5 PEEP. RN attempted to wean sedation but patient keeps triggering the vent.  He has cough and pupillary reflex.  RN states she has seen him move all 4 extremities but not to command.  He remains unresponsive. He is on Levophed, propofol, fentanyl, and vasopressin. He was intermittently paced for approximately 5 min this morning when he was coughing and gagging on the vent. He remains in critical condition.  UOP is 275 today.       01/02/2023:  Seen and examined patient in the ICU  today.  Off bicarb drip.  Currently on 2 sedatives and Levophed.     REASON FOR CONSULT:  STEMI      HPI:  67-year-old male with past medical history of peripheral vascular disease, right subclavian stenosis, chronic smoker, hypertension, hyperlipidemia presented to oxygen [facility] with 2 hour history of chest pain, shortness of breath.  ECG showed junctional bradycardia, complete heart block with inferior and anterolateral ST elevations.  STEMI code was called to which I responded immediately and agreed to transfer the patient to [facility] cath lab for emergent cardiac catheterization.  However, patient unfortunately went into cardiac arrest at [facility] with VT, VF, asystole requiring resuscitation and intubation.  Patient was resuscitated for about 40 minutes ROSC was achieved.  Patient was moving extremities at the time as per the ER.  Patient was then transferred to [facility] ER and patient again had cardiac arrest upon arrival to the ER which required resuscitation for 30 minutes.  Patient was in PEA initially and ROSC was achieved subsequently however patient was dependent on transcutaneous pacing.  Patient blood pressure was very low and could not be obtained with a BP cuff.  Patient was unable to be transferred to the cath lab at that point due to significant hemodynamic instability. Patient was started on pressors.  A-line was placed in the ED and blood pressure eventually improved with high doses of vasopressin, norepinephrine, sodium bicarbonate and dobutamine infusions.  Discussed with patient's family members regarding the patient's critical condition.  Once the blood pressure was stabilized on the pressors, patient was brought to the cath lab for transvenous pacemaker placement and coronary angiogram.  Benefits and risks of the procedure explained to patient's family and patient's family  agreed for the procedure.      Review of patient's  allergies indicates:  No Known Allergies    Past Medical History:   Diagnosis Date    Arthritis     Chronic back pain     Chronic neck pain     Dry gangrene     RIGHT LITTLE TOE    Hypercholesteremia     Hypertension     Insomnia     Multiple falls     S/P BACK SURGERY- 1 FALL    PAD (peripheral artery disease)     Personal history of colonic polyps     PVD (peripheral vascular disease)      Past Surgical History:   Procedure Laterality Date    ANGIOGRAPHY OF LOWER EXTREMITY N/A 09/24/2020    Procedure: Angiogram Extremity Unilateral;  Surgeon: Luke Cabello MD;  Location: [facility] CATH;  Service: Cardiology;  Laterality: N/A;    BACK SURGERY      BUNIONECTOMY Bilateral     CARPAL TUNNEL RELEASE Bilateral     CHOLECYSTECTOMY      COLONOSCOPY N/A 5/9/2022    Procedure: COLONOSCOPY;  Surgeon: Braydon Durand MD;  Location: [facility] ENDO;  Service: Endoscopy;  Laterality: N/A;    COLONOSCOPY W/ POLYPECTOMY      CORONARY ANGIOGRAPHY N/A 1/1/2023    Procedure: ANGIOGRAM, CORONARY ARTERY;  Surgeon: Stefany Elizondo MD;  Location: [facility] CATH/EP LAB;  Service: Cardiology;  Laterality: N/A;    CORONARY ARTERY BYPASS GRAFT      CREATION OF BYPASS FROM INTERNAL CAROTID ARTERY TO SUBCLAVIAN ARTERY Right 12/27/2021    Procedure: CREATION, BYPASS, ARTERIAL, INTERNAL CAROTID TO SUBCLAVIAN;  Surgeon: Jeovany Goins MD;  Location: [facility] OR;  Service: Vascular;  Laterality: Right;    ELBOW ARTHROPLASTY Right     ELBOW SURGERY      INSERTION, PACEMAKER, TEMPORARY TRANSVENOUS  1/1/2023    Procedure: Insertion, Pacemaker, Temporary Transvenous;  Surgeon: Stefany Elizondo MD;  Location: [facility] CATH/EP LAB;  Service: Cardiology;;    IVUS, CORONARY  1/1/2023    Procedure: IVUS, Coronary;  Surgeon: Stefany Elizondo MD;  Location: [facility] CATH/EP LAB;  Service: Cardiology;;    LEFT HEART CATHETERIZATION Left 1/1/2023    Procedure: Left heart cath;  Surgeon: Stefany Elizondo MD;  Location: [facility] CATH/EP LAB;   Service: Cardiology;  Laterality: Left;    MINIMALLY INVASIVE FORAMINOTOMY OF  SPINE N/A 11/15/2018    Procedure: FORAMINOTOMY, SPINE, MINIMALLY INVASIVE DECOMPRESSION L4-5;  Surgeon: Iván Stevenson Jr., MD;  Location: [facility] OR;  Service: Orthopedics;  Laterality: N/A;    NECK SURGERY      acf    PERCUTANEOUS CORONARY INTERVENTION, ARTERY N/A 2023    Procedure: Percutaneous coronary intervention;  Surgeon: Stefany Elizondo MD;  Location: [facility] CATH/EP LAB;  Service: Cardiology;  Laterality: N/A;    PERCUTANEOUS TRANSLUMINAL ANGIOPLASTY (PTA) OF PERIPHERAL VESSEL Right 2020    Procedure: PTA, PERIPHERAL VESSEL of right lower extremity;  Surgeon: Luke Cabello MD;  Location: [facility] CATH;  Service: Cardiology;  Laterality: Right;    PERCUTANEOUS TRANSLUMINAL ANGIOPLASTY (PTA) OF PERIPHERAL VESSEL Left 09/10/2020    Procedure: PTA, PERIPHERAL VESSEL, Left lower extremity;  Surgeon: Luke Cabello MD;  Location: [facility] CATH;  Service: Cardiology;  Laterality: Left;    PERCUTANEOUS TRANSLUMINAL ANGIOPLASTY (PTA) OF PERIPHERAL VESSEL Left 2021    [employer] and SFA     Social History     Tobacco Use    Smoking status: Former     Packs/day: 2.00     Years: 60.00     Pack years: 120.00     Types: Cigarettes     Quit date:      Years since quittin.0    Smokeless tobacco: Never   Substance Use Topics    Alcohol use: No    Drug use: No        REVIEW OF SYSTEMS  Review of Systems     Constitutional: + fatigue Negative for chills, and fever.   Eyes: No double vision, No blurred vision  Neuro: No headaches, No dizziness  Respiratory: Negative for cough, shortness of breath and wheezing.    Cardiovascular: Negative for chest pain. Negative for palpitations and leg swelling.   Gastrointestinal: Negative for abdominal pain, No melena, diarrhea, nausea and vomiting.   Genitourinary: Negative for dysuria and frequency, Negative for hematuria  Skin: Negative for bruising, Negative for edema or  discoloration noted.      OBJECTIVE     VITAL SIGNS (Most Recent)  Temp: 97.9 °F (36.6 °C) (01/10/23 1200)  Pulse: 78 (01/10/23 1500)  Resp: (!) 26 (01/10/23 1500)  BP: (!) 186/85 (01/10/23 1500)  SpO2: 95 % (01/10/23 1500)    VENTILATION STATUS  Resp: (!) 26 (01/10/23 1500)  SpO2: 95 % (01/10/23 1500)       I & O (Last 24H):  Intake/Output Summary (Last 24 hours) at 1/10/2023 1606  Last data filed at 1/10/2023 0508  Gross per 24 hour   Intake 386.31 ml   Output 2200 ml   Net -1813.69 ml         WEIGHTS  Wt Readings from Last 1 Encounters:   01/07/23 0400 115.4 kg (254 lb 6.6 oz)   01/06/23 0400 117.6 kg (259 lb 4.2 oz)   01/05/23 0500 115.3 kg (254 lb 3.1 oz)   01/03/23 0615 105.4 kg (232 lb 5.8 oz)   01/02/23 0400 102.5 kg (225 lb 15.5 oz)   01/02/23 0053 102.5 kg (225 lb 15.5 oz)   01/01/23 0800 99.8 kg (220 lb 0.3 oz)   01/01/23 0341 99.8 kg (220 lb)       PHYSICAL EXAM  GENERAL: well built, generalized weakness  NECK: No JVD.   CARDIAC: Regular rate and rhythm, S1, S2 regular;   CHEST ANATOMY: normal.   LUNGS: CTA  ABDOMEN: Soft.  Normal bowel sounds.   EXTREMITIES: 1-2+ pitting edema upper extremities.   SKIN: Skin without lesions, moist, well perfused.   CNS: awake, alert. Follows commands.     HOME MEDICATIONS:  Current Facility-Administered Medications on File Prior to Encounter   Medication Dose Route Frequency Provider Last Rate Last Admin    0.9%  NaCl infusion   Intravenous Continuous Braydon Durand MD   Stopped at 05/09/22 1011     Current Outpatient Medications on File Prior to Encounter   Medication Sig Dispense Refill    albuterol (PROVENTIL/VENTOLIN HFA) 90 mcg/actuation inhaler Inhale 1-2 puffs into the lungs every 6 (six) hours as needed for Shortness of Breath. Rescue 8 g 0    amLODIPine (NORVASC) 10 MG tablet Take 1 tablet (10 mg total) by mouth once daily. 30 tablet 1    amLODIPine (NORVASC) 5 MG tablet Take 5 mg by mouth once daily.      aspirin 81 MG Chew Take 81 mg by mouth once daily.       carvediloL (COREG) 25 MG tablet Take 25 mg by mouth 2 (two) times daily with meals.      clopidogreL (PLAVIX) 75 mg tablet Take 75 mg by mouth once daily. On hold for sx on 12/27/21      ezetimibe (ZETIA) 10 mg tablet Take 10 mg by mouth once daily.      hydroCHLOROthiazide (HYDRODIURIL) 12.5 MG Tab Take 1 tablet (12.5 mg total) by mouth once daily. 30 tablet 1    HYDROcodone-acetaminophen (NORCO)  mg per tablet Take 1 tablet by mouth every 8 (eight) hours as needed for Pain.      mupirocin (BACTROBAN) 2 % ointment Apply topically 3 (three) times daily. 22 g 0    oxyCODONE-acetaminophen (PERCOCET) 7.5-325 mg per tablet Take 1 tablet by mouth 2 (two) times daily as needed.      traMADoL (ULTRAM) 50 mg tablet Take 50 mg by mouth every 6 (six) hours as needed.      valsartan (DIOVAN) 160 MG tablet Take 160 mg by mouth once daily.         SCHEDULED MEDS:   amLODIPine  10 mg Oral Daily    aspirin  81 mg Oral Daily    atorvastatin  20 mg Oral QHS    carvediloL  25 mg Oral BID WM    chlorhexidine  15 mL Mouth/Throat BID    enoxparin  40 mg Subcutaneous Q24H    famotidine  20 mg Per OG tube BID    hydroCHLOROthiazide  12.5 mg Oral Daily    levalbuterol  0.63 mg Nebulization TID WAKE    losartan  25 mg Oral Daily    nicotine  1 patch Transdermal Daily    ticagrelor  90 mg Oral BID       CONTINUOUS INFUSIONS:   DOBUTamine Stopped (01/01/23 0945)    midazolam Stopped (01/07/23 1830)    nicardipine Stopped (01/09/23 0432)       PRN MEDS:acetaminophen, albuterol-ipratropium, atropine, calcium gluconate IVPB, calcium gluconate IVPB, calcium gluconate IVPB, dextrose 10%, dextrose 10%, DOBUTamine, EPINEPHrine, glucagon (human recombinant), glucose, glucose, hydrALAZINE, magnesium sulfate IVPB, magnesium sulfate IVPB, melatonin, midazolam, morphine, naloxone, ondansetron, oxyCODONE-acetaminophen, polyethylene glycol, potassium chloride in water **AND** potassium chloride in water **AND** potassium chloride in water,  senna-docusate 8.6-50 mg, simethicone, sodium chloride 0.9%, sodium phosphate IVPB, sodium phosphate IVPB, sodium phosphate IVPB    LABS AND DIAGNOSTICS     CBC LAST 3 DAYS  Recent Labs   Lab 01/08/23  0346 01/08/23  0513 01/09/23  0310 01/10/23  0338   WBC 13.16*  --  11.66 11.23   RBC 3.89*  --  4.15* 3.63*   HGB 12.0*  --  12.8* 11.2*   HCT 35.8* 36 37.6* 33.7*   MCV 92  --  91 93   MCH 30.8  --  30.8 30.9   MCHC 33.5  --  34.0 33.2   RDW 14.4  --  14.6* 14.3     --  261 292   MPV 9.2  --  9.3 10.0   GRAN 83.0*  --  77.0* 73.9*  8.3*   LYMPH 7.0*  CANCELED  --  9.0*  CANCELED 9.9*  1.1   MONO 5.0  CANCELED  --  7.0  CANCELED 10.8  1.2*   BASO CANCELED  --  CANCELED 0.03   NRBC 0  --  0 0         COAGULATION LAST 3 DAYS  No results for input(s): LABPT, INR, APTT in the last 168 hours.    CHEMISTRY LAST 3 DAYS  Recent Labs   Lab 01/08/23  0346 01/08/23  0513 01/08/23  1619 01/09/23  0310 01/09/23  1102 01/10/23  0338     --   --  140  --  139   K 4.0  --   --  3.7  --  3.6     --   --  107  --  106   CO2 25  --   --  24  --  27   ANIONGAP 6*  --   --  9  --  6*   BUN 47*  --   --  44*  --  47*   CREATININE 0.9  --   --  0.7  --  0.9     --   --  98  --  88   CALCIUM 8.3*  --   --  8.2*  --  8.1*   PH  --  7.455* 7.511*  --  7.541*  --    MG 2.3  --   --  2.2  --  2.1   ALBUMIN 2.4*  --   --  2.4*  --  2.3*   PROT 6.1  --   --  6.2  --  5.9*   ALKPHOS 68  --   --  80  --  79   ALT 50*  --   --  52*  --  51*   AST 24  --   --  27  --  32   BILITOT 0.4  --   --  0.9  --  0.8         CARDIAC PROFILE LAST 3 DAYS  No results for input(s): BNP, CPK, CPKMB, LDH, TROPONINI, TROPONINIHS in the last 168 hours.    ENDOCRINE LAST 3 DAYS  No results for input(s): TSH, PROCAL in the last 168 hours.      LAST ARTERIAL BLOOD GAS  ABG  Recent Labs   Lab 01/09/23  1102   PH 7.541*   PO2 59*   PCO2 29.7*   HCO3 25.5   BE 3         LAST 7 DAYS MICROBIOLOGY   Microbiology Results (last 7 days)        Procedure Component Value Units Date/Time    Blood culture [[id number]] Collected: 01/03/23 0954    Order Status: Completed Specimen: Blood Updated: 01/08/23 1032     Blood Culture, Routine No growth after 5 days.    Blood culture [[id number]] Collected: 01/03/23 0954    Order Status: Completed Specimen: Blood Updated: 01/08/23 1032     Blood Culture, Routine No growth after 5 days.    Culture, Respiratory with Gram Stain [[id number]]  (Abnormal)  (Susceptibility) Collected: 01/02/23 1431    Order Status: Completed Specimen: Respiratory from Tracheal Aspirate Updated: 01/04/23 0856     Respiratory Culture Reduced normal respiratory lorenzo      STAPHYLOCOCCUS AUREUS  Moderate       Gram Stain (Respiratory) <10 epithelial cells per low power field.     Gram Stain (Respiratory) Few WBC's     Gram Stain (Respiratory) Moderate Gram positive cocci            MOST RECENT IMAGING  X-Ray Chest AP Portable  HISTORY: SHORTNESS OF BREATH    FINDINGS: Portable chest radiograph at 1101 hours compared to 01/07/2023 shows interval removal of the ET and NG tubes, and transvenous pacing lead via IVC approach. The cardiac silhouette and pulmonary vasculature are stable and within normal limits, with aortic vascular calcifications.    The lungs are normally expanded, with left basilar opacities suggesting subsegmental atelectasis. There is no consolidation, large pleural effusion, evidence of pulmonary edema, or pneumothorax.    IMPRESSION: Interval removal of support devices as described, with left basilar opacities suggesting atelectasis.    Electronically signed by:  Hema Tafoya MD  1/9/2023 12:04 PM CST Workstation: [id number]      ECHOCARDIOGRAM RESULTS (last 5)  No results found for this or any previous visit.      CURRENT/PREVIOUS VISIT EKG  Results for orders placed or performed during the hospital encounter of 01/01/23   EKG 12-LEAD on arrival to floor    Collection Time: 01/01/23  9:59 AM    Narrative    Test Reason :  I21.3,    Vent. Rate : 070 BPM     Atrial Rate : 000 BPM     P-R Int : 000 ms          QRS Dur : 078 ms      QT Int : 450 ms       P-R-T Axes : 000 027 -68 degrees     QTc Int : 486 ms    Atrial fibrillation with occasional ventricular-paced complexes and with  premature ventricular or aberrantly conducted complexes  Inferior infarct ,age undetermined  Abnormal ECG  When compared with ECG of 01-JAN-2023 09:57,  Previous ECG has undetermined rhythm, needs review  Confirmed by Sarthak Cardona MD ([id number]) on 1/4/2023 6:57:56 PM    Referred By: IRENE MOSER           Confirmed By:Sarthak Cardona MD     Tele: NSVT 6-7 beats.       ASSESSMENT/PLAN:     Active Hospital Problems    Diagnosis    *STEMI involving right coronary artery    Pneumonia of right lung due to methicillin susceptible Staphylococcus aureus (MSSA)    Shock liver    Cardiogenic shock    Cardiac arrest    Acute respiratory failure with hypoxia and hypercarbia    Leukocytosis    Anemia    History of tobacco abuse    Primary hypertension    PVD (peripheral vascular disease) with claudication    PAD (peripheral artery disease)       ASSESSMENT & PLAN:   STEMI- inferior wall MI complicated by complete heart block and RV shock  Cardiac arrest status post resuscitation and intubation (patient was resuscitated for 60-70 minutes in total)  Peripheral vascular disease  Chronic smoking  Hypertension  hyperlipidemia      RECOMMENDATIONS:    Continue DAPT  Lipitor 20mg daily.   Losartan 25mg added today.   Continue amlodipine carvedilol and hydrochlorothiazide  Monitor blood pressure  Continue ICU care  Physical therapy    Mitra Moore NP  Department of Cardiology  [facility]  01/10/2023    I have personally interviewed and examined the patient, I have reviewed the Nurse Practitioner's history and physical, assessment, and plan. I have personally evaluated the patient at bedside and agree with the findings and made appropriate changes as necessary in  recommendations.      Stefany Elizondo MD  Department of Cardiology  [facility]  01/10/2023

## 2023-01-10 NOTE — PLAN OF CARE
Plan of care discussed with patient and spouse at bedside.  All questions answered and verbalization received of understanding.  Problem: Infection  Goal: Absence of Infection Signs and Symptoms  Outcome: Ongoing, Progressing     Problem: Adult Inpatient Plan of Care  Goal: Plan of Care Review  Outcome: Ongoing, Progressing  Goal: Patient-Specific Goal (Individualized)  Outcome: Ongoing, Progressing  Goal: Absence of Hospital-Acquired Illness or Injury  Outcome: Ongoing, Progressing  Goal: Optimal Comfort and Wellbeing  Outcome: Ongoing, Progressing  Goal: Readiness for Transition of Care  Outcome: Ongoing, Progressing     Problem: Noninvasive Ventilation Acute  Goal: Effective Unassisted Ventilation and Oxygenation  Outcome: Ongoing, Progressing     Problem: Skin Injury Risk Increased  Goal: Skin Health and Integrity  Outcome: Ongoing, Progressing     Problem: Fall Injury Risk  Goal: Absence of Fall and Fall-Related Injury  Outcome: Ongoing, Progressing

## 2023-01-10 NOTE — PLAN OF CARE
01/10/23 1241   Post-Acute Status   Post-Acute Authorization Placement   Post-Acute Placement Status Pending state direction/certification     MD, PT and OT recommending inpt rehab. Working on SNF in case rehab not authorized. LOCET called in; [facility] faxed; awaiting 142.

## 2023-01-10 NOTE — NURSING
Patient is resting quietly, respirations even and non labored. No needs or complaints at this time. All lab specimens collected. Report given to next shift RN.

## 2023-01-10 NOTE — PROGRESS NOTES
[facility]  Adult Nutrition   Progress Note (Initial Assessment)     SUMMARY      Recommendations:   1. Continue current pureed diet, encourage intake and assist prn.  2.  continue to obtain menu preferences daily.     Goals:   Patient to meet 75% or more of his EEN/EPN and not aspirate.will tolerate TF at goal rate    Dietitian Rounds Brief  F/U Nutrition Note: Spoke with pts nurse since pt has been placed on a Pureed diet after having been fed enterally for a few days. She said he did really well and at 75% which is good. His LBM was on 1/5/2023 per records which is probably correct since pt has been intubate. He does have miralax and docusate-senna ordered prn which I alerted his nurse to. Will continue to follow prn.    Diet order: Pureed       % Intake of Estimated Energy Needs: 75 - 100 %  % Meal Intake: 75 - 100 %    Estimated/Assessed Needs  Weight Used For Calorie Calculations: 103 kg (227 lb 1.2 oz)  Energy Calorie Requirements (kcal): 5040-2340 (20-25)  Energy Need Method: Kcal/kg  Protein Requirements: 124-155gm (1.2-1.5gm/kg)  Weight Used For Protein Calculations: 103 kg (227 lb 1.2 oz)     Estimated Fluid Requirement Method: RDA Method  RDA Method (mL): 2060       Weight History:  Wt Readings from Last 5 Encounters:   01/07/23 115.4 kg (254 lb 6.6 oz)   01/01/23 99.8 kg (220 lb)   09/09/22 99.8 kg (220 lb)   05/06/22 95.7 kg (211 lb)   01/31/22 93.4 kg (206 lb)        Reason for Assessment  Reason For Assessment: RD follow-up  Diagnosis:  (STEMI involving right coronary artery)  Relevant Medical History: peripheral vascular disease, essential hypertension, hyperlipidemia and ongoing tobacco use    Medications:Pertinent Medications Reviewed  Scheduled Meds:   amLODIPine  10 mg Oral Daily    aspirin  81 mg Oral Daily    atorvastatin  20 mg Oral QHS    carvediloL  25 mg Oral BID WM    chlorhexidine  15 mL Mouth/Throat BID    enoxparin  40 mg Subcutaneous Q24H    famotidine  20 mg Per  OG tube BID    hydroCHLOROthiazide  12.5 mg Oral Daily    levalbuterol  0.63 mg Nebulization TID WAKE    losartan  25 mg Oral Daily    nicotine  1 patch Transdermal Daily    ticagrelor  90 mg Oral BID     Continuous Infusions:   DOBUTamine Stopped (01/01/23 0945)    midazolam Stopped (01/07/23 1830)    nicardipine Stopped (01/09/23 0432)     PRN Meds:.acetaminophen, albuterol-ipratropium, atropine, calcium gluconate IVPB, calcium gluconate IVPB, calcium gluconate IVPB, dextrose 10%, dextrose 10%, DOBUTamine, EPINEPHrine, glucagon (human recombinant), glucose, glucose, hydrALAZINE, magnesium sulfate IVPB, magnesium sulfate IVPB, melatonin, midazolam, morphine, naloxone, ondansetron, oxyCODONE-acetaminophen, polyethylene glycol, potassium chloride in water **AND** potassium chloride in water **AND** potassium chloride in water, senna-docusate 8.6-50 mg, simethicone, sodium chloride 0.9%, sodium phosphate IVPB, sodium phosphate IVPB, sodium phosphate IVPB    Labs: Pertinent Labs Reviewed  Clinical Chemistry:  Recent Labs   Lab 01/10/23  0338      K 3.6      CO2 27   GLU 88   BUN 47*   CREATININE 0.9   CALCIUM 8.1*   PROT 5.9*   ALBUMIN 2.3*   BILITOT 0.8   ALKPHOS 79   AST 32   ALT 51*   ANIONGAP 6*   MG 2.1     CBC:   Recent Labs   Lab 01/10/23  0338   WBC 11.23   RBC 3.63*   HGB 11.2*   HCT 33.7*      MCV 93   MCH 30.9   MCHC 33.2     Monitor and Evaluation  Food and Nutrient Intake: enteral nutrition intake  Food and Nutrient Adminstration: enteral and parenteral nutrition administration  Anthropometric Measurements: weight change  Biochemical Data, Medical Tests and Procedures: electrolyte and renal panel, gastrointestinal profile, glucose/endocrine profile  Nutrition-Focused Physical Findings: overall appearance     Nutrition Risk  Level of Risk/Frequency of Follow-up: high     Nutrition Follow-Up  RD Follow-up?: Yes    Tova Romano, TIESHA 01/10/2023 3:01 PM

## 2023-01-10 NOTE — PLAN OF CARE
01/10/23 1641   Discharge Plan   Discharge Plan A Rehab   Discharge Plan B Skilled Nursing Facility     Currently awaiting acceptance with In Rehab.

## 2023-01-10 NOTE — PLAN OF CARE
Problem: Physical Therapy  Goal: Physical Therapy Goal  Description: Goals to be met by: 2023     Patient will increase functional independence with mobility by performin. Supine to sit with MInimal Assistance  2. Sit to stand transfer with Moderate Assistance  3. Bed to chair with Mod A  w/ol AD  3. Gait  x 10  feet with Minimal Assistance using Rolling Walker.     Outcome: Ongoing, Progressing

## 2023-01-10 NOTE — PT/OT/SLP PROGRESS
"Speech Language Pathology Treatment    Patient Name:  Peyman Gr   MRN:  [id number]  Admitting Diagnosis: STEMI involving right coronary artery    Recommendations:                 General Recommendations:  Dysphagia therapy and Cognitive-linguistic evaluation  Diet recommendations:  Puree Diet - IDDSI Level 4, Liquid Diet Level: Thin   Aspiration Precautions: 1 bite/sip at a time, Alternating bites/sips, Assistance with meals, Frequent oral care, HOB to 90 degrees, Meds whole 1 at a time, Small bites/sips, and Wear oxygen during intake   General Precautions: Standard, aspiration, respiratory  Communication strategies:  none    Subjective     Pt awake, alert, oriented x4. Sitting upright in bed; agreeable to PO trials. Increased alertness and endurance today.  Patient goals: "I want some eggs and grits."     Pain/Comfort:       Respiratory Status: High flow, flow 7 L/min, concentration  %    Objective:     Has the patient been evaluated by SLP for swallowing?   Yes  Keep patient NPO? No   Current Respiratory Status:        Pt observed during SLP regulated PO trials of thin water via straw sips, tsp bites pudding, and tsp bites diced peaches in thin juice. Pt w/ adequate oral acceptance and labial closure around utensils. Pt continues w/ mild oral residue after puree and peaches; cleared w/ liquid wash. Continued oral respiration and inadequate labial seal during mastication of peaches; coughing during 1/2 trials of single diced peach. Prolonged mastication and poor bolus control noted w/ peach trials. No overt s/s aspiration w/ puree and thin liquid trials.    Assessment:     Peyman Gr is a 67 y.o. male with an SLP diagnosis of oropharyngeal Dysphagia.  He presents with improved bolus control w/ purees and thin liquids. Increased alertness and endurance noted, improving safety and readiness for diet initiation today. Rec IDDSI level 4-puree diet w/ thin liquids and meds whole 1 at a time; aspiration precautions " listed above should be implemented. ST to continue f/u re diet tolerance, use of swallowing precautions, and possible diet upgrade.    Goals:   Multidisciplinary Problems       SLP Goals          Problem: SLP    Goal Priority Disciplines Outcome   SLP Goal     SLP Ongoing, Progressing   Description: 1. Pt will tolerate PO trials of puree and thin liquid w/o overt s/s aspiration and w/ adequate oral clearance during >90% of trials -MET  2. Pt will participate in ongoing diagnostic dysphagia tx in order to determine LRD  3. Pt will tolerate pureed diet w/ thin liquids w/o overt s/s aspiration and w/ adequate oral clearance w/ >90% of PO intake                       Plan:     Patient to be seen:  5 x/week   Plan of Care expires:     Plan of Care reviewed with:  patient, other (see comments) (nursing, MD)   SLP Follow-Up:  Yes       Discharge recommendations:      Barriers to Discharge:   TBD    Time Tracking:     SLP Treatment Date:   01/10/23  Speech Start Time:  0914  Speech Stop Time:  0937     Speech Total Time (min):  23 min    Billable Minutes: Treatment Swallowing Dysfunction 23 min    01/10/2023

## 2023-01-10 NOTE — NURSING
Patient assessment completed at this time. No needs or complaints. Patient voiced understanding of room and call light. Will continue to monitor closely. Family at bedside.

## 2023-01-10 NOTE — PROGRESS NOTES
[facility]  Department of Cardiology  Progress Note      PATIENT NAME: Peyman Gr    MRN: [id number]  TODAY'S DATE: 01/09/2023  ADMIT DATE: 1/1/2023                          CONSULT REQUESTED BY: Claudio Hoyos MD    SUBJECTIVE     PRINCIPAL PROBLEM: STEMI involving right coronary artery    Interval history:  1/9/23  Seen and examined patient in ICU today. Pt is extubated. Awake alert. Denies any chest pain.     1/7/23  Patient was extubated today around noon.  He is on BiPAP arousable and responsive to commands.  Cardene drip has been started due to hypertension.  Nurse reports patient moves all extremities with right-sided weakness noted.      1/6/23:  Patient lying in bed intubated and sedated. Vitals are stable.     1/5/23:  Patient seen lying in bed off sedation. Patient noted to be moving and appeared agitated. Neuro at bedside for exam. He is hypertensive and tachycardic without sedation.     1/4/23:  Patient seen lying in bed on sedation this morning.  No distress noted.  Vitals were stable at that time.  Of note the patient has become hypertensive and tachycardic this afternoon.  Patient is currently receiving full sedation due to jerking and sweating.    01/03/2023:    Patient remains intubated and sedated on mechanical ventilation. FIO2 is 60% with 5 PEEP. RN attempted to wean sedation but patient keeps triggering the vent.  He has cough and pupillary reflex.  RN states she has seen him move all 4 extremities but not to command.  He remains unresponsive. He is on Levophed, propofol, fentanyl, and vasopressin. He was intermittently paced for approximately 5 min this morning when he was coughing and gagging on the vent. He remains in critical condition.  UOP is 275 today.       01/02/2023:  Seen and examined patient in the ICU today.  Off bicarb drip.  Currently on 2 sedatives and Levophed.     REASON FOR CONSULT:  STEMI      HPI:  67-year-old male with past medical history of peripheral  vascular disease, right subclavian stenosis, chronic smoker, hypertension, hyperlipidemia presented to oxygen [facility] with 2 hour history of chest pain, shortness of breath.  ECG showed junctional bradycardia, complete heart block with inferior and anterolateral ST elevations.  STEMI code was called to which I responded immediately and agreed to transfer the patient to [facility] cath lab for emergent cardiac catheterization.  However, patient unfortunately went into cardiac arrest at [facility] with VT, VF, asystole requiring resuscitation and intubation.  Patient was resuscitated for about 40 minutes ROSC was achieved.  Patient was moving extremities at the time as per the ER.  Patient was then transferred to [facility] ER and patient again had cardiac arrest upon arrival to the ER which required resuscitation for 30 minutes.  Patient was in PEA initially and ROSC was achieved subsequently however patient was dependent on transcutaneous pacing.  Patient blood pressure was very low and could not be obtained with a BP cuff.  Patient was unable to be transferred to the cath lab at that point due to significant hemodynamic instability. Patient was started on pressors.  A-line was placed in the ED and blood pressure eventually improved with high doses of vasopressin, norepinephrine, sodium bicarbonate and dobutamine infusions.  Discussed with patient's family members regarding the patient's critical condition.  Once the blood pressure was stabilized on the pressors, patient was brought to the cath lab for transvenous pacemaker placement and coronary angiogram.  Benefits and risks of the procedure explained to patient's family and patient's family  agreed for the procedure.      Review of patient's allergies indicates:  No Known Allergies    Past Medical History:   Diagnosis Date    Arthritis     Chronic back pain     Chronic neck pain     Dry gangrene      RIGHT LITTLE TOE    Hypercholesteremia     Hypertension     Insomnia     Multiple falls     S/P BACK SURGERY- 1 FALL    PAD (peripheral artery disease)     Personal history of colonic polyps     PVD (peripheral vascular disease)      Past Surgical History:   Procedure Laterality Date    ANGIOGRAPHY OF LOWER EXTREMITY N/A 09/24/2020    Procedure: Angiogram Extremity Unilateral;  Surgeon: Luke Cabello MD;  Location: [facility] CATH;  Service: Cardiology;  Laterality: N/A;    BACK SURGERY      BUNIONECTOMY Bilateral     CARPAL TUNNEL RELEASE Bilateral     CHOLECYSTECTOMY      COLONOSCOPY N/A 5/9/2022    Procedure: COLONOSCOPY;  Surgeon: Braydon Durand MD;  Location: [facility] ENDO;  Service: Endoscopy;  Laterality: N/A;    COLONOSCOPY W/ POLYPECTOMY      CORONARY ANGIOGRAPHY N/A 1/1/2023    Procedure: ANGIOGRAM, CORONARY ARTERY;  Surgeon: Stefany Elizondo MD;  Location: [facility] CATH/EP LAB;  Service: Cardiology;  Laterality: N/A;    CORONARY ARTERY BYPASS GRAFT      CREATION OF BYPASS FROM INTERNAL CAROTID ARTERY TO SUBCLAVIAN ARTERY Right 12/27/2021    Procedure: CREATION, BYPASS, ARTERIAL, INTERNAL CAROTID TO SUBCLAVIAN;  Surgeon: Jeovany Goins MD;  Location: [facility] OR;  Service: Vascular;  Laterality: Right;    ELBOW ARTHROPLASTY Right     ELBOW SURGERY      INSERTION, PACEMAKER, TEMPORARY TRANSVENOUS  1/1/2023    Procedure: Insertion, Pacemaker, Temporary Transvenous;  Surgeon: Stefany Elizondo MD;  Location: [facility] CATH/EP LAB;  Service: Cardiology;;    IVUS, CORONARY  1/1/2023    Procedure: IVUS, Coronary;  Surgeon: Stefany Elizondo MD;  Location: [facility] CATH/EP LAB;  Service: Cardiology;;    LEFT HEART CATHETERIZATION Left 1/1/2023    Procedure: Left heart cath;  Surgeon: Stefany Elizondo MD;  Location: [facility] CATH/EP LAB;  Service: Cardiology;  Laterality: Left;    MINIMALLY INVASIVE FORAMINOTOMY OF  SPINE N/A 11/15/2018    Procedure: FORAMINOTOMY, SPINE, MINIMALLY INVASIVE  DECOMPRESSION L4-5;  Surgeon: Iván Stevenson Jr., MD;  Location: [facility] OR;  Service: Orthopedics;  Laterality: N/A;    NECK SURGERY      acf    PERCUTANEOUS CORONARY INTERVENTION, ARTERY N/A 2023    Procedure: Percutaneous coronary intervention;  Surgeon: Stefany Elizondo MD;  Location: [facility] CATH/EP LAB;  Service: Cardiology;  Laterality: N/A;    PERCUTANEOUS TRANSLUMINAL ANGIOPLASTY (PTA) OF PERIPHERAL VESSEL Right 2020    Procedure: PTA, PERIPHERAL VESSEL of right lower extremity;  Surgeon: Luke Cabello MD;  Location: [facility] CATH;  Service: Cardiology;  Laterality: Right;    PERCUTANEOUS TRANSLUMINAL ANGIOPLASTY (PTA) OF PERIPHERAL VESSEL Left 09/10/2020    Procedure: PTA, PERIPHERAL VESSEL, Left lower extremity;  Surgeon: Luke Cabello MD;  Location: [facility] CATH;  Service: Cardiology;  Laterality: Left;    PERCUTANEOUS TRANSLUMINAL ANGIOPLASTY (PTA) OF PERIPHERAL VESSEL Left 2021    Profunda and SFA     Social History     Tobacco Use    Smoking status: Former     Packs/day: 2.00     Years: 60.00     Pack years: 120.00     Types: Cigarettes     Quit date:      Years since quittin.0    Smokeless tobacco: Never   Substance Use Topics    Alcohol use: No    Drug use: No        REVIEW OF SYSTEMS  CV:  Denies chest pain    OBJECTIVE     VITAL SIGNS (Most Recent)  Temp: 97.9 °F (36.6 °C) (23 150)  Pulse: 79 (23 170)  Resp: (!) 26 (23)  BP: (!) 179/85 (23)  SpO2: 95 % (23)    VENTILATION STATUS  Resp: (!) 26 (23)  SpO2: 95 % (23)  Oxygen Concentration (%):  [45-50] 45    I & O (Last 24H):  Intake/Output Summary (Last 24 hours) at 2023 181  Last data filed at 2023 1721  Gross per 24 hour   Intake 1800.09 ml   Output 2750 ml   Net -949.91 ml       WEIGHTS  Wt Readings from Last 1 Encounters:   23 0400 115.4 kg (254 lb 6.6 oz)   23 0400 117.6 kg (259 lb 4.2 oz)   23 0500 115.3 kg (254 lb 3.1  oz)   01/03/23 0615 105.4 kg (232 lb 5.8 oz)   01/02/23 0400 102.5 kg (225 lb 15.5 oz)   01/02/23 0053 102.5 kg (225 lb 15.5 oz)   01/01/23 0800 99.8 kg (220 lb 0.3 oz)   01/01/23 0341 99.8 kg (220 lb)       PHYSICAL EXAM  GENERAL: well built, well nourished, well-developed.   NECK: No JVD.   CARDIAC: Regular rate and rhythm, S1, S2 regular;   CHEST ANATOMY: normal.   LUNGS: CTA  ABDOMEN: Soft.  Normal bowel sounds.   EXTREMITIES: 1-2+ pitting edema upper extremities.   SKIN: Skin without lesions, moist, well perfused.   CNS: awake, alert. Follows commands.     HOME MEDICATIONS:  Current Facility-Administered Medications on File Prior to Encounter   Medication Dose Route Frequency Provider Last Rate Last Admin    0.9%  NaCl infusion   Intravenous Continuous Braydon Durand MD   Stopped at 05/09/22 1011     Current Outpatient Medications on File Prior to Encounter   Medication Sig Dispense Refill    albuterol (PROVENTIL/VENTOLIN HFA) 90 mcg/actuation inhaler Inhale 1-2 puffs into the lungs every 6 (six) hours as needed for Shortness of Breath. Rescue 8 g 0    amLODIPine (NORVASC) 10 MG tablet Take 1 tablet (10 mg total) by mouth once daily. 30 tablet 1    amLODIPine (NORVASC) 5 MG tablet Take 5 mg by mouth once daily.      aspirin 81 MG Chew Take 81 mg by mouth once daily.      carvediloL (COREG) 25 MG tablet Take 25 mg by mouth 2 (two) times daily with meals.      clopidogreL (PLAVIX) 75 mg tablet Take 75 mg by mouth once daily. On hold for sx on 12/27/21      ezetimibe (ZETIA) 10 mg tablet Take 10 mg by mouth once daily.      hydroCHLOROthiazide (HYDRODIURIL) 12.5 MG Tab Take 1 tablet (12.5 mg total) by mouth once daily. 30 tablet 1    HYDROcodone-acetaminophen (NORCO)  mg per tablet Take 1 tablet by mouth every 8 (eight) hours as needed for Pain.      mupirocin (BACTROBAN) 2 % ointment Apply topically 3 (three) times daily. 22 g 0    oxyCODONE-acetaminophen (PERCOCET) 7.5-325 mg per tablet Take 1 tablet  by mouth 2 (two) times daily as needed.      traMADoL (ULTRAM) 50 mg tablet Take 50 mg by mouth every 6 (six) hours as needed.      valsartan (DIOVAN) 160 MG tablet Take 160 mg by mouth once daily.         SCHEDULED MEDS:   amLODIPine  10 mg Oral Daily    aspirin  81 mg Oral Daily    carvediloL  25 mg Oral BID WM    chlorhexidine  15 mL Mouth/Throat BID    enoxparin  40 mg Subcutaneous Q24H    famotidine  20 mg Per OG tube BID    hydroCHLOROthiazide  12.5 mg Oral Daily    levalbuterol  0.63 mg Nebulization TID WAKE    nicotine  1 patch Transdermal Daily    ticagrelor  90 mg Oral BID       CONTINUOUS INFUSIONS:   DOBUTamine Stopped (01/01/23 0945)    midazolam Stopped (01/07/23 1830)    nicardipine Stopped (01/09/23 0432)       PRN MEDS:acetaminophen, albuterol-ipratropium, atropine, calcium gluconate IVPB, calcium gluconate IVPB, calcium gluconate IVPB, dextrose 10%, dextrose 10%, DOBUTamine, EPINEPHrine, glucagon (human recombinant), glucose, glucose, hydrALAZINE, magnesium sulfate IVPB, magnesium sulfate IVPB, melatonin, midazolam, morphine, naloxone, ondansetron, oxyCODONE-acetaminophen, polyethylene glycol, potassium chloride in water **AND** potassium chloride in water **AND** potassium chloride in water, senna-docusate 8.6-50 mg, simethicone, sodium chloride 0.9%, sodium phosphate IVPB, sodium phosphate IVPB, sodium phosphate IVPB    LABS AND DIAGNOSTICS     CBC LAST 3 DAYS  Recent Labs   Lab 01/07/23  0345 01/07/23  0358 01/08/23  0346 01/08/23  0513 01/09/23  0310   WBC 11.79  --  13.16*  --  11.66   RBC 3.27*  --  3.89*  --  4.15*   HGB 10.3*  --  12.0*  --  12.8*   HCT 30.6*   < > 35.8* 36 37.6*   MCV 94  --  92  --  91   MCH 31.5*  --  30.8  --  30.8   MCHC 33.7  --  33.5  --  34.0   RDW 14.6*  --  14.4  --  14.6*     --  245  --  261   MPV 9.5  --  9.2  --  9.3   GRAN 81.5*  9.6*  --  83.0*  --  77.0*   LYMPH 5.3*  0.6*  --  7.0*  CANCELED  --  9.0*  CANCELED   MONO 8.1  1.0  --  5.0   CANCELED  --  7.0  CANCELED   BASO 0.02  --  CANCELED  --  CANCELED   NRBC 0  --  0  --  0    < > = values in this interval not displayed.       COAGULATION LAST 3 DAYS  No results for input(s): LABPT, INR, APTT in the last 168 hours.    CHEMISTRY LAST 3 DAYS  Recent Labs   Lab 01/07/23  0345 01/07/23  0358 01/08/23  0346 01/08/23  0513 01/08/23  1619 01/09/23  0310 01/09/23  1102     --  141  --   --  140  --    K 4.6  --  4.0  --   --  3.7  --      --  110  --   --  107  --    CO2 26  --  25  --   --  24  --    ANIONGAP 5*  --  6*  --   --  9  --    BUN 48*  --  47*  --   --  44*  --    CREATININE 0.8  --  0.9  --   --  0.7  --    *  --  109  --   --  98  --    CALCIUM 8.1*  --  8.3*  --   --  8.2*  --    PH  --    < >  --  7.455* 7.511*  --  7.541*   MG 2.4  --  2.3  --   --  2.2  --    ALBUMIN 2.3*  --  2.4*  --   --  2.4*  --    PROT 5.7*  --  6.1  --   --  6.2  --    ALKPHOS 65  --  68  --   --  80  --    ALT 60*  --  50*  --   --  52*  --    AST 28  --  24  --   --  27  --    BILITOT 0.5  --  0.4  --   --  0.9  --     < > = values in this interval not displayed.       CARDIAC PROFILE LAST 3 DAYS  No results for input(s): BNP, CPK, CPKMB, LDH, TROPONINI, TROPONINIHS in the last 168 hours.    ENDOCRINE LAST 3 DAYS  Recent Labs   Lab 01/03/23  0954   PROCAL 3.13*       LAST ARTERIAL BLOOD GAS  ABG  Recent Labs   Lab 01/09/23  1102   PH 7.541*   PO2 59*   PCO2 29.7*   HCO3 25.5   BE 3       LAST 7 DAYS MICROBIOLOGY   Microbiology Results (last 7 days)       Procedure Component Value Units Date/Time    Blood culture [[id number]] Collected: 01/03/23 0954    Order Status: Completed Specimen: Blood Updated: 01/08/23 1032     Blood Culture, Routine No growth after 5 days.    Blood culture [[id number]] Collected: 01/03/23 0954    Order Status: Completed Specimen: Blood Updated: 01/08/23 1032     Blood Culture, Routine No growth after 5 days.    Culture, Respiratory with Gram Stain [[id number]]   (Abnormal)  (Susceptibility) Collected: 01/02/23 1431    Order Status: Completed Specimen: Respiratory from Tracheal Aspirate Updated: 01/04/23 0856     Respiratory Culture Reduced normal respiratory lorenzo      STAPHYLOCOCCUS AUREUS  Moderate       Gram Stain (Respiratory) <10 epithelial cells per low power field.     Gram Stain (Respiratory) Few WBC's     Gram Stain (Respiratory) Moderate Gram positive cocci    MRSA Screen by PCR [[id number]] Collected: 01/03/23 1412    Order Status: Completed Specimen: Nasopharyngeal Swab from Nasal Updated: 01/03/23 1554     MRSA SCREEN BY PCR Negative            MOST RECENT IMAGING  X-Ray Chest AP Portable  HISTORY: SHORTNESS OF BREATH    FINDINGS: Portable chest radiograph at 1101 hours compared to 01/07/2023 shows interval removal of the ET and NG tubes, and transvenous pacing lead via IVC approach. The cardiac silhouette and pulmonary vasculature are stable and within normal limits, with aortic vascular calcifications.    The lungs are normally expanded, with left basilar opacities suggesting subsegmental atelectasis. There is no consolidation, large pleural effusion, evidence of pulmonary edema, or pneumothorax.    IMPRESSION: Interval removal of support devices as described, with left basilar opacities suggesting atelectasis.    Electronically signed by:  Hema Tafoya MD  1/9/2023 12:04 PM CST Workstation: [id number]      ECHOCARDIOGRAM RESULTS (last 5)  No results found for this or any previous visit.      CURRENT/PREVIOUS VISIT EKG  Results for orders placed or performed during the hospital encounter of 01/01/23   EKG 12-LEAD on arrival to floor    Collection Time: 01/01/23  9:59 AM    Narrative    Test Reason : I21.3,    Vent. Rate : 070 BPM     Atrial Rate : 000 BPM     P-R Int : 000 ms          QRS Dur : 078 ms      QT Int : 450 ms       P-R-T Axes : 000 027 -68 degrees     QTc Int : 486 ms    Atrial fibrillation with occasional ventricular-paced complexes and  with  premature ventricular or aberrantly conducted complexes  Inferior infarct ,age undetermined  Abnormal ECG  When compared with ECG of 01-JAN-2023 09:57,  Previous ECG has undetermined rhythm, needs review  Confirmed by Sarthak Cardona MD ([id number]) on 1/4/2023 6:57:56 PM    Referred By: IRENE MOSER           Confirmed By:Sarthak Cardona MD     Tele: NSVT 6-7 beats.       ASSESSMENT/PLAN:     Active Hospital Problems    Diagnosis    *STEMI involving right coronary artery    Pneumonia of right lung due to methicillin susceptible Staphylococcus aureus (MSSA)    Shock liver    Cardiogenic shock    Cardiac arrest    Acute respiratory failure with hypoxia and hypercarbia    Leukocytosis    Anemia    History of tobacco abuse    Primary hypertension    PVD (peripheral vascular disease) with claudication    PAD (peripheral artery disease)       ASSESSMENT & PLAN:   STEMI- inferior wall MI complicated by complete heart block and RV shock  Cardiac arrest status post resuscitation and intubation (patient was resuscitated for 60-70 minutes in total)  Peripheral vascular disease  Chronic smoking  Hypertension  hyperlipidemia      RECOMMENDATIONS:  Status post extubation  Continue DAPT  We will resume statin tomorrow  Continue amlodipine carvedilol and hydrochlorothiazide  Monitor blood pressure  Continue ICU care  Physical therapy        Stefany Elizondo MD  Department of Cardiology  [facility]  01/09/2023

## 2023-01-10 NOTE — PROGRESS NOTES
Pulmonary/Critical Care  Progress Note      Patient name: Peyman Gr  MRN: [id number]  Date: 01/10/2023    Admit Date: 1/1/2023  Consult Requested By: Ligia Fernandez MD    Reason for Consult: respiratory failure    HPI:    1/1/2023 - 68 yo initially presented with chest pain about  2 AM, had bradycardia then VTVF arrest and had prolonged code - transferred to [facility] and was in cardiac arrest at arrival after multiple rounds of meds he had ROSC and taken to cath lab.  In Cath lab had occluded RCA and had successful PCI.  ALso has LM and OM disease.  He has been very unstable and moved to ICU.  He is unresponsive and cool.  He is ventilated.  On dobutrex and levophed, having some ectopy (contacting cardiology to see if they want to start lidocaine or amiodarone, QTc is 487), he is on bicarb drip and last ABG showed adequate oxygenation but a mixed metabolic and respiratory acidosis (vent adjused and will repeat).  No family was in the waiting room.  Chart has been reviewed and case d/w nursing, respiratory and Dr Hoyos.  BY report pt was resuscitated for > 60 minutes.    1/2/2023 - Remains critically ill, has been agitated at times and has needed increased sedation, he has not followed any commands.  Rhythm seems more stable but he has had ectopy.  Temp had increased from his hypothermia yesterday.  Electrolytes replaced by SS.  LFT have increased.  ABG this AM shows alkalosis (on bicarb drip).  CXR reviewed.  ECHO noted.    1/3/2023 - Remains critically ill, gets agitated at times and has required sedation.  He does not respond to voice or pain but does have spontaneous respirations and movement.  Pupils are sluggish to NR, minimal corneal reflex.  Had temp this AM.  CXR noted.    1/4/2023 - Had issues yesterday - HTN, bradycardia, a ? Of seizure activity and I was not notified of any of this.  Overnight things have been more stable.  He remains unresponsive.  Temp has decreased, renal function good, LFT better.   I tried to call family yesterday but got no answer.  Family has been at bedside per nursing.  SC + for [facility]    1/5/2023 - Remains critically ill and is not responsive to me.  When sedation decreased he does have some movement (not purposeful).  EEG report reviewed and neuro note seen.  Neuro issues preclude any thoughts of weaning ventilator support  Tachycardic today.  O2 needs have increased.    1/6/23: Off sedation for an hour today and moved spontaneously, shaking head around, but did not appear to follow commands. Oxygen requirements going down.    1/7/23: Off sedation for >30 minutes, he is moving his head spontaneously and may attend to his name, but hard to tell. Does not follow commands. He is respiring well on PS 5/5. Will keep on pressure support and Precedex with fentanyl boluses for now.    1/8/23: Doing well off sedation, following commands, intact respiratory drive, ventilating well. Extubating now.    1/9 The patient is mumbly mouthed but conversing.  He states he is in the patient is mumbling mouth but interacting well. He states he is in 6 of 10 pain with his back at this time.    1/10/2023 - Stable extubated and in no distress, is answering questions but is confused over the events of the last few days.  No new respiratory issues reported.    Review of Systems    Review of Systems   Unable to perform ROS: Mental acuity     Past Medical History    Past Medical History:   Diagnosis Date    Arthritis     Chronic back pain     Chronic neck pain     Dry gangrene     RIGHT LITTLE TOE    Hypercholesteremia     Hypertension     Insomnia     Multiple falls     S/P BACK SURGERY- 1 FALL    PAD (peripheral artery disease)     Personal history of colonic polyps     PVD (peripheral vascular disease)        Past Surgical History    Past Surgical History:   Procedure Laterality Date    ANGIOGRAPHY OF LOWER EXTREMITY N/A 09/24/2020    Procedure: Angiogram Extremity Unilateral;  Surgeon: Luke Cabello MD;   Location: [facility] CATH;  Service: Cardiology;  Laterality: N/A;    BACK SURGERY      BUNIONECTOMY Bilateral     CARPAL TUNNEL RELEASE Bilateral     CHOLECYSTECTOMY      COLONOSCOPY N/A 5/9/2022    Procedure: COLONOSCOPY;  Surgeon: Braydon Durand MD;  Location: [facility] ENDO;  Service: Endoscopy;  Laterality: N/A;    COLONOSCOPY W/ POLYPECTOMY      CORONARY ANGIOGRAPHY N/A 1/1/2023    Procedure: ANGIOGRAM, CORONARY ARTERY;  Surgeon: Stefany Elizondo MD;  Location: [facility] CATH/EP LAB;  Service: Cardiology;  Laterality: N/A;    CORONARY ARTERY BYPASS GRAFT      CREATION OF BYPASS FROM INTERNAL CAROTID ARTERY TO SUBCLAVIAN ARTERY Right 12/27/2021    Procedure: CREATION, BYPASS, ARTERIAL, INTERNAL CAROTID TO SUBCLAVIAN;  Surgeon: Jeovany Goins MD;  Location: [facility] OR;  Service: Vascular;  Laterality: Right;    ELBOW ARTHROPLASTY Right     ELBOW SURGERY      INSERTION, PACEMAKER, TEMPORARY TRANSVENOUS  1/1/2023    Procedure: Insertion, Pacemaker, Temporary Transvenous;  Surgeon: Stefany Elizondo MD;  Location: [facility] CATH/EP LAB;  Service: Cardiology;;    IVUS, CORONARY  1/1/2023    Procedure: IVUS, Coronary;  Surgeon: Stefany Elizondo MD;  Location: [facility] CATH/EP LAB;  Service: Cardiology;;    LEFT HEART CATHETERIZATION Left 1/1/2023    Procedure: Left heart cath;  Surgeon: Stefany Elizondo MD;  Location: [facility] CATH/EP LAB;  Service: Cardiology;  Laterality: Left;    MINIMALLY INVASIVE FORAMINOTOMY OF  SPINE N/A 11/15/2018    Procedure: FORAMINOTOMY, SPINE, MINIMALLY INVASIVE DECOMPRESSION L4-5;  Surgeon: Iván Stevenson Jr., MD;  Location: [facility] OR;  Service: Orthopedics;  Laterality: N/A;    NECK SURGERY      acf    PERCUTANEOUS CORONARY INTERVENTION, ARTERY N/A 1/1/2023    Procedure: Percutaneous coronary intervention;  Surgeon: Stefany Elizondo MD;  Location: [facility] CATH/EP LAB;  Service: Cardiology;  Laterality: N/A;    PERCUTANEOUS TRANSLUMINAL ANGIOPLASTY (PTA) OF  PERIPHERAL VESSEL Right 05/01/2020    Procedure: PTA, PERIPHERAL VESSEL of right lower extremity;  Surgeon: Luke Cabello MD;  Location: [facility] CATH;  Service: Cardiology;  Laterality: Right;    PERCUTANEOUS TRANSLUMINAL ANGIOPLASTY (PTA) OF PERIPHERAL VESSEL Left 09/10/2020    Procedure: PTA, PERIPHERAL VESSEL, Left lower extremity;  Surgeon: Luke Cabello MD;  Location: [facility] CATH;  Service: Cardiology;  Laterality: Left;    PERCUTANEOUS TRANSLUMINAL ANGIOPLASTY (PTA) OF PERIPHERAL VESSEL Left 07/06/2021    Profunda and SFA       Medications (scheduled):      amLODIPine  10 mg Oral Daily    aspirin  81 mg Oral Daily    atorvastatin  20 mg Oral QHS    carvediloL  25 mg Oral BID WM    chlorhexidine  15 mL Mouth/Throat BID    enoxparin  40 mg Subcutaneous Q24H    famotidine  20 mg Per OG tube BID    hydroCHLOROthiazide  12.5 mg Oral Daily    levalbuterol  0.63 mg Nebulization TID WAKE    losartan  25 mg Oral Daily    nicotine  1 patch Transdermal Daily    ticagrelor  90 mg Oral BID       Medications (infusions):      DOBUTamine Stopped (01/01/23 0945)    midazolam Stopped (01/07/23 1830)    nicardipine Stopped (01/09/23 0432)       Medications (prn):     acetaminophen, albuterol-ipratropium, atropine, calcium gluconate IVPB, calcium gluconate IVPB, calcium gluconate IVPB, dextrose 10%, dextrose 10%, DOBUTamine, EPINEPHrine, glucagon (human recombinant), glucose, glucose, hydrALAZINE, magnesium sulfate IVPB, magnesium sulfate IVPB, melatonin, midazolam, morphine, naloxone, ondansetron, oxyCODONE-acetaminophen, polyethylene glycol, potassium chloride in water **AND** potassium chloride in water **AND** potassium chloride in water, senna-docusate 8.6-50 mg, simethicone, sodium chloride 0.9%, sodium phosphate IVPB, sodium phosphate IVPB, sodium phosphate IVPB    Family History:   Family History   Problem Relation Age of Onset    COPD Mother     Hypertension Father     Heart disease Father     Heart attack Father      "COPD Sister     Other Sister     Kidney failure Brother     Hypertension Brother     Diabetes Brother        Social History: Tobacco:   Social History     Tobacco Use   Smoking Status Former    Packs/day: 2.00    Years: 60.00    Pack years: 120.00    Types: Cigarettes    Quit date:     Years since quittin.0   Smokeless Tobacco Never                                EtOH:   Social History     Substance and Sexual Activity   Alcohol Use No                                Drugs:   Social History     Substance and Sexual Activity   Drug Use No       Physical Exam    Vital signs:  Temp:  [97.9 °F (36.6 °C)-99.5 °F (37.5 °C)]   Pulse:  [70-84]   Resp:  [16-35]   BP: (140-210)/()   SpO2:  [92 %-98 %]     Intake/Output:   Intake/Output Summary (Last 24 hours) at 1/10/2023 1619  Last data filed at 1/10/2023 0508  Gross per 24 hour   Intake 386.31 ml   Output 2200 ml   Net -1813.69 ml          BMI: Estimated body mass index is 32.65 kg/m² as calculated from the following:    Height as of an earlier encounter on 23: 6' 2.02" (1.88 m).    Weight as of this encounter: 115.4 kg (254 lb 6.6 oz).    PHYSICAL EXAM    GENERAL:   more responsive, following commands, some confusion  HEENT:  Pupils equal and reactive.  Mouth dry.  NECK: No cervical adenopathy palpated.  HEART: Regular rate and rhythm. No murmur or gallop auscultated.  LUNGS:  + rhonchi better cough effort. Lung excursion symmetrical.   ABDOMEN: Bowel sounds present. Soft, non-tender, non-distended.  EXTREMITIES: Normal muscle tone and joint movement, no cyanosis or clubbing.  Right arterial line is positional.  Right midline and peripheral  LYMPHATICS: No adenopathy palpated, no edema.  SKIN: Dry, intact, no lesions.   NEURO: following commands, speech is difficult to understand  Psych:  Calm    Laboratory    Recent Labs   Lab 01/10/23  0338   WBC 11.23   RBC 3.63*   HGB 11.2*   HCT 33.7*      MCV 93   MCH 30.9   MCHC 33.2         Recent Labs   Lab " 01/10/23  0338   CALCIUM 8.1*   PROT 5.9*      K 3.6   CO2 27      BUN 47*   CREATININE 0.9   ALKPHOS 79   ALT 51*   AST 32   BILITOT 0.8         Microbiology:       Microbiology Results (last 7 days)       Procedure Component Value Units Date/Time    Blood culture [[id number]] Collected: 01/03/23 0954    Order Status: Completed Specimen: Blood Updated: 01/08/23 1032     Blood Culture, Routine No growth after 5 days.    Blood culture [[id number]] Collected: 01/03/23 0954    Order Status: Completed Specimen: Blood Updated: 01/08/23 1032     Blood Culture, Routine No growth after 5 days.    Culture, Respiratory with Gram Stain [[id number]]  (Abnormal)  (Susceptibility) Collected: 01/02/23 1431    Order Status: Completed Specimen: Respiratory from Tracheal Aspirate Updated: 01/04/23 0856     Respiratory Culture Reduced normal respiratory lorenzo      STAPHYLOCOCCUS AUREUS  Moderate       Gram Stain (Respiratory) <10 epithelial cells per low power field.     Gram Stain (Respiratory) Few WBC's     Gram Stain (Respiratory) Moderate Gram positive cocci            Radiology    X-Ray Chest AP Portable  HISTORY: SHORTNESS OF BREATH    FINDINGS: Portable chest radiograph at 1101 hours compared to 01/07/2023 shows interval removal of the ET and NG tubes, and transvenous pacing lead via IVC approach. The cardiac silhouette and pulmonary vasculature are stable and within normal limits, with aortic vascular calcifications.    The lungs are normally expanded, with left basilar opacities suggesting subsegmental atelectasis. There is no consolidation, large pleural effusion, evidence of pulmonary edema, or pneumothorax.    IMPRESSION: Interval removal of support devices as described, with left basilar opacities suggesting atelectasis.    Electronically signed by:  Hema Tafoya MD  1/9/2023 12:04 PM CST Workstation: [id number]        Ventilator Information              Recent Labs     01/09/23  1102   PH 7.541*   PCO2 29.7*    PO2 59*   HCO3 25.5   POCSATURATED 93*   BE 3           Impression    Active Hospital Problems    Diagnosis  POA    *STEMI involving right coronary artery [I21.11]  Yes    Pneumonia of right lung due to methicillin susceptible Staphylococcus aureus (MSSA) [J15.211]  No    Shock liver [K72.00]  Yes    Cardiogenic shock [R57.0]  Yes    Cardiac arrest [I46.9]  Yes    Acute respiratory failure with hypoxia and hypercarbia [J96.01, J96.02]  Yes    Leukocytosis [D72.829]  Yes    Anemia [D64.9]  Yes    History of tobacco abuse [Z87.891]  Not Applicable    Primary hypertension [I10]  Yes    PVD (peripheral vascular disease) with claudication [I73.9]  Yes    PAD (peripheral artery disease) [I73.9]  Yes      Resolved Hospital Problems    Diagnosis Date Resolved POA    Hyponatremia [E87.1] 01/02/2023 Yes       Continue present meds  Home percocet restarted  PRN IV morphine 2 mg  Neuro following  Trend labs  Monitor electrolytes and replace  Discontinue steroids  Increase activities as able and will assess how he responds      Nick Padgett MD  [facility] Pulmonary/Critical Care  01/10/2023

## 2023-01-10 NOTE — CARE UPDATE
01/10/23 0709   Patient Assessment/Suction   Level of Consciousness (AVPU) alert   All Lung Fields Breath Sounds diminished;crackles, fine   Skin Integrity   $ Wound Care Tech Time 15 min   Area Observed Bridge of nose   Skin Appearance without discoloration   PRE-TX-O2   Device (Oxygen Therapy) high flow nasal cannula   $ Is the patient on Low Flow Oxygen? Yes   Flow (L/min) 7   SpO2 (!) 93 %   Pulse Oximetry Type Continuous   $ Pulse Oximetry - Multiple Charge Pulse Oximetry - Multiple   Pulse 74   Resp 16   Aerosol Therapy   $ Aerosol Therapy Charges Aerosol Treatment   Daily Review of Necessity (SVN) completed   Respiratory Treatment Status (SVN) given   Treatment Route (SVN) mask   Patient Position (SVN) semi-Doan's   Post Treatment Assessment (SVN) increased aeration   Signs of Intolerance (SVN) none   Breath Sounds Post-Respiratory Treatment   Throughout All Fields Post-Treatment aeration increased   Post-treatment Heart Rate (beats/min) 73   Post-treatment Resp Rate (breaths/min) 16   Preset CPAP/BiPAP Settings   $ CPAP/BiPAP Daily Charge BiPAP/CPAP Daily   Education   $ Education Bronchodilator;15 min   Respiratory Evaluation   $ Care Plan Tech Time 15 min

## 2023-01-10 NOTE — CARE UPDATE
Patient spouse reported that they do not have an option for rehab. Stated they are staying with their daughter in [place] right now and prefer a facility close to [place].    Referral sent to [facility] and [facility].

## 2023-01-10 NOTE — PT/OT/SLP EVAL
Occupational Therapy   Evaluation    Name: Peyman Gr  MRN: [id number]  Admitting Diagnosis: STEMI involving right coronary artery  Recent Surgery: Procedure(s) (LRB):  Left heart cath (Left)  Insertion, Pacemaker, Temporary Transvenous  ANGIOGRAM, CORONARY ARTERY (N/A)  Percutaneous coronary intervention (N/A)  IVUS, Coronary 9 Days Post-Op    Recommendations:     Discharge Recommendations: rehabilitation facility  Discharge Equipment Recommendations:   (TBD)  Barriers to discharge:   (Increased physical assistance with ADLs.)    Assessment:     Peyman Gr is a 67 y.o. male with a medical diagnosis of STEMI involving right coronary artery.  He presents with general weakness. Performance deficits affecting function: weakness, impaired endurance, impaired self care skills, impaired functional mobility, gait instability, impaired balance, impaired cognition, impaired cardiopulmonary response to activity.      Rehab Prognosis: Good; patient would benefit from acute skilled OT services to address these deficits and reach maximum level of function.       Plan:     Patient to be seen 6 x/week to address the above listed problems via self-care/home management, therapeutic activities, therapeutic exercises  Plan of Care Expires: 02/10/23  Plan of Care Reviewed with: patient    Subjective     Chief Complaint: General weakness  Patient/Family Comments/goals: improved cognition, functional mobility and ADL independence.    Occupational Profile:  Living Environment: lives with spouse in a 1 story home with no steps.  Previous level of function: independent with ADLs, IADLs and driving.  Roles and Routines: primary homemaker  Equipment Used at Home: none  Assistance upon Discharge: Spouse    Pain/Comfort:  Pain Rating 1: 8/10  Location 1: back  Pain Addressed 1: Reposition, Distraction  Pain Rating Post-Intervention 1: 8/10    Patients cultural, spiritual, Jew conflicts given the current situation: no    Objective:      Communicated with: nurse prior to session.  Patient found sitting edge of bed with telemetry, peripheral IV, cohen catheter, oxygen upon OT entry to room.    General Precautions: Standard, fall, aspiration  Orthopedic Precautions: N/A  Braces: N/A  Respiratory Status: Nasal cannula, flow 7 L/min    Occupational Performance:    Bed Mobility:    Patient completed Sit to Supine with maximal assistance  Performed unsupported sitting EOB with minimal assistance.    Cognitive/Visual Perceptual:  Cognitive/Psychosocial Skills:     -       Oriented to: Person, Place, and Situation   -       Follows Commands/attention:Follows two-step commands  -       Communication: clear/fluent  -       Memory: No Deficits noted  -       Safety awareness/insight to disability: impaired   -       Mood/Affect/Coping skills/emotional control: Cooperative and Pleasant  Visual/Perceptual:      -Intact Acuity    Physical Exam:  Balance:    -       Sitting: Minimal Assistance  Upper Extremity Range of Motion:     -       Right Upper Extremity: WFL  -       Left Upper Extremity: WFL  Upper Extremity Strength:    -       Right Upper Extremity: 3/5  -       Left Upper Extremity: 3/5   Strength:    -       Right Upper Extremity: fair  -       Left Upper Extremity: fair  Fine Motor Coordination:    -       Intact    AMPAC 6 Click ADL:  AMPAC Total Score: 13    Treatment & Education:  Patient educated on the purpose of Occupational Therapy and the importance of getting OOB.    Patient left HOB elevated with all lines intact, call button in reach, and bed alarm on    GOALS:   Multidisciplinary Problems       Occupational Therapy Goals          Problem: Occupational Therapy    Goal Priority Disciplines Outcome Interventions   Occupational Therapy Goal     OT, PT/OT     Description: Goals to be met by: 2/10/2023    Patient will increase functional independence with ADLs by performing:    UE Dressing with Stand-by Assistance.  LE Dressing with  Stand-by Assistance.  Grooming while seated with Stand-by Assistance.  Toileting from bedside commode with Minimal Assistance for hygiene and clothing management.   Sitting at edge of bed x10 minutes with Supervision.  Supine to sit with Stand-by Assistance.  Toilet transfer to bedside commode with Minimal Assistance.  Upper extremity exercise program x10 reps per handout, with assistance as needed.                         History:     Past Medical History:   Diagnosis Date    Arthritis     Chronic back pain     Chronic neck pain     Dry gangrene     RIGHT LITTLE TOE    Hypercholesteremia     Hypertension     Insomnia     Multiple falls     S/P BACK SURGERY- 1 FALL    PAD (peripheral artery disease)     Personal history of colonic polyps     PVD (peripheral vascular disease)          Past Surgical History:   Procedure Laterality Date    ANGIOGRAPHY OF LOWER EXTREMITY N/A 09/24/2020    Procedure: Angiogram Extremity Unilateral;  Surgeon: Luke Cabello MD;  Location: [facility] CATH;  Service: Cardiology;  Laterality: N/A;    BACK SURGERY      BUNIONECTOMY Bilateral     CARPAL TUNNEL RELEASE Bilateral     CHOLECYSTECTOMY      COLONOSCOPY N/A 5/9/2022    Procedure: COLONOSCOPY;  Surgeon: Braydon Durand MD;  Location: [facility] ENDO;  Service: Endoscopy;  Laterality: N/A;    COLONOSCOPY W/ POLYPECTOMY      CORONARY ANGIOGRAPHY N/A 1/1/2023    Procedure: ANGIOGRAM, CORONARY ARTERY;  Surgeon: Stefany Elizondo MD;  Location: [facility] CATH/EP LAB;  Service: Cardiology;  Laterality: N/A;    CORONARY ARTERY BYPASS GRAFT      CREATION OF BYPASS FROM INTERNAL CAROTID ARTERY TO SUBCLAVIAN ARTERY Right 12/27/2021    Procedure: CREATION, BYPASS, ARTERIAL, INTERNAL CAROTID TO SUBCLAVIAN;  Surgeon: Jeovany Goins MD;  Location: [facility] OR;  Service: Vascular;  Laterality: Right;    ELBOW ARTHROPLASTY Right     ELBOW SURGERY      INSERTION, PACEMAKER, TEMPORARY TRANSVENOUS  1/1/2023    Procedure: Insertion, Pacemaker, Temporary  Transvenous;  Surgeon: Stefany Elizondo MD;  Location: [facility] CATH/EP LAB;  Service: Cardiology;;    IVUS, CORONARY  1/1/2023    Procedure: IVUS, Coronary;  Surgeon: Stefany Elizondo MD;  Location: [facility] CATH/EP LAB;  Service: Cardiology;;    LEFT HEART CATHETERIZATION Left 1/1/2023    Procedure: Left heart cath;  Surgeon: Stefany Elizondo MD;  Location: [facility] CATH/EP LAB;  Service: Cardiology;  Laterality: Left;    MINIMALLY INVASIVE FORAMINOTOMY OF  SPINE N/A 11/15/2018    Procedure: FORAMINOTOMY, SPINE, MINIMALLY INVASIVE DECOMPRESSION L4-5;  Surgeon: Iván Stevenson Jr., MD;  Location: [facility] OR;  Service: Orthopedics;  Laterality: N/A;    NECK SURGERY      acf    PERCUTANEOUS CORONARY INTERVENTION, ARTERY N/A 1/1/2023    Procedure: Percutaneous coronary intervention;  Surgeon: Stefany Elizondo MD;  Location: [facility] CATH/EP LAB;  Service: Cardiology;  Laterality: N/A;    PERCUTANEOUS TRANSLUMINAL ANGIOPLASTY (PTA) OF PERIPHERAL VESSEL Right 05/01/2020    Procedure: PTA, PERIPHERAL VESSEL of right lower extremity;  Surgeon: Luke Cabello MD;  Location: [facility] CATH;  Service: Cardiology;  Laterality: Right;    PERCUTANEOUS TRANSLUMINAL ANGIOPLASTY (PTA) OF PERIPHERAL VESSEL Left 09/10/2020    Procedure: PTA, PERIPHERAL VESSEL, Left lower extremity;  Surgeon: Luke Cabello MD;  Location: [facility] CATH;  Service: Cardiology;  Laterality: Left;    PERCUTANEOUS TRANSLUMINAL ANGIOPLASTY (PTA) OF PERIPHERAL VESSEL Left 07/06/2021    Profunda and SFA       Time Tracking:     OT Date of Treatment: 01/10/23  OT Start Time: 1054  OT Stop Time: 1114  OT Total Time (min): 20 min    Billable Minutes:Evaluation 10  Therapeutic Activity 10    1/10/2023

## 2023-01-10 NOTE — PLAN OF CARE
Problem: SLP  Goal: SLP Goal  Description: 1. Pt will tolerate PO trials of puree and thin liquid w/o overt s/s aspiration and w/ adequate oral clearance during >90% of trials -MET  2. Pt will participate in ongoing diagnostic dysphagia tx in order to determine LRD  3. Pt will tolerate pureed diet w/ thin liquids w/o overt s/s aspiration and w/ adequate oral clearance w/ >90% of PO intake  Outcome: Ongoing, Progressing     Pt safe to initiate level 4- puree diet w/ thin liquids. New goals for diet tolerance added.

## 2023-01-11 LAB
ALBUMIN SERPL BCP-MCNC: 2.4 G/DL (ref 3.5–5.2)
ALP SERPL-CCNC: 78 U/L (ref 55–135)
ALT SERPL W/O P-5'-P-CCNC: 43 U/L (ref 10–44)
ANION GAP SERPL CALC-SCNC: 9 MMOL/L (ref 8–16)
AST SERPL-CCNC: 25 U/L (ref 10–40)
BASOPHILS # BLD AUTO: 0.03 K/UL (ref 0–0.2)
BASOPHILS NFR BLD: 0.3 % (ref 0–1.9)
BILIRUB SERPL-MCNC: 0.8 MG/DL (ref 0.1–1)
BUN SERPL-MCNC: 37 MG/DL (ref 8–23)
CALCIUM SERPL-MCNC: 8.3 MG/DL (ref 8.7–10.5)
CHLORIDE SERPL-SCNC: 100 MMOL/L (ref 95–110)
CO2 SERPL-SCNC: 27 MMOL/L (ref 23–29)
CREAT SERPL-MCNC: 0.9 MG/DL (ref 0.5–1.4)
DIFFERENTIAL METHOD: ABNORMAL
EOSINOPHIL # BLD AUTO: 0.2 K/UL (ref 0–0.5)
EOSINOPHIL NFR BLD: 1.7 % (ref 0–8)
ERYTHROCYTE [DISTWIDTH] IN BLOOD BY AUTOMATED COUNT: 14.1 % (ref 11.5–14.5)
EST. GFR  (NO RACE VARIABLE): >60 ML/MIN/1.73 M^2
GLUCOSE SERPL-MCNC: 96 MG/DL (ref 70–110)
HCT VFR BLD AUTO: 34.8 % (ref 40–54)
HGB BLD-MCNC: 11.7 G/DL (ref 14–18)
IMM GRANULOCYTES # BLD AUTO: 0.35 K/UL (ref 0–0.04)
IMM GRANULOCYTES NFR BLD AUTO: 3.7 % (ref 0–0.5)
LYMPHOCYTES # BLD AUTO: 1.2 K/UL (ref 1–4.8)
LYMPHOCYTES NFR BLD: 12.9 % (ref 18–48)
MAGNESIUM SERPL-MCNC: 1.9 MG/DL (ref 1.6–2.6)
MCH RBC QN AUTO: 31.4 PG (ref 27–31)
MCHC RBC AUTO-ENTMCNC: 33.6 G/DL (ref 32–36)
MCV RBC AUTO: 93 FL (ref 82–98)
MONOCYTES # BLD AUTO: 1.3 K/UL (ref 0.3–1)
MONOCYTES NFR BLD: 13.2 % (ref 4–15)
NEUTROPHILS # BLD AUTO: 6.5 K/UL (ref 1.8–7.7)
NEUTROPHILS NFR BLD: 68.2 % (ref 38–73)
NRBC BLD-RTO: 0 /100 WBC
PLATELET # BLD AUTO: 300 K/UL (ref 150–450)
PMV BLD AUTO: 9.6 FL (ref 9.2–12.9)
POTASSIUM SERPL-SCNC: 3.9 MMOL/L (ref 3.5–5.1)
PROT SERPL-MCNC: 6.1 G/DL (ref 6–8.4)
RBC # BLD AUTO: 3.73 M/UL (ref 4.6–6.2)
SODIUM SERPL-SCNC: 136 MMOL/L (ref 136–145)
WBC # BLD AUTO: 9.58 K/UL (ref 3.9–12.7)

## 2023-01-11 PROCEDURE — 25000003 PHARM REV CODE 250: Performed by: INTERNAL MEDICINE

## 2023-01-11 PROCEDURE — 25000003 PHARM REV CODE 250: Performed by: FAMILY MEDICINE

## 2023-01-11 PROCEDURE — 85025 COMPLETE CBC W/AUTO DIFF WBC: CPT | Performed by: FAMILY MEDICINE

## 2023-01-11 PROCEDURE — 99233 PR SUBSEQUENT HOSPITAL CARE,LEVL III: ICD-10-PCS | Mod: ,,, | Performed by: INTERNAL MEDICINE

## 2023-01-11 PROCEDURE — 25000242 PHARM REV CODE 250 ALT 637 W/ HCPCS: Performed by: INTERNAL MEDICINE

## 2023-01-11 PROCEDURE — 97110 THERAPEUTIC EXERCISES: CPT

## 2023-01-11 PROCEDURE — 97530 THERAPEUTIC ACTIVITIES: CPT

## 2023-01-11 PROCEDURE — S4991 NICOTINE PATCH NONLEGEND: HCPCS | Performed by: INTERNAL MEDICINE

## 2023-01-11 PROCEDURE — 94761 N-INVAS EAR/PLS OXIMETRY MLT: CPT

## 2023-01-11 PROCEDURE — 99233 SBSQ HOSP IP/OBS HIGH 50: CPT | Mod: ,,, | Performed by: INTERNAL MEDICINE

## 2023-01-11 PROCEDURE — 83735 ASSAY OF MAGNESIUM: CPT | Performed by: FAMILY MEDICINE

## 2023-01-11 PROCEDURE — 25000003 PHARM REV CODE 250

## 2023-01-11 PROCEDURE — 63600175 PHARM REV CODE 636 W HCPCS: Performed by: INTERNAL MEDICINE

## 2023-01-11 PROCEDURE — 99900031 HC PATIENT EDUCATION (STAT)

## 2023-01-11 PROCEDURE — 97535 SELF CARE MNGMENT TRAINING: CPT

## 2023-01-11 PROCEDURE — 27000221 HC OXYGEN, UP TO 24 HOURS

## 2023-01-11 PROCEDURE — 92526 ORAL FUNCTION THERAPY: CPT

## 2023-01-11 PROCEDURE — 94640 AIRWAY INHALATION TREATMENT: CPT

## 2023-01-11 PROCEDURE — 20000000 HC ICU ROOM

## 2023-01-11 PROCEDURE — 80053 COMPREHEN METABOLIC PANEL: CPT | Performed by: FAMILY MEDICINE

## 2023-01-11 RX ORDER — LOSARTAN POTASSIUM 50 MG/1
50 TABLET ORAL DAILY
Status: DISCONTINUED | OUTPATIENT
Start: 2023-01-12 | End: 2023-01-12

## 2023-01-11 RX ORDER — LOSARTAN POTASSIUM 25 MG/1
25 TABLET ORAL ONCE
Status: COMPLETED | OUTPATIENT
Start: 2023-01-11 | End: 2023-01-11

## 2023-01-11 RX ORDER — FUROSEMIDE 10 MG/ML
20 INJECTION INTRAMUSCULAR; INTRAVENOUS ONCE
Status: COMPLETED | OUTPATIENT
Start: 2023-01-11 | End: 2023-01-11

## 2023-01-11 RX ADMIN — LEVALBUTEROL HYDROCHLORIDE 0.63 MG: 0.63 SOLUTION RESPIRATORY (INHALATION) at 02:01

## 2023-01-11 RX ADMIN — ENOXAPARIN SODIUM 40 MG: 100 INJECTION SUBCUTANEOUS at 09:01

## 2023-01-11 RX ADMIN — ATORVASTATIN CALCIUM 20 MG: 20 TABLET, FILM COATED ORAL at 09:01

## 2023-01-11 RX ADMIN — POTASSIUM CHLORIDE 40 MEQ: 29.8 INJECTION, SOLUTION INTRAVENOUS at 08:01

## 2023-01-11 RX ADMIN — OXYCODONE AND ACETAMINOPHEN 1 TABLET: 7.5; 325 TABLET ORAL at 07:01

## 2023-01-11 RX ADMIN — FAMOTIDINE 20 MG: 20 TABLET ORAL at 08:01

## 2023-01-11 RX ADMIN — TICAGRELOR 90 MG: 90 TABLET ORAL at 08:01

## 2023-01-11 RX ADMIN — AMLODIPINE BESYLATE 10 MG: 5 TABLET ORAL at 08:01

## 2023-01-11 RX ADMIN — CARVEDILOL 25 MG: 25 TABLET, FILM COATED ORAL at 05:01

## 2023-01-11 RX ADMIN — OXYCODONE AND ACETAMINOPHEN 1 TABLET: 7.5; 325 TABLET ORAL at 11:01

## 2023-01-11 RX ADMIN — CHLORHEXIDINE GLUCONATE 15 ML: 1.2 RINSE ORAL at 08:01

## 2023-01-11 RX ADMIN — FUROSEMIDE 20 MG: 10 INJECTION INTRAMUSCULAR; INTRAVENOUS at 08:01

## 2023-01-11 RX ADMIN — OXYCODONE AND ACETAMINOPHEN 1 TABLET: 7.5; 325 TABLET ORAL at 12:01

## 2023-01-11 RX ADMIN — FAMOTIDINE 20 MG: 20 TABLET ORAL at 09:01

## 2023-01-11 RX ADMIN — CARVEDILOL 25 MG: 25 TABLET, FILM COATED ORAL at 07:01

## 2023-01-11 RX ADMIN — LEVALBUTEROL HYDROCHLORIDE 0.63 MG: 0.63 SOLUTION RESPIRATORY (INHALATION) at 09:01

## 2023-01-11 RX ADMIN — HYDROCHLOROTHIAZIDE 12.5 MG: 12.5 TABLET ORAL at 08:01

## 2023-01-11 RX ADMIN — ASPIRIN 81 MG CHEWABLE TABLET 81 MG: 81 TABLET CHEWABLE at 08:01

## 2023-01-11 RX ADMIN — SENNOSIDES AND DOCUSATE SODIUM 1 TABLET: 50; 8.6 TABLET ORAL at 07:01

## 2023-01-11 RX ADMIN — LOSARTAN POTASSIUM 25 MG: 25 TABLET, FILM COATED ORAL at 08:01

## 2023-01-11 RX ADMIN — LEVALBUTEROL HYDROCHLORIDE 0.63 MG: 0.63 SOLUTION RESPIRATORY (INHALATION) at 07:01

## 2023-01-11 RX ADMIN — OXYCODONE AND ACETAMINOPHEN 1 TABLET: 7.5; 325 TABLET ORAL at 01:01

## 2023-01-11 RX ADMIN — TICAGRELOR 90 MG: 90 TABLET ORAL at 09:01

## 2023-01-11 RX ADMIN — LOSARTAN POTASSIUM 25 MG: 25 TABLET, FILM COATED ORAL at 07:01

## 2023-01-11 RX ADMIN — CHLORHEXIDINE GLUCONATE 15 ML: 1.2 RINSE ORAL at 09:01

## 2023-01-11 RX ADMIN — NICOTINE 1 PATCH: 14 PATCH, EXTENDED RELEASE TRANSDERMAL at 08:01

## 2023-01-11 RX ADMIN — OXYCODONE AND ACETAMINOPHEN 1 TABLET: 7.5; 325 TABLET ORAL at 03:01

## 2023-01-11 NOTE — PLAN OF CARE
Problem: SLP  Goal: SLP Goal  Description: 1. Pt will tolerate PO trials of puree and thin liquid w/o overt s/s aspiration and w/ adequate oral clearance during >90% of trials -MET  2. Pt will participate in ongoing diagnostic dysphagia tx in order to determine LRD  3. Pt will tolerate pureed diet w/ thin liquids w/o overt s/s aspiration and w/ adequate oral clearance w/ >90% of PO intake  Outcome: Ongoing, Progressing    REC [facility]IDDSI [facility] with thin liquids. Will continue to follow.

## 2023-01-11 NOTE — PLAN OF CARE
01/11/23 0909   Patient Assessment/Suction   Level of Consciousness (AVPU) alert   Respiratory Effort Normal;Unlabored   Expansion/Accessory Muscles/Retractions no retractions;no use of accessory muscles   All Lung Fields Breath Sounds coarse   PRE-TX-O2   Device (Oxygen Therapy) high flow nasal cannula   $ Is the patient on Low Flow Oxygen? Yes   Flow (L/min) 7   SpO2 95 %   Pulse Oximetry Type Continuous   $ Pulse Oximetry - Multiple Charge Pulse Oximetry - Multiple   Pulse 80   Resp 20   Aerosol Therapy   $ Aerosol Therapy Charges Aerosol Treatment   Daily Review of Necessity (SVN) completed   Respiratory Treatment Status (SVN) given   Treatment Route (SVN) mask;oxygen   Patient Position (SVN) HOB elevated   Post Treatment Assessment (SVN) breath sounds unchanged   Signs of Intolerance (SVN) none   Breath Sounds Post-Respiratory Treatment   Post-treatment Heart Rate (beats/min) 76   Post-treatment Resp Rate (breaths/min) 16   Education   $ Education Bronchodilator;15 min

## 2023-01-11 NOTE — PT/OT/SLP PROGRESS
Physical Therapy Treatment    Patient Name:  Peyman Gr   MRN:  [id number]    Recommendations:     Discharge Recommendations: rehabilitation facility  Discharge Equipment Recommendations:  (TBD at next level of care)  Barriers to discharge:  increased caregiver burden of care    Assessment:     Peyman Gr is a 67 y.o. male admitted with a medical diagnosis of STEMI involving right coronary artery.  He presents with the following impairments/functional limitations: weakness, impaired endurance, impaired self care skills, impaired functional mobility, gait instability, impaired balance, decreased lower extremity function, decreased upper extremity function, decreased safety awareness, impaired cardiopulmonary response to activity . Pt presented in W/C having t/f'ed in AM with Max A x2 He was ready to return to bed. Pt required Max A x2 for t/f.  Rolled to R and L  with Mod/Max A .      Plan:     During this hospitalization, patient to be seen daily to address the identified rehab impairments via therapeutic activities and progress toward the following goals:    Plan of Care Expires:  23    Subjective         Objective:       Treatment & Education  T/f'ed W/C to bed with Max A x2    Patient left supine with all lines intact, call button in reach, bed alarm on, and his wife present..    GOALS:   Multidisciplinary Problems       Physical Therapy Goals          Problem: Physical Therapy    Goal Priority Disciplines Outcome Goal Variances Interventions   Physical Therapy Goal     PT, PT/OT Ongoing, Progressing     Description: Goals to be met by: 2023     Patient will increase functional independence with mobility by performin. Supine to sit with MInimal Assistance  2. Sit to stand transfer with Moderate Assistance  3. Bed to chair with Mod A  w/ol AD  3. Gait  x 10  feet with Minimal Assistance using Rolling Walker.                          Time Tracking:     PT Received On: 23  PT Start Time:  1540     PT Stop Time: 1557  PT Total Time (min): 17 min     Billable Minutes: Therapeutic Activity 17 minutes    Treatment Type: Treatment  PT/PTA: PT           01/11/2023

## 2023-01-11 NOTE — PROGRESS NOTES
Pulmonary/Critical Care  Progress Note      Patient name: Peyman Gr  MRN: [id number]  Date: 01/11/2023    Admit Date: 1/1/2023  Consult Requested By: Ligia Fernandez MD    Reason for Consult: respiratory failure    HPI:    1/1/2023 - 66 yo initially presented with chest pain about  2 AM, had bradycardia then VTVF arrest and had prolonged code - transferred to [facility] and was in cardiac arrest at arrival after multiple rounds of meds he had ROSC and taken to cath lab.  In Cath lab had occluded RCA and had successful PCI.  ALso has LM and OM disease.  He has been very unstable and moved to ICU.  He is unresponsive and cool.  He is ventilated.  On dobutrex and levophed, having some ectopy (contacting cardiology to see if they want to start lidocaine or amiodarone, QTc is 487), he is on bicarb drip and last ABG showed adequate oxygenation but a mixed metabolic and respiratory acidosis (vent adjused and will repeat).  No family was in the waiting room.  Chart has been reviewed and case d/w nursing, respiratory and Dr Hoyos.  BY report pt was resuscitated for > 60 minutes.    1/2/2023 - Remains critically ill, has been agitated at times and has needed increased sedation, he has not followed any commands.  Rhythm seems more stable but he has had ectopy.  Temp had increased from his hypothermia yesterday.  Electrolytes replaced by SS.  LFT have increased.  ABG this AM shows alkalosis (on bicarb drip).  CXR reviewed.  ECHO noted.    1/3/2023 - Remains critically ill, gets agitated at times and has required sedation.  He does not respond to voice or pain but does have spontaneous respirations and movement.  Pupils are sluggish to NR, minimal corneal reflex.  Had temp this AM.  CXR noted.    1/4/2023 - Had issues yesterday - HTN, bradycardia, a ? Of seizure activity and I was not notified of any of this.  Overnight things have been more stable.  He remains unresponsive.  Temp has decreased, renal function good, LFT better.   I tried to call family yesterday but got no answer.  Family has been at bedside per nursing.  SC + for [facility]    1/5/2023 - Remains critically ill and is not responsive to me.  When sedation decreased he does have some movement (not purposeful).  EEG report reviewed and neuro note seen.  Neuro issues preclude any thoughts of weaning ventilator support  Tachycardic today.  O2 needs have increased.    1/6/23: Off sedation for an hour today and moved spontaneously, shaking head around, but did not appear to follow commands. Oxygen requirements going down.    1/7/23: Off sedation for >30 minutes, he is moving his head spontaneously and may attend to his name, but hard to tell. Does not follow commands. He is respiring well on PS 5/5. Will keep on pressure support and Precedex with fentanyl boluses for now.    1/8/23: Doing well off sedation, following commands, intact respiratory drive, ventilating well. Extubating now.    1/9 The patient is mumbly mouthed but conversing.  He states he is in the patient is mumbling mouth but interacting well. He states he is in 6 of 10 pain with his back at this time.    1/10/2023 - Stable extubated and in no distress, is answering questions but is confused over the events of the last few days.  No new respiratory issues reported.    1/11/2023 - Stable overnight, he has no new complaints and is more interactive.    Review of Systems    Review of Systems   Unable to perform ROS: Mental acuity     Past Medical History    Past Medical History:   Diagnosis Date    Arthritis     Chronic back pain     Chronic neck pain     Dry gangrene     RIGHT LITTLE TOE    Hypercholesteremia     Hypertension     Insomnia     Multiple falls     S/P BACK SURGERY- 1 FALL    PAD (peripheral artery disease)     Personal history of colonic polyps     PVD (peripheral vascular disease)        Past Surgical History    Past Surgical History:   Procedure Laterality Date    ANGIOGRAPHY OF LOWER EXTREMITY N/A 09/24/2020     Procedure: Angiogram Extremity Unilateral;  Surgeon: Luke Cabello MD;  Location: [facility] CATH;  Service: Cardiology;  Laterality: N/A;    BACK SURGERY      BUNIONECTOMY Bilateral     CARPAL TUNNEL RELEASE Bilateral     CHOLECYSTECTOMY      COLONOSCOPY N/A 5/9/2022    Procedure: COLONOSCOPY;  Surgeon: Braydon Durand MD;  Location: [facility] ENDO;  Service: Endoscopy;  Laterality: N/A;    COLONOSCOPY W/ POLYPECTOMY      CORONARY ANGIOGRAPHY N/A 1/1/2023    Procedure: ANGIOGRAM, CORONARY ARTERY;  Surgeon: Stefany Elizondo MD;  Location: [facility] CATH/EP LAB;  Service: Cardiology;  Laterality: N/A;    CORONARY ARTERY BYPASS GRAFT      CREATION OF BYPASS FROM INTERNAL CAROTID ARTERY TO SUBCLAVIAN ARTERY Right 12/27/2021    Procedure: CREATION, BYPASS, ARTERIAL, INTERNAL CAROTID TO SUBCLAVIAN;  Surgeon: Jeovany Goins MD;  Location: [facility] OR;  Service: Vascular;  Laterality: Right;    ELBOW ARTHROPLASTY Right     ELBOW SURGERY      INSERTION, PACEMAKER, TEMPORARY TRANSVENOUS  1/1/2023    Procedure: Insertion, Pacemaker, Temporary Transvenous;  Surgeon: Stefany Elizondo MD;  Location: [facility] CATH/EP LAB;  Service: Cardiology;;    IVUS, CORONARY  1/1/2023    Procedure: IVUS, Coronary;  Surgeon: Stefany Elizondo MD;  Location: [facility] CATH/EP LAB;  Service: Cardiology;;    LEFT HEART CATHETERIZATION Left 1/1/2023    Procedure: Left heart cath;  Surgeon: Stefany Elizondo MD;  Location: [facility] CATH/EP LAB;  Service: Cardiology;  Laterality: Left;    MINIMALLY INVASIVE FORAMINOTOMY OF  SPINE N/A 11/15/2018    Procedure: FORAMINOTOMY, SPINE, MINIMALLY INVASIVE DECOMPRESSION L4-5;  Surgeon: Iván Stevenson Jr., MD;  Location: [facility] OR;  Service: Orthopedics;  Laterality: N/A;    NECK SURGERY      acf    PERCUTANEOUS CORONARY INTERVENTION, ARTERY N/A 1/1/2023    Procedure: Percutaneous coronary intervention;  Surgeon: Stefany Elizondo MD;  Location: [facility] CATH/EP LAB;  Service: Cardiology;   Laterality: N/A;    PERCUTANEOUS TRANSLUMINAL ANGIOPLASTY (PTA) OF PERIPHERAL VESSEL Right 05/01/2020    Procedure: PTA, PERIPHERAL VESSEL of right lower extremity;  Surgeon: Luke Cabello MD;  Location: [facility] CATH;  Service: Cardiology;  Laterality: Right;    PERCUTANEOUS TRANSLUMINAL ANGIOPLASTY (PTA) OF PERIPHERAL VESSEL Left 09/10/2020    Procedure: PTA, PERIPHERAL VESSEL, Left lower extremity;  Surgeon: Luke Cabello MD;  Location: [facility] CATH;  Service: Cardiology;  Laterality: Left;    PERCUTANEOUS TRANSLUMINAL ANGIOPLASTY (PTA) OF PERIPHERAL VESSEL Left 07/06/2021    Profunda and SFA       Medications (scheduled):      amLODIPine  10 mg Oral Daily    aspirin  81 mg Oral Daily    atorvastatin  20 mg Oral QHS    carvediloL  25 mg Oral BID WM    chlorhexidine  15 mL Mouth/Throat BID    enoxparin  40 mg Subcutaneous Q24H    famotidine  20 mg Per OG tube BID    hydroCHLOROthiazide  12.5 mg Oral Daily    levalbuterol  0.63 mg Nebulization TID WAKE    losartan  25 mg Oral Daily    nicotine  1 patch Transdermal Daily    ticagrelor  90 mg Oral BID       Medications (infusions):      DOBUTamine Stopped (01/01/23 0945)    midazolam Stopped (01/07/23 1830)    nicardipine Stopped (01/09/23 0432)       Medications (prn):     acetaminophen, albuterol-ipratropium, atropine, calcium gluconate IVPB, calcium gluconate IVPB, calcium gluconate IVPB, dextrose 10%, dextrose 10%, DOBUTamine, EPINEPHrine, glucagon (human recombinant), glucose, glucose, hydrALAZINE, hydrALAZINE, magnesium sulfate IVPB, magnesium sulfate IVPB, melatonin, midazolam, morphine, naloxone, ondansetron, oxyCODONE-acetaminophen, polyethylene glycol, potassium chloride in water **AND** potassium chloride in water **AND** potassium chloride in water, senna-docusate 8.6-50 mg, simethicone, sodium chloride 0.9%, sodium phosphate IVPB, sodium phosphate IVPB, sodium phosphate IVPB    Family History:   Family History   Problem Relation Age of Onset    COPD  "Mother     Hypertension Father     Heart disease Father     Heart attack Father     COPD Sister     Other Sister     Kidney failure Brother     Hypertension Brother     Diabetes Brother        Social History: Tobacco:   Social History     Tobacco Use   Smoking Status Former    Packs/day: 2.00    Years: 60.00    Pack years: 120.00    Types: Cigarettes    Quit date:     Years since quittin.0   Smokeless Tobacco Never                                EtOH:   Social History     Substance and Sexual Activity   Alcohol Use No                                Drugs:   Social History     Substance and Sexual Activity   Drug Use No       Physical Exam    Vital signs:  Temp:  [97.8 °F (36.6 °C)-98.8 °F (37.1 °C)]   Pulse:  [74-93]   Resp:  [8-32]   BP: (115-194)/()   SpO2:  [93 %-98 %]     Intake/Output:   Intake/Output Summary (Last 24 hours) at 2023 1452  Last data filed at 2023 0630  Gross per 24 hour   Intake 480 ml   Output 2450 ml   Net -1970 ml          BMI: Estimated body mass index is 32.65 kg/m² as calculated from the following:    Height as of an earlier encounter on 23: 6' 2.02" (1.88 m).    Weight as of this encounter: 115.4 kg (254 lb 6.6 oz).    PHYSICAL EXAM    GENERAL:   more responsive, following commands, some confusion  HEENT:  Pupils equal and reactive.  Mouth dry.  NECK: No cervical adenopathy palpated.  HEART: Regular rate and rhythm. No murmur or gallop auscultated.  LUNGS:  + rhonchi better cough effort. Lung excursion symmetrical.   ABDOMEN: Bowel sounds present. Soft, non-tender, non-distended.  EXTREMITIES: Normal muscle tone and joint movement, no cyanosis or clubbing.  Right arterial line is positional.  Right midline and peripheral  LYMPHATICS: No adenopathy palpated, no edema.  SKIN: Dry, intact, no lesions.   NEURO: following commands, speech is difficult to understand  Psych:  Calm    Laboratory    Recent Labs   Lab 23  0556   WBC 9.58   RBC 3.73*   HGB 11.7* "   HCT 34.8*      MCV 93   MCH 31.4*   MCHC 33.6         Recent Labs   Lab 01/11/23  0556   CALCIUM 8.3*   PROT 6.1      K 3.9   CO2 27      BUN 37*   CREATININE 0.9   ALKPHOS 78   ALT 43   AST 25   BILITOT 0.8         Microbiology:       Microbiology Results (last 7 days)       Procedure Component Value Units Date/Time    Blood culture [[id number]] Collected: 01/03/23 0954    Order Status: Completed Specimen: Blood Updated: 01/08/23 1032     Blood Culture, Routine No growth after 5 days.    Blood culture [[id number]] Collected: 01/03/23 0954    Order Status: Completed Specimen: Blood Updated: 01/08/23 1032     Blood Culture, Routine No growth after 5 days.            Radiology    X-Ray Chest AP Portable  HISTORY: SHORTNESS OF BREATH    FINDINGS: Portable chest radiograph at 1101 hours compared to 01/07/2023 shows interval removal of the ET and NG tubes, and transvenous pacing lead via IVC approach. The cardiac silhouette and pulmonary vasculature are stable and within normal limits, with aortic vascular calcifications.    The lungs are normally expanded, with left basilar opacities suggesting subsegmental atelectasis. There is no consolidation, large pleural effusion, evidence of pulmonary edema, or pneumothorax.    IMPRESSION: Interval removal of support devices as described, with left basilar opacities suggesting atelectasis.    Electronically signed by:  Hema Tafoya MD  1/9/2023 12:04 PM CST Workstation: [id number]        Ventilator Information    Oxygen Concentration (%):  [35] 35  Resp Rate Total:  [0 br/min] 0 br/min         Recent Labs     01/09/23  1102   PH 7.541*   PCO2 29.7*   PO2 59*   HCO3 25.5   POCSATURATED 93*   BE 3           Impression    Active Hospital Problems    Diagnosis  POA    *STEMI involving right coronary artery [I21.11]  Yes    Pneumonia of right lung due to methicillin susceptible Staphylococcus aureus (MSSA) [J15.211]  No    Shock liver [K72.00]  Yes    Cardiogenic  shock [R57.0]  Yes    Cardiac arrest [I46.9]  Yes    Acute respiratory failure with hypoxia and hypercarbia [J96.01, J96.02]  Yes    Leukocytosis [D72.829]  Yes    Anemia [D64.9]  Yes    History of tobacco abuse [Z87.891]  Not Applicable    Primary hypertension [I10]  Yes    PVD (peripheral vascular disease) with claudication [I73.9]  Yes    PAD (peripheral artery disease) [I73.9]  Yes      Resolved Hospital Problems    Diagnosis Date Resolved POA    Hyponatremia [E87.1] 01/02/2023 Yes       Continue present meds  Neuro following  Trend labs  Monitor electrolytes and replace  Increase activities as able and will assess how he responds  Looking into lTAC      Nick Padgett MD  [facility] Pulmonary/Critical Care  01/11/2023

## 2023-01-11 NOTE — PROGRESS NOTES
[facility] Medicine  Progress Note    Patient name: Peyman Gr  MRN: [id number]  Admit Date: 1/1/2023   LOS: 9 days     SUBJECTIVE:     Principal problem: STEMI involving right coronary artery    Interval History:  no SOB on 7L HFNC.  Pt conversing with friends/family at bedside without SOB.  No current CP.      Summary:   67-year-old male with peripheral vascular disease, essential hypertension, hyperlipidemia and ongoing tobacco use presented to outside facility with 2 hour onset of chest pain and shortness of breath.  EKG revealed inferior and anterolateral STEMI.  Quickly degenerated into cardiac arrest with runs of VT/VF and asystole requiring resuscitation for about 40 minutes.  He was subsequently intubated and transferred to our ED. En route he developed cardiac arrest again (PEA) which was continued into our ED and was resuscitated for another 30 minutes.  He required transcutaneous pacing.  He was taken emergently to the catheterization lab and underwent PCI with stenting of RCA which was felt to be the culprit lesion.  He was also found to have left main stenosis of about 70%.  A transvenous pacemaker was also placed.  Currently admitted to the ICU for post cardiac arrest care.  He remains intubated and critically ill. Hospital stay complicated by fever; initiated on broad spectrum antibiotics.  Respiratory culture growing MSSA - de-escalated to ceftriaxone.  Extubated successfully on 1/8.    Scheduled Meds:   amLODIPine  10 mg Oral Daily    aspirin  81 mg Oral Daily    atorvastatin  20 mg Oral QHS    carvediloL  25 mg Oral BID WM    chlorhexidine  15 mL Mouth/Throat BID    enoxparin  40 mg Subcutaneous Q24H    famotidine  20 mg Per OG tube BID    hydroCHLOROthiazide  12.5 mg Oral Daily    levalbuterol  0.63 mg Nebulization TID WAKE    losartan  25 mg Oral Daily    nicotine  1 patch Transdermal Daily    ticagrelor  90 mg Oral BID     Continuous Infusions:   DOBUTamine Stopped  (01/01/23 0945)    midazolam Stopped (01/07/23 1830)    nicardipine Stopped (01/09/23 0432)     PRN Meds:acetaminophen, albuterol-ipratropium, atropine, calcium gluconate IVPB, calcium gluconate IVPB, calcium gluconate IVPB, dextrose 10%, dextrose 10%, DOBUTamine, EPINEPHrine, glucagon (human recombinant), glucose, glucose, hydrALAZINE, magnesium sulfate IVPB, magnesium sulfate IVPB, melatonin, midazolam, morphine, naloxone, ondansetron, oxyCODONE-acetaminophen, polyethylene glycol, potassium chloride in water **AND** potassium chloride in water **AND** potassium chloride in water, senna-docusate 8.6-50 mg, simethicone, sodium chloride 0.9%, sodium phosphate IVPB, sodium phosphate IVPB, sodium phosphate IVPB    Review of patient's allergies indicates:  No Known Allergies    Review of Systems:  All systems reviewed and are negative except as noted per above.      OBJECTIVE:     Vital Signs (Most Recent)  Temp: 97.8 °F (36.6 °C) (01/10/23 1645)  Pulse: 74 (01/10/23 2001)  Resp: (!) 22 (01/10/23 2133)  BP: (!) 177/85 (01/10/23 2001)  SpO2: 97 % (01/10/23 2001)    Vital Signs Range (Last 24H):  Temp:  [97.8 °F (36.6 °C)-99.5 °F (37.5 °C)]   Pulse:  [70-84]   Resp:  [16-35]   BP: (140-210)/()   SpO2:  [92 %-98 %]     I & O (Last 24H):  Intake/Output Summary (Last 24 hours) at 1/10/2023 2153  Last data filed at 1/10/2023 1753  Gross per 24 hour   Intake 700 ml   Output 2250 ml   Net -1550 ml       Gen: alert, responsive  HEENT:  Eyes - no pallor  External ears with no lesions  Nares patent  Mouth, Throat:  trachea midline   CV: RRR  Lungs: CTA B/L, on HFNC  Abd: +BS, soft, NT, ND  Ext: no atrophy; +BLE edema  Skin: warm, dry  Neuro: grossly intact  Psych: pleasant    Laboratory:  I have reviewed all pertinent lab results within the past 24 hours.  CBC:   Recent Labs   Lab 01/10/23  0338   WBC 11.23   RBC 3.63*   HGB 11.2*   HCT 33.7*      MCV 93   MCH 30.9   MCHC 33.2       CMP:   Recent Labs   Lab  01/10/23  0338   GLU 88   CALCIUM 8.1*   ALBUMIN 2.3*   PROT 5.9*      K 3.6   CO2 27      BUN 47*   CREATININE 0.9   ALKPHOS 79   ALT 51*   AST 32   BILITOT 0.8       No results for input(s): CKMB, CPKMB, TROPONINT, TROPONINI, MYOGLOBIN in the last 168 hours.      Diagnostic Results:  Labs: Reviewed      ASSESSMENT/PLAN:     Active Hospital Problems    Diagnosis  POA    *STEMI involving right coronary artery [I21.11]  Yes    Pneumonia of right lung due to methicillin susceptible Staphylococcus aureus (MSSA) [J15.211]  No    Shock liver [K72.00]  Yes    Cardiogenic shock [R57.0]  Yes    Cardiac arrest [I46.9]  Yes    Acute respiratory failure with hypoxia and hypercarbia [J96.01, J96.02]  Yes    Leukocytosis [D72.829]  Yes    Anemia [D64.9]  Yes    History of tobacco abuse [Z87.891]  Not Applicable    Primary hypertension [I10]  Yes    PVD (peripheral vascular disease) with claudication [I73.9]  Yes    PAD (peripheral artery disease) [I73.9]  Yes      Resolved Hospital Problems    Diagnosis Date Resolved POA    Hyponatremia [E87.1] 01/02/2023 Yes         Plan:   Cardiac arrest secondary to STEMI involving RCA complicated by complete heart block  Status post emergent cardiac catheterization with PCI and stenting  - 01/01/2023  On aspirin and ticagrelor for dual antiplatelet therapy  Continue post cardiac arrest care in ICU   Mechanical ventilation for respiratory failure; successfully extubated on 01/08  Monitor electrolytes and replete per protocol   MSSA pneumonia - s/p ceftriaxone  Monitor urine output and avoid nephrotoxic agents, NSAIDs and iodinated contrast  Echocardiogram noted - normal LVEF and grade 1 diastolic dysfunction  Nicardipine drip for hypertensive emergency--completed.  continue home carvedilol, HCTZ and amlodipine.  Hydralazine prn    PT/OT/SLP     VTE Risk Mitigation (From admission, onward)           Ordered     enoxaparin injection 40 mg  Every 24 hours (non-standard times)          01/07/23 0902     IP VTE HIGH RISK PATIENT  Once         01/01/23 0817     Place sequential compression device  Until discontinued         01/01/23 0817                        Department Hospital Medicine  [facility]  Ligia Fernandez MD  Date of service: 01/10/2023

## 2023-01-11 NOTE — PROGRESS NOTES
[facility]  Department of Cardiology  Progress Note      PATIENT NAME: Peyman Gr    MRN: [id number]  TODAY'S DATE: 01/11/2023  ADMIT DATE: 1/1/2023                          CONSULT REQUESTED BY: Ligia Fernandez MD    SUBJECTIVE     PRINCIPAL PROBLEM: STEMI involving right coronary artery    Interval history:  1/11/23  Patient is sitting in wheelchair eating lunch during examination.  Wife is at side.  He is alert and oriented, in no acute distress, sinus rhythm on the monitor, 5 L nasal cannula.  He continues to have generalized weakness but no focal deficits at this time.    1/10/23  Patient is resting comfortably in bed.  He remains extubated.  BP elevated overnight.   Patient sat on side of the bed with Physical Therapy today.     1/9/23  Seen and examined patient in ICU today. Pt is extubated. Awake alert. Denies any chest pain.     1/7/23  Patient was extubated today around noon.  He is on BiPAP arousable and responsive to commands.  Cardene drip has been started due to hypertension.  Nurse reports patient moves all extremities with right-sided weakness noted.      1/6/23:  Patient lying in bed intubated and sedated. Vitals are stable.     1/5/23:  Patient seen lying in bed off sedation. Patient noted to be moving and appeared agitated. Neuro at bedside for exam. He is hypertensive and tachycardic without sedation.     1/4/23:  Patient seen lying in bed on sedation this morning.  No distress noted.  Vitals were stable at that time.  Of note the patient has become hypertensive and tachycardic this afternoon.  Patient is currently receiving full sedation due to jerking and sweating.    01/03/2023:    Patient remains intubated and sedated on mechanical ventilation. FIO2 is 60% with 5 PEEP. RN attempted to wean sedation but patient keeps triggering the vent.  He has cough and pupillary reflex.  RN states she has seen him move all 4 extremities but not to command.  He remains unresponsive. He is on  Levophed, propofol, fentanyl, and vasopressin. He was intermittently paced for approximately 5 min this morning when he was coughing and gagging on the vent. He remains in critical condition.  UOP is 275 today.       01/02/2023:  Seen and examined patient in the ICU today.  Off bicarb drip.  Currently on 2 sedatives and Levophed.     REASON FOR CONSULT:  STEMI      HPI:  67-year-old male with past medical history of peripheral vascular disease, right subclavian stenosis, chronic smoker, hypertension, hyperlipidemia presented to oxygen [facility] with 2 hour history of chest pain, shortness of breath.  ECG showed junctional bradycardia, complete heart block with inferior and anterolateral ST elevations.  STEMI code was called to which I responded immediately and agreed to transfer the patient to [facility] cath lab for emergent cardiac catheterization.  However, patient unfortunately went into cardiac arrest at [facility] with VT, VF, asystole requiring resuscitation and intubation.  Patient was resuscitated for about 40 minutes ROSC was achieved.  Patient was moving extremities at the time as per the ER.  Patient was then transferred to [facility] ER and patient again had cardiac arrest upon arrival to the ER which required resuscitation for 30 minutes.  Patient was in PEA initially and ROSC was achieved subsequently however patient was dependent on transcutaneous pacing.  Patient blood pressure was very low and could not be obtained with a BP cuff.  Patient was unable to be transferred to the cath lab at that point due to significant hemodynamic instability. Patient was started on pressors.  A-line was placed in the ED and blood pressure eventually improved with high doses of vasopressin, norepinephrine, sodium bicarbonate and dobutamine infusions.  Discussed with patient's family members regarding the patient's critical condition.  Once the blood pressure  was stabilized on the pressors, patient was brought to the cath lab for transvenous pacemaker placement and coronary angiogram.  Benefits and risks of the procedure explained to patient's family and patient's family  agreed for the procedure.      Review of patient's allergies indicates:  No Known Allergies    Past Medical History:   Diagnosis Date    Arthritis     Chronic back pain     Chronic neck pain     Dry gangrene     RIGHT LITTLE TOE    Hypercholesteremia     Hypertension     Insomnia     Multiple falls     S/P BACK SURGERY- 1 FALL    PAD (peripheral artery disease)     Personal history of colonic polyps     PVD (peripheral vascular disease)      Past Surgical History:   Procedure Laterality Date    ANGIOGRAPHY OF LOWER EXTREMITY N/A 09/24/2020    Procedure: Angiogram Extremity Unilateral;  Surgeon: Luke Cabello MD;  Location: [facility] CATH;  Service: Cardiology;  Laterality: N/A;    BACK SURGERY      BUNIONECTOMY Bilateral     CARPAL TUNNEL RELEASE Bilateral     CHOLECYSTECTOMY      COLONOSCOPY N/A 5/9/2022    Procedure: COLONOSCOPY;  Surgeon: Braydon Durand MD;  Location: [facility] ENDO;  Service: Endoscopy;  Laterality: N/A;    COLONOSCOPY W/ POLYPECTOMY      CORONARY ANGIOGRAPHY N/A 1/1/2023    Procedure: ANGIOGRAM, CORONARY ARTERY;  Surgeon: Stefany Elizondo MD;  Location: [facility] CATH/EP LAB;  Service: Cardiology;  Laterality: N/A;    CORONARY ARTERY BYPASS GRAFT      CREATION OF BYPASS FROM INTERNAL CAROTID ARTERY TO SUBCLAVIAN ARTERY Right 12/27/2021    Procedure: CREATION, BYPASS, ARTERIAL, INTERNAL CAROTID TO SUBCLAVIAN;  Surgeon: Jeovany Goins MD;  Location: [facility] OR;  Service: Vascular;  Laterality: Right;    ELBOW ARTHROPLASTY Right     ELBOW SURGERY      INSERTION, PACEMAKER, TEMPORARY TRANSVENOUS  1/1/2023    Procedure: Insertion, Pacemaker, Temporary Transvenous;  Surgeon: Stefany Elizondo MD;  Location: [facility] CATH/EP LAB;  Service: Cardiology;;    IVUS, CORONARY  1/1/2023     Procedure: IVUS, Coronary;  Surgeon: Stefany Elizondo MD;  Location: [place] CATH/EP LAB;  Service: Cardiology;;    LEFT HEART CATHETERIZATION Left 2023    Procedure: Left heart cath;  Surgeon: Stefany Elizondo MD;  Location: [facility] CATH/EP LAB;  Service: Cardiology;  Laterality: Left;    MINIMALLY INVASIVE FORAMINOTOMY OF  SPINE N/A 11/15/2018    Procedure: FORAMINOTOMY, SPINE, MINIMALLY INVASIVE DECOMPRESSION L4-5;  Surgeon: Iván Stevenson Jr., MD;  Location: [facility] OR;  Service: Orthopedics;  Laterality: N/A;    NECK SURGERY      acf    PERCUTANEOUS CORONARY INTERVENTION, ARTERY N/A 2023    Procedure: Percutaneous coronary intervention;  Surgeon: Stefany Elizondo MD;  Location: [place] CATH/EP LAB;  Service: Cardiology;  Laterality: N/A;    PERCUTANEOUS TRANSLUMINAL ANGIOPLASTY (PTA) OF PERIPHERAL VESSEL Right 2020    Procedure: PTA, PERIPHERAL VESSEL of right lower extremity;  Surgeon: Luke Cabello MD;  Location: [facility] CATH;  Service: Cardiology;  Laterality: Right;    PERCUTANEOUS TRANSLUMINAL ANGIOPLASTY (PTA) OF PERIPHERAL VESSEL Left 09/10/2020    Procedure: PTA, PERIPHERAL VESSEL, Left lower extremity;  Surgeon: Luke Cabello MD;  Location: [facility] CATH;  Service: Cardiology;  Laterality: Left;    PERCUTANEOUS TRANSLUMINAL ANGIOPLASTY (PTA) OF PERIPHERAL VESSEL Left 2021    Profunda and SFA     Social History     Tobacco Use    Smoking status: Former     Packs/day: 2.00     Years: 60.00     Pack years: 120.00     Types: Cigarettes     Quit date: 2017     Years since quittin.0    Smokeless tobacco: Never   Substance Use Topics    Alcohol use: No    Drug use: No        REVIEW OF SYSTEMS  Review of Systems     Constitutional: + fatigue Negative for chills, and fever.   Eyes: No double vision, No blurred vision  Neuro: No headaches, No dizziness  Respiratory: Negative for cough, shortness of breath and wheezing.    Cardiovascular: Negative for chest pain.  Negative for palpitations and leg swelling.   Gastrointestinal: Negative for abdominal pain, No melena, diarrhea, nausea and vomiting.   Genitourinary: Negative for dysuria and frequency, Negative for hematuria  Skin: Negative for bruising, Negative for edema or discoloration noted.      OBJECTIVE     VITAL SIGNS (Most Recent)  Temp: 98.6 °F (37 °C) (01/11/23 1200)  Pulse: 83 (01/11/23 1403)  Resp: 14 (01/11/23 1403)  BP: (!) 149/93 (01/11/23 1403)  SpO2: 97 % (01/11/23 1403)    VENTILATION STATUS  Resp: 14 (01/11/23 1403)  SpO2: 97 % (01/11/23 1403)  Oxygen Concentration (%):  [35] 35  Resp Rate Total:  [0 br/min] 0 br/min    I & O (Last 24H):  Intake/Output Summary (Last 24 hours) at 1/11/2023 1649  Last data filed at 1/11/2023 0630  Gross per 24 hour   Intake 480 ml   Output 2450 ml   Net -1970 ml         WEIGHTS  Wt Readings from Last 1 Encounters:   01/07/23 0400 115.4 kg (254 lb 6.6 oz)   01/06/23 0400 117.6 kg (259 lb 4.2 oz)   01/05/23 0500 115.3 kg (254 lb 3.1 oz)   01/03/23 0615 105.4 kg (232 lb 5.8 oz)   01/02/23 0400 102.5 kg (225 lb 15.5 oz)   01/02/23 0053 102.5 kg (225 lb 15.5 oz)   01/01/23 0800 99.8 kg (220 lb 0.3 oz)   01/01/23 0341 99.8 kg (220 lb)       PHYSICAL EXAM  GENERAL: well built, generalized weakness  NECK: No JVD.   CARDIAC: Regular rate and rhythm, S1, S2 regular;   CHEST ANATOMY: normal.   LUNGS:  Fine crackles noted on right lower lobe  ABDOMEN: Soft.  Normal bowel sounds.   EXTREMITIES: 1-2+ pitting edema upper extremities.   SKIN: Skin without lesions, moist, well perfused.   CNS: awake, alert. Follows commands.     HOME MEDICATIONS:  Current Facility-Administered Medications on File Prior to Encounter   Medication Dose Route Frequency Provider Last Rate Last Admin    0.9%  NaCl infusion   Intravenous Continuous Braydon Durand MD   Stopped at 05/09/22 1011     Current Outpatient Medications on File Prior to Encounter   Medication Sig Dispense Refill    albuterol  (PROVENTIL/VENTOLIN HFA) 90 mcg/actuation inhaler Inhale 1-2 puffs into the lungs every 6 (six) hours as needed for Shortness of Breath. Rescue 8 g 0    amLODIPine (NORVASC) 10 MG tablet Take 1 tablet (10 mg total) by mouth once daily. 30 tablet 1    amLODIPine (NORVASC) 5 MG tablet Take 5 mg by mouth once daily.      aspirin 81 MG Chew Take 81 mg by mouth once daily.      carvediloL (COREG) 25 MG tablet Take 25 mg by mouth 2 (two) times daily with meals.      clopidogreL (PLAVIX) 75 mg tablet Take 75 mg by mouth once daily. On hold for sx on 12/27/21      ezetimibe (ZETIA) 10 mg tablet Take 10 mg by mouth once daily.      hydroCHLOROthiazide (HYDRODIURIL) 12.5 MG Tab Take 1 tablet (12.5 mg total) by mouth once daily. 30 tablet 1    HYDROcodone-acetaminophen (NORCO)  mg per tablet Take 1 tablet by mouth every 8 (eight) hours as needed for Pain.      mupirocin (BACTROBAN) 2 % ointment Apply topically 3 (three) times daily. 22 g 0    oxyCODONE-acetaminophen (PERCOCET) 7.5-325 mg per tablet Take 1 tablet by mouth 2 (two) times daily as needed.      traMADoL (ULTRAM) 50 mg tablet Take 50 mg by mouth every 6 (six) hours as needed.      valsartan (DIOVAN) 160 MG tablet Take 160 mg by mouth once daily.         SCHEDULED MEDS:   amLODIPine  10 mg Oral Daily    aspirin  81 mg Oral Daily    atorvastatin  20 mg Oral QHS    carvediloL  25 mg Oral BID WM    chlorhexidine  15 mL Mouth/Throat BID    enoxparin  40 mg Subcutaneous Q24H    famotidine  20 mg Per OG tube BID    hydroCHLOROthiazide  12.5 mg Oral Daily    levalbuterol  0.63 mg Nebulization TID WAKE    losartan  25 mg Oral Daily    nicotine  1 patch Transdermal Daily    ticagrelor  90 mg Oral BID       CONTINUOUS INFUSIONS:   DOBUTamine Stopped (01/01/23 0979)    midazolam Stopped (01/07/23 3110)    nicardipine Stopped (01/09/23 3422)       PRN MEDS:acetaminophen, albuterol-ipratropium, atropine, calcium gluconate IVPB, calcium gluconate IVPB, calcium gluconate  IVPB, dextrose 10%, dextrose 10%, DOBUTamine, EPINEPHrine, glucagon (human recombinant), glucose, glucose, hydrALAZINE, hydrALAZINE, magnesium sulfate IVPB, magnesium sulfate IVPB, melatonin, midazolam, morphine, naloxone, ondansetron, oxyCODONE-acetaminophen, polyethylene glycol, potassium chloride in water **AND** potassium chloride in water **AND** potassium chloride in water, senna-docusate 8.6-50 mg, simethicone, sodium chloride 0.9%, sodium phosphate IVPB, sodium phosphate IVPB, sodium phosphate IVPB    LABS AND DIAGNOSTICS     CBC LAST 3 DAYS  Recent Labs   Lab 01/09/23  0310 01/10/23  0338 01/11/23  0556   WBC 11.66 11.23 9.58   RBC 4.15* 3.63* 3.73*   HGB 12.8* 11.2* 11.7*   HCT 37.6* 33.7* 34.8*   MCV 91 93 93   MCH 30.8 30.9 31.4*   MCHC 34.0 33.2 33.6   RDW 14.6* 14.3 14.1    292 300   MPV 9.3 10.0 9.6   GRAN 77.0* 73.9*  8.3* 68.2  6.5   LYMPH 9.0*  CANCELED 9.9*  1.1 12.9*  1.2   MONO 7.0  CANCELED 10.8  1.2* 13.2  1.3*   BASO CANCELED 0.03 0.03   NRBC 0 0 0         COAGULATION LAST 3 DAYS  No results for input(s): LABPT, INR, APTT in the last 168 hours.    CHEMISTRY LAST 3 DAYS  Recent Labs   Lab 01/08/23  0513 01/08/23  1619 01/09/23  0310 01/09/23  1102 01/10/23  0338 01/11/23  0556   NA  --   --  140  --  139 136   K  --   --  3.7  --  3.6 3.9   CL  --   --  107  --  106 100   CO2  --   --  24  --  27 27   ANIONGAP  --   --  9  --  6* 9   BUN  --   --  44*  --  47* 37*   CREATININE  --   --  0.7  --  0.9 0.9   GLU  --   --  98  --  88 96   CALCIUM  --   --  8.2*  --  8.1* 8.3*   PH 7.455* 7.511*  --  7.541*  --   --    MG  --   --  2.2  --  2.1 1.9   ALBUMIN  --   --  2.4*  --  2.3* 2.4*   PROT  --   --  6.2  --  5.9* 6.1   ALKPHOS  --   --  80  --  79 78   ALT  --   --  52*  --  51* 43   AST  --   --  27  --  32 25   BILITOT  --   --  0.9  --  0.8 0.8         CARDIAC PROFILE LAST 3 DAYS  No results for input(s): BNP, CPK, CPKMB, LDH, TROPONINI, TROPONINIHS in the last 168  hours.    ENDOCRINE LAST 3 DAYS  No results for input(s): TSH, PROCAL in the last 168 hours.      LAST ARTERIAL BLOOD GAS  ABG  Recent Labs   Lab 01/09/23  1102   PH 7.541*   PO2 59*   PCO2 29.7*   HCO3 25.5   BE 3         LAST 7 DAYS MICROBIOLOGY   Microbiology Results (last 7 days)       Procedure Component Value Units Date/Time    Blood culture [[id number]] Collected: 01/03/23 0954    Order Status: Completed Specimen: Blood Updated: 01/08/23 1032     Blood Culture, Routine No growth after 5 days.    Blood culture [[id number]] Collected: 01/03/23 0954    Order Status: Completed Specimen: Blood Updated: 01/08/23 1032     Blood Culture, Routine No growth after 5 days.            MOST RECENT IMAGING  X-Ray Chest AP Portable  HISTORY: SHORTNESS OF BREATH    FINDINGS: Portable chest radiograph at 1101 hours compared to 01/07/2023 shows interval removal of the ET and NG tubes, and transvenous pacing lead via IVC approach. The cardiac silhouette and pulmonary vasculature are stable and within normal limits, with aortic vascular calcifications.    The lungs are normally expanded, with left basilar opacities suggesting subsegmental atelectasis. There is no consolidation, large pleural effusion, evidence of pulmonary edema, or pneumothorax.    IMPRESSION: Interval removal of support devices as described, with left basilar opacities suggesting atelectasis.    Electronically signed by:  Hema Tafoya MD  1/9/2023 12:04 PM CST Workstation: [id number]      ECHOCARDIOGRAM RESULTS (last 5)  No results found for this or any previous visit.      CURRENT/PREVIOUS VISIT EKG  Results for orders placed or performed during the hospital encounter of 01/01/23   EKG 12-LEAD on arrival to floor    Collection Time: 01/01/23  9:59 AM    Narrative    Test Reason : I21.3,    Vent. Rate : 070 BPM     Atrial Rate : 000 BPM     P-R Int : 000 ms          QRS Dur : 078 ms      QT Int : 450 ms       P-R-T Axes : 000 027 -68 degrees     QTc Int : 486  ms    Atrial fibrillation with occasional ventricular-paced complexes and with  premature ventricular or aberrantly conducted complexes  Inferior infarct ,age undetermined  Abnormal ECG  When compared with ECG of 01-JAN-2023 09:57,  Previous ECG has undetermined rhythm, needs review  Confirmed by Sarthak Cardona MD ([id number]) on 1/4/2023 6:57:56 PM    Referred By: IRENE MOSER           Confirmed By:Sarthak Cardona MD     Tele: NSVT 6-7 beats.       ASSESSMENT/PLAN:     Active Hospital Problems    Diagnosis    *STEMI involving right coronary artery    Pneumonia of right lung due to methicillin susceptible Staphylococcus aureus (MSSA)    Shock liver    Cardiogenic shock    Cardiac arrest    Acute respiratory failure with hypoxia and hypercarbia    Leukocytosis    Anemia    History of tobacco abuse    Primary hypertension    PVD (peripheral vascular disease) with claudication    PAD (peripheral artery disease)       ASSESSMENT & PLAN:   STEMI- inferior wall MI complicated by complete heart block and RV shock  Cardiac arrest status post resuscitation and intubation (patient was resuscitated for 60-70 minutes in total)  Peripheral vascular disease  Chronic smoking  Hypertension  hyperlipidemia      RECOMMENDATIONS:    Continue DAPT- aspirin and Brilinta  Increase Lipitor to 40 mg   Continue amlodipine, losartan, carvedilol and hydrochlorothiazide  Monitor blood pressure. Consider increasing losartan to 50mg daily for elevated BP.   Patient has residual left main and OM lesions.  Further revascularization will be planned based on the recovery in the next few days.  Patient is okay for  step-down bed from cardiac standpoint.  Physical therapy    Mitra Moore NP  Department of Cardiology  [facility]  01/11/2023    I have personally interviewed and examined the patient, I have reviewed the Nurse Practitioner's history and physical, assessment, and plan. I have personally evaluated the patient at bedside and agree  with the findings and made appropriate changes as necessary in recommendations.      Stefany Elizondo MD  Department of Cardiology  [facility]  01/11/2023

## 2023-01-11 NOTE — PT/OT/SLP PROGRESS
Occupational Therapy   Treatment    Name: Peyman Gr  MRN: [id number]  Admitting Diagnosis:  STEMI involving right coronary artery  10 Days Post-Op    Recommendations:     Discharge Recommendations: rehabilitation facility  Discharge Equipment Recommendations:   (TBD)  Barriers to discharge:   (Increased physical assistance with ADLs.)    Assessment:     Peyman Gr is a 67 y.o. male with a medical diagnosis of STEMI involving right coronary artery.  He presents with improving medical acuity and functional mobility. Patient participated in LB dressing sitting in wheelchair. Performance deficits affecting function are weakness, impaired endurance, impaired self care skills, gait instability, impaired functional mobility, impaired balance, decreased upper extremity function, decreased lower extremity function, decreased safety awareness, impaired cardiopulmonary response to activity.     Rehab Prognosis:  Good; patient would benefit from acute skilled OT services to address these deficits and reach maximum level of function.       Plan:     Patient to be seen 6 x/week to address the above listed problems via self-care/home management, therapeutic activities, therapeutic exercises  Plan of Care Expires: 02/10/23  Plan of Care Reviewed with: patient    Subjective     Pain/Comfort:  Pain Rating 1: 0/10  Pain Rating Post-Intervention 1: 0/10    Objective:     Communicated with: nurse prior to session.  Patient found  sitting in wheelchair  with pulse ox (continuous), oxygen, peripheral IV, telemetry, cohen catheter upon OT entry to room.    General Precautions: Standard, fall, aspiration    Orthopedic Precautions:N/A  Braces: N/A  Respiratory Status: High flow, flow 7 L/min     Occupational Performance:     Activities of Daily Living:  Lower Body Dressing: minimum assistance to doff socks, moderate assistance to don sock sitting in wheelchair      AMPAC 6 Click ADL:      Treatment & Education:  Patient required  increased time with frequent rest breaks to work on LB dressing sitting in wheelchair.    Patient left  sitting in wheelchair  with all lines intact and call button in reach    GOALS:   Multidisciplinary Problems       Occupational Therapy Goals          Problem: Occupational Therapy    Goal Priority Disciplines Outcome Interventions   Occupational Therapy Goal     OT, PT/OT     Description: Goals to be met by: 2/10/2023    Patient will increase functional independence with ADLs by performing:    UE Dressing with Stand-by Assistance.  LE Dressing with Stand-by Assistance.  Grooming while seated with Stand-by Assistance.  Toileting from bedside commode with Minimal Assistance for hygiene and clothing management.   Sitting at edge of bed x10 minutes with Supervision.  Supine to sit with Stand-by Assistance.  Toilet transfer to bedside commode with Minimal Assistance.  Upper extremity exercise program x10 reps per handout, with assistance as needed.                         Time Tracking:     OT Date of Treatment: 01/11/23  OT Start Time: 1115  OT Stop Time: 1140  OT Total Time (min): 25 min    Billable Minutes:Self Care/Home Management 25    OT/ALDO: OT          1/11/2023

## 2023-01-11 NOTE — PT/OT/SLP PROGRESS
"Physical Therapy Treatment    Patient Name:  Peyman Gr   MRN:  [id number]    Recommendations:     Discharge Recommendations: rehabilitation facility  Discharge Equipment Recommendations:  (TBD at next level of care)  Barriers to discharge:  Increased caregiver burden of care    Assessment:     Peyman Gr is a 67 y.o. male admitted with a medical diagnosis of STEMI involving right coronary artery.  He presents with the following impairments/functional limitations: weakness, impaired endurance, impaired self care skills, impaired functional mobility, gait instability, impaired balance, decreased lower extremity function, decreased upper extremity function, decreased safety awareness, impaired cardiopulmonary response to activity .    Pt showed  improvement  for t/f supine to sit  with increased ability to slide B LE close to EOB and advancing  upper trunk. Pt sat mostly unassisted /CGA. He has increased active movement of his R hand but has decreased fluidity of B UE movement.  Pt t/f'ed bed to W/C with Max A x2.  An attempt was made for t/f to stand but pt was unable to bear weight to B LE. He's highly motivated.    Rehab Prognosis: Good; patient would benefit from acute skilled PT services to address these deficits and reach maximum level of function.    Recent Surgery: Procedure(s) (LRB):  Left heart cath (Left)  Insertion, Pacemaker, Temporary Transvenous  ANGIOGRAM, CORONARY ARTERY (N/A)  Percutaneous coronary intervention (N/A)  IVUS, Coronary 10 Days Post-Op    Plan:     During this hospitalization, patient to be seen daily to address the identified rehab impairments via gait training, therapeutic activities, therapeutic exercises and progress toward the following goals:    Plan of Care Expires:  02/09/23    Subjective     Chief Complaint: none  Patient/Family Comments/goals: "I want  to walk."  Pain/Comfort:         Objective:     Communicated with nurse prior to session.  Patient found HOB elevated with " pulse ox (continuous), oxygen, peripheral IV, telemetry, cohen catheter upon PT entry to room.     General Precautions: Standard, fall, aspiration  Orthopedic Precautions:    Braces:    Respiratory Status: Nasal cannula, flow 7 L/min     Functional Mobility:  Bed Mobility:     Rolling Right: moderate assistance  Supine to sit  with Mod assistance     Bed to Chair: maximal assistance and of 2 persons with  no AD  using  Squat Pivot  Balance: unsupported sitting balance/CGA      AM-PAC 6 CLICK MOBILITY          Treatment & Education:  Educated on safety, use of call light, Supine to sit  Mod A with improved ability to advance B LE closer to EOB. T/F'ed bed to W/C with Max A x2.  1x10 reps of B LE  AAROM  to major muscle groups.     Patient left HOB elevated with all lines intact, call button in reach, and chair alarm on..    GOALS:   Multidisciplinary Problems       Physical Therapy Goals          Problem: Physical Therapy    Goal Priority Disciplines Outcome Goal Variances Interventions   Physical Therapy Goal     PT, PT/OT Ongoing, Progressing     Description: Goals to be met by: 2023     Patient will increase functional independence with mobility by performin. Supine to sit with MInimal Assistance  2. Sit to stand transfer with Moderate Assistance  3. Bed to chair with Mod A  w/ol AD  3. Gait  x 10  feet with Minimal Assistance using Rolling Walker.                          Time Tracking:     PT Received On: 23  PT Start Time: 1030     PT Stop Time: 1109  PT Total Time (min): 39 min     Billable Minutes: Therapeutic Activity 27 minutes  and Therapeutic Exercise 12 minutes    Treatment Type: Treatment  PT/PTA: PT           2023

## 2023-01-11 NOTE — NURSING
Patient assessment completed at this time. No needs or complaints. Patient voiced understanding of room and call light. Will continue to monitor closely.

## 2023-01-11 NOTE — PT/OT/SLP PROGRESS
Speech Language Pathology Treatment    Patient Name:  Peyman Gr   MRN:  [id number]  Admitting Diagnosis: STEMI involving right coronary artery    Recommendations:                 General Recommendations:  Dysphagia therapy  Diet recommendations:  Mechanical soft, Minced & Moist Diet - IDDSI Level 5, Liquid Diet Level: Thin   Aspiration Precautions: Assistance with meals, Avoid talking while eating, Check for pocketing/oral residue, Feed only when awake/alert, Frequent oral care, Monitor for s/s of aspiration, Small bites/sips, and Standard aspiration precautions   General Precautions: Standard, aspiration, fall  Communication strategies:  none    Subjective     No c/o  Dislikes pureed textures    Pain/Comfort:  Pain Rating 1: 0/10    Objective:   Pt seen in ICU. He is alert and sitting up in w/c. Oriented x4 and engages on conversation. Pt and wife deny prior h/o dysphagia. Tolerated breakfast and lunch without difficulty per pt and wife. Pt with wet, productive cough; able to use [place] independently. Requires assistance with PO. Pt consumed sips of juice via cup and straw with no overt s/s airway compromise. Prolonged oral prep noted with diced peach trials 2° generalized weakness of oral musculature. More timely oral prep noted with cory cracker trials. Mild lingual residue which cleared with liquid wash. Required verbal cues to avoid talking with PO. Pt/educ educated on aspiration precautions, diet advancement and SLP role and POC. Receptive to information provided.     Has the patient been evaluated by SLP for swallowing?   Yes  Keep patient NPO? No   Current Respiratory Status:        Assessment:     Peyman rG is a 67 y.o. male with an SLP diagnosis of Dysphagia.  He presents with improved tolerance of upgraded textures. REC advancing to [facility] soft textures (IDDSI 5) with thin liquids. Will continue to follow. .    Goals:   Multidisciplinary Problems       SLP Goals          Problem: SLP    Goal  Priority Disciplines Outcome   SLP Goal     SLP Ongoing, Progressing   Description: 1. Pt will tolerate PO trials of puree and thin liquid w/o overt s/s aspiration and w/ adequate oral clearance during >90% of trials -MET  2. Pt will participate in ongoing diagnostic dysphagia tx in order to determine LRD  3. Pt will tolerate pureed diet w/ thin liquids w/o overt s/s aspiration and w/ adequate oral clearance w/ >90% of PO intake                       Plan:     Patient to be seen:  5 x/week   Plan of Care expires:     Plan of Care reviewed with:  patient, spouse   SLP Follow-Up:  Yes       Discharge recommendations:      Barriers to Discharge:  Level of Skilled Assistance Needed .    Time Tracking:     SLP Treatment Date:   01/11/23  Speech Start Time:  1436  Speech Stop Time:  1455     Speech Total Time (min):  19 min    Billable Minutes: Treatment Swallowing Dysfunction 19 and Total Time 19 01/11/2023

## 2023-01-11 NOTE — RESPIRATORY THERAPY
01/10/23 2001   Patient Assessment/Suction   Level of Consciousness (AVPU) alert   Respiratory Effort Unlabored   Expansion/Accessory Muscles/Retractions no use of accessory muscles   All Lung Fields Breath Sounds coarse   Cough Frequency infrequent   Cough Type weak   PRE-TX-O2   Device (Oxygen Therapy) high flow nasal cannula   $ Is the patient on Low Flow Oxygen? Yes   Flow (L/min) 7   SpO2 97 %   Pulse Oximetry Type Continuous   $ Pulse Oximetry - Multiple Charge Pulse Oximetry - Multiple   Pulse 74   Resp 18   BP (!) 177/85   Aerosol Therapy   $ Aerosol Therapy Charges Aerosol Treatment   Daily Review of Necessity (SVN) completed   Respiratory Treatment Status (SVN) given   Treatment Route (SVN) mask;oxygen   Patient Position (SVN) semi-Doan's   Post Treatment Assessment (SVN) increased aeration   Signs of Intolerance (SVN) none   Breath Sounds Post-Respiratory Treatment   Throughout All Fields Post-Treatment aeration increased   Post-treatment Heart Rate (beats/min) 76   Post-treatment Resp Rate (breaths/min) 18   Education   $ Education Bronchodilator;DME Nebulizer;DME Oxygen;15 min

## 2023-01-12 LAB
ALBUMIN SERPL BCP-MCNC: 2.7 G/DL (ref 3.5–5.2)
ALP SERPL-CCNC: 79 U/L (ref 55–135)
ALT SERPL W/O P-5'-P-CCNC: 41 U/L (ref 10–44)
ANION GAP SERPL CALC-SCNC: 11 MMOL/L (ref 8–16)
AST SERPL-CCNC: 29 U/L (ref 10–40)
BASOPHILS # BLD AUTO: 0.03 K/UL (ref 0–0.2)
BASOPHILS NFR BLD: 0.3 % (ref 0–1.9)
BILIRUB SERPL-MCNC: 0.8 MG/DL (ref 0.1–1)
BUN SERPL-MCNC: 29 MG/DL (ref 8–23)
CALCIUM SERPL-MCNC: 8.2 MG/DL (ref 8.7–10.5)
CHLORIDE SERPL-SCNC: 96 MMOL/L (ref 95–110)
CO2 SERPL-SCNC: 28 MMOL/L (ref 23–29)
CREAT SERPL-MCNC: 0.9 MG/DL (ref 0.5–1.4)
DIFFERENTIAL METHOD: ABNORMAL
EOSINOPHIL # BLD AUTO: 0.1 K/UL (ref 0–0.5)
EOSINOPHIL NFR BLD: 1.1 % (ref 0–8)
ERYTHROCYTE [DISTWIDTH] IN BLOOD BY AUTOMATED COUNT: 13.7 % (ref 11.5–14.5)
EST. GFR  (NO RACE VARIABLE): >60 ML/MIN/1.73 M^2
GLUCOSE SERPL-MCNC: 96 MG/DL (ref 70–110)
HCT VFR BLD AUTO: 34.2 % (ref 40–54)
HGB BLD-MCNC: 11.3 G/DL (ref 14–18)
IMM GRANULOCYTES # BLD AUTO: 0.34 K/UL (ref 0–0.04)
IMM GRANULOCYTES NFR BLD AUTO: 3.4 % (ref 0–0.5)
LYMPHOCYTES # BLD AUTO: 1.4 K/UL (ref 1–4.8)
LYMPHOCYTES NFR BLD: 14.3 % (ref 18–48)
MAGNESIUM SERPL-MCNC: 2 MG/DL (ref 1.6–2.6)
MCH RBC QN AUTO: 30.9 PG (ref 27–31)
MCHC RBC AUTO-ENTMCNC: 33 G/DL (ref 32–36)
MCV RBC AUTO: 93 FL (ref 82–98)
MONOCYTES # BLD AUTO: 1.4 K/UL (ref 0.3–1)
MONOCYTES NFR BLD: 13.4 % (ref 4–15)
NEUTROPHILS # BLD AUTO: 6.8 K/UL (ref 1.8–7.7)
NEUTROPHILS NFR BLD: 67.5 % (ref 38–73)
NRBC BLD-RTO: 0 /100 WBC
PLATELET # BLD AUTO: 323 K/UL (ref 150–450)
PMV BLD AUTO: 9.8 FL (ref 9.2–12.9)
POTASSIUM SERPL-SCNC: 3.6 MMOL/L (ref 3.5–5.1)
PROT SERPL-MCNC: 6.4 G/DL (ref 6–8.4)
RBC # BLD AUTO: 3.66 M/UL (ref 4.6–6.2)
SODIUM SERPL-SCNC: 135 MMOL/L (ref 136–145)
WBC # BLD AUTO: 10.05 K/UL (ref 3.9–12.7)

## 2023-01-12 PROCEDURE — S4991 NICOTINE PATCH NONLEGEND: HCPCS | Performed by: INTERNAL MEDICINE

## 2023-01-12 PROCEDURE — 93010 EKG 12-LEAD: ICD-10-PCS | Mod: ,,, | Performed by: SPECIALIST

## 2023-01-12 PROCEDURE — 25000003 PHARM REV CODE 250: Performed by: INTERNAL MEDICINE

## 2023-01-12 PROCEDURE — 99900035 HC TECH TIME PER 15 MIN (STAT)

## 2023-01-12 PROCEDURE — 94640 AIRWAY INHALATION TREATMENT: CPT

## 2023-01-12 PROCEDURE — 85025 COMPLETE CBC W/AUTO DIFF WBC: CPT | Performed by: FAMILY MEDICINE

## 2023-01-12 PROCEDURE — 80053 COMPREHEN METABOLIC PANEL: CPT | Performed by: FAMILY MEDICINE

## 2023-01-12 PROCEDURE — 99233 SBSQ HOSP IP/OBS HIGH 50: CPT | Mod: ,,, | Performed by: INTERNAL MEDICINE

## 2023-01-12 PROCEDURE — 93010 ELECTROCARDIOGRAM REPORT: CPT | Mod: ,,, | Performed by: SPECIALIST

## 2023-01-12 PROCEDURE — 63600175 PHARM REV CODE 636 W HCPCS: Performed by: INTERNAL MEDICINE

## 2023-01-12 PROCEDURE — 93005 ELECTROCARDIOGRAM TRACING: CPT | Performed by: SPECIALIST

## 2023-01-12 PROCEDURE — 63600175 PHARM REV CODE 636 W HCPCS: Performed by: FAMILY MEDICINE

## 2023-01-12 PROCEDURE — 92526 ORAL FUNCTION THERAPY: CPT

## 2023-01-12 PROCEDURE — 25000242 PHARM REV CODE 250 ALT 637 W/ HCPCS: Performed by: INTERNAL MEDICINE

## 2023-01-12 PROCEDURE — 99900031 HC PATIENT EDUCATION (STAT)

## 2023-01-12 PROCEDURE — 27000221 HC OXYGEN, UP TO 24 HOURS

## 2023-01-12 PROCEDURE — 25000003 PHARM REV CODE 250: Performed by: FAMILY MEDICINE

## 2023-01-12 PROCEDURE — 97530 THERAPEUTIC ACTIVITIES: CPT

## 2023-01-12 PROCEDURE — 25000003 PHARM REV CODE 250

## 2023-01-12 PROCEDURE — 94761 N-INVAS EAR/PLS OXIMETRY MLT: CPT

## 2023-01-12 PROCEDURE — 99233 PR SUBSEQUENT HOSPITAL CARE,LEVL III: ICD-10-PCS | Mod: ,,, | Performed by: INTERNAL MEDICINE

## 2023-01-12 PROCEDURE — 21000000 HC CCU ICU ROOM CHARGE

## 2023-01-12 PROCEDURE — 94660 CPAP INITIATION&MGMT: CPT

## 2023-01-12 PROCEDURE — 83735 ASSAY OF MAGNESIUM: CPT | Performed by: FAMILY MEDICINE

## 2023-01-12 RX ORDER — LEVALBUTEROL INHALATION SOLUTION 0.63 MG/3ML
0.63 SOLUTION RESPIRATORY (INHALATION) 3 TIMES DAILY PRN
Status: DISCONTINUED | OUTPATIENT
Start: 2023-01-12 | End: 2023-01-25

## 2023-01-12 RX ORDER — HYDROCHLOROTHIAZIDE 25 MG/1
25 TABLET ORAL DAILY
Status: DISCONTINUED | OUTPATIENT
Start: 2023-01-13 | End: 2023-01-12

## 2023-01-12 RX ORDER — LOSARTAN POTASSIUM 50 MG/1
100 TABLET ORAL DAILY
Status: DISCONTINUED | OUTPATIENT
Start: 2023-01-13 | End: 2023-01-24

## 2023-01-12 RX ORDER — HYDRALAZINE HYDROCHLORIDE 25 MG/1
25 TABLET, FILM COATED ORAL EVERY 8 HOURS
Status: DISCONTINUED | OUTPATIENT
Start: 2023-01-12 | End: 2023-01-14

## 2023-01-12 RX ORDER — TRAZODONE HYDROCHLORIDE 50 MG/1
50 TABLET ORAL NIGHTLY
Status: DISCONTINUED | OUTPATIENT
Start: 2023-01-12 | End: 2023-01-24

## 2023-01-12 RX ORDER — HYDROCHLOROTHIAZIDE 25 MG/1
50 TABLET ORAL DAILY
Status: DISCONTINUED | OUTPATIENT
Start: 2023-01-13 | End: 2023-01-12

## 2023-01-12 RX ORDER — HYDROCHLOROTHIAZIDE 25 MG/1
25 TABLET ORAL DAILY
Status: DISCONTINUED | OUTPATIENT
Start: 2023-01-13 | End: 2023-01-14

## 2023-01-12 RX ADMIN — OXYCODONE AND ACETAMINOPHEN 1 TABLET: 7.5; 325 TABLET ORAL at 04:01

## 2023-01-12 RX ADMIN — TICAGRELOR 90 MG: 90 TABLET ORAL at 08:01

## 2023-01-12 RX ADMIN — ATORVASTATIN CALCIUM 20 MG: 20 TABLET, FILM COATED ORAL at 08:01

## 2023-01-12 RX ADMIN — CARVEDILOL 25 MG: 25 TABLET, FILM COATED ORAL at 04:01

## 2023-01-12 RX ADMIN — MELATONIN 6 MG: at 11:01

## 2023-01-12 RX ADMIN — LOSARTAN POTASSIUM 50 MG: 50 TABLET, FILM COATED ORAL at 08:01

## 2023-01-12 RX ADMIN — OXYCODONE AND ACETAMINOPHEN 1 TABLET: 7.5; 325 TABLET ORAL at 08:01

## 2023-01-12 RX ADMIN — ASPIRIN 81 MG CHEWABLE TABLET 81 MG: 81 TABLET CHEWABLE at 08:01

## 2023-01-12 RX ADMIN — HYDROCHLOROTHIAZIDE 12.5 MG: 12.5 TABLET ORAL at 08:01

## 2023-01-12 RX ADMIN — POTASSIUM CHLORIDE 40 MEQ: 29.8 INJECTION, SOLUTION INTRAVENOUS at 06:01

## 2023-01-12 RX ADMIN — NICOTINE 1 PATCH: 14 PATCH, EXTENDED RELEASE TRANSDERMAL at 08:01

## 2023-01-12 RX ADMIN — OXYCODONE AND ACETAMINOPHEN 1 TABLET: 7.5; 325 TABLET ORAL at 12:01

## 2023-01-12 RX ADMIN — HYDRALAZINE HYDROCHLORIDE 25 MG: 25 TABLET ORAL at 09:01

## 2023-01-12 RX ADMIN — ENOXAPARIN SODIUM 40 MG: 100 INJECTION SUBCUTANEOUS at 10:01

## 2023-01-12 RX ADMIN — CHLORHEXIDINE GLUCONATE 15 ML: 1.2 RINSE ORAL at 08:01

## 2023-01-12 RX ADMIN — MELATONIN 6 MG: at 01:01

## 2023-01-12 RX ADMIN — FAMOTIDINE 20 MG: 20 TABLET ORAL at 08:01

## 2023-01-12 RX ADMIN — OXYCODONE AND ACETAMINOPHEN 1 TABLET: 7.5; 325 TABLET ORAL at 11:01

## 2023-01-12 RX ADMIN — LEVALBUTEROL HYDROCHLORIDE 0.63 MG: 0.63 SOLUTION RESPIRATORY (INHALATION) at 07:01

## 2023-01-12 RX ADMIN — CARVEDILOL 25 MG: 25 TABLET, FILM COATED ORAL at 08:01

## 2023-01-12 RX ADMIN — MORPHINE SULFATE 2 MG: 2 INJECTION, SOLUTION INTRAMUSCULAR; INTRAVENOUS at 08:01

## 2023-01-12 RX ADMIN — HYDRALAZINE HYDROCHLORIDE 5 MG: 20 INJECTION, SOLUTION INTRAMUSCULAR; INTRAVENOUS at 08:01

## 2023-01-12 RX ADMIN — TRAZODONE HYDROCHLORIDE 50 MG: 50 TABLET ORAL at 08:01

## 2023-01-12 RX ADMIN — AMLODIPINE BESYLATE 10 MG: 5 TABLET ORAL at 08:01

## 2023-01-12 NOTE — PLAN OF CARE
Problem: Occupational Therapy  Goal: Occupational Therapy Goal  Description: Goals to be met by: 2/10/2023    Patient will increase functional independence with ADLs by performing:    UE Dressing with Stand-by Assistance.  LE Dressing with Stand-by Assistance.  Grooming while seated with Stand-by Assistance.  Toileting from bedside commode with Minimal Assistance for hygiene and clothing management.   Sitting at edge of bed x10 minutes with Supervision.  Supine to sit with Stand-by Assistance.  Toilet transfer to bedside commode with Minimal Assistance.  Upper extremity exercise program x10 reps per handout, with assistance as needed.    Outcome: Ongoing, Progressing

## 2023-01-12 NOTE — CARE UPDATE
[facility]; still awaiting auth; CM to follow.

## 2023-01-12 NOTE — PT/OT/SLP PROGRESS
Physical Therapy Treatment    Patient Name:  Peyman Gr   MRN:  [id number]    Recommendations:     Discharge Recommendations: rehabilitation facility  Discharge Equipment Recommendations:  (TBD at next level of care)  Barriers to discharge: None    Assessment:     Peyman Gr is a 67 y.o. male admitted with a medical diagnosis of STEMI involving right coronary artery.  He presents with the following impairments/functional limitations: weakness, impaired endurance, impaired functional mobility, gait instability, impaired balance, decreased lower extremity function, decreased ROM, edema .  Patient agreeable to PT treatment this afternoon.  Patient presented sitting in chair at bedside.  Patient transferred to standing x 3 times with a RW with max assist and stood 3 x 10-15 seconds with RW with mod assist.  Patient then transferred WC to EOB with max assist with no AD using a stand pivot transfer.  Patient then instructed to perform quad sets and ankle pumps frequently during the day.     Rehab Prognosis: Good; patient would benefit from acute skilled PT services to address these deficits and reach maximum level of function.    Recent Surgery: Procedure(s) (LRB):  Left heart cath (Left)  Insertion, Pacemaker, Temporary Transvenous  ANGIOGRAM, CORONARY ARTERY (N/A)  Percutaneous coronary intervention (N/A)  IVUS, Coronary 11 Days Post-Op    Plan:     During this hospitalization, patient to be seen daily to address the identified rehab impairments via therapeutic activities, therapeutic exercises, gait training and progress toward the following goals:    Plan of Care Expires:  02/09/23    Subjective     Chief Complaint: fatigue  Patient/Family Comments/goals: get stronger  Pain/Comfort:         Objective:     Communicated with nurse prior to session.  Patient found supine with chair check, peripheral IV upon PT entry to room.     General Precautions: Standard, fall  Orthopedic Precautions: N/A  Braces:    Respiratory  Status: Room air     Functional Mobility:  Bed Mobility:     Sit to Supine: maximal assistance  Transfers:     Sit to Stand:  maximal assistance with rolling walker  Bed to Chair: maximal assistance with  no AD  using  Stand Pivot      AM-PAC 6 CLICK MOBILITY          Treatment & Education:  Transfer training sit to stand x 3 times RW max assist, EOB to chair no AD max assist,  Standing tolerance 3 x 10-15 seconds RW mod assist.    Patient left supine with call button in reach, bed alarm on, nurse notified, and spouse present..    GOALS:   Multidisciplinary Problems       Physical Therapy Goals          Problem: Physical Therapy    Goal Priority Disciplines Outcome Goal Variances Interventions   Physical Therapy Goal     PT, PT/OT Ongoing, Progressing     Description: Goals to be met by: 2023     Patient will increase functional independence with mobility by performin. Supine to sit with MInimal Assistance  2. Sit to stand transfer with Moderate Assistance  3. Bed to chair with Mod A  w/ol AD  3. Gait  x 10  feet with Minimal Assistance using Rolling Walker.                          Time Tracking:     PT Received On: 23  PT Start Time: 1322     PT Stop Time: 1400  PT Total Time (min): 38 min     Billable Minutes: Therapeutic Activity 38    Treatment Type: Treatment  PT/PTA: PT     PTA Visit Number: 0     2023

## 2023-01-12 NOTE — PROGRESS NOTES
Pulmonary/Critical Care  Progress Note      Patient name: Peyman Gr  MRN: [id number]  Date: 01/12/2023    Admit Date: 1/1/2023  Consult Requested By: Ligia Fernandez MD    Reason for Consult: respiratory failure    HPI:    1/1/2023 - 68 yo initially presented with chest pain about  2 AM, had bradycardia then VTVF arrest and had prolonged code - transferred to [facility] and was in cardiac arrest at arrival after multiple rounds of meds he had ROSC and taken to cath lab.  In Cath lab had occluded RCA and had successful PCI.  ALso has LM and OM disease.  He has been very unstable and moved to ICU.  He is unresponsive and cool.  He is ventilated.  On dobutrex and levophed, having some ectopy (contacting cardiology to see if they want to start lidocaine or amiodarone, QTc is 487), he is on bicarb drip and last ABG showed adequate oxygenation but a mixed metabolic and respiratory acidosis (vent adjused and will repeat).  No family was in the waiting room.  Chart has been reviewed and case d/w nursing, respiratory and Dr Hoyos.  BY report pt was resuscitated for > 60 minutes.    1/2/2023 - Remains critically ill, has been agitated at times and has needed increased sedation, he has not followed any commands.  Rhythm seems more stable but he has had ectopy.  Temp had increased from his hypothermia yesterday.  Electrolytes replaced by SS.  LFT have increased.  ABG this AM shows alkalosis (on bicarb drip).  CXR reviewed.  ECHO noted.    1/3/2023 - Remains critically ill, gets agitated at times and has required sedation.  He does not respond to voice or pain but does have spontaneous respirations and movement.  Pupils are sluggish to NR, minimal corneal reflex.  Had temp this AM.  CXR noted.    1/4/2023 - Had issues yesterday - HTN, bradycardia, a ? Of seizure activity and I was not notified of any of this.  Overnight things have been more stable.  He remains unresponsive.  Temp has decreased, renal function good, LFT better.   I tried to call family yesterday but got no answer.  Family has been at bedside per nursing.  SC + for [facility]    1/5/2023 - Remains critically ill and is not responsive to me.  When sedation decreased he does have some movement (not purposeful).  EEG report reviewed and neuro note seen.  Neuro issues preclude any thoughts of weaning ventilator support  Tachycardic today.  O2 needs have increased.    1/6/23: Off sedation for an hour today and moved spontaneously, shaking head around, but did not appear to follow commands. Oxygen requirements going down.    1/7/23: Off sedation for >30 minutes, he is moving his head spontaneously and may attend to his name, but hard to tell. Does not follow commands. He is respiring well on PS 5/5. Will keep on pressure support and Precedex with fentanyl boluses for now.    1/8/23: Doing well off sedation, following commands, intact respiratory drive, ventilating well. Extubating now.    1/9 The patient is mumbly mouthed but conversing.  He states he is in the patient is mumbling mouth but interacting well. He states he is in 6 of 10 pain with his back at this time.    1/10/2023 - Stable extubated and in no distress, is answering questions but is confused over the events of the last few days.  No new respiratory issues reported.    1/11/2023 - Stable overnight, he has no new complaints and is more interactive.    1/12/2023 - Stable overnight and no new issues reported.  Cardiology note reviewed and potential plans for revascularization noted.   No new respiratory complaints.  Has been working with therapy. BP has been elevated.  Still on 5 LPM    Review of Systems    Review of Systems   Constitutional:  Negative for chills, diaphoresis, fever, malaise/fatigue and weight loss.   HENT:  Positive for sore throat. Negative for congestion.    Eyes:  Negative for pain.   Respiratory:  Positive for cough. Negative for hemoptysis, sputum production, shortness of breath, wheezing and stridor.     Cardiovascular:  Positive for chest pain (musculoskeletal). Negative for palpitations, orthopnea, claudication, leg swelling and PND.   Gastrointestinal:  Negative for abdominal pain, constipation, diarrhea, heartburn, nausea and vomiting.   Genitourinary:  Negative for dysuria, frequency and urgency.   Musculoskeletal:  Negative for falls and myalgias.   Neurological:  Positive for weakness (generalized, better). Negative for sensory change and focal weakness.        Some confusion at times but better   Psychiatric/Behavioral:  Negative for depression, substance abuse and suicidal ideas. The patient is not nervous/anxious.      Past Medical History    Past Medical History:   Diagnosis Date    Arthritis     Chronic back pain     Chronic neck pain     Dry gangrene     RIGHT LITTLE TOE    Hypercholesteremia     Hypertension     Insomnia     Multiple falls     S/P BACK SURGERY- 1 FALL    PAD (peripheral artery disease)     Personal history of colonic polyps     PVD (peripheral vascular disease)        Past Surgical History    Past Surgical History:   Procedure Laterality Date    ANGIOGRAPHY OF LOWER EXTREMITY N/A 09/24/2020    Procedure: Angiogram Extremity Unilateral;  Surgeon: Luke Cabello MD;  Location: [facility] CATH;  Service: Cardiology;  Laterality: N/A;    BACK SURGERY      BUNIONECTOMY Bilateral     CARPAL TUNNEL RELEASE Bilateral     CHOLECYSTECTOMY      COLONOSCOPY N/A 5/9/2022    Procedure: COLONOSCOPY;  Surgeon: Braydon Durand MD;  Location: [facility] ENDO;  Service: Endoscopy;  Laterality: N/A;    COLONOSCOPY W/ POLYPECTOMY      CORONARY ANGIOGRAPHY N/A 1/1/2023    Procedure: ANGIOGRAM, CORONARY ARTERY;  Surgeon: Stefany Elizondo MD;  Location: [facility] CATH/EP LAB;  Service: Cardiology;  Laterality: N/A;    CORONARY ARTERY BYPASS GRAFT      CREATION OF BYPASS FROM INTERNAL CAROTID ARTERY TO SUBCLAVIAN ARTERY Right 12/27/2021    Procedure: CREATION, BYPASS, ARTERIAL, INTERNAL CAROTID TO SUBCLAVIAN;   Surgeon: Jeovany Goins MD;  Location: [facility] OR;  Service: Vascular;  Laterality: Right;    ELBOW ARTHROPLASTY Right     ELBOW SURGERY      INSERTION, PACEMAKER, TEMPORARY TRANSVENOUS  1/1/2023    Procedure: Insertion, Pacemaker, Temporary Transvenous;  Surgeon: Stefany Elizondo MD;  Location: [facility] CATH/EP LAB;  Service: Cardiology;;    IVUS, CORONARY  1/1/2023    Procedure: IVUS, Coronary;  Surgeon: Stefany Elizondo MD;  Location: [facility] CATH/EP LAB;  Service: Cardiology;;    LEFT HEART CATHETERIZATION Left 1/1/2023    Procedure: Left heart cath;  Surgeon: Stefany Elizondo MD;  Location: [facility] CATH/EP LAB;  Service: Cardiology;  Laterality: Left;    MINIMALLY INVASIVE FORAMINOTOMY OF  SPINE N/A 11/15/2018    Procedure: FORAMINOTOMY, SPINE, MINIMALLY INVASIVE DECOMPRESSION L4-5;  Surgeon: Iván Stevenson Jr., MD;  Location: [facility] OR;  Service: Orthopedics;  Laterality: N/A;    NECK SURGERY      acf    PERCUTANEOUS CORONARY INTERVENTION, ARTERY N/A 1/1/2023    Procedure: Percutaneous coronary intervention;  Surgeon: Stefany Elizondo MD;  Location: [facility] CATH/EP LAB;  Service: Cardiology;  Laterality: N/A;    PERCUTANEOUS TRANSLUMINAL ANGIOPLASTY (PTA) OF PERIPHERAL VESSEL Right 05/01/2020    Procedure: PTA, PERIPHERAL VESSEL of right lower extremity;  Surgeon: Luke Cabello MD;  Location: [facility] CATH;  Service: Cardiology;  Laterality: Right;    PERCUTANEOUS TRANSLUMINAL ANGIOPLASTY (PTA) OF PERIPHERAL VESSEL Left 09/10/2020    Procedure: PTA, PERIPHERAL VESSEL, Left lower extremity;  Surgeon: Luke Cabello MD;  Location: [facility] CATH;  Service: Cardiology;  Laterality: Left;    PERCUTANEOUS TRANSLUMINAL ANGIOPLASTY (PTA) OF PERIPHERAL VESSEL Left 07/06/2021    Profunda and SFA       Medications (scheduled):      amLODIPine  10 mg Oral Daily    aspirin  81 mg Oral Daily    atorvastatin  20 mg Oral QHS    carvediloL  25 mg Oral BID WM    chlorhexidine  15 mL Mouth/Throat BID     enoxparin  40 mg Subcutaneous Q24H    famotidine  20 mg Per OG tube BID    hydroCHLOROthiazide  12.5 mg Oral Daily    levalbuterol  0.63 mg Nebulization TID WAKE    losartan  50 mg Oral Daily    nicotine  1 patch Transdermal Daily    ticagrelor  90 mg Oral BID       Medications (infusions):      DOBUTamine Stopped (23 0945)    midazolam Stopped (23 1830)       Medications (prn):     acetaminophen, albuterol-ipratropium, atropine, calcium gluconate IVPB, calcium gluconate IVPB, calcium gluconate IVPB, dextrose 10%, dextrose 10%, DOBUTamine, EPINEPHrine, glucagon (human recombinant), glucose, glucose, hydrALAZINE, hydrALAZINE, magnesium sulfate IVPB, magnesium sulfate IVPB, melatonin, midazolam, morphine, naloxone, ondansetron, oxyCODONE-acetaminophen, polyethylene glycol, potassium chloride in water **AND** potassium chloride in water **AND** potassium chloride in water, senna-docusate 8.6-50 mg, simethicone, sodium chloride 0.9%, sodium phosphate IVPB, sodium phosphate IVPB, sodium phosphate IVPB    Family History:   Family History   Problem Relation Age of Onset    COPD Mother     Hypertension Father     Heart disease Father     Heart attack Father     COPD Sister     Other Sister     Kidney failure Brother     Hypertension Brother     Diabetes Brother        Social History: Tobacco:   Social History     Tobacco Use   Smoking Status Former    Packs/day: 2.00    Years: 60.00    Pack years: 120.00    Types: Cigarettes    Quit date:     Years since quittin.0   Smokeless Tobacco Never                                EtOH:   Social History     Substance and Sexual Activity   Alcohol Use No                                Drugs:   Social History     Substance and Sexual Activity   Drug Use No       Physical Exam    Vital signs:  Temp:  [97.9 °F (36.6 °C)-98.8 °F (37.1 °C)]   Pulse:  [72-93]   Resp:  [8-38]   BP: (115-199)/(56-95)   SpO2:  [93 %-100 %]     Intake/Output:   Intake/Output Summary (Last 24  "hours) at 1/12/2023 0854  Last data filed at 1/11/2023 1758  Gross per 24 hour   Intake 560 ml   Output 2225 ml   Net -1665 ml          BMI: Estimated body mass index is 32.65 kg/m² as calculated from the following:    Height as of an earlier encounter on 1/1/23: 6' 2.02" (1.88 m).    Weight as of this encounter: 115.4 kg (254 lb 6.6 oz).    PHYSICAL EXAM    GENERAL:  better  HEENT:  Pupils equal and reactive.    NECK: No cervical adenopathy palpated.  HEART: Regular rate and rhythm. No murmur or gallop auscultated.  LUNGS:  decreased at bases, some cough, no acc m use  ABDOMEN: Bowel sounds present. Soft, non-tender, non-distended.  EXTREMITIES: Normal muscle tone and joint movement, no cyanosis or clubbing.   LYMPHATICS: No adenopathy palpated, no edema.  SKIN: Dry, intact, no lesions.   NEURO: following commands, better  Psych:  Calm    Laboratory    Recent Labs   Lab 01/12/23  0407   WBC 10.05   RBC 3.66*   HGB 11.3*   HCT 34.2*      MCV 93   MCH 30.9   MCHC 33.0         Recent Labs   Lab 01/12/23  0407   CALCIUM 8.2*   PROT 6.4   *   K 3.6   CO2 28   CL 96   BUN 29*   CREATININE 0.9   ALKPHOS 79   ALT 41   AST 29   BILITOT 0.8         Microbiology:       Microbiology Results (last 7 days)       Procedure Component Value Units Date/Time    Blood culture [[id number]] Collected: 01/03/23 0954    Order Status: Completed Specimen: Blood Updated: 01/08/23 1032     Blood Culture, Routine No growth after 5 days.    Blood culture [[id number]] Collected: 01/03/23 0954    Order Status: Completed Specimen: Blood Updated: 01/08/23 1032     Blood Culture, Routine No growth after 5 days.            Radiology    X-Ray Chest AP Portable  HISTORY: SHORTNESS OF BREATH    FINDINGS: Portable chest radiograph at 1101 hours compared to 01/07/2023 shows interval removal of the ET and NG tubes, and transvenous pacing lead via IVC approach. The cardiac silhouette and pulmonary vasculature are stable and within normal limits, " with aortic vascular calcifications.    The lungs are normally expanded, with left basilar opacities suggesting subsegmental atelectasis. There is no consolidation, large pleural effusion, evidence of pulmonary edema, or pneumothorax.    IMPRESSION: Interval removal of support devices as described, with left basilar opacities suggesting atelectasis.    Electronically signed by:  Hema Tafoya MD  1/9/2023 12:04 PM CST Workstation: [id number]        Ventilator Information    Oxygen Concentration (%):  [35] 35  Resp Rate Total:  [0 br/min] 0 br/min         Recent Labs     01/09/23  1102   PH 7.541*   PCO2 29.7*   PO2 59*   HCO3 25.5   POCSATURATED 93*   BE 3           Impression    Active Hospital Problems    Diagnosis  POA    *STEMI involving right coronary artery [I21.11]  Yes    Pneumonia of right lung due to methicillin susceptible Staphylococcus aureus (MSSA) [J15.211]  No    Shock liver [K72.00]  Yes    Cardiogenic shock [R57.0]  Yes    Cardiac arrest [I46.9]  Yes    Acute respiratory failure with hypoxia and hypercarbia [J96.01, J96.02]  Yes    Leukocytosis [D72.829]  Yes    Anemia [D64.9]  Yes    History of tobacco abuse [Z87.891]  Not Applicable    Primary hypertension [I10]  Yes    PVD (peripheral vascular disease) with claudication [I73.9]  Yes    PAD (peripheral artery disease) [I73.9]  Yes      Resolved Hospital Problems    Diagnosis Date Resolved POA    Hyponatremia [E87.1] 01/02/2023 Yes       Continue present meds  Neuro following  Trend labs  Monitor electrolytes and replace  Increase activities as able and will assess how he responds  Looking into LTAC  BP management per cardiology  Note possible plans for revascularization  Waiting on bed to transfer out of ICU    Respiratory status stable, I will sign off.  Please reconsult if I can be of further assistance.  Thank you for this consult.        Nick Padgett MD  [facility] Pulmonary/Critical Care  01/12/2023

## 2023-01-12 NOTE — PT/OT/SLP PROGRESS
Occupational Therapy   Treatment    Name: Peyman Gr  MRN: [id number]  Admitting Diagnosis:  STEMI involving right coronary artery  11 Days Post-Op    Recommendations:     Discharge Recommendations: rehabilitation facility  Discharge Equipment Recommendations:   (TBD)  Barriers to discharge:   (Increased physical assistance with ADLs.)    Assessment:     Peyman Gr is a 67 y.o. male with a medical diagnosis of STEMI involving right coronary artery.  He presents with improving medical acuity and functional mobility. Patient participated in sit to stand x3 trials and standing tolerance x 30 seconds each trial. Performance deficits affecting function are weakness, impaired endurance, impaired self care skills, impaired functional mobility, gait instability, impaired balance, decreased upper extremity function, decreased lower extremity function, decreased safety awareness, impaired cardiopulmonary response to activity.     Rehab Prognosis:  Good; patient would benefit from acute skilled OT services to address these deficits and reach maximum level of function.       Plan:     Patient to be seen 6 x/week to address the above listed problems via self-care/home management, therapeutic activities, therapeutic exercises  Plan of Care Expires: 02/10/23  Plan of Care Reviewed with: patient    Subjective     Pain/Comfort:  Pain Rating 1: 0/10  Pain Rating Post-Intervention 1: 0/10    Objective:     Communicated with: nurse prior to session.  Patient found  up in wheelchair  with telemetry, peripheral IV, cohen catheter, oxygen, pulse ox (continuous) upon OT entry to room.    General Precautions: Standard, fall    Orthopedic Precautions:N/A  Braces: N/A  Respiratory Status: Nasal cannula, flow 5 L/min     Occupational Performance:     Functional Mobility/Transfers:  Patient completed Sit <> Stand x3 Trials with maximal assistance x2 using hand-held assist and rolling walker   Functional Mobility: Performed standing  tolerance of 30 seconds x3 trials with maximal assistance x2      AMPAC 6 Click ADL: 15    Treatment & Education:  Patient participated in preparatory exercise towards performing a functional transfer.     Patient left  up in wheelchair  with all lines intact, call button in reach, and chair alarm on    GOALS:   Multidisciplinary Problems       Occupational Therapy Goals          Problem: Occupational Therapy    Goal Priority Disciplines Outcome Interventions   Occupational Therapy Goal     OT, PT/OT Ongoing, Progressing    Description: Goals to be met by: 2/10/2023    Patient will increase functional independence with ADLs by performing:    UE Dressing with Stand-by Assistance.  LE Dressing with Stand-by Assistance.  Grooming while seated with Stand-by Assistance.  Toileting from bedside commode with Minimal Assistance for hygiene and clothing management.   Sitting at edge of bed x10 minutes with Supervision.  Supine to sit with Stand-by Assistance.  Toilet transfer to bedside commode with Minimal Assistance.  Upper extremity exercise program x10 reps per handout, with assistance as needed.                         Time Tracking:     OT Date of Treatment: 01/12/23  OT Start Time: 1101  OT Stop Time: 1124  OT Total Time (min): 23 min    Billable Minutes:Therapeutic Activity 23    OT/ALDO: OT          1/12/2023

## 2023-01-12 NOTE — PLAN OF CARE
Treatment plan discussed with patient with time allowed for questions and answers     Problem: Adult Inpatient Plan of Care  Goal: Absence of Hospital-Acquired Illness or Injury  Outcome: Ongoing, Progressing     Problem: Adult Inpatient Plan of Care  Goal: Plan of Care Review  Outcome: Ongoing, Progressing

## 2023-01-12 NOTE — PLAN OF CARE
01/12/23 1132   Discharge Reassessment   Assessment Type Discharge Planning Reassessment   Did the patient's condition or plan change since previous assessment? No   Discharge Plan discussed with: Adult children   Name(s) and Number(s) Rachel Flores   Communicated TOBIAS with patient/caregiver Date not available/Unable to determine   Discharge Plan A Rehab   Discharge Plan B Skilled Nursing Facility   DME Needed Upon Discharge  none   Discharge Barriers Identified None   Why the patient remains in the hospital Requires continued medical care   Post-Acute Status   Post-Acute Authorization Placement   Post-Acute Placement Status Pending payor review/awaiting authorization (if required)     [facility]; still awaiting [facility]; CM to follow

## 2023-01-12 NOTE — PROGRESS NOTES
[facility] Medicine  Progress Note    Patient name: Peyman Gr  MRN: [id number]  Admit Date: 1/1/2023   LOS: 11 days     ASSESSMENT/PLAN:     Active Hospital Problems    Diagnosis  POA    *STEMI involving right coronary artery [I21.11]  Yes    Pneumonia of right lung due to methicillin susceptible Staphylococcus aureus (MSSA) [J15.211]  No    Shock liver [K72.00]  Yes    Cardiogenic shock [R57.0]  Yes    Cardiac arrest [I46.9]  Yes    Acute respiratory failure with hypoxia and hypercarbia [J96.01, J96.02]  Yes    Leukocytosis [D72.829]  Yes    Anemia [D64.9]  Yes    History of tobacco abuse [Z87.891]  Not Applicable    Primary hypertension [I10]  Yes    PVD (peripheral vascular disease) with claudication [I73.9]  Yes    PAD (peripheral artery disease) [I73.9]  Yes      Resolved Hospital Problems    Diagnosis Date Resolved POA    Hyponatremia [E87.1] 01/02/2023 Yes     Cardiac arrest  Due to STEMI involving RCA   Complicated by complete heart block  MSSA pneumonia - s/p ceftriaxone  - successfully extubated on 01/08  - s/p emergent stenting 1/1/23  - currently planning for another re-vascularization  - continue brillinta, asa, statin  - adjusting HTN medication  - PT/OT/SLP     Chronic conditions as noted above/below; home medications reviewed personally by me and restarted as appropriate  Electrolyte derangement:  Trending BMP; Mg; replacement prn  DVT ppx: lovenox  FULL CODE    SUBJECTIVE:     Principal problem: STEMI involving right coronary artery    67-year-old male with peripheral vascular disease, essential hypertension, hyperlipidemia and ongoing tobacco use presented to outside facility with 2 hour onset of chest pain and shortness of breath.  EKG revealed inferior and anterolateral STEMI.  Quickly degenerated into cardiac arrest with runs of VT/VF and asystole requiring resuscitation for about 40 minutes.  He was subsequently intubated and transferred to our ED. En route he  developed cardiac arrest again (PEA) which was continued into our ED and was resuscitated for another 30 minutes.  He required transcutaneous pacing.  He was taken emergently to the catheterization lab and underwent PCI with stenting of RCA which was felt to be the culprit lesion.  He was also found to have left main stenosis of about 70%.  A transvenous pacemaker was also placed.  Currently admitted to the ICU for post cardiac arrest care.  He remains intubated and critically ill. Hospital stay complicated by fever; initiated on broad spectrum antibiotics.  Respiratory culture growing MSSA - de-escalated to ceftriaxone.  Extubated successfully on 1/8.    Interval History:      1/11:  no SOB on HFNC.  Pt conversing with friends/family at bedside without SOB.  No current CP.  No pain throughout.  Pt is alert and oriented x4, however he is extremely tangential and not at mental status baseline per his family.  MAEW.  Follows commands.    1/12:  out of ICU.  Pt without CP.  No SOB.  Family at bedside, he is responding more appropriately today.    Review of Systems:  All systems reviewed and are negative except as noted per above.      OBJECTIVE:     Vital Signs (Most Recent)  Temp: 98.4 °F (36.9 °C) (01/12/23 1501)  Pulse: 85 (01/12/23 1700)  Resp: (!) 25 (01/12/23 1700)  BP: (!) 178/85 (01/12/23 1700)  SpO2: (!) 91 % (01/12/23 1700)    I & O (Last 24H):  Intake/Output Summary (Last 24 hours) at 1/12/2023 1731  Last data filed at 1/12/2023 1713  Gross per 24 hour   Intake 1640 ml   Output 3025 ml   Net -1385 ml       Gen: alert, responsive  HEENT:  Eyes - no pallor  External ears with no lesions  Nares patent  Mouth, Throat:  trachea midline   CV: RRR  Lungs: CTA B/L, on HFNC  Abd: +BS, soft, NT, ND  Ext: no atrophy; +BLE edema  Skin: warm, dry  Neuro: no focal deficits at this time  Psych: pleasant    Laboratory:  I have reviewed all pertinent lab results within the past 24 hours.  CBC:   Recent Labs   Lab  01/12/23  0407   WBC 10.05   RBC 3.66*   HGB 11.3*   HCT 34.2*      MCV 93   MCH 30.9   MCHC 33.0       CMP:   Recent Labs   Lab 01/12/23 0407   GLU 96   CALCIUM 8.2*   ALBUMIN 2.7*   PROT 6.4   *   K 3.6   CO2 28   CL 96   BUN 29*   CREATININE 0.9   ALKPHOS 79   ALT 41   AST 29   BILITOT 0.8       No results for input(s): CKMB, CPKMB, TROPONINT, TROPONINI, MYOGLOBIN in the last 168 hours.    Imaging Results              X-Ray Chest AP Portable (Final result)  Result time 01/01/23 07:01:46      Final result by Elizabeth Sharp IV, MD (01/01/23 07:01:46)                   Narrative:    Chest, single view    HISTORY: Nasogastric tube placement.    In the interval, there has been introduction of a nasogastric tube which extends into the left upper quadrant of the abdomen just beyond the GE junction. An endotracheal tube remains in place with its tip positioned well above the level of the saurav.    Bilateral interstitial and mild groundglass opacities, greater on the right are again observed. There are no new confluent infiltrates, volume loss or effusions.    The cardiomediastinal silhouette is stable.    IMPRESSION:    Interval introduction of nasogastric tube extending into the left upper abdomen with its tip just beyond the GE junction.    Otherwise stable cardiopulmonary status.    Electronically signed by:  Elizabeth Sharp MD  1/1/2023 7:01 AM CST Workstation: [id number]                                     X-Ray Chest AP Portable (Final result)  Result time 01/01/23 06:59:55      Final result by Elizabeth Sharp IV, MD (01/01/23 06:59:55)                   Narrative:    Chest, single view    HISTORY: Cardiac arrest.    Comparison 9/9/2022.    Endotracheal tube is in place with its tip positioned well above the level of the saurav.    The heart size is within normal limits. The central pulmonary vasculature is not acutely engorged.  There is arteriosclerotic calcification within the aortic arch.    There is  scattered bilateral interstitial and groundglass pulmonary opacities with upper lung zone predominance, greater on the right. No effusion or extrapulmonary air identified.    Surgical changes are noted within the cervical spine and within the soft tissues of the right side of the neck.    IMPRESSION:    Positioning of endotracheal tube as above.    Interval development of interstitial and groundglass pulmonary opacities with upper lung zone predominance.    Electronically signed by:  Elizabeth Sharp MD  1/1/2023 6:59 AM CST Workstation: [id number]                                    Department Hospital Medicine  [facility]  Ligia Fernandez MD  Date of service: 01/12/2023

## 2023-01-12 NOTE — PLAN OF CARE
01/12/23 0709   Patient Assessment/Suction   Level of Consciousness (AVPU) alert   Respiratory Effort Unlabored;Normal   Expansion/Accessory Muscles/Retractions no use of accessory muscles   All Lung Fields Breath Sounds Anterior:;Lateral:;clear;diminished   SAKSHI Breath Sounds clear   LLL Breath Sounds diminished   RUL Breath Sounds clear   RML Breath Sounds clear   RLL Breath Sounds clear   Rhythm/Pattern, Respiratory no shortness of breath reported   Cough Frequency infrequent   Cough Type good;nonproductive   PRE-TX-O2   Device (Oxygen Therapy) high flow nasal cannula   $ Is the patient on Low Flow Oxygen? Yes   Flow (L/min) 5   SpO2 97 %   Pulse Oximetry Type Continuous   $ Pulse Oximetry - Multiple Charge Pulse Oximetry - Multiple   Oximetry Probe Site Intact   Pulse 75   Resp 20   Aerosol Therapy   $ Aerosol Therapy Charges Aerosol Treatment   Daily Review of Necessity (SVN) completed   Respiratory Treatment Status (SVN) given   Treatment Route (SVN) mask;oxygen   Patient Position (SVN) HOB elevated   Post Treatment Assessment (SVN) breath sounds improved   Signs of Intolerance (SVN) none   Breath Sounds Post-Respiratory Treatment   Throughout All Fields Post-Treatment All Fields   Throughout All Fields Post-Treatment aeration increased   Post-treatment Heart Rate (beats/min) 71   Post-treatment Resp Rate (breaths/min) 14   Preset CPAP/BiPAP Settings   Mode Of Delivery BiPAP;Standby   $ CPAP/BiPAP Daily Charge BiPAP/CPAP Daily   $ Is patient using? Yes   Size of Mask Medium/Large   Sized Appropriately? Yes   Equipment Type V60   CPAP (cm H2O) 12   Ipap 6   Set Rate (Breaths/Min) 20   Education   $ Education Bronchodilator;15 min

## 2023-01-12 NOTE — PROGRESS NOTES
[facility] Medicine  Progress Note    Patient name: Peyman Gr  MRN: [id number]  Admit Date: 1/1/2023   LOS: 10 days     SUBJECTIVE:     Principal problem: STEMI involving right coronary artery    Interval History:  no SOB on HFNC.  Pt conversing with friends/family at bedside without SOB.  No current CP.  No pain throughout.  Pt is alert and oriented x4, however he is extremely tangential and not at mental status baseline per his family.  MAEW.  Follows commands.    Summary:   67-year-old male with peripheral vascular disease, essential hypertension, hyperlipidemia and ongoing tobacco use presented to outside facility with 2 hour onset of chest pain and shortness of breath.  EKG revealed inferior and anterolateral STEMI.  Quickly degenerated into cardiac arrest with runs of VT/VF and asystole requiring resuscitation for about 40 minutes.  He was subsequently intubated and transferred to our ED. En route he developed cardiac arrest again (PEA) which was continued into our ED and was resuscitated for another 30 minutes.  He required transcutaneous pacing.  He was taken emergently to the catheterization lab and underwent PCI with stenting of RCA which was felt to be the culprit lesion.  He was also found to have left main stenosis of about 70%.  A transvenous pacemaker was also placed.  Currently admitted to the ICU for post cardiac arrest care.  He remains intubated and critically ill. Hospital stay complicated by fever; initiated on broad spectrum antibiotics.  Respiratory culture growing MSSA - de-escalated to ceftriaxone.  Extubated successfully on 1/8.    Scheduled Meds:   amLODIPine  10 mg Oral Daily    aspirin  81 mg Oral Daily    atorvastatin  20 mg Oral QHS    carvediloL  25 mg Oral BID WM    chlorhexidine  15 mL Mouth/Throat BID    enoxparin  40 mg Subcutaneous Q24H    famotidine  20 mg Per OG tube BID    hydroCHLOROthiazide  12.5 mg Oral Daily    levalbuterol  0.63 mg Nebulization  TID WAKE    losartan  25 mg Oral Daily    nicotine  1 patch Transdermal Daily    ticagrelor  90 mg Oral BID     Continuous Infusions:   DOBUTamine Stopped (01/01/23 0945)    midazolam Stopped (01/07/23 1830)    nicardipine Stopped (01/09/23 0432)     PRN Meds:acetaminophen, albuterol-ipratropium, atropine, calcium gluconate IVPB, calcium gluconate IVPB, calcium gluconate IVPB, dextrose 10%, dextrose 10%, DOBUTamine, EPINEPHrine, glucagon (human recombinant), glucose, glucose, hydrALAZINE, hydrALAZINE, magnesium sulfate IVPB, magnesium sulfate IVPB, melatonin, midazolam, morphine, naloxone, ondansetron, oxyCODONE-acetaminophen, polyethylene glycol, potassium chloride in water **AND** potassium chloride in water **AND** potassium chloride in water, senna-docusate 8.6-50 mg, simethicone, sodium chloride 0.9%, sodium phosphate IVPB, sodium phosphate IVPB, sodium phosphate IVPB    Review of patient's allergies indicates:  No Known Allergies    Review of Systems:  All systems reviewed and are negative except as noted per above.      OBJECTIVE:     Vital Signs (Most Recent)  Temp: 98.3 °F (36.8 °C) (01/11/23 1600)  Pulse: 90 (01/11/23 1800)  Resp: (!) 27 (01/11/23 1800)  BP: (!) 165/83 (01/11/23 1800)  SpO2: 95 % (01/11/23 1800)    Vital Signs Range (Last 24H):  Temp:  [98.2 °F (36.8 °C)-98.8 °F (37.1 °C)]   Pulse:  [74-93]   Resp:  [8-31]   BP: (115-199)/()   SpO2:  [93 %-98 %]     I & O (Last 24H):  Intake/Output Summary (Last 24 hours) at 1/11/2023 1822  Last data filed at 1/11/2023 1758  Gross per 24 hour   Intake 1010 ml   Output 3325 ml   Net -2315 ml       Gen: alert, responsive  HEENT:  Eyes - no pallor  External ears with no lesions  Nares patent  Mouth, Throat:  trachea midline   CV: RRR  Lungs: CTA B/L, on HFNC  Abd: +BS, soft, NT, ND  Ext: no atrophy; +BLE edema  Skin: warm, dry  Neuro: no focal deficits at this time  Psych: pleasant    Laboratory:  I have reviewed all pertinent lab results within the  past 24 hours.  CBC:   Recent Labs   Lab 01/11/23  0556   WBC 9.58   RBC 3.73*   HGB 11.7*   HCT 34.8*      MCV 93   MCH 31.4*   MCHC 33.6       CMP:   Recent Labs   Lab 01/11/23  0556   GLU 96   CALCIUM 8.3*   ALBUMIN 2.4*   PROT 6.1      K 3.9   CO2 27      BUN 37*   CREATININE 0.9   ALKPHOS 78   ALT 43   AST 25   BILITOT 0.8       No results for input(s): CKMB, CPKMB, TROPONINT, TROPONINI, MYOGLOBIN in the last 168 hours.      Diagnostic Results:  Labs: Reviewed      ASSESSMENT/PLAN:     Active Hospital Problems    Diagnosis  POA    *STEMI involving right coronary artery [I21.11]  Yes    Pneumonia of right lung due to methicillin susceptible Staphylococcus aureus (MSSA) [J15.211]  No    Shock liver [K72.00]  Yes    Cardiogenic shock [R57.0]  Yes    Cardiac arrest [I46.9]  Yes    Acute respiratory failure with hypoxia and hypercarbia [J96.01, J96.02]  Yes    Leukocytosis [D72.829]  Yes    Anemia [D64.9]  Yes    History of tobacco abuse [Z87.891]  Not Applicable    Primary hypertension [I10]  Yes    PVD (peripheral vascular disease) with claudication [I73.9]  Yes    PAD (peripheral artery disease) [I73.9]  Yes      Resolved Hospital Problems    Diagnosis Date Resolved POA    Hyponatremia [E87.1] 01/02/2023 Yes         Plan:   Cardiac arrest secondary to STEMI involving RCA complicated by complete heart block  Status post emergent cardiac catheterization with PCI and stenting  - 01/01/2023  On aspirin and ticagrelor for dual antiplatelet therapy  Continue post cardiac arrest care in ICU   Mechanical ventilation for respiratory failure; successfully extubated on 01/08  Monitor electrolytes and replete per protocol   MSSA pneumonia - s/p ceftriaxone  Monitor urine output and avoid nephrotoxic agents, NSAIDs and iodinated contrast  Echocardiogram noted - normal LVEF and grade 1 diastolic dysfunction  Nicardipine drip for hypertensive emergency--completed.  continue home carvedilol, HCTZ and  amlodipine.  Hydralazine prn    PT/OT/SLP     VTE Risk Mitigation (From admission, onward)           Ordered     enoxaparin injection 40 mg  Every 24 hours (non-standard times)         01/07/23 0902     IP VTE HIGH RISK PATIENT  Once         01/01/23 0817     Place sequential compression device  Until discontinued         01/01/23 0817                        Department Hospital Medicine  [facility]  Ligia Fernandez MD  Date of service: 01/11/2023

## 2023-01-12 NOTE — PT/OT/SLP PROGRESS
"Speech Language Pathology Treatment    Patient Name:  Peyman Gr   MRN:  [id number]  Admitting Diagnosis: STEMI involving right coronary artery    Recommendations:                 General Recommendations:  Dysphagia therapy and Cognitive-linguistic evaluation  Diet recommendations:  Minced & Moist Diet - IDDSI Level 5, Liquid Diet Level: Thin   Aspiration Precautions: 1 bite/sip at a time, Alternating bites/sips, Assistance with meals, Avoid talking while eating, Eliminate distractions, Frequent oral care, HOB to 90 degrees, Meds whole 1 at a time, Small bites/sips, and Wear oxygen during intake   General Precautions: Standard, aspiration, fall  Communication strategies:  none    Subjective     Pt awake sitting in bed upon entering. Agreeable to ST.  Patient goals: "I'm about to get up and move to the chair."     Pain/Comfort:       Respiratory Status: High flow, flow 5 L/min, concentration  %    Objective:     Has the patient been evaluated by SLP for swallowing?   Yes  Keep patient NPO? No   Current Respiratory Status:        Pt observed eating dry cracker and diced peaches in thin juice. Pt w/ large bites and multiple bites prior to clearing oral cavity. Slowed mastication and bolus control/manipulation w/ large amounts of solids in oral cavity. Pt w/ mild oral residue after trials of cracker and peaches; cleared independently w/ liquid wash. Pt cued to take smaller bites and 1 bite at a time; pt responding to cues "That's how I normally eat." SLP provided ed re swallowing safety and risks for aspiration. Swallowing precautions reviewed 2 times prior to leaving pt's room; pt expressing understanding.    It should be noted that during visit, pt w/ poor attention, topic maintenance, and intermittently orientation. Pt suspected to have cognitive-linguistic deficits.    Assessment:     Peyman Gr is a 67 y.o. male with an SLP diagnosis of Dysphagia.  He presents with improved airway protection w/ complex " textures; no overt s/s aspiration noted during trials. He continues to require cues for safety w/ complex textures. Pt continues w/ difficulties w/ mastication and bolus manipulation. Rec continue level 5- minced and moist diet. Rec pt also complete cognitive-linguistic eval to address perceived cognitive deficits.    Goals:   Multidisciplinary Problems       SLP Goals          Problem: SLP    Goal Priority Disciplines Outcome   SLP Goal     SLP Ongoing, Progressing   Description: 1. Pt will tolerate PO trials of puree and thin liquid w/o overt s/s aspiration and w/ adequate oral clearance during >90% of trials -MET  2. Pt will participate in ongoing diagnostic dysphagia tx in order to determine LRD  3. Pt will tolerate pureed diet w/ thin liquids w/o overt s/s aspiration and w/ adequate oral clearance w/ >90% of PO intake -MET  4. Pt will tolerate level 6 diet w/ thin liquids w/ adequate bolus manipulation and oral clearance w/o overt s/s aspiration during >90% of PO intake  5. Pt will participate in cognitive-linguistic evaluation                       Plan:     Patient to be seen:  5 x/week   Plan of Care expires:     Plan of Care reviewed with:  patient, other (see comments) (nursing)   SLP Follow-Up:  Yes       Discharge recommendations:  rehabilitation facility   Barriers to Discharge:   TBD    Time Tracking:     SLP Treatment Date:   01/12/23  Speech Start Time:  0906  Speech Stop Time:  0925     Speech Total Time (min):  19 min    Billable Minutes: Treatment Swallowing Dysfunction 19 min    01/12/2023

## 2023-01-12 NOTE — PLAN OF CARE
Plan of care discussed with patient and spouse at bedside.    Problem: Infection  Goal: Absence of Infection Signs and Symptoms  Outcome: Ongoing, Progressing     Problem: Adult Inpatient Plan of Care  Goal: Plan of Care Review  Outcome: Ongoing, Progressing  Goal: Patient-Specific Goal (Individualized)  Outcome: Ongoing, Progressing  Goal: Absence of Hospital-Acquired Illness or Injury  Outcome: Ongoing, Progressing  Goal: Optimal Comfort and Wellbeing  Outcome: Ongoing, Progressing  Goal: Readiness for Transition of Care  Outcome: Ongoing, Progressing     Problem: Noninvasive Ventilation Acute  Goal: Effective Unassisted Ventilation and Oxygenation  Outcome: Ongoing, Progressing     Problem: Skin Injury Risk Increased  Goal: Skin Health and Integrity  Outcome: Ongoing, Progressing     Problem: Fall Injury Risk  Goal: Absence of Fall and Fall-Related Injury  Outcome: Ongoing, Progressing

## 2023-01-12 NOTE — RESPIRATORY THERAPY
01/11/23 1938   Patient Assessment/Suction   Level of Consciousness (AVPU) alert   Respiratory Effort Unlabored   Expansion/Accessory Muscles/Retractions no use of accessory muscles   All Lung Fields Breath Sounds coarse   PRE-TX-O2   Device (Oxygen Therapy) high flow nasal cannula   $ Is the patient on Low Flow Oxygen? Yes   Flow (L/min) 5   SpO2 95 %   Pulse Oximetry Type Continuous   $ Pulse Oximetry - Multiple Charge Pulse Oximetry - Multiple   Pulse 80   Resp (!) 24   Aerosol Therapy   $ Aerosol Therapy Charges Aerosol Treatment   Daily Review of Necessity (SVN) completed   Respiratory Treatment Status (SVN) given   Treatment Route (SVN) mask;oxygen   Patient Position (SVN) HOB elevated   Post Treatment Assessment (SVN) breath sounds unchanged   Signs of Intolerance (SVN) none   Breath Sounds Post-Respiratory Treatment   Throughout All Fields Post-Treatment no change   Post-treatment Heart Rate (beats/min) 87   Post-treatment Resp Rate (breaths/min) 15   Education   $ Education Bronchodilator;DME Nebulizer;DME Oxygen;15 min

## 2023-01-13 LAB
ALBUMIN SERPL BCP-MCNC: 2.8 G/DL (ref 3.5–5.2)
ALP SERPL-CCNC: 87 U/L (ref 55–135)
ALT SERPL W/O P-5'-P-CCNC: 40 U/L (ref 10–44)
ANION GAP SERPL CALC-SCNC: 9 MMOL/L (ref 8–16)
AST SERPL-CCNC: 31 U/L (ref 10–40)
BASOPHILS # BLD AUTO: 0.06 K/UL (ref 0–0.2)
BASOPHILS NFR BLD: 0.5 % (ref 0–1.9)
BILIRUB SERPL-MCNC: 0.7 MG/DL (ref 0.1–1)
BUN SERPL-MCNC: 21 MG/DL (ref 8–23)
CALCIUM SERPL-MCNC: 8.6 MG/DL (ref 8.7–10.5)
CHLORIDE SERPL-SCNC: 101 MMOL/L (ref 95–110)
CO2 SERPL-SCNC: 25 MMOL/L (ref 23–29)
CREAT SERPL-MCNC: 0.9 MG/DL (ref 0.5–1.4)
DIFFERENTIAL METHOD: ABNORMAL
EOSINOPHIL # BLD AUTO: 0.1 K/UL (ref 0–0.5)
EOSINOPHIL NFR BLD: 1.1 % (ref 0–8)
ERYTHROCYTE [DISTWIDTH] IN BLOOD BY AUTOMATED COUNT: 13.5 % (ref 11.5–14.5)
EST. GFR  (NO RACE VARIABLE): >60 ML/MIN/1.73 M^2
GLUCOSE SERPL-MCNC: 94 MG/DL (ref 70–110)
HCT VFR BLD AUTO: 33.3 % (ref 40–54)
HGB BLD-MCNC: 11.5 G/DL (ref 14–18)
IMM GRANULOCYTES # BLD AUTO: 0.41 K/UL (ref 0–0.04)
IMM GRANULOCYTES NFR BLD AUTO: 3.7 % (ref 0–0.5)
LYMPHOCYTES # BLD AUTO: 1.3 K/UL (ref 1–4.8)
LYMPHOCYTES NFR BLD: 11.9 % (ref 18–48)
MAGNESIUM SERPL-MCNC: 1.8 MG/DL (ref 1.6–2.6)
MCH RBC QN AUTO: 31.7 PG (ref 27–31)
MCHC RBC AUTO-ENTMCNC: 34.5 G/DL (ref 32–36)
MCV RBC AUTO: 92 FL (ref 82–98)
MONOCYTES # BLD AUTO: 1.4 K/UL (ref 0.3–1)
MONOCYTES NFR BLD: 12.6 % (ref 4–15)
NEUTROPHILS # BLD AUTO: 7.8 K/UL (ref 1.8–7.7)
NEUTROPHILS NFR BLD: 70.2 % (ref 38–73)
NRBC BLD-RTO: 0 /100 WBC
PLATELET # BLD AUTO: 325 K/UL (ref 150–450)
PMV BLD AUTO: 9.9 FL (ref 9.2–12.9)
POTASSIUM SERPL-SCNC: 3.7 MMOL/L (ref 3.5–5.1)
PROCALCITONIN SERPL IA-MCNC: 0.2 NG/ML (ref 0–0.5)
PROT SERPL-MCNC: 6.5 G/DL (ref 6–8.4)
RBC # BLD AUTO: 3.63 M/UL (ref 4.6–6.2)
SODIUM SERPL-SCNC: 135 MMOL/L (ref 136–145)
WBC # BLD AUTO: 11.13 K/UL (ref 3.9–12.7)

## 2023-01-13 PROCEDURE — 63600175 PHARM REV CODE 636 W HCPCS: Performed by: FAMILY MEDICINE

## 2023-01-13 PROCEDURE — 25000003 PHARM REV CODE 250

## 2023-01-13 PROCEDURE — 25000003 PHARM REV CODE 250: Performed by: INTERNAL MEDICINE

## 2023-01-13 PROCEDURE — 97110 THERAPEUTIC EXERCISES: CPT

## 2023-01-13 PROCEDURE — 92526 ORAL FUNCTION THERAPY: CPT

## 2023-01-13 PROCEDURE — 36415 COLL VENOUS BLD VENIPUNCTURE: CPT | Performed by: FAMILY MEDICINE

## 2023-01-13 PROCEDURE — 21000000 HC CCU ICU ROOM CHARGE

## 2023-01-13 PROCEDURE — 99232 SBSQ HOSP IP/OBS MODERATE 35: CPT | Mod: ,,, | Performed by: INTERNAL MEDICINE

## 2023-01-13 PROCEDURE — 63600175 PHARM REV CODE 636 W HCPCS: Performed by: INTERNAL MEDICINE

## 2023-01-13 PROCEDURE — 83735 ASSAY OF MAGNESIUM: CPT | Performed by: FAMILY MEDICINE

## 2023-01-13 PROCEDURE — 97530 THERAPEUTIC ACTIVITIES: CPT | Mod: CQ

## 2023-01-13 PROCEDURE — S4991 NICOTINE PATCH NONLEGEND: HCPCS | Performed by: INTERNAL MEDICINE

## 2023-01-13 PROCEDURE — 80053 COMPREHEN METABOLIC PANEL: CPT | Performed by: FAMILY MEDICINE

## 2023-01-13 PROCEDURE — 99232 PR SUBSEQUENT HOSPITAL CARE,LEVL II: ICD-10-PCS | Mod: ,,, | Performed by: INTERNAL MEDICINE

## 2023-01-13 PROCEDURE — 25000003 PHARM REV CODE 250: Performed by: FAMILY MEDICINE

## 2023-01-13 PROCEDURE — 84145 PROCALCITONIN (PCT): CPT | Performed by: FAMILY MEDICINE

## 2023-01-13 PROCEDURE — 85025 COMPLETE CBC W/AUTO DIFF WBC: CPT | Performed by: FAMILY MEDICINE

## 2023-01-13 RX ORDER — SODIUM,POTASSIUM PHOSPHATES 280-250MG
2 POWDER IN PACKET (EA) ORAL
Status: DISCONTINUED | OUTPATIENT
Start: 2023-01-13 | End: 2023-01-24

## 2023-01-13 RX ORDER — LANOLIN ALCOHOL/MO/W.PET/CERES
800 CREAM (GRAM) TOPICAL
Status: DISCONTINUED | OUTPATIENT
Start: 2023-01-13 | End: 2023-01-24

## 2023-01-13 RX ORDER — QUETIAPINE FUMARATE 25 MG/1
25 TABLET, FILM COATED ORAL
Status: DISCONTINUED | OUTPATIENT
Start: 2023-01-13 | End: 2023-01-24

## 2023-01-13 RX ORDER — OXYCODONE AND ACETAMINOPHEN 7.5; 325 MG/1; MG/1
1 TABLET ORAL EVERY 6 HOURS PRN
Status: DISCONTINUED | OUTPATIENT
Start: 2023-01-13 | End: 2023-01-24

## 2023-01-13 RX ORDER — LOSARTAN POTASSIUM 50 MG/1
50 TABLET ORAL NIGHTLY
Status: DISCONTINUED | OUTPATIENT
Start: 2023-01-13 | End: 2023-01-13

## 2023-01-13 RX ORDER — LOSARTAN POTASSIUM 50 MG/1
50 TABLET ORAL
Status: DISCONTINUED | OUTPATIENT
Start: 2023-01-13 | End: 2023-01-24

## 2023-01-13 RX ORDER — ESCITALOPRAM OXALATE 10 MG/1
10 TABLET ORAL DAILY
Status: DISCONTINUED | OUTPATIENT
Start: 2023-01-13 | End: 2023-02-01 | Stop reason: HOSPADM

## 2023-01-13 RX ADMIN — TRAZODONE HYDROCHLORIDE 50 MG: 50 TABLET ORAL at 08:01

## 2023-01-13 RX ADMIN — HYDRALAZINE HYDROCHLORIDE 25 MG: 25 TABLET ORAL at 05:01

## 2023-01-13 RX ADMIN — HYDRALAZINE HYDROCHLORIDE 25 MG: 25 TABLET ORAL at 02:01

## 2023-01-13 RX ADMIN — HYDROCHLOROTHIAZIDE 25 MG: 25 TABLET ORAL at 08:01

## 2023-01-13 RX ADMIN — TICAGRELOR 90 MG: 90 TABLET ORAL at 08:01

## 2023-01-13 RX ADMIN — CHLORHEXIDINE GLUCONATE 15 ML: 1.2 RINSE ORAL at 08:01

## 2023-01-13 RX ADMIN — ASPIRIN 81 MG CHEWABLE TABLET 81 MG: 81 TABLET CHEWABLE at 08:01

## 2023-01-13 RX ADMIN — QUETIAPINE 25 MG: 25 TABLET ORAL at 08:01

## 2023-01-13 RX ADMIN — OXYCODONE AND ACETAMINOPHEN 1 TABLET: 7.5; 325 TABLET ORAL at 03:01

## 2023-01-13 RX ADMIN — LOSARTAN POTASSIUM 100 MG: 50 TABLET, FILM COATED ORAL at 08:01

## 2023-01-13 RX ADMIN — LOSARTAN POTASSIUM 50 MG: 50 TABLET, FILM COATED ORAL at 08:01

## 2023-01-13 RX ADMIN — OXYCODONE AND ACETAMINOPHEN 1 TABLET: 7.5; 325 TABLET ORAL at 11:01

## 2023-01-13 RX ADMIN — HYDRALAZINE HYDROCHLORIDE 10 MG: 20 INJECTION INTRAMUSCULAR; INTRAVENOUS at 03:01

## 2023-01-13 RX ADMIN — FAMOTIDINE 20 MG: 20 TABLET ORAL at 08:01

## 2023-01-13 RX ADMIN — NICOTINE 1 PATCH: 14 PATCH, EXTENDED RELEASE TRANSDERMAL at 08:01

## 2023-01-13 RX ADMIN — AMLODIPINE BESYLATE 10 MG: 5 TABLET ORAL at 08:01

## 2023-01-13 RX ADMIN — OXYCODONE AND ACETAMINOPHEN 1 TABLET: 7.5; 325 TABLET ORAL at 04:01

## 2023-01-13 RX ADMIN — CARVEDILOL 25 MG: 25 TABLET, FILM COATED ORAL at 08:01

## 2023-01-13 RX ADMIN — ATORVASTATIN CALCIUM 20 MG: 20 TABLET, FILM COATED ORAL at 08:01

## 2023-01-13 RX ADMIN — Medication 800 MG: at 07:01

## 2023-01-13 RX ADMIN — OXYCODONE HYDROCHLORIDE AND ACETAMINOPHEN 1 TABLET: 7.5; 325 TABLET ORAL at 10:01

## 2023-01-13 RX ADMIN — OXYCODONE AND ACETAMINOPHEN 1 TABLET: 7.5; 325 TABLET ORAL at 08:01

## 2023-01-13 RX ADMIN — ENOXAPARIN SODIUM 40 MG: 100 INJECTION SUBCUTANEOUS at 11:01

## 2023-01-13 RX ADMIN — POTASSIUM BICARBONATE 50 MEQ: 977.5 TABLET, EFFERVESCENT ORAL at 08:01

## 2023-01-13 RX ADMIN — HYDRALAZINE HYDROCHLORIDE 25 MG: 25 TABLET ORAL at 10:01

## 2023-01-13 RX ADMIN — CARVEDILOL 25 MG: 25 TABLET, FILM COATED ORAL at 04:01

## 2023-01-13 RX ADMIN — ESCITALOPRAM OXALATE 10 MG: 10 TABLET ORAL at 08:01

## 2023-01-13 NOTE — PLAN OF CARE
Problem: Infection  Goal: Absence of Infection Signs and Symptoms  Outcome: Ongoing, Progressing     Problem: Adult Inpatient Plan of Care  Goal: Plan of Care Review  Outcome: Ongoing, Progressing  Goal: Patient-Specific Goal (Individualized)  Outcome: Ongoing, Progressing  Goal: Absence of Hospital-Acquired Illness or Injury  Outcome: Ongoing, Progressing  Goal: Optimal Comfort and Wellbeing  Outcome: Ongoing, Progressing  Goal: Readiness for Transition of Care  Outcome: Ongoing, Progressing     Problem: Noninvasive Ventilation Acute  Goal: Effective Unassisted Ventilation and Oxygenation  Outcome: Ongoing, Progressing     Problem: Skin Injury Risk Increased  Goal: Skin Health and Integrity  Outcome: Ongoing, Progressing     Problem: Fall Injury Risk  Goal: Absence of Fall and Fall-Related Injury  Outcome: Ongoing, Progressing

## 2023-01-13 NOTE — PT/OT/SLP PROGRESS
"Speech Language Pathology Treatment    Patient Name:  Peyman Gr   MRN:  [id number]  Admitting Diagnosis: STEMI involving right coronary artery    Recommendations:                 General Recommendations:  Dysphagia therapy and Cognitive-linguistic evaluation  Diet recommendations:  Minced & Moist Diet - IDDSI Level 5, Liquid Diet Level: Thin   Aspiration Precautions:  1 bite/sip at a time, Alternating bites/sips, Assistance with meals, Avoid talking while eating, Eliminate distractions, Frequent oral care, HOB to 90 degrees, Meds whole 1 at a time, Small bites/sips, and Wear oxygen during intake     General Precautions: Standard, aspiration, fall  Communication strategies:  none    Subjective     Pt awake, laying in bed w/ wife at bedside. Agreeable to PO trials.  Patient goals: "I'm not feeling as well as I was yesterday. I feel weaker today, but  I'm still doing what everyone is telling me to do."     Pain/Comfort:       Respiratory Status: High flow, flow 5 L/min, concentration  %    Objective:     Has the patient been evaluated by SLP for swallowing?   Yes  Keep patient NPO? No   Current Respiratory Status:        Pt observed w/ self-regulated PO trials of diced peaches in thin juice, bites of dry cracker, and thin water via straw sips. Pt was able to recall small bites, slow rate of intake, and alternating bite/sips swallowing precautions; however, pt continues to take multiple large bites before clearing prior bite in addition to talking w/ food in mouth. He requires moderate level cues for implementation of strategies. Poor bolus manipulation, prolonged mastication, and inadequate oral clearance noted across trials. Lingual and mandibular weakness observed during trials.    Pt's wife at bedside reporting pt at baseline for cognitive-linguistic status. Ed provided re pt's oral dysphagia and recs for diet and swallowing precautions provided to pt's wife.    Assessment:     Peyman Gr is a 67 y.o. male " with an SLP diagnosis of moderate oral Dysphagia.  He presents with poor bolus manipulation, prolonged mastication, piecemeal deglutition, and inadequate oral clearance. He continues to require moderate levels of cuing to implement swallowing precautions. Rec pt remain on level 5 diet w/ thin liquids at this time. Pt may benefit from lingual and mandibular ROM/strengthening exercises to improve manipulation and clearance of complex textures. ST to continue current POC.    Goals:   Multidisciplinary Problems       SLP Goals          Problem: SLP    Goal Priority Disciplines Outcome   SLP Goal     SLP Ongoing, Progressing   Description: 1. Pt will tolerate PO trials of puree and thin liquid w/o overt s/s aspiration and w/ adequate oral clearance during >90% of trials -MET  2. Pt will participate in ongoing diagnostic dysphagia tx in order to determine LRD  3. Pt will tolerate pureed diet w/ thin liquids w/o overt s/s aspiration and w/ adequate oral clearance w/ >90% of PO intake -MET  4. Pt will tolerate level 6 diet w/ thin liquids w/ adequate bolus manipulation and oral clearance w/o overt s/s aspiration during >90% of PO intake  5. Pt will participate in cognitive-linguistic evaluation                       Plan:     Patient to be seen:  5 x/week   Plan of Care expires:     Plan of Care reviewed with:  patient, spouse   SLP Follow-Up:  Yes       Discharge recommendations:  rehabilitation facility   Barriers to Discharge:  None    Time Tracking:     SLP Treatment Date:   01/13/23  Speech Start Time:  1202  Speech Stop Time:  1225     Speech Total Time (min):  23 min    Billable Minutes: Treatment Swallowing Dysfunction 23 min    01/13/2023

## 2023-01-13 NOTE — CARE UPDATE
Cm spoke with [facility] and told [employer] denied rehab.     CM spoke with family and they will go with back up plan; [facility].    [facility] contacted about decision and they will submit for auth today. CM to follow.      16:27-[facility] Alert: YES response from [facility] re: Referral [id number] for patient in [facility] [id number]: Yes, willing to accept patient Will have bed for Monday, bogdan approval rec'd, had to use my last private room for isolation, he will need to be in obs  due to pneumonia and guidelines r/t covid

## 2023-01-13 NOTE — PT/OT/SLP PROGRESS
Physical Therapy Treatment    Patient Name:  Peyman Gr   MRN:  [id number]    Recommendations:     Discharge Recommendations: rehabilitation facility  Discharge Equipment Recommendations:  (TBD at next level of care)  Barriers to discharge:  increased assist with mobility    Assessment:     Peyman Gr is a 67 y.o. male admitted with a medical diagnosis of STEMI involving right coronary artery.  He presents with the following impairments/functional limitations: weakness, impaired endurance, impaired functional mobility, gait instability, impaired balance, decreased lower extremity function, decreased ROM, edema.    Pt agreeable to visit. Spouse present throughout session. RN reports high BP earlier and to take again before mobilizing pt. Blood pressure 114/55 with pt reporting that that is not normal and to take it again. BP taken again at 120/56. Pt agreeable to transfer to wheelchair. Pt required mod assist for bed mobility and max assist x 2 for sit to stand at RW. Pt brushed his teeth while sitting EOB with set up assist. Pt stand pivot transferred to wheelchair with max assist x 2 and RW with pt almost total to complete transfer as his knees began to buckle. Pt left sitting up in chair with all needs within reach and spouse present.    Rehab Prognosis: Fair; patient would benefit from acute skilled PT services to address these deficits and reach maximum level of function.    Recent Surgery: Procedure(s) (LRB):  Left heart cath (Left)  Insertion, Pacemaker, Temporary Transvenous  ANGIOGRAM, CORONARY ARTERY (N/A)  Percutaneous coronary intervention (N/A)  IVUS, Coronary 12 Days Post-Op    Plan:     During this hospitalization, patient to be seen daily to address the identified rehab impairments via gait training, therapeutic activities, therapeutic exercises and progress toward the following goals:    Plan of Care Expires:  02/09/23    Subjective     Chief Complaint: back pain  Patient/Family Comments/goals:  to get stronger  Pain/Comfort:  Pain Rating 1: 10/10  Location 1: back  Pain Addressed 1: Pre-medicate for activity, Reposition, Distraction      Objective:     Communicated with RN prior to session.  Patient found HOB elevated with telemetry, peripheral IV, oxygen, pulse ox (continuous), blood pressure cuff upon PT entry to room.     General Precautions: Standard, fall  Orthopedic Precautions: N/A  Braces:    Respiratory Status: Nasal cannula, flow 5 L/min     Functional Mobility:  Bed Mobility:     Supine to Sit: moderate assistance  Transfers:     Sit to Stand:  maximal assistance and of 2 persons with rolling walker  Bed to Chair: maximal assistance and of 2 persons with  rolling walker  using  Stand Pivot      AM-PAC 6 CLICK MOBILITY          Treatment & Education:  Pt educated on importance of time OOB, importance of intermittent mobility, safe techniques for transfers/ambulation, discharge recommendations/options, and use of call light for assistance and fall prevention.      Patient left up in chair with all lines intact, call button in reach, chair alarm on, RN notified, and spouse present..    GOALS:   Multidisciplinary Problems       Physical Therapy Goals          Problem: Physical Therapy    Goal Priority Disciplines Outcome Goal Variances Interventions   Physical Therapy Goal     PT, PT/OT Ongoing, Progressing     Description: Goals to be met by: 2023     Patient will increase functional independence with mobility by performin. Supine to sit with MInimal Assistance  2. Sit to stand transfer with Moderate Assistance  3. Bed to chair with Mod A  w/ol AD  3. Gait  x 10  feet with Minimal Assistance using Rolling Walker.                          Time Tracking:     PT Received On: 23  PT Start Time: 0911     PT Stop Time: 0945  PT Total Time (min): 34 min     Billable Minutes: Therapeutic Activity 34    Treatment Type: Treatment  PT/PTA: PTA     PTA Visit Number: 1     2023

## 2023-01-13 NOTE — PT/OT/SLP PROGRESS
Physical Therapy Treatment    Patient Name:  Peyman Gr   MRN:  [id number]    Recommendations:     Discharge Recommendations: rehabilitation facility  Discharge Equipment Recommendations:  (TBD at next level of care)  Barriers to discharge:  increased assist with mobility    Assessment:     Peyman Gr is a 67 y.o. male admitted with a medical diagnosis of STEMI involving right coronary artery.  He presents with the following impairments/functional limitations: weakness, impaired endurance, impaired functional mobility, gait instability, impaired balance, decreased lower extremity function, decreased ROM, edema.    RN requested assistance with getting pt back to bed as pt has requested several times. Pt required max assist x 2 for stand pivot transfer from wheelchair to bed, B knees wobbly and threatening to buckle. Pt transferred supine with mod assist x 2.     Rehab Prognosis: Fair; patient would benefit from acute skilled PT services to address these deficits and reach maximum level of function.    Recent Surgery: Procedure(s) (LRB):  Left heart cath (Left)  Insertion, Pacemaker, Temporary Transvenous  ANGIOGRAM, CORONARY ARTERY (N/A)  Percutaneous coronary intervention (N/A)  IVUS, Coronary 12 Days Post-Op    Plan:     During this hospitalization, patient to be seen daily to address the identified rehab impairments via gait training, therapeutic activities, therapeutic exercises and progress toward the following goals:    Plan of Care Expires:  02/09/23    Subjective     Chief Complaint: pt stating today is not a good day for him  Patient/Family Comments/goals: to get stronger  Pain/Comfort:  Pain Rating 1: 0/10  Location 1: back  Pain Addressed 1: Pre-medicate for activity, Reposition, Distraction      Objective:     Communicated with RN prior to session.  Patient found up in chair with telemetry, peripheral IV, oxygen, pulse ox (continuous), blood pressure cuff upon PT entry to room.     General  Precautions: Standard, fall  Orthopedic Precautions: N/A  Braces:    Respiratory Status: Nasal cannula, flow 5 L/min     Functional Mobility:  Bed Mobility:     Sit to Supine: moderate assistance and of 2 persons  Transfers:     Bed to Chair: maximal assistance and of 2 persons with  rolling walker  using  Stand Pivot      AM-PAC 6 CLICK MOBILITY          Treatment & Education:  Pt educated on importance of time OOB, importance of intermittent mobility, safe techniques for transfers/ambulation, discharge recommendations/options, and use of call light for assistance and fall prevention.      Patient left HOB elevated with all lines intact, call button in reach, bed alarm on, and spouse present..    GOALS:   Multidisciplinary Problems       Physical Therapy Goals          Problem: Physical Therapy    Goal Priority Disciplines Outcome Goal Variances Interventions   Physical Therapy Goal     PT, PT/OT Ongoing, Progressing     Description: Goals to be met by: 2023     Patient will increase functional independence with mobility by performin. Supine to sit with MInimal Assistance  2. Sit to stand transfer with Moderate Assistance  3. Bed to chair with Mod A  w/ol AD  3. Gait  x 10  feet with Minimal Assistance using Rolling Walker.                          Time Tracking:     PT Received On: 23  PT Start Time: 1106     PT Stop Time: 1114  PT Total Time (min): 8 min     Billable Minutes: Therapeutic Activity 8    Treatment Type: Treatment  PT/PTA: PTA     PTA Visit Number: 1     2023

## 2023-01-13 NOTE — PROGRESS NOTES
[facility]  Department of Cardiology  Progress Note      PATIENT NAME: Peyman Gr    MRN: [id number]  TODAY'S DATE: 01/13/2023  ADMIT DATE: 1/1/2023                          CONSULT REQUESTED BY: Ligia Fernandez MD    SUBJECTIVE     PRINCIPAL PROBLEM: STEMI involving right coronary artery    Interval history:  1/13/2023  Patient is resting comfortably in bed.  No acute distress.  Wife is at bedside.  He is alert and oriented.  Sinus rhythm on the monitor.  Remains on nasal cannula.  He has significant weakness in bilateral legs and is a full assist with transferring from bed to wheelchair.  He has had great improvement in mobility and strength in his upper extremities.  Encourage patient to sit up in bedside chair for at least 4 hours at a time.  No events on telemetry.  Patient had 1 episode of hypertension with the systolic BP greater than 200.  RN reports that it might not have been accurate reading and he was also in pain.  His blood pressure has been doing well since.    1/11/23  Patient is sitting in wheelchair eating lunch during examination.  Wife is at side.  He is alert and oriented, in no acute distress, sinus rhythm on the monitor, 5 L nasal cannula.  He continues to have generalized weakness but no focal deficits at this time.    1/10/23  Patient is resting comfortably in bed.  He remains extubated.  BP elevated overnight.   Patient sat on side of the bed with Physical Therapy today.     1/9/23  Seen and examined patient in ICU today. Pt is extubated. Awake alert. Denies any chest pain.     1/7/23  Patient was extubated today around noon.  He is on BiPAP arousable and responsive to commands.  Cardene drip has been started due to hypertension.  Nurse reports patient moves all extremities with right-sided weakness noted.      1/6/23:  Patient lying in bed intubated and sedated. Vitals are stable.     1/5/23:  Patient seen lying in bed off sedation. Patient noted to be moving and appeared  agitated. Neuro at bedside for exam. He is hypertensive and tachycardic without sedation.     1/4/23:  Patient seen lying in bed on sedation this morning.  No distress noted.  Vitals were stable at that time.  Of note the patient has become hypertensive and tachycardic this afternoon.  Patient is currently receiving full sedation due to jerking and sweating.    01/03/2023:    Patient remains intubated and sedated on mechanical ventilation. FIO2 is 60% with 5 PEEP. RN attempted to wean sedation but patient keeps triggering the vent.  He has cough and pupillary reflex.  RN states she has seen him move all 4 extremities but not to command.  He remains unresponsive. He is on Levophed, propofol, fentanyl, and vasopressin. He was intermittently paced for approximately 5 min this morning when he was coughing and gagging on the vent. He remains in critical condition.  UOP is 275 today.       01/02/2023:  Seen and examined patient in the ICU today.  Off bicarb drip.  Currently on 2 sedatives and Levophed.     REASON FOR CONSULT:  STEMI      HPI:  67-year-old male with past medical history of peripheral vascular disease, right subclavian stenosis, chronic smoker, hypertension, hyperlipidemia presented to oxygen [facility] with 2 hour history of chest pain, shortness of breath.  ECG showed junctional bradycardia, complete heart block with inferior and anterolateral ST elevations.  STEMI code was called to which I responded immediately and agreed to transfer the patient to [facility] cath lab for emergent cardiac catheterization.  However, patient unfortunately went into cardiac arrest at [facility] with VT, VF, asystole requiring resuscitation and intubation.  Patient was resuscitated for about 40 minutes ROSC was achieved.  Patient was moving extremities at the time as per the ER.  Patient was then transferred to [facility] ER and patient again had cardiac arrest upon  arrival to the ER which required resuscitation for 30 minutes.  Patient was in PEA initially and ROSC was achieved subsequently however patient was dependent on transcutaneous pacing.  Patient blood pressure was very low and could not be obtained with a BP cuff.  Patient was unable to be transferred to the cath lab at that point due to significant hemodynamic instability. Patient was started on pressors.  A-line was placed in the ED and blood pressure eventually improved with high doses of vasopressin, norepinephrine, sodium bicarbonate and dobutamine infusions.  Discussed with patient's family members regarding the patient's critical condition.  Once the blood pressure was stabilized on the pressors, patient was brought to the cath lab for transvenous pacemaker placement and coronary angiogram.  Benefits and risks of the procedure explained to patient's family and patient's family  agreed for the procedure.      Review of patient's allergies indicates:  No Known Allergies    Past Medical History:   Diagnosis Date    Arthritis     Chronic back pain     Chronic neck pain     Dry gangrene     RIGHT LITTLE TOE    Hypercholesteremia     Hypertension     Insomnia     Multiple falls     S/P BACK SURGERY- 1 FALL    PAD (peripheral artery disease)     Personal history of colonic polyps     PVD (peripheral vascular disease)      Past Surgical History:   Procedure Laterality Date    ANGIOGRAPHY OF LOWER EXTREMITY N/A 09/24/2020    Procedure: Angiogram Extremity Unilateral;  Surgeon: Luke Cabello MD;  Location: [facility] CATH;  Service: Cardiology;  Laterality: N/A;    BACK SURGERY      BUNIONECTOMY Bilateral     CARPAL TUNNEL RELEASE Bilateral     CHOLECYSTECTOMY      COLONOSCOPY N/A 5/9/2022    Procedure: COLONOSCOPY;  Surgeon: Braydon Durand MD;  Location: [facility] ENDO;  Service: Endoscopy;  Laterality: N/A;    COLONOSCOPY W/ POLYPECTOMY      CORONARY ANGIOGRAPHY N/A 1/1/2023    Procedure: ANGIOGRAM, CORONARY ARTERY;   Surgeon: Stefany Elizondo MD;  Location: [facility] CATH/EP LAB;  Service: Cardiology;  Laterality: N/A;    CORONARY ARTERY BYPASS GRAFT      CREATION OF BYPASS FROM INTERNAL CAROTID ARTERY TO SUBCLAVIAN ARTERY Right 12/27/2021    Procedure: CREATION, BYPASS, ARTERIAL, INTERNAL CAROTID TO SUBCLAVIAN;  Surgeon: Jeovany Goins MD;  Location: [facility] OR;  Service: Vascular;  Laterality: Right;    ELBOW ARTHROPLASTY Right     ELBOW SURGERY      INSERTION, PACEMAKER, TEMPORARY TRANSVENOUS  1/1/2023    Procedure: Insertion, Pacemaker, Temporary Transvenous;  Surgeon: Stefany Elizondo MD;  Location: [facility] CATH/EP LAB;  Service: Cardiology;;    IVUS, CORONARY  1/1/2023    Procedure: IVUS, Coronary;  Surgeon: Stefany Elizondo MD;  Location: [facility] CATH/EP LAB;  Service: Cardiology;;    LEFT HEART CATHETERIZATION Left 1/1/2023    Procedure: Left heart cath;  Surgeon: Stefany Elizondo MD;  Location: [facility] CATH/EP LAB;  Service: Cardiology;  Laterality: Left;    MINIMALLY INVASIVE FORAMINOTOMY OF  SPINE N/A 11/15/2018    Procedure: FORAMINOTOMY, SPINE, MINIMALLY INVASIVE DECOMPRESSION L4-5;  Surgeon: Iván Stevenson Jr., MD;  Location: [facility] OR;  Service: Orthopedics;  Laterality: N/A;    NECK SURGERY      acf    PERCUTANEOUS CORONARY INTERVENTION, ARTERY N/A 1/1/2023    Procedure: Percutaneous coronary intervention;  Surgeon: Stefany Elizondo MD;  Location: [facility] CATH/EP LAB;  Service: Cardiology;  Laterality: N/A;    PERCUTANEOUS TRANSLUMINAL ANGIOPLASTY (PTA) OF PERIPHERAL VESSEL Right 05/01/2020    Procedure: PTA, PERIPHERAL VESSEL of right lower extremity;  Surgeon: Luke Cabello MD;  Location: [facility] CATH;  Service: Cardiology;  Laterality: Right;    PERCUTANEOUS TRANSLUMINAL ANGIOPLASTY (PTA) OF PERIPHERAL VESSEL Left 09/10/2020    Procedure: PTA, PERIPHERAL VESSEL, Left lower extremity;  Surgeon: Luke Cabello MD;  Location: [facility] CATH;  Service: Cardiology;  Laterality: Left;     PERCUTANEOUS TRANSLUMINAL ANGIOPLASTY (PTA) OF PERIPHERAL VESSEL Left 2021    [employer] and SFA     Social History     Tobacco Use    Smoking status: Former     Packs/day: 2.00     Years: 60.00     Pack years: 120.00     Types: Cigarettes     Quit date:      Years since quittin.0    Smokeless tobacco: Never   Substance Use Topics    Alcohol use: No    Drug use: No        REVIEW OF SYSTEMS  Review of Systems     Constitutional: + fatigue and weakness; Negative for chills, and fever.   Eyes: No double vision, No blurred vision  Neuro: No headaches, No dizziness  Respiratory: Negative for cough, shortness of breath and wheezing.    Cardiovascular: Negative for chest pain. Negative for palpitations and leg swelling.   Gastrointestinal: Negative for abdominal pain, No melena, diarrhea, nausea and vomiting.   Genitourinary: Negative for dysuria and frequency, Negative for hematuria  Skin: Negative for bruising, Negative for edema or discoloration noted.      OBJECTIVE     VITAL SIGNS (Most Recent)  Temp: 99.4 °F (37.4 °C) (23 0745)  Pulse: 86 (23 1500)  Resp: 18 (23)  BP: (!) 166/89 (23 1500)  SpO2: (!) 94 % (23 1400)    VENTILATION STATUS  Resp: 18 (23)  SpO2: (!) 94 % (23 1400)       I & O (Last 24H):  Intake/Output Summary (Last 24 hours) at 2023 1626  Last data filed at 2023 0601  Gross per 24 hour   Intake 1010 ml   Output 2050 ml   Net -1040 ml         WEIGHTS  Wt Readings from Last 1 Encounters:   23 0400 115.4 kg (254 lb 6.6 oz)   23 0400 117.6 kg (259 lb 4.2 oz)   23 0500 115.3 kg (254 lb 3.1 oz)   23 0615 105.4 kg (232 lb 5.8 oz)   23 0400 102.5 kg (225 lb 15.5 oz)   23 0053 102.5 kg (225 lb 15.5 oz)   23 0800 99.8 kg (220 lb 0.3 oz)   23 0341 99.8 kg (220 lb)       PHYSICAL EXAM  GENERAL: well built, generalized weakness  NECK: No JVD.   CARDIAC: Regular rate and rhythm, S1, S2 regular;    CHEST ANATOMY: normal.   LUNGS:  Diminished in lower lobes   ABDOMEN: Soft.  Normal bowel sounds.   EXTREMITIES:  Improved trace edema in upper extremities  SKIN: Skin without lesions, moist, well perfused.   CNS: awake, alert. Follows commands.     HOME MEDICATIONS:  Current Facility-Administered Medications on File Prior to Encounter   Medication Dose Route Frequency Provider Last Rate Last Admin    0.9%  NaCl infusion   Intravenous Continuous Braydon Durand MD   Stopped at 05/09/22 1011     Current Outpatient Medications on File Prior to Encounter   Medication Sig Dispense Refill    albuterol (PROVENTIL/VENTOLIN HFA) 90 mcg/actuation inhaler Inhale 1-2 puffs into the lungs every 6 (six) hours as needed for Shortness of Breath. Rescue 8 g 0    amLODIPine (NORVASC) 10 MG tablet Take 1 tablet (10 mg total) by mouth once daily. 30 tablet 1    amLODIPine (NORVASC) 5 MG tablet Take 5 mg by mouth once daily.      aspirin 81 MG Chew Take 81 mg by mouth once daily.      carvediloL (COREG) 25 MG tablet Take 25 mg by mouth 2 (two) times daily with meals.      clopidogreL (PLAVIX) 75 mg tablet Take 75 mg by mouth once daily. On hold for sx on 12/27/21      ezetimibe (ZETIA) 10 mg tablet Take 10 mg by mouth once daily.      hydroCHLOROthiazide (HYDRODIURIL) 12.5 MG Tab Take 1 tablet (12.5 mg total) by mouth once daily. 30 tablet 1    HYDROcodone-acetaminophen (NORCO)  mg per tablet Take 1 tablet by mouth every 8 (eight) hours as needed for Pain.      mupirocin (BACTROBAN) 2 % ointment Apply topically 3 (three) times daily. 22 g 0    oxyCODONE-acetaminophen (PERCOCET) 7.5-325 mg per tablet Take 1 tablet by mouth 2 (two) times daily as needed.      traMADoL (ULTRAM) 50 mg tablet Take 50 mg by mouth every 6 (six) hours as needed.      valsartan (DIOVAN) 160 MG tablet Take 160 mg by mouth once daily.         SCHEDULED MEDS:   amLODIPine  10 mg Oral Daily    aspirin  81 mg Oral Daily    atorvastatin  20 mg Oral QHS     carvediloL  25 mg Oral BID WM    chlorhexidine  15 mL Mouth/Throat BID    enoxparin  40 mg Subcutaneous Q24H    famotidine  20 mg Per OG tube BID    hydrALAZINE  25 mg Oral Q8H    hydroCHLOROthiazide  25 mg Oral Daily    losartan  100 mg Oral Daily    nicotine  1 patch Transdermal Daily    ticagrelor  90 mg Oral BID    traZODone  50 mg Oral QHS       CONTINUOUS INFUSIONS:        PRN MEDS:acetaminophen, atropine, calcium gluconate IVPB, calcium gluconate IVPB, calcium gluconate IVPB, dextrose 10%, dextrose 10%, EPINEPHrine, glucagon (human recombinant), glucose, glucose, hydrALAZINE, hydrALAZINE, levalbuterol, magnesium oxide, magnesium oxide, magnesium sulfate IVPB, magnesium sulfate IVPB, melatonin, morphine, naloxone, ondansetron, oxyCODONE-acetaminophen, polyethylene glycol, potassium bicarbonate, potassium bicarbonate, potassium bicarbonate, potassium chloride in water **AND** potassium chloride in water **AND** potassium chloride in water, potassium, sodium phosphates, potassium, sodium phosphates, potassium, sodium phosphates, senna-docusate 8.6-50 mg, simethicone, sodium chloride 0.9%, sodium phosphate IVPB, sodium phosphate IVPB, sodium phosphate IVPB    LABS AND DIAGNOSTICS     CBC LAST 3 DAYS  Recent Labs   Lab 01/11/23  0556 01/12/23  0407 01/13/23  0514   WBC 9.58 10.05 11.13   RBC 3.73* 3.66* 3.63*   HGB 11.7* 11.3* 11.5*   HCT 34.8* 34.2* 33.3*   MCV 93 93 92   MCH 31.4* 30.9 31.7*   MCHC 33.6 33.0 34.5   RDW 14.1 13.7 13.5    323 325   MPV 9.6 9.8 9.9   GRAN 68.2  6.5 67.5  6.8 70.2  7.8*   LYMPH 12.9*  1.2 14.3*  1.4 11.9*  1.3   MONO 13.2  1.3* 13.4  1.4* 12.6  1.4*   BASO 0.03 0.03 0.06   NRBC 0 0 0         COAGULATION LAST 3 DAYS  No results for input(s): LABPT, INR, APTT in the last 168 hours.    CHEMISTRY LAST 3 DAYS  Recent Labs   Lab 01/08/23  0513 01/08/23  1619 01/09/23  0310 01/09/23  1102 01/10/23  0338 01/11/23  0556 01/12/23  0407 01/13/23  0514   NA  --   --    < >  --     < > 136 135* 135*   K  --   --    < >  --    < > 3.9 3.6 3.7   CL  --   --    < >  --    < > 100 96 101   CO2  --   --    < >  --    < > 27 28 25   ANIONGAP  --   --    < >  --    < > 9 11 9   BUN  --   --    < >  --    < > 37* 29* 21   CREATININE  --   --    < >  --    < > 0.9 0.9 0.9   GLU  --   --    < >  --    < > 96 96 94   CALCIUM  --   --    < >  --    < > 8.3* 8.2* 8.6*   PH 7.455* 7.511*  --  7.541*  --   --   --   --    MG  --   --    < >  --    < > 1.9 2.0 1.8   ALBUMIN  --   --    < >  --    < > 2.4* 2.7* 2.8*   PROT  --   --    < >  --    < > 6.1 6.4 6.5   ALKPHOS  --   --    < >  --    < > 78 79 87   ALT  --   --    < >  --    < > 43 41 40   AST  --   --    < >  --    < > 25 29 31   BILITOT  --   --    < >  --    < > 0.8 0.8 0.7    < > = values in this interval not displayed.         CARDIAC PROFILE LAST 3 DAYS  No results for input(s): BNP, CPK, CPKMB, LDH, TROPONINI, TROPONINIHS in the last 168 hours.    ENDOCRINE LAST 3 DAYS  Recent Labs   Lab 01/13/23  0514   PROCAL 0.20         LAST ARTERIAL BLOOD GAS  ABG  Recent Labs   Lab 01/09/23  1102   PH 7.541*   PO2 59*   PCO2 29.7*   HCO3 25.5   BE 3         LAST 7 DAYS MICROBIOLOGY   Microbiology Results (last 7 days)       Procedure Component Value Units Date/Time    Blood culture [[id number]] Collected: 01/03/23 0954    Order Status: Completed Specimen: Blood Updated: 01/08/23 1032     Blood Culture, Routine No growth after 5 days.    Blood culture [[id number]] Collected: 01/03/23 0954    Order Status: Completed Specimen: Blood Updated: 01/08/23 1032     Blood Culture, Routine No growth after 5 days.            MOST RECENT IMAGING  X-Ray Chest AP Portable  HISTORY: SHORTNESS OF BREATH    FINDINGS: Portable chest radiograph at 1101 hours compared to 01/07/2023 shows interval removal of the ET and NG tubes, and transvenous pacing lead via IVC approach. The cardiac silhouette and pulmonary vasculature are stable and within normal limits, with aortic  vascular calcifications.    The lungs are normally expanded, with left basilar opacities suggesting subsegmental atelectasis. There is no consolidation, large pleural effusion, evidence of pulmonary edema, or pneumothorax.    IMPRESSION: Interval removal of support devices as described, with left basilar opacities suggesting atelectasis.    Electronically signed by:  Hema Tafoya MD  1/9/2023 12:04 PM CST Workstation: [id number]      ECHOCARDIOGRAM RESULTS (last 5)  No results found for this or any previous visit.      CURRENT/PREVIOUS VISIT EKG  Results for orders placed or performed during the hospital encounter of 01/01/23   EKG 12-lead    Collection Time: 01/12/23  3:01 PM    Narrative    Test Reason : R94.31,    Vent. Rate : 075 BPM     Atrial Rate : 075 BPM     P-R Int : 128 ms          QRS Dur : 078 ms      QT Int : 384 ms       P-R-T Axes : 065 010 091 degrees     QTc Int : 428 ms    Sinus rhythm with Premature supraventricular complexes  Possible Left atrial enlargement  Possible Inferior infarct ,age undetermined  Cannot rule out Anterior infarct ,age undetermined  Abnormal ECG  When compared with ECG of 01-JAN-2023 09:59,  Sinus rhythm has replaced Electronic ventricular pacemaker    Referred By: IRENE MOSER           Confirmed By:      Tele: NSVT 6-7 beats.       ASSESSMENT/PLAN:     Active Hospital Problems    Diagnosis    *STEMI involving right coronary artery    Pneumonia of right lung due to methicillin susceptible Staphylococcus aureus (MSSA)    Shock liver    Cardiogenic shock    Cardiac arrest    Acute respiratory failure with hypoxia and hypercarbia    Leukocytosis    Anemia    History of tobacco abuse    Primary hypertension    PVD (peripheral vascular disease) with claudication    PAD (peripheral artery disease)       ASSESSMENT & PLAN:   STEMI- inferior wall MI complicated by complete heart block and RV shock  Cardiac arrest status post resuscitation and intubation (patient was resuscitated  for 60-70 minutes in total)  Peripheral vascular disease  Chronic smoking  Hypertension  hyperlipidemia  Ostial Left main disease     RECOMMENDATIONS:    Continue DAPT- aspirin and Brilinta  Increase Lipitor to 40 mg.  Heart healthy diet.  Continue amlodipine, losartan, carvedilol, hydralazine, and hydrochlorothiazide.  Monitor blood pressure.   Patient has residual left main and OM lesions.  Further revascularization will be planned based on the recovery in the next few days.  Physical therapy.  Increase activity as tolerated.  Encourage patient to set up in the chair for at least 4 hours in the day.    Mitra Moore NP  Department of Cardiology  [facility]  01/13/2023      Pt was hallucinating today in the evening and was moved to different room closer to nursing station. Pt still not able to stand and walk however improving strength progressively. Pt will require intervention of ostial left main (PCI vs CABG). Will discuss treatment options with pt's family based on pt's clinical improvement over the weekend.        I have personally interviewed and examined the patient, I have reviewed the Nurse Practitioner's history and physical, assessment, and plan. I have personally evaluated the patient at bedside and agree with the findings and made appropriate changes as necessary in recommendations.    Stefany Elizondo MD  Department of Cardiology  [facility]  1/13/23

## 2023-01-13 NOTE — PT/OT/SLP PROGRESS
Occupational Therapy   Treatment    Name: Peyman Gr  MRN: [id number]  Admitting Diagnosis:  STEMI involving right coronary artery  12 Days Post-Op    Recommendations:     Discharge Recommendations: rehabilitation facility  Discharge Equipment Recommendations:   (TBD)  Barriers to discharge:       Assessment:     Peyman Gr is a 67 y.o. male with a medical diagnosis of STEMI involving right coronary artery.  He presents with the following Performance deficits affecting function are weakness, impaired endurance, impaired self care skills, impaired functional mobility, gait instability, impaired balance, decreased lower extremity function, edema, decreased ROM.     Rehab Prognosis:  Good; patient would benefit from acute skilled OT services to address these deficits and reach maximum level of function.       Plan:     Patient to be seen 6 x/week to address the above listed problems via self-care/home management, therapeutic activities, therapeutic exercises  Plan of Care Expires: 02/10/23  Plan of Care Reviewed with: patient, spouse    Subjective     Pain/Comfort:  Pain Rating 1: 8/10  Location 1: back  Pain Addressed 1: Pre-medicate for activity, Reposition, Distraction  Pain Rating Post-Intervention 1: 8/10    Objective:     Communicated with: nurse prior to session.  Patient found up in chair with telemetry, peripheral IV, oxygen, pulse ox (continuous), blood pressure cuff upon OT entry to room.    General Precautions: Standard, fall    Orthopedic Precautions:N/A  Braces: N/A  Respiratory Status: Nasal cannula, flow 5 L/min     Occupational Performance:   - Pt participated in BUE exercises with red theraband, 2x10 reps involving shoulder and elbow joints. No rest break required. Pt instructed to stay sitting up in a chair until lunch time      [facility] 6 Click ADL:      Treatment & Education:  OT role and POC, safety awareness, importance of OOB activity.     Patient left up in chair with all lines intact, call  button in reach, chair alarm on, nurse notified, and spouse present    GOALS:   Multidisciplinary Problems       Occupational Therapy Goals          Problem: Occupational Therapy    Goal Priority Disciplines Outcome Interventions   Occupational Therapy Goal     OT, PT/OT Ongoing, Progressing    Description: Goals to be met by: 2/10/2023    Patient will increase functional independence with ADLs by performing:    UE Dressing with Stand-by Assistance.  LE Dressing with Stand-by Assistance.  Grooming while seated with Stand-by Assistance.  Toileting from bedside commode with Minimal Assistance for hygiene and clothing management.   Sitting at edge of bed x10 minutes with Supervision.  Supine to sit with Stand-by Assistance.  Toilet transfer to bedside commode with Minimal Assistance.  Upper extremity exercise program x10 reps per handout, with assistance as needed.                         Time Tracking:     OT Date of Treatment: 01/13/23  OT Start Time: 1020  OT Stop Time: 1032  OT Total Time (min): 12 min    Billable Minutes:Therapeutic Exercise 12    OT/ALDO: OT          1/13/2023

## 2023-01-14 LAB
ALBUMIN SERPL BCP-MCNC: 2.8 G/DL (ref 3.5–5.2)
ALP SERPL-CCNC: 87 U/L (ref 55–135)
ALT SERPL W/O P-5'-P-CCNC: 36 U/L (ref 10–44)
ANION GAP SERPL CALC-SCNC: 9 MMOL/L (ref 8–16)
AST SERPL-CCNC: 34 U/L (ref 10–40)
BASOPHILS # BLD AUTO: 0.06 K/UL (ref 0–0.2)
BASOPHILS NFR BLD: 0.6 % (ref 0–1.9)
BILIRUB SERPL-MCNC: 0.6 MG/DL (ref 0.1–1)
BUN SERPL-MCNC: 22 MG/DL (ref 8–23)
CALCIUM SERPL-MCNC: 8.4 MG/DL (ref 8.7–10.5)
CHLORIDE SERPL-SCNC: 101 MMOL/L (ref 95–110)
CO2 SERPL-SCNC: 25 MMOL/L (ref 23–29)
CREAT SERPL-MCNC: 1 MG/DL (ref 0.5–1.4)
DIFFERENTIAL METHOD: ABNORMAL
EOSINOPHIL # BLD AUTO: 0.1 K/UL (ref 0–0.5)
EOSINOPHIL NFR BLD: 1.1 % (ref 0–8)
ERYTHROCYTE [DISTWIDTH] IN BLOOD BY AUTOMATED COUNT: 13.6 % (ref 11.5–14.5)
EST. GFR  (NO RACE VARIABLE): >60 ML/MIN/1.73 M^2
GLUCOSE SERPL-MCNC: 97 MG/DL (ref 70–110)
HCT VFR BLD AUTO: 31.8 % (ref 40–54)
HGB BLD-MCNC: 10.6 G/DL (ref 14–18)
IMM GRANULOCYTES # BLD AUTO: 0.26 K/UL (ref 0–0.04)
IMM GRANULOCYTES NFR BLD AUTO: 2.6 % (ref 0–0.5)
LYMPHOCYTES # BLD AUTO: 1.4 K/UL (ref 1–4.8)
LYMPHOCYTES NFR BLD: 13.6 % (ref 18–48)
MAGNESIUM SERPL-MCNC: 1.9 MG/DL (ref 1.6–2.6)
MCH RBC QN AUTO: 31.5 PG (ref 27–31)
MCHC RBC AUTO-ENTMCNC: 33.3 G/DL (ref 32–36)
MCV RBC AUTO: 95 FL (ref 82–98)
MONOCYTES # BLD AUTO: 1.5 K/UL (ref 0.3–1)
MONOCYTES NFR BLD: 14.3 % (ref 4–15)
NEUTROPHILS # BLD AUTO: 6.9 K/UL (ref 1.8–7.7)
NEUTROPHILS NFR BLD: 67.8 % (ref 38–73)
NRBC BLD-RTO: 0 /100 WBC
PLATELET # BLD AUTO: 295 K/UL (ref 150–450)
PMV BLD AUTO: 9.9 FL (ref 9.2–12.9)
POTASSIUM SERPL-SCNC: 3.7 MMOL/L (ref 3.5–5.1)
POTASSIUM SERPL-SCNC: 3.9 MMOL/L (ref 3.5–5.1)
PROT SERPL-MCNC: 6.3 G/DL (ref 6–8.4)
RBC # BLD AUTO: 3.36 M/UL (ref 4.6–6.2)
SODIUM SERPL-SCNC: 135 MMOL/L (ref 136–145)
WBC # BLD AUTO: 10.11 K/UL (ref 3.9–12.7)

## 2023-01-14 PROCEDURE — 99900031 HC PATIENT EDUCATION (STAT)

## 2023-01-14 PROCEDURE — S4991 NICOTINE PATCH NONLEGEND: HCPCS | Performed by: INTERNAL MEDICINE

## 2023-01-14 PROCEDURE — 25000003 PHARM REV CODE 250: Performed by: INTERNAL MEDICINE

## 2023-01-14 PROCEDURE — 25000242 PHARM REV CODE 250 ALT 637 W/ HCPCS: Performed by: FAMILY MEDICINE

## 2023-01-14 PROCEDURE — 83735 ASSAY OF MAGNESIUM: CPT | Performed by: FAMILY MEDICINE

## 2023-01-14 PROCEDURE — 93010 EKG 12-LEAD: ICD-10-PCS | Mod: ,,, | Performed by: SPECIALIST

## 2023-01-14 PROCEDURE — 27000221 HC OXYGEN, UP TO 24 HOURS

## 2023-01-14 PROCEDURE — 99900035 HC TECH TIME PER 15 MIN (STAT)

## 2023-01-14 PROCEDURE — 85025 COMPLETE CBC W/AUTO DIFF WBC: CPT | Performed by: FAMILY MEDICINE

## 2023-01-14 PROCEDURE — 93005 ELECTROCARDIOGRAM TRACING: CPT | Performed by: SPECIALIST

## 2023-01-14 PROCEDURE — 63600175 PHARM REV CODE 636 W HCPCS: Performed by: INTERNAL MEDICINE

## 2023-01-14 PROCEDURE — 93010 ELECTROCARDIOGRAM REPORT: CPT | Mod: ,,, | Performed by: SPECIALIST

## 2023-01-14 PROCEDURE — 21000000 HC CCU ICU ROOM CHARGE

## 2023-01-14 PROCEDURE — 25000003 PHARM REV CODE 250: Performed by: FAMILY MEDICINE

## 2023-01-14 PROCEDURE — 80053 COMPREHEN METABOLIC PANEL: CPT | Performed by: FAMILY MEDICINE

## 2023-01-14 PROCEDURE — 84132 ASSAY OF SERUM POTASSIUM: CPT | Performed by: FAMILY MEDICINE

## 2023-01-14 PROCEDURE — 36415 COLL VENOUS BLD VENIPUNCTURE: CPT | Performed by: FAMILY MEDICINE

## 2023-01-14 PROCEDURE — 94761 N-INVAS EAR/PLS OXIMETRY MLT: CPT

## 2023-01-14 PROCEDURE — 25000003 PHARM REV CODE 250

## 2023-01-14 RX ORDER — LIDOCAINE 50 MG/G
1 PATCH TOPICAL
Status: DISCONTINUED | OUTPATIENT
Start: 2023-01-14 | End: 2023-01-24

## 2023-01-14 RX ORDER — NITROGLYCERIN 0.4 MG/1
0.4 TABLET SUBLINGUAL EVERY 5 MIN PRN
Status: DISCONTINUED | OUTPATIENT
Start: 2023-01-14 | End: 2023-01-24

## 2023-01-14 RX ORDER — NITROGLYCERIN 0.4 MG/1
TABLET SUBLINGUAL
Status: DISPENSED
Start: 2023-01-14 | End: 2023-01-15

## 2023-01-14 RX ADMIN — CHLORHEXIDINE GLUCONATE 15 ML: 1.2 RINSE ORAL at 09:01

## 2023-01-14 RX ADMIN — POLYETHYLENE GLYCOL 3350 17 G: 17 POWDER, FOR SOLUTION ORAL at 09:01

## 2023-01-14 RX ADMIN — NITROGLYCERIN 0.4 MG: 0.4 TABLET SUBLINGUAL at 05:01

## 2023-01-14 RX ADMIN — LIDOCAINE 5% 1 PATCH: 700 PATCH TOPICAL at 05:01

## 2023-01-14 RX ADMIN — POTASSIUM BICARBONATE 50 MEQ: 977.5 TABLET, EFFERVESCENT ORAL at 06:01

## 2023-01-14 RX ADMIN — CARVEDILOL 25 MG: 25 TABLET, FILM COATED ORAL at 08:01

## 2023-01-14 RX ADMIN — ATORVASTATIN CALCIUM 20 MG: 20 TABLET, FILM COATED ORAL at 09:01

## 2023-01-14 RX ADMIN — QUETIAPINE 25 MG: 25 TABLET ORAL at 05:01

## 2023-01-14 RX ADMIN — ASPIRIN 81 MG CHEWABLE TABLET 81 MG: 81 TABLET CHEWABLE at 09:01

## 2023-01-14 RX ADMIN — HYDRALAZINE HYDROCHLORIDE 25 MG: 25 TABLET ORAL at 01:01

## 2023-01-14 RX ADMIN — OXYCODONE HYDROCHLORIDE AND ACETAMINOPHEN 1 TABLET: 7.5; 325 TABLET ORAL at 05:01

## 2023-01-14 RX ADMIN — TICAGRELOR 90 MG: 90 TABLET ORAL at 09:01

## 2023-01-14 RX ADMIN — NICOTINE 1 PATCH: 14 PATCH, EXTENDED RELEASE TRANSDERMAL at 09:01

## 2023-01-14 RX ADMIN — FAMOTIDINE 20 MG: 20 TABLET ORAL at 09:01

## 2023-01-14 RX ADMIN — ESCITALOPRAM OXALATE 10 MG: 10 TABLET ORAL at 09:01

## 2023-01-14 RX ADMIN — OXYCODONE HYDROCHLORIDE AND ACETAMINOPHEN 1 TABLET: 7.5; 325 TABLET ORAL at 11:01

## 2023-01-14 RX ADMIN — POLYETHYLENE GLYCOL 3350 17 G: 17 POWDER, FOR SOLUTION ORAL at 06:01

## 2023-01-14 RX ADMIN — ENOXAPARIN SODIUM 40 MG: 100 INJECTION SUBCUTANEOUS at 09:01

## 2023-01-14 RX ADMIN — CARVEDILOL 25 MG: 25 TABLET, FILM COATED ORAL at 05:01

## 2023-01-14 RX ADMIN — LOSARTAN POTASSIUM 50 MG: 50 TABLET, FILM COATED ORAL at 05:01

## 2023-01-14 RX ADMIN — HYDRALAZINE HYDROCHLORIDE 25 MG: 25 TABLET ORAL at 05:01

## 2023-01-14 RX ADMIN — TRAZODONE HYDROCHLORIDE 50 MG: 50 TABLET ORAL at 09:01

## 2023-01-14 RX ADMIN — ACETAMINOPHEN 650 MG: 325 TABLET ORAL at 09:01

## 2023-01-14 NOTE — CARE UPDATE
01/14/23 1035   Patient Assessment/Suction   Level of Consciousness (AVPU) alert   All Lung Fields Breath Sounds diminished;clear   PRE-TX-O2   Device (Oxygen Therapy) nasal cannula   $ Is the patient on Low Flow Oxygen? Yes   Flow (L/min) 1   Pulse Oximetry Type Continuous   $ Pulse Oximetry - Multiple Charge Pulse Oximetry - Multiple   Aerosol Therapy   $ Aerosol Therapy Charges PRN treatment not required   Education   $ Education Bronchodilator;15 min   Respiratory Evaluation   $ Care Plan Tech Time 15 min

## 2023-01-14 NOTE — PT/OT/SLP PROGRESS
Physical Therapy      Patient Name:  Peyman Gr   MRN:  [id number]    Patient not seen today secondary to Patient fatigue. Will follow-up 1/15/2023.

## 2023-01-14 NOTE — PROGRESS NOTES
[facility] Medicine  Progress Note    Patient name: Peyman Gr  MRN: [id number]  Admit Date: 1/1/2023   LOS: 12 days     ASSESSMENT/PLAN:     Active Hospital Problems    Diagnosis  POA    *STEMI involving right coronary artery [I21.11]  Yes    Pneumonia of right lung due to methicillin susceptible Staphylococcus aureus (MSSA) [J15.211]  No    Shock liver [K72.00]  Yes    Cardiogenic shock [R57.0]  Yes    Cardiac arrest [I46.9]  Yes    Acute respiratory failure with hypoxia and hypercarbia [J96.01, J96.02]  Yes    Leukocytosis [D72.829]  Yes    Anemia [D64.9]  Yes    History of tobacco abuse [Z87.891]  Not Applicable    Primary hypertension [I10]  Yes    PVD (peripheral vascular disease) with claudication [I73.9]  Yes    PAD (peripheral artery disease) [I73.9]  Yes      Resolved Hospital Problems    Diagnosis Date Resolved POA    Hyponatremia [E87.1] 01/02/2023 Yes     Cardiac arrest  Due to STEMI involving RCA   Complicated by complete heart block  MSSA pneumonia - s/p ceftriaxone  - successfully extubated on 01/08  - s/p emergent stenting 1/1/23  - currently planning for another re-vascularization, pending pt's physical conditioning   - continue brillinta, asa, statin  - adjusting HTN medication  - PT/OT/SLP     Chronic conditions as noted above/below; home medications reviewed personally by me and restarted as appropriate  Electrolyte derangement:  Trending BMP; Mg; replacement prn  DVT ppx: lovenox  FULL CODE    SUBJECTIVE:     Principal problem: STEMI involving right coronary artery    67-year-old male with peripheral vascular disease, essential hypertension, hyperlipidemia and ongoing tobacco use presented to outside facility with 2 hour onset of chest pain and shortness of breath.  EKG revealed inferior and anterolateral STEMI.  Quickly degenerated into cardiac arrest with runs of VT/VF and asystole requiring resuscitation for about 40 minutes.  He was subsequently intubated and  transferred to our ED. En route he developed cardiac arrest again (PEA) which was continued into our ED and was resuscitated for another 30 minutes.  He required transcutaneous pacing.  He was taken emergently to the catheterization lab and underwent PCI with stenting of RCA which was felt to be the culprit lesion.  He was also found to have left main stenosis of about 70%.  A transvenous pacemaker was also placed.  Currently admitted to the ICU for post cardiac arrest care.  He remains intubated and critically ill. Hospital stay complicated by fever; initiated on broad spectrum antibiotics.  Respiratory culture growing MSSA - de-escalated to ceftriaxone.  Extubated successfully on 1/8.    Interval History:      1/11:  no SOB on HFNC.  Pt conversing with friends/family at bedside without SOB.  No current CP.  No pain throughout.  Pt is alert and oriented x4, however he is extremely tangential and not at mental status baseline per his family.  MAEW.  Follows commands.    1/12:  out of ICU.  Pt without CP.  No SOB.  Family at bedside, he is responding more appropriately today.    1/13:  pt hallucinating this evening.  It occurs intermittently, usually after his family leaves for the evening.  He sees people staring at him through the 2nd floor window.  He is being moved closer to nursing station.  Starting Seroquel pm.  Previously he had trouble sleeping; I suspect anxiety / other sequela after cardiac arrest.  Start AM lexapro.  Delirium precautions.  No fever, no leukocytosis, procal negative.   No CP.  No SOB.    Review of Systems:  All systems reviewed and are negative except as noted per above.      OBJECTIVE:     Vital Signs (Most Recent)  Temp: 97.8 °F (36.6 °C) (01/13/23 1915)  Pulse: 79 (01/13/23 1721)  Resp: (!) 28 (01/13/23 1721)  BP: (!) 172/79 (01/13/23 1721)  SpO2: 96 % (01/13/23 1721)    I & O (Last 24H):  Intake/Output Summary (Last 24 hours) at 1/13/2023 1944  Last data filed at 1/13/2023 0601  Gross  per 24 hour   Intake 360 ml   Output 1250 ml   Net -890 ml       Gen: alert, responsive, hallucinating   HEENT:  Eyes - no pallor  External ears with no lesions  Nares patent  Mouth, Throat:  trachea midline   CV: RRR  Lungs: CTA B/L, on HFNC  Abd: +BS, soft, NT, ND  Ext: no atrophy; +BLE edema  Skin: warm, dry  Neuro: hallucinating   Psych: pleasant    Laboratory:  I have reviewed all pertinent lab results within the past 24 hours.  CBC:   Recent Labs   Lab 01/13/23 0514   WBC 11.13   RBC 3.63*   HGB 11.5*   HCT 33.3*      MCV 92   MCH 31.7*   MCHC 34.5       CMP:   Recent Labs   Lab 01/13/23 0514   GLU 94   CALCIUM 8.6*   ALBUMIN 2.8*   PROT 6.5   *   K 3.7   CO2 25      BUN 21   CREATININE 0.9   ALKPHOS 87   ALT 40   AST 31   BILITOT 0.7       No results for input(s): CKMB, CPKMB, TROPONINT, TROPONINI, MYOGLOBIN in the last 168 hours.    Imaging Results              X-Ray Chest AP Portable (Final result)  Result time 01/01/23 07:01:46      Final result by Elizabeth Sharp IV, MD (01/01/23 07:01:46)                   Narrative:    Chest, single view    HISTORY: Nasogastric tube placement.    In the interval, there has been introduction of a nasogastric tube which extends into the left upper quadrant of the abdomen just beyond the GE junction. An endotracheal tube remains in place with its tip positioned well above the level of the saurav.    Bilateral interstitial and mild groundglass opacities, greater on the right are again observed. There are no new confluent infiltrates, volume loss or effusions.    The cardiomediastinal silhouette is stable.    IMPRESSION:    Interval introduction of nasogastric tube extending into the left upper abdomen with its tip just beyond the GE junction.    Otherwise stable cardiopulmonary status.    Electronically signed by:  Elizabeth Sharp MD  1/1/2023 7:01 AM [facility] Workstation: [id number]                                     X-Ray Chest AP Portable (Final result)   Result time 01/01/23 06:59:55      Final result by Elizabeth Sharp IV, MD (01/01/23 06:59:55)                   Narrative:    Chest, single view    HISTORY: Cardiac arrest.    Comparison 9/9/2022.    Endotracheal tube is in place with its tip positioned well above the level of the saurav.    The heart size is within normal limits. The central pulmonary vasculature is not acutely engorged.  There is arteriosclerotic calcification within the aortic arch.    There is scattered bilateral interstitial and groundglass pulmonary opacities with upper lung zone predominance, greater on the right. No effusion or extrapulmonary air identified.    Surgical changes are noted within the cervical spine and within the soft tissues of the right side of the neck.    IMPRESSION:    Positioning of endotracheal tube as above.    Interval development of interstitial and groundglass pulmonary opacities with upper lung zone predominance.    Electronically signed by:  Elizabeth Sharp MD  1/1/2023 6:59 AM [facility] Workstation: [id number]                                    Department Hospital Medicine  [facility]  Ligia Fernandez MD  Date of service: 01/13/2023

## 2023-01-14 NOTE — PROGRESS NOTES
Continues to do well from a cardiac standpoint.  Patient needs to get up and get out of the bed and be active to see if he has any anginal symptoms based on which we will have to decide the urgency of his revascularization status.  Cardiology will continue to follow.  Continue with dual antiplatelet therapy for now.    As per Dr. Rosenbaum note, patient needs to get up and walk around and if he is extremely deconditioned, Dr. Elizondo will consider PCI later in the week.

## 2023-01-14 NOTE — NURSING
Sat pt on edge of bed w/ all meals. Refused PT today, said he was tired from sitting edge of bed. Good uop. Low bp this am. Changes made per MD to bp regimen.  Up to chair, c/o palpable,reproducable left chest to flank pain 8/10.Ekg obtained. Chest pain then 4/10. 1 dose SL Nitro given, bp was 150's systolic, pt still in chair.  BP dropped to 80's systolic, pt appeared to have blank stare and myoclonus. Bp quickly recovered to 130's systolic as we got the pt in the bed during this episode. Chest pain was 1/10 at that point. Lidocaine patch applied. Warmth applied this am to this location and it diminished his pain as well.

## 2023-01-14 NOTE — PROGRESS NOTES
[facility] Medicine  Progress Note    Patient name: Peyman Gr  MRN: [id number]  Admit Date: 1/1/2023   LOS: 13 days     ASSESSMENT/PLAN:     Active Hospital Problems    Diagnosis  POA    *STEMI involving right coronary artery [I21.11]  Yes    Pneumonia of right lung due to methicillin susceptible Staphylococcus aureus (MSSA) [J15.211]  No    Shock liver [K72.00]  Yes    Cardiogenic shock [R57.0]  Yes    Cardiac arrest [I46.9]  Yes    Acute respiratory failure with hypoxia and hypercarbia [J96.01, J96.02]  Yes    Leukocytosis [D72.829]  Yes    Anemia [D64.9]  Yes    History of tobacco abuse [Z87.891]  Not Applicable    Primary hypertension [I10]  Yes    PVD (peripheral vascular disease) with claudication [I73.9]  Yes    PAD (peripheral artery disease) [I73.9]  Yes      Resolved Hospital Problems    Diagnosis Date Resolved POA    Hyponatremia [E87.1] 01/02/2023 Yes     Cardiac arrest  Due to STEMI involving RCA   Complicated by complete heart block  MSSA pneumonia - s/p ceftriaxone  - successfully extubated on 01/08  - s/p emergent stenting 1/1/23  - cardiology awaiting pt's improved physical conditioning to evaluate for anginal symptoms, to decide on urgency of revascularization status.  Pt is currently participating with PT/OT and is a maximum assist for transferring.  No anginal pain during PT/OT so far.      - continue brillinta, asa, statin  - adjusting HTN medication  - PT/OT/SLP     Chronic conditions as noted above/below; home medications reviewed personally by me and restarted as appropriate  Electrolyte derangement:  Trending BMP; Mg; replacement prn  DVT ppx: lovenox  FULL CODE    SUBJECTIVE:     Principal problem: STEMI involving right coronary artery    67-year-old male with peripheral vascular disease, essential hypertension, hyperlipidemia and ongoing tobacco use presented to outside facility with 2 hour onset of chest pain and shortness of breath.  EKG revealed inferior and  anterolateral STEMI.  Quickly degenerated into cardiac arrest with runs of VT/VF and asystole requiring resuscitation for about 40 minutes.  He was subsequently intubated and transferred to our ED. En route he developed cardiac arrest again (PEA) which was continued into our ED and was resuscitated for another 30 minutes.  He required transcutaneous pacing.  He was taken emergently to the catheterization lab and underwent PCI with stenting of RCA which was felt to be the culprit lesion.  He was also found to have left main stenosis of about 70%.  A transvenous pacemaker was also placed.  Currently admitted to the ICU for post cardiac arrest care.  He remains intubated and critically ill. Hospital stay complicated by fever; initiated on broad spectrum antibiotics.  Respiratory culture growing MSSA - de-escalated to ceftriaxone.  Extubated successfully on 1/8.    Interval History:      1/11:  no SOB on HFNC.  Pt conversing with friends/family at bedside without SOB.  No current CP.  No pain throughout.  Pt is alert and oriented x4, however he is extremely tangential and not at mental status baseline per his family.  MAEW.  Follows commands.    1/12:  out of ICU.  Pt without CP.  No SOB.  Family at bedside, he is responding more appropriately today.    1/13:  pt hallucinating this evening.  It occurs intermittently, usually after his family leaves for the evening.  He sees people staring at him through the 2nd floor window.  He is being moved closer to nursing station.  Starting Seroquel pm.  Previously he had trouble sleeping; I suspect anxiety / other sequela after cardiac arrest.  Start AM lexapro.  Delirium precautions.  No fever, no leukocytosis, procal negative.   No CP.  No SOB.    1/12:  pt's BP medication held due to hypotension this AM.  It appears he was given IV hydralazine at 0339 today.  BP appears labile throughout day.  Adjusting medication doses now.  Prioritizing coreg, losartan.  Continuing IV  "hydralazine prn.  He has sat on edge of bed with all meals, reports fatigue as a result.  Cardiology is trying to evaluate for anginal symptoms: Pt currently transferring during my exam.  He reports L-chest pain when transferring (3 person assist), described as "picking" with no radiation, +SOB.  Obtaining EKG, obtaining troponin.  Discussed personally with cardiology.  Pt's CP and SOB already alleviating while sitting at rest.    Review of Systems:  All systems reviewed and are negative except as noted per above.      OBJECTIVE:     Vital Signs (Most Recent)  Temp: 98.3 °F (36.8 °C) (01/14/23 1300)  Pulse: 72 (01/14/23 1600)  Resp: (!) 35 (01/14/23 1600)  BP: (!) 176/75 (01/14/23 1600)  SpO2: 98 % (01/14/23 1600)    I & O (Last 24H):  Intake/Output Summary (Last 24 hours) at 1/14/2023 1648  Last data filed at 1/14/2023 1345  Gross per 24 hour   Intake 365 ml   Output 1400 ml   Net -1035 ml       Gen: alert, responsive  HEENT:  Eyes - no pallor  External ears with no lesions  Nares patent  Mouth, Throat:  trachea midline   CV: RRR  Lungs: CTA B/L, on RA.  (After transferring, requiring NC again)  Abd: +BS, soft, NT, ND  Ext: no atrophy; +BLE edema  Skin: warm, dry  Neuro: alert, responsive, 4/5   Psych: pleasant    Laboratory:  I have reviewed all pertinent lab results within the past 24 hours.  CBC:   Recent Labs   Lab 01/14/23  0506   WBC 10.11   RBC 3.36*   HGB 10.6*   HCT 31.8*      MCV 95   MCH 31.5*   MCHC 33.3       CMP:   Recent Labs   Lab 01/14/23  0506 01/14/23  1111   GLU 97  --    CALCIUM 8.4*  --    ALBUMIN 2.8*  --    PROT 6.3  --    *  --    K 3.7 3.9   CO2 25  --      --    BUN 22  --    CREATININE 1.0  --    ALKPHOS 87  --    ALT 36  --    AST 34  --    BILITOT 0.6  --        No results for input(s): CKMB, CPKMB, TROPONINT, TROPONINI, MYOGLOBIN in the last 168 hours.    Imaging Results              X-Ray Chest AP Portable (Final result)  Result time 01/01/23 07:01:46      Final " result by Elizabeth Sharp IV, MD (01/01/23 07:01:46)                   Narrative:    Chest, single view    HISTORY: Nasogastric tube placement.    In the interval, there has been introduction of a nasogastric tube which extends into the left upper quadrant of the abdomen just beyond the GE junction. An endotracheal tube remains in place with its tip positioned well above the level of the saurav.    Bilateral interstitial and mild groundglass opacities, greater on the right are again observed. There are no new confluent infiltrates, volume loss or effusions.    The cardiomediastinal silhouette is stable.    IMPRESSION:    Interval introduction of nasogastric tube extending into the left upper abdomen with its tip just beyond the GE junction.    Otherwise stable cardiopulmonary status.    Electronically signed by:  Elizabeth Sharp MD  1/1/2023 7:01 AM [facility] Workstation: [id number]                                     X-Ray Chest AP Portable (Final result)  Result time 01/01/23 06:59:55      Final result by Elizabeth Sharp IV, MD (01/01/23 06:59:55)                   Narrative:    Chest, single view    HISTORY: Cardiac arrest.    Comparison 9/9/2022.    Endotracheal tube is in place with its tip positioned well above the level of the saurav.    The heart size is within normal limits. The central pulmonary vasculature is not acutely engorged.  There is arteriosclerotic calcification within the aortic arch.    There is scattered bilateral interstitial and groundglass pulmonary opacities with upper lung zone predominance, greater on the right. No effusion or extrapulmonary air identified.    Surgical changes are noted within the cervical spine and within the soft tissues of the right side of the neck.    IMPRESSION:    Positioning of endotracheal tube as above.    Interval development of interstitial and groundglass pulmonary opacities with upper lung zone predominance.    Electronically signed by:  Elizabeth Sharp MD  1/1/2023 6:59  [facility] Workstation: [id number]                                    Department Hospital Medicine  Replaced by [facility]  Ligia Fernandez MD  Date of service: 01/14/2023

## 2023-01-14 NOTE — PLAN OF CARE
Pt's room changed at beginning of shift due to climbing  out of bed and hallucinating, pt had  intermittent periods of confusion but Pt is alert and oriented x 3 this am.   Problem: Infection  Goal: Absence of Infection Signs and Symptoms  Outcome: Ongoing, Progressing     Problem: Adult Inpatient Plan of Care  Goal: Plan of Care Review  Outcome: Ongoing, Progressing  Goal: Patient-Specific Goal (Individualized)  Outcome: Ongoing, Progressing  Goal: Absence of Hospital-Acquired Illness or Injury  Outcome: Ongoing, Progressing  Goal: Optimal Comfort and Wellbeing  Outcome: Ongoing, Progressing  Goal: Readiness for Transition of Care  Outcome: Ongoing, Progressing     Problem: Noninvasive Ventilation Acute  Goal: Effective Unassisted Ventilation and Oxygenation  Outcome: Ongoing, Progressing     Problem: Skin Injury Risk Increased  Goal: Skin Health and Integrity  Outcome: Ongoing, Progressing     Problem: Fall Injury Risk  Goal: Absence of Fall and Fall-Related Injury  Outcome: Ongoing, Progressing

## 2023-01-14 NOTE — PT/OT/SLP PROGRESS
Occupational Therapy      Patient Name:  Peyman Gr   MRN:  [id number]    Patient not seen today secondary to Other (Comment) (therapist unavailable). Will follow-up next scheduled service date.    1/14/2023

## 2023-01-15 LAB
ALBUMIN SERPL BCP-MCNC: 2.8 G/DL (ref 3.5–5.2)
ALP SERPL-CCNC: 85 U/L (ref 55–135)
ALT SERPL W/O P-5'-P-CCNC: 33 U/L (ref 10–44)
ANION GAP SERPL CALC-SCNC: 11 MMOL/L (ref 8–16)
AST SERPL-CCNC: 29 U/L (ref 10–40)
BASOPHILS # BLD AUTO: 0.05 K/UL (ref 0–0.2)
BASOPHILS NFR BLD: 0.6 % (ref 0–1.9)
BILIRUB SERPL-MCNC: 0.6 MG/DL (ref 0.1–1)
BUN SERPL-MCNC: 24 MG/DL (ref 8–23)
CALCIUM SERPL-MCNC: 8.3 MG/DL (ref 8.7–10.5)
CHLORIDE SERPL-SCNC: 100 MMOL/L (ref 95–110)
CO2 SERPL-SCNC: 23 MMOL/L (ref 23–29)
CREAT SERPL-MCNC: 1.1 MG/DL (ref 0.5–1.4)
DIFFERENTIAL METHOD: ABNORMAL
EOSINOPHIL # BLD AUTO: 0.1 K/UL (ref 0–0.5)
EOSINOPHIL NFR BLD: 0.9 % (ref 0–8)
ERYTHROCYTE [DISTWIDTH] IN BLOOD BY AUTOMATED COUNT: 13.7 % (ref 11.5–14.5)
EST. GFR  (NO RACE VARIABLE): >60 ML/MIN/1.73 M^2
GLUCOSE SERPL-MCNC: 84 MG/DL (ref 70–110)
HCT VFR BLD AUTO: 30.3 % (ref 40–54)
HGB BLD-MCNC: 9.7 G/DL (ref 14–18)
IMM GRANULOCYTES # BLD AUTO: 0.17 K/UL (ref 0–0.04)
IMM GRANULOCYTES NFR BLD AUTO: 2 % (ref 0–0.5)
LYMPHOCYTES # BLD AUTO: 1.5 K/UL (ref 1–4.8)
LYMPHOCYTES NFR BLD: 17.1 % (ref 18–48)
MAGNESIUM SERPL-MCNC: 1.9 MG/DL (ref 1.6–2.6)
MCH RBC QN AUTO: 30.9 PG (ref 27–31)
MCHC RBC AUTO-ENTMCNC: 32 G/DL (ref 32–36)
MCV RBC AUTO: 97 FL (ref 82–98)
MONOCYTES # BLD AUTO: 1.3 K/UL (ref 0.3–1)
MONOCYTES NFR BLD: 15.3 % (ref 4–15)
NEUTROPHILS # BLD AUTO: 5.5 K/UL (ref 1.8–7.7)
NEUTROPHILS NFR BLD: 64.1 % (ref 38–73)
NRBC BLD-RTO: 0 /100 WBC
PLATELET # BLD AUTO: 339 K/UL (ref 150–450)
PMV BLD AUTO: 10 FL (ref 9.2–12.9)
POTASSIUM SERPL-SCNC: 3.4 MMOL/L (ref 3.5–5.1)
POTASSIUM SERPL-SCNC: 3.7 MMOL/L (ref 3.5–5.1)
PROT SERPL-MCNC: 6.2 G/DL (ref 6–8.4)
RBC # BLD AUTO: 3.14 M/UL (ref 4.6–6.2)
SODIUM SERPL-SCNC: 134 MMOL/L (ref 136–145)
WBC # BLD AUTO: 8.54 K/UL (ref 3.9–12.7)

## 2023-01-15 PROCEDURE — 80053 COMPREHEN METABOLIC PANEL: CPT | Performed by: FAMILY MEDICINE

## 2023-01-15 PROCEDURE — 25000003 PHARM REV CODE 250: Performed by: INTERNAL MEDICINE

## 2023-01-15 PROCEDURE — 25000003 PHARM REV CODE 250

## 2023-01-15 PROCEDURE — 63600175 PHARM REV CODE 636 W HCPCS: Performed by: INTERNAL MEDICINE

## 2023-01-15 PROCEDURE — 83735 ASSAY OF MAGNESIUM: CPT | Performed by: FAMILY MEDICINE

## 2023-01-15 PROCEDURE — 97116 GAIT TRAINING THERAPY: CPT

## 2023-01-15 PROCEDURE — 94761 N-INVAS EAR/PLS OXIMETRY MLT: CPT

## 2023-01-15 PROCEDURE — 99900035 HC TECH TIME PER 15 MIN (STAT)

## 2023-01-15 PROCEDURE — 21000000 HC CCU ICU ROOM CHARGE

## 2023-01-15 PROCEDURE — 36415 COLL VENOUS BLD VENIPUNCTURE: CPT | Performed by: FAMILY MEDICINE

## 2023-01-15 PROCEDURE — 99900031 HC PATIENT EDUCATION (STAT)

## 2023-01-15 PROCEDURE — S4991 NICOTINE PATCH NONLEGEND: HCPCS | Performed by: INTERNAL MEDICINE

## 2023-01-15 PROCEDURE — 63600175 PHARM REV CODE 636 W HCPCS: Performed by: FAMILY MEDICINE

## 2023-01-15 PROCEDURE — 25000003 PHARM REV CODE 250: Performed by: FAMILY MEDICINE

## 2023-01-15 PROCEDURE — 84132 ASSAY OF SERUM POTASSIUM: CPT | Performed by: FAMILY MEDICINE

## 2023-01-15 PROCEDURE — 85025 COMPLETE CBC W/AUTO DIFF WBC: CPT | Performed by: FAMILY MEDICINE

## 2023-01-15 RX ORDER — FUROSEMIDE 10 MG/ML
40 INJECTION INTRAMUSCULAR; INTRAVENOUS ONCE
Status: COMPLETED | OUTPATIENT
Start: 2023-01-15 | End: 2023-01-15

## 2023-01-15 RX ORDER — POLYETHYLENE GLYCOL 3350 17 G/17G
17 POWDER, FOR SOLUTION ORAL 2 TIMES DAILY
Status: DISCONTINUED | OUTPATIENT
Start: 2023-01-15 | End: 2023-01-24

## 2023-01-15 RX ORDER — AMLODIPINE BESYLATE 5 MG/1
10 TABLET ORAL DAILY
Status: DISCONTINUED | OUTPATIENT
Start: 2023-01-15 | End: 2023-01-16

## 2023-01-15 RX ORDER — LACTULOSE 10 G/15ML
30 SOLUTION ORAL EVERY 6 HOURS PRN
Status: DISCONTINUED | OUTPATIENT
Start: 2023-01-15 | End: 2023-01-24

## 2023-01-15 RX ORDER — ACETAMINOPHEN 10 MG/ML
1000 INJECTION, SOLUTION INTRAVENOUS EVERY 8 HOURS
Status: DISCONTINUED | OUTPATIENT
Start: 2023-01-15 | End: 2023-01-16

## 2023-01-15 RX ORDER — FUROSEMIDE 20 MG/1
20 TABLET ORAL DAILY
Status: DISCONTINUED | OUTPATIENT
Start: 2023-01-15 | End: 2023-01-24

## 2023-01-15 RX ORDER — LACTULOSE 10 G/15ML
15 SOLUTION ORAL 2 TIMES DAILY PRN
Status: DISCONTINUED | OUTPATIENT
Start: 2023-01-15 | End: 2023-01-15

## 2023-01-15 RX ORDER — DOCUSATE SODIUM 100 MG/1
100 CAPSULE, LIQUID FILLED ORAL 2 TIMES DAILY
Status: DISCONTINUED | OUTPATIENT
Start: 2023-01-15 | End: 2023-01-24

## 2023-01-15 RX ADMIN — TRAZODONE HYDROCHLORIDE 50 MG: 50 TABLET ORAL at 09:01

## 2023-01-15 RX ADMIN — OXYCODONE HYDROCHLORIDE AND ACETAMINOPHEN 1 TABLET: 7.5; 325 TABLET ORAL at 12:01

## 2023-01-15 RX ADMIN — ENOXAPARIN SODIUM 40 MG: 100 INJECTION SUBCUTANEOUS at 09:01

## 2023-01-15 RX ADMIN — ACETAMINOPHEN 650 MG: 325 TABLET ORAL at 02:01

## 2023-01-15 RX ADMIN — CHLORHEXIDINE GLUCONATE 15 ML: 1.2 RINSE ORAL at 09:01

## 2023-01-15 RX ADMIN — LOSARTAN POTASSIUM 50 MG: 50 TABLET, FILM COATED ORAL at 04:01

## 2023-01-15 RX ADMIN — LOSARTAN POTASSIUM 100 MG: 50 TABLET, FILM COATED ORAL at 09:01

## 2023-01-15 RX ADMIN — MELATONIN 6 MG: at 10:01

## 2023-01-15 RX ADMIN — FAMOTIDINE 20 MG: 20 TABLET ORAL at 09:01

## 2023-01-15 RX ADMIN — ATORVASTATIN CALCIUM 20 MG: 20 TABLET, FILM COATED ORAL at 09:01

## 2023-01-15 RX ADMIN — ACETAMINOPHEN 1000 MG: 10 INJECTION INTRAVENOUS at 11:01

## 2023-01-15 RX ADMIN — TICAGRELOR 90 MG: 90 TABLET ORAL at 09:01

## 2023-01-15 RX ADMIN — OXYCODONE HYDROCHLORIDE AND ACETAMINOPHEN 1 TABLET: 7.5; 325 TABLET ORAL at 06:01

## 2023-01-15 RX ADMIN — ESCITALOPRAM OXALATE 10 MG: 10 TABLET ORAL at 09:01

## 2023-01-15 RX ADMIN — POTASSIUM BICARBONATE 35 MEQ: 391 TABLET, EFFERVESCENT ORAL at 09:01

## 2023-01-15 RX ADMIN — HYDRALAZINE HYDROCHLORIDE 10 MG: 20 INJECTION INTRAMUSCULAR; INTRAVENOUS at 03:01

## 2023-01-15 RX ADMIN — ASPIRIN 81 MG CHEWABLE TABLET 81 MG: 81 TABLET CHEWABLE at 09:01

## 2023-01-15 RX ADMIN — POLYETHYLENE GLYCOL 3350 17 G: 17 POWDER, FOR SOLUTION ORAL at 09:01

## 2023-01-15 RX ADMIN — OXYCODONE HYDROCHLORIDE AND ACETAMINOPHEN 1 TABLET: 7.5; 325 TABLET ORAL at 01:01

## 2023-01-15 RX ADMIN — MAGNESIUM SULFATE HEPTAHYDRATE 2 G: 40 INJECTION, SOLUTION INTRAVENOUS at 11:01

## 2023-01-15 RX ADMIN — FUROSEMIDE 40 MG: 10 INJECTION, SOLUTION INTRAMUSCULAR; INTRAVENOUS at 06:01

## 2023-01-15 RX ADMIN — FUROSEMIDE 20 MG: 20 TABLET ORAL at 04:01

## 2023-01-15 RX ADMIN — NICOTINE 1 PATCH: 14 PATCH, EXTENDED RELEASE TRANSDERMAL at 09:01

## 2023-01-15 RX ADMIN — AMLODIPINE BESYLATE 10 MG: 5 TABLET ORAL at 04:01

## 2023-01-15 RX ADMIN — POTASSIUM BICARBONATE 35 MEQ: 391 TABLET, EFFERVESCENT ORAL at 11:01

## 2023-01-15 RX ADMIN — LIDOCAINE 5% 1 PATCH: 700 PATCH TOPICAL at 09:01

## 2023-01-15 RX ADMIN — QUETIAPINE 25 MG: 25 TABLET ORAL at 04:01

## 2023-01-15 RX ADMIN — POTASSIUM BICARBONATE 50 MEQ: 977.5 TABLET, EFFERVESCENT ORAL at 05:01

## 2023-01-15 RX ADMIN — CARVEDILOL 25 MG: 25 TABLET, FILM COATED ORAL at 04:01

## 2023-01-15 RX ADMIN — ACETAMINOPHEN 1000 MG: 10 INJECTION INTRAVENOUS at 10:01

## 2023-01-15 RX ADMIN — CARVEDILOL 25 MG: 25 TABLET, FILM COATED ORAL at 06:01

## 2023-01-15 RX ADMIN — HYDRALAZINE HYDROCHLORIDE 5 MG: 20 INJECTION INTRAMUSCULAR; INTRAVENOUS at 01:01

## 2023-01-15 NOTE — PROGRESS NOTES
Encouraged patient to be moving around and participate when getting out of bed to chair and when therapy comes to work with him. Wife has requested IS for him.     - Plans for possible revascularization with PCI later in the week.   - continue DAPT with Brilinta 90 mg po BID and aspirin 81 mg po daily.

## 2023-01-15 NOTE — PLAN OF CARE
Problem: Physical Therapy  Goal: Physical Therapy Goal  Description: Goals to be met by: 2023     Patient will increase functional independence with mobility by performin. Supine to sit with MInimal Assistance  2. Sit to stand transfer with Moderate Assistance  3. Bed to chair with Mod A  w/ol AD  3. Gait  x 10  feet with Minimal Assistance using Rolling Walker.     Outcome: Ongoing, Progressing   Patient seen for progressive ambulation and OOB activity to facilitate improved function.

## 2023-01-15 NOTE — PROGRESS NOTES
[facility] Medicine  Progress Note    Patient name: Peyman Gr  MRN: [id number]  Admit Date: 1/1/2023   LOS: 14 days     ASSESSMENT/PLAN:     Active Hospital Problems    Diagnosis  POA    *STEMI involving right coronary artery [I21.11]  Yes    Pneumonia of right lung due to methicillin susceptible Staphylococcus aureus (MSSA) [J15.211]  No    Shock liver [K72.00]  Yes    Cardiogenic shock [R57.0]  Yes    Cardiac arrest [I46.9]  Yes    Acute respiratory failure with hypoxia and hypercarbia [J96.01, J96.02]  Yes    Leukocytosis [D72.829]  Yes    Anemia [D64.9]  Yes    History of tobacco abuse [Z87.891]  Not Applicable    Primary hypertension [I10]  Yes    PVD (peripheral vascular disease) with claudication [I73.9]  Yes    PAD (peripheral artery disease) [I73.9]  Yes      Resolved Hospital Problems    Diagnosis Date Resolved POA    Hyponatremia [E87.1] 01/02/2023 Yes     Cardiac arrest  Due to STEMI involving RCA   Complicated by complete heart block  MSSA pneumonia - s/p ceftriaxone  - s/p emergent stenting 1/1/23  - successfully extubated on 01/08  - cardiology awaiting pt's improved physical conditioning to evaluate for anginal symptoms, to decide on urgency of revascularization status.  Pt is currently participating with PT/OT and is a maximum assist for transferring.    - continue brillinta, asa, statin  - adjusting HTN medication  - PT/OT/SLP   - echo reviewed  - start po lasix; trending BMP    Chronic conditions as noted above/below; home medications reviewed personally by me and restarted as appropriate  Electrolyte derangement:  Trending BMP; Mg; replacement prn  DVT ppx: lovenox  FULL CODE    SUBJECTIVE:     Principal problem: STEMI involving right coronary artery    67-year-old male with peripheral vascular disease, essential hypertension, hyperlipidemia and ongoing tobacco use presented to outside facility with 2 hour onset of chest pain and shortness of breath.  EKG revealed  inferior and anterolateral STEMI.  Quickly degenerated into cardiac arrest with runs of VT/VF and asystole requiring resuscitation for about 40 minutes.  He was subsequently intubated and transferred to our ED. En route he developed cardiac arrest again (PEA) which was continued into our ED and was resuscitated for another 30 minutes.  He required transcutaneous pacing.  He was taken emergently to the catheterization lab and underwent PCI with stenting of RCA which was felt to be the culprit lesion.  He was also found to have left main stenosis of about 70%.  A transvenous pacemaker was also placed.  Currently admitted to the ICU for post cardiac arrest care.  He remains intubated and critically ill. Hospital stay complicated by fever; initiated on broad spectrum antibiotics.  Respiratory culture growing MSSA - de-escalated to ceftriaxone.  Extubated successfully on 1/8.    Interval History:      1/11:  no SOB on HFNC.  Pt conversing with friends/family at bedside without SOB.  No current CP.  No pain throughout.  Pt is alert and oriented x4, however he is extremely tangential and not at mental status baseline per his family.  MAEW.  Follows commands.    1/12:  out of ICU.  Pt without CP.  No SOB.  Family at bedside, he is responding more appropriately today.    1/13:  pt hallucinating this evening.  It occurs intermittently, usually after his family leaves for the evening.  He sees people staring at him through the 2nd floor window.  He is being moved closer to nursing station.  Starting Seroquel pm.  Previously he had trouble sleeping; I suspect anxiety / other sequela after cardiac arrest.  Start AM lexapro.  Delirium precautions.  No fever, no leukocytosis, procal negative.   No CP.  No SOB.    1/14:  pt's BP medication held due to hypotension this AM.  It appears he was given IV hydralazine at 0339 today.  BP appears labile throughout day.  Adjusting medication doses now.  Prioritizing coreg, losartan.   "Continuing IV hydralazine prn.  He has sat on edge of bed with all meals, reports fatigue as a result.  Cardiology is trying to evaluate for anginal symptoms: Pt currently transferring during my exam.  He reports L-chest pain when transferring (3 person assist), described as "picking" with no radiation, +SOB.  Obtaining EKG, obtaining troponin.  Discussed personally with cardiology.  Pt's CP and SOB already alleviating while sitting at rest.    1/15:  now pt is hypertensive again.  Continue coreg, losartan.  Adding back norvasc.  Continue IV hydralazine prn.   Pt's percocet is being spaced out to assist with decreasing confusion/delirium.  Pt was angry about this and reported msk pain; IV tylenol has been started and now he is pain-free and much happier.  He is participating with PT/OT.  He reports b/l feet swelling.  No CP.  No SOB.      Review of Systems:  All systems reviewed and are negative except as noted per above.      OBJECTIVE:     Vital Signs (Most Recent)  Temp: 99.3 °F (37.4 °C) (01/15/23 0700)  Pulse: 74 (01/15/23 1544)  Resp: 20 (01/15/23 1544)  BP: (!) 167/77 (immediately after 10mg iv hydralazine) (01/15/23 1544)  SpO2: 95 % (01/15/23 1544)    I & O (Last 24H):  Intake/Output Summary (Last 24 hours) at 1/15/2023 1556  Last data filed at 1/15/2023 0600  Gross per 24 hour   Intake 240 ml   Output 1800 ml   Net -1560 ml       Gen: alert, responsive, on RA  HEENT:  Eyes - no pallor  External ears with no lesions  Nares patent  Mouth, Throat:  trachea midline   CV: RRR  Lungs: CTA B/L  Abd: +BS, soft, NT, ND  Ext: no atrophy; +BLE edema  Skin: warm, dry  Neuro: alert, responsive  Psych: pleasant    Laboratory:  I have reviewed all pertinent lab results within the past 24 hours.  CBC:   Recent Labs   Lab 01/15/23  0548   WBC 8.54   RBC 3.14*   HGB 9.7*   HCT 30.3*      MCV 97   MCH 30.9   MCHC 32.0       CMP:   Recent Labs   Lab 01/15/23  0548   GLU 84   CALCIUM 8.3*   ALBUMIN 2.8*   PROT 6.2   NA " 134*   K 3.4*   CO2 23      BUN 24*   CREATININE 1.1   ALKPHOS 85   ALT 33   AST 29   BILITOT 0.6       No results for input(s): CKMB, CPKMB, TROPONINT, TROPONINI, MYOGLOBIN in the last 168 hours.    Imaging Results              X-Ray Chest AP Portable (Final result)  Result time 01/01/23 07:01:46      Final result by Elizabeth Sharp IV, MD (01/01/23 07:01:46)                   Narrative:    Chest, single view    HISTORY: Nasogastric tube placement.    In the interval, there has been introduction of a nasogastric tube which extends into the left upper quadrant of the abdomen just beyond the GE junction. An endotracheal tube remains in place with its tip positioned well above the level of the saurav.    Bilateral interstitial and mild groundglass opacities, greater on the right are again observed. There are no new confluent infiltrates, volume loss or effusions.    The cardiomediastinal silhouette is stable.    IMPRESSION:    Interval introduction of nasogastric tube extending into the left upper abdomen with its tip just beyond the GE junction.    Otherwise stable cardiopulmonary status.    Electronically signed by:  Elizabeth Sharp MD  1/1/2023 7:01 AM CST Workstation: [id number]                                     X-Ray Chest AP Portable (Final result)  Result time 01/01/23 06:59:55      Final result by Elizabeth Sharp IV, MD (01/01/23 06:59:55)                   Narrative:    Chest, single view    HISTORY: Cardiac arrest.    Comparison 9/9/2022.    Endotracheal tube is in place with its tip positioned well above the level of the saurav.    The heart size is within normal limits. The central pulmonary vasculature is not acutely engorged.  There is arteriosclerotic calcification within the aortic arch.    There is scattered bilateral interstitial and groundglass pulmonary opacities with upper lung zone predominance, greater on the right. No effusion or extrapulmonary air identified.    Surgical changes are noted  within the cervical spine and within the soft tissues of the right side of the neck.    IMPRESSION:    Positioning of endotracheal tube as above.    Interval development of interstitial and groundglass pulmonary opacities with upper lung zone predominance.    Electronically signed by:  Elizabeth Sharp MD  1/1/2023 6:59 AM CST Workstation: [id number]                                    Department Hospital Medicine  [facility]  Ligia Fernandez MD  Date of service: 01/15/2023

## 2023-01-16 LAB
ALBUMIN SERPL BCP-MCNC: 3 G/DL (ref 3.5–5.2)
ALP SERPL-CCNC: 101 U/L (ref 55–135)
ALT SERPL W/O P-5'-P-CCNC: 37 U/L (ref 10–44)
ANION GAP SERPL CALC-SCNC: 8 MMOL/L (ref 8–16)
AST SERPL-CCNC: 33 U/L (ref 10–40)
BASOPHILS # BLD AUTO: 0.06 K/UL (ref 0–0.2)
BASOPHILS NFR BLD: 0.8 % (ref 0–1.9)
BILIRUB SERPL-MCNC: 0.9 MG/DL (ref 0.1–1)
BUN SERPL-MCNC: 23 MG/DL (ref 8–23)
CALCIUM SERPL-MCNC: 8.3 MG/DL (ref 8.7–10.5)
CHLORIDE SERPL-SCNC: 101 MMOL/L (ref 95–110)
CO2 SERPL-SCNC: 25 MMOL/L (ref 23–29)
CREAT SERPL-MCNC: 1.1 MG/DL (ref 0.5–1.4)
DIFFERENTIAL METHOD: ABNORMAL
EOSINOPHIL # BLD AUTO: 0.1 K/UL (ref 0–0.5)
EOSINOPHIL NFR BLD: 0.8 % (ref 0–8)
ERYTHROCYTE [DISTWIDTH] IN BLOOD BY AUTOMATED COUNT: 13.8 % (ref 11.5–14.5)
EST. GFR  (NO RACE VARIABLE): >60 ML/MIN/1.73 M^2
GLUCOSE SERPL-MCNC: 95 MG/DL (ref 70–110)
HCT VFR BLD AUTO: 31.1 % (ref 40–54)
HGB BLD-MCNC: 10.5 G/DL (ref 14–18)
IMM GRANULOCYTES # BLD AUTO: 0.12 K/UL (ref 0–0.04)
IMM GRANULOCYTES NFR BLD AUTO: 1.7 % (ref 0–0.5)
LYMPHOCYTES # BLD AUTO: 1.3 K/UL (ref 1–4.8)
LYMPHOCYTES NFR BLD: 18 % (ref 18–48)
MAGNESIUM SERPL-MCNC: 2.3 MG/DL (ref 1.6–2.6)
MCH RBC QN AUTO: 31.3 PG (ref 27–31)
MCHC RBC AUTO-ENTMCNC: 33.8 G/DL (ref 32–36)
MCV RBC AUTO: 93 FL (ref 82–98)
MONOCYTES # BLD AUTO: 1.2 K/UL (ref 0.3–1)
MONOCYTES NFR BLD: 16.2 % (ref 4–15)
NEUTROPHILS # BLD AUTO: 4.4 K/UL (ref 1.8–7.7)
NEUTROPHILS NFR BLD: 62.5 % (ref 38–73)
NRBC BLD-RTO: 0 /100 WBC
PLATELET # BLD AUTO: 364 K/UL (ref 150–450)
PMV BLD AUTO: 9.6 FL (ref 9.2–12.9)
POTASSIUM SERPL-SCNC: 3.7 MMOL/L (ref 3.5–5.1)
PROT SERPL-MCNC: 6.7 G/DL (ref 6–8.4)
RBC # BLD AUTO: 3.35 M/UL (ref 4.6–6.2)
SODIUM SERPL-SCNC: 134 MMOL/L (ref 136–145)
WBC # BLD AUTO: 7.11 K/UL (ref 3.9–12.7)

## 2023-01-16 PROCEDURE — 25000003 PHARM REV CODE 250: Performed by: FAMILY MEDICINE

## 2023-01-16 PROCEDURE — 63600175 PHARM REV CODE 636 W HCPCS: Performed by: INTERNAL MEDICINE

## 2023-01-16 PROCEDURE — 85025 COMPLETE CBC W/AUTO DIFF WBC: CPT | Performed by: FAMILY MEDICINE

## 2023-01-16 PROCEDURE — 25000003 PHARM REV CODE 250: Performed by: INTERNAL MEDICINE

## 2023-01-16 PROCEDURE — 97530 THERAPEUTIC ACTIVITIES: CPT | Mod: CQ

## 2023-01-16 PROCEDURE — 92526 ORAL FUNCTION THERAPY: CPT

## 2023-01-16 PROCEDURE — 83735 ASSAY OF MAGNESIUM: CPT | Performed by: FAMILY MEDICINE

## 2023-01-16 PROCEDURE — 36415 COLL VENOUS BLD VENIPUNCTURE: CPT | Performed by: FAMILY MEDICINE

## 2023-01-16 PROCEDURE — 97110 THERAPEUTIC EXERCISES: CPT | Mod: CQ

## 2023-01-16 PROCEDURE — S4991 NICOTINE PATCH NONLEGEND: HCPCS | Performed by: INTERNAL MEDICINE

## 2023-01-16 PROCEDURE — 80053 COMPREHEN METABOLIC PANEL: CPT | Performed by: FAMILY MEDICINE

## 2023-01-16 PROCEDURE — 97110 THERAPEUTIC EXERCISES: CPT

## 2023-01-16 PROCEDURE — 25000003 PHARM REV CODE 250

## 2023-01-16 PROCEDURE — 21000000 HC CCU ICU ROOM CHARGE

## 2023-01-16 PROCEDURE — 97116 GAIT TRAINING THERAPY: CPT | Mod: CQ

## 2023-01-16 RX ORDER — AMLODIPINE BESYLATE 5 MG/1
5 TABLET ORAL 2 TIMES DAILY
Status: DISCONTINUED | OUTPATIENT
Start: 2023-01-16 | End: 2023-01-16

## 2023-01-16 RX ORDER — AMLODIPINE BESYLATE 5 MG/1
5 TABLET ORAL DAILY
Status: DISCONTINUED | OUTPATIENT
Start: 2023-01-16 | End: 2023-01-18

## 2023-01-16 RX ADMIN — OXYCODONE HYDROCHLORIDE AND ACETAMINOPHEN 1 TABLET: 7.5; 325 TABLET ORAL at 07:01

## 2023-01-16 RX ADMIN — NICOTINE 1 PATCH: 14 PATCH, EXTENDED RELEASE TRANSDERMAL at 09:01

## 2023-01-16 RX ADMIN — CARVEDILOL 25 MG: 25 TABLET, FILM COATED ORAL at 07:01

## 2023-01-16 RX ADMIN — OXYCODONE HYDROCHLORIDE AND ACETAMINOPHEN 1 TABLET: 7.5; 325 TABLET ORAL at 06:01

## 2023-01-16 RX ADMIN — MELATONIN 6 MG: at 08:01

## 2023-01-16 RX ADMIN — AMLODIPINE BESYLATE 10 MG: 5 TABLET ORAL at 08:01

## 2023-01-16 RX ADMIN — FAMOTIDINE 20 MG: 20 TABLET ORAL at 08:01

## 2023-01-16 RX ADMIN — TICAGRELOR 90 MG: 90 TABLET ORAL at 08:01

## 2023-01-16 RX ADMIN — OXYCODONE HYDROCHLORIDE AND ACETAMINOPHEN 1 TABLET: 7.5; 325 TABLET ORAL at 12:01

## 2023-01-16 RX ADMIN — CHLORHEXIDINE GLUCONATE 15 ML: 1.2 RINSE ORAL at 08:01

## 2023-01-16 RX ADMIN — AMLODIPINE BESYLATE 5 MG: 5 TABLET ORAL at 04:01

## 2023-01-16 RX ADMIN — ATORVASTATIN CALCIUM 20 MG: 20 TABLET, FILM COATED ORAL at 08:01

## 2023-01-16 RX ADMIN — LOSARTAN POTASSIUM 50 MG: 50 TABLET, FILM COATED ORAL at 04:01

## 2023-01-16 RX ADMIN — CARVEDILOL 25 MG: 25 TABLET, FILM COATED ORAL at 04:01

## 2023-01-16 RX ADMIN — TRAZODONE HYDROCHLORIDE 50 MG: 50 TABLET ORAL at 08:01

## 2023-01-16 RX ADMIN — ESCITALOPRAM OXALATE 10 MG: 10 TABLET ORAL at 08:01

## 2023-01-16 RX ADMIN — LOSARTAN POTASSIUM 100 MG: 50 TABLET, FILM COATED ORAL at 08:01

## 2023-01-16 RX ADMIN — QUETIAPINE 25 MG: 25 TABLET ORAL at 04:01

## 2023-01-16 RX ADMIN — ENOXAPARIN SODIUM 40 MG: 100 INJECTION SUBCUTANEOUS at 09:01

## 2023-01-16 RX ADMIN — OXYCODONE HYDROCHLORIDE AND ACETAMINOPHEN 1 TABLET: 7.5; 325 TABLET ORAL at 01:01

## 2023-01-16 RX ADMIN — ASPIRIN 81 MG CHEWABLE TABLET 81 MG: 81 TABLET CHEWABLE at 08:01

## 2023-01-16 RX ADMIN — FUROSEMIDE 20 MG: 20 TABLET ORAL at 08:01

## 2023-01-16 NOTE — NURSING
No cardiac pain today, left flank palpable and reproducable. IV ofirmev given x1 as oxy was not available to give. Pt in chair for every meal. Very lucid today, more talkative. CHG bath given. Still no BM, dr katz added medications for bowel regimen    Pt SOB while attempting to sit on edge of bed. Denies left flank pain and denies chest pain, only endorsed back pain. Sats 94% on room air, bp 130's systolic LLL posterior lung sounds diminished, RLL coarse. One time 40mg IV lasix and cxr obtained.

## 2023-01-16 NOTE — PROGRESS NOTES
No acute events overnight.  Will discuss with Dr. Elizondo regarding his further plans for left main revascularization.

## 2023-01-16 NOTE — PT/OT/SLP PROGRESS
"Speech Language Pathology Treatment    Patient Name:  Peyman Gr   MRN:  [id number]  Admitting Diagnosis: STEMI involving right coronary artery    Recommendations:                 General Recommendations:  Dysphagia therapy  Diet recommendations:  Soft & Bite Sized Diet - IDDSI Level 6, Liquid Diet Level: Thin   Aspiration Precautions:   1 bite/sip at a time, Alternating bites/sips, Assistance with meals, Avoid talking while eating, Eliminate distractions, Frequent oral care, HOB to 90 degrees, Meds whole 1 at a time, and Small bites/sips     General Precautions: Standard, aspiration, fall  Communication strategies:  none    Subjective     Pt awake sitting upright in bed w/ wife at bedside. Pt agreeable to ST.  Patient goals: "I want to get in my workouts and keep getting better so I can get out of here and go [facility]     Pain/Comfort:       Respiratory Status: Room air    Objective:     Has the patient been evaluated by SLP for swallowing?   Yes  Keep patient NPO? No   Current Respiratory Status:        Pt observed self-regulating tsp bites of diced peaches in thin juice and straw sips thin liquid. Pt able to recall and utilized swallowing precautions during trials independently, w/ the exception of avoiding talking during meals in 1/4 trials. Adequate oral acceptance, clearance, and timely mastication. No overt s/s aspiration noted. Pt w/ less fatigue and effort during mastication and when feeding himself.    Assessment:     Peyman Gr is a 67 y.o. male with an SLP diagnosis of mild-moderate oral Dysphagia.  He presents with improved mastication, endurance, strength, and bolus manipulation. Rec pt upgrade diet to IDDSI 6- soft and bite sized w/ thin liquids. ST to continue f/u 3x/week for diet tolerance and use of swallowing precautions during PO trials and meals.    Goals:   Multidisciplinary Problems       SLP Goals          Problem: SLP    Goal Priority Disciplines Outcome "   SLP Goal     SLP Ongoing, Progressing   Description: 1. Pt will tolerate PO trials of puree and thin liquid w/o overt s/s aspiration and w/ adequate oral clearance during >90% of trials -MET  2. Pt will participate in ongoing diagnostic dysphagia tx in order to determine LRD -MET  3. Pt will tolerate pureed diet w/ thin liquids w/o overt s/s aspiration and w/ adequate oral clearance w/ >90% of PO intake -MET  4. Pt will tolerate level 6 diet w/ thin liquids w/ adequate bolus manipulation and oral clearance w/o overt s/s aspiration during >90% of PO intake  5. Pt will participate in cognitive-linguistic evaluation (DISCONTINUED, pt's wife stating pt at baseline)                       Plan:     Patient to be seen:  3 x/week   Plan of Care expires:     Plan of Care reviewed with:  patient, other (see comments) (nursing, MD)   SLP Follow-Up:  Yes       Discharge recommendations:  rehabilitation facility   Barriers to Discharge:  None    Time Tracking:     SLP Treatment Date:   01/16/23  Speech Start Time:  1422  Speech Stop Time:  1441     Speech Total Time (min):  19 min    Billable Minutes: Treatment Swallowing Dysfunction 19 min    01/16/2023

## 2023-01-16 NOTE — PT/OT/SLP PROGRESS
Occupational Therapy   Treatment    Name: Peyman Gr  MRN: [id number]  Admitting Diagnosis:  STEMI involving right coronary artery  15 Days Post-Op    Recommendations:     Discharge Recommendations: rehabilitation facility  Discharge Equipment Recommendations:   (TBD)  Barriers to discharge:   increased assistance needed for ADLs/functional mobility     Assessment:     Peyman Gr is a 67 y.o. male with a medical diagnosis of STEMI involving right coronary artery. Performance deficits affecting function are weakness, impaired endurance, impaired self care skills, impaired functional mobility, gait instability, impaired balance, decreased safety awareness, impaired cardiopulmonary response to activity.     Pt presents with generalized weakness; he is highly motivated to reach his goals.  Pt participated in UE/LE therex seated EOB.    Rehab Prognosis:  Good; patient would benefit from acute skilled OT services to address these deficits and reach maximum level of function.       Plan:     Patient to be seen 6 x/week to address the above listed problems via self-care/home management, therapeutic activities, therapeutic exercises, cognitive retraining  Plan of Care Expires: 02/10/23  Plan of Care Reviewed with: patient, family    Subjective     Pain/Comfort:  Pain Rating 1: 0/10  Pain Rating Post-Intervention 1: 0/10    Objective:     Communicated with: nurse prior to session.  Patient found sitting edge of bed with telemetry, pulse ox (continuous), peripheral IV, blood pressure cuff upon OT entry to room.    General Precautions: Standard, fall    Orthopedic Precautions:N/A  Braces: N/A  Respiratory Status: Room air    Treatment & Education:  Pt presented sitting EOB; pt reported some fatigue having just recently finished working with PT; he requested education on therex to be performed in his free time to work on improving his strength/endurance; pt completed BUE/BLE therex x10 repetitions in all major joints/planes  while seated EOB with rest between sets 2/2 fatigue.  Pt is motivated to improve his overall strength/endurance so that he can increase his independence with ADLs.      Patient left sitting edge of bed with all lines intact, call button in reach, and bed alarm on    GOALS:   Multidisciplinary Problems       Occupational Therapy Goals          Problem: Occupational Therapy    Goal Priority Disciplines Outcome Interventions   Occupational Therapy Goal     OT, PT/OT Ongoing, Progressing    Description: Goals to be met by: 2/10/2023    Patient will increase functional independence with ADLs by performing:    UE Dressing with Stand-by Assistance.  LE Dressing with Stand-by Assistance.  Grooming while seated with Stand-by Assistance.  Toileting from bedside commode with Minimal Assistance for hygiene and clothing management.   Sitting at edge of bed x10 minutes with Supervision.  Supine to sit with Stand-by Assistance.  Toilet transfer to bedside commode with Minimal Assistance.  Upper extremity exercise program x10 reps per handout, with assistance as needed.                         Time Tracking:     OT Date of Treatment: 01/16/23  OT Start Time: 1209  OT Stop Time: 1222  OT Total Time (min): 13 min    Billable Minutes:Therapeutic Exercise 13    OT/ALDO: OT          1/16/2023

## 2023-01-16 NOTE — PT/OT/SLP PROGRESS
Physical Therapy Treatment    Patient Name:  Peyman Gr   MRN:  [id number]    Recommendations:     Discharge Recommendations: rehabilitation facility  Discharge Equipment Recommendations: walker, rolling  Barriers to discharge:  increased assist with mobility    Assessment:     Peyman Gr is a 67 y.o. male admitted with a medical diagnosis of STEMI involving right coronary artery.  He presents with the following impairments/functional limitations: weakness, gait instability, decreased lower extremity function, impaired functional mobility, impaired cardiopulmonary response to activity, impaired balance, impaired coordination.    Pt agreeable to visit. Pt required min assist x 2 for sit to stand transfer with RW. Pt ambulated 15' x 2 with min-mod assist with increased trunk and knee flexion. Pt knees trembling. Pt required seated rest break in between trials due to fatigue and shortness of breath.    Rehab Prognosis: Fair; patient would benefit from acute skilled PT services to address these deficits and reach maximum level of function.    Recent Surgery: Procedure(s) (LRB):  Left heart cath (Left)  Insertion, Pacemaker, Temporary Transvenous  ANGIOGRAM, CORONARY ARTERY (N/A)  Percutaneous coronary intervention (N/A)  IVUS, Coronary 15 Days Post-Op    Plan:     During this hospitalization, patient to be seen daily to address the identified rehab impairments via gait training, therapeutic activities, therapeutic exercises and progress toward the following goals:    Plan of Care Expires:  02/09/23    Subjective     Chief Complaint: pt reports being wore out after ambulation  Patient/Family Comments/goals: to get stronger  Pain/Comfort:  Pain Rating 1: 0/10      Objective:     Communicated with RN prior to session.  Patient found sitting edge of bed with telemetry, blood pressure cuff, pulse ox (continuous) upon PT entry to room.     General Precautions: Standard, fall  Orthopedic Precautions: N/A  Braces:  N/A  Respiratory Status: Room air     Functional Mobility:  Transfers:     Sit to Stand:  minimum assistance and of 2 persons with rolling walker  Gait: 2 x 15' with RW and min-modA, increased trunk and knee flexion with knees trembling      AM-PAC 6 CLICK MOBILITY          Treatment & Education:  Pt educated on importance of time OOB, importance of intermittent mobility, safe techniques for transfers/ambulation, discharge recommendations/options, and use of call light for assistance and fall prevention.      Patient left sitting edge of bed with all lines intact, call button in reach, RN notified, and spouse present..    GOALS:   Multidisciplinary Problems       Physical Therapy Goals          Problem: Physical Therapy    Goal Priority Disciplines Outcome Goal Variances Interventions   Physical Therapy Goal     PT, PT/OT Ongoing, Progressing     Description: Goals to be met by: 2023     Patient will increase functional independence with mobility by performin. Supine to sit with MInimal Assistance  2. Sit to stand transfer with Moderate Assistance  3. Bed to chair with Mod A  w/ol AD  3. Gait  x 10  feet with Minimal Assistance using Rolling Walker.                          Time Tracking:     PT Received On: 23  PT Start Time: 1139     PT Stop Time: 1203  PT Total Time (min): 24 min     Billable Minutes: Gait Training 10 and Therapeutic Activity 14    Treatment Type: Treatment  PT/PTA: PTA     PTA Visit Number: 1     2023

## 2023-01-16 NOTE — PLAN OF CARE
Per paul Howard with [facility], patient is clinically accepted for Skilled Nursing, authorization was submitted on 1/13/23, Authorization is still pending as of 1/16/23 per paul Howard.       01/16/23 0931   Discharge Reassessment   Assessment Type Discharge Planning Reassessment   Did the patient's condition or plan change since previous assessment? No   Discharge Plan discussed with: Adult children   Communicated TOBIAS with patient/caregiver Date not available/Unable to determine   Discharge Plan A Skilled Nursing Facility   Discharge Plan B Skilled Nursing Facility   DME Needed Upon Discharge  none   Discharge Barriers Identified None   Why the patient remains in the hospital Requires continued medical care   Post-Acute Status   Post-Acute Authorization Placement   Post-Acute Placement Status Pending payor review/awaiting authorization (if required)   Discharge Delays None known at this time

## 2023-01-16 NOTE — PLAN OF CARE
Problem: Infection  Goal: Absence of Infection Signs and Symptoms  Outcome: Ongoing, Progressing     Problem: Adult Inpatient Plan of Care  Goal: Plan of Care Review  Outcome: Ongoing, Progressing  Goal: Patient-Specific Goal (Individualized)  Outcome: Ongoing, Progressing  Goal: Absence of Hospital-Acquired Illness or Injury  Outcome: Ongoing, Progressing  Goal: Optimal Comfort and Wellbeing  Outcome: Ongoing, Progressing  Goal: Readiness for Transition of Care  Outcome: Ongoing, Progressing     Problem: Skin Injury Risk Increased  Goal: Skin Health and Integrity  Outcome: Ongoing, Progressing     Problem: Fall Injury Risk  Goal: Absence of Fall and Fall-Related Injury  Outcome: Ongoing, Progressing

## 2023-01-16 NOTE — PROGRESS NOTES
[facility] Medicine  Progress Note    Patient name: Peyman Gr  MRN: [id number]  Admit Date: 1/1/2023   LOS: 15 days     ASSESSMENT/PLAN:     Active Hospital Problems    Diagnosis  POA    *STEMI involving right coronary artery [I21.11]  Yes    Pneumonia of right lung due to methicillin susceptible Staphylococcus aureus (MSSA) [J15.211]  No    Shock liver [K72.00]  Yes    Cardiogenic shock [R57.0]  Yes    Cardiac arrest [I46.9]  Yes    Acute respiratory failure with hypoxia and hypercarbia [J96.01, J96.02]  Yes    Leukocytosis [D72.829]  Yes    Anemia [D64.9]  Yes    History of tobacco abuse [Z87.891]  Not Applicable    Primary hypertension [I10]  Yes    PVD (peripheral vascular disease) with claudication [I73.9]  Yes    PAD (peripheral artery disease) [I73.9]  Yes      Resolved Hospital Problems    Diagnosis Date Resolved POA    Hyponatremia [E87.1] 01/02/2023 Yes     Cardiac arrest  Due to STEMI involving RCA   Complicated by complete heart block  MSSA pneumonia - s/p ceftriaxone  - s/p emergent stenting 1/1/23  - successfully extubated on 01/08  - cardiology awaiting pt's improved physical conditioning to evaluate for anginal symptoms, to decide on urgency of revascularization status.  Pt is currently participating with PT/OT and is a maximum assist for transferring.  He has intermittent L chest pain during transferring, I evaluated this personally with a STAT EKG and discussed it with Dr Talley.  EKG not concerning at that time.  CP resolved with rest, without other intervention.  - continue brillinta, asa, statin  - adjusting HTN medication as needed  - PT/OT/SLP   - po lasix; trending BMP    Chronic conditions as noted above/below; home medications reviewed personally by me and restarted as appropriate  Electrolyte derangement:  Trending BMP; Mg; replacement prn  DVT ppx: lovenox  FULL CODE    SUBJECTIVE:     Principal problem: STEMI involving right coronary artery    67-year-old male  with peripheral vascular disease, essential hypertension, hyperlipidemia and ongoing tobacco use presented to outside facility with 2 hour onset of chest pain and shortness of breath.  EKG revealed inferior and anterolateral STEMI.  Quickly degenerated into cardiac arrest with runs of VT/VF and asystole requiring resuscitation for about 40 minutes.  He was subsequently intubated and transferred to our ED. En route he developed cardiac arrest again (PEA) which was continued into our ED and was resuscitated for another 30 minutes.  He required transcutaneous pacing.  He was taken emergently to the catheterization lab and underwent PCI with stenting of RCA which was felt to be the culprit lesion.  He was also found to have left main stenosis of about 70%.  A transvenous pacemaker was also placed.  Currently admitted to the ICU for post cardiac arrest care.  He remains intubated and critically ill. Hospital stay complicated by fever; initiated on broad spectrum antibiotics.  Respiratory culture growing MSSA - de-escalated to ceftriaxone.  Extubated successfully on 1/8.    Interval History:      1/11:  no SOB on HFNC.  Pt conversing with friends/family at bedside without SOB.  No current CP.  No pain throughout.  Pt is alert and oriented x4, however he is extremely tangential and not at mental status baseline per his family.  MAEW.  Follows commands.    1/12:  out of ICU.  Pt without CP.  No SOB.  Family at bedside, he is responding more appropriately today.    1/13:  pt hallucinating this evening.  It occurs intermittently, usually after his family leaves for the evening.  He sees people staring at him through the 2nd floor window.  He is being moved closer to nursing station.  Starting Seroquel pm.  Previously he had trouble sleeping; I suspect anxiety / other sequela after cardiac arrest.  Start AM lexapro.  Delirium precautions.  No fever, no leukocytosis, procal negative.   No CP.  No SOB.    1/14:  pt's BP  "medication held due to hypotension this AM.  It appears he was given IV hydralazine at 0339 today.  BP appears labile throughout day.  Adjusting medication doses now.  Prioritizing coreg, losartan.  Continuing IV hydralazine prn.  He has sat on edge of bed with all meals, reports fatigue as a result.  Cardiology is trying to evaluate for anginal symptoms: Pt currently transferring during my exam.  He reports L-chest pain when transferring (3 person assist), described as "picking" with no radiation, +SOB.  Obtaining EKG, obtaining troponin.  Discussed personally with cardiology.  Pt's CP and SOB already alleviating while sitting at rest.    1/15:  now pt is hypertensive again.  Continue coreg, losartan.  Adding back norvasc.  Continue IV hydralazine prn.   Pt's percocet is being spaced out to assist with decreasing confusion/delirium.  Pt was angry about this and reported msk pain; IV tylenol has been started and now he is pain-free and much happier.  He is participating with PT/OT.  He reports b/l feet swelling.  No CP.  No SOB.      1/16:  adjusting HTN medication again.  No CP, no SOB, no n/v, no diaphoresis, no dizziness.  Nor have the symptoms occured during exertion since last night--(pt had SOB last night, now s/p lasix.).    Review of Systems:  All systems reviewed and are negative except as noted per above.      OBJECTIVE:     Vital Signs (Most Recent)  Temp: 98.6 °F (37 °C) (01/16/23 0715)  Pulse: 71 (01/16/23 1100)  Resp: 20 (01/16/23 1300)  BP: (!) 142/73 (01/16/23 1100)  SpO2: (!) 93 % (01/16/23 1100)    I & O (Last 24H):  Intake/Output Summary (Last 24 hours) at 1/16/2023 1433  Last data filed at 1/16/2023 1302  Gross per 24 hour   Intake 615 ml   Output 4500 ml   Net -3885 ml       Gen: alert, responsive, on RA  HEENT:  Eyes - no pallor  External ears with no lesions  Nares patent  Mouth, Throat:  trachea midline   CV: RRR  Lungs: CTA B/L  Abd: +BS, soft, NT, ND  Ext: no atrophy; +BLE edema to ankles - " resolved  Skin: warm, dry  Neuro: alert, responsive  Psych: pleasant    Laboratory:  I have reviewed all pertinent lab results within the past 24 hours.  CBC:   Recent Labs   Lab 01/16/23  0508   WBC 7.11   RBC 3.35*   HGB 10.5*   HCT 31.1*      MCV 93   MCH 31.3*   MCHC 33.8       CMP:   Recent Labs   Lab 01/16/23  0508   GLU 95   CALCIUM 8.3*   ALBUMIN 3.0*   PROT 6.7   *   K 3.7   CO2 25      BUN 23   CREATININE 1.1   ALKPHOS 101   ALT 37   AST 33   BILITOT 0.9       No results for input(s): CKMB, CPKMB, TROPONINT, TROPONINI, MYOGLOBIN in the last 168 hours.    Imaging Results              X-Ray Chest AP Portable (Final result)  Result time 01/01/23 07:01:46      Final result by Elizabeth Sharp IV, MD (01/01/23 07:01:46)                   Narrative:    Chest, single view    HISTORY: Nasogastric tube placement.    In the interval, there has been introduction of a nasogastric tube which extends into the left upper quadrant of the abdomen just beyond the GE junction. An endotracheal tube remains in place with its tip positioned well above the level of the saurav.    Bilateral interstitial and mild groundglass opacities, greater on the right are again observed. There are no new confluent infiltrates, volume loss or effusions.    The cardiomediastinal silhouette is stable.    IMPRESSION:    Interval introduction of nasogastric tube extending into the left upper abdomen with its tip just beyond the GE junction.    Otherwise stable cardiopulmonary status.    Electronically signed by:  Elizabeth Sharp MD  1/1/2023 7:01 AM CST Workstation: [id number]                                     X-Ray Chest AP Portable (Final result)  Result time 01/01/23 06:59:55      Final result by Elizabeth Sharp IV, MD (01/01/23 06:59:55)                   Narrative:    Chest, single view    HISTORY: Cardiac arrest.    Comparison 9/9/2022.    Endotracheal tube is in place with its tip positioned well above the level of the  saurav.    The heart size is within normal limits. The central pulmonary vasculature is not acutely engorged.  There is arteriosclerotic calcification within the aortic arch.    There is scattered bilateral interstitial and groundglass pulmonary opacities with upper lung zone predominance, greater on the right. No effusion or extrapulmonary air identified.    Surgical changes are noted within the cervical spine and within the soft tissues of the right side of the neck.    IMPRESSION:    Positioning of endotracheal tube as above.    Interval development of interstitial and groundglass pulmonary opacities with upper lung zone predominance.    Electronically signed by:  Elizabeth Sharp MD  1/1/2023 6:59 AM CST Workstation: [id number]                                    Department Hospital Medicine  [facility]  Ligia Fernandez MD  Date of service: 01/16/2023

## 2023-01-16 NOTE — PLAN OF CARE
Problem: SLP  Goal: SLP Goal  Description: 1. Pt will tolerate PO trials of puree and thin liquid w/o overt s/s aspiration and w/ adequate oral clearance during >90% of trials -MET  2. Pt will participate in ongoing diagnostic dysphagia tx in order to determine LRD -MET  3. Pt will tolerate pureed diet w/ thin liquids w/o overt s/s aspiration and w/ adequate oral clearance w/ >90% of PO intake -MET  4. Pt will tolerate LRD, level 6 diet w/ thin liquids, w/ adequate bolus manipulation and oral clearance w/o overt s/s aspiration during >90% of PO intake  5. Pt will participate in cognitive-linguistic evaluation (DISCONTINUED, pt's wife stating pt at baseline)  Outcome: Ongoing, Progressing

## 2023-01-17 LAB
ALBUMIN SERPL BCP-MCNC: 3 G/DL (ref 3.5–5.2)
ALP SERPL-CCNC: 102 U/L (ref 55–135)
ALT SERPL W/O P-5'-P-CCNC: 35 U/L (ref 10–44)
ANION GAP SERPL CALC-SCNC: 9 MMOL/L (ref 8–16)
AST SERPL-CCNC: 29 U/L (ref 10–40)
BASOPHILS # BLD AUTO: 0.05 K/UL (ref 0–0.2)
BASOPHILS NFR BLD: 0.7 % (ref 0–1.9)
BILIRUB SERPL-MCNC: 0.5 MG/DL (ref 0.1–1)
BUN SERPL-MCNC: 24 MG/DL (ref 8–23)
CALCIUM SERPL-MCNC: 8.5 MG/DL (ref 8.7–10.5)
CHLORIDE SERPL-SCNC: 102 MMOL/L (ref 95–110)
CO2 SERPL-SCNC: 23 MMOL/L (ref 23–29)
CREAT SERPL-MCNC: 1 MG/DL (ref 0.5–1.4)
DIFFERENTIAL METHOD: ABNORMAL
EOSINOPHIL # BLD AUTO: 0.1 K/UL (ref 0–0.5)
EOSINOPHIL NFR BLD: 0.7 % (ref 0–8)
ERYTHROCYTE [DISTWIDTH] IN BLOOD BY AUTOMATED COUNT: 13.4 % (ref 11.5–14.5)
EST. GFR  (NO RACE VARIABLE): >60 ML/MIN/1.73 M^2
GLUCOSE SERPL-MCNC: 98 MG/DL (ref 70–110)
HCT VFR BLD AUTO: 30.3 % (ref 40–54)
HGB BLD-MCNC: 10.1 G/DL (ref 14–18)
IMM GRANULOCYTES # BLD AUTO: 0.11 K/UL (ref 0–0.04)
IMM GRANULOCYTES NFR BLD AUTO: 1.5 % (ref 0–0.5)
LYMPHOCYTES # BLD AUTO: 1.5 K/UL (ref 1–4.8)
LYMPHOCYTES NFR BLD: 21.2 % (ref 18–48)
MAGNESIUM SERPL-MCNC: 2 MG/DL (ref 1.6–2.6)
MCH RBC QN AUTO: 31.3 PG (ref 27–31)
MCHC RBC AUTO-ENTMCNC: 33.3 G/DL (ref 32–36)
MCV RBC AUTO: 94 FL (ref 82–98)
MONOCYTES # BLD AUTO: 1.2 K/UL (ref 0.3–1)
MONOCYTES NFR BLD: 16.1 % (ref 4–15)
NEUTROPHILS # BLD AUTO: 4.3 K/UL (ref 1.8–7.7)
NEUTROPHILS NFR BLD: 59.8 % (ref 38–73)
NRBC BLD-RTO: 0 /100 WBC
PLATELET # BLD AUTO: 365 K/UL (ref 150–450)
PMV BLD AUTO: 9.4 FL (ref 9.2–12.9)
POTASSIUM SERPL-SCNC: 3.5 MMOL/L (ref 3.5–5.1)
PROT SERPL-MCNC: 7.1 G/DL (ref 6–8.4)
RBC # BLD AUTO: 3.23 M/UL (ref 4.6–6.2)
SODIUM SERPL-SCNC: 134 MMOL/L (ref 136–145)
WBC # BLD AUTO: 7.13 K/UL (ref 3.9–12.7)

## 2023-01-17 PROCEDURE — 25000003 PHARM REV CODE 250: Performed by: INTERNAL MEDICINE

## 2023-01-17 PROCEDURE — 30200315 PPD INTRADERMAL TEST REV CODE 302: Performed by: INTERNAL MEDICINE

## 2023-01-17 PROCEDURE — 36415 COLL VENOUS BLD VENIPUNCTURE: CPT | Performed by: FAMILY MEDICINE

## 2023-01-17 PROCEDURE — S4991 NICOTINE PATCH NONLEGEND: HCPCS | Performed by: INTERNAL MEDICINE

## 2023-01-17 PROCEDURE — 85025 COMPLETE CBC W/AUTO DIFF WBC: CPT | Performed by: FAMILY MEDICINE

## 2023-01-17 PROCEDURE — 97110 THERAPEUTIC EXERCISES: CPT

## 2023-01-17 PROCEDURE — 99233 PR SUBSEQUENT HOSPITAL CARE,LEVL III: ICD-10-PCS | Mod: ,,, | Performed by: GENERAL PRACTICE

## 2023-01-17 PROCEDURE — 25000003 PHARM REV CODE 250: Performed by: FAMILY MEDICINE

## 2023-01-17 PROCEDURE — 25000003 PHARM REV CODE 250

## 2023-01-17 PROCEDURE — 92526 ORAL FUNCTION THERAPY: CPT

## 2023-01-17 PROCEDURE — 99233 SBSQ HOSP IP/OBS HIGH 50: CPT | Mod: ,,, | Performed by: GENERAL PRACTICE

## 2023-01-17 PROCEDURE — 21000000 HC CCU ICU ROOM CHARGE

## 2023-01-17 PROCEDURE — 63600175 PHARM REV CODE 636 W HCPCS: Performed by: INTERNAL MEDICINE

## 2023-01-17 PROCEDURE — 83735 ASSAY OF MAGNESIUM: CPT | Performed by: FAMILY MEDICINE

## 2023-01-17 PROCEDURE — 86580 TB INTRADERMAL TEST: CPT | Performed by: INTERNAL MEDICINE

## 2023-01-17 PROCEDURE — 63600175 PHARM REV CODE 636 W HCPCS: Performed by: FAMILY MEDICINE

## 2023-01-17 PROCEDURE — 97116 GAIT TRAINING THERAPY: CPT

## 2023-01-17 PROCEDURE — 80053 COMPREHEN METABOLIC PANEL: CPT | Performed by: FAMILY MEDICINE

## 2023-01-17 PROCEDURE — 97530 THERAPEUTIC ACTIVITIES: CPT

## 2023-01-17 RX ADMIN — OXYCODONE HYDROCHLORIDE AND ACETAMINOPHEN 1 TABLET: 7.5; 325 TABLET ORAL at 01:01

## 2023-01-17 RX ADMIN — ESCITALOPRAM OXALATE 10 MG: 10 TABLET ORAL at 08:01

## 2023-01-17 RX ADMIN — CARVEDILOL 25 MG: 25 TABLET, FILM COATED ORAL at 07:01

## 2023-01-17 RX ADMIN — LOSARTAN POTASSIUM 50 MG: 50 TABLET, FILM COATED ORAL at 04:01

## 2023-01-17 RX ADMIN — FUROSEMIDE 20 MG: 20 TABLET ORAL at 08:01

## 2023-01-17 RX ADMIN — TICAGRELOR 90 MG: 90 TABLET ORAL at 08:01

## 2023-01-17 RX ADMIN — ATORVASTATIN CALCIUM 20 MG: 20 TABLET, FILM COATED ORAL at 08:01

## 2023-01-17 RX ADMIN — NICOTINE 1 PATCH: 14 PATCH, EXTENDED RELEASE TRANSDERMAL at 08:01

## 2023-01-17 RX ADMIN — TRAZODONE HYDROCHLORIDE 50 MG: 50 TABLET ORAL at 08:01

## 2023-01-17 RX ADMIN — CHLORHEXIDINE GLUCONATE 15 ML: 1.2 RINSE ORAL at 08:01

## 2023-01-17 RX ADMIN — FAMOTIDINE 20 MG: 20 TABLET ORAL at 08:01

## 2023-01-17 RX ADMIN — POTASSIUM BICARBONATE 60 MEQ: 391 TABLET, EFFERVESCENT ORAL at 06:01

## 2023-01-17 RX ADMIN — HYDRALAZINE HYDROCHLORIDE 10 MG: 20 INJECTION INTRAMUSCULAR; INTRAVENOUS at 12:01

## 2023-01-17 RX ADMIN — CARVEDILOL 25 MG: 25 TABLET, FILM COATED ORAL at 04:01

## 2023-01-17 RX ADMIN — AMLODIPINE BESYLATE 5 MG: 5 TABLET ORAL at 08:01

## 2023-01-17 RX ADMIN — ASPIRIN 81 MG CHEWABLE TABLET 81 MG: 81 TABLET CHEWABLE at 08:01

## 2023-01-17 RX ADMIN — ENOXAPARIN SODIUM 40 MG: 100 INJECTION SUBCUTANEOUS at 11:01

## 2023-01-17 RX ADMIN — OXYCODONE HYDROCHLORIDE AND ACETAMINOPHEN 1 TABLET: 7.5; 325 TABLET ORAL at 07:01

## 2023-01-17 RX ADMIN — LOSARTAN POTASSIUM 100 MG: 50 TABLET, FILM COATED ORAL at 08:01

## 2023-01-17 RX ADMIN — TUBERCULIN PURIFIED PROTEIN DERIVATIVE 5 UNITS: 5 INJECTION, SOLUTION INTRADERMAL at 01:01

## 2023-01-17 NOTE — PT/OT/SLP PROGRESS
Occupational Therapy   Treatment    Name: Peyman Gr  MRN: [id number]  Admitting Diagnosis:  STEMI involving right coronary artery  16 Days Post-Op    Recommendations:     Discharge Recommendations: rehabilitation facility  Discharge Equipment Recommendations:   (TBD)  Barriers to discharge:       Assessment:     Peyman Gr is a 67 y.o. male with a medical diagnosis of STEMI involving right coronary artery.  He presents with the following performance deficits affecting function are weakness, impaired endurance, impaired self care skills, impaired functional mobility, gait instability, impaired balance, decreased upper extremity function, decreased lower extremity function, decreased safety awareness, impaired cardiopulmonary response to activity. Pt was agreeable to OT. Pt utilized urinal in sitting EOB. Pt c/o back pain and states he was just medicated. Pt has agreeable to participate with HOB elevated. OT provided instruction in graded BUE therapeutic exercises to increase strength and functional activity tolerance for ADL's/IADL's. Pt required cues and demonstration to complete. Pt ended session secondary to fatigue. OT provided instruction in calling for assist. Pt verbalized understanding.    Rehab Prognosis:  Good; patient would benefit from acute skilled OT services to address these deficits and reach maximum level of function.       Plan:     Patient to be seen 6 x/week to address the above listed problems via self-care/home management, therapeutic activities, therapeutic exercises  Plan of Care Expires: 02/10/23  Plan of Care Reviewed with: patient    Subjective     Pain/Comfort:  Pain Rating 1: 8/10 (Pt reports just medicated)  Location 1: back  Pain Rating Post-Intervention 1: 6/10  Location 2: back    Objective:     Communicated with: Nurse Emerson prior to session.  Patient found sitting edge of bed with blood pressure cuff, pulse ox (continuous), peripheral IV, telemetry upon OT entry to  room.    General Precautions: Standard, fall    Orthopedic Precautions:N/A  Braces: N/A  Respiratory Status: Room air     Occupational Performance:     Bed Mobility:    Patient completed Sit to Supine with stand by assistance       Activities of Daily Living:  Toileting: Pt was able to use urinal in sitting EOB with SBA.        [facility] 6 Click ADL:      Treatment & Education:  OT provided instruction in graded BUE therapeutic exercises with HOB elevated for increased back comfort to increase strength and functional activity tolerance for ADL's/IADL's. Pt required cues and demonstration to complete. Pt ended session secondary to fatigue.  OT provided education in calling for assist. Pt verbalized understanding.    Patient left HOB elevated with all lines intact and call button in reach    GOALS:   Multidisciplinary Problems       Occupational Therapy Goals          Problem: Occupational Therapy    Goal Priority Disciplines Outcome Interventions   Occupational Therapy Goal     OT, PT/OT Ongoing, Progressing    Description: Goals to be met by: 2/10/2023    Patient will increase functional independence with ADLs by performing:    UE Dressing with Stand-by Assistance.  LE Dressing with Stand-by Assistance.  Grooming while seated with Stand-by Assistance.  Toileting from bedside commode with Minimal Assistance for hygiene and clothing management.   Sitting at edge of bed x10 minutes with Supervision.  Supine to sit with Stand-by Assistance.  Toilet transfer to bedside commode with Minimal Assistance.  Upper extremity exercise program x10 reps per handout, with assistance as needed.                         Time Tracking:     OT Date of Treatment: 01/17/23  OT Start Time: 1328  OT Stop Time: 1341  OT Total Time (min): 13 min    Billable Minutes:Therapeutic Exercise 13    OT/ALDO: OT          1/17/2023

## 2023-01-17 NOTE — PT/OT/SLP PROGRESS
Physical Therapy Treatment    Patient Name:  Peyman Gr   MRN:  [id number]    Recommendations:     Discharge Recommendations: rehabilitation facility  Discharge Equipment Recommendations: walker, rolling, wheelchair, bath bench  Barriers to discharge: None    Assessment:     Peyman Gr is a 67 y.o. male admitted with a medical diagnosis of STEMI involving right coronary artery.  He presents with the following impairments/functional limitations: weakness, impaired endurance, impaired self care skills, impaired functional mobility, gait instability, impaired balance, decreased upper extremity function, decreased lower extremity function, decreased safety awareness, impaired cardiopulmonary response to activity. Patient extremely motivated and does theraband exercises on his own. Patient fatigues quickly but vitals are stable.    Rehab Prognosis: Good; patient would benefit from acute skilled PT services to address these deficits and reach maximum level of function.    Recent Surgery: Procedure(s) (LRB):  Left heart cath (Left)  Insertion, Pacemaker, Temporary Transvenous  ANGIOGRAM, CORONARY ARTERY (N/A)  Percutaneous coronary intervention (N/A)  IVUS, Coronary 16 Days Post-Op    Plan:     During this hospitalization, patient to be seen 6 x/week to address the identified rehab impairments via gait training, therapeutic activities, therapeutic exercises and progress toward the following goals:    Plan of Care Expires:  01/18/23    Subjective     Chief Complaint: Pt reports he is ready to walk and has been exercising all morning.   Patient/Family Comments/goals: get better  Pain/Comfort:  Pain Rating 1: 0/10      Objective:     Communicated with nurse prior to session.  Patient found sitting edge of bed with telemetry, pulse ox (continuous) upon PT entry to room.     General Precautions: Standard, fall  Orthopedic Precautions: N/A  Braces: N/A  Respiratory Status: Room air     Functional Mobility:  Transfers:      Sit to Stand:  moderate assistance and of 1 persons with rolling walker  Gait: 10 ft with rolling walker. B flexed knees, narrow base of support, and flexed postures  Balance: static standing with B UE support       AM-PAC 6 CLICK MOBILITY  Turning over in bed (including adjusting bedclothes, sheets and blankets)?: 2  Sitting down on and standing up from a chair with arms (e.g., wheelchair, bedside commode, etc.): 2  Moving from lying on back to sitting on the side of the bed?: 2  Moving to and from a bed to a chair (including a wheelchair)?: 2  Need to walk in hospital room?: 2  Climbing 3-5 steps with a railing?: 1  Basic Mobility Total Score: 11       Treatment & Education:  Educated on safety when sitting. Performed sit to stand x 2 trials from bed in low position. Required mod A and VC for safety and positioning.    Patient left up in chair with all lines intact, call button in reach, chair alarm on, and nurse notified..    GOALS:   Multidisciplinary Problems       Physical Therapy Goals          Problem: Physical Therapy    Goal Priority Disciplines Outcome Goal Variances Interventions   Physical Therapy Goal     PT, PT/OT Ongoing, Progressing     Description: Goals to be met by: 2023     Patient will increase functional independence with mobility by performin. Supine to sit with MInimal Assistance  2. Sit to stand transfer with Moderate Assistance  3. Bed to chair with Mod A  w/ol AD  3. Gait  x 10  feet with Minimal Assistance using Rolling Walker.                          Time Tracking:     PT Received On: 23  PT Start Time: 1043     PT Stop Time: 1114  PT Total Time (min): 31 min     Billable Minutes: Gait Training 10min  and Therapeutic Activity 21 min    Treatment Type: Treatment  PT/PTA: PT     PTA Visit Number: 1     2023

## 2023-01-17 NOTE — PROGRESS NOTES
[facility]  Department of Cardiology  Progress Note    PATIENT NAME: Peyman Gr  MRN: [id number]  TODAY'S DATE: 01/17/2023  ADMIT DATE: 1/1/2023    SUBJECTIVE     PRINCIPLE PROBLEM: STEMI involving right coronary artery    INTERVAL HISTORY:    1/17/2023  Seen sitting up in bed with no acute distress noted.  Walked minimal amount in the room today.  Easily fatigued and has shortness of breath with minimal exertion.    Review of patient's allergies indicates:  No Known Allergies    REVIEW OF SYSTEMS  CARDIOVASCULAR: No recent chest pain, palpitations, arm, neck, or jaw pain  RESPIRATORY: No recent fever, cough chills, SOB or congestion  : No blood in the urine  GI: No Nausea, vomiting, constipation, diarrhea, blood, or reflux.  MUSCULOSKELETAL: No myalgias  NEURO: No lightheadedness or dizziness  EYES: No Double vision, blurry, vision or headache     OBJECTIVE     VITAL SIGNS (Most Recent)  Temp: 98 °F (36.7 °C) (01/17/23 1400)  Pulse: 64 (01/17/23 1500)  Resp: 16 (01/17/23 1500)  BP: (!) 182/79 (01/17/23 1622)  SpO2: (!) 94 % (01/17/23 1500)    VENTILATION STATUS  Resp: 16 (01/17/23 1500)  SpO2: (!) 94 % (01/17/23 1500)       I & O (Last 24H):  Intake/Output Summary (Last 24 hours) at 1/17/2023 1654  Last data filed at 1/17/2023 0555  Gross per 24 hour   Intake 200 ml   Output 700 ml   Net -500 ml       WEIGHTS  Wt Readings from Last 1 Encounters:   01/17/23 0400 105.2 kg (231 lb 14.8 oz)   01/15/23 0400 100 kg (220 lb 7.4 oz)   01/14/23 0400 97.8 kg (215 lb 9.8 oz)   01/07/23 0400 115.4 kg (254 lb 6.6 oz)   01/06/23 0400 117.6 kg (259 lb 4.2 oz)   01/05/23 0500 115.3 kg (254 lb 3.1 oz)   01/03/23 0615 105.4 kg (232 lb 5.8 oz)   01/02/23 0400 102.5 kg (225 lb 15.5 oz)   01/02/23 0053 102.5 kg (225 lb 15.5 oz)   01/01/23 0800 99.8 kg (220 lb 0.3 oz)   01/01/23 0341 99.8 kg (220 lb)       PHYSICAL EXAM  CONSTITUTIONAL: Well built, well nourished in no apparent distress  NECK: no carotid bruit, no  JVD  LUNGS: CTA  CHEST WALL: no tenderness  HEART: regular rate and rhythm, S1, S2 normal, no murmur, click, rub or gallop   ABDOMEN: soft, non-tender; bowel sounds normal; no masses,  no organomegaly  EXTREMITIES: Extremities normal, no edema  NEURO: AAO X 3    SCHEDULED MEDS:   amLODIPine  5 mg Oral Daily    aspirin  81 mg Oral Daily    atorvastatin  20 mg Oral QHS    carvediloL  25 mg Oral BID WM    chlorhexidine  15 mL Mouth/Throat BID    docusate sodium  100 mg Oral BID    enoxparin  40 mg Subcutaneous Q24H    EScitalopram oxalate  10 mg Oral Daily    famotidine  20 mg Per OG tube BID    furosemide  20 mg Oral Daily    LIDOcaine  1 patch Transdermal Q24H    losartan  100 mg Oral Daily    losartan  50 mg Oral Daily with dinner    nicotine  1 patch Transdermal Daily    polyethylene glycol  17 g Oral BID    QUEtiapine  25 mg Oral Daily with dinner    ticagrelor  90 mg Oral BID    traZODone  50 mg Oral QHS       CONTINUOUS INFUSIONS:    PRN MEDS:acetaminophen, atropine, calcium gluconate IVPB, calcium gluconate IVPB, calcium gluconate IVPB, dextrose 10%, dextrose 10%, EPINEPHrine, glucagon (human recombinant), glucose, glucose, hydrALAZINE, hydrALAZINE, lactulose, levalbuterol, magnesium oxide, magnesium oxide, magnesium sulfate IVPB, magnesium sulfate IVPB, melatonin, naloxone, nitroglycerin, ondansetron, oxyCODONE-acetaminophen, potassium bicarbonate, potassium bicarbonate, potassium bicarbonate, potassium chloride in water **AND** potassium chloride in water **AND** potassium chloride in water, potassium, sodium phosphates, potassium, sodium phosphates, potassium, sodium phosphates, senna-docusate 8.6-50 mg, simethicone, sodium chloride 0.9%, sodium phosphate IVPB, sodium phosphate IVPB, sodium phosphate IVPB    LABS AND DIAGNOSTICS     CBC LAST 3 DAYS  Recent Labs   Lab 01/15/23  0548 01/16/23  0508 01/17/23  0522   WBC 8.54 7.11 7.13   RBC 3.14* 3.35* 3.23*   HGB 9.7* 10.5* 10.1*   HCT 30.3* 31.1* 30.3*   MCV  97 93 94   MCH 30.9 31.3* 31.3*   MCHC 32.0 33.8 33.3   RDW 13.7 13.8 13.4    364 365   MPV 10.0 9.6 9.4   GRAN 64.1  5.5 62.5  4.4 59.8  4.3   LYMPH 17.1*  1.5 18.0  1.3 21.2  1.5   MONO 15.3*  1.3* 16.2*  1.2* 16.1*  1.2*   BASO 0.05 0.06 0.05   NRBC 0 0 0       COAGULATION LAST 3 DAYS  No results for input(s): LABPT, INR, APTT in the last 168 hours.    CHEMISTRY LAST 3 DAYS  Recent Labs   Lab 01/15/23  0548 01/15/23  1601 01/16/23  0508 01/17/23  0522   *  --  134* 134*   K 3.4* 3.7 3.7 3.5     --  101 102   CO2 23  --  25 23   ANIONGAP 11  --  8 9   BUN 24*  --  23 24*   CREATININE 1.1  --  1.1 1.0   GLU 84  --  95 98   CALCIUM 8.3*  --  8.3* 8.5*   MG 1.9  --  2.3 2.0   ALBUMIN 2.8*  --  3.0* 3.0*   PROT 6.2  --  6.7 7.1   ALKPHOS 85  --  101 102   ALT 33  --  37 35   AST 29  --  33 29   BILITOT 0.6  --  0.9 0.5       CARDIAC PROFILE LAST 3 DAYS  No results for input(s): BNP, CPK, CPKMB, LDH, TROPONINI, TROPONINIHS in the last 168 hours.    ENDOCRINE LAST 3 DAYS  Recent Labs   Lab 01/13/23  0514   PROCAL 0.20       LAST ARTERIAL BLOOD GAS  ABG  No results for input(s): PH, PO2, PCO2, HCO3, BE in the last 168 hours.    LAST 7 DAYS MICROBIOLOGY   Microbiology Results (last 7 days)       ** No results found for the last 168 hours. **            MOST RECENT IMAGING  X-Ray Chest 1 View  AP Portable chest: 1/15/2023 6:56 PM CST    History: 67 years  old Male with SOB.    Comparison: Chest radiograph performed 1/9/2023.    Findings: The cardiomediastinal silhouette is enlarged.    No pneumothorax is seen.    No acute airspace opacities are seen.    No discrete pleural effusion is apparent.    Impression: No acute airspace opacities are seen.    Electronically signed by:  Maggi Bowden DO  1/15/2023 6:56 PM CST Workstation: [id number]      [facility]  Results for orders placed during the hospital encounter of 01/01/23    Echo Saline Bubble? No    Interpretation Summary  · The estimated  ejection fraction is 55%.  · The left ventricle is normal in size with concentric hypertrophy.  · Mild to moderate tricuspid regurgitation.  · Moderate right ventricular enlargement with moderately reduced right ventricular systolic function.  · Grade I left ventricular diastolic dysfunction.  · There is mild aortic valve stenosis.  · Aortic valve area is 1.68 cm2; peak velocity is 1.36 m/s; mean gradient is 4 mmHg.  · There is abnormal septal wall motion consistent with right ventricular pacemaker.  · There are segmental left ventricular wall motion abnormalities.      CURRENT/PREVIOUS VISIT EKG  Results for orders placed or performed during the hospital encounter of 01/01/23   EKG 12-lead    Collection Time: 01/14/23  5:26 PM    Narrative    Test Reason : R07.9,    Vent. Rate : 076 BPM     Atrial Rate : 076 BPM     P-R Int : 128 ms          QRS Dur : 082 ms      QT Int : 378 ms       P-R-T Axes : 065 -07 104 degrees     QTc Int : 425 ms    Normal sinus rhythm  LVH with repolarization abnormality  Inferior infarct (cited on or before 01-JAN-2023)  Abnormal ECG  When compared with ECG of 12-JAN-2023 15:01,  Premature supraventricular complexes are no longer Present  Questionable change in initial forces of Inferior leads  Confirmed by Claude ZAFAR, Aurelio CAMPO ([id number]) on 1/16/2023 8:57:26 PM    Referred By: IRENE MOSER           Confirmed By:Aurelio Arce MD       ASSESSMENT/PLAN:     Active Hospital Problems    Diagnosis    *STEMI involving right coronary artery    Pneumonia of right lung due to methicillin susceptible Staphylococcus aureus (MSSA)    Shock liver    Cardiogenic shock    Cardiac arrest    Acute respiratory failure with hypoxia and hypercarbia    Leukocytosis    Anemia    History of tobacco abuse    Primary hypertension    PVD (peripheral vascular disease) with claudication    PAD (peripheral artery disease)       ASSESSMENT & PLAN:   STEMI- inferior wall MI complicated by complete heart block and RV  shock  Cardiac arrest status post resuscitation and intubation (patient was resuscitated for 60-70 minutes in total)  Peripheral vascular disease  Chronic smoking  Hypertension  hyperlipidemia         RECOMMENDATIONS:    Continue dual anti-platelet therapy with Brilinta and aspirin.  The patient did report some chest discomfort yesterday to nursing staff but today he states he is not been having any chest discomfort.  He does fatigue easily and is short of breath with minimal exertion.  Had a discussion with him regarding the risk of further angiogram.  Consideration for continued medical therapy as opposed to further intervention.  Case to be discussed further between Dr. Saleh and Dr. Elizondo.        Mary Lou Turpin NP  [facility]  Department of Cardiology  Date of Service: 01/17/2023  4:54 PM    I have personally interviewed and examined the patient, I have reviewed the Nurse Practitioner's history and physical, assessment, and plan. I have personally evaluated the patient at bedside and agree with the findings and made appropriate changes as necessary in recommendations.    Patient's heart catheterization was reviewed.  He denies any chest pain he just gets weak with exertion and has very debilitated condition.  Continue current medical management now with physical therapy.      Coronary Findings    Diagnostic  Dominance: Right  Left Main   Discrete, eccentric, calcified 70% stenosis at the ostium.      Left Anterior Descending   Large-sized. Mild diffuse atherosclerosis. Medium to large-sized D1 with mild atherosclerosis.      Ramus Intermedius   Small-sized. Mild atherosclerosis.      Left Circumflex   Medium to large-sized with mild atherosclerosis. Large-sized OM1 which bifurcated into 2 medium-sized branches in the mid segment. There was discrete 90% stenosis in the inferior branch of OM1.      Right Coronary Artery   100% flush thrombotic occlusion of the Turk crook ostial RCA (culprit for  STEMI). The vessel was medium-sized with mild-to-moderate diffuse atherosclerosis in proximal mid and distal segments (visualized after intervention). The vessel has medium-sized RPDA and RPL with mild atherosclerosis.   Ost RCA lesion was 100% stenosed. This was the culprit lesion. The lesion was ostial and thrombotic. The lesion was < 10 mm long. The lesion was a type C lesion. The stenosis was measured by a visual reading.      Intervention     Ost RCA lesion   Angioplasty   The balloon was inserted, placed across lesion, inflated and removed. An angioplasty using a standard balloon: CATHETER BALLOON EUPHORA 2.38A59VM was performed. The balloon was inflated multiple times. The balloon was removed following the angioplasty.   Supplies used: CATHETER BALLOON EUPHORA 2.12B63YU   Angioplasty   The balloon was inserted, placed across lesion, inflated and removed. An angioplasty using a standard balloon: CATHETER BALLOON EUPHORA 2.14V99FV was performed. The balloon was inflated multiple times. The balloon was removed following the angioplasty.   Supplies used: CATHETER BALLOON EUPHORA 2.49Z82WO   Stent   A drug-eluting stent was successfully placed. The stent used was a STENT HERMILA JOSE GUADALUPE 2.75 X 22. The stent was deployed by way of balloon expansion. The stent balloon placed across lesion, balloon was removed and was deployed. There were multiple inflations. The stent was deployed at 12 sandra. The inflation time was 25 sec. The stent was redilated a second time at 18 sandra. The second inflation time was 15 sec. The stent was redilated a third time at 20 sandra. The third inflation time was 15 sec.   Supplies used: STENT HERMILA JOSE GUADALUPE 2.75 X 22   Post-Intervention Lesion Assessment   The intervention was successful. The guidewire crossed the lesion. The diagnostic DAIN flow was 0. The post-intervention DAIN flow was 3. There were no complications. TVP was placed prior to the intervention via right femoral vein. The lesion was initially  pre-dilated with a compliant balloon followed by noncompliant balloon at higher pressure due to the presence of calcium. IVUS catheter could not be advanced due to the thorne's crook angle of RCA. Drug-eluting stent was successfully deployed at the ostium followed by flaring of the ostium with the stent balloon. There was no dissection. The femoral artery sheath was sutured in place and hemostasis was subsequently achieved with manual pressure due to presence of significant peripheral vascular disease.   There is a 0% residual stenosis post intervention.        Left Heart Findings    Left Ventricle    The ejection fraction is calculated to be 65%.   LVEDP (Pre): 17 mmHG.         Dougie Saleh MD  Department of Cardiology  [facility]  01/17/2023 4:56 PM

## 2023-01-17 NOTE — PT/OT/SLP PROGRESS
"Speech Language Pathology Treatment    Patient Name:  Peyman Gr   MRN:  [id number]  Admitting Diagnosis: STEMI involving right coronary artery    Recommendations:                 General Recommendations:  Dysphagia therapy  Diet recommendations:  Soft & Bite Sized Diet - IDDSI Level 6, Liquid Diet Level: Thin   Aspiration Precautions:  1 bite/sip at a time, Alternating bites/sips, Assistance with meals, Avoid talking while eating, Eliminate distractions, Frequent oral care, HOB to 90 degrees, Meds whole 1 at a time, and Small bites/sips    General Precautions: Standard, aspiration, fall  Communication strategies:  none    Subjective     Pt awake sitting edge of bed w/ lunch tray. Pt agreeable to ST.  Patient goals: "I got accepted into [facility]." "I can't eat right not, it's time for my pain pill"    Pain/Comfort:   Nursing notified of pt's pain    Respiratory Status: Room air    Objective:     Has the patient been evaluated by SLP for swallowing?   Yes  Keep patient NPO? No   Current Respiratory Status:        Pt observed consuming rice and cubed turkey w/ gravy. Pt continues w/ decreased bolus formation and poor bolus manipulation. Continued use of swallowing precautions w/ min cues for no talking during PO intake. Pt required increased liquid wash and lingual sweep to clear turkey from oral cavity. Re-educated re aspiration risks associated w/ unclear oral cavity after meals; pt reports he has been cleaning out his mouth w/ toothbrush after meals.    Assessment:     Peyman Gr is a 67 y.o. male with an SLP diagnosis of moderate oral Dysphagia.  He presents with difficulty w/ cubed meats w/ upgraded diet today. Adequate use of strategies for oral clearance after bites; adequate use of aspiration precautions when given min cues. Continue established POC.    Goals:   Multidisciplinary Problems       SLP Goals          Problem: SLP    Goal Priority Disciplines Outcome   SLP Goal     SLP Ongoing, " Progressing   Description: 1. Pt will tolerate PO trials of puree and thin liquid w/o overt s/s aspiration and w/ adequate oral clearance during >90% of trials -MET  2. Pt will participate in ongoing diagnostic dysphagia tx in order to determine LRD -MET  3. Pt will tolerate pureed diet w/ thin liquids w/o overt s/s aspiration and w/ adequate oral clearance w/ >90% of PO intake -MET  4. Pt will tolerate level 6 diet w/ thin liquids w/ adequate bolus manipulation and oral clearance w/o overt s/s aspiration during >90% of PO intake  5. Pt will participate in cognitive-linguistic evaluation (DISCONTINUED, pt's wife stating pt at baseline)                       Plan:     Patient to be seen:  3 x/week   Plan of Care expires:     Plan of Care reviewed with:  patient, other (see comments) (nursing, MD)   SLP Follow-Up:  Yes       Discharge recommendations:  rehabilitation facility   Barriers to Discharge:  None    Time Tracking:     SLP Treatment Date:   01/17/23  Speech Start Time:  1303  Speech Stop Time:  1313     Speech Total Time (min):  10 min    Billable Minutes: Treatment Swallowing Dysfunction 10 min    01/17/2023

## 2023-01-17 NOTE — PLAN OF CARE
01/17/23 1251   Post-Acute Status   Post-Acute Authorization Placement   Post-Acute Placement Status Set-up Complete/Auth obtained     Per Anita at [facility], [facility] auth obtained. CM attempted to call patient's spouse to notify of above, no answer at this time. CM called patient's cell to notify of above and to notify of need for admission paperwork to be completed. Patient stated he is calling wife to notify. CM to follow

## 2023-01-17 NOTE — PLAN OF CARE
Problem: Occupational Therapy  Goal: Occupational Therapy Goal  Description: Goals to be met by: 2/10/2023    Patient will increase functional independence with ADLs by performing:    UE Dressing with Stand-by Assistance.  LE Dressing with Stand-by Assistance.  Grooming while seated with Stand-by Assistance.  Toileting from bedside commode with Minimal Assistance for hygiene and clothing management.   Sitting at edge of bed x10 minutes with Supervision.  Supine to sit with Stand-by Assistance.  Toilet transfer to bedside commode with Minimal Assistance.  Upper extremity exercise program x10 reps per handout, with assistance as needed.    Outcome: Ongoing, Progressing   Continue with POC

## 2023-01-17 NOTE — PLAN OF CARE
01/17/23 1102   Discharge Reassessment   Assessment Type Discharge Planning Reassessment   Did the patient's condition or plan change since previous assessment? No   Discharge Plan discussed with: Adult children   Communicated TOBIAS with patient/caregiver Yes   Discharge Plan A Skilled Nursing Facility   Discharge Plan B Skilled Nursing Facility   DME Needed Upon Discharge  none   Discharge Barriers Identified None   Why the patient remains in the hospital Placement issues   Post-Acute Status   Post-Acute Authorization Placement   Post-Acute Placement Status Pending payor review/awaiting authorization (if required)   Coverage [employer] medicare   Discharge Delays (!) Payor Issues     Per Anita at [facility] authorization still pending at this time. Case Management anticipating discharge today if authorization obtained. CM following

## 2023-01-18 LAB
ALBUMIN SERPL BCP-MCNC: 3.1 G/DL (ref 3.5–5.2)
ALP SERPL-CCNC: 110 U/L (ref 55–135)
ALT SERPL W/O P-5'-P-CCNC: 32 U/L (ref 10–44)
ANION GAP SERPL CALC-SCNC: 10 MMOL/L (ref 8–16)
AST SERPL-CCNC: 28 U/L (ref 10–40)
BASOPHILS # BLD AUTO: 0.06 K/UL (ref 0–0.2)
BASOPHILS NFR BLD: 0.9 % (ref 0–1.9)
BILIRUB SERPL-MCNC: 0.7 MG/DL (ref 0.1–1)
BUN SERPL-MCNC: 26 MG/DL (ref 8–23)
CALCIUM SERPL-MCNC: 8.5 MG/DL (ref 8.7–10.5)
CHLORIDE SERPL-SCNC: 101 MMOL/L (ref 95–110)
CO2 SERPL-SCNC: 23 MMOL/L (ref 23–29)
CREAT SERPL-MCNC: 1.1 MG/DL (ref 0.5–1.4)
DIFFERENTIAL METHOD: ABNORMAL
EOSINOPHIL # BLD AUTO: 0.1 K/UL (ref 0–0.5)
EOSINOPHIL NFR BLD: 0.9 % (ref 0–8)
ERYTHROCYTE [DISTWIDTH] IN BLOOD BY AUTOMATED COUNT: 13.5 % (ref 11.5–14.5)
EST. GFR  (NO RACE VARIABLE): >60 ML/MIN/1.73 M^2
GLUCOSE SERPL-MCNC: 94 MG/DL (ref 70–110)
HCT VFR BLD AUTO: 31.4 % (ref 40–54)
HGB BLD-MCNC: 10.4 G/DL (ref 14–18)
IMM GRANULOCYTES # BLD AUTO: 0.09 K/UL (ref 0–0.04)
IMM GRANULOCYTES NFR BLD AUTO: 1.4 % (ref 0–0.5)
LYMPHOCYTES # BLD AUTO: 1.7 K/UL (ref 1–4.8)
LYMPHOCYTES NFR BLD: 25.4 % (ref 18–48)
MAGNESIUM SERPL-MCNC: 2.1 MG/DL (ref 1.6–2.6)
MCH RBC QN AUTO: 31 PG (ref 27–31)
MCHC RBC AUTO-ENTMCNC: 33.1 G/DL (ref 32–36)
MCV RBC AUTO: 94 FL (ref 82–98)
MONOCYTES # BLD AUTO: 0.9 K/UL (ref 0.3–1)
MONOCYTES NFR BLD: 14 % (ref 4–15)
NEUTROPHILS # BLD AUTO: 3.7 K/UL (ref 1.8–7.7)
NEUTROPHILS NFR BLD: 57.4 % (ref 38–73)
NRBC BLD-RTO: 0 /100 WBC
PLATELET # BLD AUTO: 367 K/UL (ref 150–450)
PMV BLD AUTO: 9.3 FL (ref 9.2–12.9)
POTASSIUM SERPL-SCNC: 3.6 MMOL/L (ref 3.5–5.1)
PROT SERPL-MCNC: 6.6 G/DL (ref 6–8.4)
RBC # BLD AUTO: 3.36 M/UL (ref 4.6–6.2)
SODIUM SERPL-SCNC: 134 MMOL/L (ref 136–145)
WBC # BLD AUTO: 6.5 K/UL (ref 3.9–12.7)

## 2023-01-18 PROCEDURE — 25000003 PHARM REV CODE 250: Performed by: INTERNAL MEDICINE

## 2023-01-18 PROCEDURE — 99233 PR SUBSEQUENT HOSPITAL CARE,LEVL III: ICD-10-PCS | Mod: ,,, | Performed by: GENERAL PRACTICE

## 2023-01-18 PROCEDURE — 99233 SBSQ HOSP IP/OBS HIGH 50: CPT | Mod: ,,, | Performed by: GENERAL PRACTICE

## 2023-01-18 PROCEDURE — 21000000 HC CCU ICU ROOM CHARGE

## 2023-01-18 PROCEDURE — 92526 ORAL FUNCTION THERAPY: CPT

## 2023-01-18 PROCEDURE — 99900031 HC PATIENT EDUCATION (STAT)

## 2023-01-18 PROCEDURE — 94761 N-INVAS EAR/PLS OXIMETRY MLT: CPT

## 2023-01-18 PROCEDURE — 63600175 PHARM REV CODE 636 W HCPCS: Performed by: FAMILY MEDICINE

## 2023-01-18 PROCEDURE — 83735 ASSAY OF MAGNESIUM: CPT | Performed by: FAMILY MEDICINE

## 2023-01-18 PROCEDURE — 80053 COMPREHEN METABOLIC PANEL: CPT | Performed by: FAMILY MEDICINE

## 2023-01-18 PROCEDURE — 99900035 HC TECH TIME PER 15 MIN (STAT)

## 2023-01-18 PROCEDURE — 63600175 PHARM REV CODE 636 W HCPCS: Performed by: INTERNAL MEDICINE

## 2023-01-18 PROCEDURE — 36415 COLL VENOUS BLD VENIPUNCTURE: CPT | Performed by: FAMILY MEDICINE

## 2023-01-18 PROCEDURE — 97116 GAIT TRAINING THERAPY: CPT | Mod: CQ

## 2023-01-18 PROCEDURE — 85025 COMPLETE CBC W/AUTO DIFF WBC: CPT | Performed by: FAMILY MEDICINE

## 2023-01-18 PROCEDURE — S4991 NICOTINE PATCH NONLEGEND: HCPCS | Performed by: INTERNAL MEDICINE

## 2023-01-18 PROCEDURE — 97535 SELF CARE MNGMENT TRAINING: CPT

## 2023-01-18 PROCEDURE — 25000003 PHARM REV CODE 250

## 2023-01-18 PROCEDURE — 25000003 PHARM REV CODE 250: Performed by: FAMILY MEDICINE

## 2023-01-18 RX ORDER — CLONIDINE 0.1 MG/24H
1 PATCH, EXTENDED RELEASE TRANSDERMAL
Status: DISCONTINUED | OUTPATIENT
Start: 2023-01-18 | End: 2023-01-18

## 2023-01-18 RX ORDER — AMLODIPINE BESYLATE 5 MG/1
10 TABLET ORAL DAILY
Status: DISCONTINUED | OUTPATIENT
Start: 2023-01-18 | End: 2023-01-25

## 2023-01-18 RX ADMIN — LOSARTAN POTASSIUM 100 MG: 50 TABLET, FILM COATED ORAL at 09:01

## 2023-01-18 RX ADMIN — POTASSIUM BICARBONATE 50 MEQ: 977.5 TABLET, EFFERVESCENT ORAL at 06:01

## 2023-01-18 RX ADMIN — OXYCODONE HYDROCHLORIDE AND ACETAMINOPHEN 1 TABLET: 7.5; 325 TABLET ORAL at 12:01

## 2023-01-18 RX ADMIN — ATORVASTATIN CALCIUM 20 MG: 20 TABLET, FILM COATED ORAL at 08:01

## 2023-01-18 RX ADMIN — FUROSEMIDE 20 MG: 20 TABLET ORAL at 09:01

## 2023-01-18 RX ADMIN — CHLORHEXIDINE GLUCONATE 15 ML: 1.2 RINSE ORAL at 08:01

## 2023-01-18 RX ADMIN — LOSARTAN POTASSIUM 50 MG: 50 TABLET, FILM COATED ORAL at 05:01

## 2023-01-18 RX ADMIN — TICAGRELOR 90 MG: 90 TABLET ORAL at 09:01

## 2023-01-18 RX ADMIN — OXYCODONE HYDROCHLORIDE AND ACETAMINOPHEN 1 TABLET: 7.5; 325 TABLET ORAL at 02:01

## 2023-01-18 RX ADMIN — OXYCODONE HYDROCHLORIDE AND ACETAMINOPHEN 1 TABLET: 7.5; 325 TABLET ORAL at 07:01

## 2023-01-18 RX ADMIN — ENOXAPARIN SODIUM 40 MG: 100 INJECTION SUBCUTANEOUS at 09:01

## 2023-01-18 RX ADMIN — ASPIRIN 81 MG CHEWABLE TABLET 81 MG: 81 TABLET CHEWABLE at 09:01

## 2023-01-18 RX ADMIN — HYDRALAZINE HYDROCHLORIDE 10 MG: 20 INJECTION INTRAMUSCULAR; INTRAVENOUS at 12:01

## 2023-01-18 RX ADMIN — OXYCODONE HYDROCHLORIDE AND ACETAMINOPHEN 1 TABLET: 7.5; 325 TABLET ORAL at 08:01

## 2023-01-18 RX ADMIN — LIDOCAINE 5% 1 PATCH: 700 PATCH TOPICAL at 05:01

## 2023-01-18 RX ADMIN — CARVEDILOL 25 MG: 25 TABLET, FILM COATED ORAL at 05:01

## 2023-01-18 RX ADMIN — NICOTINE 1 PATCH: 14 PATCH, EXTENDED RELEASE TRANSDERMAL at 09:01

## 2023-01-18 RX ADMIN — ESCITALOPRAM OXALATE 10 MG: 10 TABLET ORAL at 09:01

## 2023-01-18 RX ADMIN — DOCUSATE SODIUM 100 MG: 100 CAPSULE, LIQUID FILLED ORAL at 09:01

## 2023-01-18 RX ADMIN — CARVEDILOL 25 MG: 25 TABLET, FILM COATED ORAL at 07:01

## 2023-01-18 RX ADMIN — FAMOTIDINE 20 MG: 20 TABLET ORAL at 08:01

## 2023-01-18 RX ADMIN — FAMOTIDINE 20 MG: 20 TABLET ORAL at 09:01

## 2023-01-18 RX ADMIN — AMLODIPINE BESYLATE 10 MG: 5 TABLET ORAL at 09:01

## 2023-01-18 NOTE — PROGRESS NOTES
[facility]  Progress Note    Patient name: Peyman Gr  MRN: [id number]  Admit Date: 1/1/2023   LOS: 17 days   DOS: 01/18/2023    ASSESSMENT/PLAN:     Active Hospital Problems    Diagnosis  POA    *STEMI involving right coronary artery [I21.11]  Yes    Pneumonia of right lung due to methicillin susceptible Staphylococcus aureus (MSSA) [J15.211]  No    Shock liver [K72.00]  Yes    Cardiogenic shock [R57.0]  Yes    Cardiac arrest [I46.9]  Yes    Acute respiratory failure with hypoxia and hypercarbia [J96.01, J96.02]  Yes    Leukocytosis [D72.829]  Yes    Anemia [D64.9]  Yes    History of tobacco abuse [Z87.891]  Not Applicable    Primary hypertension [I10]  Yes    PVD (peripheral vascular disease) with claudication [I73.9]  Yes    PAD (peripheral artery disease) [I73.9]  Yes      Resolved Hospital Problems    Diagnosis Date Resolved POA    Hyponatremia [E87.1] 01/02/2023 Yes     Cardiac arrest  Due to STEMI involving RCA   Complicated by complete heart block  MSSA pneumonia - s/p ceftriaxone  - s/p emergent stenting 1/1/23  - successfully extubated on 01/08  - cardiology awaiting pt's improved physical conditioning to evaluate for anginal symptoms, to decide on urgency of revascularization status.  Pt is currently participating with PT/OT and is a maximum assist for transferring.  He has intermittent L chest pain during transferring, I evaluated this personally with a STAT EKG and discussed it with Dr Talley.  EKG not concerning at that time.  CP resolved with rest, without other intervention.  - continue brillinta, asa, statin  - adjusting HTN medication as needed  - PT/OT/SLP   - po lasix; trending BMP    Chronic conditions as noted above/below; home medications reviewed personally by me and restarted as appropriate  Electrolyte derangement:  Trending BMP; Mg; replacement prn  DVT ppx: lovenox  FULL CODE    Plan update today:   Continue care, appreciate consultants   Blood pressure  elevated today, increase Norvasc to 10 mg  Follow cardiology recommendations for repeat angiogram possible outpatient versus inpatient - may need CTS eval  Continue medical management including dual antiplatelets and statin, blood pressure control   Continue PT/OT/SLP, advance diet as tolerated  Will need skilled nursing facility placement  Possible discharge next 24-48 hours  Moderate risk patient secondary to slowly improving acute illness no moderate intensity; prescription drug management    SUBJECTIVE:     Principal problem: STEMI involving right coronary artery    67-year-old male with peripheral vascular disease, essential hypertension, hyperlipidemia and ongoing tobacco use presented to outside facility with 2 hour onset of chest pain and shortness of breath.  EKG revealed inferior and anterolateral STEMI.  Quickly degenerated into cardiac arrest with runs of VT/VF and asystole requiring resuscitation for about 40 minutes.  He was subsequently intubated and transferred to our ED. En route he developed cardiac arrest again (PEA) which was continued into our ED and was resuscitated for another 30 minutes.  He required transcutaneous pacing.  He was taken emergently to the catheterization lab and underwent PCI with stenting of RCA which was felt to be the culprit lesion.  He was also found to have left main stenosis of about 70%.  A transvenous pacemaker was also placed.  Currently admitted to the ICU for post cardiac arrest care.  He remains intubated and critically ill. Hospital stay complicated by fever; initiated on broad spectrum antibiotics.  Respiratory culture growing MSSA - de-escalated to ceftriaxone.  Extubated successfully on 1/8.    Interval History:      1/11:  no SOB on HFNC.  Pt conversing with friends/family at bedside without SOB.  No current CP.  No pain throughout.  Pt is alert and oriented x4, however he is extremely tangential and not at mental status baseline per his family.  BELINDA.   "Follows commands.    1/12:  out of ICU.  Pt without CP.  No SOB.  Family at bedside, he is responding more appropriately today.    1/13:  pt hallucinating this evening.  It occurs intermittently, usually after his family leaves for the evening.  He sees people staring at him through the 2nd floor window.  He is being moved closer to nursing station.  Starting Seroquel pm.  Previously he had trouble sleeping; I suspect anxiety / other sequela after cardiac arrest.  Start AM lexapro.  Delirium precautions.  No fever, no leukocytosis, procal negative.   No CP.  No SOB.    1/14:  pt's BP medication held due to hypotension this AM.  It appears he was given IV hydralazine at 0339 today.  BP appears labile throughout day.  Adjusting medication doses now.  Prioritizing coreg, losartan.  Continuing IV hydralazine prn.  He has sat on edge of bed with all meals, reports fatigue as a result.  Cardiology is trying to evaluate for anginal symptoms: Pt currently transferring during my exam.  He reports L-chest pain when transferring (3 person assist), described as "picking" with no radiation, +SOB.  Obtaining EKG, obtaining troponin.  Discussed personally with cardiology.  Pt's CP and SOB already alleviating while sitting at rest.    1/15:  now pt is hypertensive again.  Continue coreg, losartan.  Adding back norvasc.  Continue IV hydralazine prn.   Pt's percocet is being spaced out to assist with decreasing confusion/delirium.  Pt was angry about this and reported msk pain; IV tylenol has been started and now he is pain-free and much happier.  He is participating with PT/OT.  He reports b/l feet swelling.  No CP.  No SOB.      1/16:  adjusting HTN medication again.  No CP, no SOB, no n/v, no diaphoresis, no dizziness.  Nor have the symptoms occured during exertion since last night--(pt had SOB last night, now s/p lasix.).    1/17: Patient seen and examined.  Patient reports feeling well today.  Denies any chest pain, shortness a " breath, syncope, or headache.  Eating okay without nausea or vomiting.  Improving mobilization with therapy.  Tolerating dysphagia diet.  Agreeable to skilled nursing facility placement for ongoing therapy.    1/18:  Patient seen and examined.  Patient feeling well today without chest pain, shortness breath, syncope, or headache.  Tolerating diet without nausea or vomiting.  Weakness improving with therapy.    Review of Systems:  2 point review of systems reviewed and negative except as per interval history above      OBJECTIVE:     Vitals:    01/18/23 0900 01/18/23 1000 01/18/23 1100 01/18/23 1411   BP: (!) 172/68 (!) 156/69 129/64 (!) 133/59   Pulse: 81 74 65 89   Resp: (!) 33 20 18 20   Temp:       TempSrc:       SpO2: 96% 97% 97% 96%   Weight:       Height:           Gen: alert, responsive, on RA  Eyes - no pallor  CV: RRR  Lungs: CTA B/L  Abd: +BS, soft, NT, ND  Neuro: alert, responsive  Psych: pleasant    Laboratory:  I have reviewed all pertinent lab results within the past 24 hours.  CBC:   Recent Labs   Lab 01/18/23 0417   WBC 6.50   RBC 3.36*   HGB 10.4*   HCT 31.4*      MCV 94   MCH 31.0   MCHC 33.1       CMP:   Recent Labs   Lab 01/18/23 0417   GLU 94   CALCIUM 8.5*   ALBUMIN 3.1*   PROT 6.6   *   K 3.6   CO2 23      BUN 26*   CREATININE 1.1   ALKPHOS 110   ALT 32   AST 28   BILITOT 0.7       No results for input(s): CKMB, CPKMB, TROPONINT, TROPONINI, MYOGLOBIN in the last 168 hours.    Imaging Results              X-Ray Chest AP Portable (Final result)  Result time 01/01/23 07:01:46      Final result by Elizabeth Sharp IV, MD (01/01/23 07:01:46)                   Narrative:    Chest, single view    HISTORY: Nasogastric tube placement.    In the interval, there has been introduction of a nasogastric tube which extends into the left upper quadrant of the abdomen just beyond the GE junction. An endotracheal tube remains in place with its tip positioned well above the level of the  saurav.    Bilateral interstitial and mild groundglass opacities, greater on the right are again observed. There are no new confluent infiltrates, volume loss or effusions.    The cardiomediastinal silhouette is stable.    IMPRESSION:    Interval introduction of nasogastric tube extending into the left upper abdomen with its tip just beyond the GE junction.    Otherwise stable cardiopulmonary status.    Electronically signed by:  Elizabeth Sharp MD  1/1/2023 7:01 AM CST Workstation: [id number]                                     X-Ray Chest AP Portable (Final result)  Result time 01/01/23 06:59:55      Final result by Elizabeth Sharp IV, MD (01/01/23 06:59:55)                   Narrative:    Chest, single view    HISTORY: Cardiac arrest.    Comparison 9/9/2022.    Endotracheal tube is in place with its tip positioned well above the level of the saurav.    The heart size is within normal limits. The central pulmonary vasculature is not acutely engorged.  There is arteriosclerotic calcification within the aortic arch.    There is scattered bilateral interstitial and groundglass pulmonary opacities with upper lung zone predominance, greater on the right. No effusion or extrapulmonary air identified.    Surgical changes are noted within the cervical spine and within the soft tissues of the right side of the neck.    IMPRESSION:    Positioning of endotracheal tube as above.    Interval development of interstitial and groundglass pulmonary opacities with upper lung zone predominance.    Electronically signed by:  Elizabeth Sharp MD  1/1/2023 6:59 AM CST Workstation: [id number]                                    Department Hospital Medicine  [facility]  Julio C Sibley MD  Date of service: 01/18/2023

## 2023-01-18 NOTE — NURSING
Bp's have been elevated from 140's -180's past couple days. Night shift RN gave 10mg IV hydralazine for 190/88 @ 0010: one hour f/u bp only 181/79, and 0400 bp noted to be 170/73.  increased norvasc from 5mg to 10mg qd. Adequate response, systolic 120-130's        Pt adamantly refusing to stay NPO in case of PCI today. He is refusing PCI as well. States he was told he needs to get stronger first. States he was told that if he proceeds with PCI he will not be allowed at [facility]. States even if he can be accepted at [facility] after receiving a PCI, he still wishes to refuse. Attempted to keep NPO in order to sort out details, but pt still refusing PCI.     Wife on phon, wishes for  to stay and receive PCI. Pt is ok with this plan and will proceed with PCI.     called for plan of care. Spoke w/  who recommends surgical consult to CTS. Wife updated face to face.

## 2023-01-18 NOTE — PT/OT/SLP PROGRESS
"Speech Language Pathology Treatment    Patient Name:  Peyman Gr   MRN:  [id number]  Admitting Diagnosis: STEMI involving right coronary artery    Recommendations:                 General Recommendations:  Dysphagia therapy  Diet recommendations:  Soft & Bite Sized Diet - IDDSI Level 6, Liquid Diet Level: Thin   Aspiration Precautions:  1 bite/sip at a time, Alternating bites/sips, Assistance with meals, Avoid talking while eating, Eliminate distractions, Frequent oral care, HOB to 90 degrees, Meds whole 1 at a time, and Small bites/sips    General Precautions: Standard, aspiration, fall  Communication strategies:  none    Subjective     Pt awake talking on phone to wife; nursing in room discussing POC w/ pt and his wife. Pt agreeable to ST, but not to PO trials.  Patient goals: "I need to get my stent surgery so I can get out of here and go to [facility]"     Pain/Comfort:       Respiratory Status: Room air    Objective:     Has the patient been evaluated by SLP for swallowing?   Yes  Keep patient NPO? No   Current Respiratory Status:        Pt completed lingual sweep along top and bottom sulci x10, lingual resistance exercises x10, and tongue to palate exercises x10. Pt required visual and verbal cues to complete exercises; decreased ROM and strength noted during exercises.     Assessment:     Peyman Gr is a 67 y.o. male with an SLP diagnosis of moderate Dysphagia.  He presents with lingual weakness and decreased ROM impacting oral clearance of complex bolus textures. Goals for lingual strengthening and ROM exercises added to pt's POC to address oral deficits. ST to continue f/u to address oral dysphagia.    Goals:   Multidisciplinary Problems       SLP Goals          Problem: SLP    Goal Priority Disciplines Outcome   SLP Goal     SLP Ongoing, Progressing   Description: 1. Pt will tolerate PO trials of puree and thin liquid w/o overt s/s aspiration and w/ adequate oral clearance during >90% of trials " -MET  2. Pt will participate in ongoing diagnostic dysphagia tx in order to determine LRD -MET  3. Pt will tolerate pureed diet w/ thin liquids w/o overt s/s aspiration and w/ adequate oral clearance w/ >90% of PO intake -MET  4. Pt will tolerate level 6 diet w/ thin liquids w/ adequate bolus manipulation and oral clearance w/o overt s/s aspiration during >90% of PO intake  5. Pt will participate in cognitive-linguistic evaluation (DISCONTINUED, pt's wife stating pt at baseline)  6. Pt will complete lingual strengthening exercises x10 each during each session                       Plan:     Patient to be seen:  3 x/week   Plan of Care expires:     Plan of Care reviewed with:  patient, other (see comments) (nursing, MD)   SLP Follow-Up:  Yes       Discharge recommendations:  rehabilitation facility   Barriers to Discharge:  None    Time Tracking:     SLP Treatment Date:   01/18/23  Speech Start Time:  0955  Speech Stop Time:  1014     Speech Total Time (min):  19 min    Billable Minutes: Treatment Swallowing Dysfunction 19 min    01/18/2023

## 2023-01-18 NOTE — PT/OT/SLP PROGRESS
Occupational Therapy   Treatment    Name: Peyman Gr  MRN: [id number]  Admitting Diagnosis:  STEMI involving right coronary artery  17 Days Post-Op    Recommendations:     Discharge Recommendations: rehabilitation facility  Discharge Equipment Recommendations:   (TBD)  Barriers to discharge:       Assessment:     Peyman Gr is a 67 y.o. male with a medical diagnosis of STEMI involving right coronary artery.  Performance deficits affecting function are weakness, impaired endurance, impaired self care skills, impaired functional mobility, gait instability, impaired balance, decreased lower extremity function, impaired cardiopulmonary response to activity.     Rehab Prognosis:  Good; patient would benefit from acute skilled OT services to address these deficits and reach maximum level of function.       Plan:     Patient to be seen 6 x/week to address the above listed problems via self-care/home management, therapeutic activities, therapeutic exercises  Plan of Care Expires: 02/10/23  Plan of Care Reviewed with: patient    Subjective     Pain/Comfort:  Pain Rating 1: 0/10  Pain Rating Post-Intervention 1: 0/10    Objective:     Communicated with: nurse prior to session.  Patient found HOB elevated with blood pressure cuff, pulse ox (continuous), peripheral IV, telemetry upon OT entry to room.    General Precautions: Standard, fall    Orthopedic Precautions:N/A  Braces: N/A  Respiratory Status: Room air     Occupational Performance:     Bed Mobility:    Patient completed Scooting/Bridging with stand by assistance  Patient completed Supine to Sit with stand by assistance  Patient completed Sit to Supine with minimal assistance to BLE    Functional Mobility/Transfers:  Patient completed Sit <> Stand Transfer with minimum assistance  with  rolling walker   Patient completed Bed <> Bedside commode Transfer using Stand Pivot technique with minimum assistance with rolling walker      Activities of Daily  Living:  Grooming: supervision with oral and facial hygiene while seated EOB      [facility] 6 Click ADL: 19    Treatment & Education:  OT ed patient on safety with walker use for functional mobility with cues for hand placement & sequencing.       Patient left HOB elevated with all lines intact, call button in reach, and nurse notified    GOALS:   Multidisciplinary Problems       Occupational Therapy Goals          Problem: Occupational Therapy    Goal Priority Disciplines Outcome Interventions   Occupational Therapy Goal     OT, PT/OT Ongoing, Progressing    Description: Goals to be met by: 2/10/2023    Patient will increase functional independence with ADLs by performing:    UE Dressing with Stand-by Assistance.  LE Dressing with Stand-by Assistance.  Grooming while seated with Stand-by Assistance.  Toileting from bedside commode with Minimal Assistance for hygiene and clothing management.   Sitting at edge of bed x10 minutes with Supervision.  Supine to sit with Stand-by Assistance.  Toilet transfer to bedside commode with Minimal Assistance.  Upper extremity exercise program x10 reps per handout, with assistance as needed.                         Time Tracking:     OT Date of Treatment: 01/18/23  OT Start Time: 1103  OT Stop Time: 1127  OT Total Time (min): 24 min    Billable Minutes:Self Care/Home Management 24    OT/ALDO: OT          1/18/2023

## 2023-01-18 NOTE — PT/OT/SLP PROGRESS
Physical Therapy Treatment    Patient Name:  Peyman Gr   MRN:  [id number]    Recommendations:     Discharge Recommendations: rehabilitation facility  Discharge Equipment Recommendations: walker, rolling, wheelchair, bath bench  Barriers to discharge:  increased assist with mobility    Assessment:     Peyman Gr is a 67 y.o. male admitted with a medical diagnosis of STEMI involving right coronary artery.  He presents with the following impairments/functional limitations: weakness, impaired endurance, impaired self care skills, impaired functional mobility, gait instability, impaired balance, decreased upper extremity function, decreased lower extremity function, decreased safety awareness, impaired cardiopulmonary response to activity.    Pt agreeable to visit. Pt wearing NC with 2 L/m O2. Pt doffed NC in preparation for ambulation. Pt required min assist x 2 for sit to stand transfer for safety. Pt ambulated 15' x 2 with RW and min to mod assist a pt fatigues quickly. Pt with forward flexed posture and flexed knees. Pt SpO2 87-90% with ambulation on RA and 94-95% at rest on RA. Pt donned NC post session.    Rehab Prognosis: Fair; patient would benefit from acute skilled PT services to address these deficits and reach maximum level of function.    Recent Surgery: Procedure(s) (LRB):  Left heart cath (Left)  Insertion, Pacemaker, Temporary Transvenous  ANGIOGRAM, CORONARY ARTERY (N/A)  Percutaneous coronary intervention (N/A)  IVUS, Coronary 17 Days Post-Op    Plan:     During this hospitalization, patient to be seen 6 x/week to address the identified rehab impairments via gait training, therapeutic activities, therapeutic exercises and progress toward the following goals:    Plan of Care Expires:  01/18/23    Subjective     Chief Complaint: pt reports difficulty breathing during ambulation  Patient/Family Comments/goals: to get stronger  Pain/Comfort:  Pain Rating 1: 0/10      Objective:     Communicated with  RN prior to session.  Patient found sitting edge of bed with blood pressure cuff, pulse ox (continuous), peripheral IV, telemetry, oxygen upon PT entry to room.     General Precautions: Standard, fall  Orthopedic Precautions: N/A  Braces: N/A  Respiratory Status:  room air and nasal cannula 2 L/m O2     Functional Mobility:  Bed Mobility:     Sit to Supine: contact guard assistance  Transfers:     Sit to Stand:  minimum assistance and of 2 persons with rolling walker  Gait: 2 x 15' with RW and min to mod assist for safety due to forward flexed posture and flexed knees.      AM-PAC 6 CLICK MOBILITY          Treatment & Education:  Pt educated on importance of time OOB, importance of intermittent mobility, safe techniques for transfers/ambulation, discharge recommendations/options, and use of call light for assistance and fall prevention.      Patient left HOB elevated with all lines intact, call button in reach, bed alarm on, RN notified, and spouse present..    GOALS:   Multidisciplinary Problems       Physical Therapy Goals          Problem: Physical Therapy    Goal Priority Disciplines Outcome Goal Variances Interventions   Physical Therapy Goal     PT, PT/OT Ongoing, Progressing     Description: Goals to be met by: 2023     Patient will increase functional independence with mobility by performin. Supine to sit with MInimal Assistance  2. Sit to stand transfer with Moderate Assistance  3. Bed to chair with Mod A  w/ol AD  3. Gait  x 10  feet with Minimal Assistance using Rolling Walker.                          Time Tracking:     PT Received On: 23  PT Start Time: 1325     PT Stop Time: 1340  PT Total Time (min): 15 min     Billable Minutes: Gait Training 15    Treatment Type: Treatment  PT/PTA: PTA     PTA Visit Number: 1     2023

## 2023-01-18 NOTE — PROGRESS NOTES
[facility]  Progress Note    Patient name: Peyman Gr  MRN: [id number]  Admit Date: 1/1/2023   LOS: 16 days   DOS: 01/17/2023    ASSESSMENT/PLAN:     Active Hospital Problems    Diagnosis  POA    *STEMI involving right coronary artery [I21.11]  Yes    Pneumonia of right lung due to methicillin susceptible Staphylococcus aureus (MSSA) [J15.211]  No    Shock liver [K72.00]  Yes    Cardiogenic shock [R57.0]  Yes    Cardiac arrest [I46.9]  Yes    Acute respiratory failure with hypoxia and hypercarbia [J96.01, J96.02]  Yes    Leukocytosis [D72.829]  Yes    Anemia [D64.9]  Yes    History of tobacco abuse [Z87.891]  Not Applicable    Primary hypertension [I10]  Yes    PVD (peripheral vascular disease) with claudication [I73.9]  Yes    PAD (peripheral artery disease) [I73.9]  Yes      Resolved Hospital Problems    Diagnosis Date Resolved POA    Hyponatremia [E87.1] 01/02/2023 Yes     Cardiac arrest  Due to STEMI involving RCA   Complicated by complete heart block  MSSA pneumonia - s/p ceftriaxone  - s/p emergent stenting 1/1/23  - successfully extubated on 01/08  - cardiology awaiting pt's improved physical conditioning to evaluate for anginal symptoms, to decide on urgency of revascularization status.  Pt is currently participating with PT/OT and is a maximum assist for transferring.  He has intermittent L chest pain during transferring, I evaluated this personally with a STAT EKG and discussed it with Dr Talley.  EKG not concerning at that time.  CP resolved with rest, without other intervention.  - continue brillinta, asa, statin  - adjusting HTN medication as needed  - PT/OT/SLP   - po lasix; trending BMP    Chronic conditions as noted above/below; home medications reviewed personally by me and restarted as appropriate  Electrolyte derangement:  Trending BMP; Mg; replacement prn  DVT ppx: lovenox  FULL CODE    Plan update today:   Continue care, appreciate consultants   Follow cardiology  recommendations for repeat angiogram possible outpatient versus inpatient  Continue medical management including dual antiplatelets and statin, blood pressure control   Continue PT/OT/SLP, advance diet as tolerated  Will need skilled nursing facility placement  Possible discharge next 24-48 hours  Moderate risk patient secondary to slowly improving acute illness no moderate intensity; prescription drug management    SUBJECTIVE:     Principal problem: STEMI involving right coronary artery    67-year-old male with peripheral vascular disease, essential hypertension, hyperlipidemia and ongoing tobacco use presented to outside facility with 2 hour onset of chest pain and shortness of breath.  EKG revealed inferior and anterolateral STEMI.  Quickly degenerated into cardiac arrest with runs of VT/VF and asystole requiring resuscitation for about 40 minutes.  He was subsequently intubated and transferred to our ED. En route he developed cardiac arrest again (PEA) which was continued into our ED and was resuscitated for another 30 minutes.  He required transcutaneous pacing.  He was taken emergently to the catheterization lab and underwent PCI with stenting of RCA which was felt to be the culprit lesion.  He was also found to have left main stenosis of about 70%.  A transvenous pacemaker was also placed.  Currently admitted to the ICU for post cardiac arrest care.  He remains intubated and critically ill. Hospital stay complicated by fever; initiated on broad spectrum antibiotics.  Respiratory culture growing MSSA - de-escalated to ceftriaxone.  Extubated successfully on 1/8.    Interval History:      1/11:  no SOB on HFNC.  Pt conversing with friends/family at bedside without SOB.  No current CP.  No pain throughout.  Pt is alert and oriented x4, however he is extremely tangential and not at mental status baseline per his family.  MAEW.  Follows commands.    1/12:  out of ICU.  Pt without CP.  No SOB.  Family at bedside,  "he is responding more appropriately today.    1/13:  pt hallucinating this evening.  It occurs intermittently, usually after his family leaves for the evening.  He sees people staring at him through the 2nd floor window.  He is being moved closer to nursing station.  Starting Seroquel pm.  Previously he had trouble sleeping; I suspect anxiety / other sequela after cardiac arrest.  Start AM lexapro.  Delirium precautions.  No fever, no leukocytosis, procal negative.   No CP.  No SOB.    1/14:  pt's BP medication held due to hypotension this AM.  It appears he was given IV hydralazine at 0339 today.  BP appears labile throughout day.  Adjusting medication doses now.  Prioritizing coreg, losartan.  Continuing IV hydralazine prn.  He has sat on edge of bed with all meals, reports fatigue as a result.  Cardiology is trying to evaluate for anginal symptoms: Pt currently transferring during my exam.  He reports L-chest pain when transferring (3 person assist), described as "picking" with no radiation, +SOB.  Obtaining EKG, obtaining troponin.  Discussed personally with cardiology.  Pt's CP and SOB already alleviating while sitting at rest.    1/15:  now pt is hypertensive again.  Continue coreg, losartan.  Adding back norvasc.  Continue IV hydralazine prn.   Pt's percocet is being spaced out to assist with decreasing confusion/delirium.  Pt was angry about this and reported msk pain; IV tylenol has been started and now he is pain-free and much happier.  He is participating with PT/OT.  He reports b/l feet swelling.  No CP.  No SOB.      1/16:  adjusting HTN medication again.  No CP, no SOB, no n/v, no diaphoresis, no dizziness.  Nor have the symptoms occured during exertion since last night--(pt had SOB last night, now s/p lasix.).    1/17: Patient seen and examined.  Patient reports feeling well today.  Denies any chest pain, shortness a breath, syncope, or headache.  Eating okay without nausea or vomiting.  Improving " mobilization with therapy.  Tolerating dysphagia diet.  Agreeable to skilled nursing facility placement for ongoing therapy.    Review of Systems:  2 point review of systems reviewed and negative except as per interval history above      OBJECTIVE:     Vitals:    01/17/23 1500 01/17/23 1622 01/17/23 1700 01/17/23 1701   BP: (!) 161/72 (!) 182/79 (!) 168/72 (!) 168/72   BP Location:       Patient Position:       Pulse: 64  77 76   Resp: 16  (!) 22 20   Temp:    98.1 °F (36.7 °C)   TempSrc:       SpO2: (!) 94%  95% 95%   Weight:       Height:           Gen: alert, responsive, on RA  Eyes - no pallor  CV: RRR  Lungs: CTA B/L  Abd: +BS, soft, NT, ND  Neuro: alert, responsive  Psych: pleasant    Laboratory:  I have reviewed all pertinent lab results within the past 24 hours.  CBC:   Recent Labs   Lab 01/17/23 0522   WBC 7.13   RBC 3.23*   HGB 10.1*   HCT 30.3*      MCV 94   MCH 31.3*   MCHC 33.3       CMP:   Recent Labs   Lab 01/17/23 0522   GLU 98   CALCIUM 8.5*   ALBUMIN 3.0*   PROT 7.1   *   K 3.5   CO2 23      BUN 24*   CREATININE 1.0   ALKPHOS 102   ALT 35   AST 29   BILITOT 0.5       No results for input(s): CKMB, CPKMB, TROPONINT, TROPONINI, MYOGLOBIN in the last 168 hours.    Imaging Results              X-Ray Chest AP Portable (Final result)  Result time 01/01/23 07:01:46      Final result by Elizabeth Sharp IV, MD (01/01/23 07:01:46)                   Narrative:    Chest, single view    HISTORY: Nasogastric tube placement.    In the interval, there has been introduction of a nasogastric tube which extends into the left upper quadrant of the abdomen just beyond the GE junction. An endotracheal tube remains in place with its tip positioned well above the level of the saurav.    Bilateral interstitial and mild groundglass opacities, greater on the right are again observed. There are no new confluent infiltrates, volume loss or effusions.    The cardiomediastinal silhouette is  stable.    IMPRESSION:    Interval introduction of nasogastric tube extending into the left upper abdomen with its tip just beyond the GE junction.    Otherwise stable cardiopulmonary status.    Electronically signed by:  Elizabeth Sharp MD  1/1/2023 7:01 AM CST Workstation: [id number]                                     X-Ray Chest AP Portable (Final result)  Result time 01/01/23 06:59:55      Final result by Elizabeth Sharp IV, MD (01/01/23 06:59:55)                   Narrative:    Chest, single view    HISTORY: Cardiac arrest.    Comparison 9/9/2022.    Endotracheal tube is in place with its tip positioned well above the level of the saurav.    The heart size is within normal limits. The central pulmonary vasculature is not acutely engorged.  There is arteriosclerotic calcification within the aortic arch.    There is scattered bilateral interstitial and groundglass pulmonary opacities with upper lung zone predominance, greater on the right. No effusion or extrapulmonary air identified.    Surgical changes are noted within the cervical spine and within the soft tissues of the right side of the neck.    IMPRESSION:    Positioning of endotracheal tube as above.    Interval development of interstitial and groundglass pulmonary opacities with upper lung zone predominance.    Electronically signed by:  Elizabeth Sharp MD  1/1/2023 6:59 AM CST Workstation: [id number]                                    Department Hospital Medicine  [facility]  Julio C Sibley MD  Date of service: 01/17/2023

## 2023-01-18 NOTE — PLAN OF CARE
01/18/23 1455   Post-Acute Status   Post-Acute Authorization Placement   Post-Acute Placement Status Set-up Complete/Auth obtained     Per Anita at [facility], admission paperwork complete and auth from [employer] obtained. Awaiting medical clearance for discharge to SNF facility

## 2023-01-18 NOTE — CARE UPDATE
01/18/23 0758   Patient Assessment/Suction   Level of Consciousness (AVPU) alert   Respiratory Effort Normal;Unlabored   Expansion/Accessory Muscles/Retractions no use of accessory muscles;no retractions;expansion symmetric   All Lung Fields Breath Sounds clear   Rhythm/Pattern, Respiratory unlabored;pattern regular;depth regular;chest wiggle adequate;no shortness of breath reported   Cough Frequency no cough   PRE-TX-O2   Device (Oxygen Therapy) room air   SpO2 95 %   Pulse Oximetry Type Continuous   $ Pulse Oximetry - Multiple Charge Pulse Oximetry - Multiple   Pulse 78   Resp (!) 25   Aerosol Therapy   $ Aerosol Therapy Charges PRN treatment not required   Education   $ Education Bronchodilator;15 min   Respiratory Evaluation   $ Care Plan Tech Time 15 min

## 2023-01-18 NOTE — PHYSICIAN QUERY
"  PT Name: Peyman Gr  MR #: [id number]    DOCUMENTATION CLARIFICATION     CDS/: Livier Garza               Contact information:[phone] or through epic messenger  This form is a permanent document in the medical record.    Query Date: January 18, 2023    Dear Provider,  By submitting this query, we are merely seeking further clarification of documentation. Please utilize your independent clinical judgment when addressing the question(s) below.    The medical record contains:   Indicators Supporting Clinical Findings Location in Medical Record    Chest pain document presented with chest pain  [facility]    "Angina" documented cardiology awaiting pt's improved physical conditioning to evaluate for anginal symptoms, to decide on urgency of revascularization status.     Patient needs to get up and get out of the bed and be active to see if he has any anginal symptoms based on which we will have to decide the urgency  of his revascularization status.  Hospitalist's progress notes 1/14            Cardiology progress notes 1/14    CAD documented  Pt with CAD      3 QUESTIONS, ONLY ANSWER 2 AND 3 IF YOU RULE IN ANGINA    1. Please specify the type of angina:  [   ] Accelerated   [   ] Progressive    [   ] Stable   [   ] Unstable/Crescendo   [   X] Variant    [   ] With documented spasm    [  ]    [    ] Other angina type (please specify):    DISAGREE WITH ANGINA       2. Please clarify the underlying cause of angina IF YOU AGREE WITH ANGINA   [   ] Aortic stenosis   [ X  ] CAD   [   ] Other causes (please specify):       [  ] Clinically undetermined       Present on admission (POA) status:   [   X] Yes (Y)                           [  ] Clinically Undetermined (W)  [   ] No (N)                            [   ] Documentation insufficient to determine if condition is POA (U)                                                                                                                                        "

## 2023-01-19 ENCOUNTER — CLINICAL SUPPORT (OUTPATIENT)
Dept: CARDIOLOGY | Facility: HOSPITAL | Age: 68
DRG: 231 | End: 2023-01-19
Attending: EMERGENCY MEDICINE
Payer: MEDICARE

## 2023-01-19 VITALS — HEIGHT: 74 IN | BODY MASS INDEX: 29.65 KG/M2 | WEIGHT: 231 LBS

## 2023-01-19 LAB
ALBUMIN SERPL BCP-MCNC: 3.1 G/DL (ref 3.5–5.2)
ALP SERPL-CCNC: 114 U/L (ref 55–135)
ALT SERPL W/O P-5'-P-CCNC: 31 U/L (ref 10–44)
ANION GAP SERPL CALC-SCNC: 6 MMOL/L (ref 8–16)
AST SERPL-CCNC: 28 U/L (ref 10–40)
BASOPHILS # BLD AUTO: 0.07 K/UL (ref 0–0.2)
BASOPHILS NFR BLD: 1.3 % (ref 0–1.9)
BILIRUB SERPL-MCNC: 0.7 MG/DL (ref 0.1–1)
BSA FOR ECHO PROCEDURE: 2.34 M2
BUN SERPL-MCNC: 24 MG/DL (ref 8–23)
CALCIUM SERPL-MCNC: 8.2 MG/DL (ref 8.7–10.5)
CHLORIDE SERPL-SCNC: 103 MMOL/L (ref 95–110)
CO2 SERPL-SCNC: 23 MMOL/L (ref 23–29)
CREAT SERPL-MCNC: 1.1 MG/DL (ref 0.5–1.4)
CV ECHO LV RWT: 0.49 CM
DIFFERENTIAL METHOD: ABNORMAL
DOP CALC LVOT PEAK VEL: 1.14 M/S
DOP CALCLVOT PEAK VEL VTI: 18.7 CM
E WAVE DECELERATION TIME: 310.79 MSEC
E/A RATIO: 0.9
E/E' RATIO: 11.54 M/S
ECHO LV POSTERIOR WALL: 1.2 CM (ref 0.6–1.1)
EJECTION FRACTION: 70 %
EOSINOPHIL # BLD AUTO: 0.1 K/UL (ref 0–0.5)
EOSINOPHIL NFR BLD: 0.9 % (ref 0–8)
ERYTHROCYTE [DISTWIDTH] IN BLOOD BY AUTOMATED COUNT: 13.5 % (ref 11.5–14.5)
EST. GFR  (NO RACE VARIABLE): >60 ML/MIN/1.73 M^2
FRACTIONAL SHORTENING: 31 % (ref 28–44)
GLUCOSE SERPL-MCNC: 98 MG/DL (ref 70–110)
HCT VFR BLD AUTO: 30.1 % (ref 40–54)
HGB BLD-MCNC: 10 G/DL (ref 14–18)
IMM GRANULOCYTES # BLD AUTO: 0.07 K/UL (ref 0–0.04)
IMM GRANULOCYTES NFR BLD AUTO: 1.3 % (ref 0–0.5)
INTERVENTRICULAR SEPTUM: 1.31 CM (ref 0.6–1.1)
LEFT ATRIUM VOLUME INDEX MOD: 16.3 ML/M2
LEFT ATRIUM VOLUME MOD: 37.61 CM3
LEFT INTERNAL DIMENSION IN SYSTOLE: 3.36 CM (ref 2.1–4)
LEFT VENTRICLE DIASTOLIC VOLUME INDEX: 48.07 ML/M2
LEFT VENTRICLE DIASTOLIC VOLUME: 111.04 ML
LEFT VENTRICLE MASS INDEX: 103 G/M2
LEFT VENTRICLE SYSTOLIC VOLUME INDEX: 19.9 ML/M2
LEFT VENTRICLE SYSTOLIC VOLUME: 46.05 ML
LEFT VENTRICULAR INTERNAL DIMENSION IN DIASTOLE: 4.87 CM (ref 3.5–6)
LEFT VENTRICULAR MASS: 238.92 G
LV LATERAL E/E' RATIO: 9.38 M/S
LV SEPTAL E/E' RATIO: 15 M/S
LVOT MG: 2.47 MMHG
LVOT MV: 0.72 CM/S
LYMPHOCYTES # BLD AUTO: 1.4 K/UL (ref 1–4.8)
LYMPHOCYTES NFR BLD: 24.7 % (ref 18–48)
MAGNESIUM SERPL-MCNC: 2 MG/DL (ref 1.6–2.6)
MCH RBC QN AUTO: 31 PG (ref 27–31)
MCHC RBC AUTO-ENTMCNC: 33.2 G/DL (ref 32–36)
MCV RBC AUTO: 93 FL (ref 82–98)
MONOCYTES # BLD AUTO: 0.8 K/UL (ref 0.3–1)
MONOCYTES NFR BLD: 14.4 % (ref 4–15)
MV PEAK A VEL: 0.83 M/S
MV PEAK E VEL: 0.75 M/S
MV STENOSIS PRESSURE HALF TIME: 90.13 MS
MV VALVE AREA P 1/2 METHOD: 2.44 CM2
NEUTROPHILS # BLD AUTO: 3.2 K/UL (ref 1.8–7.7)
NEUTROPHILS NFR BLD: 57.4 % (ref 38–73)
NRBC BLD-RTO: 0 /100 WBC
PLATELET # BLD AUTO: 367 K/UL (ref 150–450)
PMV BLD AUTO: 9.2 FL (ref 9.2–12.9)
POTASSIUM SERPL-SCNC: 3.7 MMOL/L (ref 3.5–5.1)
PROT SERPL-MCNC: 6.8 G/DL (ref 6–8.4)
RA PRESSURE: 3 MMHG
RA VOL SYS: 27.55 ML
RBC # BLD AUTO: 3.23 M/UL (ref 4.6–6.2)
SODIUM SERPL-SCNC: 132 MMOL/L (ref 136–145)
TDI LATERAL: 0.08 M/S
TDI SEPTAL: 0.05 M/S
TDI: 0.07 M/S
TRICUSPID ANNULAR PLANE SYSTOLIC EXCURSION: 1.51 CM
WBC # BLD AUTO: 5.54 K/UL (ref 3.9–12.7)

## 2023-01-19 PROCEDURE — 97116 GAIT TRAINING THERAPY: CPT

## 2023-01-19 PROCEDURE — 25000003 PHARM REV CODE 250: Performed by: INTERNAL MEDICINE

## 2023-01-19 PROCEDURE — 99900035 HC TECH TIME PER 15 MIN (STAT)

## 2023-01-19 PROCEDURE — 97535 SELF CARE MNGMENT TRAINING: CPT

## 2023-01-19 PROCEDURE — 94761 N-INVAS EAR/PLS OXIMETRY MLT: CPT

## 2023-01-19 PROCEDURE — S4991 NICOTINE PATCH NONLEGEND: HCPCS | Performed by: INTERNAL MEDICINE

## 2023-01-19 PROCEDURE — 99900031 HC PATIENT EDUCATION (STAT)

## 2023-01-19 PROCEDURE — 63600175 PHARM REV CODE 636 W HCPCS: Performed by: INTERNAL MEDICINE

## 2023-01-19 PROCEDURE — 99223 1ST HOSP IP/OBS HIGH 75: CPT | Mod: ,,, | Performed by: PHYSICIAN ASSISTANT

## 2023-01-19 PROCEDURE — 80053 COMPREHEN METABOLIC PANEL: CPT | Performed by: FAMILY MEDICINE

## 2023-01-19 PROCEDURE — 25000003 PHARM REV CODE 250

## 2023-01-19 PROCEDURE — 85025 COMPLETE CBC W/AUTO DIFF WBC: CPT | Performed by: FAMILY MEDICINE

## 2023-01-19 PROCEDURE — 99232 SBSQ HOSP IP/OBS MODERATE 35: CPT | Mod: ,,, | Performed by: GENERAL PRACTICE

## 2023-01-19 PROCEDURE — 36415 COLL VENOUS BLD VENIPUNCTURE: CPT | Performed by: FAMILY MEDICINE

## 2023-01-19 PROCEDURE — 99232 PR SUBSEQUENT HOSPITAL CARE,LEVL II: ICD-10-PCS | Mod: ,,, | Performed by: GENERAL PRACTICE

## 2023-01-19 PROCEDURE — 83735 ASSAY OF MAGNESIUM: CPT | Performed by: FAMILY MEDICINE

## 2023-01-19 PROCEDURE — 93308 ECHO (CUPID ONLY): ICD-10-PCS | Mod: 26,,, | Performed by: SPECIALIST

## 2023-01-19 PROCEDURE — 99223 PR INITIAL HOSPITAL CARE,LEVL III: ICD-10-PCS | Mod: ,,, | Performed by: PHYSICIAN ASSISTANT

## 2023-01-19 PROCEDURE — 25000003 PHARM REV CODE 250: Performed by: FAMILY MEDICINE

## 2023-01-19 PROCEDURE — 93308 TTE F-UP OR LMTD: CPT

## 2023-01-19 PROCEDURE — 21000000 HC CCU ICU ROOM CHARGE

## 2023-01-19 PROCEDURE — 63600175 PHARM REV CODE 636 W HCPCS: Performed by: NURSE PRACTITIONER

## 2023-01-19 PROCEDURE — 92526 ORAL FUNCTION THERAPY: CPT

## 2023-01-19 PROCEDURE — 93308 TTE F-UP OR LMTD: CPT | Mod: 26,,, | Performed by: SPECIALIST

## 2023-01-19 RX ORDER — ENOXAPARIN SODIUM 150 MG/ML
1 INJECTION SUBCUTANEOUS
Status: DISCONTINUED | OUTPATIENT
Start: 2023-01-19 | End: 2023-01-23

## 2023-01-19 RX ADMIN — LOSARTAN POTASSIUM 100 MG: 50 TABLET, FILM COATED ORAL at 08:01

## 2023-01-19 RX ADMIN — FUROSEMIDE 20 MG: 20 TABLET ORAL at 08:01

## 2023-01-19 RX ADMIN — ESCITALOPRAM OXALATE 10 MG: 10 TABLET ORAL at 08:01

## 2023-01-19 RX ADMIN — NICOTINE 1 PATCH: 14 PATCH, EXTENDED RELEASE TRANSDERMAL at 08:01

## 2023-01-19 RX ADMIN — POTASSIUM BICARBONATE 50 MEQ: 977.5 TABLET, EFFERVESCENT ORAL at 07:01

## 2023-01-19 RX ADMIN — FAMOTIDINE 20 MG: 20 TABLET ORAL at 08:01

## 2023-01-19 RX ADMIN — ENOXAPARIN SODIUM 40 MG: 100 INJECTION SUBCUTANEOUS at 09:01

## 2023-01-19 RX ADMIN — CARVEDILOL 25 MG: 25 TABLET, FILM COATED ORAL at 07:01

## 2023-01-19 RX ADMIN — ASPIRIN 81 MG CHEWABLE TABLET 81 MG: 81 TABLET CHEWABLE at 08:01

## 2023-01-19 RX ADMIN — LOSARTAN POTASSIUM 50 MG: 50 TABLET, FILM COATED ORAL at 05:01

## 2023-01-19 RX ADMIN — OXYCODONE HYDROCHLORIDE AND ACETAMINOPHEN 1 TABLET: 7.5; 325 TABLET ORAL at 06:01

## 2023-01-19 RX ADMIN — ATORVASTATIN CALCIUM 20 MG: 20 TABLET, FILM COATED ORAL at 08:01

## 2023-01-19 RX ADMIN — OXYCODONE HYDROCHLORIDE AND ACETAMINOPHEN 1 TABLET: 7.5; 325 TABLET ORAL at 08:01

## 2023-01-19 RX ADMIN — CHLORHEXIDINE GLUCONATE 15 ML: 1.2 RINSE ORAL at 09:01

## 2023-01-19 RX ADMIN — CARVEDILOL 25 MG: 25 TABLET, FILM COATED ORAL at 05:01

## 2023-01-19 RX ADMIN — CHLORHEXIDINE GLUCONATE 15 ML: 1.2 RINSE ORAL at 08:01

## 2023-01-19 RX ADMIN — AMLODIPINE BESYLATE 10 MG: 5 TABLET ORAL at 08:01

## 2023-01-19 RX ADMIN — ENOXAPARIN SODIUM 105 MG: 150 INJECTION SUBCUTANEOUS at 08:01

## 2023-01-19 RX ADMIN — OXYCODONE HYDROCHLORIDE AND ACETAMINOPHEN 1 TABLET: 7.5; 325 TABLET ORAL at 07:01

## 2023-01-19 RX ADMIN — OXYCODONE HYDROCHLORIDE AND ACETAMINOPHEN 1 TABLET: 7.5; 325 TABLET ORAL at 11:01

## 2023-01-19 NOTE — PROGRESS NOTES
Replaced by [facility]  Department of Cardiology  Progress Note    PATIENT NAME: Peyman Gr  MRN: [id number]  TODAY'S DATE: 01/19/2023  ADMIT DATE: 1/1/2023    SUBJECTIVE     PRINCIPLE PROBLEM: STEMI involving right coronary artery    INTERVAL HISTORY:    1/19/2023  Patient is being evaluated by CT surgery for CABG. He reports he is feeling okay. Has had some SOB but denies chest pain.     1/18/23:  No new complaints.  Making some progress with physical therapy.  Some shortness of breath on exertion.  Films reviewed with Dr. Elizondo.      Review of patient's allergies indicates:  No Known Allergies    REVIEW OF SYSTEMS  CARDIOVASCULAR: No recent chest pain, palpitations, arm, neck, or jaw pain  RESPIRATORY: No recent fever, cough chills, SOB or congestion  : No blood in the urine  GI: No Nausea, vomiting, constipation, diarrhea, blood, or reflux.  MUSCULOSKELETAL: No myalgias  NEURO: No lightheadedness or dizziness  EYES: No Double vision, blurry, vision or headache     OBJECTIVE     VITAL SIGNS (Most Recent)  Temp: 98.3 °F (36.8 °C) (01/19/23 1300)  Pulse: 62 (01/19/23 1400)  Resp: 19 (01/19/23 1400)  BP: 115/63 (01/19/23 1400)  SpO2: 97 % (01/19/23 1400)    VENTILATION STATUS  Resp: 19 (01/19/23 1400)  SpO2: 97 % (01/19/23 1400)       I & O (Last 24H):  Intake/Output Summary (Last 24 hours) at 1/19/2023 1456  Last data filed at 1/19/2023 1417  Gross per 24 hour   Intake 320 ml   Output 1000 ml   Net -680 ml         WEIGHTS  Wt Readings from Last 3 Encounters:   01/17/23 0400 105.2 kg (231 lb 14.8 oz)   01/15/23 0400 100 kg (220 lb 7.4 oz)   01/14/23 0400 97.8 kg (215 lb 9.8 oz)   01/07/23 0400 115.4 kg (254 lb 6.6 oz)   01/06/23 0400 117.6 kg (259 lb 4.2 oz)   01/05/23 0500 115.3 kg (254 lb 3.1 oz)   01/03/23 0615 105.4 kg (232 lb 5.8 oz)   01/02/23 0400 102.5 kg (225 lb 15.5 oz)   01/02/23 0053 102.5 kg (225 lb 15.5 oz)   01/01/23 0800 99.8 kg (220 lb 0.3 oz)   01/01/23 0341 99.8 kg (220 lb)   01/19/23 1350  104.8 kg (231 lb)   01/01/23 1706 99.8 kg (220 lb)       PHYSICAL EXAM  CONSTITUTIONAL: Well built, well nourished in no apparent distress  NECK: no carotid bruit, no JVD  LUNGS: CTA  CHEST WALL: no tenderness  HEART: regular rate and rhythm, S1, S2 normal, no murmur, click, rub or gallop   ABDOMEN: soft, non-tender; bowel sounds normal; no masses,  no organomegaly  EXTREMITIES: Extremities normal, no edema  NEURO: AAO X 3    SCHEDULED MEDS:   amLODIPine  10 mg Oral Daily    aspirin  81 mg Oral Daily    atorvastatin  20 mg Oral QHS    carvediloL  25 mg Oral BID WM    chlorhexidine  15 mL Mouth/Throat BID    docusate sodium  100 mg Oral BID    enoxparin  40 mg Subcutaneous Q24H    EScitalopram oxalate  10 mg Oral Daily    famotidine  20 mg Per OG tube BID    furosemide  20 mg Oral Daily    LIDOcaine  1 patch Transdermal Q24H    losartan  100 mg Oral Daily    losartan  50 mg Oral Daily with dinner    nicotine  1 patch Transdermal Daily    polyethylene glycol  17 g Oral BID    QUEtiapine  25 mg Oral Daily with dinner    traZODone  50 mg Oral QHS       CONTINUOUS INFUSIONS:    PRN MEDS:acetaminophen, atropine, calcium gluconate IVPB, calcium gluconate IVPB, calcium gluconate IVPB, dextrose 10%, dextrose 10%, EPINEPHrine, glucagon (human recombinant), glucose, glucose, hydrALAZINE, hydrALAZINE, lactulose, levalbuterol, magnesium oxide, magnesium oxide, magnesium sulfate IVPB, magnesium sulfate IVPB, melatonin, naloxone, nitroglycerin, ondansetron, oxyCODONE-acetaminophen, potassium bicarbonate, potassium bicarbonate, potassium bicarbonate, potassium chloride in water **AND** potassium chloride in water **AND** potassium chloride in water, potassium, sodium phosphates, potassium, sodium phosphates, potassium, sodium phosphates, senna-docusate 8.6-50 mg, simethicone, sodium chloride 0.9%, sodium phosphate IVPB, sodium phosphate IVPB, sodium phosphate IVPB    LABS AND DIAGNOSTICS     CBC LAST 3 DAYS  Recent Labs   Lab  01/17/23 0522 01/18/23 0417 01/19/23 0443   WBC 7.13 6.50 5.54   RBC 3.23* 3.36* 3.23*   HGB 10.1* 10.4* 10.0*   HCT 30.3* 31.4* 30.1*   MCV 94 94 93   MCH 31.3* 31.0 31.0   MCHC 33.3 33.1 33.2   RDW 13.4 13.5 13.5    367 367   MPV 9.4 9.3 9.2   GRAN 59.8  4.3 57.4  3.7 57.4  3.2   LYMPH 21.2  1.5 25.4  1.7 24.7  1.4   MONO 16.1*  1.2* 14.0  0.9 14.4  0.8   BASO 0.05 0.06 0.07   NRBC 0 0 0         COAGULATION LAST 3 DAYS  No results for input(s): LABPT, INR, APTT in the last 168 hours.    CHEMISTRY LAST 3 DAYS  Recent Labs   Lab 01/17/23 0522 01/18/23 0417 01/19/23 0443   * 134* 132*   K 3.5 3.6 3.7    101 103   CO2 23 23 23   ANIONGAP 9 10 6*   BUN 24* 26* 24*   CREATININE 1.0 1.1 1.1   GLU 98 94 98   CALCIUM 8.5* 8.5* 8.2*   MG 2.0 2.1 2.0   ALBUMIN 3.0* 3.1* 3.1*   PROT 7.1 6.6 6.8   ALKPHOS 102 110 114   ALT 35 32 31   AST 29 28 28   BILITOT 0.5 0.7 0.7         CARDIAC PROFILE LAST 3 DAYS  No results for input(s): BNP, CPK, CPKMB, LDH, TROPONINI in the last 168 hours.    ENDOCRINE LAST 3 DAYS  Recent Labs   Lab 01/13/23  0514   PROCAL 0.20         LAST ARTERIAL BLOOD GAS  ABG  No results for input(s): PH, PO2, PCO2, HCO3, BE in the last 168 hours.    LAST 7 DAYS MICROBIOLOGY   Microbiology Results (last 7 days)       ** No results found for the last 168 hours. **            MOST RECENT IMAGING  X-Ray Chest AP Portable  HISTORY: SOB    FINDINGS: Portable chest radiograph at 1321 hours compared to prior exams shows the cardiomediastinal silhouette and pulmonary vasculature are within normal limits. There are aortic vascular calcifications.    The lungs are normally expanded, with no consolidation, large pleural effusion, or evidence of pulmonary edema. No confluent infiltrates or pneumothorax. There are no significant osseous abnormalities.    IMPRESSION: No evidence of active cardiopulmonary disease.    Electronically signed by:  Hema Tfaoya MD  1/19/2023 1:58 PM CST Workstation:  [id number]      [facility]  Results for orders placed during the hospital encounter of 01/01/23    Echo Saline Bubble? No    Interpretation Summary  · The estimated ejection fraction is 55%.  · The left ventricle is normal in size with concentric hypertrophy.  · Mild to moderate tricuspid regurgitation.  · Moderate right ventricular enlargement with moderately reduced right ventricular systolic function.  · Grade I left ventricular diastolic dysfunction.  · There is mild aortic valve stenosis.  · Aortic valve area is 1.68 cm2; peak velocity is 1.36 m/s; mean gradient is 4 mmHg.  · There is abnormal septal wall motion consistent with right ventricular pacemaker.  · There are segmental left ventricular wall motion abnormalities.      CURRENT/PREVIOUS VISIT EKG  Results for orders placed or performed during the hospital encounter of 01/01/23   EKG 12-lead    Collection Time: 01/14/23  5:26 PM    Narrative    Test Reason : R07.9,    Vent. Rate : 076 BPM     Atrial Rate : 076 BPM     P-R Int : 128 ms          QRS Dur : 082 ms      QT Int : 378 ms       P-R-T Axes : 065 -07 104 degrees     QTc Int : 425 ms    Normal sinus rhythm  LVH with repolarization abnormality  Inferior infarct (cited on or before 01-JAN-2023)  Abnormal ECG  When compared with ECG of 12-JAN-2023 15:01,  Premature supraventricular complexes are no longer Present  Questionable change in initial forces of Inferior leads  Confirmed by Claude ZAFAR, Aurelio CAMPO ([id number]) on 1/16/2023 8:57:26 PM    Referred By: IRENE MOSER           Confirmed By:Aurelio Arce MD       ASSESSMENT/PLAN:     Active Hospital Problems    Diagnosis    *STEMI involving right coronary artery    Pneumonia of right lung due to methicillin susceptible Staphylococcus aureus (MSSA)    Shock liver    Cardiogenic shock    Cardiac arrest    Acute respiratory failure with hypoxia and hypercarbia    Leukocytosis    Anemia    History of tobacco abuse    Primary hypertension    PVD (peripheral  vascular disease) with claudication    PAD (peripheral artery disease)       ASSESSMENT & PLAN:   Severe left main coronary stenosis with 90% lesion in small mid marginal  ST elevation MI with stent in ostial right  status post STEMI with cardiac arrest      RECOMMENDATIONS:    Awaiting CT surgery recommendations for CABG. He is off brilinta currently. Will change to weight based Lovenox.   Will follow.         Mary Lou Turpin NP  [facility]  Department of Cardiology  Date of Service: 01/19/2023      I have personally interviewed and examined the patient, I have reviewed the Nurse Practitioner's history and physical, assessment, and plan. I have personally evaluated the patient at bedside and agree with the findings and made appropriate changes as necessary in recommendations.    Patient has a history of right subclavian stenosis.  Recommend evaluation of the left subclavian and LIMA before surgery with ultrasound or CTA.    Dougie Saleh MD  Department of Cardiology  [facility]  01/19/2023 3:04 PM

## 2023-01-19 NOTE — NURSING
67 yr old male  alert and awake   in bed   Right small toe   Pt says that he saw podiatry dr removed the bone from the mummified toe  now he has a hard crusty ? Nail/bone growing from the site  small amt of clear drainage at the base of the ?        Recommendation:   Right small toe   Clean with chlorhexidine/ns.  Pat dry.  Paint with betadine. Air dry

## 2023-01-19 NOTE — CARE UPDATE
01/18/23 2002   Patient Assessment/Suction   Level of Consciousness (AVPU) alert   Respiratory Effort Normal;Unlabored   Expansion/Accessory Muscles/Retractions no use of accessory muscles   All Lung Fields Breath Sounds Anterior:;clear;diminished   Rhythm/Pattern, Respiratory unlabored   Cough Frequency infrequent   PRE-TX-O2   Device (Oxygen Therapy) room air   SpO2 97 %   Pulse Oximetry Type Continuous   $ Pulse Oximetry - Multiple Charge Pulse Oximetry - Multiple   Pulse 60   Resp (!) 21   /66   Aerosol Therapy   $ Aerosol Therapy Charges PRN treatment not required   Education   $ Education DME Oxygen;15 min   Respiratory Evaluation   $ Care Plan Tech Time 15 min

## 2023-01-19 NOTE — PT/OT/SLP PROGRESS
"Speech Language Pathology Treatment    Patient Name:  Peyman Gr   MRN:  [id number]  Admitting Diagnosis: STEMI involving right coronary artery    Recommendations:                 General Recommendations:  Dysphagia therapy  Diet recommendations:  Soft & Bite Sized Diet - IDDSI Level 6, Liquid Diet Level: Thin   Aspiration Precautions:  1 bite/sip at a time, Alternating bites/sips, Assistance with meals, Avoid talking while eating, Eliminate distractions, Frequent oral care, HOB to 90 degrees, Meds whole 1 at a time, and Small bites/sips1 bite/sip at a time, Alternating bites/sips, Assistance with meals, Avoid talking while eating, Eliminate distractions, Frequent oral care, HOB to 90 degrees, Meds whole 1 at a time, and Small bites/sips    General Precautions: Standard, aspiration, fall  Communication strategies:  none    Subjective     Pt awake talking on phone when entering pt's room. Pt agreeable to ST.  Patient goals: "I want some different breakfast, maybe some bread"    Pain/Comfort:       Respiratory Status: Room air    Objective:     Has the patient been evaluated by SLP for swallowing?   Yes  Keep patient NPO? No   Current Respiratory Status:        Pt completed lingual strengthening exercises x20 and lingual ROM exercises x10. Mild increase in ROM today; continues to require modeling and cuing for correct placement w/ strengthening exercises. Ed re swallowing exercises, current diet, aspiration risks, and swallowing precautions reviewed again.    Assessment:     Peyman Gr is a 67 y.o. male with an SLP diagnosis of moderate Dysphagia.  Mild improvements w/ lingual ROM today; continues w/ lingual weakness and ROM deficits. Continue POC.    Goals:   Multidisciplinary Problems       SLP Goals          Problem: SLP    Goal Priority Disciplines Outcome   SLP Goal     SLP Ongoing, Progressing   Description: 1. Pt will tolerate PO trials of puree and thin liquid w/o overt s/s aspiration and w/ adequate oral " clearance during >90% of trials -MET  2. Pt will participate in ongoing diagnostic dysphagia tx in order to determine LRD -MET  3. Pt will tolerate pureed diet w/ thin liquids w/o overt s/s aspiration and w/ adequate oral clearance w/ >90% of PO intake -MET  4. Pt will tolerate level 6 diet w/ thin liquids w/ adequate bolus manipulation and oral clearance w/o overt s/s aspiration during >90% of PO intake  5. Pt will participate in cognitive-linguistic evaluation (DISCONTINUED, pt's wife stating pt at baseline)  6. Pt will complete lingual strengthening exercises x10 each during each session                       Plan:     Patient to be seen:  4 x/week   Plan of Care expires:     Plan of Care reviewed with:  patient, other (see comments) (nursing, MD)   SLP Follow-Up:  Yes       Discharge recommendations:  rehabilitation facility   Barriers to Discharge:  None    Time Tracking:     SLP Treatment Date:   01/19/23  Speech Start Time:  0949  Speech Stop Time:  1004     Speech Total Time (min):  15 min    Billable Minutes: Treatment Swallowing Dysfunction 15 min    01/19/2023

## 2023-01-19 NOTE — PROGRESS NOTES
CVT SURGERY CONSULT NOTE    Patient Info:   Peyman Gr                                                        67 y.o.                          1955    Date of Consult: 1/19/2023    Reason for Consult: CABG    Referring Physician: Peyman Gonsalves III, *    Subjective:    HPI:  66 year old AAM with hx of PVD, carotid disease, tobacco use, HTN, HLD who presented to [facility] for STEMI with 3rd degree HB on 1/1/2023 with subsequent VT/VF/asystole cardiac arrest.  ROSC was achieved after about 40minutes of resuscitation. He was intubated at [facility] and transvenous pacing initiated with repeat cardiac arrest. He was transferred to [facility] in cardiac arrest with PEA. ROSC achieved after about 30 minutes of resuscitation.  He was admitted to ICU with 3 vasopressors and transcutaneous pacing. Once stabilized he went to cath lab emergently for transvenous pacer and angiogram. He was found to have significant 2 vessel disease and left main disease. Successful PTCA/JOSE GUADALUPE of ostial RCA (culprit for STEMI) was performed and transvenous pacemaker. He was extubated on 1/8/2023 and transferred out of ICU 1/12/2023. He has continued to have labile BP with multiple medication adjustments. CVTS consulted for CABG eval.   Pt seen this afternoon and reports prior to admission he was very functional without chest pain or SOB.  He states now he can only perform about 5 minutes of activity before developing SOB. The SOB is relieved with rest. He states he can barely eat due to SOB. He reports his O2 sats drop into the 80's with activity. Per discussion with nurse this has not been noted. He is presently on RA with O2 sats 94%. He reports previous right carotid bypass last year in [place]. He is from [place] and was here visiting his daughter prior to admission. H/e has PAD with BLE stents and previous dry gangrene of right fifth toe with about 2 years ago with exposed bone currently.  He has a lifelong smoking hx. He does not use  inhalers. His last dose of Brilinta was 1/18/2023 am. He has hx of previous back surgery.     Past Medical History:   Diagnosis Date    Arthritis     Chronic back pain     Chronic neck pain     Dry gangrene     RIGHT LITTLE TOE    Hypercholesteremia     Hypertension     Insomnia     Multiple falls     S/P BACK SURGERY- 1 FALL    PAD (peripheral artery disease)     Personal history of colonic polyps     PVD (peripheral vascular disease)        Past Surgical History:   Procedure Laterality Date    ANGIOGRAPHY OF LOWER EXTREMITY N/A 09/24/2020    Procedure: Angiogram Extremity Unilateral;  Surgeon: Luke Cabello MD;  Location: [facility] CATH;  Service: Cardiology;  Laterality: N/A;    BACK SURGERY      BUNIONECTOMY Bilateral     CARPAL TUNNEL RELEASE Bilateral     CHOLECYSTECTOMY      COLONOSCOPY N/A 5/9/2022    Procedure: COLONOSCOPY;  Surgeon: Braydon Durand MD;  Location: [facility] ENDO;  Service: Endoscopy;  Laterality: N/A;    COLONOSCOPY W/ POLYPECTOMY      CORONARY ANGIOGRAPHY N/A 1/1/2023    Procedure: ANGIOGRAM, CORONARY ARTERY;  Surgeon: Stefany Elizondo MD;  Location: [facility] CATH/EP LAB;  Service: Cardiology;  Laterality: N/A;    CORONARY ARTERY BYPASS GRAFT      CREATION OF BYPASS FROM INTERNAL CAROTID ARTERY TO SUBCLAVIAN ARTERY Right 12/27/2021    Procedure: CREATION, BYPASS, ARTERIAL, INTERNAL CAROTID TO SUBCLAVIAN;  Surgeon: Jeovany Goins MD;  Location: [facility] OR;  Service: Vascular;  Laterality: Right;    ELBOW ARTHROPLASTY Right     ELBOW SURGERY      INSERTION, PACEMAKER, TEMPORARY TRANSVENOUS  1/1/2023    Procedure: Insertion, Pacemaker, Temporary Transvenous;  Surgeon: Stefany Elizondo MD;  Location: [facility] CATH/EP LAB;  Service: Cardiology;;    IVUS, CORONARY  1/1/2023    Procedure: IVUS, Coronary;  Surgeon: Stefany Elizondo MD;  Location: [facility] CATH/EP LAB;  Service: Cardiology;;    LEFT HEART CATHETERIZATION Left 1/1/2023    Procedure: Left heart cath;  Surgeon: Stefany  Moisés Elizondo MD;  Location: [place] CATH/EP LAB;  Service: Cardiology;  Laterality: Left;    MINIMALLY INVASIVE FORAMINOTOMY OF  SPINE N/A 11/15/2018    Procedure: FORAMINOTOMY, SPINE, MINIMALLY INVASIVE DECOMPRESSION L4-5;  Surgeon: Iván Stevenson Jr., MD;  Location: [facility] OR;  Service: Orthopedics;  Laterality: N/A;    NECK SURGERY      acf    PERCUTANEOUS CORONARY INTERVENTION, ARTERY N/A 2023    Procedure: Percutaneous coronary intervention;  Surgeon: Stefany Elizondo MD;  Location: [place] CATH/EP LAB;  Service: Cardiology;  Laterality: N/A;    PERCUTANEOUS TRANSLUMINAL ANGIOPLASTY (PTA) OF PERIPHERAL VESSEL Right 2020    Procedure: PTA, PERIPHERAL VESSEL of right lower extremity;  Surgeon: Luke Cabello MD;  Location: [facility] CATH;  Service: Cardiology;  Laterality: Right;    PERCUTANEOUS TRANSLUMINAL ANGIOPLASTY (PTA) OF PERIPHERAL VESSEL Left 09/10/2020    Procedure: PTA, PERIPHERAL VESSEL, Left lower extremity;  Surgeon: Luke Cabello MD;  Location: [facility] CATH;  Service: Cardiology;  Laterality: Left;    PERCUTANEOUS TRANSLUMINAL ANGIOPLASTY (PTA) OF PERIPHERAL VESSEL Left 2021    Pro[employer] and SFA       Social History     Tobacco Use    Smoking status: Former     Packs/day: 2.00     Years: 60.00     Pack years: 120.00     Types: Cigarettes     Quit date:      Years since quittin.0    Smokeless tobacco: Never   Substance Use Topics    Alcohol use: No       Social History     Substance and Sexual Activity   Drug Use No       Family History   Problem Relation Age of Onset    COPD Mother     Hypertension Father     Heart disease Father     Heart attack Father     COPD Sister     Other Sister     Kidney failure Brother     Hypertension Brother     Diabetes Brother        Review of patient's allergies indicates:  No Known Allergies    Prior to Admission medications    Medication Sig Start Date End Date Taking? Authorizing Provider   albuterol (PROVENTIL/VENTOLIN HFA) 90  mcg/actuation inhaler Inhale 1-2 puffs into the lungs every 6 (six) hours as needed for Shortness of Breath. Rescue 9/9/22   Jerardo Perez MD   amLODIPine (NORVASC) 10 MG tablet Take 1 tablet (10 mg total) by mouth once daily. 12/30/21   Aurelio Flores PA-C   amLODIPine (NORVASC) 5 MG tablet Take 5 mg by mouth once daily.    Historical Provider   aspirin 81 MG Chew Take 81 mg by mouth once daily.    Historical Provider   carvediloL (COREG) 25 MG tablet Take 25 mg by mouth 2 (two) times daily with meals.    Historical Provider   clopidogreL (PLAVIX) 75 mg tablet Take 75 mg by mouth once daily. On hold for sx on 12/27/21    Historical Provider   ezetimibe (ZETIA) 10 mg tablet Take 10 mg by mouth once daily.    Historical Provider   hydroCHLOROthiazide (HYDRODIURIL) 12.5 MG Tab Take 1 tablet (12.5 mg total) by mouth once daily. 12/30/21   Aurelio Flores PA-C   HYDROcodone-acetaminophen (NORCO)  mg per tablet Take 1 tablet by mouth every 8 (eight) hours as needed for Pain.    Historical Provider   mupirocin (BACTROBAN) 2 % ointment Apply topically 3 (three) times daily. 2/3/22   Gabriel Monahan NP   oxyCODONE-acetaminophen (PERCOCET) 7.5-325 mg per tablet Take 1 tablet by mouth 2 (two) times daily as needed. 11/8/21   Historical Provider   traMADoL (ULTRAM) 50 mg tablet Take 50 mg by mouth every 6 (six) hours as needed. 11/22/21   Historical Provider   valsartan (DIOVAN) 160 MG tablet Take 160 mg by mouth once daily.    Historical Provider       Review of Systems   Constitutional:  Negative for fever.   HENT:  Negative for congestion.    Eyes:  Negative for blurred vision.   Respiratory:  Positive for shortness of breath. Negative for cough and wheezing.    Cardiovascular:  Negative for chest pain, palpitations, orthopnea and leg swelling.   Gastrointestinal:  Negative for nausea and vomiting.   Genitourinary:  Negative for dysuria.   Skin:  Negative for rash.   Neurological:  Negative for weakness.  "  Psychiatric/Behavioral:  The patient is not nervous/anxious.      Objective:    Physical Exam  Vitals and nursing note reviewed.   Constitutional:       General: He is not in acute distress.  HENT:      Head: Normocephalic and atraumatic.   Eyes:      Extraocular Movements: Extraocular movements intact.   Cardiovascular:      Rate and Rhythm: Normal rate and regular rhythm.      Heart sounds: Normal heart sounds. No murmur heard.     Comments: No palpable peripheral pulses, feet are warm with good color, right fifth toe with ?exposed bone and dark discoloration of skin, no drainage or odor noted  Pulmonary:      Effort: Pulmonary effort is normal. No respiratory distress.      Breath sounds: No wheezing or rales.   Abdominal:      General: Bowel sounds are normal. There is no distension.      Palpations: Abdomen is soft.      Tenderness: There is no abdominal tenderness.   Musculoskeletal:      Cervical back: Neck supple.   Skin:     General: Skin is warm and dry.   Neurological:      Mental Status: He is alert and oriented to person, place, and time.     BP (!) 166/77   Pulse 77   Temp 99.8 °F (37.7 °C) (Oral)   Resp 16   Ht 6' 2" (1.88 m)   Wt 105.2 kg (231 lb 14.8 oz)   SpO2 (!) 94%   BMI 29.76 kg/m²   INPATIENT MEDS:      amLODIPine  10 mg Oral Daily    aspirin  81 mg Oral Daily    atorvastatin  20 mg Oral QHS    carvediloL  25 mg Oral BID WM    chlorhexidine  15 mL Mouth/Throat BID    docusate sodium  100 mg Oral BID    enoxparin  40 mg Subcutaneous Q24H    EScitalopram oxalate  10 mg Oral Daily    famotidine  20 mg Per OG tube BID    furosemide  20 mg Oral Daily    LIDOcaine  1 patch Transdermal Q24H    losartan  100 mg Oral Daily    losartan  50 mg Oral Daily with dinner    nicotine  1 patch Transdermal Daily    polyethylene glycol  17 g Oral BID    QUEtiapine  25 mg Oral Daily with dinner    traZODone  50 mg Oral QHS     acetaminophen, atropine, calcium gluconate IVPB, calcium gluconate IVPB, " calcium gluconate IVPB, dextrose 10%, dextrose 10%, EPINEPHrine, glucagon (human recombinant), glucose, glucose, hydrALAZINE, hydrALAZINE, lactulose, levalbuterol, magnesium oxide, magnesium oxide, magnesium sulfate IVPB, magnesium sulfate IVPB, melatonin, naloxone, nitroglycerin, ondansetron, oxyCODONE-acetaminophen, potassium bicarbonate, potassium bicarbonate, potassium bicarbonate, potassium chloride in water **AND** potassium chloride in water **AND** potassium chloride in water, potassium, sodium phosphates, potassium, sodium phosphates, potassium, sodium phosphates, senna-docusate 8.6-50 mg, simethicone, sodium chloride 0.9%, sodium phosphate IVPB, sodium phosphate IVPB, sodium phosphate IVPB     RECENT O2 THERAPY RA    I/O last 24 hours  Intake/Output - Last 3 Shifts         01/17 0700 01/18 0659 01/18 0700 01/19 0659 01/19 0700 01/20 0659    P.O. 790 120 200    Total Intake(mL/kg) 790 (7.5) 120 (1.1) 200 (1.9)    Urine (mL/kg/hr) 1200 (0.5) 1050 (0.4) 250 (0.6)    Stool  1     Total Output 1200 1051 250    Net -410 -931 -50                 Recent cardiac rhythm: SR  Recent Pain Assessment: 0  CBC:  Recent Labs   Lab 01/17/23  0522 01/18/23  0417 01/19/23  0443   WBC 7.13 6.50 5.54   RBC 3.23* 3.36* 3.23*   HGB 10.1* 10.4* 10.0*    367 367   MCV 94 94 93   MCH 31.3* 31.0 31.0   MCHC 33.3 33.1 33.2     BMP:  Recent Labs   Lab 01/17/23  0522 01/18/23  0417 01/19/23  0443   CO2 23 23 23   BUN 24* 26* 24*   CREATININE 1.0 1.1 1.1   CALCIUM 8.5* 8.5* 8.2*     PT/INR:  INR   Date Value Ref Range Status   01/01/2023 1.2 0.8 - 1.2 Final     Comment:     Coumadin Therapy:  2.0 - 3.0 for INR for all indicators except mechanical heart valves  and antiphospholipid syndromes which should use 2.5 - 3.5.     12/27/2021 1.1 <1.2 Final     Comment:     INR should be used only to monitor patients on stable oral   anticoagulant therapy, not during initiation of therapy.     12/21/2021 1.0 <1.2 Final     Comment:      INR should be used only to monitor patients on stable oral   anticoagulant therapy, not during initiation of therapy.         DIAGNOSTIC RESULTS  Carotid US 1/18/2023  IMPRESSION:  1. Atheromatous plaque of the carotid bulbs and ICA origins, with mildly elevated velocities in the distal right ICA, suggesting 50-69% stenosis.  2. Patent vertebral arteries, with biphasic flow in the right vertebral artery.  3. Patent right common carotid artery bypass graft.    Echo 1/1/2023  Summary    The estimated ejection fraction is 55%.  The left ventricle is normal in size with concentric hypertrophy.  Mild to moderate tricuspid regurgitation.  Moderate right ventricular enlargement with moderately reduced right ventricular systolic function.  Grade I left ventricular diastolic dysfunction.  There is mild aortic valve stenosis.  Aortic valve area is 1.68 cm2; peak velocity is 1.36 m/s; mean gradient is 4 mmHg.  There is abnormal septal wall motion consistent with right ventricular pacemaker.  There are segmental left ventricular wall motion abnormalities.    [facility] 1/1/2023  Coronary Findings    Diagnostic  Dominance: Right  Left Main   Discrete, eccentric, calcified 70% stenosis at the ostium.      Left Anterior Descending   Large-sized. Mild diffuse atherosclerosis. Medium to large-sized D1 with mild atherosclerosis.      Ramus Intermedius   Small-sized. Mild atherosclerosis.      Left Circumflex   Medium to large-sized with mild atherosclerosis. Large-sized OM1 which bifurcated into 2 medium-sized branches in the mid segment. There was discrete 90% stenosis in the inferior branch of OM1.      Right Coronary Artery   100% flush thrombotic occlusion of the [place] crook ostial RCA (culprit for STEMI). The vessel was medium-sized with mild-to-moderate diffuse atherosclerosis in proximal mid and distal segments (visualized after intervention). The vessel has medium-sized RPDA and RPL with mild atherosclerosis.   Ost RCA lesion was  100% stenosed. This was the culprit lesion. The lesion was ostial and thrombotic. The lesion was < 10 mm long. The lesion was a type C lesion. The stenosis was measured by a visual reading.      Intervention     Ost RCA lesion   Angioplasty   The balloon was inserted, placed across lesion, inflated and removed. An angioplasty using a standard balloon: CATHETER BALLOON EUPHORA 2.81R91JA was performed. The balloon was inflated multiple times. The balloon was removed following the angioplasty.   Supplies used: CATHETER BALLOON EUPHORA 2.23B06MH   Angioplasty   The balloon was inserted, placed across lesion, inflated and removed. An angioplasty using a standard balloon: CATHETER BALLOON EUPHORA 2.75E01FG was performed. The balloon was inflated multiple times. The balloon was removed following the angioplasty.   Supplies used: CATHETER BALLOON EUPHORA 2.02C63PU   Stent   A drug-eluting stent was successfully placed. The stent used was a STENT HERMILA JOSE GUADALUPE 2.75 X 22. The stent was deployed by way of balloon expansion. The stent balloon placed across lesion, balloon was removed and was deployed. There were multiple inflations. The stent was deployed at 12 sandra. The inflation time was 25 sec. The stent was redilated a second time at 18 sandra. The second inflation time was 15 sec. The stent was redilated a third time at 20 sandra. The third inflation time was 15 sec.   Supplies used: STENT HERMILA JOSE GUADALUPE 2.75 X 22   Post-Intervention Lesion Assessment   The intervention was successful. The guidewire crossed the lesion. The diagnostic DAIN flow was 0. The post-intervention DAIN flow was 3. There were no complications. TVP was placed prior to the intervention via right femoral vein. The lesion was initially pre-dilated with a compliant balloon followed by noncompliant balloon at higher pressure due to the presence of calcium. IVUS catheter could not be advanced due to the thorne's crook angle of RCA. Drug-eluting stent was successfully  deployed at the ostium followed by flaring of the ostium with the stent balloon. There was no dissection. The femoral artery sheath was sutured in place and hemostasis was subsequently achieved with manual pressure due to presence of significant peripheral vascular disease.   There is a 0% residual stenosis post intervention.        Left Heart Findings    Left Ventricle    The ejection fraction is calculated to be 65%.   LVEDP (Pre): 17 mmHG.     ECG Results    None         CURRENT CONSULTS  Consults (From admission, onward)          Status Ordering Provider     Inpatient consult to Cardiothoracic Surgery  Once        Provider:  Zechariah Douglas MD    Acknowledged PASQUALE MCCOY     Inpatient consult to   Once        Provider:  (Not yet assigned)    Acknowledged LIGIA FERNANDEZ     Inpatient consult to Midline team  Once        Provider:  (Not yet assigned)    Acknowledged NESS BAUMAN     Inpatient consult to Registered Dietitian/Nutritionist  Once        Provider:  (Not yet assigned)    Completed NESS BAUMAN     Inpatient consult to Neurology  Once        Provider:  Brigette Aparicio MD    Completed VIANEY PADGETT     Inpatient consult to Cardiology  Once        Provider:  Stefany Elizondo MD    Completed LIGIA FERNANDEZ     Inpatient consult to Intensivist  Once        Provider:  Vianey Padgett MD    Completed LIGIA FERNANDEZ     Inpatient consult to Hospitalist  Once        Provider:  Ligia Fernandez MD    Acknowledged LIGIA FERNANDEZ              Assessment/Plan:    STEMI  S/P VF/VT/PEA/3rd degree HB arrest  S/P RCA JOSE GUADALUPE/transcutaneous pacer  Significant Left  main CAD  PAD with previous BLE stents and exposed bone of fifth toe  HTN  HLD  Lifelong smoker  Previous right carotid bypass  -Hemodynamically stable but pt reports significant SOB with minimal activity. No chest pain. He is adamant that his SOB is due to his CAD.  -Obtain CXR and echo for  evaluation to make sure pt is at optimized  -He is anxious to proceed with CABG ASAP  -Last dose of Brilinta was am of 1/18/2023  -Dr Douglas to further discuss with pt and potential timing of surgery      Case and plan of care discussed with MD.    DVT Prophylaxis:  Anticoagulants   Medication Route Frequency    enoxaparin injection 40 mg Subcutaneous Q24H       Signed:  Debbi Valdez PA-C  Cardiovascular Thoracic Surgery  1/19/2023  10:50 AM

## 2023-01-19 NOTE — PROGRESS NOTES
[facility]  Department of Cardiology  Progress Note    PATIENT NAME: Peyman Gr  MRN: [id number]  TODAY'S DATE: 01/18/2023  ADMIT DATE: 1/1/2023    SUBJECTIVE     PRINCIPLE PROBLEM: STEMI involving right coronary artery    INTERVAL HISTORY:    1/18/2023  No new complaints.  Making some progress with physical therapy.  Some shortness of breath on exertion.  Films reviewed with Dr. Elizondo.      Review of patient's allergies indicates:  No Known Allergies    REVIEW OF SYSTEMS  CARDIOVASCULAR: No recent chest pain, palpitations, arm, neck, or jaw pain  RESPIRATORY: No recent fever, cough chills, SOB or congestion  : No blood in the urine  GI: No Nausea, vomiting, constipation, diarrhea, blood, or reflux.  MUSCULOSKELETAL: No myalgias  NEURO: No lightheadedness or dizziness  EYES: No Double vision, blurry, vision or headache     OBJECTIVE     VITAL SIGNS (Most Recent)  Temp: 98.4 °F (36.9 °C) (01/17/23 2000)  Pulse: 89 (01/18/23 1411)  Resp: 20 (01/18/23 1411)  BP: (!) 133/59 (01/18/23 1411)  SpO2: 96 % (01/18/23 1411)    VENTILATION STATUS  Resp: 20 (01/18/23 1411)  SpO2: 96 % (01/18/23 1411)       I & O (Last 24H):  Intake/Output Summary (Last 24 hours) at 1/18/2023 1838  Last data filed at 1/18/2023 1412  Gross per 24 hour   Intake 360 ml   Output 926 ml   Net -566 ml       WEIGHTS  Wt Readings from Last 3 Encounters:   01/17/23 0400 105.2 kg (231 lb 14.8 oz)   01/15/23 0400 100 kg (220 lb 7.4 oz)   01/14/23 0400 97.8 kg (215 lb 9.8 oz)   01/07/23 0400 115.4 kg (254 lb 6.6 oz)   01/06/23 0400 117.6 kg (259 lb 4.2 oz)   01/05/23 0500 115.3 kg (254 lb 3.1 oz)   01/03/23 0615 105.4 kg (232 lb 5.8 oz)   01/02/23 0400 102.5 kg (225 lb 15.5 oz)   01/02/23 0053 102.5 kg (225 lb 15.5 oz)   01/01/23 0800 99.8 kg (220 lb 0.3 oz)   01/01/23 0341 99.8 kg (220 lb)   01/01/23 1706 99.8 kg (220 lb)   09/09/22 1447 99.8 kg (220 lb)       PHYSICAL EXAM  CONSTITUTIONAL: Well built, well nourished in no apparent  distress  NECK: no carotid bruit, no JVD  LUNGS: CTA  CHEST WALL: no tenderness  HEART: regular rate and rhythm, S1, S2 normal, no murmur, click, rub or gallop   ABDOMEN: soft, non-tender; bowel sounds normal; no masses,  no organomegaly  EXTREMITIES: Extremities normal, no edema  NEURO: AAO X 3    SCHEDULED MEDS:   amLODIPine  10 mg Oral Daily    aspirin  81 mg Oral Daily    atorvastatin  20 mg Oral QHS    carvediloL  25 mg Oral BID WM    chlorhexidine  15 mL Mouth/Throat BID    docusate sodium  100 mg Oral BID    enoxparin  40 mg Subcutaneous Q24H    EScitalopram oxalate  10 mg Oral Daily    famotidine  20 mg Per OG tube BID    furosemide  20 mg Oral Daily    LIDOcaine  1 patch Transdermal Q24H    losartan  100 mg Oral Daily    losartan  50 mg Oral Daily with dinner    nicotine  1 patch Transdermal Daily    polyethylene glycol  17 g Oral BID    QUEtiapine  25 mg Oral Daily with dinner    traZODone  50 mg Oral QHS       CONTINUOUS INFUSIONS:    PRN MEDS:acetaminophen, atropine, calcium gluconate IVPB, calcium gluconate IVPB, calcium gluconate IVPB, dextrose 10%, dextrose 10%, EPINEPHrine, glucagon (human recombinant), glucose, glucose, hydrALAZINE, hydrALAZINE, lactulose, levalbuterol, magnesium oxide, magnesium oxide, magnesium sulfate IVPB, magnesium sulfate IVPB, melatonin, naloxone, nitroglycerin, ondansetron, oxyCODONE-acetaminophen, potassium bicarbonate, potassium bicarbonate, potassium bicarbonate, potassium chloride in water **AND** potassium chloride in water **AND** potassium chloride in water, potassium, sodium phosphates, potassium, sodium phosphates, potassium, sodium phosphates, senna-docusate 8.6-50 mg, simethicone, sodium chloride 0.9%, sodium phosphate IVPB, sodium phosphate IVPB, sodium phosphate IVPB    LABS AND DIAGNOSTICS     CBC LAST 3 DAYS  Recent Labs   Lab 01/16/23  0508 01/17/23  0522 01/18/23  0417   WBC 7.11 7.13 6.50   RBC 3.35* 3.23* 3.36*   HGB 10.5* 10.1* 10.4*   HCT 31.1* 30.3*  31.4*   MCV 93 94 94   MCH 31.3* 31.3* 31.0   MCHC 33.8 33.3 33.1   RDW 13.8 13.4 13.5    365 367   MPV 9.6 9.4 9.3   GRAN 62.5  4.4 59.8  4.3 57.4  3.7   LYMPH 18.0  1.3 21.2  1.5 25.4  1.7   MONO 16.2*  1.2* 16.1*  1.2* 14.0  0.9   BASO 0.06 0.05 0.06   NRBC 0 0 0       COAGULATION LAST 3 DAYS  No results for input(s): LABPT, INR, APTT in the last 168 hours.    CHEMISTRY LAST 3 DAYS  Recent Labs   Lab 01/16/23  0508 01/17/23  0522 01/18/23  0417   * 134* 134*   K 3.7 3.5 3.6    102 101   CO2 25 23 23   ANIONGAP 8 9 10   BUN 23 24* 26*   CREATININE 1.1 1.0 1.1   GLU 95 98 94   CALCIUM 8.3* 8.5* 8.5*   MG 2.3 2.0 2.1   ALBUMIN 3.0* 3.0* 3.1*   PROT 6.7 7.1 6.6   ALKPHOS 101 102 110   ALT 37 35 32   AST 33 29 28   BILITOT 0.9 0.5 0.7       CARDIAC PROFILE LAST 3 DAYS  No results for input(s): BNP, CPK, CPKMB, LDH, TROPONINI in the last 168 hours.    ENDOCRINE LAST 3 DAYS  Recent Labs   Lab 01/13/23  0514   PROCAL 0.20       LAST ARTERIAL BLOOD GAS  ABG  No results for input(s): PH, PO2, PCO2, HCO3, BE in the last 168 hours.    LAST 7 DAYS MICROBIOLOGY   Microbiology Results (last 7 days)       ** No results found for the last 168 hours. **            MOST RECENT IMAGING  X-Ray Chest 1 View  AP Portable chest: 1/15/2023 6:56 PM CST    History: 67 years  old Male with SOB.    Comparison: Chest radiograph performed 1/9/2023.    Findings: The cardiomediastinal silhouette is enlarged.    No pneumothorax is seen.    No acute airspace opacities are seen.    No discrete pleural effusion is apparent.    Impression: No acute airspace opacities are seen.    Electronically signed by:  Maggi Bowden DO  1/15/2023 6:56 PM CST Workstation: [id number]      [facility]  Results for orders placed during the hospital encounter of 01/01/23    Echo Saline Bubble? No    Interpretation Summary  · The estimated ejection fraction is 55%.  · The left ventricle is normal in size with concentric hypertrophy.  · Mild to  moderate tricuspid regurgitation.  · Moderate right ventricular enlargement with moderately reduced right ventricular systolic function.  · Grade I left ventricular diastolic dysfunction.  · There is mild aortic valve stenosis.  · Aortic valve area is 1.68 cm2; peak velocity is 1.36 m/s; mean gradient is 4 mmHg.  · There is abnormal septal wall motion consistent with right ventricular pacemaker.  · There are segmental left ventricular wall motion abnormalities.      CURRENT/PREVIOUS VISIT EKG  Results for orders placed or performed during the hospital encounter of 01/01/23   EKG 12-lead    Collection Time: 01/14/23  5:26 PM    Narrative    Test Reason : R07.9,    Vent. Rate : 076 BPM     Atrial Rate : 076 BPM     P-R Int : 128 ms          QRS Dur : 082 ms      QT Int : 378 ms       P-R-T Axes : 065 -07 104 degrees     QTc Int : 425 ms    Normal sinus rhythm  LVH with repolarization abnormality  Inferior infarct (cited on or before 01-JAN-2023)  Abnormal ECG  When compared with ECG of 12-JAN-2023 15:01,  Premature supraventricular complexes are no longer Present  Questionable change in initial forces of Inferior leads  Confirmed by Claude ZAFAR, Aurelio CAMPO ([id number]) on 1/16/2023 8:57:26 PM    Referred By: IRENE MOSER           Confirmed By:Aurelio Arce MD       ASSESSMENT/PLAN:     Active Hospital Problems    Diagnosis    *STEMI involving right coronary artery    Pneumonia of right lung due to methicillin susceptible Staphylococcus aureus (MSSA)    Shock liver    Cardiogenic shock    Cardiac arrest    Acute respiratory failure with hypoxia and hypercarbia    Leukocytosis    Anemia    History of tobacco abuse    Primary hypertension    PVD (peripheral vascular disease) with claudication    PAD (peripheral artery disease)       ASSESSMENT & PLAN:   Severe left main coronary stenosis with 90% lesion in small mid marginal  ST elevation MI with stent in ostial right  status post STEMI with cardiac  arrest      RECOMMENDATIONS:  Discussed with Dr. Elizondo the patient and his wife..  Is a significant severe eccentric calcified ostial left main stenosis.  Cardiovascular consult Dr. Avila.          Dougie Saleh MD  [facility]  Department of Cardiology  Date of Service: 01/18/2023  6:38 PM

## 2023-01-19 NOTE — CARE UPDATE
01/19/23 0816   Patient Assessment/Suction   Level of Consciousness (AVPU) alert   All Lung Fields Breath Sounds clear;diminished   PRE-TX-O2   Device (Oxygen Therapy) room air   Pulse Oximetry Type Continuous   $ Pulse Oximetry - Multiple Charge Pulse Oximetry - Multiple   Resp 16   Aerosol Therapy   $ Aerosol Therapy Charges PRN treatment not required   Education   $ Education Bronchodilator;15 min   Respiratory Evaluation   $ Care Plan Tech Time 15 min

## 2023-01-19 NOTE — PT/OT/SLP PROGRESS
Occupational Therapy   Treatment    Name: Peyman Gr  MRN: [id number]  Admitting Diagnosis:  STEMI involving right coronary artery  18 Days Post-Op    Recommendations:     Discharge Recommendations: rehabilitation facility  Discharge Equipment Recommendations:   (TBD)  Barriers to discharge:       Assessment:     Peyman Gr is a 67 y.o. male with a medical diagnosis of STEMI involving right coronary artery.  Performance deficits affecting function are weakness, impaired endurance, impaired self care skills, impaired functional mobility, gait instability, impaired balance, decreased lower extremity function, decreased coordination, impaired cardiopulmonary response to activity.     Rehab Prognosis:  Good; patient would benefit from acute skilled OT services to address these deficits and reach maximum level of function.       Plan:     Patient to be seen 6 x/week to address the above listed problems via self-care/home management, therapeutic activities, therapeutic exercises  Plan of Care Expires: 02/10/23  Plan of Care Reviewed with: patient    Subjective     Pain/Comfort:  Pain Rating 1: 0/10  Pain Rating Post-Intervention 1: 0/10    Objective:     Communicated with: nurse prior to session.  Patient found HOB elevated with blood pressure cuff, peripheral IV, pulse ox (continuous), telemetry upon OT entry to room.    General Precautions: Standard, fall    Orthopedic Precautions:N/A  Braces: N/A  Respiratory Status: Room air     Occupational Performance:     Bed Mobility:    Patient completed Scooting/Bridging with stand by assistance  Patient completed Supine to Sit with stand by assistance  Patient completed Sit to Supine with stand by assistance     Functional Mobility/Transfers:  Patient completed Sit <> Stand Transfer with contact guard assistance  with  rolling walker   Patient completed Bed <> Chair Transfer using Stand Pivot technique with contact guard assistance with rolling walker  Functional Mobility:  Patient ambulated from bed>sink>chair>bed requiring CGA and RW. Patient's SpO2 decreased from 96 to 93 with activity, then increased back to 96 after ~2 min.    Activities of Daily Living:  Grooming: stand by assistance with performing oral and facial hygiene while standing at sink. Patient required a sitting rest break after ~4 min of standing at sink.      [facility] 6 Click ADL:      Treatment & Education:  OT ed patient on safety with walker use for functional mobility with cues for hand placement & sequencing.   OT reviewed energy conservation techniques when performing grooming tasks at sink.    Patient left HOB elevated with all lines intact, call button in reach, and bed alarm on    GOALS:   Multidisciplinary Problems       Occupational Therapy Goals          Problem: Occupational Therapy    Goal Priority Disciplines Outcome Interventions   Occupational Therapy Goal     OT, PT/OT Ongoing, Progressing    Description: Goals to be met by: 2/10/2023    Patient will increase functional independence with ADLs by performing:    UE Dressing with Stand-by Assistance.  LE Dressing with Stand-by Assistance.  Grooming while seated with Stand-by Assistance.  Toileting from bedside commode with Minimal Assistance for hygiene and clothing management.   Sitting at edge of bed x10 minutes with Supervision.  Supine to sit with Stand-by Assistance.  Toilet transfer to bedside commode with Minimal Assistance.  Upper extremity exercise program x10 reps per handout, with assistance as needed.                         Time Tracking:     OT Date of Treatment: 01/19/23  OT Start Time: 1019  OT Stop Time: 1039  OT Total Time (min): 20 min    Billable Minutes:Self Care/Home Management 20    OT/ALDO: OT          1/19/2023

## 2023-01-19 NOTE — PT/OT/SLP PROGRESS
"Physical Therapy Treatment    Patient Name:  Peyman Gr   MRN:  [id number]    Recommendations:     Discharge Recommendations: rehabilitation facility  Discharge Equipment Recommendations: walker, rolling, wheelchair, bath bench  Barriers to discharge: None    Assessment:     Peyman Gr is a 67 y.o. male admitted with a medical diagnosis of STEMI involving right coronary artery.  He presents with the following impairments/functional limitations: weakness, impaired endurance, impaired self care skills, impaired functional mobility, gait instability, impaired balance, decreased upper extremity function, decreased lower extremity function, pain, impaired cardiopulmonary response to activity.    Pt lying supin in bed with HOB slightly elevated upon PT arrival to room, states he has just worked with OT so is tired but willing to do whatever he can for PT as well.  He is A & Ox4 and agreeable to activity.  Pt required CGA for gait training with seated rest break due to SaO2 dropping with minimal standing activity or gait.  Recovers quickly and denies chest pain or other symptoms.    Rehab Prognosis: Fair; patient would benefit from acute skilled PT services to address these deficits and reach maximum level of function.    Recent Surgery: Procedure(s) (LRB):  Left heart cath (Left)  Insertion, Pacemaker, Temporary Transvenous  ANGIOGRAM, CORONARY ARTERY (N/A)  Percutaneous coronary intervention (N/A)  IVUS, Coronary 18 Days Post-Op    Plan:     During this hospitalization, patient to be seen 6 x/week to address the identified rehab impairments via gait training, therapeutic activities, therapeutic exercises and progress toward the following goals:    Plan of Care Expires:  01/18/23    Subjective     Chief Complaint: "I need to get this blockage repaired (surgically) then I'll be ok, I like to be on the go"  Patient/Family Comments/goals: get better  Pain/Comfort:  Pain Rating 1: 7/10  Location 1: back  Pain Addressed " 1: Pre-medicate for activity, Reposition, Distraction  Pain Rating Post-Intervention 1: 7/10      Objective:     Communicated with RNMarlee prior to session.  Patient found HOB elevated with blood pressure cuff, pulse ox (continuous), peripheral IV, telemetry, oxygen upon PT entry to room.     General Precautions: Standard, fall  Orthopedic Precautions: N/A  Braces: N/A  Respiratory Status: Room air     Functional Mobility:  Bed Mobility:     Supine to Sit: contact guard assistance  Sit to Supine: contact guard assistance  Transfers:     Sit to Stand:  minimum assistance and moderate assistance with rolling walker  Gait: 2x15' w/RW and CGA for safety, SaO2 decreases to mid 80s% but quickly recovers to low to mid 90s within 30 seconds of seated rest break. Pt is non-symptomatic, denies chest pain.      AM-PAC 6 CLICK MOBILITY  Turning over in bed (including adjusting bedclothes, sheets and blankets)?: 3  Sitting down on and standing up from a chair with arms (e.g., wheelchair, bedside commode, etc.): 3  Moving from lying on back to sitting on the side of the bed?: 3  Moving to and from a bed to a chair (including a wheelchair)?: 3  Need to walk in hospital room?: 3  Climbing 3-5 steps with a railing?: 3  Basic Mobility Total Score: 18       Treatment & Education:  Pt was educated on the following: call light use, importance of OOB activity and functional mobility to negate the negative effects of prolonged bed rest during this hospitalization, safe transfers/ambulation and discharge planning recommendations/options.     Patient left up in chair with all lines intact, call button in reach, chair alarm on, and RN notified..    GOALS:   Multidisciplinary Problems       Physical Therapy Goals          Problem: Physical Therapy    Goal Priority Disciplines Outcome Goal Variances Interventions   Physical Therapy Goal     PT, PT/OT Ongoing, Progressing     Description: Goals to be met by: 2/9/2023     Patient will  increase functional independence with mobility by performin. Supine to sit with MInimal Assistance  2. Sit to stand transfer with Moderate Assistance  3. Bed to chair with Mod A  w/ol AD  3. Gait  x 10  feet with Minimal Assistance using Rolling Walker.                          Time Tracking:     PT Received On: 23  PT Start Time: 1141     PT Stop Time: 1154  PT Total Time (min): 13 min     Billable Minutes: Gait Training 13    Treatment Type: Treatment  PT/PTA: PT     PTA Visit Number: 1     2023

## 2023-01-19 NOTE — CARE UPDATE
New orders for cardiothoracic surgeon consult. CM following for medical clearance to SNF facility

## 2023-01-19 NOTE — PROGRESS NOTES
[facility] Medicine  Progress Note    Patient name: Peyman Gr  MRN: [id number]  Admit Date: 1/1/2023   LOS: 18 days   DOS: 01/19/2023    ASSESSMENT/PLAN:     Active Hospital Problems    Diagnosis  POA    *STEMI involving right coronary artery [I21.11]  Yes    Pneumonia of right lung due to methicillin susceptible Staphylococcus aureus (MSSA) [J15.211]  No    Shock liver [K72.00]  Yes    Cardiogenic shock [R57.0]  Yes    Cardiac arrest [I46.9]  Yes    Acute respiratory failure with hypoxia and hypercarbia [J96.01, J96.02]  Yes    Leukocytosis [D72.829]  Yes    Anemia [D64.9]  Yes    History of tobacco abuse [Z87.891]  Not Applicable    Primary hypertension [I10]  Yes    PVD (peripheral vascular disease) with claudication [I73.9]  Yes    PAD (peripheral artery disease) [I73.9]  Yes      Resolved Hospital Problems    Diagnosis Date Resolved POA    Hyponatremia [E87.1] 01/02/2023 Yes     Cardiac arrest  Due to STEMI involving RCA   Multivessel CAD with left main disease  Complicated by complete heart block  MSSA pneumonia - s/p ceftriaxone  - s/p emergent stenting 1/1/23  - successfully extubated on 01/08  - cardiology awaiting pt's improved physical conditioning to evaluate for anginal symptoms, to decide on urgency of revascularization status.  Pt is currently participating with PT/OT and is a maximum assist for transferring.  He has intermittent L chest pain during transferring, I evaluated this personally with a STAT EKG and discussed it with Dr Talley.  EKG not concerning at that time.  CP resolved with rest, without other intervention.  - continue brillinta, asa, statin  - adjusting HTN medication as needed  - PT/OT/SLP   - po lasix; trending BMP    Chronic conditions as noted above/below; home medications reviewed personally by me and restarted as appropriate  Electrolyte derangement:  Trending BMP; Mg; replacement prn  DVT ppx: lovenox  FULL CODE    Plan update today:   Continue care,  appreciate consultants   Awaiting CT surgery evaluation  Blood pressure better with increased dose of Norvasc, monitor and titrate as needed  Continue medical management including dual antiplatelets and statin, blood pressure control   Brilinta stopped, Lovenox initiated pending surgical plans  Carotid ultrasound with mild left-sided stenoses  Continue PT/OT/SLP, advance diet as tolerated  Will need skilled nursing facility placement  Possible discharge next 24-48 hours - pending CT surgery plans  High risk secondary to severe exacerbation of chronic illness requiring surgical management; high risk of life-threatening deterioration    SUBJECTIVE:     Principal problem: STEMI involving right coronary artery    67-year-old male with peripheral vascular disease, essential hypertension, hyperlipidemia and ongoing tobacco use presented to outside facility with 2 hour onset of chest pain and shortness of breath.  EKG revealed inferior and anterolateral STEMI.  Quickly degenerated into cardiac arrest with runs of VT/VF and asystole requiring resuscitation for about 40 minutes.  He was subsequently intubated and transferred to our ED. En route he developed cardiac arrest again (PEA) which was continued into our ED and was resuscitated for another 30 minutes.  He required transcutaneous pacing.  He was taken emergently to the catheterization lab and underwent PCI with stenting of RCA which was felt to be the culprit lesion.  He was also found to have left main stenosis of about 70%.  A transvenous pacemaker was also placed.  Currently admitted to the ICU for post cardiac arrest care.  He remains intubated and critically ill. Hospital stay complicated by fever; initiated on broad spectrum antibiotics.  Respiratory culture growing MSSA - de-escalated to ceftriaxone.  Extubated successfully on 1/8.    Interval History:      1/11:  no SOB on HFNC.  Pt conversing with friends/family at bedside without SOB.  No current CP.  No  "pain throughout.  Pt is alert and oriented x4, however he is extremely tangential and not at mental status baseline per his family.  BELINDA.  Follows commands.    1/12:  out of ICU.  Pt without CP.  No SOB.  Family at bedside, he is responding more appropriately today.    1/13:  pt hallucinating this evening.  It occurs intermittently, usually after his family leaves for the evening.  He sees people staring at him through the 2nd floor window.  He is being moved closer to nursing station.  Starting Seroquel pm.  Previously he had trouble sleeping; I suspect anxiety / other sequela after cardiac arrest.  Start AM lexapro.  Delirium precautions.  No fever, no leukocytosis, procal negative.   No CP.  No SOB.    1/14:  pt's BP medication held due to hypotension this AM.  It appears he was given IV hydralazine at 0339 today.  BP appears labile throughout day.  Adjusting medication doses now.  Prioritizing coreg, losartan.  Continuing IV hydralazine prn.  He has sat on edge of bed with all meals, reports fatigue as a result.  Cardiology is trying to evaluate for anginal symptoms: Pt currently transferring during my exam.  He reports L-chest pain when transferring (3 person assist), described as "picking" with no radiation, +SOB.  Obtaining EKG, obtaining troponin.  Discussed personally with cardiology.  Pt's CP and SOB already alleviating while sitting at rest.    1/15:  now pt is hypertensive again.  Continue coreg, losartan.  Adding back norvasc.  Continue IV hydralazine prn.   Pt's percocet is being spaced out to assist with decreasing confusion/delirium.  Pt was angry about this and reported msk pain; IV tylenol has been started and now he is pain-free and much happier.  He is participating with PT/OT.  He reports b/l feet swelling.  No CP.  No SOB.      1/16:  adjusting HTN medication again.  No CP, no SOB, no n/v, no diaphoresis, no dizziness.  Nor have the symptoms occured during exertion since last night--(pt had " SOB last night, now s/p lasix.).    1/17: Patient seen and examined.  Patient reports feeling well today.  Denies any chest pain, shortness a breath, syncope, or headache.  Eating okay without nausea or vomiting.  Improving mobilization with therapy.  Tolerating dysphagia diet.  Agreeable to skilled nursing facility placement for ongoing therapy.    1/18:  Patient seen and examined.  Patient feeling well today without chest pain, shortness breath, syncope, or headache.  Tolerating diet without nausea or vomiting.  Weakness improving with therapy.    1/19:  Patient seen and examined.  Patient reports persistent intermittent chest pain, with associated shortness of breath.  He feels strongly this is cardiac related.  Waiting to discuss with CT surgery.  No nausea or vomiting.  No fever or chills.    Review of Systems:  3 point review of systems reviewed and negative except as per interval history above      OBJECTIVE:     Vitals:    01/19/23 1200 01/19/23 1300 01/19/23 1400 01/19/23 1505   BP: 138/70 139/68 115/63    BP Location:       Patient Position:       Pulse: 65 68 62    Resp: (!) 24 (!) 23 19    Temp:  98.3 °F (36.8 °C)  98.5 °F (36.9 °C)   TempSrc:  Oral  Oral   SpO2: 97% 96% 97%    Weight:       Height:           Gen: alert, responsive, on RA  Eyes - no pallor  ENT:  Moist mucous membranes  CV: RRR  Lungs: CTA B/L  Abd: +BS, soft, NT, ND  Neuro: alert, responsive  Psych: pleasant, good insight  Skin: Dry and warm, no jaundice    Laboratory:  I have reviewed all pertinent lab results within the past 24 hours.  CBC:   Recent Labs   Lab 01/19/23  0443   WBC 5.54   RBC 3.23*   HGB 10.0*   HCT 30.1*      MCV 93   MCH 31.0   MCHC 33.2       CMP:   Recent Labs   Lab 01/19/23  0443   GLU 98   CALCIUM 8.2*   ALBUMIN 3.1*   PROT 6.8   *   K 3.7   CO2 23      BUN 24*   CREATININE 1.1   ALKPHOS 114   ALT 31   AST 28   BILITOT 0.7       No results for input(s): CKMB, CPKMB, TROPONINT, TROPONINI,  MYOGLOBIN in the last 168 hours.    Imaging Results              X-Ray Chest AP Portable (Final result)  Result time 01/01/23 07:01:46      Final result by Elizabeth Sharp IV, MD (01/01/23 07:01:46)                   Narrative:    Chest, single view    HISTORY: Nasogastric tube placement.    In the interval, there has been introduction of a nasogastric tube which extends into the left upper quadrant of the abdomen just beyond the GE junction. An endotracheal tube remains in place with its tip positioned well above the level of the saurav.    Bilateral interstitial and mild groundglass opacities, greater on the right are again observed. There are no new confluent infiltrates, volume loss or effusions.    The cardiomediastinal silhouette is stable.    IMPRESSION:    Interval introduction of nasogastric tube extending into the left upper abdomen with its tip just beyond the GE junction.    Otherwise stable cardiopulmonary status.    Electronically signed by:  Elizabeth Sharp MD  1/1/2023 7:01 AM CST Workstation: [id number]                                     X-Ray Chest AP Portable (Final result)  Result time 01/01/23 06:59:55      Final result by Elizabeth Sharp IV, MD (01/01/23 06:59:55)                   Narrative:    Chest, single view    HISTORY: Cardiac arrest.    Comparison 9/9/2022.    Endotracheal tube is in place with its tip positioned well above the level of the saurav.    The heart size is within normal limits. The central pulmonary vasculature is not acutely engorged.  There is arteriosclerotic calcification within the aortic arch.    There is scattered bilateral interstitial and groundglass pulmonary opacities with upper lung zone predominance, greater on the right. No effusion or extrapulmonary air identified.    Surgical changes are noted within the cervical spine and within the soft tissues of the right side of the neck.    IMPRESSION:    Positioning of endotracheal tube as above.    Interval development  of interstitial and groundglass pulmonary opacities with upper lung zone predominance.    Electronically signed by:  Elizabeth Sharp MD  1/1/2023 6:59 AM CST Workstation: [id number]                                    Department Hospital Medicine  [facility]  Julio C Sibley MD  Date of service: 01/19/2023

## 2023-01-20 LAB
ALBUMIN SERPL BCP-MCNC: 2.9 G/DL (ref 3.5–5.2)
ALP SERPL-CCNC: 105 U/L (ref 55–135)
ALT SERPL W/O P-5'-P-CCNC: 28 U/L (ref 10–44)
ANION GAP SERPL CALC-SCNC: 6 MMOL/L (ref 8–16)
AST SERPL-CCNC: 23 U/L (ref 10–40)
BASOPHILS # BLD AUTO: 0.06 K/UL (ref 0–0.2)
BASOPHILS NFR BLD: 1.2 % (ref 0–1.9)
BILIRUB SERPL-MCNC: 0.5 MG/DL (ref 0.1–1)
BUN SERPL-MCNC: 22 MG/DL (ref 8–23)
CALCIUM SERPL-MCNC: 8.3 MG/DL (ref 8.7–10.5)
CHLORIDE SERPL-SCNC: 101 MMOL/L (ref 95–110)
CO2 SERPL-SCNC: 24 MMOL/L (ref 23–29)
CREAT SERPL-MCNC: 1 MG/DL (ref 0.5–1.4)
DIFFERENTIAL METHOD: ABNORMAL
EOSINOPHIL # BLD AUTO: 0.1 K/UL (ref 0–0.5)
EOSINOPHIL NFR BLD: 1 % (ref 0–8)
ERYTHROCYTE [DISTWIDTH] IN BLOOD BY AUTOMATED COUNT: 13.2 % (ref 11.5–14.5)
EST. GFR  (NO RACE VARIABLE): >60 ML/MIN/1.73 M^2
GLUCOSE SERPL-MCNC: 100 MG/DL (ref 70–110)
HCT VFR BLD AUTO: 30 % (ref 40–54)
HGB BLD-MCNC: 10.2 G/DL (ref 14–18)
IMM GRANULOCYTES # BLD AUTO: 0.06 K/UL (ref 0–0.04)
IMM GRANULOCYTES NFR BLD AUTO: 1.2 % (ref 0–0.5)
LYMPHOCYTES # BLD AUTO: 1.6 K/UL (ref 1–4.8)
LYMPHOCYTES NFR BLD: 32.7 % (ref 18–48)
MAGNESIUM SERPL-MCNC: 1.9 MG/DL (ref 1.6–2.6)
MCH RBC QN AUTO: 31.5 PG (ref 27–31)
MCHC RBC AUTO-ENTMCNC: 34 G/DL (ref 32–36)
MCV RBC AUTO: 93 FL (ref 82–98)
MONOCYTES # BLD AUTO: 0.7 K/UL (ref 0.3–1)
MONOCYTES NFR BLD: 13.2 % (ref 4–15)
NEUTROPHILS # BLD AUTO: 2.5 K/UL (ref 1.8–7.7)
NEUTROPHILS NFR BLD: 50.7 % (ref 38–73)
NRBC BLD-RTO: 0 /100 WBC
PLATELET # BLD AUTO: 350 K/UL (ref 150–450)
PMV BLD AUTO: 9.4 FL (ref 9.2–12.9)
POTASSIUM SERPL-SCNC: 3.8 MMOL/L (ref 3.5–5.1)
PROT SERPL-MCNC: 6.4 G/DL (ref 6–8.4)
RBC # BLD AUTO: 3.24 M/UL (ref 4.6–6.2)
SODIUM SERPL-SCNC: 131 MMOL/L (ref 136–145)
WBC # BLD AUTO: 4.93 K/UL (ref 3.9–12.7)

## 2023-01-20 PROCEDURE — 25000003 PHARM REV CODE 250

## 2023-01-20 PROCEDURE — 63600175 PHARM REV CODE 636 W HCPCS: Performed by: NURSE PRACTITIONER

## 2023-01-20 PROCEDURE — 80053 COMPREHEN METABOLIC PANEL: CPT | Performed by: FAMILY MEDICINE

## 2023-01-20 PROCEDURE — 97535 SELF CARE MNGMENT TRAINING: CPT

## 2023-01-20 PROCEDURE — 97116 GAIT TRAINING THERAPY: CPT

## 2023-01-20 PROCEDURE — 25000003 PHARM REV CODE 250: Performed by: INTERNAL MEDICINE

## 2023-01-20 PROCEDURE — 36415 COLL VENOUS BLD VENIPUNCTURE: CPT | Performed by: FAMILY MEDICINE

## 2023-01-20 PROCEDURE — S4991 NICOTINE PATCH NONLEGEND: HCPCS | Performed by: INTERNAL MEDICINE

## 2023-01-20 PROCEDURE — 83735 ASSAY OF MAGNESIUM: CPT | Performed by: FAMILY MEDICINE

## 2023-01-20 PROCEDURE — 21000000 HC CCU ICU ROOM CHARGE

## 2023-01-20 PROCEDURE — 25000003 PHARM REV CODE 250: Performed by: FAMILY MEDICINE

## 2023-01-20 PROCEDURE — 92526 ORAL FUNCTION THERAPY: CPT

## 2023-01-20 PROCEDURE — 85025 COMPLETE CBC W/AUTO DIFF WBC: CPT | Performed by: FAMILY MEDICINE

## 2023-01-20 PROCEDURE — 94761 N-INVAS EAR/PLS OXIMETRY MLT: CPT

## 2023-01-20 PROCEDURE — 99900031 HC PATIENT EDUCATION (STAT)

## 2023-01-20 PROCEDURE — 99900035 HC TECH TIME PER 15 MIN (STAT)

## 2023-01-20 RX ADMIN — LOSARTAN POTASSIUM 50 MG: 50 TABLET, FILM COATED ORAL at 04:01

## 2023-01-20 RX ADMIN — POLYETHYLENE GLYCOL 3350 17 G: 17 POWDER, FOR SOLUTION ORAL at 08:01

## 2023-01-20 RX ADMIN — LOSARTAN POTASSIUM 100 MG: 50 TABLET, FILM COATED ORAL at 09:01

## 2023-01-20 RX ADMIN — ESCITALOPRAM OXALATE 10 MG: 10 TABLET ORAL at 09:01

## 2023-01-20 RX ADMIN — QUETIAPINE 25 MG: 25 TABLET ORAL at 08:01

## 2023-01-20 RX ADMIN — ENOXAPARIN SODIUM 105 MG: 150 INJECTION SUBCUTANEOUS at 09:01

## 2023-01-20 RX ADMIN — DOCUSATE SODIUM 100 MG: 100 CAPSULE, LIQUID FILLED ORAL at 09:01

## 2023-01-20 RX ADMIN — FUROSEMIDE 20 MG: 20 TABLET ORAL at 09:01

## 2023-01-20 RX ADMIN — CARVEDILOL 25 MG: 25 TABLET, FILM COATED ORAL at 09:01

## 2023-01-20 RX ADMIN — OXYCODONE HYDROCHLORIDE AND ACETAMINOPHEN 1 TABLET: 7.5; 325 TABLET ORAL at 04:01

## 2023-01-20 RX ADMIN — FAMOTIDINE 20 MG: 20 TABLET ORAL at 09:01

## 2023-01-20 RX ADMIN — ENOXAPARIN SODIUM 105 MG: 150 INJECTION SUBCUTANEOUS at 08:01

## 2023-01-20 RX ADMIN — CHLORHEXIDINE GLUCONATE 15 ML: 1.2 RINSE ORAL at 08:01

## 2023-01-20 RX ADMIN — TRAZODONE HYDROCHLORIDE 50 MG: 50 TABLET ORAL at 08:01

## 2023-01-20 RX ADMIN — OXYCODONE HYDROCHLORIDE AND ACETAMINOPHEN 1 TABLET: 7.5; 325 TABLET ORAL at 09:01

## 2023-01-20 RX ADMIN — LIDOCAINE 5% 1 PATCH: 700 PATCH TOPICAL at 05:01

## 2023-01-20 RX ADMIN — AMLODIPINE BESYLATE 10 MG: 5 TABLET ORAL at 09:01

## 2023-01-20 RX ADMIN — ATORVASTATIN CALCIUM 20 MG: 20 TABLET, FILM COATED ORAL at 08:01

## 2023-01-20 RX ADMIN — CARVEDILOL 25 MG: 25 TABLET, FILM COATED ORAL at 04:01

## 2023-01-20 RX ADMIN — OXYCODONE HYDROCHLORIDE AND ACETAMINOPHEN 1 TABLET: 7.5; 325 TABLET ORAL at 02:01

## 2023-01-20 RX ADMIN — CHLORHEXIDINE GLUCONATE 15 ML: 1.2 RINSE ORAL at 09:01

## 2023-01-20 RX ADMIN — ASPIRIN 81 MG CHEWABLE TABLET 81 MG: 81 TABLET CHEWABLE at 09:01

## 2023-01-20 RX ADMIN — NICOTINE 1 PATCH: 14 PATCH, EXTENDED RELEASE TRANSDERMAL at 09:01

## 2023-01-20 RX ADMIN — DOCUSATE SODIUM 100 MG: 100 CAPSULE, LIQUID FILLED ORAL at 08:01

## 2023-01-20 RX ADMIN — FAMOTIDINE 20 MG: 20 TABLET ORAL at 08:01

## 2023-01-20 NOTE — PT/OT/SLP PROGRESS
Occupational Therapy   Treatment    Name: Peyman Gr  MRN: [id number]  Admitting Diagnosis:  STEMI involving right coronary artery  19 Days Post-Op    Recommendations:     Discharge Recommendations: rehabilitation facility  Discharge Equipment Recommendations:   (TBD)  Barriers to discharge:       Assessment:     Peyman Gr is a 67 y.o. male with a medical diagnosis of STEMI involving right coronary artery.  Pt agreeable to OT therapy session this AM. Performance deficits affecting function are weakness, impaired endurance, impaired self care skills, impaired functional mobility, gait instability, impaired balance, impaired cardiopulmonary response to activity. Pt reports feeling slightly more SOB today than yesterday.    Rehab Prognosis:  Good; patient would benefit from acute skilled OT services to address these deficits and reach maximum level of function.       Plan:     Patient to be seen 6 x/week to address the above listed problems via self-care/home management, therapeutic exercises, therapeutic activities  Plan of Care Expires: 02/10/23  Plan of Care Reviewed with: patient    Subjective     Pain/Comfort:  Pain Rating 1: 0/10    Objective:     Communicated with: nursing prior to session.  Patient found up in chair with blood pressure cuff, peripheral IV, pulse ox (continuous), telemetry upon OT entry to room.    General Precautions: Standard, fall    Orthopedic Precautions:N/A  Braces: N/A  Respiratory Status: Room air     Occupational Performance:     Activities of Daily Living:  Grooming: setup assistance seated in chair to brush teeth and wash face      Therapeutic Exercise:  UE exercises with yellow tband x 10 reps in all major planes seated in chair with SBA and with multiple rest breaks as needed; pt tolerated well    Treatment & Education:  Pt educated on role of OT/POC, importance of OOB/EOB activity, use of call bell, UE exercises, and safety during ADLs, transfers, and functional  mobility.    Patient left up in chair with all lines intact and call button in reach    GOALS:   Multidisciplinary Problems       Occupational Therapy Goals          Problem: Occupational Therapy    Goal Priority Disciplines Outcome Interventions   Occupational Therapy Goal     OT, PT/OT Ongoing, Progressing    Description: Goals to be met by: 2/10/2023    Patient will increase functional independence with ADLs by performing:    UE Dressing with Stand-by Assistance.  LE Dressing with Stand-by Assistance.  Grooming while seated with Stand-by Assistance.  Toileting from bedside commode with Minimal Assistance for hygiene and clothing management.   Sitting at edge of bed x10 minutes with Supervision.  Supine to sit with Stand-by Assistance.  Toilet transfer to bedside commode with Minimal Assistance.  Upper extremity exercise program x10 reps per handout, with assistance as needed.                         Time Tracking:     OT Date of Treatment: 01/20/23  OT Start Time: 1041  OT Stop Time: 1057  OT Total Time (min): 16 min    Billable Minutes:Self Care/Home Management 16    OT/ALDO: OT          1/20/2023

## 2023-01-20 NOTE — PLAN OF CARE
Problem: SLP  Goal: SLP Goal  Description: 1. Pt will tolerate PO trials of puree and thin liquid w/o overt s/s aspiration and w/ adequate oral clearance during >90% of trials -MET  2. Pt will participate in ongoing diagnostic dysphagia tx in order to determine LRD -MET  3. Pt will tolerate pureed diet w/ thin liquids w/o overt s/s aspiration and w/ adequate oral clearance w/ >90% of PO intake -MET  4. Pt will tolerate level 6 diet w/ thin liquids w/ adequate bolus manipulation and oral clearance w/o overt s/s aspiration during >90% of PO intake -MET  5. Pt will participate in cognitive-linguistic evaluation (DISCONTINUED, pt's wife stating pt at baseline)  6. Pt will complete lingual strengthening exercises x10 each during each session  7. Pt will tolerate level 7a diet (Regular, Easy to Chew) w/ thin liquids w/ adequate bolus manipulation and oral clearance w/o overt s/s aspiration during >90% of PO intake  Outcome: Ongoing, Progressing

## 2023-01-20 NOTE — PT/OT/SLP PROGRESS
Physical Therapy Treatment    Patient Name:  Peyman Gr   MRN:  [id number]    Recommendations:     Discharge Recommendations: rehabilitation facility  Discharge Equipment Recommendations: walker, rolling, wheelchair, bath bench  Barriers to discharge: None    Assessment:     Peyman Gr is a 67 y.o. male admitted with a medical diagnosis of STEMI involving right coronary artery.  He presents with the following impairments/functional limitations: weakness, impaired endurance, impaired self care skills, impaired functional mobility, gait instability, impaired balance, decreased lower extremity function, impaired cardiopulmonary response to activity.    Rehab Prognosis: Fair; patient would benefit from acute skilled PT services to address these deficits and reach maximum level of function.    Recent Surgery: Procedure(s) (LRB):  Left heart cath (Left)  Insertion, Pacemaker, Temporary Transvenous  ANGIOGRAM, CORONARY ARTERY (N/A)  Percutaneous coronary intervention (N/A)  IVUS, Coronary 19 Days Post-Op    Plan:     During this hospitalization, patient to be seen 6 x/week to address the identified rehab impairments via gait training, therapeutic activities, therapeutic exercises and progress toward the following goals:    Plan of Care Expires:  01/18/23    Subjective     Chief Complaint: RN  Patient/Family Comments/goals: get better, have surgery   Pain/Comfort:         Objective:     Communicated with RN prior to session.  Patient found sitting edge of bed with blood pressure cuff, pulse ox (continuous), peripheral IV, telemetry, oxygen upon PT entry to room.     General Precautions: Standard, fall  Orthopedic Precautions: N/A  Braces: N/A  Respiratory Status: Room air     Functional Mobility:  Transfers:     Sit to Stand:  contact guard assistance with rolling walker  Gait: 2x10' w/ seated rest break in between, uses RW and CGA.      AM-PAC 6 CLICK MOBILITY          Treatment & Education:  Pt was educated on the  following: call light use, importance of OOB activity and functional mobility to negate the negative effects of prolonged bed rest during this hospitalization, safe transfers/ambulation and discharge planning recommendations/options.     Patient left HOB elevated with all lines intact, call button in reach, RN notified, and wife present..    GOALS:   Multidisciplinary Problems       Physical Therapy Goals          Problem: Physical Therapy    Goal Priority Disciplines Outcome Goal Variances Interventions   Physical Therapy Goal     PT, PT/OT Ongoing, Progressing     Description: Goals to be met by: 2023     Patient will increase functional independence with mobility by performin. Supine to sit with MInimal Assistance  2. Sit to stand transfer with Moderate Assistance  3. Bed to chair with Mod A  w/ol AD  3. Gait  x 10  feet with Minimal Assistance using Rolling Walker.                          Time Tracking:     PT Received On: 23  PT Start Time: 1403     PT Stop Time: 1414  PT Total Time (min): 11 min     Billable Minutes: Gait Training 11    Treatment Type: Treatment  PT/PTA: PT     PTA Visit Number: 1     2023

## 2023-01-20 NOTE — PT/OT/SLP PROGRESS
"Speech Language Pathology Treatment    Patient Name:  Peyman Gr   MRN:  [id number]  Admitting Diagnosis: STEMI involving right coronary artery    Recommendations:                 General Recommendations:  Dysphagia therapy  Diet recommendations:  Easy to Chew Diet - IDDSI Level 7, Liquid Diet Level: Thin   Aspiration Precautions:  1 bite/sip at a time, Alternating bites/sips, Avoid talking while eating, Eliminate distractions, Frequent oral care (ESPECIALLY BEFORE/AFTER MEALS), HOB to 90 degrees, Meds whole 1 at a time, and Small bites/sips    General Precautions: Standard, aspiration, fall  Communication strategies:  none    Subjective     Pt awake, sitting upright in bed w/ family at bedside upon entering. Nursing administering meds. Pt agreeable to ST.  Patient goals: "I want some strawberry ensure and some bread"     Pain/Comfort:       Respiratory Status: Room air    Objective:     Has the patient been evaluated by SLP for swallowing?   Yes  Keep patient NPO? No   Current Respiratory Status:        Pt completed lingual sweep exercises x10, lingual strengthening exercises x20, and buccal strengthening/labial seal exercises x5. He was observed during self-regulated bites of dry cracker utilizing swallowing precautions w/o cues and w/ adequate oral clearance across trials. He was able to recall all swallowing precautions IND, oral motor exercises IND, and recall education re swallowing precautions/exercises given min cuing.    Assessment:     Peyman Gr is a 67 y.o. male with an SLP diagnosis of oral Dysphagia.  He presents with improved oral clearance w/ complex textures, increased endurance during oral motor exercises, and increased competence in dysphagia education. Rec pt upgrade diet to IDDSI 7a- Regular Easy to Chew, thin liquids. Continue established POC.    Goals:   Multidisciplinary Problems       SLP Goals          Problem: SLP    Goal Priority Disciplines Outcome   SLP Goal     SLP Ongoing, " Progressing   Description: 1. Pt will tolerate PO trials of puree and thin liquid w/o overt s/s aspiration and w/ adequate oral clearance during >90% of trials -MET  2. Pt will participate in ongoing diagnostic dysphagia tx in order to determine LRD -MET  3. Pt will tolerate pureed diet w/ thin liquids w/o overt s/s aspiration and w/ adequate oral clearance w/ >90% of PO intake -MET  4. Pt will tolerate level 6 diet w/ thin liquids w/ adequate bolus manipulation and oral clearance w/o overt s/s aspiration during >90% of PO intake -MET  5. Pt will participate in cognitive-linguistic evaluation (DISCONTINUED, pt's wife stating pt at baseline)  6. Pt will complete lingual strengthening exercises x10 each during each session  7. Pt will tolerate level 7a diet w/ thin liquids w/ adequate bolus manipulation and oral clearance w/o overt s/s aspiration during >90% of PO intake                       Plan:     Patient to be seen:  3 x/week   Plan of Care expires:     Plan of Care reviewed with:  patient, family, other (see comments) (nursing, MD)   SLP Follow-Up:  Yes       Discharge recommendations:  rehabilitation facility   Barriers to Discharge:  None    Time Tracking:     SLP Treatment Date:   01/20/23  Speech Start Time:  0928  Speech Stop Time:  0946     Speech Total Time (min):  18 min    Billable Minutes: Treatment Swallowing Dysfunction 18 min    01/20/2023

## 2023-01-20 NOTE — PLAN OF CARE
01/20/23 1038   Discharge Reassessment   Assessment Type Discharge Planning Reassessment   Did the patient's condition or plan change since previous assessment? Yes   Discharge Plan discussed with: Spouse/sig other;Patient   Communicated TOBIAS with patient/caregiver Yes   Discharge Plan A Skilled Nursing Facility   Discharge Plan B Skilled Nursing Facility   Why the patient remains in the hospital Requires continued medical care   Post-Acute Status   Post-Acute Authorization Placement   Post-Acute Placement Status Set-up Complete/Auth obtained   Discharge Delays (!) Change in Medical Condition     Case Management noted patient need for CABG per Dr. Avila's note.     Patient accepted to [facility]. Authorization obtained. Awaiting medical clearance for transfer to SNF facility. CM to follow

## 2023-01-20 NOTE — NURSING
No acute changes. Brilinta on hold. CXR/ECHO today. Sob on exertion which resolves quickly with rest while on room air. CHG bath today. BP's well controlled. Up to chair w/ every meal. Using incentive spirometer.

## 2023-01-20 NOTE — PROGRESS NOTES
Critical access hospital Medicine  Progress Note    Patient name: Peyman Gr  MRN: [id number]  Admit Date: 1/1/2023   LOS: 19 days   DOS: 01/20/2023    ASSESSMENT/PLAN:     Active Hospital Problems    Diagnosis  POA    *STEMI involving right coronary artery [I21.11]  Yes    Pneumonia of right lung due to methicillin susceptible Staphylococcus aureus (MSSA) [J15.211]  No    Shock liver [K72.00]  Yes    Cardiogenic shock [R57.0]  Yes    Cardiac arrest [I46.9]  Yes    Acute respiratory failure with hypoxia and hypercarbia [J96.01, J96.02]  Yes    Leukocytosis [D72.829]  Yes    Anemia [D64.9]  Yes    History of tobacco abuse [Z87.891]  Not Applicable    Primary hypertension [I10]  Yes    PVD (peripheral vascular disease) with claudication [I73.9]  Yes    PAD (peripheral artery disease) [I73.9]  Yes      Resolved Hospital Problems    Diagnosis Date Resolved POA    Hyponatremia [E87.1] 01/02/2023 Yes     Cardiac arrest  Due to STEMI involving RCA   Multivessel CAD with left main disease  Complicated by complete heart block  MSSA pneumonia - s/p ceftriaxone  - s/p emergent stenting 1/1/23  - successfully extubated on 01/08  - cardiology awaiting pt's improved physical conditioning to evaluate for anginal symptoms, to decide on urgency of revascularization status.  Pt is currently participating with PT/OT and is a maximum assist for transferring.  He has intermittent L chest pain during transferring, I evaluated this personally with a STAT EKG and discussed it with Dr Talley.  EKG not concerning at that time.  CP resolved with rest, without other intervention.  - continue brillinta, asa, statin  - adjusting HTN medication as needed  - PT/OT/SLP   - po lasix; trending BMP    Chronic conditions as noted above/below; home medications reviewed personally by me and restarted as appropriate  Electrolyte derangement:  Trending BMP; Mg; replacement prn  DVT ppx: lovenox  FULL CODE    Plan update today:   Continue care,  appreciate consultants   Patient agreeable to surgical intervention/CABG in near future  Blood pressure better with increased dose of Norvasc, monitor and titrate as needed  Continue medical management including dual antiplatelets and statin, blood pressure control   Brilinta stopped, Lovenox initiated pending surgical plans  Carotid ultrasound with mild left-sided stenoses  Echo LVEF 70%  Continue PT/OT/SLP, advance diet as tolerated  Will need skilled nursing facility placement postoperatively  High risk secondary to severe exacerbation of chronic illness requiring surgical management; high risk of life-threatening deterioration    SUBJECTIVE:     Principal problem: STEMI involving right coronary artery    67-year-old male with peripheral vascular disease, essential hypertension, hyperlipidemia and ongoing tobacco use presented to outside facility with 2 hour onset of chest pain and shortness of breath.  EKG revealed inferior and anterolateral STEMI.  Quickly degenerated into cardiac arrest with runs of VT/VF and asystole requiring resuscitation for about 40 minutes.  He was subsequently intubated and transferred to our ED. En route he developed cardiac arrest again (PEA) which was continued into our ED and was resuscitated for another 30 minutes.  He required transcutaneous pacing.  He was taken emergently to the catheterization lab and underwent PCI with stenting of RCA which was felt to be the culprit lesion.  He was also found to have left main stenosis of about 70%.  A transvenous pacemaker was also placed.  Currently admitted to the ICU for post cardiac arrest care.  He remains intubated and critically ill. Hospital stay complicated by fever; initiated on broad spectrum antibiotics.  Respiratory culture growing MSSA - de-escalated to ceftriaxone.  Extubated successfully on 1/8.    Interval History:      1/11:  no SOB on HFNC.  Pt conversing with friends/family at bedside without SOB.  No current CP.  No pain  "throughout.  Pt is alert and oriented x4, however he is extremely tangential and not at mental status baseline per his family.  BELINDA.  Follows commands.    1/12:  out of ICU.  Pt without CP.  No SOB.  Family at bedside, he is responding more appropriately today.    1/13:  pt hallucinating this evening.  It occurs intermittently, usually after his family leaves for the evening.  He sees people staring at him through the 2nd floor window.  He is being moved closer to nursing station.  Starting Seroquel pm.  Previously he had trouble sleeping; I suspect anxiety / other sequela after cardiac arrest.  Start AM lexapro.  Delirium precautions.  No fever, no leukocytosis, procal negative.   No CP.  No SOB.    1/14:  pt's BP medication held due to hypotension this AM.  It appears he was given IV hydralazine at 0339 today.  BP appears labile throughout day.  Adjusting medication doses now.  Prioritizing coreg, losartan.  Continuing IV hydralazine prn.  He has sat on edge of bed with all meals, reports fatigue as a result.  Cardiology is trying to evaluate for anginal symptoms: Pt currently transferring during my exam.  He reports L-chest pain when transferring (3 person assist), described as "picking" with no radiation, +SOB.  Obtaining EKG, obtaining troponin.  Discussed personally with cardiology.  Pt's CP and SOB already alleviating while sitting at rest.    1/15:  now pt is hypertensive again.  Continue coreg, losartan.  Adding back norvasc.  Continue IV hydralazine prn.   Pt's percocet is being spaced out to assist with decreasing confusion/delirium.  Pt was angry about this and reported msk pain; IV tylenol has been started and now he is pain-free and much happier.  He is participating with PT/OT.  He reports b/l feet swelling.  No CP.  No SOB.      1/16:  adjusting HTN medication again.  No CP, no SOB, no n/v, no diaphoresis, no dizziness.  Nor have the symptoms occured during exertion since last night--(pt had SOB " last night, now s/p lasix.).    1/17: Patient seen and examined.  Patient reports feeling well today.  Denies any chest pain, shortness a breath, syncope, or headache.  Eating okay without nausea or vomiting.  Improving mobilization with therapy.  Tolerating dysphagia diet.  Agreeable to skilled nursing facility placement for ongoing therapy.    1/18:  Patient seen and examined.  Patient feeling well today without chest pain, shortness breath, syncope, or headache.  Tolerating diet without nausea or vomiting.  Weakness improving with therapy.    1/19:  Patient seen and examined.  Patient reports persistent intermittent chest pain, with associated shortness of breath.  He feels strongly this is cardiac related.  Waiting to discuss with CT surgery.  No nausea or vomiting.  No fever or chills.    1/20: Patient seen and examined.  Patient feels well today with no chest pain, fever, shortness of breath, nausea, or vomiting.  Tolerating diet.  Discussed case with CT surgery.  Patient is willing to remain hospitalized, he would like to pursue surgical intervention at this time.    Review of Systems:  3 point review of systems reviewed and negative except as per interval history above      OBJECTIVE:     Vitals:    01/20/23 1301 01/20/23 1400 01/20/23 1500 01/20/23 1501   BP:  (!) 166/72 (!) 141/63 (!) 141/63   BP Location:       Patient Position:       Pulse: 61 77 79 78   Resp: (!) 30 (!) 23 (!) 25 (!) 30   Temp:       TempSrc:       SpO2: 100% 98% 96% 99%   Weight:       Height:           Gen:  Comfortable appearing, nontoxic nondiaphoretic  Eyes - no conjunctival discharge, no scleral icterus  ENT:  Moist mucous membranes  CV: RRR, 2+ radial pulses  Lungs: CTA B/L, Comfortable work of breathing  Abd: +BS, soft, NT, ND  Neuro: alert, responsive, follows commands, fluent speech  Psych: pleasant, good insight  Skin: Dry and warm, no jaundice    Laboratory:  I have reviewed all pertinent lab results within the past 24  hours.  CBC:   Recent Labs   Lab 01/20/23  0455   WBC 4.93   RBC 3.24*   HGB 10.2*   HCT 30.0*      MCV 93   MCH 31.5*   MCHC 34.0       CMP:   Recent Labs   Lab 01/20/23  0455      CALCIUM 8.3*   ALBUMIN 2.9*   PROT 6.4   *   K 3.8   CO2 24      BUN 22   CREATININE 1.0   ALKPHOS 105   ALT 28   AST 23   BILITOT 0.5       No results for input(s): CKMB, CPKMB, TROPONINT, TROPONINI, MYOGLOBIN in the last 168 hours.    Imaging Results              X-Ray Chest AP Portable (Final result)  Result time 01/01/23 07:01:46      Final result by Elizabeth Sharp IV, MD (01/01/23 07:01:46)                   Narrative:    Chest, single view    HISTORY: Nasogastric tube placement.    In the interval, there has been introduction of a nasogastric tube which extends into the left upper quadrant of the abdomen just beyond the GE junction. An endotracheal tube remains in place with its tip positioned well above the level of the saurav.    Bilateral interstitial and mild groundglass opacities, greater on the right are again observed. There are no new confluent infiltrates, volume loss or effusions.    The cardiomediastinal silhouette is stable.    IMPRESSION:    Interval introduction of nasogastric tube extending into the left upper abdomen with its tip just beyond the GE junction.    Otherwise stable cardiopulmonary status.    Electronically signed by:  Elizabeth Sharp MD  1/1/2023 7:01 AM CST Workstation: [id number]                                     X-Ray Chest AP Portable (Final result)  Result time 01/01/23 06:59:55      Final result by Elizabeth Sharp IV, MD (01/01/23 06:59:55)                   Narrative:    Chest, single view    HISTORY: Cardiac arrest.    Comparison 9/9/2022.    Endotracheal tube is in place with its tip positioned well above the level of the saurav.    The heart size is within normal limits. The central pulmonary vasculature is not acutely engorged.  There is arteriosclerotic calcification  within the aortic arch.    There is scattered bilateral interstitial and groundglass pulmonary opacities with upper lung zone predominance, greater on the right. No effusion or extrapulmonary air identified.    Surgical changes are noted within the cervical spine and within the soft tissues of the right side of the neck.    IMPRESSION:    Positioning of endotracheal tube as above.    Interval development of interstitial and groundglass pulmonary opacities with upper lung zone predominance.    Electronically signed by:  Elizabeth Sharp MD  1/1/2023 6:59 AM CST Workstation: [id number]                                    Department Hospital Medicine  [facility]  Julio C Sibley MD  Date of service: 01/20/2023

## 2023-01-20 NOTE — PROGRESS NOTES
[facility]  Department of Cardiology  Progress Note    PATIENT NAME: Peyman Gr  MRN: [id number]  TODAY'S DATE: 01/20/2023  ADMIT DATE: 1/1/2023    SUBJECTIVE     PRINCIPLE PROBLEM: STEMI involving right coronary artery    INTERVAL HISTORY:    1/20/2023  Patient seen sitting up in chair with no distress noted. Breathing is stable. He does have SOB with exertion. He has been using the IS.    1/19/23  Patient is being evaluated by CT surgery for CABG. He reports he is feeling okay. Has had some SOB but denies chest pain.     1/18/23:  No new complaints.  Making some progress with physical therapy.  Some shortness of breath on exertion.  Films reviewed with Dr. Elizondo.      Review of patient's allergies indicates:  No Known Allergies    REVIEW OF SYSTEMS  CARDIOVASCULAR: No recent chest pain, palpitations, arm, neck, or jaw pain  RESPIRATORY: No recent fever, cough chills, SOB or congestion  : No blood in the urine  GI: No Nausea, vomiting, constipation, diarrhea, blood, or reflux.  MUSCULOSKELETAL: No myalgias  NEURO: No lightheadedness or dizziness  EYES: No Double vision, blurry, vision or headache     OBJECTIVE     VITAL SIGNS (Most Recent)  Temp: 98.4 °F (36.9 °C) (01/20/23 1202)  Pulse: 69 (01/20/23 1202)  Resp: 20 (01/20/23 1202)  BP: 115/62 (01/20/23 1202)  SpO2: 98 % (01/20/23 1202)    VENTILATION STATUS  Resp: 20 (01/20/23 1202)  SpO2: 98 % (01/20/23 1202)       I & O (Last 24H):  Intake/Output Summary (Last 24 hours) at 1/20/2023 1251  Last data filed at 1/20/2023 1017  Gross per 24 hour   Intake 600 ml   Output 1125 ml   Net -525 ml         WEIGHTS  Wt Readings from Last 3 Encounters:   01/17/23 0400 105.2 kg (231 lb 14.8 oz)   01/15/23 0400 100 kg (220 lb 7.4 oz)   01/14/23 0400 97.8 kg (215 lb 9.8 oz)   01/07/23 0400 115.4 kg (254 lb 6.6 oz)   01/06/23 0400 117.6 kg (259 lb 4.2 oz)   01/05/23 0500 115.3 kg (254 lb 3.1 oz)   01/03/23 0615 105.4 kg (232 lb 5.8 oz)   01/02/23 0400 102.5 kg  (225 lb 15.5 oz)   01/02/23 0053 102.5 kg (225 lb 15.5 oz)   01/01/23 0800 99.8 kg (220 lb 0.3 oz)   01/01/23 0341 99.8 kg (220 lb)   01/19/23 1350 104.8 kg (231 lb)   01/01/23 1706 99.8 kg (220 lb)       PHYSICAL EXAM  CONSTITUTIONAL: Well built, well nourished in no apparent distress  NECK: no carotid bruit, no JVD  LUNGS: CTA  CHEST WALL: no tenderness  HEART: regular rate and rhythm, S1, S2 normal, no murmur, click, rub or gallop   ABDOMEN: soft, non-tender; bowel sounds normal; no masses,  no organomegaly  EXTREMITIES: Extremities normal, no edema  NEURO: AAO X 3    SCHEDULED MEDS:   amLODIPine  10 mg Oral Daily    aspirin  81 mg Oral Daily    atorvastatin  20 mg Oral QHS    carvediloL  25 mg Oral BID WM    chlorhexidine  15 mL Mouth/Throat BID    docusate sodium  100 mg Oral BID    enoxparin  1 mg/kg Subcutaneous Q12H    EScitalopram oxalate  10 mg Oral Daily    famotidine  20 mg Per OG tube BID    furosemide  20 mg Oral Daily    LIDOcaine  1 patch Transdermal Q24H    losartan  100 mg Oral Daily    losartan  50 mg Oral Daily with dinner    nicotine  1 patch Transdermal Daily    polyethylene glycol  17 g Oral BID    QUEtiapine  25 mg Oral Daily with dinner    traZODone  50 mg Oral QHS       CONTINUOUS INFUSIONS:    PRN MEDS:acetaminophen, atropine, calcium gluconate IVPB, calcium gluconate IVPB, calcium gluconate IVPB, dextrose 10%, dextrose 10%, EPINEPHrine, glucagon (human recombinant), glucose, glucose, hydrALAZINE, hydrALAZINE, lactulose, levalbuterol, magnesium oxide, magnesium oxide, magnesium sulfate IVPB, magnesium sulfate IVPB, melatonin, naloxone, nitroglycerin, ondansetron, oxyCODONE-acetaminophen, potassium bicarbonate, potassium bicarbonate, potassium bicarbonate, potassium chloride in water **AND** potassium chloride in water **AND** potassium chloride in water, potassium, sodium phosphates, potassium, sodium phosphates, potassium, sodium phosphates, senna-docusate 8.6-50 mg, simethicone, sodium  chloride 0.9%, sodium phosphate IVPB, sodium phosphate IVPB, sodium phosphate IVPB    LABS AND DIAGNOSTICS     CBC LAST 3 DAYS  Recent Labs   Lab 01/18/23 0417 01/19/23 0443 01/20/23  0455   WBC 6.50 5.54 4.93   RBC 3.36* 3.23* 3.24*   HGB 10.4* 10.0* 10.2*   HCT 31.4* 30.1* 30.0*   MCV 94 93 93   MCH 31.0 31.0 31.5*   MCHC 33.1 33.2 34.0   RDW 13.5 13.5 13.2    367 350   MPV 9.3 9.2 9.4   GRAN 57.4  3.7 57.4  3.2 50.7  2.5   LYMPH 25.4  1.7 24.7  1.4 32.7  1.6   MONO 14.0  0.9 14.4  0.8 13.2  0.7   BASO 0.06 0.07 0.06   NRBC 0 0 0         COAGULATION LAST 3 DAYS  No results for input(s): LABPT, INR, APTT in the last 168 hours.    CHEMISTRY LAST 3 DAYS  Recent Labs   Lab 01/18/23 0417 01/19/23 0443 01/20/23 0455   * 132* 131*   K 3.6 3.7 3.8    103 101   CO2 23 23 24   ANIONGAP 10 6* 6*   BUN 26* 24* 22   CREATININE 1.1 1.1 1.0   GLU 94 98 100   CALCIUM 8.5* 8.2* 8.3*   MG 2.1 2.0 1.9   ALBUMIN 3.1* 3.1* 2.9*   PROT 6.6 6.8 6.4   ALKPHOS 110 114 105   ALT 32 31 28   AST 28 28 23   BILITOT 0.7 0.7 0.5         CARDIAC PROFILE LAST 3 DAYS  No results for input(s): BNP, CPK, CPKMB, LDH, TROPONINI in the last 168 hours.    ENDOCRINE LAST 3 DAYS  No results for input(s): TSH, PROCAL in the last 168 hours.      LAST ARTERIAL BLOOD GAS  ABG  No results for input(s): PH, PO2, PCO2, HCO3, BE in the last 168 hours.    LAST 7 DAYS MICROBIOLOGY   Microbiology Results (last 7 days)       ** No results found for the last 168 hours. **            MOST RECENT IMAGING  Echo Saline Bubble? No  · The left ventricle is normal in size with mild concentric hypertrophy   and normal systolic function.  · The estimated ejection fraction is 70%.  · Normal left ventricular diastolic function.  · Atrial fibrillation not observed.  · Normal right ventricular size with normal right ventricular systolic   function.  · Normal central venous pressure (3 mmHg).     X-Ray Chest AP Portable  HISTORY: SOB    FINDINGS:  Portable chest radiograph at 1321 hours compared to prior exams shows the cardiomediastinal silhouette and pulmonary vasculature are within normal limits. There are aortic vascular calcifications.    The lungs are normally expanded, with no consolidation, large pleural effusion, or evidence of pulmonary edema. No confluent infiltrates or pneumothorax. There are no significant osseous abnormalities.    IMPRESSION: No evidence of active cardiopulmonary disease.    Electronically signed by:  Hema Tafoya MD  1/19/2023 1:58 PM [facility] Workstation: [id number]      [facility]  Results for orders placed during the hospital encounter of 01/01/23    Echo Saline Bubble? No    Interpretation Summary  · The estimated ejection fraction is 55%.  · The left ventricle is normal in size with concentric hypertrophy.  · Mild to moderate tricuspid regurgitation.  · Moderate right ventricular enlargement with moderately reduced right ventricular systolic function.  · Grade I left ventricular diastolic dysfunction.  · There is mild aortic valve stenosis.  · Aortic valve area is 1.68 cm2; peak velocity is 1.36 m/s; mean gradient is 4 mmHg.  · There is abnormal septal wall motion consistent with right ventricular pacemaker.  · There are segmental left ventricular wall motion abnormalities.      CURRENT/PREVIOUS VISIT EKG  Results for orders placed or performed during the hospital encounter of 01/01/23   EKG 12-lead    Collection Time: 01/14/23  5:26 PM    Narrative    Test Reason : R07.9,    Vent. Rate : 076 BPM     Atrial Rate : 076 BPM     P-R Int : 128 ms          QRS Dur : 082 ms      QT Int : 378 ms       P-R-T Axes : 065 -07 104 degrees     QTc Int : 425 ms    Normal sinus rhythm  LVH with repolarization abnormality  Inferior infarct (cited on or before 01-JAN-2023)  Abnormal ECG  When compared with ECG of 12-JAN-2023 15:01,  Premature supraventricular complexes are no longer Present  Questionable change in initial forces of Inferior  leads  Confirmed by Claude ZAFAR, Aurelio CAMPO ([id number]) on 1/16/2023 8:57:26 PM    Referred By: IRENE MOSER           Confirmed By:Aurelio Arce MD       ASSESSMENT/PLAN:     Active Hospital Problems    Diagnosis    *STEMI involving right coronary artery    Pneumonia of right lung due to methicillin susceptible Staphylococcus aureus (MSSA)    Shock liver    Cardiogenic shock    Cardiac arrest    Acute respiratory failure with hypoxia and hypercarbia    Leukocytosis    Anemia    History of tobacco abuse    Primary hypertension    PVD (peripheral vascular disease) with claudication    PAD (peripheral artery disease)       ASSESSMENT & PLAN:   Severe left main coronary stenosis with 90% lesion in small mid marginal  ST elevation MI with stent in ostial right  status post STEMI with cardiac arrest      RECOMMENDATIONS:    Continue Lovenox weight based. Hold dose before surgery. Patient will remain inpatient until CABG.   Vitals are stable. Continue current medications.   Dr. Arce to follow this weekend.         Mary Lou Turpin NP  [facility]  Department of Cardiology  Date of Service: 01/20/2023      I have personally interviewed and examined the patient, I have reviewed the Nurse Practitioner's history and physical, assessment, and plan. I have personally evaluated the patient at bedside and agree with the findings and made appropriate changes as necessary in recommendations.    Dougie Saleh MD  Department of Cardiology  [facility]  01/20/2023 3:04 PM

## 2023-01-20 NOTE — PROGRESS NOTES
The angiograms were reviewed.  I spoke to the patient about surgical bypass.  He seems to be understanding and agreeable.  He has been on antiplatelet therapy.  If he remains in the hospital the surgery can be done in the near future.

## 2023-01-20 NOTE — PLAN OF CARE
01/20/23 1421   Patient Assessment/Suction   Level of Consciousness (AVPU) alert   Respiratory Effort Normal;Unlabored   All Lung Fields Breath Sounds clear   PRE-TX-O2   Device (Oxygen Therapy) room air   SpO2 97 %   Pulse Oximetry Type Continuous   $ Pulse Oximetry - Multiple Charge Pulse Oximetry - Multiple   Pulse 72   Resp (!) 27   Aerosol Therapy   $ Aerosol Therapy Charges PRN treatment not required   Respiratory Treatment Status (SVN) PRN treatment not required   Education   $ Education Bronchodilator;15 min   Respiratory Evaluation   $ Care Plan Tech Time 15 min

## 2023-01-20 NOTE — PROGRESS NOTES
Count includes the [facility]  Adult Nutrition   Progress Note (Follow-Up)    SUMMARY      Recommendations:   1. Continue current Cardiac SMH; Easy to chew (Level 7a), Cardiac (Low Na/Chol); Thin diet as tolerated.  2.  continue to obtain meal preferences daily.     Goals:   Patient will continue to meet 75% or more of his EEN/EPN.    Dietitian Rounds Brief  F/U Nutrition Note: Pt OOR but nursing records him eating 75% or more and he loves his strawberry ensure HP with meals so I believe he is getting his energy and protein needs met and his LBM was yesterday. Skin intact except small area on R small toe which looks really good from the wounds notes yesterday. Will follow prn.    Hospitalists PN's  1/17: Patient seen and examined.  Patient reports feeling well today.  Denies any chest pain, shortness a breath, syncope, or headache.  Eating okay without nausea or vomiting.  Improving mobilization with therapy.  Tolerating dysphagia diet.  Agreeable to skilled nursing facility placement for ongoing therapy.     1/18:  Patient seen and examined.  Patient feeling well today without chest pain, shortness breath, syncope, or headache.  Tolerating diet without nausea or vomiting.  Weakness improving with therapy.     1/19:  Patient seen and examined.  Patient reports persistent intermittent chest pain, with associated shortness of breath.  He feels strongly this is cardiac related.  Waiting to discuss with CT surgery.  No nausea or vomiting.  No fever or chills.    Diet order: Cardiac SMH; Easy to chew (Level 7a), Cardiac (Low Na/Chol); Thin      Oral Supplement: Strawberry Glucerna TID      % Intake of Estimated Energy Needs: 75 - 100 %  % Meal Intake: 75 - 100 %    Estimated/Assessed Needs  Weight Used For Calorie Calculations: 103 kg (227 lb 1.2 oz)  Energy Calorie Requirements (kcal): 8904-4637 (20-25)  Energy Need Method: Kcal/kg  Protein Requirements: 124-155gm (1.2-1.5gm/kg)  Weight Used For Protein Calculations:  103 kg (227 lb 1.2 oz)     Estimated Fluid Requirement Method: RDA Method  RDA Method (mL): 2060       Weight History:  Wt Readings from Last 5 Encounters:   01/17/23 105.2 kg (231 lb 14.8 oz)   01/19/23 104.8 kg (231 lb)   01/01/23 99.8 kg (220 lb)   09/09/22 99.8 kg (220 lb)   05/06/22 95.7 kg (211 lb)        Reason for Assessment  Reason For Assessment: RD follow-up  Diagnosis:  (STEMI involving right coronary artery)  Relevant Medical History: peripheral vascular disease, essential hypertension, hyperlipidemia and ongoing tobacco use    Medications:Pertinent Medications Reviewed  Scheduled Meds:   amLODIPine  10 mg Oral Daily    aspirin  81 mg Oral Daily    atorvastatin  20 mg Oral QHS    carvediloL  25 mg Oral BID WM    chlorhexidine  15 mL Mouth/Throat BID    docusate sodium  100 mg Oral BID    enoxparin  1 mg/kg Subcutaneous Q12H    EScitalopram oxalate  10 mg Oral Daily    famotidine  20 mg Per OG tube BID    furosemide  20 mg Oral Daily    LIDOcaine  1 patch Transdermal Q24H    losartan  100 mg Oral Daily    losartan  50 mg Oral Daily with dinner    nicotine  1 patch Transdermal Daily    polyethylene glycol  17 g Oral BID    QUEtiapine  25 mg Oral Daily with dinner    traZODone  50 mg Oral QHS     Continuous Infusions:  PRN Meds:.acetaminophen, atropine, calcium gluconate IVPB, calcium gluconate IVPB, calcium gluconate IVPB, dextrose 10%, dextrose 10%, EPINEPHrine, glucagon (human recombinant), glucose, glucose, hydrALAZINE, hydrALAZINE, lactulose, levalbuterol, magnesium oxide, magnesium oxide, magnesium sulfate IVPB, magnesium sulfate IVPB, melatonin, naloxone, nitroglycerin, ondansetron, oxyCODONE-acetaminophen, potassium bicarbonate, potassium bicarbonate, potassium bicarbonate, potassium chloride in water **AND** potassium chloride in water **AND** potassium chloride in water, potassium, sodium phosphates, potassium, sodium phosphates, potassium, sodium phosphates, senna-docusate 8.6-50 mg,  simethicone, sodium chloride 0.9%, sodium phosphate IVPB, sodium phosphate IVPB, sodium phosphate IVPB    Labs: Pertinent Labs Reviewed  Clinical Chemistry:  Recent Labs   Lab 01/20/23  0455   *   K 3.8      CO2 24      BUN 22   CREATININE 1.0   CALCIUM 8.3*   PROT 6.4   ALBUMIN 2.9*   BILITOT 0.5   ALKPHOS 105   AST 23   ALT 28   ANIONGAP 6*   MG 1.9     CBC:   Recent Labs   Lab 01/20/23  0455   WBC 4.93   RBC 3.24*   HGB 10.2*   HCT 30.0*      MCV 93   MCH 31.5*   MCHC 34.0     Monitor and Evaluation  Food and Nutrient Intake: enteral nutrition intake  Food and Nutrient Adminstration: enteral and parenteral nutrition administration  Anthropometric Measurements: weight change  Biochemical Data, Medical Tests and Procedures: electrolyte and renal panel, gastrointestinal profile, glucose/endocrine profile  Nutrition-Focused Physical Findings: overall appearance     Nutrition Risk  Level of Risk/Frequency of Follow-up: low    Nutrition Follow-Up  RD Follow-up?: Yes    Tova Romano RD 01/20/2023 10:31 AM

## 2023-01-21 PROBLEM — J96.02 ACUTE RESPIRATORY FAILURE WITH HYPOXIA AND HYPERCARBIA: Status: RESOLVED | Noted: 2023-01-01 | Resolved: 2023-01-21

## 2023-01-21 PROBLEM — I46.9 CARDIAC ARREST: Status: RESOLVED | Noted: 2023-01-01 | Resolved: 2023-01-21

## 2023-01-21 PROBLEM — R57.0 CARDIOGENIC SHOCK: Status: RESOLVED | Noted: 2023-01-02 | Resolved: 2023-01-21

## 2023-01-21 PROBLEM — D72.829 LEUKOCYTOSIS: Status: RESOLVED | Noted: 2023-01-01 | Resolved: 2023-01-21

## 2023-01-21 PROBLEM — J96.01 ACUTE RESPIRATORY FAILURE WITH HYPOXIA AND HYPERCARBIA: Status: RESOLVED | Noted: 2023-01-01 | Resolved: 2023-01-21

## 2023-01-21 PROBLEM — K72.00 SHOCK LIVER: Status: RESOLVED | Noted: 2023-01-02 | Resolved: 2023-01-21

## 2023-01-21 PROBLEM — J15.211: Status: RESOLVED | Noted: 2023-01-04 | Resolved: 2023-01-21

## 2023-01-21 LAB
ALBUMIN SERPL BCP-MCNC: 3.1 G/DL (ref 3.5–5.2)
ALP SERPL-CCNC: 114 U/L (ref 55–135)
ALT SERPL W/O P-5'-P-CCNC: 26 U/L (ref 10–44)
ANION GAP SERPL CALC-SCNC: 7 MMOL/L (ref 8–16)
AST SERPL-CCNC: 23 U/L (ref 10–40)
BASOPHILS # BLD AUTO: 0.06 K/UL (ref 0–0.2)
BASOPHILS NFR BLD: 1.3 % (ref 0–1.9)
BILIRUB SERPL-MCNC: 0.7 MG/DL (ref 0.1–1)
BUN SERPL-MCNC: 21 MG/DL (ref 8–23)
CALCIUM SERPL-MCNC: 8.7 MG/DL (ref 8.7–10.5)
CHLORIDE SERPL-SCNC: 102 MMOL/L (ref 95–110)
CO2 SERPL-SCNC: 25 MMOL/L (ref 23–29)
CREAT SERPL-MCNC: 1 MG/DL (ref 0.5–1.4)
DIFFERENTIAL METHOD: ABNORMAL
EOSINOPHIL # BLD AUTO: 0 K/UL (ref 0–0.5)
EOSINOPHIL NFR BLD: 0.6 % (ref 0–8)
ERYTHROCYTE [DISTWIDTH] IN BLOOD BY AUTOMATED COUNT: 13.3 % (ref 11.5–14.5)
EST. GFR  (NO RACE VARIABLE): >60 ML/MIN/1.73 M^2
GLUCOSE SERPL-MCNC: 95 MG/DL (ref 70–110)
HCT VFR BLD AUTO: 30 % (ref 40–54)
HGB BLD-MCNC: 9.9 G/DL (ref 14–18)
IMM GRANULOCYTES # BLD AUTO: 0.05 K/UL (ref 0–0.04)
IMM GRANULOCYTES NFR BLD AUTO: 1.1 % (ref 0–0.5)
LYMPHOCYTES # BLD AUTO: 2.1 K/UL (ref 1–4.8)
LYMPHOCYTES NFR BLD: 43.3 % (ref 18–48)
MAGNESIUM SERPL-MCNC: 1.9 MG/DL (ref 1.6–2.6)
MCH RBC QN AUTO: 30.7 PG (ref 27–31)
MCHC RBC AUTO-ENTMCNC: 33 G/DL (ref 32–36)
MCV RBC AUTO: 93 FL (ref 82–98)
MONOCYTES # BLD AUTO: 0.7 K/UL (ref 0.3–1)
MONOCYTES NFR BLD: 14.2 % (ref 4–15)
NEUTROPHILS # BLD AUTO: 1.9 K/UL (ref 1.8–7.7)
NEUTROPHILS NFR BLD: 39.5 % (ref 38–73)
NRBC BLD-RTO: 0 /100 WBC
PLATELET # BLD AUTO: 336 K/UL (ref 150–450)
PMV BLD AUTO: 9.2 FL (ref 9.2–12.9)
POTASSIUM SERPL-SCNC: 3.6 MMOL/L (ref 3.5–5.1)
PROT SERPL-MCNC: 6.7 G/DL (ref 6–8.4)
RBC # BLD AUTO: 3.22 M/UL (ref 4.6–6.2)
SODIUM SERPL-SCNC: 134 MMOL/L (ref 136–145)
WBC # BLD AUTO: 4.73 K/UL (ref 3.9–12.7)

## 2023-01-21 PROCEDURE — 25000003 PHARM REV CODE 250: Performed by: INTERNAL MEDICINE

## 2023-01-21 PROCEDURE — 21000000 HC CCU ICU ROOM CHARGE

## 2023-01-21 PROCEDURE — 97535 SELF CARE MNGMENT TRAINING: CPT

## 2023-01-21 PROCEDURE — 25000003 PHARM REV CODE 250: Performed by: FAMILY MEDICINE

## 2023-01-21 PROCEDURE — 99231 SBSQ HOSP IP/OBS SF/LOW 25: CPT | Mod: ,,, | Performed by: SPECIALIST

## 2023-01-21 PROCEDURE — 25000003 PHARM REV CODE 250

## 2023-01-21 PROCEDURE — 36415 COLL VENOUS BLD VENIPUNCTURE: CPT | Performed by: FAMILY MEDICINE

## 2023-01-21 PROCEDURE — 99900035 HC TECH TIME PER 15 MIN (STAT)

## 2023-01-21 PROCEDURE — 63600175 PHARM REV CODE 636 W HCPCS: Performed by: NURSE PRACTITIONER

## 2023-01-21 PROCEDURE — 99900031 HC PATIENT EDUCATION (STAT)

## 2023-01-21 PROCEDURE — 85025 COMPLETE CBC W/AUTO DIFF WBC: CPT | Performed by: FAMILY MEDICINE

## 2023-01-21 PROCEDURE — 99231 PR SUBSEQUENT HOSPITAL CARE,LEVL I: ICD-10-PCS | Mod: ,,, | Performed by: SPECIALIST

## 2023-01-21 PROCEDURE — 83735 ASSAY OF MAGNESIUM: CPT | Performed by: FAMILY MEDICINE

## 2023-01-21 PROCEDURE — 80053 COMPREHEN METABOLIC PANEL: CPT | Performed by: FAMILY MEDICINE

## 2023-01-21 PROCEDURE — 94761 N-INVAS EAR/PLS OXIMETRY MLT: CPT

## 2023-01-21 PROCEDURE — S4991 NICOTINE PATCH NONLEGEND: HCPCS | Performed by: INTERNAL MEDICINE

## 2023-01-21 PROCEDURE — 63600175 PHARM REV CODE 636 W HCPCS: Performed by: FAMILY MEDICINE

## 2023-01-21 PROCEDURE — 25000003 PHARM REV CODE 250: Performed by: HOSPITALIST

## 2023-01-21 RX ORDER — POTASSIUM CHLORIDE 20 MEQ/1
40 TABLET, EXTENDED RELEASE ORAL ONCE
Status: COMPLETED | OUTPATIENT
Start: 2023-01-21 | End: 2023-01-21

## 2023-01-21 RX ADMIN — FAMOTIDINE 20 MG: 20 TABLET ORAL at 08:01

## 2023-01-21 RX ADMIN — POTASSIUM CHLORIDE 40 MEQ: 1500 TABLET, EXTENDED RELEASE ORAL at 11:01

## 2023-01-21 RX ADMIN — AMLODIPINE BESYLATE 10 MG: 5 TABLET ORAL at 09:01

## 2023-01-21 RX ADMIN — OXYCODONE HYDROCHLORIDE AND ACETAMINOPHEN 1 TABLET: 7.5; 325 TABLET ORAL at 12:01

## 2023-01-21 RX ADMIN — NICOTINE 1 PATCH: 14 PATCH, EXTENDED RELEASE TRANSDERMAL at 09:01

## 2023-01-21 RX ADMIN — FUROSEMIDE 20 MG: 20 TABLET ORAL at 09:01

## 2023-01-21 RX ADMIN — LOSARTAN POTASSIUM 100 MG: 50 TABLET, FILM COATED ORAL at 09:01

## 2023-01-21 RX ADMIN — DOCUSATE SODIUM 100 MG: 100 CAPSULE, LIQUID FILLED ORAL at 09:01

## 2023-01-21 RX ADMIN — ASPIRIN 81 MG CHEWABLE TABLET 81 MG: 81 TABLET CHEWABLE at 09:01

## 2023-01-21 RX ADMIN — ENOXAPARIN SODIUM 105 MG: 150 INJECTION SUBCUTANEOUS at 09:01

## 2023-01-21 RX ADMIN — TRAZODONE HYDROCHLORIDE 50 MG: 50 TABLET ORAL at 08:01

## 2023-01-21 RX ADMIN — OXYCODONE HYDROCHLORIDE AND ACETAMINOPHEN 1 TABLET: 7.5; 325 TABLET ORAL at 11:01

## 2023-01-21 RX ADMIN — CARVEDILOL 25 MG: 25 TABLET, FILM COATED ORAL at 04:01

## 2023-01-21 RX ADMIN — FAMOTIDINE 20 MG: 20 TABLET ORAL at 09:01

## 2023-01-21 RX ADMIN — CARVEDILOL 25 MG: 25 TABLET, FILM COATED ORAL at 07:01

## 2023-01-21 RX ADMIN — HYDRALAZINE HYDROCHLORIDE 10 MG: 20 INJECTION INTRAMUSCULAR; INTRAVENOUS at 04:01

## 2023-01-21 RX ADMIN — POLYETHYLENE GLYCOL 3350 17 G: 17 POWDER, FOR SOLUTION ORAL at 09:01

## 2023-01-21 RX ADMIN — OXYCODONE HYDROCHLORIDE AND ACETAMINOPHEN 1 TABLET: 7.5; 325 TABLET ORAL at 06:01

## 2023-01-21 RX ADMIN — QUETIAPINE 25 MG: 25 TABLET ORAL at 08:01

## 2023-01-21 RX ADMIN — ATORVASTATIN CALCIUM 20 MG: 20 TABLET, FILM COATED ORAL at 08:01

## 2023-01-21 RX ADMIN — LIDOCAINE 5% 1 PATCH: 700 PATCH TOPICAL at 06:01

## 2023-01-21 RX ADMIN — ENOXAPARIN SODIUM 105 MG: 150 INJECTION SUBCUTANEOUS at 08:01

## 2023-01-21 RX ADMIN — LOSARTAN POTASSIUM 50 MG: 50 TABLET, FILM COATED ORAL at 04:01

## 2023-01-21 RX ADMIN — ESCITALOPRAM OXALATE 10 MG: 10 TABLET ORAL at 09:01

## 2023-01-21 RX ADMIN — CHLORHEXIDINE GLUCONATE 15 ML: 1.2 RINSE ORAL at 08:01

## 2023-01-21 RX ADMIN — CHLORHEXIDINE GLUCONATE 15 ML: 1.2 RINSE ORAL at 09:01

## 2023-01-21 NOTE — PLAN OF CARE
01/21/23 0835   Patient Assessment/Suction   Level of Consciousness (AVPU) alert   Respiratory Effort Normal;Unlabored   All Lung Fields Breath Sounds clear;diminished   PRE-TX-O2   Device (Oxygen Therapy) room air   SpO2 96 %   Pulse Oximetry Type Continuous   $ Pulse Oximetry - Multiple Charge Pulse Oximetry - Multiple   Pulse 93   Resp (!) 34   Aerosol Therapy   $ Aerosol Therapy Charges PRN treatment not required   Respiratory Treatment Status (SVN) PRN treatment not required   Education   $ Education 15 min;Bronchodilator   Respiratory Evaluation   $ Care Plan Tech Time 15 min

## 2023-01-21 NOTE — PT/OT/SLP PROGRESS
Occupational Therapy   Treatment    Name: Peyman Gr  MRN: [id number]  Admitting Diagnosis:  STEMI involving right coronary artery  20 Days Post-Op    Recommendations:     Discharge Recommendations: rehabilitation facility  Discharge Equipment Recommendations:   (TBD)  Barriers to discharge:   (Increased physical assistance with ADLs.)    Assessment:     Peyman Gr is a 67 y.o. male with a medical diagnosis of STEMI involving right coronary artery.  He presents with improving functional mobility. Patient participated in functional transfer and LB dressing sitting in chair.. Performance deficits affecting function are weakness, impaired endurance, impaired self care skills, impaired functional mobility, gait instability, impaired balance, decreased upper extremity function, decreased lower extremity function, decreased safety awareness, impaired cardiopulmonary response to activity.     Rehab Prognosis:  Good; patient would benefit from acute skilled OT services to address these deficits and reach maximum level of function.       Plan:     Patient to be seen 6 x/week to address the above listed problems via self-care/home management, therapeutic activities, therapeutic exercises  Plan of Care Expires: 02/10/23  Plan of Care Reviewed with: patient, spouse    Subjective     Pain/Comfort:  Pain Rating 1: 0/10  Pain Rating Post-Intervention 1: 0/10    Objective:     Communicated with: nurse prior to session.  Patient found up in chair with telemetry, peripheral IV upon OT entry to room.    General Precautions: Standard, fall    Orthopedic Precautions:N/A  Braces: N/A  Respiratory Status: Room air     Occupational Performance:     Functional Mobility/Transfers:  Patient completed Sit <> Stand Transfer with contact guard assistance  with  rolling walker     Activities of Daily Living:  Lower Body Dressing: contact guard assistance to don/doff socks sitting in chair.      [facility] 6 Click ADL:      Treatment &  Education:  Patient able to stand for 1 minute with contact guard using rolling walker with no physical assistance. Patient is making positive progress towards OT goals. Patient is pending CABG procedure.    Patient left up in chair with all lines intact, call button in reach, and spouse present    GOALS:   Multidisciplinary Problems       Occupational Therapy Goals          Problem: Occupational Therapy    Goal Priority Disciplines Outcome Interventions   Occupational Therapy Goal     OT, PT/OT Ongoing, Progressing    Description: Goals to be met by: 2/10/2023    Patient will increase functional independence with ADLs by performing:    UE Dressing with Stand-by Assistance.  LE Dressing with Stand-by Assistance.  Grooming while seated with Stand-by Assistance.  Toileting from bedside commode with Minimal Assistance for hygiene and clothing management.   Sitting at edge of bed x10 minutes with Supervision.  Supine to sit with Stand-by Assistance.  Toilet transfer to bedside commode with Minimal Assistance.  Upper extremity exercise program x10 reps per handout, with assistance as needed.                         Time Tracking:     OT Date of Treatment: 01/21/23  OT Start Time: 0847  OT Stop Time: 0901  OT Total Time (min): 14 min    Billable Minutes:Self Care/Home Management 14    OT/ALDO: OT          1/21/2023

## 2023-01-21 NOTE — PROGRESS NOTES
[facility]  Department of Cardiology  Progress Note    PATIENT NAME: Peyman Gr  MRN: [id number]  TODAY'S DATE: 01/21/2023  ADMIT DATE: 1/1/2023    SUBJECTIVE     PRINCIPLE PROBLEM: STEMI involving right coronary artery    INTERVAL HISTORY:    1/21/2023  Patient is resting comfortably in bedside chair during examination.  No acute distress, alert and oriented x4.  He denies chest pain.  Healing complains of shortness of breath with exertion.  He is waiting to have CABG in the near future, possibly next week.  He is eating well.  /70 during examination.  Patient was hypertensive overnight and was given 1 dose of IV hydralazine p.r.n..  Oral antihypertensives were titrated up yesterday.    1/20/23  Patient seen sitting up in chair with no distress noted. Breathing is stable. He does have SOB with exertion. He has been using the IS.    1/19/23  Patient is being evaluated by CT surgery for CABG. He reports he is feeling okay. Has had some SOB but denies chest pain.     1/18/23:  No new complaints.  Making some progress with physical therapy.  Some shortness of breath on exertion.  Films reviewed with Dr. Elizondo.      Review of patient's allergies indicates:  No Known Allergies    REVIEW OF SYSTEMS  CARDIOVASCULAR: No recent chest pain, palpitations, arm, neck, or jaw pain  RESPIRATORY:  Shortness of breath with exertion; No recent fever, cough chills, or congestion  : No blood in the urine  GI: No Nausea, vomiting, constipation, diarrhea, blood, or reflux.  MUSCULOSKELETAL: No myalgias  NEURO: No lightheadedness or dizziness  EYES: No Double vision, blurry, vision or headache     OBJECTIVE     VITAL SIGNS (Most Recent)  Temp: 98.6 °F (37 °C) (01/21/23 0730)  Pulse: 93 (01/21/23 0835)  Resp: (!) 34 (01/21/23 0835)  BP: (!) 142/70 (01/21/23 0945)  SpO2: 96 % (01/21/23 0835)    VENTILATION STATUS  Resp: (!) 34 (01/21/23 0835)  SpO2: 96 % (01/21/23 0835)       I & O (Last 24H):  Intake/Output Summary  (Last 24 hours) at 1/21/2023 1145  Last data filed at 1/21/2023 0839  Gross per 24 hour   Intake 240 ml   Output 1825 ml   Net -1585 ml         WEIGHTS  Wt Readings from Last 3 Encounters:   01/17/23 0400 105.2 kg (231 lb 14.8 oz)   01/15/23 0400 100 kg (220 lb 7.4 oz)   01/14/23 0400 97.8 kg (215 lb 9.8 oz)   01/07/23 0400 115.4 kg (254 lb 6.6 oz)   01/06/23 0400 117.6 kg (259 lb 4.2 oz)   01/05/23 0500 115.3 kg (254 lb 3.1 oz)   01/03/23 0615 105.4 kg (232 lb 5.8 oz)   01/02/23 0400 102.5 kg (225 lb 15.5 oz)   01/02/23 0053 102.5 kg (225 lb 15.5 oz)   01/01/23 0800 99.8 kg (220 lb 0.3 oz)   01/01/23 0341 99.8 kg (220 lb)   01/19/23 1350 104.8 kg (231 lb)   01/01/23 1706 99.8 kg (220 lb)       PHYSICAL EXAM  CONSTITUTIONAL: Well built, well nourished in no apparent distress  NECK: no carotid bruit, no JVD  LUNGS: CTA  CHEST WALL: no tenderness  HEART: regular rate and rhythm, S1, S2 normal, no murmur, click, rub or gallop   ABDOMEN: soft, non-tender; bowel sounds normal; no masses,  no organomegaly  EXTREMITIES: Extremities normal, no edema  NEURO: AAO X 3    SCHEDULED MEDS:   amLODIPine  10 mg Oral Daily    aspirin  81 mg Oral Daily    atorvastatin  20 mg Oral QHS    carvediloL  25 mg Oral BID WM    chlorhexidine  15 mL Mouth/Throat BID    docusate sodium  100 mg Oral BID    enoxparin  1 mg/kg Subcutaneous Q12H    EScitalopram oxalate  10 mg Oral Daily    famotidine  20 mg Per OG tube BID    furosemide  20 mg Oral Daily    LIDOcaine  1 patch Transdermal Q24H    losartan  100 mg Oral Daily    losartan  50 mg Oral Daily with dinner    nicotine  1 patch Transdermal Daily    polyethylene glycol  17 g Oral BID    potassium chloride  40 mEq Oral Once    QUEtiapine  25 mg Oral Daily with dinner    traZODone  50 mg Oral QHS       CONTINUOUS INFUSIONS:    PRN MEDS:acetaminophen, atropine, calcium gluconate IVPB, calcium gluconate IVPB, calcium gluconate IVPB, dextrose 10%, dextrose 10%, EPINEPHrine, glucagon (human  recombinant), glucose, glucose, hydrALAZINE, hydrALAZINE, lactulose, levalbuterol, magnesium oxide, magnesium oxide, magnesium sulfate IVPB, magnesium sulfate IVPB, melatonin, naloxone, nitroglycerin, ondansetron, oxyCODONE-acetaminophen, potassium bicarbonate, potassium bicarbonate, potassium bicarbonate, potassium chloride in water **AND** potassium chloride in water **AND** potassium chloride in water, potassium, sodium phosphates, potassium, sodium phosphates, potassium, sodium phosphates, senna-docusate 8.6-50 mg, simethicone, sodium chloride 0.9%, sodium phosphate IVPB, sodium phosphate IVPB, sodium phosphate IVPB    LABS AND DIAGNOSTICS     CBC LAST 3 DAYS  Recent Labs   Lab 01/19/23 0443 01/20/23 0455 01/21/23 0429   WBC 5.54 4.93 4.73   RBC 3.23* 3.24* 3.22*   HGB 10.0* 10.2* 9.9*   HCT 30.1* 30.0* 30.0*   MCV 93 93 93   MCH 31.0 31.5* 30.7   MCHC 33.2 34.0 33.0   RDW 13.5 13.2 13.3    350 336   MPV 9.2 9.4 9.2   GRAN 57.4  3.2 50.7  2.5 39.5  1.9   LYMPH 24.7  1.4 32.7  1.6 43.3  2.1   MONO 14.4  0.8 13.2  0.7 14.2  0.7   BASO 0.07 0.06 0.06   NRBC 0 0 0         COAGULATION LAST 3 DAYS  No results for input(s): LABPT, INR, APTT in the last 168 hours.    CHEMISTRY LAST 3 DAYS  Recent Labs   Lab 01/19/23 0443 01/20/23 0455 01/21/23 0429   * 131* 134*   K 3.7 3.8 3.6    101 102   CO2 23 24 25   ANIONGAP 6* 6* 7*   BUN 24* 22 21   CREATININE 1.1 1.0 1.0   GLU 98 100 95   CALCIUM 8.2* 8.3* 8.7   MG 2.0 1.9 1.9   ALBUMIN 3.1* 2.9* 3.1*   PROT 6.8 6.4 6.7   ALKPHOS 114 105 114   ALT 31 28 26   AST 28 23 23   BILITOT 0.7 0.5 0.7         CARDIAC PROFILE LAST 3 DAYS  No results for input(s): BNP, CPK, CPKMB, LDH, TROPONINI in the last 168 hours.    ENDOCRINE LAST 3 DAYS  No results for input(s): TSH, PROCAL in the last 168 hours.      LAST ARTERIAL BLOOD GAS  ABG  No results for input(s): PH, PO2, PCO2, HCO3, BE in the last 168 hours.    LAST 7 DAYS MICROBIOLOGY   Microbiology  Results (last 7 days)       ** No results found for the last 168 hours. **            MOST RECENT IMAGING  US Upper Extremity Arteries Bilateral  Upper extremity arterial ultrasound    Clinical history is CVA, abnormal carotid ultrasound on 1/18/2022    The right subclavian artery is patent with normal waveform pattern. The peak systolic velocity is 133 cm/s. There is scattered calcified plaque    There are scattered calcified plaque in the left subclavian artery which is patent. The peak systolic velocity is 206 cm/s.    IMPRESSION: Vascular calcification within the carotid arteries with no evidence of high-grade stenosis or occlusion    Electronically signed by:  Malu River MD  1/20/2023 3:02 PM [facility] Workstation: [id number]      LASTRevolv  Results for orders placed during the hospital encounter of 01/01/23    Echo Saline Bubble? No    Interpretation Summary  · The estimated ejection fraction is 55%.  · The left ventricle is normal in size with concentric hypertrophy.  · Mild to moderate tricuspid regurgitation.  · Moderate right ventricular enlargement with moderately reduced right ventricular systolic function.  · Grade I left ventricular diastolic dysfunction.  · There is mild aortic valve stenosis.  · Aortic valve area is 1.68 cm2; peak velocity is 1.36 m/s; mean gradient is 4 mmHg.  · There is abnormal septal wall motion consistent with right ventricular pacemaker.  · There are segmental left ventricular wall motion abnormalities.      CURRENT/PREVIOUS VISIT EKG  Results for orders placed or performed during the hospital encounter of 01/01/23   EKG 12-lead    Collection Time: 01/14/23  5:26 PM    Narrative    Test Reason : R07.9,    Vent. Rate : 076 BPM     Atrial Rate : 076 BPM     P-R Int : 128 ms          QRS Dur : 082 ms      QT Int : 378 ms       P-R-T Axes : 065 -07 104 degrees     QTc Int : 425 ms    Normal sinus rhythm  LVH with repolarization abnormality  Inferior infarct (cited on or before  01-JAN-2023)  Abnormal ECG  When compared with ECG of 12-JAN-2023 15:01,  Premature supraventricular complexes are no longer Present  Questionable change in initial forces of Inferior leads  Confirmed by Claude ZAFAR, Aurelio CAMPO ([id number]) on 1/16/2023 8:57:26 PM    Referred By: IRENE MOSER           Confirmed By:Aurelio Arce MD       ASSESSMENT/PLAN:     Active Hospital Problems    Diagnosis    *STEMI involving right coronary artery    Pneumonia of right lung due to methicillin susceptible Staphylococcus aureus (MSSA)    Shock liver    Cardiogenic shock    Cardiac arrest    Acute respiratory failure with hypoxia and hypercarbia    Leukocytosis    Anemia    History of tobacco abuse    Primary hypertension    PVD (peripheral vascular disease) with claudication    PAD (peripheral artery disease)       ASSESSMENT & PLAN:   Severe left main coronary stenosis with 90% lesion in small mid marginal  ST elevation MI with stent in ostial right  status post STEMI with cardiac arrest      RECOMMENDATIONS:    Continue Lovenox weight based. Hold dose before surgery. Patient will remain inpatient until CABG.   Continue amlodipine 10 mg daily, carvedilol 25 mg b.i.d., Lasix 20 mg daily, losartan 100 mg q.a.m., losartan 50 mg q.p.m..  Antihypertensives were increased last night.  Continue to closely monitor BP.  We will consider adding nitrate if hypertension persists this evening.  Potassium 3.6 this a.m., magnesium 1.9.  Please replace per protocol. Continue to check and replace potassium and magnesium. Goal for potassium is 4.0, and goal for magnesium is 2.0.    Thank you we will continue to follow.        Mitra Moore NP  [facility]  Department of Cardiology  Date of Service: 01/21/2023

## 2023-01-21 NOTE — PROGRESS NOTES
[facility] Medicine  Progress Note    Patient name: Peyman Gr  MRN: [id number]  Admit Date: 1/1/2023   LOS: 20 days     SUBJECTIVE:     Principal problem: STEMI involving right coronary artery    Interval History:  Patient was seen and examined bedside, sitting in the chair, denies any new symptoms, no acute events overnight as per nursing staff.  Patient appears in good spirit and looking forward to CABG    Scheduled Meds:   amLODIPine  10 mg Oral Daily    aspirin  81 mg Oral Daily    atorvastatin  20 mg Oral QHS    carvediloL  25 mg Oral BID WM    chlorhexidine  15 mL Mouth/Throat BID    docusate sodium  100 mg Oral BID    enoxparin  1 mg/kg Subcutaneous Q12H    EScitalopram oxalate  10 mg Oral Daily    famotidine  20 mg Per OG tube BID    furosemide  20 mg Oral Daily    LIDOcaine  1 patch Transdermal Q24H    losartan  100 mg Oral Daily    losartan  50 mg Oral Daily with dinner    nicotine  1 patch Transdermal Daily    polyethylene glycol  17 g Oral BID    QUEtiapine  25 mg Oral Daily with dinner    traZODone  50 mg Oral QHS     Continuous Infusions:  PRN Meds:acetaminophen, atropine, calcium gluconate IVPB, calcium gluconate IVPB, calcium gluconate IVPB, dextrose 10%, dextrose 10%, EPINEPHrine, glucagon (human recombinant), glucose, glucose, hydrALAZINE, hydrALAZINE, lactulose, levalbuterol, magnesium oxide, magnesium oxide, magnesium sulfate IVPB, magnesium sulfate IVPB, melatonin, naloxone, nitroglycerin, ondansetron, oxyCODONE-acetaminophen, potassium bicarbonate, potassium bicarbonate, potassium bicarbonate, potassium chloride in water **AND** potassium chloride in water **AND** potassium chloride in water, potassium, sodium phosphates, potassium, sodium phosphates, potassium, sodium phosphates, senna-docusate 8.6-50 mg, simethicone, sodium chloride 0.9%, sodium phosphate IVPB, sodium phosphate IVPB, sodium phosphate IVPB    Review of patient's allergies indicates:  No Known  Allergies    Review of Systems: As per interval history    OBJECTIVE:     Vital Signs (Most Recent)  Temp: 98.6 °F (37 °C) (01/21/23 0730)  Pulse: 93 (01/21/23 0835)  Resp: (!) 22 (01/21/23 1155)  BP: (!) 142/70 (01/21/23 0945)  SpO2: 96 % (01/21/23 0835)    Vital Signs Range (Last 24H):  Temp:  [98.6 °F (37 °C)-99.2 °F (37.3 °C)]   Pulse:  [63-93]   Resp:  [18-34]   BP: (126-190)/(61-88)   SpO2:  [96 %-99 %]     I & O (Last 24H):  Intake/Output Summary (Last 24 hours) at 1/21/2023 1551  Last data filed at 1/21/2023 0839  Gross per 24 hour   Intake --   Output 1825 ml   Net -1825 ml       Physical Exam:  General: Patient resting comfortably in no acute distress. Appears as stated age. Calm  Eyes: No conjunctival injection. No scleral icterus.  ENT: Hearing grossly intact. No discharge from ears. No nasal discharge.   Neck: Supple, trachea midline. No JVD  CVS: RRR. No LE edema BL  Lungs:  No tachypnea or accessory muscle use.  Clear to auscultation bilaterally  Abdomen:  Soft, nontender and nondistended.  No organomegaly  Neuro: AOx3. Moves all extremities. Follows commands. Responds appropriately   Skin:  No rash or erythema noted    Laboratory:  I have reviewed all pertinent lab results within the past 24 hours.    Diagnostic Results:  Reviewed all imaging    ASSESSMENT/PLAN:     67-year-old gentleman admitted with STEMI involving RCA causing cardiac arrest complicated by complete heart block, MSSA pneumonia, emergent stenting, intubation, extubation on 01/08.  Patient had made remarkable recovery    Active Hospital Problems    Diagnosis  POA    *STEMI involving right coronary artery [I21.11]  Yes    Anemia [D64.9]  Yes    History of tobacco abuse [Z87.891]  Not Applicable    Primary hypertension [I10]  Yes    PVD (peripheral vascular disease) with claudication [I73.9]  Yes    PAD (peripheral artery disease) [I73.9]  Yes      Resolved Hospital Problems    Diagnosis Date Resolved POA    Pneumonia of right lung due  to methicillin susceptible Staphylococcus aureus (MSSA) [J15.211] 01/21/2023 No    Shock liver [K72.00] 01/21/2023 Yes    Cardiogenic shock [R57.0] 01/21/2023 Yes    Cardiac arrest [I46.9] 01/21/2023 Yes    Acute respiratory failure with hypoxia and hypercarbia [J96.01, J96.02] 01/21/2023 Yes    Leukocytosis [D72.829] 01/21/2023 Yes    Hyponatremia [E87.1] 01/02/2023 Yes         Plan:   Remarkable recovery considering his presentation of cardiac arrest, STEMI  Help from multiple consultants appreciated  Patient finished antibiotics for pneumonia, extubated on 01/08  Blood pressure better with increased dose of Norvasc, monitor and titrate as needed  Continue medical management including dual antiplatelets and statin, blood pressure control   Brilinta stopped, Lovenox initiated pending surgical plans  Carotid ultrasound with mild left-sided stenoses  Echo LVEF 70%  Continue PT/OT/SLP, advance diet as tolerated  Patient is waiting for CABG, I was told that tentatively it may happen on 01/23  Will need skilled nursing facility placement postoperatively    VTE Risk Mitigation (From admission, onward)           Ordered     enoxaparin injection 105 mg  Every 12 hours (non-standard times)         01/19/23 1457     IP VTE HIGH RISK PATIENT  Once         01/01/23 0817     Place sequential compression device  Until discontinued         01/01/23 0817                        Department Hospital Medicine  [facility]  Yamile Lozano MD  Date of service: 01/21/2023

## 2023-01-22 LAB
ALBUMIN SERPL BCP-MCNC: 3.1 G/DL (ref 3.5–5.2)
ALP SERPL-CCNC: 110 U/L (ref 55–135)
ALT SERPL W/O P-5'-P-CCNC: 27 U/L (ref 10–44)
ANION GAP SERPL CALC-SCNC: 8 MMOL/L (ref 8–16)
AST SERPL-CCNC: 23 U/L (ref 10–40)
BASOPHILS # BLD AUTO: 0.03 K/UL (ref 0–0.2)
BASOPHILS NFR BLD: 0.7 % (ref 0–1.9)
BILIRUB SERPL-MCNC: 0.5 MG/DL (ref 0.1–1)
BUN SERPL-MCNC: 22 MG/DL (ref 8–23)
CALCIUM SERPL-MCNC: 8.8 MG/DL (ref 8.7–10.5)
CHLORIDE SERPL-SCNC: 103 MMOL/L (ref 95–110)
CO2 SERPL-SCNC: 23 MMOL/L (ref 23–29)
CREAT SERPL-MCNC: 1 MG/DL (ref 0.5–1.4)
DIFFERENTIAL METHOD: ABNORMAL
EOSINOPHIL # BLD AUTO: 0 K/UL (ref 0–0.5)
EOSINOPHIL NFR BLD: 0.7 % (ref 0–8)
ERYTHROCYTE [DISTWIDTH] IN BLOOD BY AUTOMATED COUNT: 13.4 % (ref 11.5–14.5)
EST. GFR  (NO RACE VARIABLE): >60 ML/MIN/1.73 M^2
GLUCOSE SERPL-MCNC: 99 MG/DL (ref 70–110)
HCT VFR BLD AUTO: 32.1 % (ref 40–54)
HGB BLD-MCNC: 10.7 G/DL (ref 14–18)
IMM GRANULOCYTES # BLD AUTO: 0.06 K/UL (ref 0–0.04)
IMM GRANULOCYTES NFR BLD AUTO: 1.5 % (ref 0–0.5)
LYMPHOCYTES # BLD AUTO: 1.5 K/UL (ref 1–4.8)
LYMPHOCYTES NFR BLD: 36.3 % (ref 18–48)
MAGNESIUM SERPL-MCNC: 1.8 MG/DL (ref 1.6–2.6)
MCH RBC QN AUTO: 30.9 PG (ref 27–31)
MCHC RBC AUTO-ENTMCNC: 33.3 G/DL (ref 32–36)
MCV RBC AUTO: 93 FL (ref 82–98)
MONOCYTES # BLD AUTO: 0.7 K/UL (ref 0.3–1)
MONOCYTES NFR BLD: 16.3 % (ref 4–15)
NEUTROPHILS # BLD AUTO: 1.8 K/UL (ref 1.8–7.7)
NEUTROPHILS NFR BLD: 44.5 % (ref 38–73)
NRBC BLD-RTO: 0 /100 WBC
PLATELET # BLD AUTO: 337 K/UL (ref 150–450)
PMV BLD AUTO: 9.2 FL (ref 9.2–12.9)
POTASSIUM SERPL-SCNC: 4 MMOL/L (ref 3.5–5.1)
PROT SERPL-MCNC: 7.1 G/DL (ref 6–8.4)
RBC # BLD AUTO: 3.46 M/UL (ref 4.6–6.2)
SODIUM SERPL-SCNC: 134 MMOL/L (ref 136–145)
WBC # BLD AUTO: 4.11 K/UL (ref 3.9–12.7)

## 2023-01-22 PROCEDURE — 80053 COMPREHEN METABOLIC PANEL: CPT | Performed by: FAMILY MEDICINE

## 2023-01-22 PROCEDURE — 83735 ASSAY OF MAGNESIUM: CPT | Performed by: FAMILY MEDICINE

## 2023-01-22 PROCEDURE — 85025 COMPLETE CBC W/AUTO DIFF WBC: CPT | Performed by: FAMILY MEDICINE

## 2023-01-22 PROCEDURE — 94761 N-INVAS EAR/PLS OXIMETRY MLT: CPT

## 2023-01-22 PROCEDURE — 99900031 HC PATIENT EDUCATION (STAT)

## 2023-01-22 PROCEDURE — 97116 GAIT TRAINING THERAPY: CPT

## 2023-01-22 PROCEDURE — 21000000 HC CCU ICU ROOM CHARGE

## 2023-01-22 PROCEDURE — 99900035 HC TECH TIME PER 15 MIN (STAT)

## 2023-01-22 PROCEDURE — 25000003 PHARM REV CODE 250

## 2023-01-22 PROCEDURE — 63600175 PHARM REV CODE 636 W HCPCS: Performed by: NURSE PRACTITIONER

## 2023-01-22 PROCEDURE — S4991 NICOTINE PATCH NONLEGEND: HCPCS | Performed by: INTERNAL MEDICINE

## 2023-01-22 PROCEDURE — 25000003 PHARM REV CODE 250: Performed by: INTERNAL MEDICINE

## 2023-01-22 PROCEDURE — 36415 COLL VENOUS BLD VENIPUNCTURE: CPT | Performed by: FAMILY MEDICINE

## 2023-01-22 PROCEDURE — 25000003 PHARM REV CODE 250: Performed by: FAMILY MEDICINE

## 2023-01-22 RX ADMIN — ATORVASTATIN CALCIUM 20 MG: 20 TABLET, FILM COATED ORAL at 08:01

## 2023-01-22 RX ADMIN — LOSARTAN POTASSIUM 50 MG: 50 TABLET, FILM COATED ORAL at 04:01

## 2023-01-22 RX ADMIN — CHLORHEXIDINE GLUCONATE 15 ML: 1.2 RINSE ORAL at 08:01

## 2023-01-22 RX ADMIN — QUETIAPINE 25 MG: 25 TABLET ORAL at 08:01

## 2023-01-22 RX ADMIN — OXYCODONE HYDROCHLORIDE AND ACETAMINOPHEN 1 TABLET: 7.5; 325 TABLET ORAL at 12:01

## 2023-01-22 RX ADMIN — FAMOTIDINE 20 MG: 20 TABLET ORAL at 08:01

## 2023-01-22 RX ADMIN — NICOTINE 1 PATCH: 14 PATCH, EXTENDED RELEASE TRANSDERMAL at 09:01

## 2023-01-22 RX ADMIN — FUROSEMIDE 20 MG: 20 TABLET ORAL at 08:01

## 2023-01-22 RX ADMIN — ASPIRIN 81 MG CHEWABLE TABLET 81 MG: 81 TABLET CHEWABLE at 08:01

## 2023-01-22 RX ADMIN — AMLODIPINE BESYLATE 10 MG: 5 TABLET ORAL at 08:01

## 2023-01-22 RX ADMIN — OXYCODONE HYDROCHLORIDE AND ACETAMINOPHEN 1 TABLET: 7.5; 325 TABLET ORAL at 06:01

## 2023-01-22 RX ADMIN — ENOXAPARIN SODIUM 105 MG: 150 INJECTION SUBCUTANEOUS at 08:01

## 2023-01-22 RX ADMIN — DOCUSATE SODIUM 100 MG: 100 CAPSULE, LIQUID FILLED ORAL at 08:01

## 2023-01-22 RX ADMIN — CARVEDILOL 25 MG: 25 TABLET, FILM COATED ORAL at 08:01

## 2023-01-22 RX ADMIN — OXYCODONE HYDROCHLORIDE AND ACETAMINOPHEN 1 TABLET: 7.5; 325 TABLET ORAL at 05:01

## 2023-01-22 RX ADMIN — LIDOCAINE 5% 1 PATCH: 700 PATCH TOPICAL at 05:01

## 2023-01-22 RX ADMIN — CARVEDILOL 25 MG: 25 TABLET, FILM COATED ORAL at 04:01

## 2023-01-22 RX ADMIN — TRAZODONE HYDROCHLORIDE 50 MG: 50 TABLET ORAL at 08:01

## 2023-01-22 RX ADMIN — LOSARTAN POTASSIUM 100 MG: 50 TABLET, FILM COATED ORAL at 08:01

## 2023-01-22 RX ADMIN — ESCITALOPRAM OXALATE 10 MG: 10 TABLET ORAL at 08:01

## 2023-01-22 NOTE — PT/OT/SLP PROGRESS
Physical Therapy Treatment    Patient Name:  Peyman Gr   MRN:  [id number]    Recommendations:     Discharge Recommendations: rehabilitation facility  Discharge Equipment Recommendations: walker, rolling, wheelchair, bath bench  Barriers to discharge:  medical care needed    Assessment:     Peyman Gr is a 67 y.o. male admitted with a medical diagnosis of STEMI involving right coronary artery.  He presents with the following impairments/functional limitations: weakness, impaired endurance, impaired self care skills, impaired functional mobility, gait instability, impaired balance, decreased lower extremity function, impaired cardiopulmonary response to activity.    Rehab Prognosis: Fair; patient would benefit from acute skilled PT services to address these deficits and reach maximum level of function.    Recent Surgery: Procedure(s) (LRB):  Left heart cath (Left)  Insertion, Pacemaker, Temporary Transvenous  ANGIOGRAM, CORONARY ARTERY (N/A)  Percutaneous coronary intervention (N/A)  IVUS, Coronary 21 Days Post-Op    Plan:     During this hospitalization, patient to be seen 6 x/week to address the identified rehab impairments via gait training, therapeutic activities, therapeutic exercises and progress toward the following goals:    Plan of Care Expires:  01/18/23    Subjective     Chief Complaint: weak  Patient/Family Comments/goals: get better/stronger  Pain/Comfort:  Pain Rating 1: 0/10      Objective:     Communicated with Ariella FENG prior to session.  Patient found HOB elevated with telemetry, peripheral IV, pulse ox (continuous), blood pressure cuff upon PT entry to room.     General Precautions: Standard, fall  Orthopedic Precautions: N/A  Braces: N/A  Respiratory Status: Room air     Functional Mobility:  Bed Mobility:     Supine to Sit: stand by assistance  Transfers:     Sit to Stand:  contact guard assistance with rolling walker  Gait: 2x10' w/RW, maintains SaO2 >90% at all times, slow  pace      AM-PAC 6 CLICK MOBILITY          Treatment & Education:  Pt was educated on the following: call light use, importance of OOB activity and functional mobility to negate the negative effects of prolonged bed rest during this hospitalization, safe transfers/ambulation and discharge planning recommendations/options.     Patient left up in chair with all lines intact, call button in reach, chair alarm on, RN notified, and wife present..    GOALS:   Multidisciplinary Problems       Physical Therapy Goals          Problem: Physical Therapy    Goal Priority Disciplines Outcome Goal Variances Interventions   Physical Therapy Goal     PT, PT/OT Ongoing, Progressing     Description: Goals to be met by: 2023     Patient will increase functional independence with mobility by performin. Supine to sit with MInimal Assistance  2. Sit to stand transfer with Moderate Assistance  3. Bed to chair with Mod A  w/ol AD  3. Gait  x 10  feet with Minimal Assistance using Rolling Walker.                          Time Tracking:     PT Received On: 23  PT Start Time: 952     PT Stop Time: 1004  PT Total Time (min): 12 min     Billable Minutes: Gait Training 12    Treatment Type: Treatment  PT/PTA: PT     PTA Visit Number: 1     2023

## 2023-01-22 NOTE — PROGRESS NOTES
[facility] Medicine  Progress Note    Patient name: Peyman Gr  MRN: [id number]  Admit Date: 1/1/2023   LOS: 21 days     SUBJECTIVE:     Principal problem: STEMI involving right coronary artery    Interval History:  Patient was seen and examined bedside, sitting in the chair, denies any new symptoms, no acute events overnight as per nursing staff.  Patient appears in good spirit and looking forward to CABG.  Blood pressure could be better    Scheduled Meds:   amLODIPine  10 mg Oral Daily    aspirin  81 mg Oral Daily    atorvastatin  20 mg Oral QHS    carvediloL  25 mg Oral BID WM    chlorhexidine  15 mL Mouth/Throat BID    docusate sodium  100 mg Oral BID    enoxparin  1 mg/kg Subcutaneous Q12H    EScitalopram oxalate  10 mg Oral Daily    famotidine  20 mg Per OG tube BID    furosemide  20 mg Oral Daily    LIDOcaine  1 patch Transdermal Q24H    losartan  100 mg Oral Daily    losartan  50 mg Oral Daily with dinner    nicotine  1 patch Transdermal Daily    polyethylene glycol  17 g Oral BID    QUEtiapine  25 mg Oral Daily with dinner    traZODone  50 mg Oral QHS     Continuous Infusions:  PRN Meds:acetaminophen, atropine, calcium gluconate IVPB, calcium gluconate IVPB, calcium gluconate IVPB, dextrose 10%, dextrose 10%, EPINEPHrine, glucagon (human recombinant), glucose, glucose, hydrALAZINE, hydrALAZINE, lactulose, levalbuterol, magnesium oxide, magnesium oxide, magnesium sulfate IVPB, magnesium sulfate IVPB, melatonin, naloxone, nitroglycerin, ondansetron, oxyCODONE-acetaminophen, potassium bicarbonate, potassium bicarbonate, potassium bicarbonate, potassium chloride in water **AND** potassium chloride in water **AND** potassium chloride in water, potassium, sodium phosphates, potassium, sodium phosphates, potassium, sodium phosphates, senna-docusate 8.6-50 mg, simethicone, sodium chloride 0.9%, sodium phosphate IVPB, sodium phosphate IVPB, sodium phosphate IVPB    Review of patient's  allergies indicates:  No Known Allergies    Review of Systems: As per interval history    OBJECTIVE:     Vital Signs (Most Recent)  Temp: 99 °F (37.2 °C) (01/22/23 0701)  Pulse: 70 (01/22/23 0701)  Resp: 19 (01/22/23 0701)  BP: (!) 161/74 (01/22/23 0701)  SpO2: 97 % (01/22/23 0701)    Vital Signs Range (Last 24H):  Temp:  [97.5 °F (36.4 °C)-99.1 °F (37.3 °C)]   Pulse:  [64-86]   Resp:  [13-30]   BP: (105-168)/(56-82)   SpO2:  [96 %-100 %]     I & O (Last 24H):  Intake/Output Summary (Last 24 hours) at 1/22/2023 1019  Last data filed at 1/22/2023 0531  Gross per 24 hour   Intake 480 ml   Output 1775 ml   Net -1295 ml         Physical Exam:  General: Patient resting comfortably in no acute distress. Appears as stated age. Calm  Eyes: No conjunctival injection. No scleral icterus.  ENT: Hearing grossly intact. No discharge from ears. No nasal discharge.   Neck: Supple, trachea midline. No JVD  CVS: RRR. No LE edema BL  Lungs:  No tachypnea or accessory muscle use.  Clear to auscultation bilaterally  Abdomen:  Soft, nontender and nondistended.  No organomegaly  Neuro: AOx3. Moves all extremities. Follows commands. Responds appropriately   Skin:  No rash or erythema noted    Laboratory:  I have reviewed all pertinent lab results within the past 24 hours.    Diagnostic Results:  Reviewed all imaging    ASSESSMENT/PLAN:     67-year-old gentleman admitted with STEMI involving RCA causing cardiac arrest complicated by complete heart block, MSSA pneumonia, emergent stenting, intubation, extubation on 01/08.  Patient had made remarkable recovery    Active Hospital Problems    Diagnosis  POA    *STEMI involving right coronary artery [I21.11]  Yes    Anemia [D64.9]  Yes    History of tobacco abuse [Z87.891]  Not Applicable    Primary hypertension [I10]  Yes    PVD (peripheral vascular disease) with claudication [I73.9]  Yes    PAD (peripheral artery disease) [I73.9]  Yes      Resolved Hospital Problems    Diagnosis Date Resolved  POA    Pneumonia of right lung due to methicillin susceptible Staphylococcus aureus (MSSA) [J15.211] 01/21/2023 No    Shock liver [K72.00] 01/21/2023 Yes    Cardiogenic shock [R57.0] 01/21/2023 Yes    Cardiac arrest [I46.9] 01/21/2023 Yes    Acute respiratory failure with hypoxia and hypercarbia [J96.01, J96.02] 01/21/2023 Yes    Leukocytosis [D72.829] 01/21/2023 Yes    Hyponatremia [E87.1] 01/02/2023 Yes         Plan:   Remarkable recovery considering his presentation of cardiac arrest, STEMI  Help from multiple consultants appreciated  Patient finished antibiotics for pneumonia, extubated on 01/08  Blood pressure better with increased dose of Norvasc, monitor and titrate as needed  Continue medical management including dual antiplatelets and statin, blood pressure control   Brilinta stopped, Lovenox initiated pending surgical plans  Carotid ultrasound with mild left-sided stenoses  Echo LVEF 70%  Continue PT/OT/SLP, advance diet as tolerated  Patient is waiting for CABG, I was told that tentatively it may happen on 01/24  Will need skilled nursing facility placement postoperatively    VTE Risk Mitigation (From admission, onward)           Ordered     enoxaparin injection 105 mg  Every 12 hours (non-standard times)         01/19/23 1457     IP VTE HIGH RISK PATIENT  Once         01/01/23 0817     Place sequential compression device  Until discontinued         01/01/23 0817                        Department Hospital Medicine  [facility]  Yamile Lozano MD  Date of service: 01/22/2023

## 2023-01-23 ENCOUNTER — ANESTHESIA EVENT (OUTPATIENT)
Dept: SURGERY | Facility: HOSPITAL | Age: 68
DRG: 231 | End: 2023-01-23
Payer: MEDICARE

## 2023-01-23 LAB
ABO + RH BLD: NORMAL
ALBUMIN SERPL BCP-MCNC: 3.1 G/DL (ref 3.5–5.2)
ALP SERPL-CCNC: 112 U/L (ref 55–135)
ALT SERPL W/O P-5'-P-CCNC: 27 U/L (ref 10–44)
ANION GAP SERPL CALC-SCNC: 7 MMOL/L (ref 8–16)
AST SERPL-CCNC: 26 U/L (ref 10–40)
BASOPHILS # BLD AUTO: 0.04 K/UL (ref 0–0.2)
BASOPHILS NFR BLD: 0.9 % (ref 0–1.9)
BILIRUB SERPL-MCNC: 0.3 MG/DL (ref 0.1–1)
BLD GP AB SCN CELLS X3 SERPL QL: NORMAL
BUN SERPL-MCNC: 23 MG/DL (ref 8–23)
CALCIUM SERPL-MCNC: 9.1 MG/DL (ref 8.7–10.5)
CHLORIDE SERPL-SCNC: 103 MMOL/L (ref 95–110)
CO2 SERPL-SCNC: 24 MMOL/L (ref 23–29)
CREAT SERPL-MCNC: 0.9 MG/DL (ref 0.5–1.4)
DIFFERENTIAL METHOD: ABNORMAL
EOSINOPHIL # BLD AUTO: 0 K/UL (ref 0–0.5)
EOSINOPHIL NFR BLD: 0.9 % (ref 0–8)
ERYTHROCYTE [DISTWIDTH] IN BLOOD BY AUTOMATED COUNT: 13.5 % (ref 11.5–14.5)
EST. GFR  (NO RACE VARIABLE): >60 ML/MIN/1.73 M^2
GLUCOSE SERPL-MCNC: 94 MG/DL (ref 70–110)
HCT VFR BLD AUTO: 31.8 % (ref 40–54)
HGB BLD-MCNC: 10.7 G/DL (ref 14–18)
IMM GRANULOCYTES # BLD AUTO: 0.05 K/UL (ref 0–0.04)
IMM GRANULOCYTES NFR BLD AUTO: 1.1 % (ref 0–0.5)
LYMPHOCYTES # BLD AUTO: 2.2 K/UL (ref 1–4.8)
LYMPHOCYTES NFR BLD: 49.4 % (ref 18–48)
MAGNESIUM SERPL-MCNC: 1.8 MG/DL (ref 1.6–2.6)
MCH RBC QN AUTO: 31.3 PG (ref 27–31)
MCHC RBC AUTO-ENTMCNC: 33.6 G/DL (ref 32–36)
MCV RBC AUTO: 93 FL (ref 82–98)
MONOCYTES # BLD AUTO: 0.7 K/UL (ref 0.3–1)
MONOCYTES NFR BLD: 14.5 % (ref 4–15)
NEUTROPHILS # BLD AUTO: 1.5 K/UL (ref 1.8–7.7)
NEUTROPHILS NFR BLD: 33.2 % (ref 38–73)
NRBC BLD-RTO: 0 /100 WBC
PLATELET # BLD AUTO: 296 K/UL (ref 150–450)
PMV BLD AUTO: 8.9 FL (ref 9.2–12.9)
POTASSIUM SERPL-SCNC: 3.9 MMOL/L (ref 3.5–5.1)
PROT SERPL-MCNC: 7.2 G/DL (ref 6–8.4)
RBC # BLD AUTO: 3.42 M/UL (ref 4.6–6.2)
SODIUM SERPL-SCNC: 134 MMOL/L (ref 136–145)
WBC # BLD AUTO: 4.49 K/UL (ref 3.9–12.7)

## 2023-01-23 PROCEDURE — 83735 ASSAY OF MAGNESIUM: CPT | Performed by: FAMILY MEDICINE

## 2023-01-23 PROCEDURE — 25000003 PHARM REV CODE 250

## 2023-01-23 PROCEDURE — 99232 SBSQ HOSP IP/OBS MODERATE 35: CPT | Mod: ,,, | Performed by: INTERNAL MEDICINE

## 2023-01-23 PROCEDURE — 25000003 PHARM REV CODE 250: Performed by: INTERNAL MEDICINE

## 2023-01-23 PROCEDURE — 36415 COLL VENOUS BLD VENIPUNCTURE: CPT | Performed by: THORACIC SURGERY (CARDIOTHORACIC VASCULAR SURGERY)

## 2023-01-23 PROCEDURE — 97530 THERAPEUTIC ACTIVITIES: CPT

## 2023-01-23 PROCEDURE — 21000000 HC CCU ICU ROOM CHARGE

## 2023-01-23 PROCEDURE — 97116 GAIT TRAINING THERAPY: CPT

## 2023-01-23 PROCEDURE — 85025 COMPLETE CBC W/AUTO DIFF WBC: CPT | Performed by: FAMILY MEDICINE

## 2023-01-23 PROCEDURE — S4991 NICOTINE PATCH NONLEGEND: HCPCS | Performed by: INTERNAL MEDICINE

## 2023-01-23 PROCEDURE — 86920 COMPATIBILITY TEST SPIN: CPT | Performed by: THORACIC SURGERY (CARDIOTHORACIC VASCULAR SURGERY)

## 2023-01-23 PROCEDURE — 25000003 PHARM REV CODE 250: Performed by: FAMILY MEDICINE

## 2023-01-23 PROCEDURE — 99232 PR SUBSEQUENT HOSPITAL CARE,LEVL II: ICD-10-PCS | Mod: ,,, | Performed by: INTERNAL MEDICINE

## 2023-01-23 PROCEDURE — 86900 BLOOD TYPING SEROLOGIC ABO: CPT | Performed by: THORACIC SURGERY (CARDIOTHORACIC VASCULAR SURGERY)

## 2023-01-23 PROCEDURE — 80053 COMPREHEN METABOLIC PANEL: CPT | Performed by: FAMILY MEDICINE

## 2023-01-23 PROCEDURE — 92526 ORAL FUNCTION THERAPY: CPT

## 2023-01-23 PROCEDURE — 36415 COLL VENOUS BLD VENIPUNCTURE: CPT | Performed by: FAMILY MEDICINE

## 2023-01-23 PROCEDURE — 63600175 PHARM REV CODE 636 W HCPCS: Performed by: NURSE PRACTITIONER

## 2023-01-23 RX ORDER — HYDROCODONE BITARTRATE AND ACETAMINOPHEN 500; 5 MG/1; MG/1
TABLET ORAL
Status: DISCONTINUED | OUTPATIENT
Start: 2023-01-23 | End: 2023-01-24

## 2023-01-23 RX ORDER — ENOXAPARIN SODIUM 100 MG/ML
1 INJECTION SUBCUTANEOUS
Status: DISCONTINUED | OUTPATIENT
Start: 2023-01-23 | End: 2023-01-24

## 2023-01-23 RX ADMIN — NICOTINE 1 PATCH: 14 PATCH, EXTENDED RELEASE TRANSDERMAL at 08:01

## 2023-01-23 RX ADMIN — OXYCODONE HYDROCHLORIDE AND ACETAMINOPHEN 1 TABLET: 7.5; 325 TABLET ORAL at 06:01

## 2023-01-23 RX ADMIN — ASPIRIN 81 MG CHEWABLE TABLET 81 MG: 81 TABLET CHEWABLE at 08:01

## 2023-01-23 RX ADMIN — Medication 800 MG: at 08:01

## 2023-01-23 RX ADMIN — CARVEDILOL 25 MG: 25 TABLET, FILM COATED ORAL at 08:01

## 2023-01-23 RX ADMIN — LIDOCAINE 5% 1 PATCH: 700 PATCH TOPICAL at 05:01

## 2023-01-23 RX ADMIN — FAMOTIDINE 20 MG: 20 TABLET ORAL at 09:01

## 2023-01-23 RX ADMIN — DOCUSATE SODIUM 100 MG: 100 CAPSULE, LIQUID FILLED ORAL at 08:01

## 2023-01-23 RX ADMIN — DOCUSATE SODIUM 100 MG: 100 CAPSULE, LIQUID FILLED ORAL at 09:01

## 2023-01-23 RX ADMIN — AMLODIPINE BESYLATE 10 MG: 5 TABLET ORAL at 08:01

## 2023-01-23 RX ADMIN — CHLORHEXIDINE GLUCONATE 15 ML: 1.2 RINSE ORAL at 08:01

## 2023-01-23 RX ADMIN — ENOXAPARIN SODIUM 90 MG: 100 INJECTION SUBCUTANEOUS at 09:01

## 2023-01-23 RX ADMIN — ENOXAPARIN SODIUM 105 MG: 150 INJECTION SUBCUTANEOUS at 08:01

## 2023-01-23 RX ADMIN — QUETIAPINE 25 MG: 25 TABLET ORAL at 09:01

## 2023-01-23 RX ADMIN — TRAZODONE HYDROCHLORIDE 50 MG: 50 TABLET ORAL at 09:01

## 2023-01-23 RX ADMIN — LOSARTAN POTASSIUM 50 MG: 50 TABLET, FILM COATED ORAL at 04:01

## 2023-01-23 RX ADMIN — OXYCODONE HYDROCHLORIDE AND ACETAMINOPHEN 1 TABLET: 7.5; 325 TABLET ORAL at 05:01

## 2023-01-23 RX ADMIN — CARVEDILOL 25 MG: 25 TABLET, FILM COATED ORAL at 05:01

## 2023-01-23 RX ADMIN — FAMOTIDINE 20 MG: 20 TABLET ORAL at 08:01

## 2023-01-23 RX ADMIN — LOSARTAN POTASSIUM 100 MG: 50 TABLET, FILM COATED ORAL at 08:01

## 2023-01-23 RX ADMIN — OXYCODONE HYDROCHLORIDE AND ACETAMINOPHEN 1 TABLET: 7.5; 325 TABLET ORAL at 12:01

## 2023-01-23 RX ADMIN — ESCITALOPRAM OXALATE 10 MG: 10 TABLET ORAL at 08:01

## 2023-01-23 RX ADMIN — OXYCODONE HYDROCHLORIDE AND ACETAMINOPHEN 1 TABLET: 7.5; 325 TABLET ORAL at 11:01

## 2023-01-23 RX ADMIN — Medication 800 MG: at 12:01

## 2023-01-23 RX ADMIN — CHLORHEXIDINE GLUCONATE 15 ML: 1.2 RINSE ORAL at 09:01

## 2023-01-23 RX ADMIN — ATORVASTATIN CALCIUM 20 MG: 20 TABLET, FILM COATED ORAL at 09:01

## 2023-01-23 RX ADMIN — FUROSEMIDE 20 MG: 20 TABLET ORAL at 08:01

## 2023-01-23 NOTE — PT/OT/SLP PROGRESS
Occupational Therapy   Treatment    Name: Peyman Gr  MRN: [id number]  Admitting Diagnosis:  STEMI involving right coronary artery  22 Days Post-Op    Recommendations:     Discharge Recommendations: rehabilitation facility  Discharge Equipment Recommendations:   (TBD)  Barriers to discharge:   None    Assessment:     Peyman Gr is a 67 y.o. male with a medical diagnosis of STEMI involving right coronary artery. Performance deficits affecting function are weakness, impaired endurance, impaired self care skills, impaired functional mobility, gait instability, impaired balance, impaired cardiopulmonary response to activity.     Rehab Prognosis:  Good; patient would benefit from acute skilled OT services to address these deficits and reach maximum level of function.       Plan:     Patient to be seen 6 x/week to address the above listed problems via self-care/home management, therapeutic exercises, therapeutic activities  Plan of Care Expires: 02/10/23  Plan of Care Reviewed with: patient    Subjective     Pain/Comfort:  Pain Rating 1: 0/10  Pain Rating Post-Intervention 1: 0/10    Objective:     Communicated with: nurse prior to session.  Patient found up in chair with telemetry, pulse ox (continuous), peripheral IV, blood pressure cuff upon OT entry to room.    General Precautions: Standard, fall    Orthopedic Precautions:N/A  Braces: N/A  Respiratory Status: Room air     Occupational Performance:     Functional Mobility/Transfers:  Patient completed Sit <> Stand Transfer with contact guard assistance  with  rolling walker x 5 trials with rest between trials    Treatment & Education:  Pt educated on what to expect regarding sternal precautions post op CABG and application to ADLs; OT answered pt's questions within OT scope of practice.     Patient left up in chair with all lines intact and call button in reach    GOALS:   Multidisciplinary Problems       Occupational Therapy Goals          Problem: Occupational  Therapy    Goal Priority Disciplines Outcome Interventions   Occupational Therapy Goal     OT, PT/OT Ongoing, Progressing    Description: Goals to be met by: 2/10/2023    Patient will increase functional independence with ADLs by performing:    UE Dressing with Stand-by Assistance.  LE Dressing with Stand-by Assistance.  Grooming while seated with Stand-by Assistance.  Toileting from bedside commode with Minimal Assistance for hygiene and clothing management.   Sitting at edge of bed x10 minutes with Supervision.  Supine to sit with Stand-by Assistance.  Toilet transfer to bedside commode with Minimal Assistance.  Upper extremity exercise program x10 reps per handout, with assistance as needed.                         Time Tracking:     OT Date of Treatment: 01/23/23  OT Start Time: 1050  OT Stop Time: 1058  OT Total Time (min): 8 min    Billable Minutes:Therapeutic Activity 08    OT/ALDO: OT          1/23/2023

## 2023-01-23 NOTE — PT/OT/SLP PROGRESS
"Physical Therapy Treatment    Patient Name:  Peyman Gr   MRN:  [id number]    Recommendations:     Discharge Recommendations: rehabilitation facility  Discharge Equipment Recommendations: walker, rolling, bath bench, wheelchair  Barriers to discharge: None    Assessment:     Peyman Gr is a 67 y.o. male admitted with a medical diagnosis of STEMI involving right coronary artery.  He presents with the following impairments/functional limitations: weakness, impaired endurance, impaired self care skills, impaired functional mobility, gait instability, impaired balance, decreased safety awareness, impaired cardiopulmonary response to activity.    Rehab Prognosis: Fair; patient would benefit from acute skilled PT services to address these deficits and reach maximum level of function.    Recent Surgery: Procedure(s) (LRB):  Left heart cath (Left)  Insertion, Pacemaker, Temporary Transvenous  ANGIOGRAM, CORONARY ARTERY (N/A)  Percutaneous coronary intervention (N/A)  IVUS, Coronary 22 Days Post-Op    Plan:     During this hospitalization, patient to be seen 6 x/week to address the identified rehab impairments via gait training, therapeutic activities, therapeutic exercises and progress toward the following goals:    Plan of Care Expires:  01/18/23    Subjective     Chief Complaint: feeling "woozie" today  Patient/Family Comments/goals: "get through surgery and fix these blockages"  Pain/Comfort:  Pain Rating 1: 0/10      Objective:     Communicated with RN prior to session.  Patient found HOB elevated with telemetry, peripheral IV, pulse ox (continuous), blood pressure cuff upon PT entry to room.     General Precautions: Standard, fall  Orthopedic Precautions: N/A  Braces: N/A  Respiratory Status: Room air     Functional Mobility:  Bed Mobility:     Supine to Sit: minimum assistance  Transfers:     Sit to Stand:  minimum assistance with rolling walker  Gait: up/down single small step Mercedes, 3x10' w/RW CGA and seated " rest break after each      AM-PAC 6 CLICK MOBILITY  Turning over in bed (including adjusting bedclothes, sheets and blankets)?: 3  Sitting down on and standing up from a chair with arms (e.g., wheelchair, bedside commode, etc.): 3  Moving from lying on back to sitting on the side of the bed?: 3  Moving to and from a bed to a chair (including a wheelchair)?: 3  Need to walk in hospital room?: 3  Climbing 3-5 steps with a railing?: 2  Basic Mobility Total Score: 17       Treatment & Education:  Pt was educated on the following: call light use, importance of OOB activity and functional mobility to negate the negative effects of prolonged bed rest during this hospitalization, safe transfers/ambulation and discharge planning recommendations/options.     Patient left up in chair with all lines intact, call button in reach, chair alarm on, RN notified, and wife present..    GOALS:   Multidisciplinary Problems       Physical Therapy Goals          Problem: Physical Therapy    Goal Priority Disciplines Outcome Goal Variances Interventions   Physical Therapy Goal     PT, PT/OT Ongoing, Progressing     Description: Goals to be met by: 2023     Patient will increase functional independence with mobility by performin. Supine to sit with MInimal Assistance  2. Sit to stand transfer with Moderate Assistance  3. Bed to chair with Mod A  w/ol AD  3. Gait  x 10  feet with Minimal Assistance using Rolling Walker.                          Time Tracking:     PT Received On: 23  PT Start Time: 1018     PT Stop Time: 1041  PT Total Time (min): 23 min     Billable Minutes: Gait Training 12 and Therapeutic Activity 11    Treatment Type: Treatment  PT/PTA: PT     PTA Visit Number: 1     2023

## 2023-01-23 NOTE — PROGRESS NOTES
[facility] Medicine  Progress Note    Patient name: Peyman Gr  MRN: [id number]  Admit Date: 1/1/2023   LOS: 22 days     SUBJECTIVE:     Principal problem: STEMI involving right coronary artery    Interval History:  Patient was seen and examined bedside, sitting in the chair, denies any new symptoms, no acute events overnight as per nursing staff.  Patient appears in good spirit and looking forward to CABG.  Blood pressure is better    Scheduled Meds:   amLODIPine  10 mg Oral Daily    aspirin  81 mg Oral Daily    atorvastatin  20 mg Oral QHS    carvediloL  25 mg Oral BID WM    chlorhexidine  15 mL Mouth/Throat BID    docusate sodium  100 mg Oral BID    enoxparin  1 mg/kg Subcutaneous Q12H    EScitalopram oxalate  10 mg Oral Daily    famotidine  20 mg Per OG tube BID    furosemide  20 mg Oral Daily    LIDOcaine  1 patch Transdermal Q24H    losartan  100 mg Oral Daily    losartan  50 mg Oral Daily with dinner    nicotine  1 patch Transdermal Daily    polyethylene glycol  17 g Oral BID    QUEtiapine  25 mg Oral Daily with dinner    traZODone  50 mg Oral QHS     Continuous Infusions:  PRN Meds:acetaminophen, atropine, calcium gluconate IVPB, calcium gluconate IVPB, calcium gluconate IVPB, dextrose 10%, dextrose 10%, EPINEPHrine, glucagon (human recombinant), glucose, glucose, hydrALAZINE, hydrALAZINE, lactulose, levalbuterol, magnesium oxide, magnesium oxide, magnesium sulfate IVPB, magnesium sulfate IVPB, melatonin, naloxone, nitroglycerin, ondansetron, oxyCODONE-acetaminophen, potassium bicarbonate, potassium bicarbonate, potassium bicarbonate, potassium chloride in water **AND** potassium chloride in water **AND** potassium chloride in water, potassium, sodium phosphates, potassium, sodium phosphates, potassium, sodium phosphates, senna-docusate 8.6-50 mg, simethicone, sodium chloride 0.9%, sodium phosphate IVPB, sodium phosphate IVPB, sodium phosphate IVPB    Review of patient's allergies  indicates:  No Known Allergies    Review of Systems: As per interval history    OBJECTIVE:     Vital Signs (Most Recent)  Temp: 99 °F (37.2 °C) (01/23/23 1100)  Pulse: 71 (01/23/23 1100)  Resp: 20 (01/23/23 1158)  BP: 120/64 (01/23/23 1100)  SpO2: 98 % (01/23/23 1100)    Vital Signs Range (Last 24H):  Temp:  [98.4 °F (36.9 °C)-99 °F (37.2 °C)]   Pulse:  [61-85]   Resp:  [11-25]   BP: (113-180)/(59-84)   SpO2:  [95 %-98 %]     I & O (Last 24H):  Intake/Output Summary (Last 24 hours) at 1/23/2023 1312  Last data filed at 1/23/2023 0515  Gross per 24 hour   Intake --   Output 625 ml   Net -625 ml         Physical Exam:  General: Patient resting comfortably in no acute distress. Appears as stated age. Calm  Eyes: No conjunctival injection. No scleral icterus.  ENT: Hearing grossly intact. No discharge from ears. No nasal discharge.   Neck: Supple, trachea midline. No JVD  CVS: RRR. No LE edema BL  Lungs:  No tachypnea or accessory muscle use.  Clear to auscultation bilaterally  Abdomen:  Soft, nontender and nondistended.  No organomegaly  Neuro: AOx3. Moves all extremities. Follows commands. Responds appropriately   Skin:  No rash or erythema noted    Laboratory:  I have reviewed all pertinent lab results within the past 24 hours.    Diagnostic Results:  Reviewed all imaging    ASSESSMENT/PLAN:     67-year-old gentleman admitted with STEMI involving RCA causing cardiac arrest complicated by complete heart block, MSSA pneumonia, emergent stenting, intubation, extubation on 01/08.  Patient had made remarkable recovery    Active Hospital Problems    Diagnosis  POA    *STEMI involving right coronary artery [I21.11]  Yes    Anemia [D64.9]  Yes    History of tobacco abuse [Z87.891]  Not Applicable    Primary hypertension [I10]  Yes    PVD (peripheral vascular disease) with claudication [I73.9]  Yes    PAD (peripheral artery disease) [I73.9]  Yes      Resolved Hospital Problems    Diagnosis Date Resolved POA    Pneumonia of  right lung due to methicillin susceptible Staphylococcus aureus (MSSA) [J15.211] 01/21/2023 No    Shock liver [K72.00] 01/21/2023 Yes    Cardiogenic shock [R57.0] 01/21/2023 Yes    Cardiac arrest [I46.9] 01/21/2023 Yes    Acute respiratory failure with hypoxia and hypercarbia [J96.01, J96.02] 01/21/2023 Yes    Leukocytosis [D72.829] 01/21/2023 Yes    Hyponatremia [E87.1] 01/02/2023 Yes         Plan:   Remarkable recovery considering his presentation of cardiac arrest, STEMI  Help from multiple consultants appreciated  Patient finished antibiotics for pneumonia, extubated on 01/08  Blood pressure better with increased dose of Norvasc, monitor and titrate as needed  Continue medical management including dual antiplatelets and statin, blood pressure control   Brilinta stopped, Lovenox held considering CABG tomorrow  Carotid ultrasound with mild left-sided stenoses  Echo LVEF 70%  Continue PT/OT/SLP, advance diet as tolerated  Patient is waiting for CABG, I was told that tentatively it may happen on 01/24  Will need skilled nursing facility placement postoperatively   Updated wife at bedside    VTE Risk Mitigation (From admission, onward)           Ordered     enoxaparin injection 90 mg  Every 12 hours (non-standard times)         01/23/23 1049     IP VTE HIGH RISK PATIENT  Once         01/01/23 0817     Place sequential compression device  Until discontinued         01/01/23 0817                        Department Hospital Medicine  [facility]  Yamile Lozano MD  Date of service: 01/23/2023

## 2023-01-23 NOTE — CARE UPDATE
01/22/23 8667   Patient Assessment/Suction   Level of Consciousness (AVPU) alert   Respiratory Effort Unlabored   Expansion/Accessory Muscles/Retractions no use of accessory muscles   All Lung Fields Breath Sounds clear;diminished   Rhythm/Pattern, Respiratory no shortness of breath reported   Cough Frequency no cough   PRE-TX-O2   Device (Oxygen Therapy) room air   SpO2 97 %   Pulse Oximetry Type Continuous   $ Pulse Oximetry - Multiple Charge Pulse Oximetry - Multiple   Pulse 64   Resp 17   Positioning   Head of Bed (HOB) Positioning HOB elevated;HOB at 30-45 degrees   Respiratory Evaluation   $ Care Plan Tech Time 15 min

## 2023-01-23 NOTE — PROGRESS NOTES
Pharmacist Renal Dose Adjustment Note    Peyman Gr is a 67 y.o. male being treated with the medication enoxaparin    Patient Data:    Vital Signs (Most Recent):  Temp: 98.6 °F (37 °C) (01/23/23 0721)  Pulse: 69 (01/23/23 1000)  Resp: (!) 22 (01/23/23 1000)  BP: (!) 113/59 (01/23/23 1000)  SpO2: 95 % (01/23/23 1000)   Vital Signs (72h Range):  Temp:  [97.5 °F (36.4 °C)-99.2 °F (37.3 °C)]   Pulse:  [61-93]   Resp:  [11-34]   BP: (105-190)/(56-88)   SpO2:  [95 %-100 %]      Recent Labs   Lab 01/21/23  0429 01/22/23  0612 01/23/23  0416   CREATININE 1.0 1.0 0.9     Serum creatinine: 0.9 mg/dL 01/23/23 0416  Estimated creatinine clearance: 93.9 mL/min    Enoxaparin 105 mg dose: every 12 hour frequency will be changed to enoxaparin 90 mg dose: every 12 hour frequency for weight = 86.8 kg    Pharmacist's Name: Opal Izquierdo  Pharmacist's Extension: [phone]

## 2023-01-23 NOTE — PLAN OF CARE
01/22/23 1344   Patient Assessment/Suction   Level of Consciousness (AVPU) alert   Respiratory Effort Normal;Unlabored   PRE-TX-O2   Device (Oxygen Therapy) room air   SpO2 96 %   Pulse Oximetry Type Continuous   $ Pulse Oximetry - Multiple Charge Pulse Oximetry - Multiple   Pulse 70   Resp (!) 25   Aerosol Therapy   $ Aerosol Therapy Charges PRN treatment not required   Respiratory Treatment Status (SVN) PRN treatment not required   Education   $ Education Bronchodilator;15 min   Respiratory Evaluation   $ Care Plan Tech Time 15 min

## 2023-01-23 NOTE — PROGRESS NOTES
[facility]  Department of Cardiology  Progress Note    PATIENT NAME: Peyman Gr  MRN: [id number]  TODAY'S DATE: 01/23/2023  ADMIT DATE: 1/1/2023    SUBJECTIVE     PRINCIPLE PROBLEM: STEMI involving right coronary artery    INTERVAL HISTORY:    1/23/2023    Patient denies CP   Denies SOB  Awaiting CABG which is scheduled for tomorrow he tells me.      1/21/22  Patient is resting comfortably in bedside chair during examination.  No acute distress, alert and oriented x4.  He denies chest pain.  Healing complains of shortness of breath with exertion.  He is waiting to have CABG in the near future, possibly next week.  He is eating well.  /70 during examination.  Patient was hypertensive overnight and was given 1 dose of IV hydralazine p.r.n..  Oral antihypertensives were titrated up yesterday.    1/20/23  Patient seen sitting up in chair with no distress noted. Breathing is stable. He does have SOB with exertion. He has been using the IS.    1/19/23  Patient is being evaluated by CT surgery for CABG. He reports he is feeling okay. Has had some SOB but denies chest pain.     1/18/23:  No new complaints.  Making some progress with physical therapy.  Some shortness of breath on exertion.  Films reviewed with Dr. Elizondo.      Review of patient's allergies indicates:  No Known Allergies    REVIEW OF SYSTEMS  CARDIOVASCULAR: No recent chest pain, palpitations, arm, neck, or jaw pain  RESPIRATORY:  Shortness of breath with exertion; No recent fever, cough chills, or congestion  : No blood in the urine  GI: No Nausea, vomiting, constipation, diarrhea, blood, or reflux.  MUSCULOSKELETAL: No myalgias  NEURO: No lightheadedness or dizziness  EYES: No Double vision, blurry, vision or headache     OBJECTIVE     VITAL SIGNS (Most Recent)  Temp: 99 °F (37.2 °C) (01/23/23 1100)  Pulse: 71 (01/23/23 1100)  Resp: 20 (01/23/23 1158)  BP: 120/64 (01/23/23 1100)  SpO2: 98 % (01/23/23 1100)    VENTILATION STATUS  Resp:  20 (01/23/23 1158)  SpO2: 98 % (01/23/23 1100)       I & O (Last 24H):  Intake/Output Summary (Last 24 hours) at 1/23/2023 1357  Last data filed at 1/23/2023 1000  Gross per 24 hour   Intake --   Output 975 ml   Net -975 ml       WEIGHTS  Wt Readings from Last 3 Encounters:   01/23/23 0400 86.8 kg (191 lb 5.8 oz)   01/17/23 0400 105.2 kg (231 lb 14.8 oz)   01/15/23 0400 100 kg (220 lb 7.4 oz)   01/14/23 0400 97.8 kg (215 lb 9.8 oz)   01/07/23 0400 115.4 kg (254 lb 6.6 oz)   01/06/23 0400 117.6 kg (259 lb 4.2 oz)   01/05/23 0500 115.3 kg (254 lb 3.1 oz)   01/03/23 0615 105.4 kg (232 lb 5.8 oz)   01/02/23 0400 102.5 kg (225 lb 15.5 oz)   01/02/23 0053 102.5 kg (225 lb 15.5 oz)   01/01/23 0800 99.8 kg (220 lb 0.3 oz)   01/01/23 0341 99.8 kg (220 lb)   01/19/23 1350 104.8 kg (231 lb)   01/01/23 1706 99.8 kg (220 lb)       PHYSICAL EXAM  CONSTITUTIONAL: Well built, well nourished in no apparent distress  NECK: R carotid bruit  LUNGS: CTA  CHEST WALL: no tenderness  HEART: regular rate and rhythm, S1, S2 normal, no murmur, click, rub or gallop   ABDOMEN: soft, non-tender; bowel sounds normal; no masses,  no organomegaly  EXTREMITIES: Extremities normal, no edema  NEURO: AAO X 3    SCHEDULED MEDS:   amLODIPine  10 mg Oral Daily    aspirin  81 mg Oral Daily    atorvastatin  20 mg Oral QHS    carvediloL  25 mg Oral BID WM    chlorhexidine  15 mL Mouth/Throat BID    docusate sodium  100 mg Oral BID    enoxparin  1 mg/kg Subcutaneous Q12H    EScitalopram oxalate  10 mg Oral Daily    famotidine  20 mg Per OG tube BID    furosemide  20 mg Oral Daily    LIDOcaine  1 patch Transdermal Q24H    losartan  100 mg Oral Daily    losartan  50 mg Oral Daily with dinner    nicotine  1 patch Transdermal Daily    polyethylene glycol  17 g Oral BID    QUEtiapine  25 mg Oral Daily with dinner    traZODone  50 mg Oral QHS       CONTINUOUS INFUSIONS:    PRN MEDS:sodium chloride, sodium chloride, acetaminophen, atropine, calcium gluconate IVPB,  calcium gluconate IVPB, calcium gluconate IVPB, dextrose 10%, dextrose 10%, EPINEPHrine, glucagon (human recombinant), glucose, glucose, hydrALAZINE, hydrALAZINE, lactulose, levalbuterol, magnesium oxide, magnesium oxide, magnesium sulfate IVPB, magnesium sulfate IVPB, melatonin, naloxone, nitroglycerin, ondansetron, oxyCODONE-acetaminophen, potassium bicarbonate, potassium bicarbonate, potassium bicarbonate, potassium chloride in water **AND** potassium chloride in water **AND** potassium chloride in water, potassium, sodium phosphates, potassium, sodium phosphates, potassium, sodium phosphates, senna-docusate 8.6-50 mg, simethicone, sodium chloride 0.9%, sodium phosphate IVPB, sodium phosphate IVPB, sodium phosphate IVPB    LABS AND DIAGNOSTICS     CBC LAST 3 DAYS  Recent Labs   Lab 01/21/23 0429 01/22/23  0612 01/23/23 0416   WBC 4.73 4.11 4.49   RBC 3.22* 3.46* 3.42*   HGB 9.9* 10.7* 10.7*   HCT 30.0* 32.1* 31.8*   MCV 93 93 93   MCH 30.7 30.9 31.3*   MCHC 33.0 33.3 33.6   RDW 13.3 13.4 13.5    337 296   MPV 9.2 9.2 8.9*   GRAN 39.5  1.9 44.5  1.8 33.2*  1.5*   LYMPH 43.3  2.1 36.3  1.5 49.4*  2.2   MONO 14.2  0.7 16.3*  0.7 14.5  0.7   BASO 0.06 0.03 0.04   NRBC 0 0 0       COAGULATION LAST 3 DAYS  No results for input(s): LABPT, INR, APTT in the last 168 hours.    CHEMISTRY LAST 3 DAYS  Recent Labs   Lab 01/21/23 0429 01/22/23  0612 01/23/23 0416   * 134* 134*   K 3.6 4.0 3.9    103 103   CO2 25 23 24   ANIONGAP 7* 8 7*   BUN 21 22 23   CREATININE 1.0 1.0 0.9   GLU 95 99 94   CALCIUM 8.7 8.8 9.1   MG 1.9 1.8 1.8   ALBUMIN 3.1* 3.1* 3.1*   PROT 6.7 7.1 7.2   ALKPHOS 114 110 112   ALT 26 27 27   AST 23 23 26   BILITOT 0.7 0.5 0.3       CARDIAC PROFILE LAST 3 DAYS  No results for input(s): BNP, CPK, CPKMB, LDH, TROPONINI in the last 168 hours.    ENDOCRINE LAST 3 DAYS  No results for input(s): TSH, PROCAL in the last 168 hours.      LAST ARTERIAL BLOOD GAS  ABG  No results for  input(s): PH, PO2, PCO2, HCO3, BE in the last 168 hours.    LAST 7 DAYS MICROBIOLOGY   Microbiology Results (last 7 days)       ** No results found for the last 168 hours. **            MOST RECENT IMAGING  US Upper Extremity Arteries Bilateral  Upper extremity arterial ultrasound    Clinical history is CVA, abnormal carotid ultrasound on 1/18/2022    The right subclavian artery is patent with normal waveform pattern. The peak systolic velocity is 133 cm/s. There is scattered calcified plaque    There are scattered calcified plaque in the left subclavian artery which is patent. The peak systolic velocity is 206 cm/s.    IMPRESSION: Vascular calcification within the carotid arteries with no evidence of high-grade stenosis or occlusion    Electronically signed by:  Malu River MD  1/20/2023 3:02 PM [facility] Workstation: [id number]      [facility]  Results for orders placed during the hospital encounter of 01/01/23    Echo Saline Bubble? No    Interpretation Summary  · The estimated ejection fraction is 55%.  · The left ventricle is normal in size with concentric hypertrophy.  · Mild to moderate tricuspid regurgitation.  · Moderate right ventricular enlargement with moderately reduced right ventricular systolic function.  · Grade I left ventricular diastolic dysfunction.  · There is mild aortic valve stenosis.  · Aortic valve area is 1.68 cm2; peak velocity is 1.36 m/s; mean gradient is 4 mmHg.  · There is abnormal septal wall motion consistent with right ventricular pacemaker.  · There are segmental left ventricular wall motion abnormalities.      CURRENT/PREVIOUS VISIT EKG  Results for orders placed or performed during the hospital encounter of 01/01/23   EKG 12-lead    Collection Time: 01/14/23  5:26 PM    Narrative    Test Reason : R07.9,    Vent. Rate : 076 BPM     Atrial Rate : 076 BPM     P-R Int : 128 ms          QRS Dur : 082 ms      QT Int : 378 ms       P-R-T Axes : 065 -07 104 degrees     QTc Int : 425  ms    Normal sinus rhythm  LVH with repolarization abnormality  Inferior infarct (cited on or before 01-JAN-2023)  Abnormal ECG  When compared with ECG of 12-JAN-2023 15:01,  Premature supraventricular complexes are no longer Present  Questionable change in initial forces of Inferior leads  Confirmed by Claude ZAFAR, Aurelio CAMPO ([id number]) on 1/16/2023 8:57:26 PM    Referred By: IRENE MOSER           Confirmed By:Aurelio Arce MD       ASSESSMENT/PLAN:     Active Hospital Problems    Diagnosis    *STEMI involving right coronary artery    Anemia    History of tobacco abuse    Primary hypertension    PVD (peripheral vascular disease) with claudication    PAD (peripheral artery disease)       ASSESSMENT & PLAN:   Severe left main coronary stenosis with 90% lesion in small mid marginal  ST elevation MI with stent in ostial right  status post STEMI with cardiac arrest  50-69% stenosis on US right  H/O Right neck carotid surgery      RECOMMENDATIONS:    VSS  Awaiting surgery   Continue current regimen  Will follow post operatively    Mary Hernandez NP  [facility]  Department of Cardiology  Date of Service: 01/23/2023      I have personally interviewed and examined the patient.  I have reviewed all the Nurse Practitioner's documentation, and agree with the plan.     Sarthak Cardona M.D.  [facility]  Department of Cardiology  Date of Service: 01/23/2023

## 2023-01-23 NOTE — PT/OT/SLP PROGRESS
"Speech Language Pathology Treatment    Patient Name:  Peyman Gr   MRN:  [id number]  Admitting Diagnosis: STEMI involving right coronary artery    Recommendations:                 General Recommendations:  Dysphagia therapy  Diet recommendations:  Easy to Chew Diet - IDDSI Level 7, Liquid Diet Level: Thin   Aspiration Precautions:  1 bite/sip at a time, Alternating bites/sips, Avoid talking while eating, Eliminate distractions, Frequent oral care (ESPECIALLY BEFORE/AFTER MEALS), HOB to 90 degrees, Meds whole 1 at a time, and Small bites/sips    General Precautions: Standard, aspiration, fall  Communication strategies:  none    Subjective     Pt awake sitting in bedside chair w/ wife at bedside. Agreeable to ST.  Patient goals: "I am having open heart surgery tomorrow"     Pain/Comfort:       Respiratory Status: Room air    Objective:     Has the patient been evaluated by SLP for swallowing?   Yes  Keep patient NPO? No   Current Respiratory Status:        Pt completed lingual ROM exercises x10, lingual strengthening exercises x20, and buccal/labial seal exercises x10. Pt requires min cuing for correct placement during exercises.     Assessment:     Peyman Gr is a 67 y.o. male with an SLP diagnosis of oral Dysphagia.  He presents with improved lingual ROM; lingual strength remains reduced. Increased endurance in completing exercises. Pt and his wife report adequate tolerance of diet upgrade and use of swallowing precautions. Pt scheduled to have surgery w/ intubation tomorrow. ST will f/u after surgery to ensure pt swallowing not impacted by intubation.    Goals:   Multidisciplinary Problems       SLP Goals          Problem: SLP    Goal Priority Disciplines Outcome   SLP Goal     SLP Ongoing, Progressing   Description: 1. Pt will tolerate PO trials of puree and thin liquid w/o overt s/s aspiration and w/ adequate oral clearance during >90% of trials -MET  2. Pt will participate in ongoing diagnostic dysphagia tx " in order to determine LRD -MET  3. Pt will tolerate pureed diet w/ thin liquids w/o overt s/s aspiration and w/ adequate oral clearance w/ >90% of PO intake -MET  4. Pt will tolerate level 6 diet w/ thin liquids w/ adequate bolus manipulation and oral clearance w/o overt s/s aspiration during >90% of PO intake -MET  5. Pt will participate in cognitive-linguistic evaluation (DISCONTINUED, pt's wife stating pt at baseline)  6. Pt will complete lingual strengthening exercises x10 each during each session  7. Pt will tolerate level 7a diet w/ thin liquids w/ adequate bolus manipulation and oral clearance w/o overt s/s aspiration during >90% of PO intake                       Plan:     Patient to be seen:  3 x/week   Plan of Care expires:     Plan of Care reviewed with:  patient, spouse   SLP Follow-Up:  Yes       Discharge recommendations:  rehabilitation facility   Barriers to Discharge:  None    Time Tracking:     SLP Treatment Date:   01/23/23  Speech Start Time:  1135  Speech Stop Time:  1145     Speech Total Time (min):  10 min    Billable Minutes: Treatment Swallowing Dysfunction 10 min    01/23/2023

## 2023-01-23 NOTE — ANESTHESIA PREPROCEDURE EVALUATION
01/23/2023  Peyman Gr is a 67 y.o., male.      Pre-op Assessment    I have reviewed the Patient Summary Reports.     I have reviewed the Nursing Notes. I have reviewed the NPO Status.   I have reviewed the Medications.     Review of Systems  Anesthesia Hx:  No problems with previous Anesthesia  Neg history of prior surgery. Denies Family Hx of Anesthesia complications.   Denies Personal Hx of Anesthesia complications.   Social:  Former Smoker, No Alcohol Use    Hematology/Oncology:  Hematology Normal   Oncology Normal     EENT/Dental:EENT/Dental Normal   Cardiovascular:   Hypertension Past MI (1/1/23) CABG/stent (Stent to rca on 1/1/23)  PVD hyperlipidemia Pt coded multiple times in cath lab   Pulmonary:   Shortness of breath    Renal/:  Renal/ Normal     Hepatic/GI:  Hepatic/GI Normal    Musculoskeletal:   Arthritis  S/p cervical fusion with hardware  S/p lumbar surgery with cage Spine Disorders: cervical and lumbar Disc disease, Degenerative disease and Chronic Pain    Neurological:   Neuromuscular disease: Sciatica.    Endocrine:  Endocrine Normal    Dermatological:  Skin Normal    Psych:  Psychiatric Normal           Patient Active Problem List   Diagnosis    Sciatica neuralgia    Back pain    Salivary gland enlargement    Degenerative cervical disc    Other cervical disc degeneration, unspecified cervical region    Degeneration of lumbar intervertebral disc    Preop testing    PAD (peripheral artery disease)    SOB (shortness of breath)    PVD (peripheral vascular disease) with claudication    Stenosis of right subclavian artery    Primary hypertension    History of tobacco abuse    Anemia    STEMI involving right coronary artery       Past Surgical History:   Procedure Laterality Date    ANGIOGRAPHY OF LOWER EXTREMITY N/A 09/24/2020    Procedure: Angiogram Extremity Unilateral;   Surgeon: Luke Cabello MD;  Location: [facility] CATH;  Service: Cardiology;  Laterality: N/A;    BACK SURGERY      BUNIONECTOMY Bilateral     CARPAL TUNNEL RELEASE Bilateral     CHOLECYSTECTOMY      COLONOSCOPY N/A 5/9/2022    Procedure: COLONOSCOPY;  Surgeon: Braydon Durand MD;  Location: [facility] ENDO;  Service: Endoscopy;  Laterality: N/A;    COLONOSCOPY W/ POLYPECTOMY      CORONARY ANGIOGRAPHY N/A 1/1/2023    Procedure: ANGIOGRAM, CORONARY ARTERY;  Surgeon: Stefany Elizondo MD;  Location: [facility] CATH/EP LAB;  Service: Cardiology;  Laterality: N/A;    CORONARY ARTERY BYPASS GRAFT      CREATION OF BYPASS FROM INTERNAL CAROTID ARTERY TO SUBCLAVIAN ARTERY Right 12/27/2021    Procedure: CREATION, BYPASS, ARTERIAL, INTERNAL CAROTID TO SUBCLAVIAN;  Surgeon: Jeovany Goins MD;  Location: [facility] OR;  Service: Vascular;  Laterality: Right;    ELBOW ARTHROPLASTY Right     ELBOW SURGERY      INSERTION, PACEMAKER, TEMPORARY TRANSVENOUS  1/1/2023    Procedure: Insertion, Pacemaker, Temporary Transvenous;  Surgeon: Stefany Elizondo MD;  Location: [facility] CATH/EP LAB;  Service: Cardiology;;    IVUS, CORONARY  1/1/2023    Procedure: IVUS, Coronary;  Surgeon: Stefany Elizondo MD;  Location: [facility] CATH/EP LAB;  Service: Cardiology;;    LEFT HEART CATHETERIZATION Left 1/1/2023    Procedure: Left heart cath;  Surgeon: Stefany Elizondo MD;  Location: [facility] CATH/EP LAB;  Service: Cardiology;  Laterality: Left;    MINIMALLY INVASIVE FORAMINOTOMY OF  SPINE N/A 11/15/2018    Procedure: FORAMINOTOMY, SPINE, MINIMALLY INVASIVE DECOMPRESSION L4-5;  Surgeon: Iván Stevenson Jr., MD;  Location: [facility] OR;  Service: Orthopedics;  Laterality: N/A;    NECK SURGERY      acf    PERCUTANEOUS CORONARY INTERVENTION, ARTERY N/A 1/1/2023    Procedure: Percutaneous coronary intervention;  Surgeon: Stefany Elizondo MD;  Location: [facility] CATH/EP LAB;  Service: Cardiology;  Laterality: N/A;     PERCUTANEOUS TRANSLUMINAL ANGIOPLASTY (PTA) OF PERIPHERAL VESSEL Right 05/01/2020    Procedure: PTA, PERIPHERAL VESSEL of right lower extremity;  Surgeon: Luke Cabello MD;  Location: [facility] CATH;  Service: Cardiology;  Laterality: Right;    PERCUTANEOUS TRANSLUMINAL ANGIOPLASTY (PTA) OF PERIPHERAL VESSEL Left 09/10/2020    Procedure: PTA, PERIPHERAL VESSEL, Left lower extremity;  Surgeon: Luke Cabello MD;  Location: [facility] CATH;  Service: Cardiology;  Laterality: Left;    PERCUTANEOUS TRANSLUMINAL ANGIOPLASTY (PTA) OF PERIPHERAL VESSEL Left 07/06/2021    [employer] and SFA        Tobacco Use:  The patient  reports that he quit smoking about 6 years ago. His smoking use included cigarettes. He has a 120.00 pack-year smoking history. He has never used smokeless tobacco.     Results for orders placed or performed during the hospital encounter of 01/01/23   EKG 12-lead    Collection Time: 01/14/23  5:26 PM    Narrative    Test Reason : R07.9,    Vent. Rate : 076 BPM     Atrial Rate : 076 BPM     P-R Int : 128 ms          QRS Dur : 082 ms      QT Int : 378 ms       P-R-T Axes : 065 -07 104 degrees     QTc Int : 425 ms    Normal sinus rhythm  LVH with repolarization abnormality  Inferior infarct (cited on or before 01-JAN-2023)  Abnormal ECG  When compared with ECG of 12-JAN-2023 15:01,  Premature supraventricular complexes are no longer Present  Questionable change in initial forces of Inferior leads  Confirmed by Claude ZAFAR, Aurelio CAMPO ([id number]) on 1/16/2023 8:57:26 PM    Referred By: IRENE MOSER           Confirmed By:Aurelio Arce MD             Lab Results   Component Value Date    WBC 4.49 01/23/2023    HGB 10.7 (L) 01/23/2023    HCT 31.8 (L) 01/23/2023    MCV 93 01/23/2023     01/23/2023     BMP  Lab Results   Component Value Date     (L) 01/23/2023    K 3.9 01/23/2023     01/23/2023    CO2 24 01/23/2023    BUN 23 01/23/2023    CREATININE 0.9 01/23/2023    CALCIUM 9.1 01/23/2023    ANIONGAP 7  (L) 01/23/2023    GLU 94 01/23/2023    GLU 99 01/22/2023    GLU 95 01/21/2023       Results for orders placed during the hospital encounter of 01/01/23    Echo Saline Bubble? No    Interpretation Summary  · The left ventricle is normal in size with mild concentric hypertrophy and normal systolic function.  · The estimated ejection fraction is 70%.  · Normal left ventricular diastolic function.  · Atrial fibrillation not observed.  · Normal right ventricular size with normal right ventricular systolic function.  · Normal central venous pressure (3 mmHg).          Physical Exam  General: Well nourished and Alert    Airway:  Mallampati: II   Mouth Opening: Normal  TM Distance: Normal  Tongue: Normal  Neck ROM: Normal ROM    Dental:Very poor dentition. Multiple missing teeth  Chest/Lungs:  Clear to auscultation, Normal Respiratory Rate    Heart:  Rate: Normal  Rhythm: Regular Rhythm  Sounds: Normal        Anesthesia Plan  Type of Anesthesia, risks & benefits discussed:    Anesthesia Type: Gen ETT  Intra-op Monitoring Plan: Standard ASA Monitors, Art Line, Central Line, PA and CO  Post Op Pain Control Plan: multimodal analgesia  Induction:  IV  Airway Plan: Video, Post-Induction  Informed Consent: Informed consent signed with the Patient and all parties understand the risks and agree with anesthesia plan.  All questions answered. Patient consented to blood products? Yes  ASA Score: 4 Emergent    Ready For Surgery From Anesthesia Perspective.     .

## 2023-01-24 ENCOUNTER — ANESTHESIA (OUTPATIENT)
Dept: SURGERY | Facility: HOSPITAL | Age: 68
DRG: 231 | End: 2023-01-24
Payer: MEDICARE

## 2023-01-24 LAB
ALBUMIN SERPL BCP-MCNC: 3.3 G/DL (ref 3.5–5.2)
ALLENS TEST: ABNORMAL
ALP SERPL-CCNC: 121 U/L (ref 55–135)
ALT SERPL W/O P-5'-P-CCNC: 28 U/L (ref 10–44)
ANION GAP SERPL CALC-SCNC: 11 MMOL/L (ref 8–16)
ANION GAP SERPL CALC-SCNC: 8 MMOL/L (ref 8–16)
ANION GAP SERPL CALC-SCNC: 9 MMOL/L (ref 8–16)
AST SERPL-CCNC: 30 U/L (ref 10–40)
BASOPHILS # BLD AUTO: 0 K/UL (ref 0–0.2)
BASOPHILS # BLD AUTO: 0.01 K/UL (ref 0–0.2)
BASOPHILS # BLD AUTO: 0.03 K/UL (ref 0–0.2)
BASOPHILS NFR BLD: 0 % (ref 0–1.9)
BASOPHILS NFR BLD: 0.1 % (ref 0–1.9)
BASOPHILS NFR BLD: 0.7 % (ref 0–1.9)
BILIRUB SERPL-MCNC: 0.5 MG/DL (ref 0.1–1)
BUN SERPL-MCNC: 14 MG/DL (ref 8–23)
BUN SERPL-MCNC: 16 MG/DL (ref 8–23)
BUN SERPL-MCNC: 20 MG/DL (ref 8–23)
CALCIUM SERPL-MCNC: 8.4 MG/DL (ref 8.7–10.5)
CALCIUM SERPL-MCNC: 8.7 MG/DL (ref 8.7–10.5)
CALCIUM SERPL-MCNC: 9 MG/DL (ref 8.7–10.5)
CHLORIDE SERPL-SCNC: 102 MMOL/L (ref 95–110)
CO2 SERPL-SCNC: 22 MMOL/L (ref 23–29)
CO2 SERPL-SCNC: 23 MMOL/L (ref 23–29)
CO2 SERPL-SCNC: 24 MMOL/L (ref 23–29)
CREAT SERPL-MCNC: 0.8 MG/DL (ref 0.5–1.4)
CREAT SERPL-MCNC: 0.9 MG/DL (ref 0.5–1.4)
CREAT SERPL-MCNC: 0.9 MG/DL (ref 0.5–1.4)
DELSYS: ABNORMAL
DIFFERENTIAL METHOD: ABNORMAL
EOSINOPHIL # BLD AUTO: 0 K/UL (ref 0–0.5)
EOSINOPHIL # BLD AUTO: 0 K/UL (ref 0–0.5)
EOSINOPHIL # BLD AUTO: 0.1 K/UL (ref 0–0.5)
EOSINOPHIL NFR BLD: 0 % (ref 0–8)
EOSINOPHIL NFR BLD: 0.3 % (ref 0–8)
EOSINOPHIL NFR BLD: 1.4 % (ref 0–8)
ERYTHROCYTE [DISTWIDTH] IN BLOOD BY AUTOMATED COUNT: 13.3 % (ref 11.5–14.5)
ERYTHROCYTE [DISTWIDTH] IN BLOOD BY AUTOMATED COUNT: 13.4 % (ref 11.5–14.5)
ERYTHROCYTE [DISTWIDTH] IN BLOOD BY AUTOMATED COUNT: 13.4 % (ref 11.5–14.5)
ERYTHROCYTE [SEDIMENTATION RATE] IN BLOOD BY WESTERGREN METHOD: 16 MM/H
ERYTHROCYTE [SEDIMENTATION RATE] IN BLOOD BY WESTERGREN METHOD: 16 MM/H
ERYTHROCYTE [SEDIMENTATION RATE] IN BLOOD BY WESTERGREN METHOD: 19 MM/H
ERYTHROCYTE [SEDIMENTATION RATE] IN BLOOD BY WESTERGREN METHOD: 24 MM/H
ERYTHROCYTE [SEDIMENTATION RATE] IN BLOOD BY WESTERGREN METHOD: 28 MM/H
EST. GFR  (NO RACE VARIABLE): >60 ML/MIN/1.73 M^2
FIO2: 100
FIO2: 40
FIO2: 40
FIO2: 45
FIO2: 60
GLUCOSE SERPL-MCNC: 115 MG/DL (ref 70–110)
GLUCOSE SERPL-MCNC: 122 MG/DL (ref 70–110)
GLUCOSE SERPL-MCNC: 124 MG/DL (ref 70–110)
GLUCOSE SERPL-MCNC: 131 MG/DL (ref 70–110)
GLUCOSE SERPL-MCNC: 138 MG/DL (ref 70–110)
GLUCOSE SERPL-MCNC: 144 MG/DL (ref 70–110)
GLUCOSE SERPL-MCNC: 153 MG/DL (ref 70–110)
GLUCOSE SERPL-MCNC: 162 MG/DL (ref 70–110)
GLUCOSE SERPL-MCNC: 166 MG/DL (ref 70–110)
GLUCOSE SERPL-MCNC: 171 MG/DL (ref 70–110)
GLUCOSE SERPL-MCNC: 175 MG/DL (ref 70–110)
GLUCOSE SERPL-MCNC: 177 MG/DL (ref 70–110)
GLUCOSE SERPL-MCNC: 181 MG/DL (ref 70–110)
GLUCOSE SERPL-MCNC: 194 MG/DL (ref 70–110)
GLUCOSE SERPL-MCNC: 199 MG/DL (ref 70–110)
GLUCOSE SERPL-MCNC: 204 MG/DL (ref 70–110)
GLUCOSE SERPL-MCNC: 86 MG/DL (ref 70–110)
HCO3 UR-SCNC: 22.1 MMOL/L (ref 24–28)
HCO3 UR-SCNC: 22.9 MMOL/L (ref 24–28)
HCO3 UR-SCNC: 23 MMOL/L (ref 24–28)
HCO3 UR-SCNC: 23.5 MMOL/L (ref 24–28)
HCO3 UR-SCNC: 23.7 MMOL/L (ref 24–28)
HCO3 UR-SCNC: 24.6 MMOL/L (ref 24–28)
HCO3 UR-SCNC: 25.5 MMOL/L (ref 24–28)
HCO3 UR-SCNC: 26.1 MMOL/L (ref 24–28)
HCO3 UR-SCNC: 26.4 MMOL/L (ref 24–28)
HCO3 UR-SCNC: 26.5 MMOL/L (ref 24–28)
HCO3 UR-SCNC: 26.7 MMOL/L (ref 24–28)
HCO3 UR-SCNC: 26.8 MMOL/L (ref 24–28)
HCT VFR BLD AUTO: 26.2 % (ref 40–54)
HCT VFR BLD AUTO: 28.1 % (ref 40–54)
HCT VFR BLD AUTO: 35.4 % (ref 40–54)
HCT VFR BLD CALC: 23 %PCV (ref 36–54)
HCT VFR BLD CALC: 24 %PCV (ref 36–54)
HCT VFR BLD CALC: 24 %PCV (ref 36–54)
HCT VFR BLD CALC: 26 %PCV (ref 36–54)
HCT VFR BLD CALC: 27 %PCV (ref 36–54)
HCT VFR BLD CALC: 28 %PCV (ref 36–54)
HCT VFR BLD CALC: 28 %PCV (ref 36–54)
HCT VFR BLD CALC: 30 %PCV (ref 36–54)
HCT VFR BLD CALC: 31 %PCV (ref 36–54)
HCT VFR BLD CALC: 31 %PCV (ref 36–54)
HCT VFR BLD CALC: 33 %PCV (ref 36–54)
HCT VFR BLD CALC: 35 %PCV (ref 36–54)
HGB BLD-MCNC: 11.5 G/DL (ref 14–18)
HGB BLD-MCNC: 8.7 G/DL (ref 14–18)
HGB BLD-MCNC: 9.3 G/DL (ref 14–18)
IMM GRANULOCYTES # BLD AUTO: 0.02 K/UL (ref 0–0.04)
IMM GRANULOCYTES # BLD AUTO: 0.04 K/UL (ref 0–0.04)
IMM GRANULOCYTES # BLD AUTO: 0.09 K/UL (ref 0–0.04)
IMM GRANULOCYTES NFR BLD AUTO: 0.5 % (ref 0–0.5)
IMM GRANULOCYTES NFR BLD AUTO: 0.6 % (ref 0–0.5)
IMM GRANULOCYTES NFR BLD AUTO: 1.3 % (ref 0–0.5)
LYMPHOCYTES # BLD AUTO: 0.2 K/UL (ref 1–4.8)
LYMPHOCYTES # BLD AUTO: 0.7 K/UL (ref 1–4.8)
LYMPHOCYTES # BLD AUTO: 2.1 K/UL (ref 1–4.8)
LYMPHOCYTES NFR BLD: 3.2 % (ref 18–48)
LYMPHOCYTES NFR BLD: 46.9 % (ref 18–48)
LYMPHOCYTES NFR BLD: 9.2 % (ref 18–48)
MAGNESIUM SERPL-MCNC: 1.9 MG/DL (ref 1.6–2.6)
MAGNESIUM SERPL-MCNC: 2.3 MG/DL (ref 1.6–2.6)
MAGNESIUM SERPL-MCNC: 2.8 MG/DL (ref 1.6–2.6)
MCH RBC QN AUTO: 30.8 PG (ref 27–31)
MCH RBC QN AUTO: 30.9 PG (ref 27–31)
MCH RBC QN AUTO: 31.3 PG (ref 27–31)
MCHC RBC AUTO-ENTMCNC: 32.5 G/DL (ref 32–36)
MCHC RBC AUTO-ENTMCNC: 33.1 G/DL (ref 32–36)
MCHC RBC AUTO-ENTMCNC: 33.2 G/DL (ref 32–36)
MCV RBC AUTO: 93 FL (ref 82–98)
MCV RBC AUTO: 93 FL (ref 82–98)
MCV RBC AUTO: 97 FL (ref 82–98)
MIN VOL: 12
MODE: ABNORMAL
MONOCYTES # BLD AUTO: 0.3 K/UL (ref 0.3–1)
MONOCYTES # BLD AUTO: 0.3 K/UL (ref 0.3–1)
MONOCYTES # BLD AUTO: 0.7 K/UL (ref 0.3–1)
MONOCYTES NFR BLD: 16.5 % (ref 4–15)
MONOCYTES NFR BLD: 4.2 % (ref 4–15)
MONOCYTES NFR BLD: 4.8 % (ref 4–15)
NEUTROPHILS # BLD AUTO: 1.5 K/UL (ref 1.8–7.7)
NEUTROPHILS # BLD AUTO: 6.1 K/UL (ref 1.8–7.7)
NEUTROPHILS # BLD AUTO: 6.5 K/UL (ref 1.8–7.7)
NEUTROPHILS NFR BLD: 34 % (ref 38–73)
NEUTROPHILS NFR BLD: 84.9 % (ref 38–73)
NEUTROPHILS NFR BLD: 91.4 % (ref 38–73)
NRBC BLD-RTO: 0 /100 WBC
PCO2 BLDA: 36 MMHG (ref 35–45)
PCO2 BLDA: 36.7 MMHG (ref 35–45)
PCO2 BLDA: 39 MMHG (ref 35–45)
PCO2 BLDA: 40.2 MMHG (ref 35–45)
PCO2 BLDA: 40.9 MMHG (ref 35–45)
PCO2 BLDA: 41.8 MMHG (ref 35–45)
PCO2 BLDA: 42.9 MMHG (ref 35–45)
PCO2 BLDA: 44.8 MMHG (ref 35–45)
PCO2 BLDA: 45.4 MMHG (ref 35–45)
PCO2 BLDA: 48.5 MMHG (ref 35–45)
PCO2 BLDA: 49.5 MMHG (ref 35–45)
PCO2 BLDA: 52.1 MMHG (ref 35–45)
PEEP: 5
PH SMN: 7.28 [PH] (ref 7.35–7.45)
PH SMN: 7.32 [PH] (ref 7.35–7.45)
PH SMN: 7.32 [PH] (ref 7.35–7.45)
PH SMN: 7.33 [PH] (ref 7.35–7.45)
PH SMN: 7.35 [PH] (ref 7.35–7.45)
PH SMN: 7.35 [PH] (ref 7.35–7.45)
PH SMN: 7.37 [PH] (ref 7.35–7.45)
PH SMN: 7.38 [PH] (ref 7.35–7.45)
PH SMN: 7.41 [PH] (ref 7.35–7.45)
PH SMN: 7.42 [PH] (ref 7.35–7.45)
PH SMN: 7.44 [PH] (ref 7.35–7.45)
PH SMN: 7.48 [PH] (ref 7.35–7.45)
PHOSPHATE SERPL-MCNC: 4 MG/DL (ref 2.7–4.5)
PIP: 16
PIP: 17
PIP: 20
PIP: 21
PLATELET # BLD AUTO: 162 K/UL (ref 150–450)
PLATELET # BLD AUTO: 171 K/UL (ref 150–450)
PLATELET # BLD AUTO: 275 K/UL (ref 150–450)
PMV BLD AUTO: 8.8 FL (ref 9.2–12.9)
PMV BLD AUTO: 9 FL (ref 9.2–12.9)
PMV BLD AUTO: 9 FL (ref 9.2–12.9)
PO2 BLDA: 100 MMHG (ref 80–100)
PO2 BLDA: 104 MMHG (ref 80–100)
PO2 BLDA: 106 MMHG (ref 80–100)
PO2 BLDA: 110 MMHG (ref 80–100)
PO2 BLDA: 136 MMHG (ref 80–100)
PO2 BLDA: 272 MMHG (ref 80–100)
PO2 BLDA: 300 MMHG (ref 80–100)
PO2 BLDA: 312 MMHG (ref 80–100)
PO2 BLDA: 377 MMHG (ref 80–100)
PO2 BLDA: 378 MMHG (ref 80–100)
PO2 BLDA: 475 MMHG (ref 80–100)
PO2 BLDA: 477 MMHG (ref 80–100)
POC ACTIVATED CLOTTING TIME K: 119 SEC (ref 74–137)
POC ACTIVATED CLOTTING TIME K: 119 SEC (ref 74–137)
POC ACTIVATED CLOTTING TIME K: 456 SEC (ref 74–137)
POC ACTIVATED CLOTTING TIME K: 588 SEC (ref 74–137)
POC ACTIVATED CLOTTING TIME K: 618 SEC (ref 74–137)
POC BE: -1 MMOL/L
POC BE: -2 MMOL/L
POC BE: -3 MMOL/L
POC BE: -4 MMOL/L
POC BE: 1 MMOL/L
POC BE: 1 MMOL/L
POC BE: 2 MMOL/L
POC BE: 2 MMOL/L
POC BE: 3 MMOL/L
POC IONIZED CALCIUM: 1.05 MMOL/L (ref 1.06–1.42)
POC IONIZED CALCIUM: 1.09 MMOL/L (ref 1.06–1.42)
POC IONIZED CALCIUM: 1.16 MMOL/L (ref 1.06–1.42)
POC IONIZED CALCIUM: 1.18 MMOL/L (ref 1.06–1.42)
POC IONIZED CALCIUM: 1.21 MMOL/L (ref 1.06–1.42)
POC IONIZED CALCIUM: 1.22 MMOL/L (ref 1.06–1.42)
POC IONIZED CALCIUM: 1.23 MMOL/L (ref 1.06–1.42)
POC IONIZED CALCIUM: 1.25 MMOL/L (ref 1.06–1.42)
POC IONIZED CALCIUM: 1.29 MMOL/L (ref 1.06–1.42)
POC IONIZED CALCIUM: 1.45 MMOL/L (ref 1.06–1.42)
POC PCO2 TEMP: 40.6 MMHG
POC PH TEMP: 7.34
POC PO2 TEMP: 294 MMHG
POC SATURATED O2: 100 % (ref 95–100)
POC SATURATED O2: 97 % (ref 95–100)
POC SATURATED O2: 98 % (ref 95–100)
POC SATURATED O2: 99 % (ref 95–100)
POC TCO2: 23 MMOL/L (ref 23–27)
POC TCO2: 24 MMOL/L (ref 23–27)
POC TCO2: 24 MMOL/L (ref 23–27)
POC TCO2: 25 MMOL/L (ref 23–27)
POC TCO2: 25 MMOL/L (ref 23–27)
POC TCO2: 26 MMOL/L (ref 23–27)
POC TCO2: 27 MMOL/L (ref 23–27)
POC TCO2: 27 MMOL/L (ref 23–27)
POC TCO2: 28 MMOL/L (ref 23–27)
POC TEMPERATURE: ABNORMAL
POTASSIUM BLD-SCNC: 3.7 MMOL/L (ref 3.5–5.1)
POTASSIUM BLD-SCNC: 3.8 MMOL/L (ref 3.5–5.1)
POTASSIUM BLD-SCNC: 4 MMOL/L (ref 3.5–5.1)
POTASSIUM BLD-SCNC: 4.1 MMOL/L (ref 3.5–5.1)
POTASSIUM BLD-SCNC: 4.1 MMOL/L (ref 3.5–5.1)
POTASSIUM BLD-SCNC: 4.2 MMOL/L (ref 3.5–5.1)
POTASSIUM BLD-SCNC: 4.8 MMOL/L (ref 3.5–5.1)
POTASSIUM BLD-SCNC: 5.2 MMOL/L (ref 3.5–5.1)
POTASSIUM SERPL-SCNC: 3.8 MMOL/L (ref 3.5–5.1)
POTASSIUM SERPL-SCNC: 4.1 MMOL/L (ref 3.5–5.1)
POTASSIUM SERPL-SCNC: 4.1 MMOL/L (ref 3.5–5.1)
POTASSIUM SERPL-SCNC: 4.2 MMOL/L (ref 3.5–5.1)
PROT SERPL-MCNC: 7.1 G/DL (ref 6–8.4)
PS: 10
RBC # BLD AUTO: 2.82 M/UL (ref 4.6–6.2)
RBC # BLD AUTO: 3.02 M/UL (ref 4.6–6.2)
RBC # BLD AUTO: 3.67 M/UL (ref 4.6–6.2)
SAMPLE: ABNORMAL
SAMPLE: NORMAL
SAMPLE: NORMAL
SITE: ABNORMAL
SODIUM BLD-SCNC: 133 MMOL/L (ref 136–145)
SODIUM BLD-SCNC: 134 MMOL/L (ref 136–145)
SODIUM BLD-SCNC: 135 MMOL/L (ref 136–145)
SODIUM BLD-SCNC: 135 MMOL/L (ref 136–145)
SODIUM BLD-SCNC: 138 MMOL/L (ref 136–145)
SODIUM BLD-SCNC: 139 MMOL/L (ref 136–145)
SODIUM SERPL-SCNC: 133 MMOL/L (ref 136–145)
SODIUM SERPL-SCNC: 135 MMOL/L (ref 136–145)
SODIUM SERPL-SCNC: 135 MMOL/L (ref 136–145)
SP02: 100
VT: 500
VT: 563
WBC # BLD AUTO: 4.37 K/UL (ref 3.9–12.7)
WBC # BLD AUTO: 7.09 K/UL (ref 3.9–12.7)
WBC # BLD AUTO: 7.14 K/UL (ref 3.9–12.7)

## 2023-01-24 PROCEDURE — P9045 ALBUMIN (HUMAN), 5%, 250 ML: HCPCS | Mod: JG | Performed by: THORACIC SURGERY (CARDIOTHORACIC VASCULAR SURGERY)

## 2023-01-24 PROCEDURE — 33518 CABG ARTERY-VEIN TWO: CPT | Mod: ,,, | Performed by: THORACIC SURGERY (CARDIOTHORACIC VASCULAR SURGERY)

## 2023-01-24 PROCEDURE — C1768 GRAFT, VASCULAR: HCPCS | Performed by: THORACIC SURGERY (CARDIOTHORACIC VASCULAR SURGERY)

## 2023-01-24 PROCEDURE — 25000003 PHARM REV CODE 250: Performed by: THORACIC SURGERY (CARDIOTHORACIC VASCULAR SURGERY)

## 2023-01-24 PROCEDURE — 37799 UNLISTED PX VASCULAR SURGERY: CPT

## 2023-01-24 PROCEDURE — 80048 BASIC METABOLIC PNL TOTAL CA: CPT | Mod: 91 | Performed by: THORACIC SURGERY (CARDIOTHORACIC VASCULAR SURGERY)

## 2023-01-24 PROCEDURE — 36556 CENTRAL LINE: ICD-10-PCS | Mod: 59,,, | Performed by: ANESTHESIOLOGY

## 2023-01-24 PROCEDURE — 27201423 OPTIME MED/SURG SUP & DEVICES STERILE SUPPLY: Performed by: THORACIC SURGERY (CARDIOTHORACIC VASCULAR SURGERY)

## 2023-01-24 PROCEDURE — 82803 BLOOD GASES ANY COMBINATION: CPT

## 2023-01-24 PROCEDURE — 85014 HEMATOCRIT: CPT

## 2023-01-24 PROCEDURE — 93503 INSERT/PLACE HEART CATHETER: CPT | Mod: 59,,, | Performed by: ANESTHESIOLOGY

## 2023-01-24 PROCEDURE — 63600175 PHARM REV CODE 636 W HCPCS: Performed by: THORACIC SURGERY (CARDIOTHORACIC VASCULAR SURGERY)

## 2023-01-24 PROCEDURE — 99900026 HC AIRWAY MAINTENANCE (STAT)

## 2023-01-24 PROCEDURE — D9220A PRA ANESTHESIA: Mod: ANES,,, | Performed by: STUDENT IN AN ORGANIZED HEALTH CARE EDUCATION/TRAINING PROGRAM

## 2023-01-24 PROCEDURE — 36620 ARTERIAL: ICD-10-PCS | Mod: 59,,, | Performed by: ANESTHESIOLOGY

## 2023-01-24 PROCEDURE — 33508 ENDOSCOPIC VEIN HARVEST: CPT | Mod: 59,,, | Performed by: THORACIC SURGERY (CARDIOTHORACIC VASCULAR SURGERY)

## 2023-01-24 PROCEDURE — 63600175 PHARM REV CODE 636 W HCPCS: Performed by: NURSE ANESTHETIST, CERTIFIED REGISTERED

## 2023-01-24 PROCEDURE — D9220A PRA ANESTHESIA: Mod: CRNA,,, | Performed by: NURSE ANESTHETIST, CERTIFIED REGISTERED

## 2023-01-24 PROCEDURE — 36000712 HC OR TIME LEV V 1ST 15 MIN: Performed by: THORACIC SURGERY (CARDIOTHORACIC VASCULAR SURGERY)

## 2023-01-24 PROCEDURE — 85025 COMPLETE CBC W/AUTO DIFF WBC: CPT | Performed by: FAMILY MEDICINE

## 2023-01-24 PROCEDURE — 83735 ASSAY OF MAGNESIUM: CPT | Performed by: FAMILY MEDICINE

## 2023-01-24 PROCEDURE — 82330 ASSAY OF CALCIUM: CPT

## 2023-01-24 PROCEDURE — 99900031 HC PATIENT EDUCATION (STAT)

## 2023-01-24 PROCEDURE — 36556 INSERT NON-TUNNEL CV CATH: CPT | Mod: 59,,, | Performed by: ANESTHESIOLOGY

## 2023-01-24 PROCEDURE — 63600175 PHARM REV CODE 636 W HCPCS

## 2023-01-24 PROCEDURE — 93503 INSERT/PLACE HEART CATHETER: CPT

## 2023-01-24 PROCEDURE — 25000003 PHARM REV CODE 250: Performed by: NURSE ANESTHETIST, CERTIFIED REGISTERED

## 2023-01-24 PROCEDURE — 33518 PR CABG, ARTERY-VEIN, TWO: ICD-10-PCS | Mod: ,,, | Performed by: THORACIC SURGERY (CARDIOTHORACIC VASCULAR SURGERY)

## 2023-01-24 PROCEDURE — C1751 CATH, INF, PER/CENT/MIDLINE: HCPCS

## 2023-01-24 PROCEDURE — 93010 EKG 12-LEAD: ICD-10-PCS | Mod: ,,, | Performed by: INTERNAL MEDICINE

## 2023-01-24 PROCEDURE — P9045 ALBUMIN (HUMAN), 5%, 250 ML: HCPCS | Mod: JG | Performed by: NURSE ANESTHETIST, CERTIFIED REGISTERED

## 2023-01-24 PROCEDURE — 99900035 HC TECH TIME PER 15 MIN (STAT)

## 2023-01-24 PROCEDURE — 27000080 OPTIME MED/SURG SUP & DEVICES GENERAL CLASSIFICATION: Performed by: THORACIC SURGERY (CARDIOTHORACIC VASCULAR SURGERY)

## 2023-01-24 PROCEDURE — 93503 SWAN GANZ LINE: ICD-10-PCS | Mod: 59,,, | Performed by: ANESTHESIOLOGY

## 2023-01-24 PROCEDURE — 33533 CABG ARTERIAL SINGLE: CPT | Mod: 22,,, | Performed by: THORACIC SURGERY (CARDIOTHORACIC VASCULAR SURGERY)

## 2023-01-24 PROCEDURE — 27000221 HC OXYGEN, UP TO 24 HOURS

## 2023-01-24 PROCEDURE — P9047 ALBUMIN (HUMAN), 25%, 50ML: HCPCS | Mod: JG

## 2023-01-24 PROCEDURE — 63600175 PHARM REV CODE 636 W HCPCS: Performed by: FAMILY MEDICINE

## 2023-01-24 PROCEDURE — 33508 PR ENDOSCOPY W/VIDEO-ASST VEIN HARVEST,CABG: ICD-10-PCS | Mod: 59,,, | Performed by: THORACIC SURGERY (CARDIOTHORACIC VASCULAR SURGERY)

## 2023-01-24 PROCEDURE — 63600175 PHARM REV CODE 636 W HCPCS: Mod: JG | Performed by: THORACIC SURGERY (CARDIOTHORACIC VASCULAR SURGERY)

## 2023-01-24 PROCEDURE — 33533 PR CABG, ARTERIAL, SINGLE: ICD-10-PCS | Mod: 22,,, | Performed by: THORACIC SURGERY (CARDIOTHORACIC VASCULAR SURGERY)

## 2023-01-24 PROCEDURE — 94761 N-INVAS EAR/PLS OXIMETRY MLT: CPT

## 2023-01-24 PROCEDURE — 83735 ASSAY OF MAGNESIUM: CPT | Mod: 91 | Performed by: THORACIC SURGERY (CARDIOTHORACIC VASCULAR SURGERY)

## 2023-01-24 PROCEDURE — 93010 ELECTROCARDIOGRAM REPORT: CPT | Mod: ,,, | Performed by: INTERNAL MEDICINE

## 2023-01-24 PROCEDURE — 84295 ASSAY OF SERUM SODIUM: CPT

## 2023-01-24 PROCEDURE — 37000008 HC ANESTHESIA 1ST 15 MINUTES: Performed by: THORACIC SURGERY (CARDIOTHORACIC VASCULAR SURGERY)

## 2023-01-24 PROCEDURE — 25000003 PHARM REV CODE 250

## 2023-01-24 PROCEDURE — C1898 LEAD, PMKR, OTHER THAN TRANS: HCPCS | Performed by: THORACIC SURGERY (CARDIOTHORACIC VASCULAR SURGERY)

## 2023-01-24 PROCEDURE — D9220A PRA ANESTHESIA: ICD-10-PCS | Mod: CRNA,,, | Performed by: NURSE ANESTHETIST, CERTIFIED REGISTERED

## 2023-01-24 PROCEDURE — 25000003 PHARM REV CODE 250: Performed by: FAMILY MEDICINE

## 2023-01-24 PROCEDURE — 37000009 HC ANESTHESIA EA ADD 15 MINS: Performed by: THORACIC SURGERY (CARDIOTHORACIC VASCULAR SURGERY)

## 2023-01-24 PROCEDURE — 36415 COLL VENOUS BLD VENIPUNCTURE: CPT | Performed by: FAMILY MEDICINE

## 2023-01-24 PROCEDURE — 93005 ELECTROCARDIOGRAM TRACING: CPT | Performed by: INTERNAL MEDICINE

## 2023-01-24 PROCEDURE — 84100 ASSAY OF PHOSPHORUS: CPT | Performed by: THORACIC SURGERY (CARDIOTHORACIC VASCULAR SURGERY)

## 2023-01-24 PROCEDURE — 36000713 HC OR TIME LEV V EA ADD 15 MIN: Performed by: THORACIC SURGERY (CARDIOTHORACIC VASCULAR SURGERY)

## 2023-01-24 PROCEDURE — 20000000 HC ICU ROOM

## 2023-01-24 PROCEDURE — C1729 CATH, DRAINAGE: HCPCS | Performed by: THORACIC SURGERY (CARDIOTHORACIC VASCULAR SURGERY)

## 2023-01-24 PROCEDURE — 25000003 PHARM REV CODE 250: Performed by: ANESTHESIOLOGY

## 2023-01-24 PROCEDURE — S0017 INJECTION, AMINOCAPROIC ACID: HCPCS

## 2023-01-24 PROCEDURE — S0017 INJECTION, AMINOCAPROIC ACID: HCPCS | Performed by: NURSE ANESTHETIST, CERTIFIED REGISTERED

## 2023-01-24 PROCEDURE — D9220A PRA ANESTHESIA: ICD-10-PCS | Mod: ANES,,, | Performed by: STUDENT IN AN ORGANIZED HEALTH CARE EDUCATION/TRAINING PROGRAM

## 2023-01-24 PROCEDURE — 36620 INSERTION CATHETER ARTERY: CPT | Mod: 59,,, | Performed by: ANESTHESIOLOGY

## 2023-01-24 PROCEDURE — 80053 COMPREHEN METABOLIC PANEL: CPT | Performed by: FAMILY MEDICINE

## 2023-01-24 PROCEDURE — 94002 VENT MGMT INPAT INIT DAY: CPT

## 2023-01-24 PROCEDURE — 36620 INSERTION CATHETER ARTERY: CPT

## 2023-01-24 PROCEDURE — 84132 ASSAY OF SERUM POTASSIUM: CPT

## 2023-01-24 PROCEDURE — 85025 COMPLETE CBC W/AUTO DIFF WBC: CPT | Mod: 91 | Performed by: THORACIC SURGERY (CARDIOTHORACIC VASCULAR SURGERY)

## 2023-01-24 DEVICE — IMPLANTABLE DEVICE: Type: IMPLANTABLE DEVICE | Site: CHEST | Status: FUNCTIONAL

## 2023-01-24 RX ORDER — ALBUMIN HUMAN 50 G/1000ML
25 SOLUTION INTRAVENOUS
Status: DISCONTINUED | OUTPATIENT
Start: 2023-01-24 | End: 2023-02-01 | Stop reason: HOSPADM

## 2023-01-24 RX ORDER — VANCOMYCIN HYDROCHLORIDE 500 MG/10ML
INJECTION, POWDER, LYOPHILIZED, FOR SOLUTION INTRAVENOUS
Status: DISCONTINUED | OUTPATIENT
Start: 2023-01-24 | End: 2023-01-24 | Stop reason: HOSPADM

## 2023-01-24 RX ORDER — MIDAZOLAM HYDROCHLORIDE 1 MG/ML
2 INJECTION INTRAMUSCULAR; INTRAVENOUS ONCE
Status: COMPLETED | OUTPATIENT
Start: 2023-01-24 | End: 2023-01-24

## 2023-01-24 RX ORDER — SODIUM CHLORIDE 9 MG/ML
INJECTION, SOLUTION INTRAVENOUS CONTINUOUS PRN
Status: DISCONTINUED | OUTPATIENT
Start: 2023-01-24 | End: 2023-01-24

## 2023-01-24 RX ORDER — FENTANYL CITRATE 50 UG/ML
INJECTION, SOLUTION INTRAMUSCULAR; INTRAVENOUS
Status: COMPLETED
Start: 2023-01-24 | End: 2023-01-24

## 2023-01-24 RX ORDER — AMINOCAPROIC ACID 250 MG/ML
INJECTION, SOLUTION INTRAVENOUS
Status: DISCONTINUED | OUTPATIENT
Start: 2023-01-24 | End: 2023-01-24

## 2023-01-24 RX ORDER — SODIUM BICARBONATE 1 MEQ/ML
SYRINGE (ML) INTRAVENOUS
Status: DISPENSED
Start: 2023-01-24 | End: 2023-01-25

## 2023-01-24 RX ORDER — ACETAMINOPHEN 10 MG/ML
INJECTION, SOLUTION INTRAVENOUS
Status: DISCONTINUED | OUTPATIENT
Start: 2023-01-24 | End: 2023-01-24

## 2023-01-24 RX ORDER — MAGNESIUM SULFATE HEPTAHYDRATE 40 MG/ML
2 INJECTION, SOLUTION INTRAVENOUS
Status: DISCONTINUED | OUTPATIENT
Start: 2023-01-24 | End: 2023-02-01 | Stop reason: HOSPADM

## 2023-01-24 RX ORDER — CALCIUM GLUCONATE 20 MG/ML
2 INJECTION, SOLUTION INTRAVENOUS
Status: DISCONTINUED | OUTPATIENT
Start: 2023-01-24 | End: 2023-02-01 | Stop reason: HOSPADM

## 2023-01-24 RX ORDER — MIDAZOLAM HYDROCHLORIDE 1 MG/ML
INJECTION INTRAMUSCULAR; INTRAVENOUS
Status: COMPLETED
Start: 2023-01-24 | End: 2023-01-24

## 2023-01-24 RX ORDER — POTASSIUM CHLORIDE 14.9 MG/ML
20 INJECTION INTRAVENOUS
Status: DISCONTINUED | OUTPATIENT
Start: 2023-01-24 | End: 2023-02-01 | Stop reason: HOSPADM

## 2023-01-24 RX ORDER — ROCURONIUM BROMIDE 10 MG/ML
INJECTION, SOLUTION INTRAVENOUS
Status: DISCONTINUED | OUTPATIENT
Start: 2023-01-24 | End: 2023-01-24

## 2023-01-24 RX ORDER — ONDANSETRON 2 MG/ML
4 INJECTION INTRAMUSCULAR; INTRAVENOUS EVERY 6 HOURS PRN
Status: DISCONTINUED | OUTPATIENT
Start: 2023-01-24 | End: 2023-02-01 | Stop reason: HOSPADM

## 2023-01-24 RX ORDER — FAMOTIDINE 10 MG/ML
INJECTION INTRAVENOUS
Status: DISCONTINUED | OUTPATIENT
Start: 2023-01-24 | End: 2023-01-24

## 2023-01-24 RX ORDER — MIDAZOLAM HYDROCHLORIDE 1 MG/ML
1 INJECTION INTRAMUSCULAR; INTRAVENOUS
Status: ACTIVE | OUTPATIENT
Start: 2023-01-24 | End: 2023-01-25

## 2023-01-24 RX ORDER — SODIUM CHLORIDE 450 MG/100ML
INJECTION, SOLUTION INTRAVENOUS CONTINUOUS
Status: DISCONTINUED | OUTPATIENT
Start: 2023-01-24 | End: 2023-01-25

## 2023-01-24 RX ORDER — CEFAZOLIN SODIUM 1 G/3ML
INJECTION, POWDER, FOR SOLUTION INTRAMUSCULAR; INTRAVENOUS
Status: DISCONTINUED | OUTPATIENT
Start: 2023-01-24 | End: 2023-01-24

## 2023-01-24 RX ORDER — MAGNESIUM SULFATE HEPTAHYDRATE 40 MG/ML
4 INJECTION, SOLUTION INTRAVENOUS
Status: DISCONTINUED | OUTPATIENT
Start: 2023-01-24 | End: 2023-02-01 | Stop reason: HOSPADM

## 2023-01-24 RX ORDER — POTASSIUM CHLORIDE 29.8 MG/ML
20 INJECTION INTRAVENOUS
Status: DISCONTINUED | OUTPATIENT
Start: 2023-01-24 | End: 2023-02-01 | Stop reason: HOSPADM

## 2023-01-24 RX ORDER — VECURONIUM BROMIDE FOR INJECTION 1 MG/ML
INJECTION, POWDER, LYOPHILIZED, FOR SOLUTION INTRAVENOUS
Status: DISCONTINUED | OUTPATIENT
Start: 2023-01-24 | End: 2023-01-24

## 2023-01-24 RX ORDER — CALCIUM GLUCONATE 20 MG/ML
1 INJECTION, SOLUTION INTRAVENOUS
Status: DISCONTINUED | OUTPATIENT
Start: 2023-01-24 | End: 2023-02-01 | Stop reason: HOSPADM

## 2023-01-24 RX ORDER — DOCUSATE SODIUM 100 MG/1
100 CAPSULE, LIQUID FILLED ORAL 2 TIMES DAILY
Status: DISCONTINUED | OUTPATIENT
Start: 2023-01-25 | End: 2023-02-01 | Stop reason: HOSPADM

## 2023-01-24 RX ORDER — LIDOCAINE HYDROCHLORIDE 20 MG/ML
INJECTION, SOLUTION EPIDURAL; INFILTRATION; INTRACAUDAL; PERINEURAL
Status: DISCONTINUED
Start: 2023-01-24 | End: 2023-01-24 | Stop reason: WASHOUT

## 2023-01-24 RX ORDER — HEPARIN SODIUM 10000 [USP'U]/ML
INJECTION, SOLUTION INTRAVENOUS; SUBCUTANEOUS
Status: DISCONTINUED | OUTPATIENT
Start: 2023-01-24 | End: 2023-01-24

## 2023-01-24 RX ORDER — FENTANYL CITRATE 50 UG/ML
INJECTION, SOLUTION INTRAMUSCULAR; INTRAVENOUS
Status: DISCONTINUED | OUTPATIENT
Start: 2023-01-24 | End: 2023-01-24

## 2023-01-24 RX ORDER — LIDOCAINE HYDROCHLORIDE 20 MG/ML
100 INJECTION INTRAVENOUS ONCE
Status: COMPLETED | OUTPATIENT
Start: 2023-01-24 | End: 2023-01-24

## 2023-01-24 RX ORDER — ETOMIDATE 2 MG/ML
INJECTION INTRAVENOUS
Status: DISCONTINUED | OUTPATIENT
Start: 2023-01-24 | End: 2023-01-24

## 2023-01-24 RX ORDER — SODIUM BICARBONATE 1 MEQ/ML
50 SYRINGE (ML) INTRAVENOUS ONCE
Status: COMPLETED | OUTPATIENT
Start: 2023-01-24 | End: 2023-01-24

## 2023-01-24 RX ORDER — NITROGLYCERIN 5 MG/ML
INJECTION, SOLUTION INTRAVENOUS
Status: DISCONTINUED | OUTPATIENT
Start: 2023-01-24 | End: 2023-01-24

## 2023-01-24 RX ORDER — MORPHINE SULFATE 4 MG/ML
4 INJECTION, SOLUTION INTRAMUSCULAR; INTRAVENOUS
Status: DISCONTINUED | OUTPATIENT
Start: 2023-01-24 | End: 2023-01-29

## 2023-01-24 RX ORDER — FENTANYL CITRATE 50 UG/ML
50 INJECTION, SOLUTION INTRAMUSCULAR; INTRAVENOUS ONCE
Status: COMPLETED | OUTPATIENT
Start: 2023-01-24 | End: 2023-01-24

## 2023-01-24 RX ORDER — HEPARIN SODIUM 10000 [USP'U]/ML
INJECTION, SOLUTION INTRAVENOUS; SUBCUTANEOUS
Status: DISCONTINUED | OUTPATIENT
Start: 2023-01-24 | End: 2023-01-24 | Stop reason: HOSPADM

## 2023-01-24 RX ORDER — ALBUMIN HUMAN 50 G/1000ML
SOLUTION INTRAVENOUS CONTINUOUS PRN
Status: DISCONTINUED | OUTPATIENT
Start: 2023-01-24 | End: 2023-01-24

## 2023-01-24 RX ORDER — HYDROCODONE BITARTRATE AND ACETAMINOPHEN 7.5; 325 MG/1; MG/1
1 TABLET ORAL EVERY 4 HOURS PRN
Status: DISCONTINUED | OUTPATIENT
Start: 2023-01-24 | End: 2023-01-25

## 2023-01-24 RX ORDER — SODIUM CHLORIDE, SODIUM LACTATE, POTASSIUM CHLORIDE, CALCIUM CHLORIDE 600; 310; 30; 20 MG/100ML; MG/100ML; MG/100ML; MG/100ML
INJECTION, SOLUTION INTRAVENOUS CONTINUOUS PRN
Status: DISCONTINUED | OUTPATIENT
Start: 2023-01-24 | End: 2023-01-24

## 2023-01-24 RX ORDER — CALCIUM GLUCONATE 20 MG/ML
3 INJECTION, SOLUTION INTRAVENOUS
Status: DISCONTINUED | OUTPATIENT
Start: 2023-01-24 | End: 2023-02-01 | Stop reason: HOSPADM

## 2023-01-24 RX ORDER — FENTANYL CITRATE 50 UG/ML
100 INJECTION, SOLUTION INTRAMUSCULAR; INTRAVENOUS
Status: DISCONTINUED | OUTPATIENT
Start: 2023-01-24 | End: 2023-01-24

## 2023-01-24 RX ORDER — EPHEDRINE SULFATE 50 MG/ML
INJECTION, SOLUTION INTRAVENOUS
Status: DISCONTINUED | OUTPATIENT
Start: 2023-01-24 | End: 2023-01-24

## 2023-01-24 RX ORDER — CEFAZOLIN SODIUM 2 G/50ML
2 SOLUTION INTRAVENOUS
Status: DISPENSED | OUTPATIENT
Start: 2023-01-24 | End: 2023-01-25

## 2023-01-24 RX ORDER — LIDOCAINE HCL/PF 100 MG/5ML
SYRINGE (ML) INTRAVENOUS
Status: DISCONTINUED | OUTPATIENT
Start: 2023-01-24 | End: 2023-01-24

## 2023-01-24 RX ORDER — HYDROCODONE BITARTRATE AND ACETAMINOPHEN 5; 325 MG/1; MG/1
1 TABLET ORAL EVERY 4 HOURS PRN
Status: DISCONTINUED | OUTPATIENT
Start: 2023-01-24 | End: 2023-01-25

## 2023-01-24 RX ORDER — MIDAZOLAM HYDROCHLORIDE 1 MG/ML
INJECTION INTRAMUSCULAR; INTRAVENOUS
Status: DISCONTINUED | OUTPATIENT
Start: 2023-01-24 | End: 2023-01-24

## 2023-01-24 RX ORDER — PROTAMINE SULFATE 10 MG/ML
INJECTION, SOLUTION INTRAVENOUS
Status: DISCONTINUED | OUTPATIENT
Start: 2023-01-24 | End: 2023-01-24

## 2023-01-24 RX ADMIN — SODIUM CHLORIDE: 0.9 INJECTION, SOLUTION INTRAVENOUS at 12:01

## 2023-01-24 RX ADMIN — CHLORHEXIDINE GLUCONATE 15 ML: 1.2 RINSE ORAL at 10:01

## 2023-01-24 RX ADMIN — ALBUMIN (HUMAN): 12.5 INJECTION, SOLUTION INTRAVENOUS at 05:01

## 2023-01-24 RX ADMIN — FENTANYL CITRATE 250 MCG: 50 INJECTION INTRAMUSCULAR; INTRAVENOUS at 04:01

## 2023-01-24 RX ADMIN — PHENYLEPHRINE HYDROCHLORIDE 0.2 MCG/KG/MIN: 10 INJECTION INTRAVENOUS at 01:01

## 2023-01-24 RX ADMIN — FAMOTIDINE 20 MG: 10 INJECTION, SOLUTION INTRAVENOUS at 12:01

## 2023-01-24 RX ADMIN — DEXTROSE 2 G: 50 INJECTION, SOLUTION INTRAVENOUS at 01:01

## 2023-01-24 RX ADMIN — VECURONIUM BROMIDE 5 MG: 1 INJECTION, POWDER, LYOPHILIZED, FOR SOLUTION INTRAVENOUS at 03:01

## 2023-01-24 RX ADMIN — GENTAMICIN SULFATE 80 MG: 40 INJECTION, SOLUTION INTRAMUSCULAR; INTRAVENOUS at 01:01

## 2023-01-24 RX ADMIN — FENTANYL CITRATE 250 MCG: 50 INJECTION INTRAMUSCULAR; INTRAVENOUS at 03:01

## 2023-01-24 RX ADMIN — MIDAZOLAM HYDROCHLORIDE 2 MG: 1 INJECTION, SOLUTION INTRAMUSCULAR; INTRAVENOUS at 09:01

## 2023-01-24 RX ADMIN — MIDAZOLAM HYDROCHLORIDE 2 MG: 1 INJECTION INTRAMUSCULAR; INTRAVENOUS at 09:01

## 2023-01-24 RX ADMIN — SODIUM CHLORIDE: 0.45 INJECTION, SOLUTION INTRAVENOUS at 06:01

## 2023-01-24 RX ADMIN — HYDRALAZINE HYDROCHLORIDE 10 MG: 20 INJECTION INTRAMUSCULAR; INTRAVENOUS at 07:01

## 2023-01-24 RX ADMIN — ACETAMINOPHEN 1000 MG: 10 INJECTION, SOLUTION INTRAVENOUS at 01:01

## 2023-01-24 RX ADMIN — FENTANYL CITRATE 250 MCG: 50 INJECTION INTRAMUSCULAR; INTRAVENOUS at 01:01

## 2023-01-24 RX ADMIN — AMINOCAPROIC ACID 5 G: 250 INJECTION, SOLUTION INTRAVENOUS at 05:01

## 2023-01-24 RX ADMIN — FENTANYL CITRATE 250 MCG: 50 INJECTION INTRAMUSCULAR; INTRAVENOUS at 05:01

## 2023-01-24 RX ADMIN — MIDAZOLAM HYDROCHLORIDE 5 MG: 1 INJECTION, SOLUTION INTRAMUSCULAR; INTRAVENOUS at 04:01

## 2023-01-24 RX ADMIN — EPHEDRINE SULFATE 10 MG: 50 INJECTION INTRAVENOUS at 01:01

## 2023-01-24 RX ADMIN — HYDRALAZINE HYDROCHLORIDE 10 MG: 20 INJECTION INTRAMUSCULAR; INTRAVENOUS at 04:01

## 2023-01-24 RX ADMIN — FENTANYL CITRATE 100 MCG: 50 INJECTION, SOLUTION INTRAMUSCULAR; INTRAVENOUS at 09:01

## 2023-01-24 RX ADMIN — MIDAZOLAM HYDROCHLORIDE 3 MG: 1 INJECTION, SOLUTION INTRAMUSCULAR; INTRAVENOUS at 01:01

## 2023-01-24 RX ADMIN — ROCURONIUM BROMIDE 50 MG: 10 INJECTION, SOLUTION INTRAVENOUS at 12:01

## 2023-01-24 RX ADMIN — AMINOCAPROIC ACID 5 G: 250 INJECTION, SOLUTION INTRAVENOUS at 01:01

## 2023-01-24 RX ADMIN — VECURONIUM BROMIDE 10 MG: 1 INJECTION, POWDER, LYOPHILIZED, FOR SOLUTION INTRAVENOUS at 01:01

## 2023-01-24 RX ADMIN — MIDAZOLAM HYDROCHLORIDE 2.5 MG: 1 INJECTION, SOLUTION INTRAMUSCULAR; INTRAVENOUS at 02:01

## 2023-01-24 RX ADMIN — FENTANYL CITRATE 50 MCG: 50 INJECTION, SOLUTION INTRAMUSCULAR; INTRAVENOUS at 10:01

## 2023-01-24 RX ADMIN — HEPARIN SODIUM 40000 UNITS: 10000 INJECTION, SOLUTION INTRAVENOUS; SUBCUTANEOUS at 02:01

## 2023-01-24 RX ADMIN — VECURONIUM BROMIDE 5 MG: 1 INJECTION, POWDER, LYOPHILIZED, FOR SOLUTION INTRAVENOUS at 04:01

## 2023-01-24 RX ADMIN — MIDAZOLAM HYDROCHLORIDE 5 MG: 1 INJECTION, SOLUTION INTRAMUSCULAR; INTRAVENOUS at 12:01

## 2023-01-24 RX ADMIN — PROTAMINE SULFATE 400 MG: 10 INJECTION, SOLUTION INTRAVENOUS at 04:01

## 2023-01-24 RX ADMIN — SODIUM CHLORIDE, SODIUM LACTATE, POTASSIUM CHLORIDE, AND CALCIUM CHLORIDE: .6; .31; .03; .02 INJECTION, SOLUTION INTRAVENOUS at 12:01

## 2023-01-24 RX ADMIN — LIDOCAINE HYDROCHLORIDE 100 MG: 20 INJECTION, SOLUTION INTRAVENOUS at 12:01

## 2023-01-24 RX ADMIN — OXYCODONE HYDROCHLORIDE AND ACETAMINOPHEN 1 TABLET: 7.5; 325 TABLET ORAL at 12:01

## 2023-01-24 RX ADMIN — ETOMIDATE 16 MG: 2 INJECTION, SOLUTION INTRAVENOUS at 12:01

## 2023-01-24 RX ADMIN — FENTANYL CITRATE 250 MCG: 50 INJECTION INTRAMUSCULAR; INTRAVENOUS at 02:01

## 2023-01-24 RX ADMIN — HEPARIN SODIUM 5 UNITS: 10000 INJECTION, SOLUTION INTRAVENOUS; SUBCUTANEOUS at 03:01

## 2023-01-24 RX ADMIN — ALBUMIN (HUMAN) 25 G: 12.5 SOLUTION INTRAVENOUS at 08:01

## 2023-01-24 RX ADMIN — CEFAZOLIN 1 G: 330 INJECTION, POWDER, FOR SOLUTION INTRAMUSCULAR; INTRAVENOUS at 05:01

## 2023-01-24 RX ADMIN — NITROGLYCERIN 30 MCG: 5 INJECTION, SOLUTION INTRAVENOUS at 03:01

## 2023-01-24 RX ADMIN — MIDAZOLAM HYDROCHLORIDE 2.5 MG: 1 INJECTION, SOLUTION INTRAMUSCULAR; INTRAVENOUS at 03:01

## 2023-01-24 RX ADMIN — MIDAZOLAM HYDROCHLORIDE 2 MG: 1 INJECTION, SOLUTION INTRAMUSCULAR; INTRAVENOUS at 12:01

## 2023-01-24 RX ADMIN — AMINOCAPROIC ACID 5 G: 250 INJECTION, SOLUTION INTRAVENOUS at 12:01

## 2023-01-24 RX ADMIN — SODIUM CHLORIDE, SODIUM LACTATE, POTASSIUM CHLORIDE, AND CALCIUM CHLORIDE: .6; .31; .03; .02 INJECTION, SOLUTION INTRAVENOUS at 02:01

## 2023-01-24 RX ADMIN — LIDOCAINE HYDROCHLORIDE 100 MG: 20 INJECTION INTRAVENOUS at 09:01

## 2023-01-24 RX ADMIN — SODIUM BICARBONATE 50 MEQ: 84 INJECTION, SOLUTION INTRAVENOUS at 06:01

## 2023-01-24 RX ADMIN — ONDANSETRON 4 MG: 2 INJECTION INTRAMUSCULAR; INTRAVENOUS at 12:01

## 2023-01-24 RX ADMIN — FENTANYL CITRATE 500 MCG: 50 INJECTION INTRAMUSCULAR; INTRAVENOUS at 12:01

## 2023-01-24 RX ADMIN — CEFAZOLIN SODIUM 2 G: 2 SOLUTION INTRAVENOUS at 10:01

## 2023-01-24 RX ADMIN — SODIUM CHLORIDE 0.01 MCG/KG/MIN: 9 INJECTION, SOLUTION INTRAVENOUS at 03:01

## 2023-01-24 NOTE — OP NOTE
This is Dr. Douglas dictating with date of service being 24th January 2023.    Preoperative diagnosis:  Severe atherosclerotic coronary artery disease.    Postoperative diagnosis:  Same.    Operation:  Coronary artery bypass grafting x3 using left internal mammary artery to left anterior descending coronary artery and saphenous vein from the left common carotid artery to the obtuse marginal coronary artery and saphenous vein from the body of the 1st vein graft to the posterior descending coronary artery using a combination of antegrade and retrograde cardioplegia, mild systemic hypothermia, endoscopic vein harvest from the left leg, and arterial inflow for the cardiopulmonary bypass pump machine to the right common carotid artery due to severe heavy calcific change to the ascending thoracic aorta.  Also a right femoral arterial line was placed with a percutaneous Seldinger technique.  The operation was quite difficult and long-lasting due to the calcific change within the ascending thoracic aorta.  This added extra time and effort to the procedure.    Operation in detail:  The patient was prepped and draped in usual sterile fashion.  The right femoral line was inserted with a percutaneous Seldinger technique.  The right common femoral artery was punctured and a J-wire advanced.  A dilator and sheath were placed over the wire into the femoral artery.  The wire and dilator were removed leaving the sheath which was secured to skin with interrupted silk suture.    The saphenous vein was harvested from the left leg with the Terumo endoscopic harvest system.  The side branches were initially controlled with electrocautery.  The vein was excised and retrogradely flushed and found to be satisfactory.  The vein branches were reinforced with hemoclips and silk ties.  The leg was closed in a single layer of the subcutaneous tissue with running 2 0 Monocryl stitch and the skin was closed with running 3 0 subcuticular Monocryl  stitch.    The chest was opened through median sternotomy and the left internal mammary artery was harvested with the cautery and hemoclips.  After heparinization the mammary artery was divided distally and found have satisfactory flow.    The pericardium was incised and retraction sutures placed.  The patient was found to have a heavily calcified ascending thoracic aorta.  I did not feel that this vessel could be cannulated are manipulated to any great extent.  Therefore the dissection proceeded up to the right common carotid artery.  This was controlled proximally and distally.  Similarly the left common carotid artery was dissected and controlled above the aortic arch.  Both of these vessels were found to be soft and compressible.    The patient was heparinized.  The right common carotid artery was doubly clamped and then opened.  To the opening was sewn an 8 mm Hemashield gold Dacron graft in end-to-side fashion with running 6 0 Prolene suture.  Where necessary the suture line was reinforced with interrupted 6 0 Prolene suture.  The graft was then connected to the arterial inflow cannula for the cardiopulmonary bypass pump machine.  It was de-aired and then connected to the bypass circuit.    The right atrium was cannulated with a two-stage cannula.  The right atrial free wall was cannulated with a retrograde cardioplegia cannula which was directed into the coronary sinus.    The patient was placed on cardiopulmonary bypass and cooled to systemic moderate hypothermia.  The cross-clamp was applied and heart arrested with retrograde cardioplegia.  This was delivered intermittently during the cross-clamp period.  Once the heart was satisfactorily arrested the bypasses were then done.    Saphenous vein was sewn in end-to-side fashion to the obtuse marginal coronary artery with running 7 0 Prolene suture.  The graft was measured to length and cut and then sewn in an end-to-side fashion to the left common carotid  artery with running 6 0 Prolene suture.  The left internal mammary artery was sewn to the left anterior descending coronary artery in end-to-side fashion with running 7 0 Prolene suture.  Flow was checked and seemed to be satisfactory.  The pedicle was fixed to the epicardium with interrupted 6 0 Prolene suture.    The vein was then sewn in end-to-side fashion to the posterior descending coronary artery with running 7 0 Prolene suture.  The graft was measured to length and cut and then sewn to the body of the vein graft that proceeded to circumflex system.  This was done in end-to-side fashion with running 6 0 Prolene suture.  The patient was rewarming during this time the cross-clamp released.  The patient was weaned from cardiopulmonary bypass once satisfactory hemodynamics were maintained rewarming complete.  Chest tubes were inserted into the left hemithorax and 2 into the anterior mediastinum through separate stab wounds and secured to skin with 0 silk suture.    Two pacing wires were placed on the right atrium and 2 pacing wires were placed on the right ventricle and brought out through separate stab wounds and secured to skin with 2-0 silk suture.    A single Ever drain was inserted through separate stab wound and secured to skin with 2-0 silk suture.  It was directed into the mediastinum and right pleural space.    Once the patient was doing well decannulation was performed and the sites secured with their respective pursestrings and oversewn with Prolene suture.  The graft that was sewn to the right common carotid artery was clamped above its anastomosis to the carotid artery.  It was transected.  It was oversewn with 6 0 Prolene suture.    Once hemostasis was satisfactory closure of the sternum was done with interrupted stainless steel wire.  The presternal fascia and subcutaneous tissues were closed with running 0 and 2-0 Monocryl stitch respectively.  The skin was closed with running 3 0 subcuticular  Monocryl stitch.    Overall patient tolerated the procedure well and there appeared to be no major obvious intraoperative complications.  Estimated blood loss was 500 cc.  The patient was brought to the ICU in satisfactory condition.

## 2023-01-24 NOTE — ANESTHESIA PROCEDURE NOTES
Arterial    Diagnosis: CAD    Patient location during procedure: ICU  Procedure start time: 1/24/2023 8:46 AM  Timeout: 1/24/2023 8:45 AM  Procedure end time: 1/24/2023 8:54 AM    Staffing  Authorizing Provider: Anuj Beverly Jr., MD  Performing Provider: Anuj Beverly Jr., MD    Anesthesiologist was present at the time of the procedure.    Preanesthetic Checklist  Completed: patient identified, IV checked, site marked, risks and benefits discussed, surgical consent, monitors and equipment checked, pre-op evaluation, timeout performed and anesthesia consent givenArterial  Skin Prep: chlorhexidine gluconate  Local Infiltration: lidocaine  Orientation: right  Location: radial    Catheter Size: 20 G  Catheter placement by Ultrasound guidance. Heme positive aspiration all ports.   Vessel Caliber: medium, patent, compressibility normal  Needle advanced into vessel with real time Ultrasound guidance.  Guidewire confirmed in vessel.  Sterile sheath used.Insertion Attempts: 1  Assessment  Dressing: secured with tape and tegaderm and sutured in place and taped  Patient: Tolerated well

## 2023-01-24 NOTE — PT/OT/SLP PROGRESS
Physical Therapy      Patient Name:  Peyman Gr   MRN:  [id number]    Patient not seen today secondary to  (CABG). Will need new PT order.

## 2023-01-24 NOTE — ANESTHESIA PROCEDURE NOTES
[place] [facility]    Diagnosis: CAD  Patient location during procedure: ICU  Procedure start time: 1/24/2023 9:05 AM  Timeout: 1/24/2023 9:04 AM  Procedure end time: 1/24/2023 9:27 AM    Staffing  Authorizing Provider: Anuj Beverly Jr., MD  Performing Provider: Anuj Beverly Jr., MD    Anesthesiologist was present at the time of the procedure.  Preanesthetic Checklist  Completed: patient identified, IV checked, site marked, risks and benefits discussed, surgical consent, monitors and equipment checked, pre-op evaluation, timeout performed and anesthesia consent given  [place] [facility]  Skin Prep: chlorhexidine gluconate  Local Infiltration: none  Location: right,  internal jugular vein  Vessel Caliber: medium, patent, compressibility normal  Introducer: 9 Fr single lumen, manometry not used.  Device: CCO/Oximetric Catheter   Catheter Size: 7 Fr  Catheter placement by yes. Heme positive aspiration all ports. PAC floated with balloon up not wedgedSterile sheath usedInsertion Attempts: 1  Ultrasound Guidance  Needle advanced into vessel with real time Ultrasound guidance.  Guidewire confirmed in vessel.  Sterile sheath used.  Assessment  Central Line Bundle Protocol followed. Hand hygiene before procedure, surgical cap worn, surgical mask worn, sterile surgical gloves worn, large sterile drape used.  Verification: chest x-ray, ultrasound and blood return  Dressing: secured with tape and tegaderm and sutured in place and taped  Patient: Tolerated Well  Additional Notes  Floating of swan aborted secondary to run of v tach. Will float in the OR

## 2023-01-24 NOTE — PT/OT/SLP PROGRESS
Occupational Therapy      Patient Name:  Peyman Gr   MRN:  [id number]    Patient not seen today 2/2 plan for CABG; will need new orders post-op.      1/24/2023

## 2023-01-24 NOTE — NURSING
16 beat run of Vtach during swan insertion. [place] not floated, Dr. Beverly will float swan in OR. 100 mg Lidocaine IV given per Dr. Beverly's order. VSS.

## 2023-01-24 NOTE — NURSING
Patient transferred to MI 2 pending CABG x2 today. Consents obtained. Placed on the cardiac monitor and hemodynamics as charted, Pre-op teaching completed with questions encouraged and answered. No c/o pain. All safety precautions in effect.

## 2023-01-24 NOTE — PT/OT/SLP PROGRESS
Speech Language Pathology      Peyman Gr  MRN: [id number]    Patient not seen today secondary to Other (Comment) (Pt NPO for CABG). Will follow-up next scheduled visit day.

## 2023-01-24 NOTE — ANESTHESIA PROCEDURE NOTES
Central Line    Diagnosis: CAD  Patient location during procedure: ICU  Timeout: 1/24/2023 9:00 AM  Procedure end time: 1/24/2023 9:17 AM    Staffing  Authorizing Provider: Anuj eBverly Jr., MD  Performing Provider: Anuj Beverly Jr., MD    Staffing  Performed: anesthesiologist   Anesthesiologist: Anuj Beverly Jr., MD  Anesthesiologist was present at the time of the procedure.  Preanesthetic Checklist  Completed: patient identified, IV checked, site marked, risks and benefits discussed, surgical consent, monitors and equipment checked, pre-op evaluation, timeout performed and anesthesia consent given  Indication   Indication: hemodynamic monitoring, vascular access, med administration     Anesthesia   local infiltration    Central Line   Skin Prep: skin prepped with ChloraPrep, skin prep agent completely dried prior to procedure  Sterile Barriers Followed: Yes    All five maximal barriers used- gloves, gown, cap, mask, and large sterile sheet    hand hygiene performed prior to central venous catheter insertion  Location: left internal jugular.   Catheter type: triple lumen  Catheter Size: 7 Fr  Inserted Catheter Length: 15 cm  Ultrasound: vascular probe with ultrasound   Vessel Caliber: medium, patent, compressibility normal  Needle advanced into vessel with real time Ultrasound guidance.  Guidewire confirmed in vessel.  sterile gel and probe cover used in ultrasound-guided central venous catheter insertion  Manometry: none  Insertion Attempts: 1   Securement:line sutured, chlorhexidine patch, sterile dressing applied and blood return through all ports    Post-Procedure   X-Ray: no pneumothorax on x-ray, placement verified by x-ray, tip termination and successful placement  Tip termination comments: SVC   Adverse Events:none      Guidewire Guidewire removed intact. Guidewire removed intact, verified with nurse.

## 2023-01-24 NOTE — PLAN OF CARE
01/24/23 0800   Patient Assessment/Suction   Level of Consciousness (AVPU) alert   Respiratory Effort Unlabored   PRE-TX-O2   Device (Oxygen Therapy) room air   SpO2 97 %   Pulse Oximetry Type Continuous   $ Pulse Oximetry - Multiple Charge Pulse Oximetry - Multiple   Pulse 68   Resp 16   Respiratory Evaluation   $ Care Plan Tech Time 15 min

## 2023-01-24 NOTE — PLAN OF CARE
Educated patient on CABG recovery process. Notified patient that he will return to room on ventilator and RN will  him to extubation. We went over the importance of sternal precautions, IS use, and pain management post surgery. Patient's wife, Mrs. Monteiro, is a respiratory therapist who is not only very knowledgeable about this process but is also supportive of patient. Patient verbalizes understanding of education.

## 2023-01-25 LAB
ALLENS TEST: ABNORMAL
ANION GAP SERPL CALC-SCNC: 11 MMOL/L (ref 8–16)
BASOPHILS # BLD AUTO: 0 K/UL (ref 0–0.2)
BASOPHILS NFR BLD: 0 % (ref 0–1.9)
BUN SERPL-MCNC: 17 MG/DL (ref 8–23)
CALCIUM SERPL-MCNC: 9.1 MG/DL (ref 8.7–10.5)
CHLORIDE SERPL-SCNC: 103 MMOL/L (ref 95–110)
CO2 SERPL-SCNC: 23 MMOL/L (ref 23–29)
CREAT SERPL-MCNC: 1 MG/DL (ref 0.5–1.4)
DELSYS: ABNORMAL
DIFFERENTIAL METHOD: ABNORMAL
EOSINOPHIL # BLD AUTO: 0 K/UL (ref 0–0.5)
EOSINOPHIL NFR BLD: 0 % (ref 0–8)
ERYTHROCYTE [DISTWIDTH] IN BLOOD BY AUTOMATED COUNT: 13.3 % (ref 11.5–14.5)
EST. GFR  (NO RACE VARIABLE): >60 ML/MIN/1.73 M^2
FLOW: 15
GLUCOSE SERPL-MCNC: 104 MG/DL (ref 70–110)
GLUCOSE SERPL-MCNC: 110 MG/DL (ref 70–110)
GLUCOSE SERPL-MCNC: 113 MG/DL (ref 70–110)
GLUCOSE SERPL-MCNC: 117 MG/DL (ref 70–110)
GLUCOSE SERPL-MCNC: 128 MG/DL (ref 70–110)
GLUCOSE SERPL-MCNC: 128 MG/DL (ref 70–110)
HCO3 UR-SCNC: 22.4 MMOL/L (ref 24–28)
HCT VFR BLD AUTO: 26.6 % (ref 40–54)
HCT VFR BLD CALC: 29 %PCV (ref 36–54)
HGB BLD-MCNC: 8.7 G/DL (ref 14–18)
IMM GRANULOCYTES # BLD AUTO: 0.03 K/UL (ref 0–0.04)
IMM GRANULOCYTES NFR BLD AUTO: 0.4 % (ref 0–0.5)
LYMPHOCYTES # BLD AUTO: 0.3 K/UL (ref 1–4.8)
LYMPHOCYTES NFR BLD: 4.3 % (ref 18–48)
MAGNESIUM SERPL-MCNC: 2.3 MG/DL (ref 1.6–2.6)
MCH RBC QN AUTO: 30.5 PG (ref 27–31)
MCHC RBC AUTO-ENTMCNC: 32.7 G/DL (ref 32–36)
MCV RBC AUTO: 93 FL (ref 82–98)
MODE: ABNORMAL
MONOCYTES # BLD AUTO: 0.3 K/UL (ref 0.3–1)
MONOCYTES NFR BLD: 4.2 % (ref 4–15)
NEUTROPHILS # BLD AUTO: 7.1 K/UL (ref 1.8–7.7)
NEUTROPHILS NFR BLD: 91.1 % (ref 38–73)
NRBC BLD-RTO: 0 /100 WBC
PCO2 BLDA: 42 MMHG (ref 35–45)
PH SMN: 7.33 [PH] (ref 7.35–7.45)
PLATELET # BLD AUTO: 166 K/UL (ref 150–450)
PMV BLD AUTO: 9.1 FL (ref 9.2–12.9)
PO2 BLDA: 84 MMHG (ref 80–100)
POC BE: -3 MMOL/L
POC IONIZED CALCIUM: 1.25 MMOL/L (ref 1.06–1.42)
POC SATURATED O2: 95 % (ref 95–100)
POC TCO2: 24 MMOL/L (ref 23–27)
POTASSIUM BLD-SCNC: 4.2 MMOL/L (ref 3.5–5.1)
POTASSIUM SERPL-SCNC: 4.2 MMOL/L (ref 3.5–5.1)
RBC # BLD AUTO: 2.85 M/UL (ref 4.6–6.2)
SAMPLE: ABNORMAL
SITE: ABNORMAL
SODIUM BLD-SCNC: 139 MMOL/L (ref 136–145)
SODIUM SERPL-SCNC: 137 MMOL/L (ref 136–145)
SP02: 97
WBC # BLD AUTO: 7.82 K/UL (ref 3.9–12.7)

## 2023-01-25 PROCEDURE — 25000003 PHARM REV CODE 250: Performed by: HOSPITALIST

## 2023-01-25 PROCEDURE — 82803 BLOOD GASES ANY COMBINATION: CPT

## 2023-01-25 PROCEDURE — 37799 UNLISTED PX VASCULAR SURGERY: CPT

## 2023-01-25 PROCEDURE — 82330 ASSAY OF CALCIUM: CPT

## 2023-01-25 PROCEDURE — 20000000 HC ICU ROOM

## 2023-01-25 PROCEDURE — 27000683 HC PILLOW THERAPEUTIC

## 2023-01-25 PROCEDURE — 80048 BASIC METABOLIC PNL TOTAL CA: CPT | Performed by: THORACIC SURGERY (CARDIOTHORACIC VASCULAR SURGERY)

## 2023-01-25 PROCEDURE — 27000221 HC OXYGEN, UP TO 24 HOURS

## 2023-01-25 PROCEDURE — 94761 N-INVAS EAR/PLS OXIMETRY MLT: CPT

## 2023-01-25 PROCEDURE — 85025 COMPLETE CBC W/AUTO DIFF WBC: CPT | Performed by: THORACIC SURGERY (CARDIOTHORACIC VASCULAR SURGERY)

## 2023-01-25 PROCEDURE — 99900031 HC PATIENT EDUCATION (STAT)

## 2023-01-25 PROCEDURE — 82962 GLUCOSE BLOOD TEST: CPT

## 2023-01-25 PROCEDURE — 84132 ASSAY OF SERUM POTASSIUM: CPT

## 2023-01-25 PROCEDURE — 25000003 PHARM REV CODE 250: Performed by: THORACIC SURGERY (CARDIOTHORACIC VASCULAR SURGERY)

## 2023-01-25 PROCEDURE — 84295 ASSAY OF SERUM SODIUM: CPT

## 2023-01-25 PROCEDURE — 85014 HEMATOCRIT: CPT

## 2023-01-25 PROCEDURE — 94799 UNLISTED PULMONARY SVC/PX: CPT

## 2023-01-25 PROCEDURE — 99900035 HC TECH TIME PER 15 MIN (STAT)

## 2023-01-25 PROCEDURE — 63600175 PHARM REV CODE 636 W HCPCS: Performed by: THORACIC SURGERY (CARDIOTHORACIC VASCULAR SURGERY)

## 2023-01-25 PROCEDURE — 99233 PR SUBSEQUENT HOSPITAL CARE,LEVL III: ICD-10-PCS | Mod: ,,, | Performed by: INTERNAL MEDICINE

## 2023-01-25 PROCEDURE — 25000003 PHARM REV CODE 250: Performed by: NURSE PRACTITIONER

## 2023-01-25 PROCEDURE — 83735 ASSAY OF MAGNESIUM: CPT | Performed by: THORACIC SURGERY (CARDIOTHORACIC VASCULAR SURGERY)

## 2023-01-25 PROCEDURE — 99233 SBSQ HOSP IP/OBS HIGH 50: CPT | Mod: ,,, | Performed by: INTERNAL MEDICINE

## 2023-01-25 RX ORDER — OXYCODONE AND ACETAMINOPHEN 5; 325 MG/1; MG/1
2 TABLET ORAL EVERY 4 HOURS PRN
Status: DISCONTINUED | OUTPATIENT
Start: 2023-01-25 | End: 2023-01-29

## 2023-01-25 RX ORDER — SODIUM CHLORIDE 9 MG/ML
INJECTION, SOLUTION INTRAVENOUS ONCE
Status: COMPLETED | OUTPATIENT
Start: 2023-01-25 | End: 2023-01-25

## 2023-01-25 RX ORDER — AMLODIPINE BESYLATE 5 MG/1
5 TABLET ORAL DAILY
Status: DISCONTINUED | OUTPATIENT
Start: 2023-01-26 | End: 2023-01-25

## 2023-01-25 RX ORDER — CARVEDILOL 12.5 MG/1
12.5 TABLET ORAL 2 TIMES DAILY WITH MEALS
Status: DISCONTINUED | OUTPATIENT
Start: 2023-01-25 | End: 2023-01-25

## 2023-01-25 RX ADMIN — MORPHINE SULFATE 4 MG: 4 INJECTION, SOLUTION INTRAMUSCULAR; INTRAVENOUS at 08:01

## 2023-01-25 RX ADMIN — FAMOTIDINE 20 MG: 20 TABLET ORAL at 08:01

## 2023-01-25 RX ADMIN — ESCITALOPRAM OXALATE 10 MG: 10 TABLET ORAL at 08:01

## 2023-01-25 RX ADMIN — DOCUSATE SODIUM 100 MG: 100 CAPSULE, LIQUID FILLED ORAL at 08:01

## 2023-01-25 RX ADMIN — OXYCODONE AND ACETAMINOPHEN 2 TABLET: 325; 5 TABLET ORAL at 12:01

## 2023-01-25 RX ADMIN — HYDROCODONE BITARTRATE AND ACETAMINOPHEN 1 TABLET: 7.5; 325 TABLET ORAL at 02:01

## 2023-01-25 RX ADMIN — CHLORHEXIDINE GLUCONATE 15 ML: 1.2 RINSE ORAL at 08:01

## 2023-01-25 RX ADMIN — ASPIRIN 81 MG CHEWABLE TABLET 81 MG: 81 TABLET CHEWABLE at 08:01

## 2023-01-25 RX ADMIN — MORPHINE SULFATE 4 MG: 4 INJECTION, SOLUTION INTRAMUSCULAR; INTRAVENOUS at 02:01

## 2023-01-25 RX ADMIN — SODIUM CHLORIDE: 0.9 INJECTION, SOLUTION INTRAVENOUS at 11:01

## 2023-01-25 RX ADMIN — OXYCODONE AND ACETAMINOPHEN 2 TABLET: 325; 5 TABLET ORAL at 08:01

## 2023-01-25 RX ADMIN — HYDROCODONE BITARTRATE AND ACETAMINOPHEN 1 TABLET: 7.5; 325 TABLET ORAL at 06:01

## 2023-01-25 RX ADMIN — CARVEDILOL 25 MG: 25 TABLET, FILM COATED ORAL at 06:01

## 2023-01-25 RX ADMIN — ATORVASTATIN CALCIUM 20 MG: 20 TABLET, FILM COATED ORAL at 08:01

## 2023-01-25 RX ADMIN — OXYCODONE AND ACETAMINOPHEN 2 TABLET: 325; 5 TABLET ORAL at 04:01

## 2023-01-25 RX ADMIN — MORPHINE SULFATE 4 MG: 4 INJECTION, SOLUTION INTRAMUSCULAR; INTRAVENOUS at 01:01

## 2023-01-25 RX ADMIN — CEFAZOLIN SODIUM 2 G: 2 SOLUTION INTRAVENOUS at 05:01

## 2023-01-25 NOTE — PLAN OF CARE
Plan of care reviewed and patient progressing toward goals. Patient extubated 2350 and is tolerating ice chips. Patient has large amount of dark red drainage on mediastinal chest tube dressing and the left side of the chest tube is patent but the right side appears to have a clot. Patient has no questions or concerns. VSS and cardiac hemodynamics WNL. Continuing to closely monitor patient.     Problem: Infection  Goal: Absence of Infection Signs and Symptoms  Outcome: Ongoing, Progressing     Problem: Adult Inpatient Plan of Care  Goal: Plan of Care Review  Outcome: Ongoing, Progressing  Goal: Patient-Specific Goal (Individualized)  Outcome: Ongoing, Progressing  Goal: Absence of Hospital-Acquired Illness or Injury  Outcome: Ongoing, Progressing  Goal: Optimal Comfort and Wellbeing  Outcome: Ongoing, Progressing  Goal: Readiness for Transition of Care  Outcome: Ongoing, Progressing     Problem: Skin Injury Risk Increased  Goal: Skin Health and Integrity  Outcome: Ongoing, Progressing     Problem: Fall Injury Risk  Goal: Absence of Fall and Fall-Related Injury  Outcome: Ongoing, Progressing

## 2023-01-25 NOTE — RESPIRATORY THERAPY
01/24/23 1935   Patient Assessment/Suction   Level of Consciousness (AVPU) responds to voice   Respiratory Effort Unlabored   Expansion/Accessory Muscles/Retractions no use of accessory muscles   All Lung Fields Breath Sounds coarse   Rhythm/Pattern, Respiratory assisted mechanically   Cough Frequency with stimulation   Cough Type assisted   Suction Method oral;tracheal   Suction Pressure (mmHg) -120 mmHg   $ Suction Charges Inline Suction Procedure Stat Charge   Secretions Amount moderate   Secretions Color white;clear   Secretions Characteristics thick   Skin Integrity   $ Wound Care Tech Time 15 min   Area Observed Upper lip;Lower lip;Corner lip;Cheek   Skin Appearance without discoloration   PRE-TX-O2   Device (Oxygen Therapy) ventilator   $ Is the patient on Low Flow Oxygen? Yes   Oxygen Concentration (%) 60   SpO2 100 %   Pulse Oximetry Type Continuous   $ Pulse Oximetry - Multiple Charge Pulse Oximetry - Multiple   Pulse 89   Resp 20        Airway - Non-Surgical 01/24/23 0941 Endotracheal Tube-Hi/Lo   Placement Date/Time: 01/24/23 0941   Present Prior to Hospital Arrival?: No  Method of Intubation: Direct laryngoscopy  Inserted by: CRNA  Airway Device: Endotracheal Tube-Hi/Lo  Mask Ventilation: Easy  Intubated: Postinduction  Blade: Ranulfo #3  A...   Secured at 25 cm   Measured At Lips   Secured Location Right   Secured by Commercial tube domínguez   Bite Block none   Status Intact;Secured;Patent   Airway Safety   Ambu bag with the patient? Yes, Adult Ambu   Suction set is at the bedside? Yes   Respiratory Interventions   Airway/Ventilation Management airway patency maintained   Vent Select   Conventional Vent Y   Charged w/in last 24h YES   Preset Conventional Ventilator Settings   Vent ID [id number]   Vent Type    Ventilation Type VC   Vent Mode SIMV   Humidity HME   Set Rate 16 BPM   Vt Set 500 mL   PEEP/CPAP 5 cmH20   Pressure Support 10 cmH20   Peak Flow 60 L/min   Peak End Inspiratory Pressure 14 cmH20    Insp Rise Time  70 %   I-Trigger Type  V-TRIG   Trigger Sensitivity Flow/I-Trigger 3 L/min   Patient Ventilator Parameters   Resp Rate Total 19 br/min   Peak Airway Pressure 19 cmH20   Mean Airway Pressure 9.1 cmH20   Plateau Pressure 0 cmH20   Exhaled Vt 439 mL   Total Ve 9.33 L/m   Spont Ve 1.54 L   I:E Ratio Measured 1:1.80   Auto PEEP 0 cmH20   Tubing ID (mm) 7.5 mm   Tube Type ET   Inspired Tidal Volume (VTI) 1 mL   Conventional Ventilator Alarms   Alarms On Y   Press High Alarm 40 cmH2O   Apnea Rate 10   Apnea Volume (mL) 1 mL   Apnea Oxygen Concentration  100   Apnea Flow Rate (L/min) 80   T Apnea 20 sec(s)   IHI Ventilator Associated Pneumonia Bundle (Required)   Daily Awakening Trials Performed Yes   Daily Assessment of Readiness to Extubate Yes   Oral Care Mouth suctioned   Vent Circut Breaks Minimized Yes   Ready to Wean/Extubation Screen   FIO2<=50 (chart decimal) (!) 0.6   MV<16L (chart vol.) 9.33   PEEP <=8 (chart #) 5   Ready to Wean Parameters   F/VT Ratio<105 (RSBI) (!) 45.56   Vital Capacity   Vital Capacity (mL) 0   Education   $ Education Suction;15 min   Respiratory Evaluation   $ Care Plan Tech Time 15 min

## 2023-01-25 NOTE — PROGRESS NOTES
[facility] Medicine  Progress Note    Patient name: Peyman Gr  MRN: [id number]  Admit Date: 1/1/2023   LOS: 24 days     SUBJECTIVE:     Principal problem: STEMI involving right coronary artery    Interval History:  Patient was seen and examined bedside, he underwent CABG x3 yesterday, now extubated, breathing comfortably on nasal cannula.  Borderline low blood pressure.  Alert, oriented, encouraged to walk. Chest tubes and pacer wires in place.     Scheduled Meds:   amLODIPine  10 mg Oral Daily    aspirin  81 mg Oral Daily    atorvastatin  20 mg Oral QHS    carvediloL  25 mg Oral BID WM    ceFAZolin (ANCEF) IVPB  2 g Intravenous Q8H    chlorhexidine  15 mL Mouth/Throat BID    docusate sodium  100 mg Oral BID    EScitalopram oxalate  10 mg Oral Daily    famotidine  20 mg Per OG tube BID     Continuous Infusions:    PRN Meds:acetaminophen, albumin human 5%, atropine, calcium gluconate IVPB, calcium gluconate IVPB, calcium gluconate IVPB, dextrose 10%, dextrose 10%, EPINEPHrine, HYDROcodone-acetaminophen, HYDROcodone-acetaminophen, levalbuterol, magnesium sulfate IVPB, magnesium sulfate IVPB, melatonin, midazolam, morphine, ondansetron, potassium chloride in water, potassium chloride in water, potassium chloride in water, sodium phosphate IVPB, sodium phosphate IVPB, sodium phosphate IVPB    Review of patient's allergies indicates:  No Known Allergies    Review of Systems: As per interval history    OBJECTIVE:     Vital Signs (Most Recent)  Temp: 97 °F (36.1 °C) (01/25/23 0400)  Pulse: 101 (01/25/23 0800)  Resp: (!) 31 (01/25/23 0833)  BP: 119/68 (01/25/23 0600)  SpO2: 97 % (01/25/23 0800)    Vital Signs Range (Last 24H):  Temp:  [96.4 °F (35.8 °C)-97.9 °F (36.6 °C)]   Pulse:  []   Resp:  [13-41]   BP: ()/(53-82)   SpO2:  [95 %-100 %]   Arterial Line BP: ()/(25-67)     I & O (Last 24H):  Intake/Output Summary (Last 24 hours) at 1/25/2023 1103  Last data filed at 1/25/2023  0601  Gross per 24 hour   Intake 4768.34 ml   Output 2373 ml   Net 2395.34 ml         Physical Exam:  General:  Alert, oriented, in no acute distress  ENT:  No discharge from ears. No nasal discharge.   Neck: Supple, trachea midline. No JVD, left IJ CVC  CVS: RRR. No LE edema BL, midline dressing in place, chest tubes pacer wires in place  Lungs:  On nasal cannula, bilateral crackles noted   Abdomen:  Soft, nontender and nondistended.  No organomegaly  Neuro:  Alert, oriented x3, moves all extremities  :  Duron's catheter in place    Laboratory:  I have reviewed all pertinent lab results within the past 24 hours.    Diagnostic Results:  Reviewed all imaging    ASSESSMENT/PLAN:     67-year-old gentleman admitted with STEMI involving RCA causing cardiac arrest complicated by complete heart block, MSSA pneumonia, emergent stenting, intubation, extubation on 01/08.  Remarkable recovery considering his presentation    Active Hospital Problems    Diagnosis  POA    *STEMI involving right coronary artery [I21.11]  Yes    Anemia [D64.9]  Yes    History of tobacco abuse [Z87.891]  Not Applicable    Primary hypertension [I10]  Yes    PVD (peripheral vascular disease) with claudication [I73.9]  Yes    PAD (peripheral artery disease) [I73.9]  Yes      Resolved Hospital Problems    Diagnosis Date Resolved POA    Pneumonia of right lung due to methicillin susceptible Staphylococcus aureus (MSSA) [J15.211] 01/21/2023 No    Shock liver [K72.00] 01/21/2023 Yes    Cardiogenic shock [R57.0] 01/21/2023 Yes    Cardiac arrest [I46.9] 01/21/2023 Yes    Acute respiratory failure with hypoxia and hypercarbia [J96.01, J96.02] 01/21/2023 Yes    Leukocytosis [D72.829] 01/21/2023 Yes    Hyponatremia [E87.1] 01/02/2023 Yes         Plan:   Remarkable recovery considering his presentation of cardiac arrest, STEMI  Help from multiple consultants appreciated  Patient finished antibiotics for pneumonia, extubated on 01/08  Status post CABG x3 on  01/24.  Extubated, not on any vasopressors.  Still has chest tubes and pacer wires, borderline low blood pressure, instructed nursing staff to hold antihypertensives  Encourage p.o. hydration, avoid excessive IV fluids considering his CHF  Immediate postop management as per Cardiology and CT surgery team  Brilinta stopped, Lovenox held for surgery  Carotid ultrasound with mild left-sided stenoses  Echo LVEF 70%  Will need skilled nursing facility placement postoperatively   Out of bed to chair, encourage incentive spirometer      VTE Risk Mitigation (From admission, onward)      None                Department Hospital Medicine  [facility]  Yamile Lozano MD  Date of service: 01/25/2023

## 2023-01-25 NOTE — TRANSFER OF CARE
"Anesthesia Transfer of Care Note    Patient: Peyman Gr    Procedure(s) Performed: Procedure(s) (LRB):  CORONARY ARTERY BYPASS GRAFT (CABG) (N/A)    Patient location: ICU    Anesthesia Type: general    Transport from OR: Transported from OR intubated on 100% O2 by AMBU with adequate controlled ventilation. Continuous ECG monitoring in transport. Continuous SpO2 monitoring in transport. Continuos invasive BP monitoring in transport. Continuous CVP monitoring in transport. Continuous PA pressure monitoring in transport    Post pain: adequate analgesia    Post assessment: no apparent anesthetic complications    Post vital signs: stable    Level of consciousness: sedated and unresponsive    Nausea/Vomiting: no nausea/vomiting    Complications: none    Transfer of care protocol was followed      Last vitals:   Visit Vitals  BP (!) 173/82   Pulse 69   Temp 36.6 °C (97.9 °F)   Resp 18   Ht 6' 2" (1.88 m)   Wt 87.8 kg (193 lb 9 oz)   SpO2 97%   BMI 24.84 kg/m²     "

## 2023-01-25 NOTE — NURSING
Patient received to room 2218. Report taken from CRNA. /62, HR 89 (AV Paced), PAP 34/17, Sats 100%. Labs sent, EKG done, No CXR per Dr. Douglas as one was done in OR. ABG reviewed. 1 amp bicarb given per Dr. Reyes orders. Temp 96.4, bear hugger applied. Epi @ 0.02. , INS gtt @ 1u/hr.     1851-Family at bedside, updated on plan of care.     Report given to Jacqueline FENG

## 2023-01-25 NOTE — PROGRESS NOTES
[facility] hospital  Department of Cardiology  Progress Note    PATIENT NAME: Peyman Gr  MRN: [id number]  TODAY'S DATE: 01/25/2023  ADMIT DATE: 1/1/2023    SUBJECTIVE     PRINCIPLE PROBLEM: STEMI involving right coronary artery    INTERVAL HISTORY:    1/25/2023    Patient is sitting up in the chair and feels weak. Blood pressure in the 80s.   Denies CP. Not taking very deep breaths.       1/23/23  Patient denies CP   Denies SOB  Awaiting CABG which is scheduled for tomorrow he tells me.      1/21/22  Patient is resting comfortably in bedside chair during examination.  No acute distress, alert and oriented x4.  He denies chest pain.  Healing complains of shortness of breath with exertion.  He is waiting to have CABG in the near future, possibly next week.  He is eating well.  /70 during examination.  Patient was hypertensive overnight and was given 1 dose of IV hydralazine p.r.n..  Oral antihypertensives were titrated up yesterday.    1/20/23  Patient seen sitting up in chair with no distress noted. Breathing is stable. He does have SOB with exertion. He has been using the IS.    1/19/23  Patient is being evaluated by CT surgery for CABG. He reports he is feeling okay. Has had some SOB but denies chest pain.     1/18/23:  No new complaints.  Making some progress with physical therapy.  Some shortness of breath on exertion.  Films reviewed with Dr. Elizondo.      Review of patient's allergies indicates:  No Known Allergies    REVIEW OF SYSTEMS  +weakness    OBJECTIVE     VITAL SIGNS (Most Recent)  Temp: 97 °F (36.1 °C) (01/25/23 0400)  Pulse: 101 (01/25/23 0800)  Resp: (!) 31 (01/25/23 0833)  BP: 119/68 (01/25/23 0600)  SpO2: 97 % (01/25/23 0800)    VENTILATION STATUS  Resp: (!) 31 (01/25/23 0833)  SpO2: 97 % (01/25/23 0800)  Vent Mode: Spont  Oxygen Concentration (%):  [] 40  Resp Rate Total:  [0 br/min-28 br/min] 0 br/min  Vt Set:  [500 mL] 500 mL  PEEP/CPAP:  [5 cmH20] 5 cmH20  Pressure Support:   [10 cmH20] 10 cmH20  Mean Airway Pressure:  [8 vpZ37-53 cmH20] 11 cmH20    I & O (Last 24H):  Intake/Output Summary (Last 24 hours) at 1/25/2023 1121  Last data filed at 1/25/2023 0601  Gross per 24 hour   Intake 4768.34 ml   Output 1673 ml   Net 3095.34 ml       WEIGHTS  Wt Readings from Last 3 Encounters:   01/24/23 0300 87.8 kg (193 lb 9 oz)   01/23/23 0400 86.8 kg (191 lb 5.8 oz)   01/17/23 0400 105.2 kg (231 lb 14.8 oz)   01/15/23 0400 100 kg (220 lb 7.4 oz)   01/14/23 0400 97.8 kg (215 lb 9.8 oz)   01/07/23 0400 115.4 kg (254 lb 6.6 oz)   01/06/23 0400 117.6 kg (259 lb 4.2 oz)   01/05/23 0500 115.3 kg (254 lb 3.1 oz)   01/03/23 0615 105.4 kg (232 lb 5.8 oz)   01/02/23 0400 102.5 kg (225 lb 15.5 oz)   01/02/23 0053 102.5 kg (225 lb 15.5 oz)   01/01/23 0800 99.8 kg (220 lb 0.3 oz)   01/01/23 0341 99.8 kg (220 lb)   01/19/23 1350 104.8 kg (231 lb)   01/01/23 1706 99.8 kg (220 lb)       PHYSICAL EXAM  CONSTITUTIONAL: Well built, well nourished in no apparent distress feels weak  NECK: R carotid bruit  LUNGS: Diminished  CHEST WALL: no tenderness  HEART: regular rate and rhythm, S1, S2 normal,   ABDOMEN: soft, non-tender; bowel sounds normal; no masses,  no organomegaly  EXTREMITIES: Extremities normal, no edema  NEURO: AAO X 3    SCHEDULED MEDS:   amLODIPine  10 mg Oral Daily    aspirin  81 mg Oral Daily    atorvastatin  20 mg Oral QHS    carvediloL  25 mg Oral BID WM    ceFAZolin (ANCEF) IVPB  2 g Intravenous Q8H    chlorhexidine  15 mL Mouth/Throat BID    docusate sodium  100 mg Oral BID    EScitalopram oxalate  10 mg Oral Daily    famotidine  20 mg Per OG tube BID       CONTINUOUS INFUSIONS:    PRN MEDS:acetaminophen, albumin human 5%, atropine, calcium gluconate IVPB, calcium gluconate IVPB, calcium gluconate IVPB, dextrose 10%, dextrose 10%, EPINEPHrine, HYDROcodone-acetaminophen, HYDROcodone-acetaminophen, levalbuterol, magnesium sulfate IVPB, magnesium sulfate IVPB, melatonin, midazolam, morphine,  ondansetron, potassium chloride in water, potassium chloride in water, potassium chloride in water, sodium phosphate IVPB, sodium phosphate IVPB, sodium phosphate IVPB    LABS AND DIAGNOSTICS     CBC LAST 3 DAYS  Recent Labs   Lab 01/24/23  1726 01/24/23 1819 01/24/23 2245 01/24/23 2308 01/24/23 2343 01/25/23  0054 01/25/23  0303   WBC 7.14  --  7.09  --   --   --  7.82   RBC 3.02*  --  2.82*  --   --   --  2.85*   HGB 9.3*  --  8.7*  --   --   --  8.7*   HCT 28.1*   < > 26.2*   < > 28* 29* 26.6*   MCV 93  --  93  --   --   --  93   MCH 30.8  --  30.9  --   --   --  30.5   MCHC 33.1  --  33.2  --   --   --  32.7   RDW 13.4  --  13.3  --   --   --  13.3     --  162  --   --   --  166   MPV 9.0*  --  8.8*  --   --   --  9.1*   GRAN 84.9*  6.1  --  91.4*  6.5  --   --   --  91.1*  7.1   LYMPH 9.2*  0.7*  --  3.2*  0.2*  --   --   --  4.3*  0.3*   MONO 4.2  0.3  --  4.8  0.3  --   --   --  4.2  0.3   BASO 0.01  --  0.00  --   --   --  0.00   NRBC 0  --  0  --   --   --  0    < > = values in this interval not displayed.       COAGULATION LAST 3 DAYS  No results for input(s): LABPT, INR, APTT in the last 168 hours.    CHEMISTRY LAST 3 DAYS  Recent Labs   Lab 01/22/23  0612 01/23/23  0416 01/24/23  0406 01/24/23  1323 01/24/23  1726 01/24/23 1819 01/24/23 2245 01/24/23 2308 01/24/23  2343 01/25/23  0054 01/25/23  0303   * 134* 135*  --  133*  --  135*  --   --   --  137   K 4.0 3.9 3.8  --  4.1  4.1  --  4.2  --   --   --  4.2    103 102  --  102  --  102  --   --   --  103   CO2 23 24 24  --  23  --  22*  --   --   --  23   ANIONGAP 8 7* 9  --  8  --  11  --   --   --  11   BUN 22 23 20  --  14  --  16  --   --   --  17   CREATININE 1.0 0.9 0.8  --  0.9  --  0.9  --   --   --  1.0   GLU 99 94 86  --  177*  --  171*  --   --   --  104   CALCIUM 8.8 9.1 9.0  --  8.7  --  8.4*  --   --   --  9.1   PH  --   --   --    < >  --    < >  --  7.405 7.346* 7.335*  --    MG 1.8 1.8 1.9  --  2.8*   --  2.3  --   --   --  2.3   ALBUMIN 3.1* 3.1* 3.3*  --   --   --   --   --   --   --   --    PROT 7.1 7.2 7.1  --   --   --   --   --   --   --   --    ALKPHOS 110 112 121  --   --   --   --   --   --   --   --    ALT 27 27 28  --   --   --   --   --   --   --   --    AST 23 26 30  --   --   --   --   --   --   --   --    BILITOT 0.5 0.3 0.5  --   --   --   --   --   --   --   --     < > = values in this interval not displayed.       CARDIAC PROFILE LAST 3 DAYS  No results for input(s): BNP, CPK, CPKMB, LDH, TROPONINI in the last 168 hours.    ENDOCRINE LAST 3 DAYS  No results for input(s): TSH, PROCAL in the last 168 hours.      LAST ARTERIAL BLOOD GAS  ABG  Recent Labs   Lab 01/25/23  0054   PH 7.335*   PO2 84   PCO2 42.0   HCO3 22.4*   BE -3       LAST 7 DAYS MICROBIOLOGY   Microbiology Results (last 7 days)       ** No results found for the last 168 hours. **            MOST RECENT IMAGING  X-Ray Chest AP Portable  HISTORY: Post-op  CABG.    FINDINGS: Portable chest radiograph at 0459 hours compared to 01/24/2023 shows interval removal of the ET and NG tubes, and the PA catheter. The left internal jugular central venous catheter, and mediastinal and bilateral thoracostomy drains are unchanged in position, with median sternotomy wires and mediastinal surgical clips.    The cardiac silhouette and pulmonary vasculature are stable, with aortic vascular calcifications. The lungs are normally expanded, with no large pleural effusion, evidence of pulmonary edema, or pneumothorax.    IMPRESSION: Interval removal of some of the support devices as described, with no other significant change.    Electronically signed by:  Hema Tafoya MD  1/25/2023 6:59 AM CST Workstation: [id number]      [facility] [employer]  Results for orders placed during the hospital encounter of 01/01/23    Echo Saline Bubble? No    Interpretation Summary  · The estimated ejection fraction is 55%.  · The left ventricle is normal in size with concentric  hypertrophy.  · Mild to moderate tricuspid regurgitation.  · Moderate right ventricular enlargement with moderately reduced right ventricular systolic function.  · Grade I left ventricular diastolic dysfunction.  · There is mild aortic valve stenosis.  · Aortic valve area is 1.68 cm2; peak velocity is 1.36 m/s; mean gradient is 4 mmHg.  · There is abnormal septal wall motion consistent with right ventricular pacemaker.  · There are segmental left ventricular wall motion abnormalities.      CURRENT/PREVIOUS VISIT EKG  Results for orders placed or performed during the hospital encounter of 01/01/23   EKG 12-LEAD    Collection Time: 01/24/23  6:12 PM    Narrative    Test Reason : Z95.1,    Vent. Rate : 089 BPM     Atrial Rate : 089 BPM     P-R Int : 156 ms          QRS Dur : 116 ms      QT Int : 464 ms       P-R-T Axes : 076 102 068 degrees     QTc Int : 564 ms    AV dual-paced rhythm  Abnormal ECG  When compared with ECG of 14-JAN-2023 17:26,  Electronic ventricular pacemaker has replaced Sinus rhythm    Referred By: IRENE MOSER           Confirmed By:        ASSESSMENT/PLAN:     Active Hospital Problems    Diagnosis    *STEMI involving right coronary artery    Anemia    History of tobacco abuse    Primary hypertension    PVD (peripheral vascular disease) with claudication    PAD (peripheral artery disease)       ASSESSMENT & PLAN:     -S/P CABG X 3 left internal mammary artery to left anterior descending coronary artery and saphenous vein from the left common carotid artery to the obtuse marginal coronary artery and saphenous vein from the body of the 1st vein graft to the posterior descending coronary artery  on 1/24/2023  -Severe left main coronary stenosis with 90% lesion in small mid marginal  -ST elevation MI with stent in ostial right  status post STEMI with cardiac arrest  -50-69% stenosis on US right  -H/O Right neck carotid surgery  -Hypotension this morning  -Expected postop  anemia    RECOMMENDATIONS:        Get patient back in bed-bp 80s  Decrease amlodipine and hold for SBP<110  Continue coreg as bp allows  Give 250 mL bolus  Encourage IS  Will follow        Mary Hernandez NP  [facility]  Department of Cardiology  Date of Service: 01/25/2023      I have personally interviewed and examined the patient.  I have reviewed all the Nurse Practitioner's documentation, and agree with the plan.   1. Stop amlodipine.  And decrease Coreg to 12.5 mg p.o. b.i.d.  2. Small fluid bolus resuscitation to get his pressure up.  And patient to be in the bed today.        Sarthak Cardona M.D.  [facility]  Department of Cardiology  Date of Service: 01/23/2023

## 2023-01-25 NOTE — PLAN OF CARE
01/25/23 1021   Discharge Reassessment   Assessment Type Discharge Planning Reassessment   Did the patient's condition or plan change since previous assessment? No   Discharge Plan discussed with: Patient   Communicated TOBIAS with patient/caregiver Yes   Discharge Plan A Skilled Nursing Facility   Discharge Plan B Skilled Nursing Facility   DME Needed Upon Discharge  none   Discharge Barriers Identified None   Why the patient remains in the hospital Requires continued medical care   Post-Acute Status   Post-Acute Authorization Placement   Post-Acute Placement Status Set-up Complete/Auth obtained     Patient extubated last night. Chest tubes remain in place s/p CABG on yesterday.     Patient accepted to [facility]. Awaiting medical clearance for transfer to SNF facility at this time. CM to follow

## 2023-01-25 NOTE — PROGRESS NOTES
[facility] Medicine  Progress Note    Patient name: Peyman Gr  MRN: [id number]  Admit Date: 1/1/2023   LOS: 23 days     SUBJECTIVE:     Principal problem: STEMI involving right coronary artery    Interval History:  Patient was seen and examined bedside, he just came out of operating room status post CABG x3 postop day 0, remains intubated, sedated on vasopressors, chest tubes and pacer wires in place.     Scheduled Meds:   amLODIPine  10 mg Oral Daily    aspirin  81 mg Oral Daily    atorvastatin  20 mg Oral QHS    carvediloL  25 mg Oral BID WM    ceFAZolin (ANCEF) IVPB  2 g Intravenous Q8H    chlorhexidine  15 mL Mouth/Throat BID    [START ON 1/25/2023] docusate sodium  100 mg Oral BID    EScitalopram oxalate  10 mg Oral Daily    famotidine  20 mg Per OG tube BID    sodium bicarbonate         Continuous Infusions:   sodium chloride 0.45%      insulin regular 1 units/mL infusion orderable (CTS POST-OP)       PRN Meds:acetaminophen, albumin human 5%, atropine, calcium gluconate IVPB, calcium gluconate IVPB, calcium gluconate IVPB, dextrose 10%, dextrose 10%, EPINEPHrine, HYDROcodone-acetaminophen, HYDROcodone-acetaminophen, levalbuterol, magnesium sulfate IVPB, magnesium sulfate IVPB, melatonin, midazolam, morphine, ondansetron, potassium chloride in water, potassium chloride in water, potassium chloride in water, sodium phosphate IVPB, sodium phosphate IVPB, sodium phosphate IVPB    Review of patient's allergies indicates:  No Known Allergies    Review of Systems: As per interval history    OBJECTIVE:     Vital Signs (Most Recent)  Temp: 97.9 °F (36.6 °C) (01/24/23 1115)  Pulse: 69 (01/24/23 1200)  Resp: 18 (01/24/23 1200)  BP: (!) 173/82 (01/24/23 1200)  SpO2: 97 % (01/24/23 1200)    Vital Signs Range (Last 24H):  Temp:  [97.7 °F (36.5 °C)-98.6 °F (37 °C)]   Pulse:  [66-80]   Resp:  [14-30]   BP: (150-187)/(72-88)   SpO2:  [95 %-100 %]   Arterial Line BP: (147-183)/(53-69)     I & O (Last  24H):  Intake/Output Summary (Last 24 hours) at 1/24/2023 1814  Last data filed at 1/24/2023 1731  Gross per 24 hour   Intake 3490 ml   Output 2425 ml   Net 1065 ml         Physical Exam:  General:  Intubated, sedated  ENT:  No discharge from ears. No nasal discharge.   Neck: Supple, trachea midline. No JVD  CVS: RRR. No LE edema BL, midline dressing in place, chest tubes pacer wires in place  Lungs:  Intubated, mechanical breath sounds   Abdomen:  Soft, nontender and nondistended.  No organomegaly  Neuro:  Sedated      Laboratory:  I have reviewed all pertinent lab results within the past 24 hours.    Diagnostic Results:  Reviewed all imaging    ASSESSMENT/PLAN:     67-year-old gentleman admitted with STEMI involving RCA causing cardiac arrest complicated by complete heart block, MSSA pneumonia, emergent stenting, intubation, extubation on 01/08.  Patient had made remarkable recovery    Active Hospital Problems    Diagnosis  POA    *STEMI involving right coronary artery [I21.11]  Yes    Anemia [D64.9]  Yes    History of tobacco abuse [Z87.891]  Not Applicable    Primary hypertension [I10]  Yes    PVD (peripheral vascular disease) with claudication [I73.9]  Yes    PAD (peripheral artery disease) [I73.9]  Yes      Resolved Hospital Problems    Diagnosis Date Resolved POA    Pneumonia of right lung due to methicillin susceptible Staphylococcus aureus (MSSA) [J15.211] 01/21/2023 No    Shock liver [K72.00] 01/21/2023 Yes    Cardiogenic shock [R57.0] 01/21/2023 Yes    Cardiac arrest [I46.9] 01/21/2023 Yes    Acute respiratory failure with hypoxia and hypercarbia [J96.01, J96.02] 01/21/2023 Yes    Leukocytosis [D72.829] 01/21/2023 Yes    Hyponatremia [E87.1] 01/02/2023 Yes         Plan:   Remarkable recovery considering his presentation of cardiac arrest, STEMI  Help from multiple consultants appreciated  Patient finished antibiotics for pneumonia, extubated on 01/08  Status post CABG x3 on 01/24.  Just came out of  operating room. Immediate postop management as guided by Cardiology and CT surgery team  Brilinta stopped, Lovenox held for surgery  Carotid ultrasound with mild left-sided stenoses  Echo LVEF 70%  Will need skilled nursing facility placement postoperatively       VTE Risk Mitigation (From admission, onward)      None                Department Hospital Medicine  Count includes the [facility]  Yamile Lozano MD  Date of service: 01/24/2023

## 2023-01-25 NOTE — CARE UPDATE
Pt unable to use I.S. due to pain, will continue to encourage frequent use when pain managed.   01/25/23 0800   Patient Assessment/Suction   Level of Consciousness (AVPU) alert   Respiratory Effort Labored  (pt having pain)   Rhythm/Pattern, Respiratory tachypneic   Cough Frequency no cough   PRE-TX-O2   Device (Oxygen Therapy) nasal cannula   $ Is the patient on Low Flow Oxygen? Yes   Flow (L/min) 3   SpO2 97 %   Pulse Oximetry Type Continuous   $ Pulse Oximetry - Multiple Charge Pulse Oximetry - Multiple   Pulse 101   Resp (!) 41   Education   $ Education DME Oxygen;15 min   Respiratory Evaluation   $ Care Plan Tech Time 15 min   $ Eval/Re-eval Charges Re-evaluation

## 2023-01-26 LAB
ALBUMIN SERPL BCP-MCNC: 3.2 G/DL (ref 3.5–5.2)
ANION GAP SERPL CALC-SCNC: 6 MMOL/L (ref 8–16)
ANION GAP SERPL CALC-SCNC: 7 MMOL/L (ref 8–16)
BASOPHILS # BLD AUTO: 0.01 K/UL (ref 0–0.2)
BASOPHILS NFR BLD: 0.1 % (ref 0–1.9)
BUN SERPL-MCNC: 29 MG/DL (ref 8–23)
BUN SERPL-MCNC: 34 MG/DL (ref 8–23)
CALCIUM SERPL-MCNC: 8.5 MG/DL (ref 8.7–10.5)
CALCIUM SERPL-MCNC: 8.8 MG/DL (ref 8.7–10.5)
CHLORIDE SERPL-SCNC: 106 MMOL/L (ref 95–110)
CHLORIDE SERPL-SCNC: 107 MMOL/L (ref 95–110)
CO2 SERPL-SCNC: 22 MMOL/L (ref 23–29)
CO2 SERPL-SCNC: 23 MMOL/L (ref 23–29)
CREAT SERPL-MCNC: 1 MG/DL (ref 0.5–1.4)
CREAT SERPL-MCNC: 1.3 MG/DL (ref 0.5–1.4)
DIFFERENTIAL METHOD: ABNORMAL
EOSINOPHIL # BLD AUTO: 0 K/UL (ref 0–0.5)
EOSINOPHIL NFR BLD: 0 % (ref 0–8)
ERYTHROCYTE [DISTWIDTH] IN BLOOD BY AUTOMATED COUNT: 13.9 % (ref 11.5–14.5)
ERYTHROCYTE [DISTWIDTH] IN BLOOD BY AUTOMATED COUNT: 14.2 % (ref 11.5–14.5)
EST. GFR  (NO RACE VARIABLE): >60 ML/MIN/1.73 M^2
EST. GFR  (NO RACE VARIABLE): >60 ML/MIN/1.73 M^2
GLUCOSE SERPL-MCNC: 120 MG/DL (ref 70–110)
GLUCOSE SERPL-MCNC: 128 MG/DL (ref 70–110)
HCT VFR BLD AUTO: 22.2 % (ref 40–54)
HCT VFR BLD AUTO: 28.3 % (ref 40–54)
HGB BLD-MCNC: 7.3 G/DL (ref 14–18)
HGB BLD-MCNC: 9.4 G/DL (ref 14–18)
IMM GRANULOCYTES # BLD AUTO: 0.07 K/UL (ref 0–0.04)
IMM GRANULOCYTES NFR BLD AUTO: 0.7 % (ref 0–0.5)
LYMPHOCYTES # BLD AUTO: 0.9 K/UL (ref 1–4.8)
LYMPHOCYTES NFR BLD: 8.8 % (ref 18–48)
MAGNESIUM SERPL-MCNC: 2.1 MG/DL (ref 1.6–2.6)
MCH RBC QN AUTO: 31.1 PG (ref 27–31)
MCH RBC QN AUTO: 31.1 PG (ref 27–31)
MCHC RBC AUTO-ENTMCNC: 32.9 G/DL (ref 32–36)
MCHC RBC AUTO-ENTMCNC: 33.2 G/DL (ref 32–36)
MCV RBC AUTO: 94 FL (ref 82–98)
MCV RBC AUTO: 95 FL (ref 82–98)
MONOCYTES # BLD AUTO: 1.2 K/UL (ref 0.3–1)
MONOCYTES NFR BLD: 11.8 % (ref 4–15)
NEUTROPHILS # BLD AUTO: 8.2 K/UL (ref 1.8–7.7)
NEUTROPHILS NFR BLD: 78.6 % (ref 38–73)
NRBC BLD-RTO: 0 /100 WBC
PHOSPHATE SERPL-MCNC: 3.1 MG/DL (ref 2.7–4.5)
PLATELET # BLD AUTO: 144 K/UL (ref 150–450)
PLATELET # BLD AUTO: 151 K/UL (ref 150–450)
PMV BLD AUTO: 9.4 FL (ref 9.2–12.9)
PMV BLD AUTO: 9.5 FL (ref 9.2–12.9)
POTASSIUM SERPL-SCNC: 4.2 MMOL/L (ref 3.5–5.1)
POTASSIUM SERPL-SCNC: 4.6 MMOL/L (ref 3.5–5.1)
RBC # BLD AUTO: 2.35 M/UL (ref 4.6–6.2)
RBC # BLD AUTO: 3.02 M/UL (ref 4.6–6.2)
SODIUM SERPL-SCNC: 134 MMOL/L (ref 136–145)
SODIUM SERPL-SCNC: 137 MMOL/L (ref 136–145)
WBC # BLD AUTO: 10.45 K/UL (ref 3.9–12.7)
WBC # BLD AUTO: 10.64 K/UL (ref 3.9–12.7)

## 2023-01-26 PROCEDURE — 27000221 HC OXYGEN, UP TO 24 HOURS

## 2023-01-26 PROCEDURE — 97164 PT RE-EVAL EST PLAN CARE: CPT

## 2023-01-26 PROCEDURE — 20000000 HC ICU ROOM

## 2023-01-26 PROCEDURE — 25000003 PHARM REV CODE 250: Performed by: HOSPITALIST

## 2023-01-26 PROCEDURE — 99900035 HC TECH TIME PER 15 MIN (STAT)

## 2023-01-26 PROCEDURE — 97116 GAIT TRAINING THERAPY: CPT

## 2023-01-26 PROCEDURE — 25000003 PHARM REV CODE 250: Performed by: THORACIC SURGERY (CARDIOTHORACIC VASCULAR SURGERY)

## 2023-01-26 PROCEDURE — 83735 ASSAY OF MAGNESIUM: CPT | Performed by: THORACIC SURGERY (CARDIOTHORACIC VASCULAR SURGERY)

## 2023-01-26 PROCEDURE — 63600175 PHARM REV CODE 636 W HCPCS: Mod: TB,JG

## 2023-01-26 PROCEDURE — 94799 UNLISTED PULMONARY SVC/PX: CPT

## 2023-01-26 PROCEDURE — 63600175 PHARM REV CODE 636 W HCPCS: Performed by: HOSPITALIST

## 2023-01-26 PROCEDURE — 99233 PR SUBSEQUENT HOSPITAL CARE,LEVL III: ICD-10-PCS | Mod: ,,, | Performed by: INTERNAL MEDICINE

## 2023-01-26 PROCEDURE — 85025 COMPLETE CBC W/AUTO DIFF WBC: CPT | Performed by: THORACIC SURGERY (CARDIOTHORACIC VASCULAR SURGERY)

## 2023-01-26 PROCEDURE — 63600175 PHARM REV CODE 636 W HCPCS: Performed by: THORACIC SURGERY (CARDIOTHORACIC VASCULAR SURGERY)

## 2023-01-26 PROCEDURE — 93005 ELECTROCARDIOGRAM TRACING: CPT | Performed by: INTERNAL MEDICINE

## 2023-01-26 PROCEDURE — 99900031 HC PATIENT EDUCATION (STAT)

## 2023-01-26 PROCEDURE — 85027 COMPLETE CBC AUTOMATED: CPT | Performed by: HOSPITALIST

## 2023-01-26 PROCEDURE — 36430 TRANSFUSION BLD/BLD COMPNT: CPT

## 2023-01-26 PROCEDURE — 93010 ELECTROCARDIOGRAM REPORT: CPT | Mod: ,,, | Performed by: INTERNAL MEDICINE

## 2023-01-26 PROCEDURE — 80048 BASIC METABOLIC PNL TOTAL CA: CPT | Performed by: THORACIC SURGERY (CARDIOTHORACIC VASCULAR SURGERY)

## 2023-01-26 PROCEDURE — 63600175 PHARM REV CODE 636 W HCPCS: Mod: TB,JG | Performed by: INTERNAL MEDICINE

## 2023-01-26 PROCEDURE — 99233 SBSQ HOSP IP/OBS HIGH 50: CPT | Mod: ,,, | Performed by: INTERNAL MEDICINE

## 2023-01-26 PROCEDURE — 93010 EKG 12-LEAD: ICD-10-PCS | Mod: ,,, | Performed by: INTERNAL MEDICINE

## 2023-01-26 PROCEDURE — P9016 RBC LEUKOCYTES REDUCED: HCPCS | Performed by: THORACIC SURGERY (CARDIOTHORACIC VASCULAR SURGERY)

## 2023-01-26 PROCEDURE — 94761 N-INVAS EAR/PLS OXIMETRY MLT: CPT

## 2023-01-26 PROCEDURE — 80069 RENAL FUNCTION PANEL: CPT | Performed by: HOSPITALIST

## 2023-01-26 RX ORDER — FUROSEMIDE 10 MG/ML
20 INJECTION INTRAMUSCULAR; INTRAVENOUS ONCE AS NEEDED
Status: COMPLETED | OUTPATIENT
Start: 2023-01-26 | End: 2023-01-26

## 2023-01-26 RX ORDER — CARVEDILOL 25 MG/1
25 TABLET ORAL 2 TIMES DAILY
Status: DISCONTINUED | OUTPATIENT
Start: 2023-01-26 | End: 2023-02-01 | Stop reason: HOSPADM

## 2023-01-26 RX ORDER — GUAIFENESIN 600 MG/1
600 TABLET, EXTENDED RELEASE ORAL 2 TIMES DAILY
Status: DISCONTINUED | OUTPATIENT
Start: 2023-01-26 | End: 2023-02-01 | Stop reason: HOSPADM

## 2023-01-26 RX ORDER — HYDROCODONE BITARTRATE AND ACETAMINOPHEN 500; 5 MG/1; MG/1
TABLET ORAL
Status: DISCONTINUED | OUTPATIENT
Start: 2023-01-26 | End: 2023-02-01 | Stop reason: HOSPADM

## 2023-01-26 RX ORDER — KETOROLAC TROMETHAMINE 30 MG/ML
15 INJECTION, SOLUTION INTRAMUSCULAR; INTRAVENOUS EVERY 6 HOURS PRN
Status: DISPENSED | OUTPATIENT
Start: 2023-01-26 | End: 2023-01-29

## 2023-01-26 RX ORDER — LOSARTAN POTASSIUM 25 MG/1
25 TABLET ORAL NIGHTLY
Status: DISCONTINUED | OUTPATIENT
Start: 2023-01-26 | End: 2023-01-26

## 2023-01-26 RX ORDER — AMLODIPINE BESYLATE 5 MG/1
10 TABLET ORAL DAILY
Status: DISCONTINUED | OUTPATIENT
Start: 2023-01-26 | End: 2023-01-30

## 2023-01-26 RX ORDER — CARVEDILOL 12.5 MG/1
12.5 TABLET ORAL 2 TIMES DAILY
Status: DISCONTINUED | OUTPATIENT
Start: 2023-01-26 | End: 2023-01-26

## 2023-01-26 RX ADMIN — AMLODIPINE BESYLATE 10 MG: 5 TABLET ORAL at 09:01

## 2023-01-26 RX ADMIN — OXYCODONE AND ACETAMINOPHEN 2 TABLET: 325; 5 TABLET ORAL at 08:01

## 2023-01-26 RX ADMIN — AMIODARONE HYDROCHLORIDE 0.5 MG/MIN: 1.8 INJECTION, SOLUTION INTRAVENOUS at 11:01

## 2023-01-26 RX ADMIN — OXYCODONE AND ACETAMINOPHEN 2 TABLET: 325; 5 TABLET ORAL at 07:01

## 2023-01-26 RX ADMIN — FUROSEMIDE 20 MG: 10 INJECTION, SOLUTION INTRAVENOUS at 09:01

## 2023-01-26 RX ADMIN — AMIODARONE HYDROCHLORIDE 1 MG/MIN: 1.8 INJECTION, SOLUTION INTRAVENOUS at 04:01

## 2023-01-26 RX ADMIN — OXYCODONE AND ACETAMINOPHEN 2 TABLET: 325; 5 TABLET ORAL at 04:01

## 2023-01-26 RX ADMIN — OXYCODONE AND ACETAMINOPHEN 2 TABLET: 325; 5 TABLET ORAL at 11:01

## 2023-01-26 RX ADMIN — CHLORHEXIDINE GLUCONATE 15 ML: 1.2 RINSE ORAL at 08:01

## 2023-01-26 RX ADMIN — OXYCODONE AND ACETAMINOPHEN 2 TABLET: 325; 5 TABLET ORAL at 03:01

## 2023-01-26 RX ADMIN — FAMOTIDINE 20 MG: 20 TABLET ORAL at 08:01

## 2023-01-26 RX ADMIN — ESCITALOPRAM OXALATE 10 MG: 10 TABLET ORAL at 08:01

## 2023-01-26 RX ADMIN — DOCUSATE SODIUM 100 MG: 100 CAPSULE, LIQUID FILLED ORAL at 08:01

## 2023-01-26 RX ADMIN — CARVEDILOL 25 MG: 25 TABLET, FILM COATED ORAL at 08:01

## 2023-01-26 RX ADMIN — AMIODARONE HYDROCHLORIDE 150 MG: 1.5 INJECTION, SOLUTION INTRAVENOUS at 04:01

## 2023-01-26 RX ADMIN — ATORVASTATIN CALCIUM 20 MG: 20 TABLET, FILM COATED ORAL at 08:01

## 2023-01-26 RX ADMIN — KETOROLAC TROMETHAMINE 15 MG: 30 INJECTION, SOLUTION INTRAMUSCULAR; INTRAVENOUS at 07:01

## 2023-01-26 RX ADMIN — GUAIFENESIN 600 MG: 600 TABLET, EXTENDED RELEASE ORAL at 08:01

## 2023-01-26 RX ADMIN — KETOROLAC TROMETHAMINE 15 MG: 30 INJECTION, SOLUTION INTRAMUSCULAR; INTRAVENOUS at 02:01

## 2023-01-26 RX ADMIN — ASPIRIN 81 MG CHEWABLE TABLET 81 MG: 81 TABLET CHEWABLE at 08:01

## 2023-01-26 NOTE — NURSING
Dr. Lozano notified pt had 200 ml UO total for shift. Informed this is after pt received 2 unit PRBC transfusion with 20 mg lasix administered between units. Informed pt has also gone into afib, rate controlled. Amio gtt started per cards. New orders received for CBC and BMP STAT. Labs collected and sent to lab at this time.

## 2023-01-26 NOTE — PT/OT/SLP RE-EVAL
Physical Therapy Re-evaluation    Patient Name:  Peyman Gr   MRN:  [id number]    Recommendations:     Discharge Recommendations: rehabilitation facility  Discharge Equipment Recommendations:  (to be determined at next level of care)   Barriers to discharge:  increased burden of care    Assessment:     Peyman Gr is a 67 y.o. male admitted with a medical diagnosis of STEMI involving right coronary artery s/p CABG  x 3.  He presents with the following impairments/functional limitations: weakness, impaired functional mobility, decreased safety awareness, impaired cardiopulmonary response to activity, impaired endurance, gait instability, pain, impaired skin, decreased lower extremity function, decreased upper extremity function, impaired balance, impaired self care skills, orthopedic precautions Pt found resting in bed, receiving second unit of blood.Agreeable to PT. Noted guarding of sternum, clutching pillow to chest. Marlee FENG reports OK to ambulate. Pt required max A for bed mobility, min A to stand. Ambulated with rollator x 22 ft plus min A  very flexed at hips and trunk, and max VC's to stand erect and to reduce downward pressure on walker. Pt required 4 standing rests during short distance of  gait. He will benefit from post acute rehab prior to DC home to maximize safety and functional independence.    Rehab Prognosis:  good; patient would benefit from acute skilled PT services to address these deficits and reach maximum level of function.      Recent Surgery: Procedure(s) (LRB):  CORONARY ARTERY BYPASS GRAFT (CABG) (N/A) 2 Days Post-Op    Plan:     During this hospitalization, patient to be seen daily to address the above listed problems via gait training, therapeutic activities, therapeutic exercises  Plan of Care Expires:  02/26/23  Plan of Care Reviewed with: patient    Subjective     Communicated with PING Whalen prior to session.  Patient found HOB elevated with oxygen, peripheral IV, telemetry,  pulse ox (continuous), chest tube, blood pressure cuff, PICC line upon PT entry to room, agreeable to evaluation.      Chief Complaint: pain  Patient comments/goals: independent  Pain/Comfort:  Pain Rating 1: 510  Location 1: sternal  Pain Addressed 1: Reposition, Distraction, Nurse notified, Cessation of Activity  Pain Rating Post-Intervention 1: 610    Patients cultural, spiritual, Mormonism conflicts given the current situation: no      Objective:     Patient found with: oxygen, peripheral IV, telemetry, pulse ox (continuous), chest tube, blood pressure cuff, PICC line     General Precautions: Standard, fall, sternal  Orthopedic Precautions: N/A  Braces: N/A  Respiratory Status: Nasal cannula, flow 4 L/min    Exams:  Cognitive Exam:  Patient is oriented to Person, Place, and Situation  Skin Integrity/Edema:      -       sternal incision dressed, chest tubes in place  RLE ROM: WFL  RLE Strength: grossly 3+  LLE ROM: WFL  LLE Strength: grossly 3+    Functional Mobility:  Bed Mobility:     Supine to Sit: maximal assistance  Sit to Supine: maximal assistance and of 2 persons  Transfers:     Sit to Stand:  minimum assistance with rolling walker  Gait: amb 22 ft with rollator min A x 2, very flexed, very slow abigail and 4 standing rests    AM-PAC 6 CLICK MOBILITY  Total Score:12       Treatment and Education:   Pt educated in PT roles and goals, sternal precautions, PLB, safety with transfers and gait, DC recommendations    Patient left HOB elevated with all lines intact, call button in reach, and RN present.    GOALS:   Multidisciplinary Problems       Physical Therapy Goals          Problem: Physical Therapy    Goal Priority Disciplines Outcome Goal Variances Interventions   Physical Therapy Goal     PT, PT/OT Ongoing, Progressing     Description: Goals to be met by: 2023     Patient will increase functional independence with mobility by performin. Supine to sit with SBA Assistance  2. Sit to stand  transfer with min Assistance  3. Bed to chair with Mercedes  w/ol AD  3. Gait  x 50 feet with Minimal Assistance using Rolling Walker maintaining sternal precautions.                          History:     Past Medical History:   Diagnosis Date    Arthritis     Chronic back pain     Chronic neck pain     Dry gangrene     RIGHT LITTLE TOE    Hypercholesteremia     Hypertension     Insomnia     Multiple falls     S/P BACK SURGERY- 1 FALL    PAD (peripheral artery disease)     Personal history of colonic polyps     PVD (peripheral vascular disease)        Past Surgical History:   Procedure Laterality Date    ANGIOGRAPHY OF LOWER EXTREMITY N/A 09/24/2020    Procedure: Angiogram Extremity Unilateral;  Surgeon: Luke Cabello MD;  Location: [facility] CATH;  Service: Cardiology;  Laterality: N/A;    BACK SURGERY      BUNIONECTOMY Bilateral     CARPAL TUNNEL RELEASE Bilateral     CHOLECYSTECTOMY      COLONOSCOPY N/A 5/9/2022    Procedure: COLONOSCOPY;  Surgeon: Braydon Durand MD;  Location: [facility] ENDO;  Service: Endoscopy;  Laterality: N/A;    COLONOSCOPY W/ POLYPECTOMY      CORONARY ANGIOGRAPHY N/A 1/1/2023    Procedure: ANGIOGRAM, CORONARY ARTERY;  Surgeon: Stefany Elizondo MD;  Location: [facility] CATH/EP LAB;  Service: Cardiology;  Laterality: N/A;    CORONARY ARTERY BYPASS GRAFT      CORONARY ARTERY BYPASS GRAFT (CABG) N/A 1/24/2023    Procedure: CORONARY ARTERY BYPASS GRAFT (CABG);  Surgeon: Zechariah Douglas MD;  Location: [facility] OR;  Service: Cardiothoracic;  Laterality: N/A;    CREATION OF BYPASS FROM INTERNAL CAROTID ARTERY TO SUBCLAVIAN ARTERY Right 12/27/2021    Procedure: CREATION, BYPASS, ARTERIAL, INTERNAL CAROTID TO SUBCLAVIAN;  Surgeon: Jeovany Goins MD;  Location: [facility] OR;  Service: Vascular;  Laterality: Right;    ELBOW ARTHROPLASTY Right     ELBOW SURGERY      INSERTION, PACEMAKER, TEMPORARY TRANSVENOUS  1/1/2023    Procedure: Insertion, Pacemaker, Temporary Transvenous;  Surgeon: Stefany Curiel  AMBAR Elizondo MD;  Location: [facility] CATH/EP LAB;  Service: Cardiology;;    IVUS, CORONARY  1/1/2023    Procedure: IVUS, Coronary;  Surgeon: Stefany Elizondo MD;  Location: [facility] CATH/EP LAB;  Service: Cardiology;;    LEFT HEART CATHETERIZATION Left 1/1/2023    Procedure: Left heart cath;  Surgeon: Stefany Elizondo MD;  Location: [facility] CATH/EP LAB;  Service: Cardiology;  Laterality: Left;    MINIMALLY INVASIVE FORAMINOTOMY OF  SPINE N/A 11/15/2018    Procedure: FORAMINOTOMY, SPINE, MINIMALLY INVASIVE DECOMPRESSION L4-5;  Surgeon: Iván Stevenson Jr., MD;  Location: [facility] OR;  Service: Orthopedics;  Laterality: N/A;    NECK SURGERY      acf    PERCUTANEOUS CORONARY INTERVENTION, ARTERY N/A 1/1/2023    Procedure: Percutaneous coronary intervention;  Surgeon: Stefany Elizondo MD;  Location: [facility] CATH/EP LAB;  Service: Cardiology;  Laterality: N/A;    PERCUTANEOUS TRANSLUMINAL ANGIOPLASTY (PTA) OF PERIPHERAL VESSEL Right 05/01/2020    Procedure: PTA, PERIPHERAL VESSEL of right lower extremity;  Surgeon: Luke Cabello MD;  Location: [facility] CATH;  Service: Cardiology;  Laterality: Right;    PERCUTANEOUS TRANSLUMINAL ANGIOPLASTY (PTA) OF PERIPHERAL VESSEL Left 09/10/2020    Procedure: PTA, PERIPHERAL VESSEL, Left lower extremity;  Surgeon: Luke Cabello MD;  Location: [facility] CATH;  Service: Cardiology;  Laterality: Left;    PERCUTANEOUS TRANSLUMINAL ANGIOPLASTY (PTA) OF PERIPHERAL VESSEL Left 07/06/2021    [employer] and SFA       Time Tracking:     PT Received On: 01/26/23  PT Start Time: 1115     PT Stop Time: 1135  PT Total Time (min): 20 min     Billable Minutes: Re-eval 8 and Gait Training 12      01/26/2023

## 2023-01-26 NOTE — ANESTHESIA POSTPROCEDURE EVALUATION
Anesthesia Post Evaluation    Patient: Peyman Gr    Procedure(s) Performed: Procedure(s) (LRB):  CORONARY ARTERY BYPASS GRAFT (CABG) (N/A)    Final Anesthesia Type: general      Patient location during evaluation: ICU  Patient participation: Yes- Able to Participate  Level of consciousness: awake and alert  Post-procedure vital signs: reviewed and stable  Pain management: adequate  Airway patency: patent    PONV status at discharge: No PONV  Anesthetic complications: no      Cardiovascular status: hemodynamically stable  Respiratory status: spontaneous ventilation  Follow-up not needed.          Vitals Value Taken Time   /66 01/26/23 1600   Temp 36.8 °C (98.2 °F) 01/26/23 1200   Pulse 89 01/26/23 1626   Resp 17 01/26/23 1626   SpO2 95 % 01/26/23 1626   Vitals shown include unvalidated device data.      No case tracking events are documented in the log.      Pain/Shelby Score: Pain Rating Prior to Med Admin: 5 (1/26/2023  2:13 PM)  Pain Rating Post Med Admin: 3 (1/26/2023 12:22 PM)

## 2023-01-26 NOTE — PROGRESS NOTES
[facility] Medicine  Progress Note    Patient name: Peyman Gr  MRN: [id number]  Admit Date: 1/1/2023   LOS: 25 days     SUBJECTIVE:     Principal problem: STEMI involving right coronary artery    Interval History:  Patient was seen and examined bedside, sitting in the chair, breathing comfortably on nasal cannula.  Blood pressure is running high, briefly required Cleviprex.  Alert, oriented, encouraged to walk. Chest tubes and pacer wires in place.     Scheduled Meds:   amLODIPine  10 mg Oral Daily    aspirin  81 mg Oral Daily    atorvastatin  20 mg Oral QHS    carvediloL  25 mg Oral BID    chlorhexidine  15 mL Mouth/Throat BID    docusate sodium  100 mg Oral BID    EScitalopram oxalate  10 mg Oral Daily    famotidine  20 mg Per OG tube BID    losartan  25 mg Oral QHS     Continuous Infusions:   clevidipine         PRN Meds:sodium chloride, acetaminophen, albumin human 5%, atropine, calcium gluconate IVPB, calcium gluconate IVPB, calcium gluconate IVPB, dextrose 10%, dextrose 10%, EPINEPHrine, ketorolac, magnesium sulfate IVPB, magnesium sulfate IVPB, melatonin, morphine, ondansetron, oxyCODONE-acetaminophen, potassium chloride in water, potassium chloride in water, potassium chloride in water, sodium phosphate IVPB, sodium phosphate IVPB, sodium phosphate IVPB    Review of patient's allergies indicates:  No Known Allergies    Review of Systems: As per interval history    OBJECTIVE:     Vital Signs (Most Recent)  Temp: 99.4 °F (37.4 °C) (01/26/23 0740)  Pulse: 98 (01/26/23 0823)  Resp: (!) 33 (01/26/23 0800)  BP: (!) 152/91 (01/26/23 0823)  SpO2: 96 % (01/26/23 0800)    Vital Signs Range (Last 24H):  Temp:  [98 °F (36.7 °C)-99.5 °F (37.5 °C)]   Pulse:  []   Resp:  [18-47]   BP: ()/()   SpO2:  [93 %-99 %]     I & O (Last 24H):  Intake/Output Summary (Last 24 hours) at 1/26/2023 0905  Last data filed at 1/26/2023 0601  Gross per 24 hour   Intake 2426.5 ml   Output 945 ml    Net 1481.5 ml         Physical Exam:  General:  Alert, oriented, in no acute distress  ENT:  No discharge from ears. No nasal discharge.   Neck: Supple, trachea midline. No JVD, left IJ CVC  CVS: RRR. No LE edema BL, midline dressing in place, chest tubes pacer wires in place  Lungs:  On nasal cannula, bilateral crackles noted   Abdomen:  Soft, nontender and nondistended.  No organomegaly  Neuro:  Alert, oriented x3, moves all extremities  :  Duron's catheter in place    Laboratory:  I have reviewed all pertinent lab results within the past 24 hours.    Diagnostic Results:  Reviewed all imaging    ASSESSMENT/PLAN:     67-year-old gentleman admitted with STEMI involving RCA causing cardiac arrest complicated by complete heart block, MSSA pneumonia, emergent stenting, intubation, extubation on 01/08.  Remarkable recovery considering his presentation    Active Hospital Problems    Diagnosis  POA    *STEMI involving right coronary artery [I21.11]  Yes    Anemia [D64.9]  Yes    History of tobacco abuse [Z87.891]  Not Applicable    Primary hypertension [I10]  Yes    PVD (peripheral vascular disease) with claudication [I73.9]  Yes    PAD (peripheral artery disease) [I73.9]  Yes      Resolved Hospital Problems    Diagnosis Date Resolved POA    Pneumonia of right lung due to methicillin susceptible Staphylococcus aureus (MSSA) [J15.211] 01/21/2023 No    Shock liver [K72.00] 01/21/2023 Yes    Cardiogenic shock [R57.0] 01/21/2023 Yes    Cardiac arrest [I46.9] 01/21/2023 Yes    Acute respiratory failure with hypoxia and hypercarbia [J96.01, J96.02] 01/21/2023 Yes    Leukocytosis [D72.829] 01/21/2023 Yes    Hyponatremia [E87.1] 01/02/2023 Yes         Plan:   Remarkable recovery considering his presentation of cardiac arrest, STEMI  Help from multiple consultants appreciated  Patient finished antibiotics for pneumonia, extubated on 01/08  Status post CABG x3 on 01/24.  Extubated, not on any vasopressors.  Still has chest  tubes and pacer wires, blood pressure has up trended, resumed some antihypertensives  Encourage p.o. hydration, avoid excessive IV fluids considering his CHF  Immediate postop management as per Cardiology and CT surgery team  Brilinta stopped, Lovenox held for surgery  Carotid ultrasound with mild left-sided stenoses  Echo LVEF 70%  Will need skilled nursing facility placement postoperatively   Out of bed to chair, encourage incentive spirometer      VTE Risk Mitigation (From admission, onward)      None                Department Hospital Medicine  [facility]  Yamile Lozano MD  Date of service: 01/26/2023

## 2023-01-26 NOTE — PLAN OF CARE
Plan of care reviewed and patient progressing toward goals.     Problem: Infection  Goal: Absence of Infection Signs and Symptoms  Outcome: Ongoing, Progressing     Problem: Adult Inpatient Plan of Care  Goal: Plan of Care Review  Outcome: Ongoing, Progressing  Goal: Patient-Specific Goal (Individualized)  Outcome: Ongoing, Progressing  Goal: Absence of Hospital-Acquired Illness or Injury  Outcome: Ongoing, Progressing  Goal: Optimal Comfort and Wellbeing  Outcome: Ongoing, Progressing  Goal: Readiness for Transition of Care  Outcome: Ongoing, Progressing     Problem: Skin Injury Risk Increased  Goal: Skin Health and Integrity  Outcome: Ongoing, Progressing     Problem: Fall Injury Risk  Goal: Absence of Fall and Fall-Related Injury  Outcome: Ongoing, Progressing

## 2023-01-26 NOTE — PLAN OF CARE
Pt had low blood pressure and low urine output. Pt tachypneic with pain. GCS 15. Pt started back on home pain meds. Incentive spirometer encouraged to get up secretions. Pt sat in chair in the morning and in the afternoon. Encouraged to try to sit up longer.

## 2023-01-26 NOTE — PROGRESS NOTES
Patient went into AFIB rates controlled  Amiodarone drip ordered per protocol  Anticoagulation if okay with CT surgery.  Low-dose Lovenox for her now till the chest tubes are removed.  VSS  Will follow

## 2023-01-26 NOTE — CARE UPDATE
01/25/23 3843   PRE-TX-O2   Device (Oxygen Therapy) nasal cannula   $ Is the patient on Low Flow Oxygen? Yes   Flow (L/min) 3   SpO2 96 %   Pulse Oximetry Type Continuous   $ Pulse Oximetry - Multiple Charge Pulse Oximetry - Multiple   Pulse 85   Resp (!) 25   Respiratory Evaluation   $ Care Plan Tech Time 15 min

## 2023-01-26 NOTE — NURSING
BRIANDA Hernandez NP and COMFORT Cardona MD at bedside. Updated on pt. Status. New orders received for amiodarone gtt per protocol.

## 2023-01-26 NOTE — PLAN OF CARE
Problem: Physical Therapy  Goal: Physical Therapy Goal  Description: Goals to be met by: 2023     Patient will increase functional independence with mobility by performin. Supine to sit with SBA Assistance  2. Sit to stand transfer with min Assistance  3. Bed to chair with Mercedes  w/ol AD  3. Gait  x 50 feet with Minimal Assistance using Rolling Walker maintaining sternal precautions.     Outcome: POC updated;Ongoing, Progressing

## 2023-01-26 NOTE — CARE UPDATE
Continue frequent I.S.   01/26/23 0800   PRE-TX-O2   Device (Oxygen Therapy) nasal cannula   $ Is the patient on Low Flow Oxygen? Yes   Flow (L/min) 3   Oxygen Concentration (%) 32   SpO2 96 %   Pulse Oximetry Type Continuous   $ Pulse Oximetry - Multiple Charge Pulse Oximetry - Multiple   Pulse 94   Resp (!) 33   BP (!) 186/77   Incentive Spirometer   $ Incentive Spirometer Charges done with encouragement   Administration (IS) instruction provided, follow-up   Number of Repetitions (IS) 5   Level Incentive Spirometer (mL) 750   Patient Tolerance (IS) poor   Ready to Wean/Extubation Screen   FIO2<=50 (chart decimal) 0.32   Education   $ Education DME Oxygen;15 min   Respiratory Evaluation   $ Care Plan Tech Time 15 min   $ Eval/Re-eval Charges Re-evaluation

## 2023-01-26 NOTE — NURSING
Notified Lisa Hernandez NP that pt has gone into afib, rate controlled. Pt asymptomatic, skin warm and dry, resp even and unlabored. Will see pt shortly.

## 2023-01-26 NOTE — PROGRESS NOTES
[facility]  Department of Cardiology  Progress Note    PATIENT NAME: Peyman Gr  MRN: [id number]  TODAY'S DATE: 01/26/2023  ADMIT DATE: 1/1/2023    SUBJECTIVE     PRINCIPLE PROBLEM: STEMI involving right coronary artery    INTERVAL HISTORY:    1/26/2023    Hg 7.3- getting blood currently  BP elevated through the night  Patient had reboud HTN overnight. Looks like cleviprex was ordered.    1/25/2023  Patient is sitting up in the chair and feels weak. Blood pressure in the 80s.   Denies CP. Not taking very deep breaths.       1/23/23  Patient denies CP   Denies SOB  Awaiting CABG which is scheduled for tomorrow he tells me.      1/21/22  Patient is resting comfortably in bedside chair during examination.  No acute distress, alert and oriented x4.  He denies chest pain.  Healing complains of shortness of breath with exertion.  He is waiting to have CABG in the near future, possibly next week.  He is eating well.  /70 during examination.  Patient was hypertensive overnight and was given 1 dose of IV hydralazine p.r.n..  Oral antihypertensives were titrated up yesterday.    1/20/23  Patient seen sitting up in chair with no distress noted. Breathing is stable. He does have SOB with exertion. He has been using the IS.    1/19/23  Patient is being evaluated by CT surgery for CABG. He reports he is feeling okay. Has had some SOB but denies chest pain.     1/18/23:  No new complaints.  Making some progress with physical therapy.  Some shortness of breath on exertion.  Films reviewed with Dr. Elizondo.      Review of patient's allergies indicates:  No Known Allergies    REVIEW OF SYSTEMS  +weakness    OBJECTIVE     VITAL SIGNS (Most Recent)  Temp: 99.2 °F (37.3 °C) (01/26/23 0900)  Pulse: 89 (01/26/23 0900)  Resp: (!) 31 (01/26/23 0900)  BP: (!) 147/78 (01/26/23 0900)  SpO2: 96 % (01/26/23 0900)    VENTILATION STATUS  Resp: (!) 31 (01/26/23 0900)  SpO2: 96 % (01/26/23 0900)       I & O (Last  24H):  Intake/Output Summary (Last 24 hours) at 1/26/2023 0936  Last data filed at 1/26/2023 0934  Gross per 24 hour   Intake 2726.5 ml   Output 945 ml   Net 1781.5 ml       WEIGHTS  Wt Readings from Last 3 Encounters:   01/24/23 0300 87.8 kg (193 lb 9 oz)   01/23/23 0400 86.8 kg (191 lb 5.8 oz)   01/17/23 0400 105.2 kg (231 lb 14.8 oz)   01/15/23 0400 100 kg (220 lb 7.4 oz)   01/14/23 0400 97.8 kg (215 lb 9.8 oz)   01/07/23 0400 115.4 kg (254 lb 6.6 oz)   01/06/23 0400 117.6 kg (259 lb 4.2 oz)   01/05/23 0500 115.3 kg (254 lb 3.1 oz)   01/03/23 0615 105.4 kg (232 lb 5.8 oz)   01/02/23 0400 102.5 kg (225 lb 15.5 oz)   01/02/23 0053 102.5 kg (225 lb 15.5 oz)   01/01/23 0800 99.8 kg (220 lb 0.3 oz)   01/01/23 0341 99.8 kg (220 lb)   01/19/23 1350 104.8 kg (231 lb)   01/01/23 1706 99.8 kg (220 lb)       PHYSICAL EXAM  CONSTITUTIONAL: Well built, well nourished in no apparent distress feels weak  NECK: R carotid bruit  LUNGS: Diminished  CHEST WALL: no tenderness  HEART: regular rate and rhythm, S1, S2 normal,   ABDOMEN: soft, non-tender; bowel sounds normal; no masses,  no organomegaly  EXTREMITIES: Extremities normal, no edema  NEURO: AAO X 3    SCHEDULED MEDS:   amLODIPine  10 mg Oral Daily    aspirin  81 mg Oral Daily    atorvastatin  20 mg Oral QHS    carvediloL  25 mg Oral BID    chlorhexidine  15 mL Mouth/Throat BID    docusate sodium  100 mg Oral BID    EScitalopram oxalate  10 mg Oral Daily    famotidine  20 mg Per OG tube BID    losartan  25 mg Oral QHS       CONTINUOUS INFUSIONS:   clevidipine         PRN MEDS:sodium chloride, acetaminophen, albumin human 5%, atropine, calcium gluconate IVPB, calcium gluconate IVPB, calcium gluconate IVPB, dextrose 10%, dextrose 10%, EPINEPHrine, ketorolac, magnesium sulfate IVPB, magnesium sulfate IVPB, melatonin, morphine, ondansetron, oxyCODONE-acetaminophen, potassium chloride in water, potassium chloride in water, potassium chloride in water, sodium phosphate IVPB,  sodium phosphate IVPB, sodium phosphate IVPB    LABS AND DIAGNOSTICS     CBC LAST 3 DAYS  Recent Labs   Lab 01/24/23  2245 01/24/23  2308 01/25/23  0054 01/25/23  0303 01/26/23  0323   WBC 7.09  --   --  7.82 10.45   RBC 2.82*  --   --  2.85* 2.35*   HGB 8.7*  --   --  8.7* 7.3*   HCT 26.2*   < > 29* 26.6* 22.2*   MCV 93  --   --  93 95   MCH 30.9  --   --  30.5 31.1*   MCHC 33.2  --   --  32.7 32.9   RDW 13.3  --   --  13.3 13.9     --   --  166 144*   MPV 8.8*  --   --  9.1* 9.4   GRAN 91.4*  6.5  --   --  91.1*  7.1 78.6*  8.2*   LYMPH 3.2*  0.2*  --   --  4.3*  0.3* 8.8*  0.9*   MONO 4.8  0.3  --   --  4.2  0.3 11.8  1.2*   BASO 0.00  --   --  0.00 0.01   NRBC 0  --   --  0 0    < > = values in this interval not displayed.       COAGULATION LAST 3 DAYS  No results for input(s): LABPT, INR, APTT in the last 168 hours.    CHEMISTRY LAST 3 DAYS  Recent Labs   Lab 01/22/23  0612 01/23/23  0416 01/24/23  0406 01/24/23  1323 01/24/23  2245 01/24/23  2308 01/24/23  2343 01/25/23  0054 01/25/23  0303 01/26/23  0323   * 134* 135*   < > 135*  --   --   --  137 137   K 4.0 3.9 3.8   < > 4.2  --   --   --  4.2 4.6    103 102   < > 102  --   --   --  103 107   CO2 23 24 24   < > 22*  --   --   --  23 23   ANIONGAP 8 7* 9   < > 11  --   --   --  11 7*   BUN 22 23 20   < > 16  --   --   --  17 29*   CREATININE 1.0 0.9 0.8   < > 0.9  --   --   --  1.0 1.0   GLU 99 94 86   < > 171*  --   --   --  104 120*   CALCIUM 8.8 9.1 9.0   < > 8.4*  --   --   --  9.1 8.5*   PH  --   --   --    < >  --  7.405 7.346* 7.335*  --   --    MG 1.8 1.8 1.9   < > 2.3  --   --   --  2.3 2.1   ALBUMIN 3.1* 3.1* 3.3*  --   --   --   --   --   --   --    PROT 7.1 7.2 7.1  --   --   --   --   --   --   --    ALKPHOS 110 112 121  --   --   --   --   --   --   --    ALT 27 27 28  --   --   --   --   --   --   --    AST 23 26 30  --   --   --   --   --   --   --    BILITOT 0.5 0.3 0.5  --   --   --   --   --   --   --     < > =  values in this interval not displayed.       CARDIAC PROFILE LAST 3 DAYS  No results for input(s): BNP, CPK, CPKMB, LDH, TROPONINI in the last 168 hours.    ENDOCRINE LAST 3 DAYS  No results for input(s): TSH, PROCAL in the last 168 hours.      LAST ARTERIAL BLOOD GAS  ABG  Recent Labs   Lab 01/25/23  0054   PH 7.335*   PO2 84   PCO2 42.0   HCO3 22.4*   BE -3       LAST 7 DAYS MICROBIOLOGY   Microbiology Results (last 7 days)       ** No results found for the last 168 hours. **            MOST RECENT IMAGING  X-Ray Chest AP Portable  Chest single view    CLINICAL DATA: Postop    FINDINGS: AP view is compared to January 25. Heart size is upper normal. Post sternotomy changes are noted. Left IJ central catheter tip is at the superior vena cava. Mediastinal drains and left-sided chest tube remain in place. No infiltrates or effusions are identified. There is no evidence of pneumothorax.    IMPRESSION:  1. Post sternotomy changes with support devices in place as above. No acute radiographic abnormalities.    Electronically signed by:  Juan Daniel Sun MD  1/26/2023 6:57 AM CST Workstation: [id number]      [employer]  Results for orders placed during the hospital encounter of 01/01/23    Echo Saline Bubble? No    Interpretation Summary  · The estimated ejection fraction is 55%.  · The left ventricle is normal in size with concentric hypertrophy.  · Mild to moderate tricuspid regurgitation.  · Moderate right ventricular enlargement with moderately reduced right ventricular systolic function.  · Grade I left ventricular diastolic dysfunction.  · There is mild aortic valve stenosis.  · Aortic valve area is 1.68 cm2; peak velocity is 1.36 m/s; mean gradient is 4 mmHg.  · There is abnormal septal wall motion consistent with right ventricular pacemaker.  · There are segmental left ventricular wall motion abnormalities.      CURRENT/PREVIOUS VISIT EKG  Results for orders placed or performed during the hospital encounter of  01/01/23   EKG 12-LEAD    Collection Time: 01/24/23  6:12 PM    Narrative    Test Reason : Z95.1,    Vent. Rate : 089 BPM     Atrial Rate : 089 BPM     P-R Int : 156 ms          QRS Dur : 116 ms      QT Int : 464 ms       P-R-T Axes : 076 102 068 degrees     QTc Int : 564 ms    AV dual-paced rhythm  Abnormal ECG  When compared with ECG of 14-JAN-2023 17:26,  Electronic ventricular pacemaker has replaced Sinus rhythm    Referred By: IRENE MOSER           Confirmed By:        ASSESSMENT/PLAN:     Active Hospital Problems    Diagnosis    *STEMI involving right coronary artery    Anemia    History of tobacco abuse    Primary hypertension    PVD (peripheral vascular disease) with claudication    PAD (peripheral artery disease)       ASSESSMENT & PLAN:     -S/P CABG X 3 left internal mammary artery to left anterior descending coronary artery and saphenous vein from the left common carotid artery to the obtuse marginal coronary artery and saphenous vein from the body of the 1st vein graft to the posterior descending coronary artery  on 1/24/2023  -Severe left main coronary stenosis with 90% lesion in small mid marginal  -ST elevation MI with stent in ostial right  status post STEMI with cardiac arrest  -50-69% stenosis on US right  -H/O Right neck carotid surgery  -Hypotension this morning  -Expected postop anemia- getting blood transfusion currently    RECOMMENDATIONS:      Patient doing well got blood this morning had rebound htn overnight cleviprex was started medication resumed by hospital medicine  Currently doing better  Continue current regimen to include asa 81 mg daily  Amlodipine 10 mg daily with paramaters  Continue coreg 25 mg PO BID  Continue losartan 25mg daily with parameters  Will follow      Mary Hernandez NP  [facility]  Department of Cardiology  Date of Service: 01/26/2023      I have personally interviewed and examined the patient.  I have reviewed all the Nurse Practitioner's documentation,  and agree with the plan.     1. Patient feels better with the transfusion  2. Change losartan to 12.5 mg p.o. b.i.d. and amlodipine to 5 mg p.o. b.i.d..    Sarthak Cardona M.D.  [facility]  Department of Cardiology  Date of Service: 01/23/2023

## 2023-01-26 NOTE — PLAN OF CARE
01/26/23 0940   Post-Acute Status   Post-Acute Authorization Placement   Post-Acute Placement Status Set-up Complete/Auth obtained     Case Management sent updated clinicals to Anita at [facility] via [facility] and notified her of anticipating discharge on Monday to SNF facility. Anita v/u and stated she will reach out to [facility] to notify concerning SNF authorization.

## 2023-01-27 PROBLEM — I48.91 A-FIB: Status: ACTIVE | Noted: 2023-01-27

## 2023-01-27 LAB
ANION GAP SERPL CALC-SCNC: 6 MMOL/L (ref 8–16)
BASOPHILS # BLD AUTO: 0.02 K/UL (ref 0–0.2)
BASOPHILS NFR BLD: 0.3 % (ref 0–1.9)
BLD PROD TYP BPU: NORMAL
BLOOD UNIT EXPIRATION DATE: NORMAL
BLOOD UNIT TYPE CODE: 5100
BLOOD UNIT TYPE CODE: 5100
BLOOD UNIT TYPE CODE: 7300
BLOOD UNIT TYPE CODE: 7300
BLOOD UNIT TYPE: NORMAL
BUN SERPL-MCNC: 38 MG/DL (ref 8–23)
CALCIUM SERPL-MCNC: 8.3 MG/DL (ref 8.7–10.5)
CHLORIDE SERPL-SCNC: 105 MMOL/L (ref 95–110)
CO2 SERPL-SCNC: 24 MMOL/L (ref 23–29)
CODING SYSTEM: NORMAL
CREAT SERPL-MCNC: 1 MG/DL (ref 0.5–1.4)
DIFFERENTIAL METHOD: ABNORMAL
DISPENSE STATUS: NORMAL
EOSINOPHIL # BLD AUTO: 0.1 K/UL (ref 0–0.5)
EOSINOPHIL NFR BLD: 0.9 % (ref 0–8)
ERYTHROCYTE [DISTWIDTH] IN BLOOD BY AUTOMATED COUNT: 14.3 % (ref 11.5–14.5)
EST. GFR  (NO RACE VARIABLE): >60 ML/MIN/1.73 M^2
GLUCOSE SERPL-MCNC: 115 MG/DL (ref 70–110)
HCT VFR BLD AUTO: 25.8 % (ref 40–54)
HGB BLD-MCNC: 8.5 G/DL (ref 14–18)
IMM GRANULOCYTES # BLD AUTO: 0.11 K/UL (ref 0–0.04)
IMM GRANULOCYTES NFR BLD AUTO: 1.4 % (ref 0–0.5)
LYMPHOCYTES # BLD AUTO: 1.3 K/UL (ref 1–4.8)
LYMPHOCYTES NFR BLD: 16.7 % (ref 18–48)
MAGNESIUM SERPL-MCNC: 2.1 MG/DL (ref 1.6–2.6)
MCH RBC QN AUTO: 30.7 PG (ref 27–31)
MCHC RBC AUTO-ENTMCNC: 32.9 G/DL (ref 32–36)
MCV RBC AUTO: 93 FL (ref 82–98)
MONOCYTES # BLD AUTO: 1.1 K/UL (ref 0.3–1)
MONOCYTES NFR BLD: 14.1 % (ref 4–15)
NEUTROPHILS # BLD AUTO: 5.1 K/UL (ref 1.8–7.7)
NEUTROPHILS NFR BLD: 66.6 % (ref 38–73)
NRBC BLD-RTO: 0 /100 WBC
NUM UNITS TRANS PACKED RBC: NORMAL
PLATELET # BLD AUTO: 126 K/UL (ref 150–450)
PMV BLD AUTO: 9.1 FL (ref 9.2–12.9)
POTASSIUM SERPL-SCNC: 4.1 MMOL/L (ref 3.5–5.1)
RBC # BLD AUTO: 2.77 M/UL (ref 4.6–6.2)
SODIUM SERPL-SCNC: 135 MMOL/L (ref 136–145)
WBC # BLD AUTO: 7.65 K/UL (ref 3.9–12.7)

## 2023-01-27 PROCEDURE — 63600175 PHARM REV CODE 636 W HCPCS: Mod: TB,JG | Performed by: INTERNAL MEDICINE

## 2023-01-27 PROCEDURE — 86580 TB INTRADERMAL TEST: CPT | Performed by: HOSPITALIST

## 2023-01-27 PROCEDURE — 85025 COMPLETE CBC W/AUTO DIFF WBC: CPT | Performed by: THORACIC SURGERY (CARDIOTHORACIC VASCULAR SURGERY)

## 2023-01-27 PROCEDURE — 99900031 HC PATIENT EDUCATION (STAT)

## 2023-01-27 PROCEDURE — 94761 N-INVAS EAR/PLS OXIMETRY MLT: CPT

## 2023-01-27 PROCEDURE — 25000003 PHARM REV CODE 250: Performed by: HOSPITALIST

## 2023-01-27 PROCEDURE — 83735 ASSAY OF MAGNESIUM: CPT | Performed by: THORACIC SURGERY (CARDIOTHORACIC VASCULAR SURGERY)

## 2023-01-27 PROCEDURE — 94640 AIRWAY INHALATION TREATMENT: CPT

## 2023-01-27 PROCEDURE — 25000003 PHARM REV CODE 250: Performed by: THORACIC SURGERY (CARDIOTHORACIC VASCULAR SURGERY)

## 2023-01-27 PROCEDURE — 20000000 HC ICU ROOM

## 2023-01-27 PROCEDURE — 30200315 PPD INTRADERMAL TEST REV CODE 302: Performed by: HOSPITALIST

## 2023-01-27 PROCEDURE — 80048 BASIC METABOLIC PNL TOTAL CA: CPT | Performed by: THORACIC SURGERY (CARDIOTHORACIC VASCULAR SURGERY)

## 2023-01-27 PROCEDURE — 63600175 PHARM REV CODE 636 W HCPCS: Performed by: THORACIC SURGERY (CARDIOTHORACIC VASCULAR SURGERY)

## 2023-01-27 PROCEDURE — 21000000 HC CCU ICU ROOM CHARGE

## 2023-01-27 PROCEDURE — 94799 UNLISTED PULMONARY SVC/PX: CPT

## 2023-01-27 PROCEDURE — 25000242 PHARM REV CODE 250 ALT 637 W/ HCPCS: Performed by: THORACIC SURGERY (CARDIOTHORACIC VASCULAR SURGERY)

## 2023-01-27 RX ORDER — LEVALBUTEROL INHALATION SOLUTION 1.25 MG/3ML
1.25 SOLUTION RESPIRATORY (INHALATION) EVERY 6 HOURS PRN
Status: DISCONTINUED | OUTPATIENT
Start: 2023-01-27 | End: 2023-02-01 | Stop reason: HOSPADM

## 2023-01-27 RX ORDER — ENOXAPARIN SODIUM 100 MG/ML
40 INJECTION SUBCUTANEOUS
Status: DISCONTINUED | OUTPATIENT
Start: 2023-01-27 | End: 2023-01-28

## 2023-01-27 RX ORDER — IPRATROPIUM BROMIDE 0.5 MG/2.5ML
0.5 SOLUTION RESPIRATORY (INHALATION) EVERY 6 HOURS PRN
Status: DISCONTINUED | OUTPATIENT
Start: 2023-01-27 | End: 2023-02-01 | Stop reason: HOSPADM

## 2023-01-27 RX ORDER — AMIODARONE HYDROCHLORIDE 200 MG/1
200 TABLET ORAL 3 TIMES DAILY
Status: DISCONTINUED | OUTPATIENT
Start: 2023-01-27 | End: 2023-01-28

## 2023-01-27 RX ADMIN — OXYCODONE AND ACETAMINOPHEN 2 TABLET: 325; 5 TABLET ORAL at 02:01

## 2023-01-27 RX ADMIN — ATORVASTATIN CALCIUM 20 MG: 20 TABLET, FILM COATED ORAL at 08:01

## 2023-01-27 RX ADMIN — OXYCODONE AND ACETAMINOPHEN 2 TABLET: 325; 5 TABLET ORAL at 08:01

## 2023-01-27 RX ADMIN — AMIODARONE HYDROCHLORIDE 200 MG: 200 TABLET ORAL at 08:01

## 2023-01-27 RX ADMIN — FAMOTIDINE 20 MG: 20 TABLET ORAL at 08:01

## 2023-01-27 RX ADMIN — MORPHINE SULFATE 4 MG: 4 INJECTION, SOLUTION INTRAMUSCULAR; INTRAVENOUS at 09:01

## 2023-01-27 RX ADMIN — CARVEDILOL 25 MG: 25 TABLET, FILM COATED ORAL at 08:01

## 2023-01-27 RX ADMIN — DOCUSATE SODIUM 100 MG: 100 CAPSULE, LIQUID FILLED ORAL at 08:01

## 2023-01-27 RX ADMIN — OXYCODONE AND ACETAMINOPHEN 2 TABLET: 325; 5 TABLET ORAL at 05:01

## 2023-01-27 RX ADMIN — LEVALBUTEROL HYDROCHLORIDE 1.25 MG: 1.25 SOLUTION RESPIRATORY (INHALATION) at 04:01

## 2023-01-27 RX ADMIN — AMIODARONE HYDROCHLORIDE 0.5 MG/MIN: 1.8 INJECTION, SOLUTION INTRAVENOUS at 08:01

## 2023-01-27 RX ADMIN — KETOROLAC TROMETHAMINE 15 MG: 30 INJECTION, SOLUTION INTRAMUSCULAR; INTRAVENOUS at 08:01

## 2023-01-27 RX ADMIN — TUBERCULIN PURIFIED PROTEIN DERIVATIVE 5 UNITS: 5 INJECTION, SOLUTION INTRADERMAL at 05:01

## 2023-01-27 RX ADMIN — ENOXAPARIN SODIUM 40 MG: 100 INJECTION SUBCUTANEOUS at 08:01

## 2023-01-27 RX ADMIN — KETOROLAC TROMETHAMINE 15 MG: 30 INJECTION, SOLUTION INTRAMUSCULAR; INTRAVENOUS at 04:01

## 2023-01-27 RX ADMIN — AMIODARONE HYDROCHLORIDE 200 MG: 200 TABLET ORAL at 02:01

## 2023-01-27 RX ADMIN — GUAIFENESIN 600 MG: 600 TABLET, EXTENDED RELEASE ORAL at 08:01

## 2023-01-27 RX ADMIN — ASPIRIN 81 MG CHEWABLE TABLET 81 MG: 81 TABLET CHEWABLE at 08:01

## 2023-01-27 RX ADMIN — ESCITALOPRAM OXALATE 10 MG: 10 TABLET ORAL at 08:01

## 2023-01-27 RX ADMIN — CHLORHEXIDINE GLUCONATE 15 ML: 1.2 RINSE ORAL at 08:01

## 2023-01-27 RX ADMIN — AMLODIPINE BESYLATE 10 MG: 5 TABLET ORAL at 08:01

## 2023-01-27 RX ADMIN — IPRATROPIUM BROMIDE 0.5 MG: 0.5 SOLUTION RESPIRATORY (INHALATION) at 04:01

## 2023-01-27 NOTE — PLAN OF CARE
01/27/23 1644   Patient Assessment/Suction   Level of Consciousness (AVPU) alert   Respiratory Effort Unlabored;Normal   Expansion/Accessory Muscles/Retractions no retractions;no use of accessory muscles   All Lung Fields Breath Sounds clear;diminished   LLL Breath Sounds diminished;crackles   RUL Breath Sounds diminished   RML Breath Sounds diminished   RLL Breath Sounds diminished;crackles   Rhythm/Pattern, Respiratory unlabored;shallow   Cough Frequency infrequent   Cough Type weak;nonproductive   PRE-TX-O2   Device (Oxygen Therapy) room air   SpO2 (!) 92 %   Pulse 99   Resp (!) 22   Aerosol Therapy   $ Aerosol Therapy Charges Aerosol Treatment   Respiratory Treatment Status (SVN) given   Treatment Route (SVN) mask;oxygen   Patient Position (SVN) sitting in chair   Post Treatment Assessment (SVN) increased aeration   Signs of Intolerance (SVN) none   Breath Sounds Post-Respiratory Treatment   Post-treatment Heart Rate (beats/min) 92   Post-treatment Resp Rate (breaths/min) 16   Incentive Spirometer   $ Incentive Spirometer Charges done with encouragement   Incentive Spirometer Predicted Level (mL) 2000   Administration (IS) instruction provided, follow-up;mouthpiece utilized   Number of Repetitions (IS) 14   Level Incentive Spirometer (mL) 800   Patient Tolerance (IS) fair   Education   $ Education 15 min;Bronchodilator

## 2023-01-27 NOTE — NURSING
Notified cardiology and hosp medicine that pt has gone back into afib, rate controlled in the 90s. Informed pt amio gtt stopped as 24 hour protocol is complete and PO amio was given 2 hours prior to stopping gtt.

## 2023-01-27 NOTE — PLAN OF CARE
01/27/23 1000   Discharge Reassessment   Assessment Type Discharge Planning Reassessment   Did the patient's condition or plan change since previous assessment? No   Discharge Plan discussed with: Spouse/sig other;Patient   Communicated TOBIAS with patient/caregiver Yes   Discharge Plan A Skilled Nursing Facility   Discharge Plan B Skilled Nursing Facility   Why the patient remains in the hospital Requires continued medical care   Post-Acute Status   Post-Acute Authorization Placement   Post-Acute Placement Status Pending payor review/awaiting authorization (if required)     Case Management notified Anita of anticipated discharge on Monday, 01/30/2023. CM sent updated clinicals to anita via [employer]. Due to extended time in hospital, [facility] to re-apply for SNF auth on today in prep of anticipated discharge on Monday.

## 2023-01-27 NOTE — PROGRESS NOTES
Joshua called and said that he has to have novocain at an upcoming dentist appointment and he has never had this before. He would like to make sure that it is ok for him to have this and that it won't affect his heart. Please call Joshua back at [phone].   This patient is status post coronary artery bypass grafting.  He appears to be doing well but had atrial fibrillation overnight.  This has corrected with intravenous amiodarone.    On exam today vital signs are stable.  Surgical wounds are intact.  His chest tubes were removed.    Supportive care is ongoing.  He does have some pulmonary congestion with some expiratory wheeze.  A bronchodilator will be added to his respiratory treatments.

## 2023-01-27 NOTE — CARE UPDATE
01/26/23 2236   Patient Assessment/Suction   Level of Consciousness (AVPU) alert   Respiratory Effort Unlabored   Expansion/Accessory Muscles/Retractions no use of accessory muscles   All Lung Fields Breath Sounds clear;diminished   Rhythm/Pattern, Respiratory no shortness of breath reported   Cough Frequency no cough   PRE-TX-O2   Device (Oxygen Therapy) nasal cannula   $ Is the patient on Low Flow Oxygen? Yes   Flow (L/min) 3   SpO2 99 %   Pulse Oximetry Type Continuous   $ Pulse Oximetry - Multiple Charge Pulse Oximetry - Multiple   Pulse 76   Resp 17   Positioning   Head of Bed (HOB) Positioning HOB elevated;HOB at 30 degrees   Respiratory Evaluation   $ Care Plan Tech Time 15 min

## 2023-01-27 NOTE — NURSING
Pt ambulated in izmmer with walker and nurse assist. Tolerated well. Returned to bedside chair. Left resting comfortably in chair with call light in reach and sig other at bedside.

## 2023-01-27 NOTE — PROGRESS NOTES
[facility] Medicine  Progress Note    Patient name: Peyman Gr  MRN: [id number]  Admit Date: 1/1/2023   LOS: 26 days     SUBJECTIVE:     Principal problem: STEMI involving right coronary artery    Interval History:  Patient was seen and examined bedside, sitting in the chair, breathing comfortably on nasal cannula.  Alert, oriented, encouraged to walk. Chest tubes and pacer wires are out.     Scheduled Meds:   amiodarone  200 mg Oral TID    amLODIPine  10 mg Oral Daily    aspirin  81 mg Oral Daily    atorvastatin  20 mg Oral QHS    carvediloL  25 mg Oral BID    chlorhexidine  15 mL Mouth/Throat BID    docusate sodium  100 mg Oral BID    enoxparin  40 mg Subcutaneous Q24H    EScitalopram oxalate  10 mg Oral Daily    famotidine  20 mg Per OG tube BID    guaiFENesin  600 mg Oral BID    tuberculin  5 Units Intradermal Once     Continuous Infusions:   amiodarone in dextrose 5% 0.5 mg/min (01/27/23 0850)       PRN Meds:sodium chloride, acetaminophen, albumin human 5%, atropine, calcium gluconate IVPB, calcium gluconate IVPB, calcium gluconate IVPB, dextrose 10%, dextrose 10%, EPINEPHrine, ipratropium, ketorolac, levalbuterol, magnesium sulfate IVPB, magnesium sulfate IVPB, melatonin, morphine, ondansetron, oxyCODONE-acetaminophen, potassium chloride in water, potassium chloride in water, potassium chloride in water, sodium phosphate IVPB, sodium phosphate IVPB, sodium phosphate IVPB    Review of patient's allergies indicates:  No Known Allergies    Review of Systems: As per interval history    OBJECTIVE:     Vital Signs (Most Recent)  Temp: 97.7 °F (36.5 °C) (01/27/23 1200)  Pulse: 74 (01/27/23 1500)  Resp: (!) 24 (01/27/23 1500)  BP: 131/74 (01/27/23 1500)  SpO2: 95 % (01/27/23 1500)    Vital Signs Range (Last 24H):  Temp:  [97.7 °F (36.5 °C)-98 °F (36.7 °C)]   Pulse:  []   Resp:  [14-41]   BP: ()/(56-76)   SpO2:  [90 %-99 %]     I & O (Last 24H):  Intake/Output Summary (Last 24  hours) at 1/27/2023 1626  Last data filed at 1/27/2023 0927  Gross per 24 hour   Intake 1879.71 ml   Output 609 ml   Net 1270.71 ml       Physical Exam:  General:  Alert, oriented, in no acute distress  ENT:  No discharge from ears. No nasal discharge.   Neck: Supple, trachea midline. No JVD, left IJ CVC  CVS: RRR. No LE edema BL, midline dressing in place  Lungs:  On nasal cannula, bilateral crackles noted   Abdomen:  Soft, nontender and nondistended.  No organomegaly  Neuro:  Alert, oriented x3, moves all extremities  :  Duron's catheter in place    Laboratory:  I have reviewed all pertinent lab results within the past 24 hours.    Diagnostic Results:  Reviewed all imaging    ASSESSMENT/PLAN:     67-year-old gentleman admitted with STEMI involving RCA causing cardiac arrest complicated by complete heart block, MSSA pneumonia, emergent stenting, intubation, extubation on 01/08.  Remarkable recovery considering his presentation    Active Hospital Problems    Diagnosis  POA    *STEMI involving right coronary artery [I21.11]  Yes    A-fib [I48.91]  No    Anemia [D64.9]  Yes    History of tobacco abuse [Z87.891]  Not Applicable    Primary hypertension [I10]  Yes    PVD (peripheral vascular disease) with claudication [I73.9]  Yes    PAD (peripheral artery disease) [I73.9]  Yes      Resolved Hospital Problems    Diagnosis Date Resolved POA    Pneumonia of right lung due to methicillin susceptible Staphylococcus aureus (MSSA) [J15.211] 01/21/2023 No    Shock liver [K72.00] 01/21/2023 Yes    Cardiogenic shock [R57.0] 01/21/2023 Yes    Cardiac arrest [I46.9] 01/21/2023 Yes    Acute respiratory failure with hypoxia and hypercarbia [J96.01, J96.02] 01/21/2023 Yes    Leukocytosis [D72.829] 01/21/2023 Yes    Hyponatremia [E87.1] 01/02/2023 Yes         Plan:   Remarkable recovery considering his presentation of cardiac arrest, STEMI  Help from multiple consultants appreciated  Patient finished antibiotics for pneumonia,  extubated on 01/08  Status post CABG x3 on 01/24.  Extubated, not on any vasopressors.  Chest tubes and pacer wires were removed today  Remains on amiodarone drip as he went into AFib RVR yesterday.  Will eventually need anticoagulation  Encourage p.o. hydration, avoid excessive IV fluids considering his CHF  Immediate postop management as per Cardiology and CT surgery team  Brilinta stopped, Lovenox held for surgery  Carotid ultrasound with mild left-sided stenoses  Echo LVEF 70%  Will need skilled nursing facility placement postoperatively   Out of bed to chair, encourage incentive spirometer  Transfer to Cardiology A unit      VTE Risk Mitigation (From admission, onward)           Ordered     enoxaparin injection 40 mg  Every 24 hours (non-standard times)         01/27/23 1126                        Department Hospital Medicine  [facility]  Yamile Lozano MD  Date of service: 01/27/2023

## 2023-01-27 NOTE — PLAN OF CARE
Plan of care reviewed and patient progressing toward goals. Patient given bed bath and patient educated on the importance of drinking water and using his IS. Patient verbalized understanding. Continuing to monitor patient.    Problem: Infection  Goal: Absence of Infection Signs and Symptoms  Outcome: Ongoing, Progressing     Problem: Adult Inpatient Plan of Care  Goal: Plan of Care Review  Outcome: Ongoing, Progressing  Goal: Patient-Specific Goal (Individualized)  Outcome: Ongoing, Progressing  Goal: Absence of Hospital-Acquired Illness or Injury  Outcome: Ongoing, Progressing  Goal: Optimal Comfort and Wellbeing  Outcome: Ongoing, Progressing  Goal: Readiness for Transition of Care  Outcome: Ongoing, Progressing     Problem: Skin Injury Risk Increased  Goal: Skin Health and Integrity  Outcome: Ongoing, Progressing     Problem: Fall Injury Risk  Goal: Absence of Fall and Fall-Related Injury  Outcome: Ongoing, Progressing

## 2023-01-27 NOTE — PROGRESS NOTES
"Formerly [facility], 1928–1983  Adult Nutrition   Progress Note (Follow-Up)    SUMMARY     Recommendations  Recommendation/Intervention:   1) Continue current diet order as medically appropriate and tolerated. Encourage adequate intake of meals as tolerated.   2) ONS Unjury shakes to continue TID to assist in meeting estimated energy and protein needs when PO intake of meals is insufficinet.   3) Menu  to continue to assist in daily meal choices.   4) RD to continue to monitor meal intake, labs, weight, and will make recommendations as needed.    Goals: Pt to continue to meet at least 75% of estimated energy and protein needs via PO intake of meals and nutritional supplements.  Nutrition Goal Status: new    Dietitian Rounds Brief  Follow-up. S/p CABG on 1/24. Cardiac diet restrictions in place. Pt is receiving Unjury shakes w/ all meals to assist in better meeting pt's estimated energy and protein needs. LBM noted: 1/23/23.    Diet order:   Current Diet Order: Cardiac      Current Nutrition Support Formula Ordered: Vital High Protein  Current Nutrition Support Rate Ordered: 55 (ml)  Current Nutrition Support Frequency Ordered: ml/hr    Evaluation of Received Nutrient/Fluid Intake  Energy Calories Required: meeting needs  Protein Required: meeting needs  Fluid Required: meeting needs  Tolerance: tolerating     % Intake of Estimated Energy Needs: 75 - 100 %  % Meal Intake: 75 - 100 %      Intake/Output Summary (Last 24 hours) at 1/27/2023 1324  Last data filed at 1/27/2023 0927  Gross per 24 hour   Intake 1879.71 ml   Output 609 ml   Net 1270.71 ml        Anthropometrics  Temp: 97.7 °F (36.5 °C)  Height: 6' 2" (188 cm)  Height (inches): 74 in  Weight Method: Bed Scale  Weight: 87.8 kg (193 lb 9 oz)  Weight (lb): 193.57 lb  Ideal Body Weight (IBW), Male: 190 lb  % Ideal Body Weight, Male (lb): 115.8 %  BMI (Calculated): 24.8  BMI Grade: 18.5-24.9 - normal       Estimated/Assessed Needs  Weight Used For Calorie " Calculations: 88 kg (194 lb 0.1 oz)  Energy Calorie Requirements (kcal): 3021-6949 (25-30 kcal/kg)  Energy Need Method: Kcal/kg  Protein Requirements: 106-132 (1.2-1.5 gm/kg)  Weight Used For Protein Calculations: 88 kg (194 lb 0.1 oz)  Fluid Requirements (mL): 2640 (30 ml/kg)  Estimated Fluid Requirement Method: RDA Method  RDA Method (mL): 2200       Reason for Assessment  Reason For Assessment: RD follow-up  Diagnosis: other (see comments) (STEMI involving right coronary artery)  Relevant Medical History: peripheral vascular disease, essential hypertension, hyperlipidemia and ongoing tobacco use    Nutrition/Diet History  Spiritual, Cultural Beliefs, Scientology Practices, Values that Affect Care: no  Food Allergies: NKFA  Factors Affecting Nutritional Intake: None identified at this time    Nutrition Risk Screen  Nutrition Risk Screen: no indicators present     MST Score: 0  Have you recently lost weight without trying?: No  Weight loss score: 0  Have you been eating poorly because of a decreased appetite?: No  Appetite score: 0       Weight History:  Wt Readings from Last 5 Encounters:   01/24/23 87.8 kg (193 lb 9 oz)   01/19/23 104.8 kg (231 lb)   01/01/23 99.8 kg (220 lb)   09/09/22 99.8 kg (220 lb)   05/06/22 95.7 kg (211 lb)        Medications:Pertinent Medications Reviewed  Scheduled Meds:   amiodarone  200 mg Oral TID    amLODIPine  10 mg Oral Daily    aspirin  81 mg Oral Daily    atorvastatin  20 mg Oral QHS    carvediloL  25 mg Oral BID    chlorhexidine  15 mL Mouth/Throat BID    docusate sodium  100 mg Oral BID    enoxparin  40 mg Subcutaneous Q24H    EScitalopram oxalate  10 mg Oral Daily    famotidine  20 mg Per OG tube BID    guaiFENesin  600 mg Oral BID    tuberculin  5 Units Intradermal Once     Continuous Infusions:   amiodarone in dextrose 5% 0.5 mg/min (01/27/23 0850)     PRN Meds:.sodium chloride, acetaminophen, albumin human 5%, atropine, calcium gluconate IVPB, calcium gluconate IVPB, calcium  gluconate IVPB, dextrose 10%, dextrose 10%, EPINEPHrine, ipratropium, ketorolac, levalbuterol, magnesium sulfate IVPB, magnesium sulfate IVPB, melatonin, morphine, ondansetron, oxyCODONE-acetaminophen, potassium chloride in water, potassium chloride in water, potassium chloride in water, sodium phosphate IVPB, sodium phosphate IVPB, sodium phosphate IVPB    Labs: Pertinent Labs Reviewed  Clinical Chemistry:  Recent Labs   Lab 01/24/23  0406 01/24/23  1726 01/24/23  2245 01/26/23  1711 01/27/23  0405   * 133*   < > 134* 135*   K 3.8 4.1  4.1   < > 4.2 4.1    102   < > 106 105   CO2 24 23   < > 22* 24   GLU 86 177*   < > 128* 115*   BUN 20 14   < > 34* 38*   CREATININE 0.8 0.9   < > 1.3 1.0   CALCIUM 9.0 8.7   < > 8.8 8.3*   PROT 7.1  --   --   --   --    ALBUMIN 3.3*  --   --  3.2*  --    BILITOT 0.5  --   --   --   --    ALKPHOS 121  --   --   --   --    AST 30  --   --   --   --    ALT 28  --   --   --   --    ANIONGAP 9 8   < > 6* 6*   MG 1.9 2.8*   < >  --  2.1   PHOS  --  4.0  --  3.1  --     < > = values in this interval not displayed.     CBC:   Recent Labs   Lab 01/27/23  0405   WBC 7.65   RBC 2.77*   HGB 8.5*   HCT 25.8*   *   MCV 93   MCH 30.7   MCHC 32.9       Monitor and Evaluation  Food and Nutrient Intake: energy intake, food and beverage intake  Food and Nutrient Adminstration: diet order  Knowledge/Beliefs/Attitudes: food and nutrition knowledge/skill  Physical Activity and Function: nutrition-related ADLs and IADLs  Anthropometric Measurements: weight, weight change, body mass index  Biochemical Data, Medical Tests and Procedures: electrolyte and renal panel, gastrointestinal profile, glucose/endocrine profile  Nutrition-Focused Physical Findings: overall appearance     Nutrition Risk  Level of Risk/Frequency of Follow-up: moderate     Nutrition Follow-Up  RD Follow-up?: Yes      Annette Ngo, TIESHA 01/27/2023 1:24 PM

## 2023-01-28 LAB
ANION GAP SERPL CALC-SCNC: 5 MMOL/L (ref 8–16)
BASOPHILS # BLD AUTO: 0.04 K/UL (ref 0–0.2)
BASOPHILS NFR BLD: 0.6 % (ref 0–1.9)
BUN SERPL-MCNC: 37 MG/DL (ref 8–23)
CALCIUM SERPL-MCNC: 8 MG/DL (ref 8.7–10.5)
CHLORIDE SERPL-SCNC: 103 MMOL/L (ref 95–110)
CO2 SERPL-SCNC: 24 MMOL/L (ref 23–29)
CREAT SERPL-MCNC: 0.9 MG/DL (ref 0.5–1.4)
DIFFERENTIAL METHOD: ABNORMAL
EOSINOPHIL # BLD AUTO: 0.2 K/UL (ref 0–0.5)
EOSINOPHIL NFR BLD: 2.1 % (ref 0–8)
ERYTHROCYTE [DISTWIDTH] IN BLOOD BY AUTOMATED COUNT: 13.7 % (ref 11.5–14.5)
EST. GFR  (NO RACE VARIABLE): >60 ML/MIN/1.73 M^2
GLUCOSE SERPL-MCNC: 98 MG/DL (ref 70–110)
HCT VFR BLD AUTO: 26.1 % (ref 40–54)
HGB BLD-MCNC: 8.7 G/DL (ref 14–18)
IMM GRANULOCYTES # BLD AUTO: 0.34 K/UL (ref 0–0.04)
IMM GRANULOCYTES NFR BLD AUTO: 4.8 % (ref 0–0.5)
LYMPHOCYTES # BLD AUTO: 1.7 K/UL (ref 1–4.8)
LYMPHOCYTES NFR BLD: 23.4 % (ref 18–48)
MCH RBC QN AUTO: 30.9 PG (ref 27–31)
MCHC RBC AUTO-ENTMCNC: 33.3 G/DL (ref 32–36)
MCV RBC AUTO: 93 FL (ref 82–98)
MONOCYTES # BLD AUTO: 1 K/UL (ref 0.3–1)
MONOCYTES NFR BLD: 14 % (ref 4–15)
NEUTROPHILS # BLD AUTO: 3.9 K/UL (ref 1.8–7.7)
NEUTROPHILS NFR BLD: 55.1 % (ref 38–73)
NRBC BLD-RTO: 0 /100 WBC
PLATELET # BLD AUTO: 167 K/UL (ref 150–450)
PMV BLD AUTO: 9.9 FL (ref 9.2–12.9)
POTASSIUM SERPL-SCNC: 4 MMOL/L (ref 3.5–5.1)
RBC # BLD AUTO: 2.82 M/UL (ref 4.6–6.2)
SODIUM SERPL-SCNC: 132 MMOL/L (ref 136–145)
WBC # BLD AUTO: 7.08 K/UL (ref 3.9–12.7)

## 2023-01-28 PROCEDURE — 25000003 PHARM REV CODE 250: Performed by: HOSPITALIST

## 2023-01-28 PROCEDURE — 25000003 PHARM REV CODE 250: Performed by: THORACIC SURGERY (CARDIOTHORACIC VASCULAR SURGERY)

## 2023-01-28 PROCEDURE — 94799 UNLISTED PULMONARY SVC/PX: CPT

## 2023-01-28 PROCEDURE — 85025 COMPLETE CBC W/AUTO DIFF WBC: CPT | Performed by: HOSPITALIST

## 2023-01-28 PROCEDURE — 80048 BASIC METABOLIC PNL TOTAL CA: CPT | Performed by: HOSPITALIST

## 2023-01-28 PROCEDURE — 99900035 HC TECH TIME PER 15 MIN (STAT)

## 2023-01-28 PROCEDURE — 99233 PR SUBSEQUENT HOSPITAL CARE,LEVL III: ICD-10-PCS | Mod: ,,, | Performed by: INTERNAL MEDICINE

## 2023-01-28 PROCEDURE — 25000003 PHARM REV CODE 250: Performed by: NURSE PRACTITIONER

## 2023-01-28 PROCEDURE — 27000221 HC OXYGEN, UP TO 24 HOURS

## 2023-01-28 PROCEDURE — 94760 N-INVAS EAR/PLS OXIMETRY 1: CPT

## 2023-01-28 PROCEDURE — 63600175 PHARM REV CODE 636 W HCPCS: Performed by: THORACIC SURGERY (CARDIOTHORACIC VASCULAR SURGERY)

## 2023-01-28 PROCEDURE — 21000000 HC CCU ICU ROOM CHARGE

## 2023-01-28 PROCEDURE — 99900031 HC PATIENT EDUCATION (STAT)

## 2023-01-28 PROCEDURE — 99233 SBSQ HOSP IP/OBS HIGH 50: CPT | Mod: ,,, | Performed by: INTERNAL MEDICINE

## 2023-01-28 PROCEDURE — 97116 GAIT TRAINING THERAPY: CPT

## 2023-01-28 RX ORDER — AMIODARONE HYDROCHLORIDE 200 MG/1
200 TABLET ORAL DAILY
Status: DISCONTINUED | OUTPATIENT
Start: 2023-01-29 | End: 2023-02-01 | Stop reason: HOSPADM

## 2023-01-28 RX ORDER — ATORVASTATIN CALCIUM 40 MG/1
40 TABLET, FILM COATED ORAL NIGHTLY
Status: DISCONTINUED | OUTPATIENT
Start: 2023-01-28 | End: 2023-02-01 | Stop reason: HOSPADM

## 2023-01-28 RX ADMIN — ATORVASTATIN CALCIUM 40 MG: 40 TABLET, FILM COATED ORAL at 09:01

## 2023-01-28 RX ADMIN — FAMOTIDINE 20 MG: 20 TABLET ORAL at 09:01

## 2023-01-28 RX ADMIN — ASPIRIN 81 MG CHEWABLE TABLET 81 MG: 81 TABLET CHEWABLE at 08:01

## 2023-01-28 RX ADMIN — CHLORHEXIDINE GLUCONATE 15 ML: 1.2 RINSE ORAL at 09:01

## 2023-01-28 RX ADMIN — AMIODARONE HYDROCHLORIDE 200 MG: 200 TABLET ORAL at 08:01

## 2023-01-28 RX ADMIN — CHLORHEXIDINE GLUCONATE 15 ML: 1.2 RINSE ORAL at 08:01

## 2023-01-28 RX ADMIN — CARVEDILOL 25 MG: 25 TABLET, FILM COATED ORAL at 08:01

## 2023-01-28 RX ADMIN — GUAIFENESIN 600 MG: 600 TABLET, EXTENDED RELEASE ORAL at 09:01

## 2023-01-28 RX ADMIN — ESCITALOPRAM OXALATE 10 MG: 10 TABLET ORAL at 08:01

## 2023-01-28 RX ADMIN — OXYCODONE AND ACETAMINOPHEN 2 TABLET: 325; 5 TABLET ORAL at 02:01

## 2023-01-28 RX ADMIN — OXYCODONE AND ACETAMINOPHEN 2 TABLET: 325; 5 TABLET ORAL at 12:01

## 2023-01-28 RX ADMIN — AMLODIPINE BESYLATE 10 MG: 5 TABLET ORAL at 08:01

## 2023-01-28 RX ADMIN — CARVEDILOL 25 MG: 25 TABLET, FILM COATED ORAL at 09:01

## 2023-01-28 RX ADMIN — APIXABAN 5 MG: 5 TABLET, FILM COATED ORAL at 09:01

## 2023-01-28 RX ADMIN — OXYCODONE AND ACETAMINOPHEN 2 TABLET: 325; 5 TABLET ORAL at 08:01

## 2023-01-28 RX ADMIN — FAMOTIDINE 20 MG: 20 TABLET ORAL at 08:01

## 2023-01-28 RX ADMIN — OXYCODONE AND ACETAMINOPHEN 2 TABLET: 325; 5 TABLET ORAL at 04:01

## 2023-01-28 RX ADMIN — KETOROLAC TROMETHAMINE 15 MG: 30 INJECTION, SOLUTION INTRAMUSCULAR; INTRAVENOUS at 09:01

## 2023-01-28 RX ADMIN — GUAIFENESIN 600 MG: 600 TABLET, EXTENDED RELEASE ORAL at 08:01

## 2023-01-28 RX ADMIN — OXYCODONE AND ACETAMINOPHEN 2 TABLET: 325; 5 TABLET ORAL at 07:01

## 2023-01-28 RX ADMIN — DOCUSATE SODIUM 100 MG: 100 CAPSULE, LIQUID FILLED ORAL at 09:01

## 2023-01-28 RX ADMIN — DOCUSATE SODIUM 100 MG: 100 CAPSULE, LIQUID FILLED ORAL at 08:01

## 2023-01-28 NOTE — CONSULTS
Wound trigger. Patient is already being followed by RD, no need for further intervention at this time. Adequate nutrition. Please review previous RD note on 1/27/23 for details.     Gali Kevin RD 01/28/2023 10:43 AM

## 2023-01-28 NOTE — PLAN OF CARE
Problem: Physical Therapy  Goal: Physical Therapy Goal  Description: Goals to be met by: 2023     Patient will increase functional independence with mobility by performin. Supine to sit with SBA Assistance  2. Sit to stand transfer with min Assistance  3. Bed to chair with Mercedes  w/ol AD  3. Gait  x 50 feet with Minimal Assistance using Rolling Walker maintaining sternal precautions.     Outcome: Ongoing, Progressing

## 2023-01-28 NOTE — PROGRESS NOTES
[facility]  Department of Cardiology  Progress Note    PATIENT NAME: Peyman Gr  MRN: [id number]  TODAY'S DATE: 01/28/2023  ADMIT DATE: 1/1/2023    SUBJECTIVE     PRINCIPLE PROBLEM: STEMI involving right coronary artery    INTERVAL HISTORY:    1/28/2023  Denies any new symptoms. Chest tubes removed yesterday.     1/25/2023  Patient is sitting up in the chair and feels weak. Blood pressure in the 80s.   Denies CP. Not taking very deep breaths.       1/23/23  Patient denies CP   Denies SOB  Awaiting CABG which is scheduled for tomorrow he tells me.      1/21/22  Patient is resting comfortably in bedside chair during examination.  No acute distress, alert and oriented x4.  He denies chest pain.  Healing complains of shortness of breath with exertion.  He is waiting to have CABG in the near future, possibly next week.  He is eating well.  /70 during examination.  Patient was hypertensive overnight and was given 1 dose of IV hydralazine p.r.n..  Oral antihypertensives were titrated up yesterday.    1/20/23  Patient seen sitting up in chair with no distress noted. Breathing is stable. He does have SOB with exertion. He has been using the IS.    1/19/23  Patient is being evaluated by CT surgery for CABG. He reports he is feeling okay. Has had some SOB but denies chest pain.     1/18/23:  No new complaints.  Making some progress with physical therapy.  Some shortness of breath on exertion.  Films reviewed with Dr. Elizondo.      Review of patient's allergies indicates:  No Known Allergies    REVIEW OF SYSTEMS  +weakness    OBJECTIVE     VITAL SIGNS (Most Recent)  Temp: 97.7 °F (36.5 °C) (01/27/23 2100)  Pulse: 80 (01/28/23 0800)  Resp: 20 (01/28/23 0840)  BP: (!) 125/57 (01/28/23 0800)  SpO2: (!) 94 % (01/28/23 0800)    VENTILATION STATUS  Resp: 20 (01/28/23 0840)  SpO2: (!) 94 % (01/28/23 0800)       I & O (Last 24H):  Intake/Output Summary (Last 24 hours) at 1/28/2023 1334  Last data filed at 1/28/2023  0600  Gross per 24 hour   Intake 1380.92 ml   Output 625 ml   Net 755.92 ml       WEIGHTS  Wt Readings from Last 3 Encounters:   01/24/23 0300 87.8 kg (193 lb 9 oz)   01/23/23 0400 86.8 kg (191 lb 5.8 oz)   01/17/23 0400 105.2 kg (231 lb 14.8 oz)   01/15/23 0400 100 kg (220 lb 7.4 oz)   01/14/23 0400 97.8 kg (215 lb 9.8 oz)   01/07/23 0400 115.4 kg (254 lb 6.6 oz)   01/06/23 0400 117.6 kg (259 lb 4.2 oz)   01/05/23 0500 115.3 kg (254 lb 3.1 oz)   01/03/23 0615 105.4 kg (232 lb 5.8 oz)   01/02/23 0400 102.5 kg (225 lb 15.5 oz)   01/02/23 0053 102.5 kg (225 lb 15.5 oz)   01/01/23 0800 99.8 kg (220 lb 0.3 oz)   01/01/23 0341 99.8 kg (220 lb)   01/19/23 1350 104.8 kg (231 lb)   01/01/23 1706 99.8 kg (220 lb)       PHYSICAL EXAM  CONSTITUTIONAL: Well built, well nourished in no apparent distress feels weak  NECK: R carotid bruit  LUNGS: Diminished  CHEST WALL: no tenderness  HEART: regular rate and rhythm, S1, S2 normal,   ABDOMEN: soft, non-tender; bowel sounds normal; no masses,  no organomegaly  EXTREMITIES: Extremities normal, no edema  NEURO: AAO X 3    SCHEDULED MEDS:   [START ON 1/29/2023] amiodarone  200 mg Oral Daily    amLODIPine  10 mg Oral Daily    aspirin  81 mg Oral Daily    atorvastatin  20 mg Oral QHS    carvediloL  25 mg Oral BID    chlorhexidine  15 mL Mouth/Throat BID    docusate sodium  100 mg Oral BID    enoxparin  40 mg Subcutaneous Q24H    EScitalopram oxalate  10 mg Oral Daily    famotidine  20 mg Per OG tube BID    guaiFENesin  600 mg Oral BID       CONTINUOUS INFUSIONS:        PRN MEDS:sodium chloride, acetaminophen, albumin human 5%, atropine, calcium gluconate IVPB, calcium gluconate IVPB, calcium gluconate IVPB, dextrose 10%, dextrose 10%, EPINEPHrine, ipratropium, ketorolac, levalbuterol, magnesium sulfate IVPB, magnesium sulfate IVPB, melatonin, morphine, ondansetron, oxyCODONE-acetaminophen, potassium chloride in water, potassium chloride in water, potassium chloride in water, sodium  phosphate IVPB, sodium phosphate IVPB, sodium phosphate IVPB    LABS AND DIAGNOSTICS     CBC LAST 3 DAYS  Recent Labs   Lab 01/26/23  0323 01/26/23  1711 01/27/23  0405 01/28/23  0358   WBC 10.45 10.64 7.65 7.08   RBC 2.35* 3.02* 2.77* 2.82*   HGB 7.3* 9.4* 8.5* 8.7*   HCT 22.2* 28.3* 25.8* 26.1*   MCV 95 94 93 93   MCH 31.1* 31.1* 30.7 30.9   MCHC 32.9 33.2 32.9 33.3   RDW 13.9 14.2 14.3 13.7   * 151 126* 167   MPV 9.4 9.5 9.1* 9.9   GRAN 78.6*  8.2*  --  66.6  5.1 55.1  3.9   LYMPH 8.8*  0.9*  --  16.7*  1.3 23.4  1.7   MONO 11.8  1.2*  --  14.1  1.1* 14.0  1.0   BASO 0.01  --  0.02 0.04   NRBC 0  --  0 0       COAGULATION LAST 3 DAYS  No results for input(s): LABPT, INR, APTT in the last 168 hours.    CHEMISTRY LAST 3 DAYS  Recent Labs   Lab 01/22/23  0612 01/23/23  0416 01/24/23  0406 01/24/23  1323 01/24/23  2308 01/24/23  2343 01/25/23  0054 01/25/23  0303 01/26/23  0323 01/26/23  1711 01/27/23  0405 01/28/23  0358   * 134* 135*   < >  --   --   --  137 137 134* 135* 132*   K 4.0 3.9 3.8   < >  --   --   --  4.2 4.6 4.2 4.1 4.0    103 102   < >  --   --   --  103 107 106 105 103   CO2 23 24 24   < >  --   --   --  23 23 22* 24 24   ANIONGAP 8 7* 9   < >  --   --   --  11 7* 6* 6* 5*   BUN 22 23 20   < >  --   --   --  17 29* 34* 38* 37*   CREATININE 1.0 0.9 0.8   < >  --   --   --  1.0 1.0 1.3 1.0 0.9   GLU 99 94 86   < >  --   --   --  104 120* 128* 115* 98   CALCIUM 8.8 9.1 9.0   < >  --   --   --  9.1 8.5* 8.8 8.3* 8.0*   PH  --   --   --    < > 7.405 7.346* 7.335*  --   --   --   --   --    MG 1.8 1.8 1.9   < >  --   --   --  2.3 2.1  --  2.1  --    ALBUMIN 3.1* 3.1* 3.3*  --   --   --   --   --   --  3.2*  --   --    PROT 7.1 7.2 7.1  --   --   --   --   --   --   --   --   --    ALKPHOS 110 112 121  --   --   --   --   --   --   --   --   --    ALT 27 27 28  --   --   --   --   --   --   --   --   --    AST 23 26 30  --   --   --   --   --   --   --   --   --    BILITOT 0.5  0.3 0.5  --   --   --   --   --   --   --   --   --     < > = values in this interval not displayed.       CARDIAC PROFILE LAST 3 DAYS  No results for input(s): BNP, CPK, CPKMB, LDH, TROPONINI in the last 168 hours.    ENDOCRINE LAST 3 DAYS  No results for input(s): TSH, PROCAL in the last 168 hours.      LAST ARTERIAL BLOOD GAS  ABG  Recent Labs   Lab 01/25/23  0054   PH 7.335*   PO2 84   PCO2 42.0   HCO3 22.4*   BE -3       LAST 7 DAYS MICROBIOLOGY   Microbiology Results (last 7 days)       ** No results found for the last 168 hours. **            MOST RECENT IMAGING  X-Ray Chest AP Portable  Chest single view    CLINICAL DATA: Postop    FINDINGS: AP view is compared to January 25. Heart size is upper normal. Post sternotomy changes are noted. Left IJ central catheter tip is at the superior vena cava. Mediastinal drains and left-sided chest tube remain in place. No infiltrates or effusions are identified. There is no evidence of pneumothorax.    IMPRESSION:  1. Post sternotomy changes with support devices in place as above. No acute radiographic abnormalities.    Electronically signed by:  Juan Daniel Sun MD  1/26/2023 6:57 AM CST Workstation: [id number]      [facility]  Results for orders placed during the hospital encounter of 01/01/23    Echo Saline Bubble? No    Interpretation Summary  · The estimated ejection fraction is 55%.  · The left ventricle is normal in size with concentric hypertrophy.  · Mild to moderate tricuspid regurgitation.  · Moderate right ventricular enlargement with moderately reduced right ventricular systolic function.  · Grade I left ventricular diastolic dysfunction.  · There is mild aortic valve stenosis.  · Aortic valve area is 1.68 cm2; peak velocity is 1.36 m/s; mean gradient is 4 mmHg.  · There is abnormal septal wall motion consistent with right ventricular pacemaker.  · There are segmental left ventricular wall motion abnormalities.      CURRENT/PREVIOUS VISIT EKG  Results for  orders placed or performed during the hospital encounter of 01/01/23   EKG 12-lead    Collection Time: 01/26/23  3:14 PM    Narrative    Test Reason : I48.91,    Vent. Rate : 104 BPM     Atrial Rate : 000 BPM     P-R Int : 000 ms          QRS Dur : 082 ms      QT Int : 324 ms       P-R-T Axes : 000 001 006 degrees     QTc Int : 426 ms    Atrial fibrillation with rapid ventricular response  Inferior infarct ,age undetermined  Abnormal ECG  When compared with ECG of 24-JAN-2023 18:12,  Atrial fibrillation has replaced Electronic ventricular pacemaker    Referred By: IRENE MOSER           Confirmed By:        ASSESSMENT/PLAN:     Active Hospital Problems    Diagnosis    *STEMI involving right coronary artery    A-fib    Anemia    History of tobacco abuse    Primary hypertension    PVD (peripheral vascular disease) with claudication    PAD (peripheral artery disease)       ASSESSMENT & PLAN:     -S/P CABG X 3 left internal mammary artery to left anterior descending coronary artery and saphenous vein to the obtuse marginal coronary artery and saphenous vein from the body of the 1st vein graft to the posterior descending coronary artery  on 1/24/2023  -Severe left main coronary stenosis with 90% lesion in small mid marginal  -Cardiac arrest/ ST elevation MI s/p PCI of ostial RCA 4 weeks ago  -Hypotension this morning- asymptomatic   -postop anemia-improving hg 8.7  -Post op AFIB-Now NSR    RECOMMENDATIONS:    Decrease Amiodarone to 200 mg daily  Continue amlodipine and coreg at present dosing  Pt benefits from long term AC for Afib given recurrent episodes and high stroke risk.   Start Eliquis 5 mg PO BID if OK with CTS  D/c ASA and start Plavix 75 mg (start with 300 mg loading dose)   Continue Lipitor 40 mg daily  Physical therapy     Mary Hernandez NP  [facility]  Department of Cardiology  Date of Service: 01/28/2023      I have personally interviewed and examined the patient, I have reviewed the Nurse  Practitioner's history and physical, assessment, and plan. I have personally evaluated the patient at bedside and agree with the findings and made appropriate changes as necessary in recommendations.    Stefany Elizondo MD  Department of Cardiology  [facility]  1/28/23

## 2023-01-28 NOTE — PROGRESS NOTES
[facility] Medicine  Progress Note    Patient name: Peyman Gr  MRN: [id number]  Admit Date: 1/1/2023   LOS: 27 days     SUBJECTIVE:     Principal problem: STEMI involving right coronary artery    Interval History:  Patient was seen and examined bedside, sitting in the chair, breathing comfortably on room air.  Alert, oriented, ambulating. Chest tubes and pacer wires are out.     Scheduled Meds:   amiodarone  200 mg Oral TID    amLODIPine  10 mg Oral Daily    aspirin  81 mg Oral Daily    atorvastatin  20 mg Oral QHS    carvediloL  25 mg Oral BID    chlorhexidine  15 mL Mouth/Throat BID    docusate sodium  100 mg Oral BID    enoxparin  40 mg Subcutaneous Q24H    EScitalopram oxalate  10 mg Oral Daily    famotidine  20 mg Per OG tube BID    guaiFENesin  600 mg Oral BID     Continuous Infusions:   amiodarone in dextrose 5% Stopped (01/27/23 1651)       PRN Meds:sodium chloride, acetaminophen, albumin human 5%, atropine, calcium gluconate IVPB, calcium gluconate IVPB, calcium gluconate IVPB, dextrose 10%, dextrose 10%, EPINEPHrine, ipratropium, ketorolac, levalbuterol, magnesium sulfate IVPB, magnesium sulfate IVPB, melatonin, morphine, ondansetron, oxyCODONE-acetaminophen, potassium chloride in water, potassium chloride in water, potassium chloride in water, sodium phosphate IVPB, sodium phosphate IVPB, sodium phosphate IVPB    Review of patient's allergies indicates:  No Known Allergies    Review of Systems: As per interval history    OBJECTIVE:     Vital Signs (Most Recent)  Temp: 97.7 °F (36.5 °C) (01/27/23 2100)  Pulse: 80 (01/28/23 0800)  Resp: 20 (01/28/23 0840)  BP: (!) 125/57 (01/28/23 0800)  SpO2: (!) 94 % (01/28/23 0800)    Vital Signs Range (Last 24H):  Temp:  [97.7 °F (36.5 °C)-97.8 °F (36.6 °C)]   Pulse:  [69-99]   Resp:  [12-30]   BP: ()/(56-75)   SpO2:  [90 %-100 %]     I & O (Last 24H):  Intake/Output Summary (Last 24 hours) at 1/28/2023 1248  Last data filed at 1/28/2023  0600  Gross per 24 hour   Intake 1380.92 ml   Output 625 ml   Net 755.92 ml         Physical Exam:  General:  Alert, oriented, in no acute distress  ENT:  No discharge from ears. No nasal discharge.   Neck: Supple, trachea midline. No JVD, left IJ CVC  CVS: RRR. No LE edema BL, midline dressing in place  Lungs:  On nasal cannula, bilateral crackles noted   Abdomen:  Soft, nontender and nondistended.  No organomegaly  Neuro:  Alert, oriented x3, moves all extremities  :  Duron's catheter in place    Laboratory:  I have reviewed all pertinent lab results within the past 24 hours.    Diagnostic Results:  Reviewed all imaging    ASSESSMENT/PLAN:     67-year-old gentleman admitted with STEMI involving RCA causing cardiac arrest complicated by complete heart block, MSSA pneumonia, emergent stenting, intubation, extubation on 01/08.  Remarkable recovery considering his presentation    Active Hospital Problems    Diagnosis  POA    *STEMI involving right coronary artery [I21.11]  Yes    A-fib [I48.91]  No    Anemia [D64.9]  Yes    History of tobacco abuse [Z87.891]  Not Applicable    Primary hypertension [I10]  Yes    PVD (peripheral vascular disease) with claudication [I73.9]  Yes    PAD (peripheral artery disease) [I73.9]  Yes      Resolved Hospital Problems    Diagnosis Date Resolved POA    Pneumonia of right lung due to methicillin susceptible Staphylococcus aureus (MSSA) [J15.211] 01/21/2023 No    Shock liver [K72.00] 01/21/2023 Yes    Cardiogenic shock [R57.0] 01/21/2023 Yes    Cardiac arrest [I46.9] 01/21/2023 Yes    Acute respiratory failure with hypoxia and hypercarbia [J96.01, J96.02] 01/21/2023 Yes    Leukocytosis [D72.829] 01/21/2023 Yes    Hyponatremia [E87.1] 01/02/2023 Yes         Plan:   Remarkable recovery considering his presentation of cardiac arrest, STEMI  Help from multiple consultants appreciated  Patient finished antibiotics for pneumonia, extubated on 01/08  Status post CABG x3 on 01/24.   Extubated, not on any vasopressors.  Chest tubes and pacer wires were removed on 01/27, ambulated with nursing staff today  Breathing on room air  Found to have postop AFib, received loading dose of amiodarone, currently on p.o. amiodarone.  Eventually will need Eliquis  Encourage p.o. hydration, avoid excessive IV fluids considering his CHF  Immediate postop management as per Cardiology and CT surgery team  Brilinta stopped, Lovenox held for surgery  Carotid ultrasound with mild left-sided stenoses  Echo LVEF 70%  Will need skilled nursing facility placement postoperatively   Out of bed to chair, encourage incentive spirometer  Transfer to Cardiology A unit      VTE Risk Mitigation (From admission, onward)           Ordered     enoxaparin injection 40 mg  Every 24 hours (non-standard times)         01/27/23 1126                        Department Hospital Medicine  [facility]  Yamile Lozano MD  Date of service: 01/28/2023

## 2023-01-28 NOTE — PT/OT/SLP PROGRESS
Physical Therapy Treatment    Patient Name:  Peyman Gr   MRN:  [id number]    Recommendations:     Discharge Recommendations: rehabilitation facility  Discharge Equipment Recommendations:  (TBD at next level of care)  Barriers to discharge: None    Assessment:     Peyman Gr is a 67 y.o. male admitted with a medical diagnosis of STEMI involving right coronary artery.  He presents with the following impairments/functional limitations: weakness, impaired endurance, impaired self care skills, impaired functional mobility, gait instability, impaired balance, decreased upper extremity function, decreased lower extremity function, decreased safety awareness, pain. Per RN, patient is being transferred out of ICU so PT treatment consisted of assisting patient to  in anticipation of transfer. He requires ModA for supine to sit transfer using pillow and VC not to bear weight through UEs. He requires ModA for sit to stand transfer holding pillow and ambulated 10' to  with RW, CGA and VC for upright posture and using RW for balance and not weight bearing.      Rehab Prognosis: Good; patient would benefit from acute skilled PT services to address these deficits and reach maximum level of function.    Recent Surgery: Procedure(s) (LRB):  CORONARY ARTERY BYPASS GRAFT (CABG) (N/A) 4 Days Post-Op    Plan:     During this hospitalization, patient to be seen daily to address the identified rehab impairments via therapeutic activities, therapeutic exercises, gait training and progress toward the following goals:    Plan of Care Expires:  02/26/23    Subjective     Chief Complaint: bed not working correctly   Patient/Family Comments/goals: to rehab  Pain/Comfort:  Pain Rating 1:  (not rated)  Location 1: sternal  Pain Addressed 1: Reposition, Distraction      Objective:     Communicated with RN prior to session.  Patient found HOB elevated with blood pressure cuff, PICC line, telemetry, pulse ox (continuous) upon PT entry to  room.     General Precautions: Standard, fall, sternal  Orthopedic Precautions: N/A  Braces: N/A  Respiratory Status: Room air     Functional Mobility:  Bed Mobility:     Supine to Sit: moderate assistance  Transfers:     Sit to Stand:  moderate assistance with patient holding pillow  Gait: 10' to WC with RW, CGA and VC for upright posture and using RW for balance and not weight bearing      AM-PAC 6 CLICK MOBILITY          Treatment & Education:  Patient was educated on the importance of OOB activity and functional mobility to negate negative effects of prolonged bed rest during hospitalization, safe transfers and ambulation, use of pillow, and D/C planning     Patient left  in WC  with  RN notified and family present..    GOALS:   Multidisciplinary Problems       Physical Therapy Goals          Problem: Physical Therapy    Goal Priority Disciplines Outcome Goal Variances Interventions   Physical Therapy Goal     PT, PT/OT Ongoing, Progressing     Description: Goals to be met by: 2023     Patient will increase functional independence with mobility by performin. Supine to sit with SBA Assistance  2. Sit to stand transfer with min Assistance  3. Bed to chair with Mercedes  w/ol AD  3. Gait  x 50 feet with Minimal Assistance using Rolling Walker maintaining sternal precautions.                          Time Tracking:     PT Received On: 23  PT Start Time: 1449     PT Stop Time: 1500  PT Total Time (min): 11 min     Billable Minutes: Gait Training 11    Treatment Type: Treatment  PT/PTA: PT     PTA Visit Number: 0     2023

## 2023-01-29 LAB
ALBUMIN SERPL BCP-MCNC: 3 G/DL (ref 3.5–5.2)
ANION GAP SERPL CALC-SCNC: 7 MMOL/L (ref 8–16)
ANISOCYTOSIS BLD QL SMEAR: SLIGHT
BASOPHILS NFR BLD: 2 % (ref 0–1.9)
BNP SERPL-MCNC: 660 PG/ML (ref 0–99)
BUN SERPL-MCNC: 24 MG/DL (ref 8–23)
CALCIUM SERPL-MCNC: 8.4 MG/DL (ref 8.7–10.5)
CHLORIDE SERPL-SCNC: 99 MMOL/L (ref 95–110)
CO2 SERPL-SCNC: 24 MMOL/L (ref 23–29)
CREAT SERPL-MCNC: 1 MG/DL (ref 0.5–1.4)
DIFFERENTIAL METHOD: ABNORMAL
EOSINOPHIL NFR BLD: 2 % (ref 0–8)
ERYTHROCYTE [DISTWIDTH] IN BLOOD BY AUTOMATED COUNT: 13.7 % (ref 11.5–14.5)
EST. GFR  (NO RACE VARIABLE): >60 ML/MIN/1.73 M^2
GLUCOSE SERPL-MCNC: 99 MG/DL (ref 70–110)
HCT VFR BLD AUTO: 26.7 % (ref 40–54)
HGB BLD-MCNC: 8.7 G/DL (ref 14–18)
HYPOCHROMIA BLD QL SMEAR: ABNORMAL
IMM GRANULOCYTES # BLD AUTO: ABNORMAL K/UL (ref 0–0.04)
IMM GRANULOCYTES NFR BLD AUTO: ABNORMAL % (ref 0–0.5)
LYMPHOCYTES NFR BLD: 24 % (ref 18–48)
MAGNESIUM SERPL-MCNC: 2 MG/DL (ref 1.6–2.6)
MCH RBC QN AUTO: 30.4 PG (ref 27–31)
MCHC RBC AUTO-ENTMCNC: 32.6 G/DL (ref 32–36)
MCV RBC AUTO: 93 FL (ref 82–98)
METAMYELOCYTES NFR BLD MANUAL: 2 %
MONOCYTES NFR BLD: 11 % (ref 4–15)
MYELOCYTES NFR BLD MANUAL: 3 %
NEUTROPHILS NFR BLD: 55 % (ref 38–73)
NEUTS BAND NFR BLD MANUAL: 1 %
NRBC BLD-RTO: 0 /100 WBC
PHOSPHATE SERPL-MCNC: 3.2 MG/DL (ref 2.7–4.5)
PLATELET # BLD AUTO: 213 K/UL (ref 150–450)
PLATELET BLD QL SMEAR: ABNORMAL
PMV BLD AUTO: 9.7 FL (ref 9.2–12.9)
POIKILOCYTOSIS BLD QL SMEAR: SLIGHT
POLYCHROMASIA BLD QL SMEAR: ABNORMAL
POTASSIUM SERPL-SCNC: 3.9 MMOL/L (ref 3.5–5.1)
RBC # BLD AUTO: 2.86 M/UL (ref 4.6–6.2)
SODIUM SERPL-SCNC: 130 MMOL/L (ref 136–145)
TB INDURATION 48 - 72 HR READ: 0 MM
WBC # BLD AUTO: 6.77 K/UL (ref 3.9–12.7)

## 2023-01-29 PROCEDURE — 25000003 PHARM REV CODE 250: Performed by: HOSPITALIST

## 2023-01-29 PROCEDURE — 83735 ASSAY OF MAGNESIUM: CPT | Performed by: HOSPITALIST

## 2023-01-29 PROCEDURE — 99900035 HC TECH TIME PER 15 MIN (STAT)

## 2023-01-29 PROCEDURE — 94761 N-INVAS EAR/PLS OXIMETRY MLT: CPT

## 2023-01-29 PROCEDURE — 25000003 PHARM REV CODE 250: Performed by: THORACIC SURGERY (CARDIOTHORACIC VASCULAR SURGERY)

## 2023-01-29 PROCEDURE — 99232 SBSQ HOSP IP/OBS MODERATE 35: CPT | Mod: ,,, | Performed by: INTERNAL MEDICINE

## 2023-01-29 PROCEDURE — 83880 ASSAY OF NATRIURETIC PEPTIDE: CPT | Performed by: HOSPITALIST

## 2023-01-29 PROCEDURE — 97116 GAIT TRAINING THERAPY: CPT

## 2023-01-29 PROCEDURE — 80069 RENAL FUNCTION PANEL: CPT | Performed by: HOSPITALIST

## 2023-01-29 PROCEDURE — 25000003 PHARM REV CODE 250: Performed by: NURSE PRACTITIONER

## 2023-01-29 PROCEDURE — 85027 COMPLETE CBC AUTOMATED: CPT | Performed by: HOSPITALIST

## 2023-01-29 PROCEDURE — 21000000 HC CCU ICU ROOM CHARGE

## 2023-01-29 PROCEDURE — 99232 PR SUBSEQUENT HOSPITAL CARE,LEVL II: ICD-10-PCS | Mod: ,,, | Performed by: INTERNAL MEDICINE

## 2023-01-29 PROCEDURE — 99900031 HC PATIENT EDUCATION (STAT)

## 2023-01-29 PROCEDURE — 85007 BL SMEAR W/DIFF WBC COUNT: CPT | Performed by: HOSPITALIST

## 2023-01-29 RX ORDER — OXYCODONE AND ACETAMINOPHEN 10; 325 MG/1; MG/1
1 TABLET ORAL EVERY 8 HOURS PRN
Status: DISCONTINUED | OUTPATIENT
Start: 2023-01-29 | End: 2023-02-01 | Stop reason: HOSPADM

## 2023-01-29 RX ORDER — OXYCODONE AND ACETAMINOPHEN 10; 325 MG/1; MG/1
1 TABLET ORAL EVERY 4 HOURS PRN
Status: DISCONTINUED | OUTPATIENT
Start: 2023-01-29 | End: 2023-01-29

## 2023-01-29 RX ORDER — LACTULOSE 10 G/15ML
20 SOLUTION ORAL 3 TIMES DAILY
Status: COMPLETED | OUTPATIENT
Start: 2023-01-29 | End: 2023-01-30

## 2023-01-29 RX ORDER — POLYETHYLENE GLYCOL 3350 17 G/17G
17 POWDER, FOR SOLUTION ORAL 2 TIMES DAILY
Status: DISCONTINUED | OUTPATIENT
Start: 2023-01-29 | End: 2023-02-01 | Stop reason: HOSPADM

## 2023-01-29 RX ORDER — CLOPIDOGREL BISULFATE 75 MG/1
300 TABLET ORAL DAILY
Status: DISCONTINUED | OUTPATIENT
Start: 2023-01-29 | End: 2023-01-29

## 2023-01-29 RX ORDER — CLOPIDOGREL BISULFATE 75 MG/1
75 TABLET ORAL DAILY
Status: DISCONTINUED | OUTPATIENT
Start: 2023-01-30 | End: 2023-02-01 | Stop reason: HOSPADM

## 2023-01-29 RX ORDER — PANTOPRAZOLE SODIUM 40 MG/1
40 TABLET, DELAYED RELEASE ORAL DAILY
Status: DISCONTINUED | OUTPATIENT
Start: 2023-01-29 | End: 2023-02-01 | Stop reason: HOSPADM

## 2023-01-29 RX ORDER — CLOPIDOGREL BISULFATE 75 MG/1
75 TABLET ORAL DAILY
Status: DISCONTINUED | OUTPATIENT
Start: 2023-01-30 | End: 2023-01-29

## 2023-01-29 RX ORDER — CLOPIDOGREL BISULFATE 75 MG/1
300 TABLET ORAL ONCE
Status: COMPLETED | OUTPATIENT
Start: 2023-01-29 | End: 2023-01-29

## 2023-01-29 RX ADMIN — CLOPIDOGREL BISULFATE 300 MG: 75 TABLET ORAL at 01:01

## 2023-01-29 RX ADMIN — CARVEDILOL 25 MG: 25 TABLET, FILM COATED ORAL at 09:01

## 2023-01-29 RX ADMIN — CHLORHEXIDINE GLUCONATE 15 ML: 1.2 RINSE ORAL at 09:01

## 2023-01-29 RX ADMIN — DOCUSATE SODIUM 100 MG: 100 CAPSULE, LIQUID FILLED ORAL at 09:01

## 2023-01-29 RX ADMIN — AMLODIPINE BESYLATE 10 MG: 5 TABLET ORAL at 09:01

## 2023-01-29 RX ADMIN — FAMOTIDINE 20 MG: 20 TABLET ORAL at 09:01

## 2023-01-29 RX ADMIN — PANTOPRAZOLE SODIUM 40 MG: 40 TABLET, DELAYED RELEASE ORAL at 01:01

## 2023-01-29 RX ADMIN — POLYETHYLENE GLYCOL 3350 17 G: 17 POWDER, FOR SOLUTION ORAL at 08:01

## 2023-01-29 RX ADMIN — CHLORHEXIDINE GLUCONATE 15 ML: 1.2 RINSE ORAL at 08:01

## 2023-01-29 RX ADMIN — ESCITALOPRAM OXALATE 10 MG: 10 TABLET ORAL at 09:01

## 2023-01-29 RX ADMIN — OXYCODONE AND ACETAMINOPHEN 2 TABLET: 325; 5 TABLET ORAL at 01:01

## 2023-01-29 RX ADMIN — GUAIFENESIN 600 MG: 600 TABLET, EXTENDED RELEASE ORAL at 09:01

## 2023-01-29 RX ADMIN — OXYCODONE HYDROCHLORIDE AND ACETAMINOPHEN 1 TABLET: 10; 325 TABLET ORAL at 11:01

## 2023-01-29 RX ADMIN — GUAIFENESIN 600 MG: 600 TABLET, EXTENDED RELEASE ORAL at 08:01

## 2023-01-29 RX ADMIN — MELATONIN 6 MG: at 08:01

## 2023-01-29 RX ADMIN — ATORVASTATIN CALCIUM 40 MG: 40 TABLET, FILM COATED ORAL at 08:01

## 2023-01-29 RX ADMIN — APIXABAN 5 MG: 5 TABLET, FILM COATED ORAL at 08:01

## 2023-01-29 RX ADMIN — APIXABAN 5 MG: 5 TABLET, FILM COATED ORAL at 09:01

## 2023-01-29 RX ADMIN — POLYETHYLENE GLYCOL 3350 17 G: 17 POWDER, FOR SOLUTION ORAL at 01:01

## 2023-01-29 RX ADMIN — LACTULOSE 20 G: 20 SOLUTION ORAL at 03:01

## 2023-01-29 RX ADMIN — ACETAMINOPHEN 650 MG: 325 TABLET ORAL at 08:01

## 2023-01-29 RX ADMIN — OXYCODONE HYDROCHLORIDE AND ACETAMINOPHEN 1 TABLET: 10; 325 TABLET ORAL at 03:01

## 2023-01-29 RX ADMIN — OXYCODONE AND ACETAMINOPHEN 2 TABLET: 325; 5 TABLET ORAL at 06:01

## 2023-01-29 RX ADMIN — AMIODARONE HYDROCHLORIDE 200 MG: 200 TABLET ORAL at 09:01

## 2023-01-29 RX ADMIN — DOCUSATE SODIUM 100 MG: 100 CAPSULE, LIQUID FILLED ORAL at 08:01

## 2023-01-29 RX ADMIN — OXYCODONE HYDROCHLORIDE AND ACETAMINOPHEN 1 TABLET: 10; 325 TABLET ORAL at 10:01

## 2023-01-29 RX ADMIN — LACTULOSE 20 G: 20 SOLUTION ORAL at 08:01

## 2023-01-29 RX ADMIN — ASPIRIN 81 MG CHEWABLE TABLET 81 MG: 81 TABLET CHEWABLE at 09:01

## 2023-01-29 NOTE — PT/OT/SLP PROGRESS
Physical Therapy Treatment    Patient Name:  Peyman Gr   MRN:  [id number]    Recommendations:     Discharge Recommendations: rehabilitation facility  Discharge Equipment Recommendations:  (TBD at next level of care)  Barriers to discharge: None    Assessment:     Peyman Gr is a 67 y.o. male admitted with a medical diagnosis of STEMI involving right coronary artery.  He presents with the following impairments/functional limitations: weakness, impaired endurance, impaired self care skills, impaired functional mobility, gait instability, impaired balance, decreased upper extremity function, decreased lower extremity function, decreased safety awareness, impaired cardiopulmonary response to activity. Patient sitting at the EOB upon entering the room and is agreeable to participation with PT treatment. He requires repeated VC not to use upper extremities for transfers. He requires ModA for sit to stand transfer holding heart pillow with bed elevated. He ambulated 20' in room holding RW for balance with CGA-Luda and patient noting mild dizziness. He agrees to sit up in chair and BP taken of 135/60. Chair alarm on, all needs met, and RN notified.     Rehab Prognosis: Good; patient would benefit from acute skilled PT services to address these deficits and reach maximum level of function.    Recent Surgery: Procedure(s) (LRB):  CORONARY ARTERY BYPASS GRAFT (CABG) (N/A) 5 Days Post-Op    Plan:     During this hospitalization, patient to be seen daily to address the identified rehab impairments via gait training, therapeutic activities, therapeutic exercises and progress toward the following goals:    Plan of Care Expires:  02/26/23    Subjective     Chief Complaint: none  Patient/Family Comments/goals: ready to go to rehab tomorrow  Pain/Comfort:  Pain Rating 1: 0/10      Objective:     Communicated with RN prior to session.  Patient found  sitting EOB  with blood pressure cuff, PICC line, telemetry, pulse ox  (continuous) upon PT entry to room.     General Precautions: Standard, fall, sternal  Orthopedic Precautions: N/A  Braces: N/A  Respiratory Status: Room air     Functional Mobility:  Bed Mobility:     Scooting: moderate assistance  Transfers:     Sit to Stand:  moderate assistance with holding pillow  Gait: 20' in room holding RW for balance with CGA-Mecredes and patient noting mild dizziness.      AM-PAC 6 CLICK MOBILITY          Treatment & Education:  Patient was educated on the importance of OOB activity and functional mobility to negate negative effects of prolonged bed rest during hospitalization, safe transfers and ambulation, sternal precautions, and D/C planning     Patient left up in chair with all lines intact, call button in reach, chair alarm on, and RN notified..    GOALS:   Multidisciplinary Problems       Physical Therapy Goals          Problem: Physical Therapy    Goal Priority Disciplines Outcome Goal Variances Interventions   Physical Therapy Goal     PT, PT/OT Ongoing, Progressing     Description: Goals to be met by: 2023     Patient will increase functional independence with mobility by performin. Supine to sit with SBA Assistance  2. Sit to stand transfer with min Assistance  3. Bed to chair with Mercedes  w/ol AD  3. Gait  x 50 feet with Minimal Assistance using Rolling Walker maintaining sternal precautions.                          Time Tracking:     PT Received On: 23  PT Start Time: 1157     PT Stop Time: 1209  PT Total Time (min): 12 min     Billable Minutes: Gait Training 12    Treatment Type: Treatment  PT/PTA: PT     PTA Visit Number: 0     2023

## 2023-01-29 NOTE — PROGRESS NOTES
[facility]  Department of Cardiology  Progress Note    PATIENT NAME: Peyman Gr  MRN: [id number]  TODAY'S DATE: 01/29/2023  ADMIT DATE: 1/1/2023    SUBJECTIVE     PRINCIPLE PROBLEM: STEMI involving right coronary artery    INTERVAL HISTORY:    1/29/2023    Doing well. In and out of AFIB.  Denies CP.   Denies SOB. Has some generalized weakness plan to go to skilled to get stronger.      1/28/23  Denies any new symptoms. Chest tubes removed yesterday.     1/25/2023  Patient is sitting up in the chair and feels weak. Blood pressure in the 80s.   Denies CP. Not taking very deep breaths.       1/23/23  Patient denies CP   Denies SOB  Awaiting CABG which is scheduled for tomorrow he tells me.      1/21/22  Patient is resting comfortably in bedside chair during examination.  No acute distress, alert and oriented x4.  He denies chest pain.  Healing complains of shortness of breath with exertion.  He is waiting to have CABG in the near future, possibly next week.  He is eating well.  /70 during examination.  Patient was hypertensive overnight and was given 1 dose of IV hydralazine p.r.n..  Oral antihypertensives were titrated up yesterday.    1/20/23  Patient seen sitting up in chair with no distress noted. Breathing is stable. He does have SOB with exertion. He has been using the IS.    1/19/23  Patient is being evaluated by CT surgery for CABG. He reports he is feeling okay. Has had some SOB but denies chest pain.     1/18/23:  No new complaints.  Making some progress with physical therapy.  Some shortness of breath on exertion.  Films reviewed with Dr. Elizondo.      Review of patient's allergies indicates:  No Known Allergies    REVIEW OF SYSTEMS  +weakness    OBJECTIVE     VITAL SIGNS (Most Recent)  Temp: 97.9 °F (36.6 °C) (01/29/23 1100)  Pulse: 89 (01/29/23 1100)  Resp: 20 (01/29/23 1100)  BP: (!) 101/50 (01/29/23 1100)  SpO2: 95 % (01/29/23 1100)    VENTILATION STATUS  Resp: 20 (01/29/23  1100)  SpO2: 95 % (01/29/23 1100)       I & O (Last 24H):  Intake/Output Summary (Last 24 hours) at 1/29/2023 1310  Last data filed at 1/29/2023 0853  Gross per 24 hour   Intake 640 ml   Output 1010 ml   Net -370 ml       WEIGHTS  Wt Readings from Last 3 Encounters:   01/29/23 0757 88.2 kg (194 lb 7.1 oz)   01/29/23 0400 92.7 kg (204 lb 5.9 oz)   01/24/23 0300 87.8 kg (193 lb 9 oz)   01/23/23 0400 86.8 kg (191 lb 5.8 oz)   01/17/23 0400 105.2 kg (231 lb 14.8 oz)   01/15/23 0400 100 kg (220 lb 7.4 oz)   01/14/23 0400 97.8 kg (215 lb 9.8 oz)   01/07/23 0400 115.4 kg (254 lb 6.6 oz)   01/06/23 0400 117.6 kg (259 lb 4.2 oz)   01/05/23 0500 115.3 kg (254 lb 3.1 oz)   01/03/23 0615 105.4 kg (232 lb 5.8 oz)   01/02/23 0400 102.5 kg (225 lb 15.5 oz)   01/02/23 0053 102.5 kg (225 lb 15.5 oz)   01/01/23 0800 99.8 kg (220 lb 0.3 oz)   01/01/23 0341 99.8 kg (220 lb)   01/19/23 1350 104.8 kg (231 lb)   01/01/23 1706 99.8 kg (220 lb)       PHYSICAL EXAM  CONSTITUTIONAL: Well built, well nourished in no apparent distress feels weak  NECK: R carotid bruit  LUNGS: Diminished  CHEST WALL: no tenderness  HEART: regular rate and rhythm, S1, S2 normal,   ABDOMEN: soft, non-tender; bowel sounds normal; no masses,  no organomegaly  EXTREMITIES: Extremities normal, no edema  NEURO: AAO X 3    SCHEDULED MEDS:   amiodarone  200 mg Oral Daily    amLODIPine  10 mg Oral Daily    apixaban  5 mg Oral BID    atorvastatin  40 mg Oral QHS    carvediloL  25 mg Oral BID    chlorhexidine  15 mL Mouth/Throat BID    clopidogreL  300 mg Oral Once    [START ON 1/30/2023] clopidogreL  75 mg Oral Daily    docusate sodium  100 mg Oral BID    EScitalopram oxalate  10 mg Oral Daily    guaiFENesin  600 mg Oral BID    lactulose  20 g Oral TID    pantoprazole  40 mg Oral Daily    polyethylene glycol  17 g Oral BID       CONTINUOUS INFUSIONS:        PRN MEDS:sodium chloride, acetaminophen, albumin human 5%, atropine, calcium gluconate IVPB, calcium gluconate IVPB,  calcium gluconate IVPB, dextrose 10%, dextrose 10%, EPINEPHrine, ipratropium, levalbuterol, magnesium sulfate IVPB, magnesium sulfate IVPB, melatonin, morphine, ondansetron, oxyCODONE-acetaminophen, potassium chloride in water, potassium chloride in water, potassium chloride in water, sodium phosphate IVPB, sodium phosphate IVPB, sodium phosphate IVPB    LABS AND DIAGNOSTICS     CBC LAST 3 DAYS  Recent Labs   Lab 01/26/23  0323 01/26/23  1711 01/27/23  0405 01/28/23  0358 01/29/23  0526   WBC 10.45   < > 7.65 7.08 6.77   RBC 2.35*   < > 2.77* 2.82* 2.86*   HGB 7.3*   < > 8.5* 8.7* 8.7*   HCT 22.2*   < > 25.8* 26.1* 26.7*   MCV 95   < > 93 93 93   MCH 31.1*   < > 30.7 30.9 30.4   MCHC 32.9   < > 32.9 33.3 32.6   RDW 13.9   < > 14.3 13.7 13.7   *   < > 126* 167 213   MPV 9.4   < > 9.1* 9.9 9.7   GRAN 78.6*  8.2*  --  66.6  5.1 55.1  3.9 55.0   LYMPH 8.8*  0.9*  --  16.7*  1.3 23.4  1.7 24.0   MONO 11.8  1.2*  --  14.1  1.1* 14.0  1.0 11.0   BASO 0.01  --  0.02 0.04  --    NRBC 0  --  0 0 0    < > = values in this interval not displayed.       COAGULATION LAST 3 DAYS  No results for input(s): LABPT, INR, APTT in the last 168 hours.    CHEMISTRY LAST 3 DAYS  Recent Labs   Lab 01/23/23  0416 01/24/23  0406 01/24/23  1323 01/24/23  2308 01/24/23  2343 01/25/23  0054 01/25/23  0303 01/26/23  0323 01/26/23  1711 01/27/23  0405 01/28/23  0358 01/29/23  0526   * 135*   < >  --   --   --    < > 137 134* 135* 132* 130*   K 3.9 3.8   < >  --   --   --    < > 4.6 4.2 4.1 4.0 3.9    102   < >  --   --   --    < > 107 106 105 103 99   CO2 24 24   < >  --   --   --    < > 23 22* 24 24 24   ANIONGAP 7* 9   < >  --   --   --    < > 7* 6* 6* 5* 7*   BUN 23 20   < >  --   --   --    < > 29* 34* 38* 37* 24*   CREATININE 0.9 0.8   < >  --   --   --    < > 1.0 1.3 1.0 0.9 1.0   GLU 94 86   < >  --   --   --    < > 120* 128* 115* 98 99   CALCIUM 9.1 9.0   < >  --   --   --    < > 8.5* 8.8 8.3* 8.0* 8.4*   PH  --    --    < > 7.405 7.346* 7.335*  --   --   --   --   --   --    MG 1.8 1.9   < >  --   --   --    < > 2.1  --  2.1  --  2.0   ALBUMIN 3.1* 3.3*  --   --   --   --   --   --  3.2*  --   --  3.0*   PROT 7.2 7.1  --   --   --   --   --   --   --   --   --   --    ALKPHOS 112 121  --   --   --   --   --   --   --   --   --   --    ALT 27 28  --   --   --   --   --   --   --   --   --   --    AST 26 30  --   --   --   --   --   --   --   --   --   --    BILITOT 0.3 0.5  --   --   --   --   --   --   --   --   --   --     < > = values in this interval not displayed.       CARDIAC PROFILE LAST 3 DAYS  Recent Labs   Lab 01/29/23  0526   *       ENDOCRINE LAST 3 DAYS  No results for input(s): TSH, PROCAL in the last 168 hours.      LAST ARTERIAL BLOOD GAS  ABG  Recent Labs   Lab 01/25/23  0054   PH 7.335*   PO2 84   PCO2 42.0   HCO3 22.4*   BE -3       LAST 7 DAYS MICROBIOLOGY   Microbiology Results (last 7 days)       ** No results found for the last 168 hours. **            MOST RECENT IMAGING  X-Ray Chest AP Portable  Chest single view    CLINICAL DATA: Postop    FINDINGS: AP view is compared to January 25. Heart size is upper normal. Post sternotomy changes are noted. Left IJ central catheter tip is at the superior vena cava. Mediastinal drains and left-sided chest tube remain in place. No infiltrates or effusions are identified. There is no evidence of pneumothorax.    IMPRESSION:  1. Post sternotomy changes with support devices in place as above. No acute radiographic abnormalities.    Electronically signed by:  Juan Daniel Sun MD  1/26/2023 6:57 AM [facility] Workstation: [id number]      [facility]  Results for orders placed during the hospital encounter of 01/01/23    Echo Saline Bubble? No    Interpretation Summary  · The estimated ejection fraction is 55%.  · The left ventricle is normal in size with concentric hypertrophy.  · Mild to moderate tricuspid regurgitation.  · Moderate right ventricular enlargement with  moderately reduced right ventricular systolic function.  · Grade I left ventricular diastolic dysfunction.  · There is mild aortic valve stenosis.  · Aortic valve area is 1.68 cm2; peak velocity is 1.36 m/s; mean gradient is 4 mmHg.  · There is abnormal septal wall motion consistent with right ventricular pacemaker.  · There are segmental left ventricular wall motion abnormalities.      CURRENT/PREVIOUS VISIT EKG  Results for orders placed or performed during the hospital encounter of 01/01/23   EKG 12-lead    Collection Time: 01/26/23  3:14 PM    Narrative    Test Reason : I48.91,    Vent. Rate : 104 BPM     Atrial Rate : 000 BPM     P-R Int : 000 ms          QRS Dur : 082 ms      QT Int : 324 ms       P-R-T Axes : 000 001 006 degrees     QTc Int : 426 ms    Atrial fibrillation with rapid ventricular response  Inferior infarct ,age undetermined  Abnormal ECG  When compared with ECG of 24-JAN-2023 18:12,  Atrial fibrillation has replaced Electronic ventricular pacemaker    Referred By: IRENE MOSER           Confirmed By:        ASSESSMENT/PLAN:     Active Hospital Problems    Diagnosis    *STEMI involving right coronary artery    A-fib    Anemia    History of tobacco abuse    Primary hypertension    PVD (peripheral vascular disease) with claudication    PAD (peripheral artery disease)       ASSESSMENT & PLAN:     -S/P CABG X 3 left internal mammary artery to left anterior descending coronary artery and saphenous vein to the obtuse marginal coronary artery and saphenous vein from the body of the 1st vein graft to the posterior descending coronary artery  on 1/24/2023  -Severe left main coronary stenosis with 90% lesion in small mid marginal  -Cardiac arrest/ ST elevation MI s/p PCI of ostial RCA 4 weeks ago  -Hypotension this morning- asymptomatic   -postop anemia-improving hg 8.7  -Post op AFIB-Now NSR    RECOMMENDATIONS:    Continue Amiodarone 200 mg daily  Eliquis 5 mg PO BID  Plavix 75 mg daily ( loading dose  today of 300)  Continue Amlodipine  Continue Coreg  Continue Lipitor at present dosing  Follow up with Dr. Elizondo in 2 weeks time        Mary Hernandez NP  [facility]  Department of Cardiology  Date of Service: 01/29/2023      I have personally interviewed and examined the patient, I have reviewed the Nurse Practitioner's history and physical, assessment, and plan. I have personally evaluated the patient at bedside and agree with the findings and made appropriate changes as necessary in recommendations.    Stefany Elizondo MD  Department of Cardiology  [facility]  1/29/23

## 2023-01-29 NOTE — NURSING
Ambulated w/ walker approx 15 steps into hallway. SOB and dizziness noted, sats 91% on room air. Pulling less than yesterday on IS: now only 500. Lung sounds posteriorly:  Shallow, coarse. Cardiology aware and they reviewed the CXR, no new orders.

## 2023-01-29 NOTE — PROGRESS NOTES
[facility] Medicine  Progress Note    Patient name: Peyman Gr  MRN: [id number]  Admit Date: 1/1/2023   LOS: 28 days     SUBJECTIVE:     Principal problem: STEMI involving right coronary artery    Interval History:  Patient was seen and examined bedside, breathing comfortably on room air.  Has some cough, constipated.  Very poor effort on incentive spirometer. Alert, oriented, ambulating. Chest tubes and pacer wires are out.  Transfer to cardiology A floor.    Scheduled Meds:   amiodarone  200 mg Oral Daily    amLODIPine  10 mg Oral Daily    apixaban  5 mg Oral BID    aspirin  81 mg Oral Daily    atorvastatin  40 mg Oral QHS    carvediloL  25 mg Oral BID    chlorhexidine  15 mL Mouth/Throat BID    docusate sodium  100 mg Oral BID    EScitalopram oxalate  10 mg Oral Daily    famotidine  20 mg Per OG tube BID    guaiFENesin  600 mg Oral BID    lactulose  20 g Oral TID    polyethylene glycol  17 g Oral BID     Continuous Infusions:        PRN Meds:sodium chloride, acetaminophen, albumin human 5%, atropine, calcium gluconate IVPB, calcium gluconate IVPB, calcium gluconate IVPB, dextrose 10%, dextrose 10%, EPINEPHrine, ipratropium, levalbuterol, magnesium sulfate IVPB, magnesium sulfate IVPB, melatonin, morphine, ondansetron, oxyCODONE-acetaminophen, potassium chloride in water, potassium chloride in water, potassium chloride in water, sodium phosphate IVPB, sodium phosphate IVPB, sodium phosphate IVPB    Review of patient's allergies indicates:  No Known Allergies    Review of Systems: As per interval history    OBJECTIVE:     Vital Signs (Most Recent)  Temp: 97.8 °F (36.6 °C) (01/29/23 0705)  Pulse: 80 (01/29/23 0959)  Resp: (!) 22 (01/29/23 1056)  BP: (!) 141/66 (01/29/23 0959)  SpO2: 95 % (01/29/23 0814)    Vital Signs Range (Last 24H):  Temp:  [97.8 °F (36.6 °C)-98.7 °F (37.1 °C)]   Pulse:  []   Resp:  [14-44]   BP: (122-155)/(63-83)   SpO2:  [70 %-100 %]     I & O (Last  24H):  Intake/Output Summary (Last 24 hours) at 1/29/2023 1119  Last data filed at 1/29/2023 0853  Gross per 24 hour   Intake 640 ml   Output 1010 ml   Net -370 ml         Physical Exam:  General:  Alert, oriented, in no acute distress  ENT:  No discharge from ears. No nasal discharge.   Neck: Supple, trachea midline. No JVD, left IJ CVC  CVS: RRR. No LE edema BL, midline dressing in place  Lungs:  On room air, bilateral crackles noted   Abdomen:  Soft, nontender and nondistended.  No organomegaly  Neuro:  Alert, oriented x3, moves all extremities  :  Duron's catheter in place    Laboratory:  I have reviewed all pertinent lab results within the past 24 hours.    Diagnostic Results:  Reviewed all imaging    ASSESSMENT/PLAN:     67-year-old gentleman admitted with STEMI involving RCA causing cardiac arrest complicated by complete heart block, MSSA pneumonia, emergent stenting, intubation, extubation on 01/08.  Remarkable recovery considering his presentation    Active Hospital Problems    Diagnosis  POA    *STEMI involving right coronary artery [I21.11]  Yes    A-fib [I48.91]  No    Anemia [D64.9]  Yes    History of tobacco abuse [Z87.891]  Not Applicable    Primary hypertension [I10]  Yes    PVD (peripheral vascular disease) with claudication [I73.9]  Yes    PAD (peripheral artery disease) [I73.9]  Yes      Resolved Hospital Problems    Diagnosis Date Resolved POA    Pneumonia of right lung due to methicillin susceptible Staphylococcus aureus (MSSA) [J15.211] 01/21/2023 No    Shock liver [K72.00] 01/21/2023 Yes    Cardiogenic shock [R57.0] 01/21/2023 Yes    Cardiac arrest [I46.9] 01/21/2023 Yes    Acute respiratory failure with hypoxia and hypercarbia [J96.01, J96.02] 01/21/2023 Yes    Leukocytosis [D72.829] 01/21/2023 Yes    Hyponatremia [E87.1] 01/02/2023 Yes         Plan:   Remarkable recovery considering his presentation of cardiac arrest, STEMI  Help from multiple consultants appreciated  Patient finished  antibiotics for pneumonia, extubated on 01/08  Status post CABG x3 on 01/24.  Extubated, not on any vasopressors.  Chest tubes and pacer wires were removed on 01/27, ambulated with nursing staff, transferred to cardiology floor on 01/28  Patient is constipated, start aggressive bowel regimen.  Limit opiate use  Patient has some cough today, will obtain chest x-ray.  Instructed patient and nursing staff that patient has to use incentive spirometer aggressively  Breathing on room air  Found to have postop AFib, received loading dose of amiodarone, currently on p.o. amiodarone and Eliquis  Encourage p.o. hydration, avoid excessive IV fluids considering his CHF  Echo LVEF 70%  Will need skilled nursing facility placement postoperatively   Out of bed to chair, encourage incentive spirometer  Approaching discharge in next 24-48 hours        VTE Risk Mitigation (From admission, onward)           Ordered     apixaban tablet 5 mg  2 times daily         01/28/23 1335                        Department Hospital Medicine  [facility]  Yamile Lozano MD  Date of service: 01/29/2023

## 2023-01-29 NOTE — CARE UPDATE
01/29/23 0814   Patient Assessment/Suction   Level of Consciousness (AVPU) alert   Respiratory Effort Normal;Unlabored   Expansion/Accessory Muscles/Retractions no use of accessory muscles;no retractions;expansion symmetric   All Lung Fields Breath Sounds clear   Rhythm/Pattern, Respiratory unlabored;pattern regular;depth regular;chest wiggle adequate;no shortness of breath reported   Cough Frequency no cough   PRE-TX-O2   Device (Oxygen Therapy) room air   SpO2 95 %   Pulse Oximetry Type Continuous   $ Pulse Oximetry - Multiple Charge Pulse Oximetry - Multiple   Pulse 84   Resp 19   Aerosol Therapy   $ Aerosol Therapy Charges PRN treatment not required   Education   $ Education Bronchodilator;15 min   Respiratory Evaluation   $ Care Plan Tech Time 15 min

## 2023-01-30 PROBLEM — I21.11 STEMI INVOLVING RIGHT CORONARY ARTERY: Status: RESOLVED | Noted: 2023-01-02 | Resolved: 2023-01-30

## 2023-01-30 LAB
ALBUMIN SERPL BCP-MCNC: 2.8 G/DL (ref 3.5–5.2)
ANION GAP SERPL CALC-SCNC: 7 MMOL/L (ref 8–16)
ANISOCYTOSIS BLD QL SMEAR: SLIGHT
BASOPHILS NFR BLD: 0 % (ref 0–1.9)
BUN SERPL-MCNC: 16 MG/DL (ref 8–23)
CALCIUM SERPL-MCNC: 8.4 MG/DL (ref 8.7–10.5)
CHLORIDE SERPL-SCNC: 100 MMOL/L (ref 95–110)
CO2 SERPL-SCNC: 25 MMOL/L (ref 23–29)
CREAT SERPL-MCNC: 0.9 MG/DL (ref 0.5–1.4)
DIFFERENTIAL METHOD: ABNORMAL
EOSINOPHIL NFR BLD: 4 % (ref 0–8)
ERYTHROCYTE [DISTWIDTH] IN BLOOD BY AUTOMATED COUNT: 13.8 % (ref 11.5–14.5)
EST. GFR  (NO RACE VARIABLE): >60 ML/MIN/1.73 M^2
GLUCOSE SERPL-MCNC: 108 MG/DL (ref 70–110)
HCT VFR BLD AUTO: 28.1 % (ref 40–54)
HGB BLD-MCNC: 9.2 G/DL (ref 14–18)
HYPOCHROMIA BLD QL SMEAR: ABNORMAL
IMM GRANULOCYTES # BLD AUTO: ABNORMAL K/UL (ref 0–0.04)
IMM GRANULOCYTES NFR BLD AUTO: ABNORMAL % (ref 0–0.5)
LYMPHOCYTES NFR BLD: 21 % (ref 18–48)
MAGNESIUM SERPL-MCNC: 1.8 MG/DL (ref 1.6–2.6)
MCH RBC QN AUTO: 30.6 PG (ref 27–31)
MCHC RBC AUTO-ENTMCNC: 32.7 G/DL (ref 32–36)
MCV RBC AUTO: 93 FL (ref 82–98)
METAMYELOCYTES NFR BLD MANUAL: 4 %
MONOCYTES NFR BLD: 9 % (ref 4–15)
NEUTROPHILS NFR BLD: 52 % (ref 38–73)
NEUTS BAND NFR BLD MANUAL: 10 %
NRBC BLD-RTO: 0 /100 WBC
PHOSPHATE SERPL-MCNC: 3.3 MG/DL (ref 2.7–4.5)
PLATELET # BLD AUTO: 238 K/UL (ref 150–450)
PLATELET BLD QL SMEAR: ABNORMAL
PMV BLD AUTO: 9.1 FL (ref 9.2–12.9)
POLYCHROMASIA BLD QL SMEAR: ABNORMAL
POTASSIUM SERPL-SCNC: 3.8 MMOL/L (ref 3.5–5.1)
RBC # BLD AUTO: 3.01 M/UL (ref 4.6–6.2)
SODIUM SERPL-SCNC: 132 MMOL/L (ref 136–145)
WBC # BLD AUTO: 7.88 K/UL (ref 3.9–12.7)

## 2023-01-30 PROCEDURE — 25000003 PHARM REV CODE 250: Performed by: THORACIC SURGERY (CARDIOTHORACIC VASCULAR SURGERY)

## 2023-01-30 PROCEDURE — 25000003 PHARM REV CODE 250: Performed by: HOSPITALIST

## 2023-01-30 PROCEDURE — 94799 UNLISTED PULMONARY SVC/PX: CPT

## 2023-01-30 PROCEDURE — 21000000 HC CCU ICU ROOM CHARGE

## 2023-01-30 PROCEDURE — 99900035 HC TECH TIME PER 15 MIN (STAT)

## 2023-01-30 PROCEDURE — 85007 BL SMEAR W/DIFF WBC COUNT: CPT | Performed by: HOSPITALIST

## 2023-01-30 PROCEDURE — 63600175 PHARM REV CODE 636 W HCPCS: Performed by: STUDENT IN AN ORGANIZED HEALTH CARE EDUCATION/TRAINING PROGRAM

## 2023-01-30 PROCEDURE — 25000242 PHARM REV CODE 250 ALT 637 W/ HCPCS: Performed by: THORACIC SURGERY (CARDIOTHORACIC VASCULAR SURGERY)

## 2023-01-30 PROCEDURE — 99232 SBSQ HOSP IP/OBS MODERATE 35: CPT | Mod: ,,, | Performed by: INTERNAL MEDICINE

## 2023-01-30 PROCEDURE — 94640 AIRWAY INHALATION TREATMENT: CPT

## 2023-01-30 PROCEDURE — 63600175 PHARM REV CODE 636 W HCPCS: Performed by: NURSE PRACTITIONER

## 2023-01-30 PROCEDURE — 99232 PR SUBSEQUENT HOSPITAL CARE,LEVL II: ICD-10-PCS | Mod: ,,, | Performed by: INTERNAL MEDICINE

## 2023-01-30 PROCEDURE — 80069 RENAL FUNCTION PANEL: CPT | Performed by: HOSPITALIST

## 2023-01-30 PROCEDURE — 97530 THERAPEUTIC ACTIVITIES: CPT

## 2023-01-30 PROCEDURE — 63600175 PHARM REV CODE 636 W HCPCS: Performed by: THORACIC SURGERY (CARDIOTHORACIC VASCULAR SURGERY)

## 2023-01-30 PROCEDURE — 99900031 HC PATIENT EDUCATION (STAT)

## 2023-01-30 PROCEDURE — 25000003 PHARM REV CODE 250: Performed by: NURSE PRACTITIONER

## 2023-01-30 PROCEDURE — 83735 ASSAY OF MAGNESIUM: CPT | Performed by: HOSPITALIST

## 2023-01-30 PROCEDURE — 94761 N-INVAS EAR/PLS OXIMETRY MLT: CPT

## 2023-01-30 PROCEDURE — 85027 COMPLETE CBC AUTOMATED: CPT | Performed by: HOSPITALIST

## 2023-01-30 PROCEDURE — 97116 GAIT TRAINING THERAPY: CPT

## 2023-01-30 RX ORDER — LOSARTAN POTASSIUM 25 MG/1
25 TABLET ORAL DAILY
Qty: 30 TABLET | Refills: 0 | OUTPATIENT
Start: 2023-01-30 | End: 2023-03-01

## 2023-01-30 RX ORDER — FUROSEMIDE 10 MG/ML
20 INJECTION INTRAMUSCULAR; INTRAVENOUS ONCE
Status: COMPLETED | OUTPATIENT
Start: 2023-01-31 | End: 2023-01-30

## 2023-01-30 RX ORDER — GUAIFENESIN 600 MG/1
600 TABLET, EXTENDED RELEASE ORAL 2 TIMES DAILY
Qty: 20 TABLET | Refills: 0 | OUTPATIENT
Start: 2023-01-30 | End: 2023-02-09

## 2023-01-30 RX ORDER — AMLODIPINE BESYLATE 5 MG/1
5 TABLET ORAL 2 TIMES DAILY
Qty: 60 TABLET | Refills: 0 | OUTPATIENT
Start: 2023-01-31 | End: 2023-03-02

## 2023-01-30 RX ORDER — ESCITALOPRAM OXALATE 10 MG/1
10 TABLET ORAL DAILY
Qty: 30 TABLET | Refills: 0 | OUTPATIENT
Start: 2023-01-31 | End: 2023-03-02

## 2023-01-30 RX ORDER — POLYETHYLENE GLYCOL 3350 17 G/17G
17 POWDER, FOR SOLUTION ORAL DAILY
Qty: 10 EACH | Refills: 0 | OUTPATIENT
Start: 2023-01-30 | End: 2023-02-09

## 2023-01-30 RX ORDER — AMIODARONE HYDROCHLORIDE 200 MG/1
200 TABLET ORAL DAILY
Qty: 30 TABLET | Refills: 0 | OUTPATIENT
Start: 2023-01-31 | End: 2023-03-02

## 2023-01-30 RX ORDER — FUROSEMIDE 10 MG/ML
20 INJECTION INTRAMUSCULAR; INTRAVENOUS ONCE
Status: COMPLETED | OUTPATIENT
Start: 2023-01-30 | End: 2023-01-30

## 2023-01-30 RX ORDER — FUROSEMIDE 20 MG/1
20 TABLET ORAL DAILY
Qty: 30 TABLET | Refills: 0 | OUTPATIENT
Start: 2023-01-30 | End: 2023-03-01

## 2023-01-30 RX ORDER — ATORVASTATIN CALCIUM 40 MG/1
40 TABLET, FILM COATED ORAL NIGHTLY
Qty: 90 TABLET | Refills: 0 | OUTPATIENT
Start: 2023-01-30 | End: 2023-04-30

## 2023-01-30 RX ORDER — PANTOPRAZOLE SODIUM 40 MG/1
40 TABLET, DELAYED RELEASE ORAL DAILY
Qty: 30 TABLET | Refills: 0 | OUTPATIENT
Start: 2023-01-31 | End: 2023-03-02

## 2023-01-30 RX ORDER — AMLODIPINE BESYLATE 5 MG/1
5 TABLET ORAL 2 TIMES DAILY
Status: DISCONTINUED | OUTPATIENT
Start: 2023-01-31 | End: 2023-02-01 | Stop reason: HOSPADM

## 2023-01-30 RX ORDER — CLOPIDOGREL BISULFATE 75 MG/1
75 TABLET ORAL DAILY
Qty: 90 TABLET | Refills: 0 | OUTPATIENT
Start: 2023-01-31 | End: 2023-05-01

## 2023-01-30 RX ORDER — CARVEDILOL 25 MG/1
25 TABLET ORAL 2 TIMES DAILY
Qty: 60 TABLET | Refills: 0 | OUTPATIENT
Start: 2023-01-30 | End: 2023-03-01

## 2023-01-30 RX ADMIN — AMIODARONE HYDROCHLORIDE 200 MG: 200 TABLET ORAL at 09:01

## 2023-01-30 RX ADMIN — ESCITALOPRAM OXALATE 10 MG: 10 TABLET ORAL at 09:01

## 2023-01-30 RX ADMIN — APIXABAN 5 MG: 5 TABLET, FILM COATED ORAL at 09:01

## 2023-01-30 RX ADMIN — MELATONIN 6 MG: at 09:01

## 2023-01-30 RX ADMIN — LACTULOSE 20 G: 20 SOLUTION ORAL at 09:01

## 2023-01-30 RX ADMIN — CLOPIDOGREL BISULFATE 75 MG: 75 TABLET, FILM COATED ORAL at 09:01

## 2023-01-30 RX ADMIN — OXYCODONE HYDROCHLORIDE AND ACETAMINOPHEN 1 TABLET: 10; 325 TABLET ORAL at 07:01

## 2023-01-30 RX ADMIN — GUAIFENESIN 600 MG: 600 TABLET, EXTENDED RELEASE ORAL at 09:01

## 2023-01-30 RX ADMIN — AMLODIPINE BESYLATE 10 MG: 5 TABLET ORAL at 09:01

## 2023-01-30 RX ADMIN — DOCUSATE SODIUM 100 MG: 100 CAPSULE, LIQUID FILLED ORAL at 09:01

## 2023-01-30 RX ADMIN — PANTOPRAZOLE SODIUM 40 MG: 40 TABLET, DELAYED RELEASE ORAL at 09:01

## 2023-01-30 RX ADMIN — CHLORHEXIDINE GLUCONATE 15 ML: 1.2 RINSE ORAL at 09:01

## 2023-01-30 RX ADMIN — CARVEDILOL 25 MG: 25 TABLET, FILM COATED ORAL at 09:01

## 2023-01-30 RX ADMIN — FUROSEMIDE 20 MG: 10 INJECTION, SOLUTION INTRAMUSCULAR; INTRAVENOUS at 11:01

## 2023-01-30 RX ADMIN — IPRATROPIUM BROMIDE 0.5 MG: 0.5 SOLUTION RESPIRATORY (INHALATION) at 11:01

## 2023-01-30 RX ADMIN — MAGNESIUM SULFATE HEPTAHYDRATE 2 G: 40 INJECTION, SOLUTION INTRAVENOUS at 07:01

## 2023-01-30 RX ADMIN — POLYETHYLENE GLYCOL 3350 17 G: 17 POWDER, FOR SOLUTION ORAL at 09:01

## 2023-01-30 RX ADMIN — ATORVASTATIN CALCIUM 40 MG: 40 TABLET, FILM COATED ORAL at 09:01

## 2023-01-30 RX ADMIN — FUROSEMIDE 20 MG: 10 INJECTION, SOLUTION INTRAMUSCULAR; INTRAVENOUS at 12:01

## 2023-01-30 RX ADMIN — LEVALBUTEROL HYDROCHLORIDE 1.25 MG: 1.25 SOLUTION RESPIRATORY (INHALATION) at 11:01

## 2023-01-30 RX ADMIN — OXYCODONE HYDROCHLORIDE AND ACETAMINOPHEN 1 TABLET: 10; 325 TABLET ORAL at 04:01

## 2023-01-30 RX ADMIN — OXYCODONE HYDROCHLORIDE AND ACETAMINOPHEN 1 TABLET: 10; 325 TABLET ORAL at 11:01

## 2023-01-30 RX ADMIN — ACETAMINOPHEN 650 MG: 325 TABLET ORAL at 05:01

## 2023-01-30 NOTE — PROGRESS NOTES
[facility]  Department of Cardiology  Progress Note    PATIENT NAME: Peyman Gr  MRN: [id number]  TODAY'S DATE: 01/30/2023  ADMIT DATE: 1/1/2023    SUBJECTIVE     PRINCIPLE PROBLEM: STEMI involving right coronary artery    INTERVAL HISTORY:    1/30/2023    Patient seen sitting up in chair with no distress noted. He is still having shortness of breath intermittently. He has been working with PT.    1/29/23    Doing well. In and out of AFIB.  Denies CP.   Denies SOB. Has some generalized weakness plan to go to skilled to get stronger.      1/28/23  Denies any new symptoms. Chest tubes removed yesterday.     1/25/2023  Patient is sitting up in the chair and feels weak. Blood pressure in the 80s.   Denies CP. Not taking very deep breaths.       1/23/23  Patient denies CP   Denies SOB  Awaiting CABG which is scheduled for tomorrow he tells me.      1/21/22  Patient is resting comfortably in bedside chair during examination.  No acute distress, alert and oriented x4.  He denies chest pain.  Healing complains of shortness of breath with exertion.  He is waiting to have CABG in the near future, possibly next week.  He is eating well.  /70 during examination.  Patient was hypertensive overnight and was given 1 dose of IV hydralazine p.r.n..  Oral antihypertensives were titrated up yesterday.    1/20/23  Patient seen sitting up in chair with no distress noted. Breathing is stable. He does have SOB with exertion. He has been using the IS.    1/19/23  Patient is being evaluated by CT surgery for CABG. He reports he is feeling okay. Has had some SOB but denies chest pain.     1/18/23:  No new complaints.  Making some progress with physical therapy.  Some shortness of breath on exertion.  Films reviewed with Dr. Elizondo.      Review of patient's allergies indicates:  No Known Allergies    REVIEW OF SYSTEMS  +weakness  + SOB    OBJECTIVE     VITAL SIGNS (Most Recent)  Temp: 98 °F (36.7 °C) (01/30/23  1150)  Pulse: 79 (01/30/23 1150)  Resp: 20 (01/30/23 1150)  BP: 100/62 (01/30/23 1150)  SpO2: (!) 94 % (01/30/23 1150)    VENTILATION STATUS  Resp: 20 (01/30/23 1150)  SpO2: (!) 94 % (01/30/23 1150)       I & O (Last 24H):  Intake/Output Summary (Last 24 hours) at 1/30/2023 1329  Last data filed at 1/30/2023 1152  Gross per 24 hour   Intake 360 ml   Output 2300 ml   Net -1940 ml         WEIGHTS  Wt Readings from Last 3 Encounters:   01/29/23 0757 88.2 kg (194 lb 7.1 oz)   01/29/23 0400 92.7 kg (204 lb 5.9 oz)   01/24/23 0300 87.8 kg (193 lb 9 oz)   01/23/23 0400 86.8 kg (191 lb 5.8 oz)   01/17/23 0400 105.2 kg (231 lb 14.8 oz)   01/15/23 0400 100 kg (220 lb 7.4 oz)   01/14/23 0400 97.8 kg (215 lb 9.8 oz)   01/07/23 0400 115.4 kg (254 lb 6.6 oz)   01/06/23 0400 117.6 kg (259 lb 4.2 oz)   01/05/23 0500 115.3 kg (254 lb 3.1 oz)   01/03/23 0615 105.4 kg (232 lb 5.8 oz)   01/02/23 0400 102.5 kg (225 lb 15.5 oz)   01/02/23 0053 102.5 kg (225 lb 15.5 oz)   01/01/23 0800 99.8 kg (220 lb 0.3 oz)   01/01/23 0341 99.8 kg (220 lb)   01/19/23 1350 104.8 kg (231 lb)   01/01/23 1706 99.8 kg (220 lb)       PHYSICAL EXAM  CONSTITUTIONAL: Well built, well nourished in no apparent distress feels weak  NECK: R carotid bruit  LUNGS: Cta but Diminished bases  CHEST WALL: no tenderness  HEART: regular rate and rhythm, S1, S2 normal,   ABDOMEN: soft, non-tender; bowel sounds normal; no masses,  no organomegaly  EXTREMITIES: Extremities normal, no edema  NEURO: AAO X 3    SCHEDULED MEDS:   amiodarone  200 mg Oral Daily    amLODIPine  10 mg Oral Daily    apixaban  5 mg Oral BID    atorvastatin  40 mg Oral QHS    carvediloL  25 mg Oral BID    chlorhexidine  15 mL Mouth/Throat BID    clopidogreL  75 mg Oral Daily    docusate sodium  100 mg Oral BID    EScitalopram oxalate  10 mg Oral Daily    guaiFENesin  600 mg Oral BID    pantoprazole  40 mg Oral Daily    polyethylene glycol  17 g Oral BID       CONTINUOUS INFUSIONS:        PRN MEDS:sodium  chloride, acetaminophen, albumin human 5%, atropine, calcium gluconate IVPB, calcium gluconate IVPB, calcium gluconate IVPB, dextrose 10%, dextrose 10%, EPINEPHrine, ipratropium, levalbuterol, magnesium sulfate IVPB, magnesium sulfate IVPB, melatonin, ondansetron, oxyCODONE-acetaminophen, potassium chloride in water, potassium chloride in water, potassium chloride in water, sodium phosphate IVPB, sodium phosphate IVPB, sodium phosphate IVPB    LABS AND DIAGNOSTICS     CBC LAST 3 DAYS  Recent Labs   Lab 01/26/23  0323 01/26/23  1711 01/27/23  0405 01/28/23  0358 01/29/23  0526 01/30/23  0343   WBC 10.45   < > 7.65 7.08 6.77 7.88   RBC 2.35*   < > 2.77* 2.82* 2.86* 3.01*   HGB 7.3*   < > 8.5* 8.7* 8.7* 9.2*   HCT 22.2*   < > 25.8* 26.1* 26.7* 28.1*   MCV 95   < > 93 93 93 93   MCH 31.1*   < > 30.7 30.9 30.4 30.6   MCHC 32.9   < > 32.9 33.3 32.6 32.7   RDW 13.9   < > 14.3 13.7 13.7 13.8   *   < > 126* 167 213 238   MPV 9.4   < > 9.1* 9.9 9.7 9.1*   GRAN 78.6*  8.2*  --  66.6  5.1 55.1  3.9 55.0 52.0   LYMPH 8.8*  0.9*  --  16.7*  1.3 23.4  1.7 24.0 21.0   MONO 11.8  1.2*  --  14.1  1.1* 14.0  1.0 11.0 9.0   BASO 0.01  --  0.02 0.04  --   --    NRBC 0  --  0 0 0 0    < > = values in this interval not displayed.         COAGULATION LAST 3 DAYS  No results for input(s): LABPT, INR, APTT in the last 168 hours.    CHEMISTRY LAST 3 DAYS  Recent Labs   Lab 01/24/23  0406 01/24/23  1323 01/24/23  2308 01/24/23  2343 01/25/23  0054 01/25/23  0303 01/26/23  1711 01/27/23  0405 01/28/23  0358 01/29/23  0526 01/30/23  0343   *   < >  --   --   --    < > 134* 135* 132* 130* 132*   K 3.8   < >  --   --   --    < > 4.2 4.1 4.0 3.9 3.8      < >  --   --   --    < > 106 105 103 99 100   CO2 24   < >  --   --   --    < > 22* 24 24 24 25   ANIONGAP 9   < >  --   --   --    < > 6* 6* 5* 7* 7*   BUN 20   < >  --   --   --    < > 34* 38* 37* 24* 16   CREATININE 0.8   < >  --   --   --    < > 1.3 1.0 0.9 1.0 0.9    GLU 86   < >  --   --   --    < > 128* 115* 98 99 108   CALCIUM 9.0   < >  --   --   --    < > 8.8 8.3* 8.0* 8.4* 8.4*   PH  --    < > 7.405 7.346* 7.335*  --   --   --   --   --   --    MG 1.9   < >  --   --   --    < >  --  2.1  --  2.0 1.8   ALBUMIN 3.3*  --   --   --   --   --  3.2*  --   --  3.0* 2.8*   PROT 7.1  --   --   --   --   --   --   --   --   --   --    ALKPHOS 121  --   --   --   --   --   --   --   --   --   --    ALT 28  --   --   --   --   --   --   --   --   --   --    AST 30  --   --   --   --   --   --   --   --   --   --    BILITOT 0.5  --   --   --   --   --   --   --   --   --   --     < > = values in this interval not displayed.         CARDIAC PROFILE LAST 3 DAYS  Recent Labs   Lab 01/29/23  0526   *         ENDOCRINE LAST 3 DAYS  No results for input(s): TSH, PROCAL in the last 168 hours.      LAST ARTERIAL BLOOD GAS  ABG  Recent Labs   Lab 01/25/23  0054   PH 7.335*   PO2 84   PCO2 42.0   HCO3 22.4*   BE -3         LAST 7 DAYS MICROBIOLOGY   Microbiology Results (last 7 days)       ** No results found for the last 168 hours. **            MOST RECENT IMAGING  X-Ray KUB  REASON: Impacted stool?    TECHNIQUE: AP abdominal radiograph.    COMPARISON: None.    FINDINGS:    Multiple loops of gas and stool-filled bowel noted in an unremarkable bowel gas pattern. Stool content within the large bowel within normal limits. Lung bases are clear. No acute osseous abnormality.    IMPRESSION:    Unremarkable bowel gas pattern.    Electronically signed by:  Julio C Davis DO  1/29/2023 2:10 PM [facility] Workstation: [id number]  X-Ray Chest AP Portable  Reason: cough    FINDINGS:    Portable chest with comparison chest x-ray January 26, 2023 show normal cardiomediastinal silhouette. Median sternotomy wires noted. There is been interval removal of mediastinal chest drains and left internal jugular central venous catheter.  Linear opacities of the left lung base likely reflect subsegmental  atelectasis. Pulmonary vasculature is normal. No acute osseous abnormality.    IMPRESSION:    Linear opacities of the left lung base likely reflect subsegmental atelectasis.    Electronically signed by:  Julio C Davis DO  1/29/2023 2:09 PM [facility] Workstation: [id number]      ANYA  Results for orders placed during the hospital encounter of 01/01/23    Echo Saline Bubble? No    Interpretation Summary  · The estimated ejection fraction is 55%.  · The left ventricle is normal in size with concentric hypertrophy.  · Mild to moderate tricuspid regurgitation.  · Moderate right ventricular enlargement with moderately reduced right ventricular systolic function.  · Grade I left ventricular diastolic dysfunction.  · There is mild aortic valve stenosis.  · Aortic valve area is 1.68 cm2; peak velocity is 1.36 m/s; mean gradient is 4 mmHg.  · There is abnormal septal wall motion consistent with right ventricular pacemaker.  · There are segmental left ventricular wall motion abnormalities.      CURRENT/PREVIOUS VISIT EKG  Results for orders placed or performed during the hospital encounter of 01/01/23   EKG 12-lead    Collection Time: 01/26/23  3:14 PM    Narrative    Test Reason : I48.91,    Vent. Rate : 104 BPM     Atrial Rate : 000 BPM     P-R Int : 000 ms          QRS Dur : 082 ms      QT Int : 324 ms       P-R-T Axes : 000 001 006 degrees     QTc Int : 426 ms    Atrial fibrillation with rapid ventricular response  Inferior infarct ,age undetermined  Abnormal ECG  When compared with ECG of 24-JAN-2023 18:12,  Atrial fibrillation has replaced Electronic ventricular pacemaker  Confirmed by Sarthak Cardona MD ([id number]) on 1/29/2023 4:27:26 PM    Referred By: IRENE MOSER           Confirmed By:Sarthak Cardona MD       ASSESSMENT/PLAN:     Active Hospital Problems    Diagnosis    *STEMI involving right coronary artery    A-fib    Anemia    History of tobacco abuse    Primary hypertension    PVD (peripheral vascular disease) with  claudication    PAD (peripheral artery disease)       ASSESSMENT & PLAN:     -S/P CABG X 3 left internal mammary artery to left anterior descending coronary artery and saphenous vein to the obtuse marginal coronary artery and saphenous vein from the body of the 1st vein graft to the posterior descending coronary artery  on 1/24/2023  -Severe left main coronary stenosis with 90% lesion in small mid marginal  -Cardiac arrest/ ST elevation MI s/p PCI of ostial RCA 4 weeks ago  -Post op AFIB-Now NSR    RECOMMENDATIONS:    Continue amiodarone 200 mg po daily and coreg 25 mg po BID.   Continue Eliquis 5 mg po BID and Plavix 75 mg po daily. Recommend fall precautions and to avoid head injuries.   Change amlodipine to 5 mg po BID instead of 10 mg po daily due to labile HTN with episodes of low BP.  Hopeful for dc to SNF soon.       Mary Lou Turpin NP  [facility]  Department of Cardiology  Date of Service: 01/30/2023

## 2023-01-30 NOTE — PLAN OF CARE
01/30/23 0827   Discharge Reassessment   Assessment Type Discharge Planning Reassessment   Did the patient's condition or plan change since previous assessment? No   Discharge Plan discussed with: Patient   Communicated TOBIAS with patient/caregiver Yes   Discharge Plan A Skilled Nursing Facility   Discharge Plan B Skilled Nursing Facility   DME Needed Upon Discharge  none   Discharge Barriers Identified None   Why the patient remains in the hospital Requires continued medical care   Post-Acute Status   Post-Acute Authorization Placement   Post-Acute Placement Status Pending payor review/awaiting authorization (if required)     Case Management sent message via [facility] inquiring if Miguel Cardona re-obtained SNF auth, as patient had delay in discharge due to scheduled CABG. CM to speak with Dr. Lozano to inquire if patient is medically clear for discharge to SNF. CM following    0923 - CM received secure chat stating Miguel Cardona needed a new SNF order to obtain auth. New SNF order placed and updated clinicals sent via [facility]. CM following      1336 - notified by Dr. Lozano that cardiology placed new order for IV lasix. Discharge held for today. CM to follow up again on tomorrow

## 2023-01-30 NOTE — PROGRESS NOTES
[facility] Medicine  Progress Note    Patient name: Peyman Gr  MRN: [id number]  Admit Date: 1/1/2023   LOS: 29 days     SUBJECTIVE:     Principal problem: STEMI involving right coronary artery    Interval History:  Patient was seen and examined bedside, breathing comfortably on room air however still has some dyspnea on exertion.  Has some cough, constipated.  Very poor effort on incentive spirometer in spite multiple attempts to educate. Alert, oriented, ambulating.    Scheduled Meds:   amiodarone  200 mg Oral Daily    amLODIPine  10 mg Oral Daily    apixaban  5 mg Oral BID    atorvastatin  40 mg Oral QHS    carvediloL  25 mg Oral BID    chlorhexidine  15 mL Mouth/Throat BID    clopidogreL  75 mg Oral Daily    docusate sodium  100 mg Oral BID    EScitalopram oxalate  10 mg Oral Daily    guaiFENesin  600 mg Oral BID    pantoprazole  40 mg Oral Daily    polyethylene glycol  17 g Oral BID     Continuous Infusions:        PRN Meds:sodium chloride, acetaminophen, albumin human 5%, atropine, calcium gluconate IVPB, calcium gluconate IVPB, calcium gluconate IVPB, dextrose 10%, dextrose 10%, EPINEPHrine, ipratropium, levalbuterol, magnesium sulfate IVPB, magnesium sulfate IVPB, melatonin, ondansetron, oxyCODONE-acetaminophen, potassium chloride in water, potassium chloride in water, potassium chloride in water, sodium phosphate IVPB, sodium phosphate IVPB, sodium phosphate IVPB    Review of patient's allergies indicates:  No Known Allergies    Review of Systems: As per interval history    OBJECTIVE:     Vital Signs (Most Recent)  Temp: 97.8 °F (36.6 °C) (01/30/23 0730)  Pulse: 93 (01/30/23 0730)  Resp: 20 (01/30/23 0730)  BP: (!) 142/70 (01/30/23 0730)  SpO2: 95 % (01/30/23 0730)    Vital Signs Range (Last 24H):  Temp:  [97.8 °F (36.6 °C)-98 °F (36.7 °C)]   Pulse:  [77-93]   Resp:  [16-28]   BP: (101-167)/(50-79)   SpO2:  [93 %-96 %]     I & O (Last 24H):  Intake/Output Summary (Last 24 hours) at  1/30/2023 1022  Last data filed at 1/30/2023 0347  Gross per 24 hour   Intake 360 ml   Output 1800 ml   Net -1440 ml         Physical Exam:  General:  Alert, oriented, in no acute distress  ENT:  No discharge from ears. No nasal discharge.   Neck: Supple, trachea midline. No JVD, left IJ CVC  CVS: RRR. No LE edema BL, midline dressing in place  Lungs:  On room air, faint bilateral crackles noted  Abdomen:  Soft, nontender and nondistended.  No organomegaly  Neuro:  Alert, oriented x3, moves all extremities      Laboratory:  I have reviewed all pertinent lab results within the past 24 hours.    Diagnostic Results:  Reviewed all imaging    ASSESSMENT/PLAN:     67-year-old gentleman admitted with STEMI involving RCA causing cardiac arrest complicated by complete heart block, MSSA pneumonia, emergent stenting, intubation, extubation on 01/08.  Remarkable recovery considering his presentation    Active Hospital Problems    Diagnosis  POA    *STEMI involving right coronary artery [I21.11]  Yes    A-fib [I48.91]  No    Anemia [D64.9]  Yes    History of tobacco abuse [Z87.891]  Not Applicable    Primary hypertension [I10]  Yes    PVD (peripheral vascular disease) with claudication [I73.9]  Yes    PAD (peripheral artery disease) [I73.9]  Yes      Resolved Hospital Problems    Diagnosis Date Resolved POA    Pneumonia of right lung due to methicillin susceptible Staphylococcus aureus (MSSA) [J15.211] 01/21/2023 No    Shock liver [K72.00] 01/21/2023 Yes    Cardiogenic shock [R57.0] 01/21/2023 Yes    Cardiac arrest [I46.9] 01/21/2023 Yes    Acute respiratory failure with hypoxia and hypercarbia [J96.01, J96.02] 01/21/2023 Yes    Leukocytosis [D72.829] 01/21/2023 Yes    Hyponatremia [E87.1] 01/02/2023 Yes         Plan:   Remarkable recovery considering his presentation of cardiac arrest, STEMI  Help from multiple consultants appreciated  Patient finished antibiotics for pneumonia, extubated on 01/08  Status post CABG x3 on 01/24.   Extubated, not on any vasopressors.  Chest tubes and pacer wires were removed on 01/27, ambulated with nursing staff, transferred to cardiology floor on 01/28  Patient is constipated, started aggressive bowel regimen.  Limit opiate use  Chest x-ray showed mild pulmonary edema, patient has dyspnea on exertion, noted cardiology started IV diuresis today  Instructed nursing staff that patient has to use incentive spirometer aggressively  Breathing on room air  Found to have postop AFib, received loading dose of amiodarone, currently on p.o. amiodarone and Eliquis  Encourage p.o. hydration, avoid excessive PO fluids considering his CHF  Echo LVEF 70%  Out of bed to chair, encourage incentive spirometer  Approaching discharge in next 24 hours once cleared by Cardiology        VTE Risk Mitigation (From admission, onward)           Ordered     apixaban tablet 5 mg  2 times daily         01/28/23 0785                        Department Hospital Medicine  [facility]  Yamile Lozano MD  Date of service: 01/30/2023

## 2023-01-30 NOTE — PLAN OF CARE
01/30/23 0922   Patient Assessment/Suction   Level of Consciousness (AVPU) alert   Respiratory Effort Normal;Unlabored   All Lung Fields Breath Sounds clear   PRE-TX-O2   Device (Oxygen Therapy) room air   SpO2 95 %   Pulse Oximetry Type Continuous   $ Pulse Oximetry - Multiple Charge Pulse Oximetry - Multiple   Pulse 92   Resp (!) 22   Aerosol Therapy   $ Aerosol Therapy Charges PRN treatment not required   Respiratory Treatment Status (SVN) PRN treatment not required   Education   $ Education Bronchodilator;15 min   Respiratory Evaluation   $ Care Plan Tech Time 15 min

## 2023-01-30 NOTE — PT/OT/SLP PROGRESS
Physical Therapy Treatment    Patient Name:  Peyman Gr   MRN:  [id number]    Recommendations:     Discharge Recommendations: rehabilitation facility  Discharge Equipment Recommendations:  (TBD)  Barriers to discharge:  increased caregiver burden of care    Assessment:     Peyman Gr is a 67 y.o. male admitted with a medical diagnosis of STEMI involving right coronary artery.  He presents with the following impairments/functional limitations: weakness, impaired endurance, impaired self care skills, impaired functional mobility, gait instability, impaired balance, decreased upper extremity function, decreased lower extremity function, decreased safety awareness, impaired cardiopulmonary response to activity ..    Rehab Prognosis: Good; patient would benefit from acute skilled PT services to address these deficits and reach maximum level of function.    Recent Surgery: Procedure(s) (LRB):  CORONARY ARTERY BYPASS GRAFT (CABG) (N/A) 6 Days Post-Op    Plan:     During this hospitalization, patient to be seen daily to address the identified rehab impairments via gait training, therapeutic activities, therapeutic exercises and progress toward the following goals:    Plan of Care Expires:  02/26/23    Subjective     Chief Complaint: Shortness of breath  Patient/Family Comments/goals: PLOF  Pain/Comfort:         Objective:     Communicated with nurse prior to session.  Patient found supine with blood pressure cuff, PICC line, telemetry, pulse ox (continuous) upon PT entry to room.     General Precautions: Standard, fall, sternal  Orthopedic Precautions: N/A  Braces: N/A  Respiratory Status: Room air     Functional Mobility:  Bed Mobility:     Supine to Sit: minimum assistance  Transfers:     Sit to Stand:  minimum assistance with rolling walker  Gait: 20 ft RW and Min A       AM-PAC 6 CLICK MOBILITY          Treatment & Education:  Educated on safety, sternal precautions, gait training    Patient left up in chair with  all lines intact, call button in reach, and chair alarm on..    GOALS:   Multidisciplinary Problems       Physical Therapy Goals          Problem: Physical Therapy    Goal Priority Disciplines Outcome Goal Variances Interventions   Physical Therapy Goal     PT, PT/OT Ongoing, Progressing     Description: Goals to be met by: 2023     Patient will increase functional independence with mobility by performin. Supine to sit with SBA Assistance  2. Sit to stand transfer with min Assistance  3. Bed to chair with Mercedes  w/ol AD  3. Gait  x 50 feet with Minimal Assistance using Rolling Walker maintaining sternal precautions.                          Time Tracking:     PT Received On: 23  PT Start Time: 1117     PT Stop Time: 1140  PT Total Time (min): 23 min     Billable Minutes: Gait Training 10 minutes  and Therapeutic Activity 13 minutes    Treatment Type: Treatment  PT/PTA: PT     PTA Visit Number: 0     2023

## 2023-01-31 LAB
ANION GAP SERPL CALC-SCNC: 7 MMOL/L (ref 8–16)
BNP SERPL-MCNC: 591 PG/ML (ref 0–99)
BUN SERPL-MCNC: 12 MG/DL (ref 8–23)
CALCIUM SERPL-MCNC: 8.2 MG/DL (ref 8.7–10.5)
CHLORIDE SERPL-SCNC: 99 MMOL/L (ref 95–110)
CO2 SERPL-SCNC: 27 MMOL/L (ref 23–29)
CREAT SERPL-MCNC: 0.9 MG/DL (ref 0.5–1.4)
ERYTHROCYTE [DISTWIDTH] IN BLOOD BY AUTOMATED COUNT: 13.7 % (ref 11.5–14.5)
EST. GFR  (NO RACE VARIABLE): >60 ML/MIN/1.73 M^2
GLUCOSE SERPL-MCNC: 137 MG/DL (ref 70–110)
HCT VFR BLD AUTO: 27.5 % (ref 40–54)
HGB BLD-MCNC: 9.1 G/DL (ref 14–18)
MAGNESIUM SERPL-MCNC: 2.1 MG/DL (ref 1.6–2.6)
MCH RBC QN AUTO: 30.6 PG (ref 27–31)
MCHC RBC AUTO-ENTMCNC: 33.1 G/DL (ref 32–36)
MCV RBC AUTO: 93 FL (ref 82–98)
PLATELET # BLD AUTO: 276 K/UL (ref 150–450)
PMV BLD AUTO: 8.9 FL (ref 9.2–12.9)
POTASSIUM SERPL-SCNC: 3.9 MMOL/L (ref 3.5–5.1)
RBC # BLD AUTO: 2.97 M/UL (ref 4.6–6.2)
SODIUM SERPL-SCNC: 133 MMOL/L (ref 136–145)
WBC # BLD AUTO: 7.43 K/UL (ref 3.9–12.7)

## 2023-01-31 PROCEDURE — 83880 ASSAY OF NATRIURETIC PEPTIDE: CPT | Performed by: NURSE PRACTITIONER

## 2023-01-31 PROCEDURE — 63600175 PHARM REV CODE 636 W HCPCS: Performed by: NURSE PRACTITIONER

## 2023-01-31 PROCEDURE — 83735 ASSAY OF MAGNESIUM: CPT | Performed by: INTERNAL MEDICINE

## 2023-01-31 PROCEDURE — 99900031 HC PATIENT EDUCATION (STAT)

## 2023-01-31 PROCEDURE — 25000242 PHARM REV CODE 250 ALT 637 W/ HCPCS: Performed by: THORACIC SURGERY (CARDIOTHORACIC VASCULAR SURGERY)

## 2023-01-31 PROCEDURE — 99900035 HC TECH TIME PER 15 MIN (STAT)

## 2023-01-31 PROCEDURE — 21000000 HC CCU ICU ROOM CHARGE

## 2023-01-31 PROCEDURE — 85027 COMPLETE CBC AUTOMATED: CPT | Performed by: INTERNAL MEDICINE

## 2023-01-31 PROCEDURE — 94799 UNLISTED PULMONARY SVC/PX: CPT

## 2023-01-31 PROCEDURE — 97110 THERAPEUTIC EXERCISES: CPT | Mod: CQ

## 2023-01-31 PROCEDURE — 94640 AIRWAY INHALATION TREATMENT: CPT

## 2023-01-31 PROCEDURE — 25000003 PHARM REV CODE 250: Performed by: THORACIC SURGERY (CARDIOTHORACIC VASCULAR SURGERY)

## 2023-01-31 PROCEDURE — 27000221 HC OXYGEN, UP TO 24 HOURS

## 2023-01-31 PROCEDURE — 25000003 PHARM REV CODE 250: Performed by: HOSPITALIST

## 2023-01-31 PROCEDURE — 25000003 PHARM REV CODE 250: Performed by: NURSE PRACTITIONER

## 2023-01-31 PROCEDURE — 80048 BASIC METABOLIC PNL TOTAL CA: CPT | Performed by: INTERNAL MEDICINE

## 2023-01-31 PROCEDURE — 94761 N-INVAS EAR/PLS OXIMETRY MLT: CPT

## 2023-01-31 PROCEDURE — 36415 COLL VENOUS BLD VENIPUNCTURE: CPT | Performed by: INTERNAL MEDICINE

## 2023-01-31 RX ORDER — FUROSEMIDE 40 MG/1
40 TABLET ORAL DAILY
Status: DISCONTINUED | OUTPATIENT
Start: 2023-02-01 | End: 2023-02-01 | Stop reason: HOSPADM

## 2023-01-31 RX ORDER — POTASSIUM CHLORIDE 750 MG/1
10 CAPSULE, EXTENDED RELEASE ORAL DAILY
Status: DISCONTINUED | OUTPATIENT
Start: 2023-01-31 | End: 2023-02-01 | Stop reason: HOSPADM

## 2023-01-31 RX ORDER — FUROSEMIDE 10 MG/ML
20 INJECTION INTRAMUSCULAR; INTRAVENOUS ONCE
Status: COMPLETED | OUTPATIENT
Start: 2023-01-31 | End: 2023-01-31

## 2023-01-31 RX ADMIN — POLYETHYLENE GLYCOL 3350 17 G: 17 POWDER, FOR SOLUTION ORAL at 08:01

## 2023-01-31 RX ADMIN — APIXABAN 5 MG: 5 TABLET, FILM COATED ORAL at 08:01

## 2023-01-31 RX ADMIN — IPRATROPIUM BROMIDE 0.5 MG: 0.5 SOLUTION RESPIRATORY (INHALATION) at 03:01

## 2023-01-31 RX ADMIN — GUAIFENESIN 600 MG: 600 TABLET, EXTENDED RELEASE ORAL at 08:01

## 2023-01-31 RX ADMIN — CHLORHEXIDINE GLUCONATE 15 ML: 1.2 RINSE ORAL at 08:01

## 2023-01-31 RX ADMIN — LEVALBUTEROL HYDROCHLORIDE 1.25 MG: 1.25 SOLUTION RESPIRATORY (INHALATION) at 03:01

## 2023-01-31 RX ADMIN — CARVEDILOL 25 MG: 25 TABLET, FILM COATED ORAL at 08:01

## 2023-01-31 RX ADMIN — FUROSEMIDE 20 MG: 10 INJECTION, SOLUTION INTRAMUSCULAR; INTRAVENOUS at 01:01

## 2023-01-31 RX ADMIN — PANTOPRAZOLE SODIUM 40 MG: 40 TABLET, DELAYED RELEASE ORAL at 08:01

## 2023-01-31 RX ADMIN — AMIODARONE HYDROCHLORIDE 200 MG: 200 TABLET ORAL at 08:01

## 2023-01-31 RX ADMIN — OXYCODONE HYDROCHLORIDE AND ACETAMINOPHEN 1 TABLET: 10; 325 TABLET ORAL at 08:01

## 2023-01-31 RX ADMIN — ATORVASTATIN CALCIUM 40 MG: 40 TABLET, FILM COATED ORAL at 08:01

## 2023-01-31 RX ADMIN — MELATONIN 6 MG: at 08:01

## 2023-01-31 RX ADMIN — AMLODIPINE BESYLATE 5 MG: 5 TABLET ORAL at 08:01

## 2023-01-31 RX ADMIN — OXYCODONE HYDROCHLORIDE AND ACETAMINOPHEN 1 TABLET: 10; 325 TABLET ORAL at 04:01

## 2023-01-31 RX ADMIN — ESCITALOPRAM OXALATE 10 MG: 10 TABLET ORAL at 08:01

## 2023-01-31 RX ADMIN — IPRATROPIUM BROMIDE 0.5 MG: 0.5 SOLUTION RESPIRATORY (INHALATION) at 08:01

## 2023-01-31 RX ADMIN — DOCUSATE SODIUM 100 MG: 100 CAPSULE, LIQUID FILLED ORAL at 08:01

## 2023-01-31 RX ADMIN — LEVALBUTEROL HYDROCHLORIDE 1.25 MG: 1.25 SOLUTION RESPIRATORY (INHALATION) at 08:01

## 2023-01-31 RX ADMIN — CLOPIDOGREL BISULFATE 75 MG: 75 TABLET, FILM COATED ORAL at 08:01

## 2023-01-31 RX ADMIN — CHLORHEXIDINE GLUCONATE 15 ML: 1.2 RINSE ORAL at 10:01

## 2023-01-31 RX ADMIN — POTASSIUM CHLORIDE 10 MEQ: 750 CAPSULE, EXTENDED RELEASE ORAL at 04:01

## 2023-01-31 NOTE — PT/OT/SLP PROGRESS
"Physical Therapy Treatment    Patient Name:  Peyman Gr   MRN:  [id number]    Recommendations:     Discharge Recommendations: rehabilitation facility  Discharge Equipment Recommendations:  (TBD)  Barriers to discharge: None    Assessment:     Peyman Gr is a 67 y.o. male admitted with a medical diagnosis of STEMI involving right coronary artery.  He presents with the following impairments/functional limitations: weakness, impaired endurance, impaired self care skills, impaired functional mobility, gait instability, impaired cardiopulmonary response to activity, impaired skin.  Pt agreeable to PT but with c/o increased SOB and eager for next breathing treatment. Ambulation deferred.     Performed three trials of standing marches in place with RW support at EOB, with endurance decreasing during each trial and max tolerance ~1.5-2 minutes on first trial. Extended seated rest btwn each trial. Sats >93% on 3L NC throughout session.    Requires minAx2 persons for STS, with sternal pillow in place, and displaying improved mechanics following demo of increased anterior wt shift to stand.     Pt is motivated, eager to progress, and currently limited by cardiorespiratory deficits.      Rehab Prognosis: Good; patient would benefit from acute skilled PT services to address these deficits and reach maximum level of function.    Recent Surgery: Procedure(s) (LRB):  CORONARY ARTERY BYPASS GRAFT (CABG) (N/A) 7 Days Post-Op    Plan:     During this hospitalization, patient to be seen daily to address the identified rehab impairments via gait training, therapeutic activities, therapeutic exercises and progress toward the following goals:    Plan of Care Expires:  02/26/23    Subjective     Chief Complaint: "Are you here for my breathing treatment?" "I'm so short-winded"  Patient/Family Comments/goals: improved respiratory function  Pain/Comfort:  Pain Rating 1: 0/10  Pain Rating Post-Intervention 1: 0/10      Objective: "     Communicated with nurse prior to session.  Patient found HOB elevated with blood pressure cuff, PICC line, telemetry, pulse ox (continuous) upon PT entry to room.     General Precautions: Standard, fall, sternal  Orthopedic Precautions: N/A  Braces: N/A  Respiratory Status: Nasal cannula, flow 3 L/min     Functional Mobility:  Bed Mobility:     Supine to Sit: minimum assistance and with reminders needed for sternal prec's   Transfers:     Sit to Stand:  minimum assistance, of 2 persons, and with sternal pillow with rolling walker  Bed>BSC Transfer: contact guard assistance with  no AD and 1UE support at bedrail  using  Squat Pivot      AM-PAC 6 CLICK MOBILITY          Treatment & Education:  -Pt demo and educ on STS techniques to improve independence  -Review of sternal prec adherence during mobility    Patient left sitting edge of bed with all lines intact, call button in reach, bed alarm off, and wife present..    GOALS:   Multidisciplinary Problems       Physical Therapy Goals          Problem: Physical Therapy    Goal Priority Disciplines Outcome Goal Variances Interventions   Physical Therapy Goal     PT, PT/OT Ongoing, Progressing     Description: Goals to be met by: 2023     Patient will increase functional independence with mobility by performin. Supine to sit with SBA Assistance  2. Sit to stand transfer with min Assistance  3. Bed to chair with Mercedes  w/ol AD  3. Gait  x 50 feet with Minimal Assistance using Rolling Walker maintaining sternal precautions.                          Time Tracking:     PT Received On: 23  PT Start Time: 1347     PT Stop Time: 1404  PT Total Time (min): 17 min     Billable Minutes: Therapeutic Exercise 17    Treatment Type: Treatment  PT/PTA: PTA     PTA Visit Number: 1     2023

## 2023-01-31 NOTE — PLAN OF CARE
01/31/23 1510   Patient Assessment/Suction   Level of Consciousness (AVPU) alert   Respiratory Effort Mild   Expansion/Accessory Muscles/Retractions no use of accessory muscles   SAKSHI Breath Sounds clear   LLL Breath Sounds diminished   RUL Breath Sounds wheezes, expiratory   RML Breath Sounds coarse   RLL Breath Sounds diminished   PRE-TX-O2   Device (Oxygen Therapy) nasal cannula   $ Is the patient on Low Flow Oxygen? Yes   Flow (L/min) 2   SpO2 98 %   Pulse Oximetry Type Continuous   $ Pulse Oximetry - Multiple Charge Pulse Oximetry - Multiple   Pulse 75   Resp 18   Aerosol Therapy   $ Aerosol Therapy Charges Aerosol Treatment   Daily Review of Necessity (SVN) completed   Respiratory Treatment Status (SVN) given   Treatment Route (SVN) mask;oxygen   Patient Position (SVN) Doan's;HOB elevated   Post Treatment Assessment (SVN) breath sounds improved   Signs of Intolerance (SVN) none   Education   $ Education Bronchodilator;15 min   Respiratory Evaluation   $ Care Plan Tech Time 15 min   $ Eval/Re-eval Charges Re-evaluation

## 2023-01-31 NOTE — CARE UPDATE
01/30/23 2322   Patient Assessment/Suction   Respiratory Effort Unlabored   All Lung Fields Breath Sounds coarse   Rhythm/Pattern, Respiratory pattern regular   Cough Type productive   Secretions Amount small   Secretions Color white   Secretions Characteristics thick   PRE-TX-O2   Device (Oxygen Therapy) room air   SpO2 95 %   Pulse Oximetry Type Continuous   Pulse 78   Resp 20   Aerosol Therapy   $ Aerosol Therapy Charges Aerosol Treatment   Respiratory Treatment Status (SVN) given   Treatment Route (SVN) mask   Patient Position (SVN) HOB elevated   Post Treatment Assessment (SVN) breath sounds improved   Signs of Intolerance (SVN) none   Breath Sounds Post-Respiratory Treatment   Throughout All Fields Post-Treatment All Fields   Throughout All Fields Post-Treatment aeration increased   Post-treatment Heart Rate (beats/min) 78   Post-treatment Resp Rate (breaths/min) 16   Education   $ Education 15 min;Bronchodilator   Respiratory Evaluation   $ Care Plan Tech Time 15 min   $ Eval/Re-eval Charges Re-evaluation   Evaluation For Re-Eval 5+ day

## 2023-01-31 NOTE — PROGRESS NOTES
[facility]  Department of Cardiology  Progress Note    PATIENT NAME: Peyman Gr  MRN: [id number]  TODAY'S DATE: 01/31/2023  ADMIT DATE: 1/1/2023    SUBJECTIVE     PRINCIPLE PROBLEM: STEMI involving right coronary artery    INTERVAL HISTORY:    1/31/2023    Patient seen sitting up in chair with wife at bedside. No distress noted. Breathing is stable, but he still feels SOB with minimal exertion.     1/30/23:    Patient seen sitting up in chair with no distress noted. He is still having shortness of breath intermittently. He has been working with PT.    1/29/23    Doing well. In and out of AFIB.  Denies CP.   Denies SOB. Has some generalized weakness plan to go to skilled to get stronger.      1/28/23  Denies any new symptoms. Chest tubes removed yesterday.     1/25/2023  Patient is sitting up in the chair and feels weak. Blood pressure in the 80s.   Denies CP. Not taking very deep breaths.       1/23/23  Patient denies CP   Denies SOB  Awaiting CABG which is scheduled for tomorrow he tells me.      1/21/22  Patient is resting comfortably in bedside chair during examination.  No acute distress, alert and oriented x4.  He denies chest pain.  Healing complains of shortness of breath with exertion.  He is waiting to have CABG in the near future, possibly next week.  He is eating well.  /70 during examination.  Patient was hypertensive overnight and was given 1 dose of IV hydralazine p.r.n..  Oral antihypertensives were titrated up yesterday.    1/20/23  Patient seen sitting up in chair with no distress noted. Breathing is stable. He does have SOB with exertion. He has been using the IS.    1/19/23  Patient is being evaluated by CT surgery for CABG. He reports he is feeling okay. Has had some SOB but denies chest pain.     1/18/23:  No new complaints.  Making some progress with physical therapy.  Some shortness of breath on exertion.  Films reviewed with Dr. Elizondo.      Review of patient's allergies  indicates:  No Known Allergies    REVIEW OF SYSTEMS  +weakness  + SOB    OBJECTIVE     VITAL SIGNS (Most Recent)  Temp: 97.5 °F (36.4 °C) (01/31/23 0701)  Pulse: 74 (01/31/23 0701)  Resp: (!) 22 (01/31/23 0812)  BP: (!) 167/84 (01/31/23 0701)  SpO2: (!) 94 % (01/31/23 0701)    VENTILATION STATUS  Resp: (!) 22 (01/31/23 0812)  SpO2: (!) 94 % (01/31/23 0701)       I & O (Last 24H):  Intake/Output Summary (Last 24 hours) at 1/31/2023 1346  Last data filed at 1/31/2023 0600  Gross per 24 hour   Intake 995 ml   Output 2400 ml   Net -1405 ml         WEIGHTS  Wt Readings from Last 3 Encounters:   01/31/23 0600 87.8 kg (193 lb 9 oz)   01/29/23 0757 88.2 kg (194 lb 7.1 oz)   01/29/23 0400 92.7 kg (204 lb 5.9 oz)   01/24/23 0300 87.8 kg (193 lb 9 oz)   01/23/23 0400 86.8 kg (191 lb 5.8 oz)   01/17/23 0400 105.2 kg (231 lb 14.8 oz)   01/15/23 0400 100 kg (220 lb 7.4 oz)   01/14/23 0400 97.8 kg (215 lb 9.8 oz)   01/07/23 0400 115.4 kg (254 lb 6.6 oz)   01/06/23 0400 117.6 kg (259 lb 4.2 oz)   01/05/23 0500 115.3 kg (254 lb 3.1 oz)   01/03/23 0615 105.4 kg (232 lb 5.8 oz)   01/02/23 0400 102.5 kg (225 lb 15.5 oz)   01/02/23 0053 102.5 kg (225 lb 15.5 oz)   01/01/23 0800 99.8 kg (220 lb 0.3 oz)   01/01/23 0341 99.8 kg (220 lb)   01/19/23 1350 104.8 kg (231 lb)   01/01/23 1706 99.8 kg (220 lb)       PHYSICAL EXAM  CONSTITUTIONAL: Well built, well nourished in no apparent distress feels weak  NECK: R carotid bruit  LUNGS: Cta but Diminished bases  CHEST WALL: no tenderness  HEART: regular rate and rhythm, S1, S2 normal,   ABDOMEN: soft, non-tender; bowel sounds normal; no masses,  no organomegaly  EXTREMITIES: Extremities normal, no edema  NEURO: AAO X 3    SCHEDULED MEDS:   amiodarone  200 mg Oral Daily    amLODIPine  5 mg Oral BID    apixaban  5 mg Oral BID    atorvastatin  40 mg Oral QHS    carvediloL  25 mg Oral BID    chlorhexidine  15 mL Mouth/Throat BID    clopidogreL  75 mg Oral Daily    docusate sodium  100 mg Oral BID     EScitalopram oxalate  10 mg Oral Daily    guaiFENesin  600 mg Oral BID    pantoprazole  40 mg Oral Daily    polyethylene glycol  17 g Oral BID       CONTINUOUS INFUSIONS:        PRN MEDS:sodium chloride, acetaminophen, albumin human 5%, atropine, calcium gluconate IVPB, calcium gluconate IVPB, calcium gluconate IVPB, dextrose 10%, dextrose 10%, EPINEPHrine, ipratropium, levalbuterol, magnesium sulfate IVPB, magnesium sulfate IVPB, melatonin, ondansetron, oxyCODONE-acetaminophen, potassium chloride in water, potassium chloride in water, potassium chloride in water, sodium phosphate IVPB, sodium phosphate IVPB, sodium phosphate IVPB    LABS AND DIAGNOSTICS     CBC LAST 3 DAYS  Recent Labs   Lab 01/26/23  0323 01/26/23  1711 01/27/23  0405 01/28/23  0358 01/29/23  0526 01/30/23  0343 01/31/23  1015   WBC 10.45   < > 7.65 7.08 6.77 7.88 7.43   RBC 2.35*   < > 2.77* 2.82* 2.86* 3.01* 2.97*   HGB 7.3*   < > 8.5* 8.7* 8.7* 9.2* 9.1*   HCT 22.2*   < > 25.8* 26.1* 26.7* 28.1* 27.5*   MCV 95   < > 93 93 93 93 93   MCH 31.1*   < > 30.7 30.9 30.4 30.6 30.6   MCHC 32.9   < > 32.9 33.3 32.6 32.7 33.1   RDW 13.9   < > 14.3 13.7 13.7 13.8 13.7   *   < > 126* 167 213 238 276   MPV 9.4   < > 9.1* 9.9 9.7 9.1* 8.9*   GRAN 78.6*  8.2*  --  66.6  5.1 55.1  3.9 55.0 52.0  --    LYMPH 8.8*  0.9*  --  16.7*  1.3 23.4  1.7 24.0 21.0  --    MONO 11.8  1.2*  --  14.1  1.1* 14.0  1.0 11.0 9.0  --    BASO 0.01  --  0.02 0.04  --   --   --    NRBC 0  --  0 0 0 0  --     < > = values in this interval not displayed.         COAGULATION LAST 3 DAYS  No results for input(s): LABPT, INR, APTT in the last 168 hours.    CHEMISTRY LAST 3 DAYS  Recent Labs   Lab 01/24/23  2308 01/24/23  2343 01/25/23  0054 01/25/23  0303 01/26/23  1711 01/27/23  0405 01/29/23  0526 01/30/23  0343 01/31/23  1015   NA  --   --   --    < > 134*   < > 130* 132* 133*   K  --   --   --    < > 4.2   < > 3.9 3.8 3.9   CL  --   --   --    < > 106   < > 99 100 99    CO2  --   --   --    < > 22*   < > 24 25 27   ANIONGAP  --   --   --    < > 6*   < > 7* 7* 7*   BUN  --   --   --    < > 34*   < > 24* 16 12   CREATININE  --   --   --    < > 1.3   < > 1.0 0.9 0.9   GLU  --   --   --    < > 128*   < > 99 108 137*   CALCIUM  --   --   --    < > 8.8   < > 8.4* 8.4* 8.2*   PH 7.405 7.346* 7.335*  --   --   --   --   --   --    MG  --   --   --    < >  --    < > 2.0 1.8 2.1   ALBUMIN  --   --   --   --  3.2*  --  3.0* 2.8*  --     < > = values in this interval not displayed.         CARDIAC PROFILE LAST 3 DAYS  Recent Labs   Lab 01/29/23  0526 01/31/23  1015   * 591*         ENDOCRINE LAST 3 DAYS  No results for input(s): TSH, PROCAL in the last 168 hours.      LAST ARTERIAL BLOOD GAS  ABG  Recent Labs   Lab 01/25/23  0054   PH 7.335*   PO2 84   PCO2 42.0   HCO3 22.4*   BE -3         LAST 7 DAYS MICROBIOLOGY   Microbiology Results (last 7 days)       ** No results found for the last 168 hours. **            MOST RECENT IMAGING  X-Ray KUB  REASON: Impacted stool?    TECHNIQUE: AP abdominal radiograph.    COMPARISON: None.    FINDINGS:    Multiple loops of gas and stool-filled bowel noted in an unremarkable bowel gas pattern. Stool content within the large bowel within normal limits. Lung bases are clear. No acute osseous abnormality.    IMPRESSION:    Unremarkable bowel gas pattern.    Electronically signed by:  Julio C Davis DO  1/29/2023 2:10 PM CST Workstation: [id number]  X-Ray Chest AP Portable  Reason: cough    FINDINGS:    Portable chest with comparison chest x-ray January 26, 2023 show normal cardiomediastinal silhouette. Median sternotomy wires noted. There is been interval removal of mediastinal chest drains and left internal jugular central venous catheter.  Linear opacities of the left lung base likely reflect subsegmental atelectasis. Pulmonary vasculature is normal. No acute osseous abnormality.    IMPRESSION:    Linear opacities of the left lung base likely  reflect subsegmental atelectasis.    Electronically signed by:  Julio C Davis DO  1/29/2023 2:09 PM [facility] Workstation: [id number]      [facility]  Results for orders placed during the hospital encounter of 01/01/23    Echo Saline Bubble? No    Interpretation Summary  · The estimated ejection fraction is 55%.  · The left ventricle is normal in size with concentric hypertrophy.  · Mild to moderate tricuspid regurgitation.  · Moderate right ventricular enlargement with moderately reduced right ventricular systolic function.  · Grade I left ventricular diastolic dysfunction.  · There is mild aortic valve stenosis.  · Aortic valve area is 1.68 cm2; peak velocity is 1.36 m/s; mean gradient is 4 mmHg.  · There is abnormal septal wall motion consistent with right ventricular pacemaker.  · There are segmental left ventricular wall motion abnormalities.      CURRENT/PREVIOUS VISIT EKG  Results for orders placed or performed during the hospital encounter of 01/01/23   EKG 12-lead    Collection Time: 01/26/23  3:14 PM    Narrative    Test Reason : I48.91,    Vent. Rate : 104 BPM     Atrial Rate : 000 BPM     P-R Int : 000 ms          QRS Dur : 082 ms      QT Int : 324 ms       P-R-T Axes : 000 001 006 degrees     QTc Int : 426 ms    Atrial fibrillation with rapid ventricular response  Inferior infarct ,age undetermined  Abnormal ECG  When compared with ECG of 24-JAN-2023 18:12,  Atrial fibrillation has replaced Electronic ventricular pacemaker  Confirmed by Sarthak Cardona MD ([id number]) on 1/29/2023 4:27:26 PM    Referred By: IRENE MOSER           Confirmed By:Sarthak Cardona MD       ASSESSMENT/PLAN:     Active Hospital Problems    Diagnosis    A-fib    Anemia    History of tobacco abuse    Primary hypertension    PVD (peripheral vascular disease) with claudication    PAD (peripheral artery disease)       ASSESSMENT & PLAN:     -S/P CABG X 3 left internal mammary artery to left anterior descending coronary artery and saphenous vein  to the obtuse marginal coronary artery and saphenous vein from the body of the 1st vein graft to the posterior descending coronary artery  on 1/24/2023  -Severe left main coronary stenosis with 90% lesion in small mid marginal  -Cardiac arrest/ ST elevation MI s/p PCI of ostial RCA 4 weeks ago  -Post op AFIB-Now NSR    RECOMMENDATIONS:    Give furosemide 20 mg IV today then furosemide 40 mg po daily starting tomorrow.   Continue to check and replace potassium and magnesium. Goal for potassium is 4.0, and goal for magnesium is 2.0.   Continue Eliquis and Plavix.   Hopeful for dc to rehab soon.     Mary Lou Turpin NP  [facility]  Department of Cardiology  Date of Service: 01/31/2023

## 2023-02-01 VITALS
OXYGEN SATURATION: 92 % | SYSTOLIC BLOOD PRESSURE: 125 MMHG | TEMPERATURE: 98 F | HEART RATE: 78 BPM | RESPIRATION RATE: 36 BRPM | BODY MASS INDEX: 24.58 KG/M2 | DIASTOLIC BLOOD PRESSURE: 60 MMHG | HEIGHT: 74 IN | WEIGHT: 191.56 LBS

## 2023-02-01 LAB
ALBUMIN SERPL BCP-MCNC: 2.8 G/DL (ref 3.5–5.2)
ALP SERPL-CCNC: 85 U/L (ref 55–135)
ALT SERPL W/O P-5'-P-CCNC: 17 U/L (ref 10–44)
ANION GAP SERPL CALC-SCNC: 6 MMOL/L (ref 8–16)
AST SERPL-CCNC: 17 U/L (ref 10–40)
BASOPHILS # BLD AUTO: 0.02 K/UL (ref 0–0.2)
BASOPHILS NFR BLD: 0.3 % (ref 0–1.9)
BILIRUB SERPL-MCNC: 0.6 MG/DL (ref 0.1–1)
BUN SERPL-MCNC: 13 MG/DL (ref 8–23)
CALCIUM SERPL-MCNC: 8 MG/DL (ref 8.7–10.5)
CHLORIDE SERPL-SCNC: 98 MMOL/L (ref 95–110)
CO2 SERPL-SCNC: 27 MMOL/L (ref 23–29)
CREAT SERPL-MCNC: 0.8 MG/DL (ref 0.5–1.4)
DIFFERENTIAL METHOD: ABNORMAL
EOSINOPHIL # BLD AUTO: 0.1 K/UL (ref 0–0.5)
EOSINOPHIL NFR BLD: 0.8 % (ref 0–8)
ERYTHROCYTE [DISTWIDTH] IN BLOOD BY AUTOMATED COUNT: 13.5 % (ref 11.5–14.5)
EST. GFR  (NO RACE VARIABLE): >60 ML/MIN/1.73 M^2
GLUCOSE SERPL-MCNC: 110 MG/DL (ref 70–110)
HCT VFR BLD AUTO: 27.8 % (ref 40–54)
HGB BLD-MCNC: 9.2 G/DL (ref 14–18)
IMM GRANULOCYTES # BLD AUTO: 0.33 K/UL (ref 0–0.04)
IMM GRANULOCYTES NFR BLD AUTO: 4.2 % (ref 0–0.5)
LYMPHOCYTES # BLD AUTO: 1.7 K/UL (ref 1–4.8)
LYMPHOCYTES NFR BLD: 21.9 % (ref 18–48)
MAGNESIUM SERPL-MCNC: 1.8 MG/DL (ref 1.6–2.6)
MCH RBC QN AUTO: 30.8 PG (ref 27–31)
MCHC RBC AUTO-ENTMCNC: 33.1 G/DL (ref 32–36)
MCV RBC AUTO: 93 FL (ref 82–98)
MONOCYTES # BLD AUTO: 1.3 K/UL (ref 0.3–1)
MONOCYTES NFR BLD: 16.9 % (ref 4–15)
NEUTROPHILS # BLD AUTO: 4.4 K/UL (ref 1.8–7.7)
NEUTROPHILS NFR BLD: 55.9 % (ref 38–73)
NRBC BLD-RTO: 0 /100 WBC
PHOSPHATE SERPL-MCNC: 3.7 MG/DL (ref 2.7–4.5)
PLATELET # BLD AUTO: 326 K/UL (ref 150–450)
PMV BLD AUTO: 9 FL (ref 9.2–12.9)
POTASSIUM SERPL-SCNC: 3.4 MMOL/L (ref 3.5–5.1)
PROT SERPL-MCNC: 6.2 G/DL (ref 6–8.4)
RBC # BLD AUTO: 2.99 M/UL (ref 4.6–6.2)
SODIUM SERPL-SCNC: 131 MMOL/L (ref 136–145)
WBC # BLD AUTO: 7.86 K/UL (ref 3.9–12.7)

## 2023-02-01 PROCEDURE — 99900035 HC TECH TIME PER 15 MIN (STAT)

## 2023-02-01 PROCEDURE — 25000003 PHARM REV CODE 250: Performed by: HOSPITALIST

## 2023-02-01 PROCEDURE — 25000003 PHARM REV CODE 250: Performed by: THORACIC SURGERY (CARDIOTHORACIC VASCULAR SURGERY)

## 2023-02-01 PROCEDURE — 97116 GAIT TRAINING THERAPY: CPT | Mod: CQ

## 2023-02-01 PROCEDURE — 25000003 PHARM REV CODE 250: Performed by: NURSE PRACTITIONER

## 2023-02-01 PROCEDURE — 99900031 HC PATIENT EDUCATION (STAT)

## 2023-02-01 PROCEDURE — 83735 ASSAY OF MAGNESIUM: CPT | Performed by: INTERNAL MEDICINE

## 2023-02-01 PROCEDURE — 80053 COMPREHEN METABOLIC PANEL: CPT | Performed by: INTERNAL MEDICINE

## 2023-02-01 PROCEDURE — 85025 COMPLETE CBC W/AUTO DIFF WBC: CPT | Performed by: INTERNAL MEDICINE

## 2023-02-01 PROCEDURE — 84100 ASSAY OF PHOSPHORUS: CPT | Performed by: INTERNAL MEDICINE

## 2023-02-01 PROCEDURE — 25000003 PHARM REV CODE 250: Performed by: INTERNAL MEDICINE

## 2023-02-01 PROCEDURE — 94761 N-INVAS EAR/PLS OXIMETRY MLT: CPT

## 2023-02-01 RX ORDER — CLOPIDOGREL BISULFATE 75 MG/1
75 TABLET ORAL DAILY
Qty: 90 TABLET | Refills: 1 | Status: SHIPPED | OUTPATIENT
Start: 2023-02-01 | End: 2023-02-23 | Stop reason: SDUPTHER

## 2023-02-01 RX ORDER — TALC
6 POWDER (GRAM) TOPICAL NIGHTLY PRN
Qty: 30 TABLET | Refills: 0 | Status: SHIPPED | OUTPATIENT
Start: 2023-02-01

## 2023-02-01 RX ORDER — ESCITALOPRAM OXALATE 10 MG/1
10 TABLET ORAL DAILY
Qty: 30 TABLET | Refills: 1 | Status: SHIPPED | OUTPATIENT
Start: 2023-02-02 | End: 2023-04-03

## 2023-02-01 RX ORDER — ATORVASTATIN CALCIUM 40 MG/1
40 TABLET, FILM COATED ORAL NIGHTLY
Qty: 90 TABLET | Refills: 3 | Status: SHIPPED | OUTPATIENT
Start: 2023-02-01 | End: 2023-02-23 | Stop reason: SDUPTHER

## 2023-02-01 RX ORDER — GUAIFENESIN 600 MG/1
600 TABLET, EXTENDED RELEASE ORAL 2 TIMES DAILY
Qty: 30 TABLET | Refills: 1 | Status: SHIPPED | OUTPATIENT
Start: 2023-02-01

## 2023-02-01 RX ORDER — LANOLIN ALCOHOL/MO/W.PET/CERES
400 CREAM (GRAM) TOPICAL DAILY
Qty: 30 TABLET | Refills: 0 | Status: SHIPPED | OUTPATIENT
Start: 2023-02-01

## 2023-02-01 RX ORDER — FUROSEMIDE 40 MG/1
40 TABLET ORAL DAILY
Qty: 30 TABLET | Refills: 11 | Status: SHIPPED | OUTPATIENT
Start: 2023-02-02 | End: 2023-02-23 | Stop reason: SDUPTHER

## 2023-02-01 RX ORDER — POTASSIUM CHLORIDE 20 MEQ/1
40 TABLET, EXTENDED RELEASE ORAL ONCE
Status: COMPLETED | OUTPATIENT
Start: 2023-02-01 | End: 2023-02-01

## 2023-02-01 RX ORDER — POLYETHYLENE GLYCOL 3350 17 G/17G
17 POWDER, FOR SOLUTION ORAL 2 TIMES DAILY
Qty: 1 EACH | Refills: 0 | Status: SHIPPED | OUTPATIENT
Start: 2023-02-01

## 2023-02-01 RX ORDER — POTASSIUM CHLORIDE 750 MG/1
20 TABLET, EXTENDED RELEASE ORAL DAILY
Qty: 5 TABLET | Refills: 0 | Status: SHIPPED | OUTPATIENT
Start: 2023-02-01 | End: 2023-02-23 | Stop reason: SDUPTHER

## 2023-02-01 RX ORDER — PANTOPRAZOLE SODIUM 40 MG/1
40 TABLET, DELAYED RELEASE ORAL DAILY
Qty: 30 TABLET | Refills: 11 | Status: SHIPPED | OUTPATIENT
Start: 2023-02-02 | End: 2023-02-23 | Stop reason: SDUPTHER

## 2023-02-01 RX ORDER — DOCUSATE SODIUM 100 MG/1
100 CAPSULE, LIQUID FILLED ORAL 2 TIMES DAILY
Qty: 1 CAPSULE | Refills: 0 | Status: SHIPPED | OUTPATIENT
Start: 2023-02-01

## 2023-02-01 RX ORDER — AMLODIPINE BESYLATE 5 MG/1
5 TABLET ORAL 2 TIMES DAILY
Qty: 60 TABLET | Refills: 1 | Status: SHIPPED | OUTPATIENT
Start: 2023-02-01 | End: 2023-02-23 | Stop reason: SDUPTHER

## 2023-02-01 RX ORDER — BISACODYL 10 MG
10 SUPPOSITORY, RECTAL RECTAL DAILY PRN
Status: DISCONTINUED | OUTPATIENT
Start: 2023-02-01 | End: 2023-02-01 | Stop reason: HOSPADM

## 2023-02-01 RX ORDER — BISACODYL 10 MG
10 SUPPOSITORY, RECTAL RECTAL DAILY PRN
Qty: 30 SUPPOSITORY | Refills: 0 | Status: SHIPPED | OUTPATIENT
Start: 2023-02-01

## 2023-02-01 RX ORDER — AMIODARONE HYDROCHLORIDE 200 MG/1
200 TABLET ORAL DAILY
Qty: 30 TABLET | Refills: 1 | Status: SHIPPED | OUTPATIENT
Start: 2023-02-02 | End: 2023-02-23 | Stop reason: SDUPTHER

## 2023-02-01 RX ADMIN — FUROSEMIDE 40 MG: 40 TABLET ORAL at 09:02

## 2023-02-01 RX ADMIN — POTASSIUM CHLORIDE 40 MEQ: 1500 TABLET, EXTENDED RELEASE ORAL at 11:02

## 2023-02-01 RX ADMIN — ESCITALOPRAM OXALATE 10 MG: 10 TABLET ORAL at 09:02

## 2023-02-01 RX ADMIN — POLYETHYLENE GLYCOL 3350 17 G: 17 POWDER, FOR SOLUTION ORAL at 09:02

## 2023-02-01 RX ADMIN — OXYCODONE HYDROCHLORIDE AND ACETAMINOPHEN 1 TABLET: 10; 325 TABLET ORAL at 12:02

## 2023-02-01 RX ADMIN — CHLORHEXIDINE GLUCONATE 15 ML: 1.2 RINSE ORAL at 09:02

## 2023-02-01 RX ADMIN — DOCUSATE SODIUM 100 MG: 100 CAPSULE, LIQUID FILLED ORAL at 09:02

## 2023-02-01 RX ADMIN — CLOPIDOGREL BISULFATE 75 MG: 75 TABLET, FILM COATED ORAL at 09:02

## 2023-02-01 RX ADMIN — CARVEDILOL 25 MG: 25 TABLET, FILM COATED ORAL at 09:02

## 2023-02-01 RX ADMIN — AMIODARONE HYDROCHLORIDE 200 MG: 200 TABLET ORAL at 09:02

## 2023-02-01 RX ADMIN — POTASSIUM CHLORIDE 10 MEQ: 750 CAPSULE, EXTENDED RELEASE ORAL at 09:02

## 2023-02-01 RX ADMIN — BISACODYL 10 MG: 10 SUPPOSITORY RECTAL at 11:02

## 2023-02-01 RX ADMIN — PANTOPRAZOLE SODIUM 40 MG: 40 TABLET, DELAYED RELEASE ORAL at 09:02

## 2023-02-01 RX ADMIN — APIXABAN 5 MG: 5 TABLET, FILM COATED ORAL at 09:02

## 2023-02-01 RX ADMIN — GUAIFENESIN 600 MG: 600 TABLET, EXTENDED RELEASE ORAL at 09:02

## 2023-02-01 RX ADMIN — AMLODIPINE BESYLATE 5 MG: 5 TABLET ORAL at 09:02

## 2023-02-01 RX ADMIN — OXYCODONE HYDROCHLORIDE AND ACETAMINOPHEN 1 TABLET: 10; 325 TABLET ORAL at 09:02

## 2023-02-01 NOTE — PLAN OF CARE
02/01/23 1419   Final Note   Assessment Type Final Discharge Note   Anticipated Discharge Disposition SNF   What phone number can be called within the next 1-3 days to see how you are doing after discharge? [phone]   Post-Acute Status   Post-Acute Authorization Placement   Post-Acute Placement Status Set-up Complete/Auth obtained   Coverage [employer]   Discharge Delays (!) Ambulance Transport/Facility Transport     Patient cleared for discharge from case management standpoint. Pt discharged to [facility].  of [place] to provide transport. Nurse notified to call report to Ángel at [phone]. Family notified of transfer.    Chart and discharge orders reviewed.  Patient discharged with no further case management needs.

## 2023-02-01 NOTE — PLAN OF CARE
02/01/23 0905   Patient Assessment/Suction   Level of Consciousness (AVPU) alert   Respiratory Effort Normal;Unlabored   Expansion/Accessory Muscles/Retractions no use of accessory muscles;no retractions   PRE-TX-O2   Device (Oxygen Therapy) room air   SpO2 95 %   Pulse Oximetry Type Continuous   $ Pulse Oximetry - Multiple Charge Pulse Oximetry - Multiple   Pulse 79   Resp (!) 26   Aerosol Therapy   $ Aerosol Therapy Charges PRN treatment not required   Education   $ Education Bronchodilator;15 min

## 2023-02-01 NOTE — NURSING
0944 Dr. Matias at bedside    1446 telephone report given to nurse Neal at [facility], awaiting transport    1525 AVS explained to pt and wife, verbalized understanding, avs and prescription given. transport escorts pt off unit per wheelchair for discharge to [facility] with all personal belongings

## 2023-02-01 NOTE — PROGRESS NOTES
[facility]  Department of Cardiology  Progress Note    PATIENT NAME: Peyman Gr  MRN: [id number]  TODAY'S DATE: 02/01/2023  ADMIT DATE: 1/1/2023    SUBJECTIVE     PRINCIPLE PROBLEM: STEMI involving right coronary artery    INTERVAL HISTORY:    2/1/2023  Patient seen sitting up in bed. His breathing is stable. No SOB noted at present but he does still feel winded with exertion. HRR are stable.     1/31/23    Patient seen sitting up in chair with wife at bedside. No distress noted. Breathing is stable, but he still feels SOB with minimal exertion.     1/30/23:    Patient seen sitting up in chair with no distress noted. He is still having shortness of breath intermittently. He has been working with PT.    1/29/23    Doing well. In and out of AFIB.  Denies CP.   Denies SOB. Has some generalized weakness plan to go to skilled to get stronger.      1/28/23  Denies any new symptoms. Chest tubes removed yesterday.     1/25/2023  Patient is sitting up in the chair and feels weak. Blood pressure in the 80s.   Denies CP. Not taking very deep breaths.       1/23/23  Patient denies CP   Denies SOB  Awaiting CABG which is scheduled for tomorrow he tells me.      1/21/22  Patient is resting comfortably in bedside chair during examination.  No acute distress, alert and oriented x4.  He denies chest pain.  Healing complains of shortness of breath with exertion.  He is waiting to have CABG in the near future, possibly next week.  He is eating well.  /70 during examination.  Patient was hypertensive overnight and was given 1 dose of IV hydralazine p.r.n..  Oral antihypertensives were titrated up yesterday.    1/20/23  Patient seen sitting up in chair with no distress noted. Breathing is stable. He does have SOB with exertion. He has been using the IS.    1/19/23  Patient is being evaluated by CT surgery for CABG. He reports he is feeling okay. Has had some SOB but denies chest pain.     1/18/23:  No new  complaints.  Making some progress with physical therapy.  Some shortness of breath on exertion.  Films reviewed with Dr. Elizondo.      Review of patient's allergies indicates:  No Known Allergies    REVIEW OF SYSTEMS  +weakness  + SOB    OBJECTIVE     VITAL SIGNS (Most Recent)  Temp: 98.2 °F (36.8 °C) (02/01/23 0701)  Pulse: 78 (02/01/23 1400)  Resp: (!) 36 (02/01/23 1400)  BP: 125/60 (02/01/23 1400)  SpO2: (!) 92 % (02/01/23 1400)    VENTILATION STATUS  Resp: (!) 36 (02/01/23 1400)  SpO2: (!) 92 % (02/01/23 1400)       I & O (Last 24H):  Intake/Output Summary (Last 24 hours) at 2/1/2023 1527  Last data filed at 2/1/2023 1420  Gross per 24 hour   Intake 960 ml   Output 1500 ml   Net -540 ml         WEIGHTS  Wt Readings from Last 3 Encounters:   02/01/23 0600 86.9 kg (191 lb 9.3 oz)   01/31/23 0600 87.8 kg (193 lb 9 oz)   01/29/23 0757 88.2 kg (194 lb 7.1 oz)   01/29/23 0400 92.7 kg (204 lb 5.9 oz)   01/24/23 0300 87.8 kg (193 lb 9 oz)   01/23/23 0400 86.8 kg (191 lb 5.8 oz)   01/17/23 0400 105.2 kg (231 lb 14.8 oz)   01/15/23 0400 100 kg (220 lb 7.4 oz)   01/14/23 0400 97.8 kg (215 lb 9.8 oz)   01/07/23 0400 115.4 kg (254 lb 6.6 oz)   01/06/23 0400 117.6 kg (259 lb 4.2 oz)   01/05/23 0500 115.3 kg (254 lb 3.1 oz)   01/03/23 0615 105.4 kg (232 lb 5.8 oz)   01/02/23 0400 102.5 kg (225 lb 15.5 oz)   01/02/23 0053 102.5 kg (225 lb 15.5 oz)   01/01/23 0800 99.8 kg (220 lb 0.3 oz)   01/01/23 0341 99.8 kg (220 lb)   01/19/23 1350 104.8 kg (231 lb)   01/01/23 1706 99.8 kg (220 lb)       PHYSICAL EXAM  CONSTITUTIONAL: Well built, well nourished in no apparent distress feels weak  NECK: R carotid bruit  LUNGS: Cta but Diminished bases  CHEST WALL: no tenderness  HEART: regular rate and rhythm, S1, S2 normal,   ABDOMEN: soft, non-tender; bowel sounds normal; no masses,  no organomegaly  EXTREMITIES: Extremities normal, no edema  NEURO: AAO X 3    SCHEDULED MEDS:   amiodarone  200 mg Oral Daily    amLODIPine  5 mg Oral BID     apixaban  5 mg Oral BID    atorvastatin  40 mg Oral QHS    carvediloL  25 mg Oral BID    chlorhexidine  15 mL Mouth/Throat BID    clopidogreL  75 mg Oral Daily    docusate sodium  100 mg Oral BID    EScitalopram oxalate  10 mg Oral Daily    furosemide  40 mg Oral Daily    guaiFENesin  600 mg Oral BID    pantoprazole  40 mg Oral Daily    polyethylene glycol  17 g Oral BID    potassium chloride  10 mEq Oral Daily       CONTINUOUS INFUSIONS:        PRN MEDS:sodium chloride, acetaminophen, albumin human 5%, atropine, bisacodyL, calcium gluconate IVPB, calcium gluconate IVPB, calcium gluconate IVPB, dextrose 10%, dextrose 10%, EPINEPHrine, ipratropium, levalbuterol, magnesium sulfate IVPB, magnesium sulfate IVPB, melatonin, ondansetron, oxyCODONE-acetaminophen, potassium chloride in water, potassium chloride in water, potassium chloride in water, sodium phosphate IVPB, sodium phosphate IVPB, sodium phosphate IVPB    LABS AND DIAGNOSTICS     CBC LAST 3 DAYS  Recent Labs   Lab 01/27/23  0405 01/28/23  0358 01/29/23  0526 01/30/23  0343 01/31/23  1015 02/01/23  0430   WBC 7.65 7.08 6.77 7.88 7.43 7.86   RBC 2.77* 2.82* 2.86* 3.01* 2.97* 2.99*   HGB 8.5* 8.7* 8.7* 9.2* 9.1* 9.2*   HCT 25.8* 26.1* 26.7* 28.1* 27.5* 27.8*   MCV 93 93 93 93 93 93   MCH 30.7 30.9 30.4 30.6 30.6 30.8   MCHC 32.9 33.3 32.6 32.7 33.1 33.1   RDW 14.3 13.7 13.7 13.8 13.7 13.5   * 167 213 238 276 326   MPV 9.1* 9.9 9.7 9.1* 8.9* 9.0*   GRAN 66.6  5.1 55.1  3.9 55.0 52.0  --  55.9  4.4   LYMPH 16.7*  1.3 23.4  1.7 24.0 21.0  --  21.9  1.7   MONO 14.1  1.1* 14.0  1.0 11.0 9.0  --  16.9*  1.3*   BASO 0.02 0.04  --   --   --  0.02   NRBC 0 0 0 0  --  0         COAGULATION LAST 3 DAYS  No results for input(s): LABPT, INR, APTT in the last 168 hours.    CHEMISTRY LAST 3 DAYS  Recent Labs   Lab 01/29/23  0526 01/30/23  0343 01/31/23  1015 02/01/23  0430   * 132* 133* 131*   K 3.9 3.8 3.9 3.4*   CL 99 100 99 98   CO2 24 25 27 27    ANIONGAP 7* 7* 7* 6*   BUN 24* 16 12 13   CREATININE 1.0 0.9 0.9 0.8   GLU 99 108 137* 110   CALCIUM 8.4* 8.4* 8.2* 8.0*   MG 2.0 1.8 2.1 1.8   ALBUMIN 3.0* 2.8*  --  2.8*   PROT  --   --   --  6.2   ALKPHOS  --   --   --  85   ALT  --   --   --  17   AST  --   --   --  17   BILITOT  --   --   --  0.6         CARDIAC PROFILE LAST 3 DAYS  Recent Labs   Lab 01/29/23  0526 01/31/23  1015   * 591*         ENDOCRINE LAST 3 DAYS  No results for input(s): TSH, PROCAL in the last 168 hours.      LAST ARTERIAL BLOOD GAS  ABG  No results for input(s): PH, PO2, PCO2, HCO3, BE in the last 168 hours.      LAST 7 DAYS MICROBIOLOGY   Microbiology Results (last 7 days)       ** No results found for the last 168 hours. **            MOST RECENT IMAGING  X-Ray KUB  REASON: Impacted stool?    TECHNIQUE: AP abdominal radiograph.    COMPARISON: None.    FINDINGS:    Multiple loops of gas and stool-filled bowel noted in an unremarkable bowel gas pattern. Stool content within the large bowel within normal limits. Lung bases are clear. No acute osseous abnormality.    IMPRESSION:    Unremarkable bowel gas pattern.    Electronically signed by:  Julio C Davis DO  1/29/2023 2:10 PM CST Workstation: [id number]  X-Ray Chest AP Portable  Reason: cough    FINDINGS:    Portable chest with comparison chest x-ray January 26, 2023 show normal cardiomediastinal silhouette. Median sternotomy wires noted. There is been interval removal of mediastinal chest drains and left internal jugular central venous catheter.  Linear opacities of the left lung base likely reflect subsegmental atelectasis. Pulmonary vasculature is normal. No acute osseous abnormality.    IMPRESSION:    Linear opacities of the left lung base likely reflect subsegmental atelectasis.    Electronically signed by:  Julio C Davis DO  1/29/2023 2:09 PM CST Workstation: [id number]      [facility]  Results for orders placed during the hospital encounter of 01/01/23    Echo Saline  Bubble? No    Interpretation Summary  · The estimated ejection fraction is 55%.  · The left ventricle is normal in size with concentric hypertrophy.  · Mild to moderate tricuspid regurgitation.  · Moderate right ventricular enlargement with moderately reduced right ventricular systolic function.  · Grade I left ventricular diastolic dysfunction.  · There is mild aortic valve stenosis.  · Aortic valve area is 1.68 cm2; peak velocity is 1.36 m/s; mean gradient is 4 mmHg.  · There is abnormal septal wall motion consistent with right ventricular pacemaker.  · There are segmental left ventricular wall motion abnormalities.      CURRENT/PREVIOUS VISIT EKG  Results for orders placed or performed during the hospital encounter of 01/01/23   EKG 12-lead    Collection Time: 01/26/23  3:14 PM    Narrative    Test Reason : I48.91,    Vent. Rate : 104 BPM     Atrial Rate : 000 BPM     P-R Int : 000 ms          QRS Dur : 082 ms      QT Int : 324 ms       P-R-T Axes : 000 001 006 degrees     QTc Int : 426 ms    Atrial fibrillation with rapid ventricular response  Inferior infarct ,age undetermined  Abnormal ECG  When compared with ECG of 24-JAN-2023 18:12,  Atrial fibrillation has replaced Electronic ventricular pacemaker  Confirmed by Sarthak Cardona MD [id number]) on 1/29/2023 4:27:26 PM    Referred By: IRENE MOSER           Confirmed By:Sarthak Cardona MD       ASSESSMENT/PLAN:     Active Hospital Problems    Diagnosis    A-fib    Anemia    History of tobacco abuse    Primary hypertension    PVD (peripheral vascular disease) with claudication    PAD (peripheral artery disease)       ASSESSMENT & PLAN:     -S/P CABG X 3 left internal mammary artery to left anterior descending coronary artery and saphenous vein to the obtuse marginal coronary artery and saphenous vein from the body of the 1st vein graft to the posterior descending coronary artery  on 1/24/2023  -Severe left main coronary stenosis with 90% lesion in small mid  marginal  -Cardiac arrest/ ST elevation MI s/p PCI of ostial RCA 4 weeks ago  -Post op AFIB-Now NSR    RECOMMENDATIONS:    Stable for dc from cardiac standpoint. He will be going to rehab.   Due to stent placement, it is imperative that he does not stop Plavix for 1 year post stent.   Recommend follow up with cardiology outpatient.       Mary Lou Turpin NP  [facility]  Department of Cardiology  Date of Service: 02/01/2023

## 2023-02-01 NOTE — PROGRESS NOTES
[facility]  Department of Cardiology  Progress Note    PATIENT NAME: Peyman Gr  MRN: [id number]  TODAY'S DATE: 01/31/2023  ADMIT DATE: 1/1/2023    SUBJECTIVE     PRINCIPLE PROBLEM: STEMI involving right coronary artery    INTERVAL HISTORY:    1/31/2023  Pt still w/ SOB.Go IV lasix today, cardiology wants to monitor.       Review of patient's allergies indicates:  No Known Allergies    REVIEW OF SYSTEMS  +weakness  + SOB    OBJECTIVE     VITAL SIGNS (Most Recent)  Temp: (P) 98.5 °F (36.9 °C) (01/31/23 1100)  Pulse: 75 (01/31/23 1510)  Resp: 18 (01/31/23 1620)  BP: 134/69 (01/31/23 1330)  SpO2: 98 % (01/31/23 1510)    VENTILATION STATUS  Resp: 18 (01/31/23 1620)  SpO2: 98 % (01/31/23 1510)       I & O (Last 24H):  Intake/Output Summary (Last 24 hours) at 1/31/2023 1916  Last data filed at 1/31/2023 1620  Gross per 24 hour   Intake 480 ml   Output 1700 ml   Net -1220 ml         WEIGHTS  Wt Readings from Last 3 Encounters:   01/31/23 0600 87.8 kg (193 lb 9 oz)   01/29/23 0757 88.2 kg (194 lb 7.1 oz)   01/29/23 0400 92.7 kg (204 lb 5.9 oz)   01/24/23 0300 87.8 kg (193 lb 9 oz)   01/23/23 0400 86.8 kg (191 lb 5.8 oz)   01/17/23 0400 105.2 kg (231 lb 14.8 oz)   01/15/23 0400 100 kg (220 lb 7.4 oz)   01/14/23 0400 97.8 kg (215 lb 9.8 oz)   01/07/23 0400 115.4 kg (254 lb 6.6 oz)   01/06/23 0400 117.6 kg (259 lb 4.2 oz)   01/05/23 0500 115.3 kg (254 lb 3.1 oz)   01/03/23 0615 105.4 kg (232 lb 5.8 oz)   01/02/23 0400 102.5 kg (225 lb 15.5 oz)   01/02/23 0053 102.5 kg (225 lb 15.5 oz)   01/01/23 0800 99.8 kg (220 lb 0.3 oz)   01/01/23 0341 99.8 kg (220 lb)   01/19/23 1350 104.8 kg (231 lb)   01/01/23 1706 99.8 kg (220 lb)       PHYSICAL EXAM  CONSTITUTIONAL: Well built, well nourished in no apparent distress feels weak  NECK: R carotid bruit  LUNGS: Cta but Diminished bases  CHEST WALL: no tenderness  HEART: regular rate and rhythm, S1, S2 normal,   ABDOMEN: soft, non-tender; bowel sounds normal; no masses,  no  organomegaly  EXTREMITIES: Extremities normal, no edema  NEURO: AAO X 3    SCHEDULED MEDS:   amiodarone  200 mg Oral Daily    amLODIPine  5 mg Oral BID    apixaban  5 mg Oral BID    atorvastatin  40 mg Oral QHS    carvediloL  25 mg Oral BID    chlorhexidine  15 mL Mouth/Throat BID    clopidogreL  75 mg Oral Daily    docusate sodium  100 mg Oral BID    EScitalopram oxalate  10 mg Oral Daily    [START ON 2/1/2023] furosemide  40 mg Oral Daily    guaiFENesin  600 mg Oral BID    pantoprazole  40 mg Oral Daily    polyethylene glycol  17 g Oral BID    potassium chloride  10 mEq Oral Daily       CONTINUOUS INFUSIONS:        PRN MEDS:sodium chloride, acetaminophen, albumin human 5%, atropine, calcium gluconate IVPB, calcium gluconate IVPB, calcium gluconate IVPB, dextrose 10%, dextrose 10%, EPINEPHrine, ipratropium, levalbuterol, magnesium sulfate IVPB, magnesium sulfate IVPB, melatonin, ondansetron, oxyCODONE-acetaminophen, potassium chloride in water, potassium chloride in water, potassium chloride in water, sodium phosphate IVPB, sodium phosphate IVPB, sodium phosphate IVPB    LABS AND DIAGNOSTICS     CBC LAST 3 DAYS  Recent Labs   Lab 01/26/23  0323 01/26/23  1711 01/27/23  0405 01/28/23  0358 01/29/23  0526 01/30/23  0343 01/31/23  1015   WBC 10.45   < > 7.65 7.08 6.77 7.88 7.43   RBC 2.35*   < > 2.77* 2.82* 2.86* 3.01* 2.97*   HGB 7.3*   < > 8.5* 8.7* 8.7* 9.2* 9.1*   HCT 22.2*   < > 25.8* 26.1* 26.7* 28.1* 27.5*   MCV 95   < > 93 93 93 93 93   MCH 31.1*   < > 30.7 30.9 30.4 30.6 30.6   MCHC 32.9   < > 32.9 33.3 32.6 32.7 33.1   RDW 13.9   < > 14.3 13.7 13.7 13.8 13.7   *   < > 126* 167 213 238 276   MPV 9.4   < > 9.1* 9.9 9.7 9.1* 8.9*   GRAN 78.6*  8.2*  --  66.6  5.1 55.1  3.9 55.0 52.0  --    LYMPH 8.8*  0.9*  --  16.7*  1.3 23.4  1.7 24.0 21.0  --    MONO 11.8  1.2*  --  14.1  1.1* 14.0  1.0 11.0 9.0  --    BASO 0.01  --  0.02 0.04  --   --   --    NRBC 0  --  0 0 0 0  --     < > = values in this  interval not displayed.         COAGULATION LAST 3 DAYS  No results for input(s): LABPT, INR, APTT in the last 168 hours.    CHEMISTRY LAST 3 DAYS  Recent Labs   Lab 01/24/23  2308 01/24/23  2343 01/25/23  0054 01/25/23  0303 01/26/23  1711 01/27/23  0405 01/29/23  0526 01/30/23  0343 01/31/23  1015   NA  --   --   --    < > 134*   < > 130* 132* 133*   K  --   --   --    < > 4.2   < > 3.9 3.8 3.9   CL  --   --   --    < > 106   < > 99 100 99   CO2  --   --   --    < > 22*   < > 24 25 27   ANIONGAP  --   --   --    < > 6*   < > 7* 7* 7*   BUN  --   --   --    < > 34*   < > 24* 16 12   CREATININE  --   --   --    < > 1.3   < > 1.0 0.9 0.9   GLU  --   --   --    < > 128*   < > 99 108 137*   CALCIUM  --   --   --    < > 8.8   < > 8.4* 8.4* 8.2*   PH 7.405 7.346* 7.335*  --   --   --   --   --   --    MG  --   --   --    < >  --    < > 2.0 1.8 2.1   ALBUMIN  --   --   --   --  3.2*  --  3.0* 2.8*  --     < > = values in this interval not displayed.         CARDIAC PROFILE LAST 3 DAYS  Recent Labs   Lab 01/29/23  0526 01/31/23  1015   * 591*         ENDOCRINE LAST 3 DAYS  No results for input(s): TSH, PROCAL in the last 168 hours.      LAST ARTERIAL BLOOD GAS  ABG  Recent Labs   Lab 01/25/23  0054   PH 7.335*   PO2 84   PCO2 42.0   HCO3 22.4*   BE -3         LAST 7 DAYS MICROBIOLOGY   Microbiology Results (last 7 days)       ** No results found for the last 168 hours. **            MOST RECENT IMAGING  X-Ray KUB  REASON: Impacted stool?    TECHNIQUE: AP abdominal radiograph.    COMPARISON: None.    FINDINGS:    Multiple loops of gas and stool-filled bowel noted in an unremarkable bowel gas pattern. Stool content within the large bowel within normal limits. Lung bases are clear. No acute osseous abnormality.    IMPRESSION:    Unremarkable bowel gas pattern.    Electronically signed by:  Julio C Davis DO  1/29/2023 2:10 PM CST Workstation: [id number]  X-Ray Chest AP Portable  Reason: cough    FINDINGS:    Portable  chest with comparison chest x-ray January 26, 2023 show normal cardiomediastinal silhouette. Median sternotomy wires noted. There is been interval removal of mediastinal chest drains and left internal jugular central venous catheter.  Linear opacities of the left lung base likely reflect subsegmental atelectasis. Pulmonary vasculature is normal. No acute osseous abnormality.    IMPRESSION:    Linear opacities of the left lung base likely reflect subsegmental atelectasis.    Electronically signed by:  Julio C Davis DO  1/29/2023 2:09 PM [facility] [place] Workstation: [id number]      [employer]  Results for orders placed during the hospital encounter of 01/01/23    Echo Saline Bubble? No    Interpretation Summary  · The estimated ejection fraction is 55%.  · The left ventricle is normal in size with concentric hypertrophy.  · Mild to moderate tricuspid regurgitation.  · Moderate right ventricular enlargement with moderately reduced right ventricular systolic function.  · Grade I left ventricular diastolic dysfunction.  · There is mild aortic valve stenosis.  · Aortic valve area is 1.68 cm2; peak velocity is 1.36 m/s; mean gradient is 4 mmHg.  · There is abnormal septal wall motion consistent with right ventricular pacemaker.  · There are segmental left ventricular wall motion abnormalities.      CURRENT/PREVIOUS VISIT EKG  Results for orders placed or performed during the hospital encounter of 01/01/23   EKG 12-lead    Collection Time: 01/26/23  3:14 PM    Narrative    Test Reason : I48.91,    Vent. Rate : 104 BPM     Atrial Rate : 000 BPM     P-R Int : 000 ms          QRS Dur : 082 ms      QT Int : 324 ms       P-R-T Axes : 000 001 006 degrees     QTc Int : 426 ms    Atrial fibrillation with rapid ventricular response  Inferior infarct ,age undetermined  Abnormal ECG  When compared with ECG of 24-JAN-2023 18:12,  Atrial fibrillation has replaced Electronic ventricular pacemaker  Confirmed by Sarthak Cardona MD ([id number]) on 1/29/2023  4:27:26 PM    Referred By: IRENE MOSER           Confirmed By:Sarthak Cardona MD       ASSESSMENT/PLAN:     Active Hospital Problems    Diagnosis    A-fib    Anemia    History of tobacco abuse    Primary hypertension    PVD (peripheral vascular disease) with claudication    PAD (peripheral artery disease)       ASSESSMENT & PLAN:     -S/P CABG X 3 left internal mammary artery to left anterior descending coronary artery and saphenous vein to the obtuse marginal coronary artery and saphenous vein from the body of the 1st vein graft to the posterior descending coronary artery  on 1/24/2023  -Severe left main coronary stenosis with 90% lesion in small mid marginal  -Cardiac arrest/ ST elevation MI s/p PCI of ostial RCA 4 weeks ago  -Post op AFIB-Now NSR    RECOMMENDATIONS:    Continue amiodarone 200 mg po daily and coreg 25 mg po BID.   Continue Eliquis 5 mg po BID and Plavix 75 mg po daily. Recommend fall precautions and to avoid head injuries.   Change amlodipine to 5 mg po BID instead of 10 mg po daily due to labile HTN with episodes of low BP.  Hopeful for dc to SNF soon.   Lasix per cardiology  May need cardiac rehab      Kaushal Matias MD  [facility]  Department of Cardiology  Date of Service: 01/31/2023

## 2023-02-01 NOTE — PLAN OF CARE
02/01/23 1225   Post-Acute Status   Post-Acute Authorization Placement   Post-Acute Placement Status Pending post-acute provider review/more information requested     Discharge orders sent to Miguel Brenner; awaiting Anita to respond with call for report & the time to  pt.

## 2023-02-01 NOTE — PT/OT/SLP PROGRESS
"Physical Therapy Treatment    Patient Name:  Peyman Gr   MRN:  [id number]    Recommendations:     Discharge Recommendations: rehabilitation facility  Discharge Equipment Recommendations:  (TBD)  Barriers to discharge: None    Assessment:     Peyman Gr is a 67 y.o. male admitted with a medical diagnosis of STEMI involving right coronary artery.  He presents with the following impairments/functional limitations: weakness, impaired endurance, impaired functional mobility, gait instability, impaired cardiopulmonary response to activity, impaired skin.  Pt found seated EOB on RA with decreased c/o SOB from prior day. Ambulated 3 gait trials of 12', 25', 12' with seated rest btwn each, RW support, and CGA; decreased rest time needed between standing trials from prior day. O2 sats 90-94% throughout session.   Pt remembered 1 of 2 cues given previous day re: STS technique, and today performed STS with Mercedes x1 person and improved technique.  Was left seated on BSC with good tolerance to activity.    Rehab Prognosis: Good; patient would benefit from acute skilled PT services to address these deficits and reach maximum level of function.    Recent Surgery: Procedure(s) (LRB):  CORONARY ARTERY BYPASS GRAFT (CABG) (N/A) 8 Days Post-Op    Plan:     During this hospitalization, patient to be seen daily to address the identified rehab impairments via gait training, therapeutic activities, therapeutic exercises and progress toward the following goals:    Plan of Care Expires:  02/26/23    Subjective     Chief Complaint: SOB  Patient/Family Comments/goals: "let my heart and lungs heal"  Pain/Comfort:  Pain Rating 1: 0/10  Pain Rating Post-Intervention 1: 0/10      Objective:     Communicated with nurse Rivas prior to session.  Patient found sitting edge of bed with PICC line, telemetry, blood pressure cuff, pulse ox (continuous) upon PT entry to room.     General Precautions: Standard, fall, sternal  Orthopedic Precautions: " N/A  Braces: N/A  Respiratory Status: Room air     Functional Mobility:  Transfers:     Sit to Stand:  minimum assistance with rolling walker and and sternal pillow  Toilet Transfer: minimum assistance with  rolling walker  using  Stand Pivot  Gait: 12', 25', 12' with RW, Mercedes, improved respiratory tolerance to activity from prior day      AM-PAC 6 CLICK MOBILITY          Treatment & Education:  -Review of sternal prec's and STS technique     Patient left  seated on BSC  with all lines intact, call button in reach, nurse notified, and wife present..    GOALS:   Multidisciplinary Problems       Physical Therapy Goals          Problem: Physical Therapy    Goal Priority Disciplines Outcome Goal Variances Interventions   Physical Therapy Goal     PT, PT/OT Ongoing, Progressing     Description: Goals to be met by: 2023     Patient will increase functional independence with mobility by performin. Supine to sit with SBA Assistance  2. Sit to stand transfer with min Assistance  3. Bed to chair with Mercedes  w/ol AD  3. Gait  x 50 feet with Minimal Assistance using Rolling Walker maintaining sternal precautions.                          Time Tracking:     PT Received On: 23  PT Start Time: 1038     PT Stop Time: 1055  PT Total Time (min): 17 min     Billable Minutes: Gait Training 17    Treatment Type: Treatment  PT/PTA: PTA     PTA Visit Number: 2     2023

## 2023-02-02 NOTE — DISCHARGE SUMMARY
[facility] Medicine  Discharge Summary      Patient Name: Peyman Gr  MRN: [id number]  Admission Date: 1/1/2023  Hospital Length of Stay: 31 days  Discharge Date and Time:  02/01/2023 8:15 PM  Attending Physician: No att. providers found   Discharging Provider: Kaushal Matias MD  Primary Care Provider: Jamal Cornejo MD        HPI: Peyman Gr is a 67 y.o. Black or  male   With PMH of PVD, carotid artery disease,   Tobacco abuse, HTN, HLD,  who presents with chest pain.  Transferred from [facility] for STEMI.     Onset 2 hours before presentation to OSH ER.  Pt called EMS but ambulance not available  Pt's family eventually decided to transport the pt to OSH themselves  +associated SOB  Pt arrived at OSH with STEMI and 3rd degree HB with bradycardia  No pacing was started initially, for some reason  Pt became hypotensive  Then pt had VT/VF cardiac arrest     Reportedly pt would become alert during compressions  But immediately became obtunded when compressions stopped  Pt was intubated at OSH  Pt continued with bradycardia / HB  Pacer was started, atropine given  Pt went into cardiac arrest again - I heard this during ER physician sign out to me  EMS transport to [facility] was delayed due to lack of available transport  And I am not convinced that pt had achieved ROSC by time of EMS transport here     Pt arrived at [facility] in cardiac arrest  NP who transported with pt reported PEA on monitor during transport  ACLS again restarted when pt discovered pulseless  Pt arrives with chest compressions in progress  Pt is MAEW during chest compressions  --he is confirmed pulseless otherwise when compressions stop  Pt had to be restrained   Pt given multiple rounds epinephrine  +multiple amps bicarb  +calcium chloride     ROSC achieved  Bradycardia continues; atropine given, pacing started  Pt requiring 3 pressors to maintain BP  Sedation/Paralytic given as pt is MAEW     No known hx  CAD  However multiple presentations for SOB  No cardiac workup completed previously     Pt taken to cath lab.  Family updated by me personally.    Procedure(s) (LRB):  CORONARY ARTERY BYPASS GRAFT (CABG) (N/A)      Hospital Course:  Pt admitted for STEMI complicated by cardiac arrest 3rd degree AVB, RV shock.Pt had JOSE GUADALUPE to RCA culprit lesion, then CABG ( CABG X 3 left internal mammary artery to left anterior descending coronary artery and saphenous vein to the obtuse marginal coronary artery and saphenous vein from the body of the 1st vein graft to the posterior descending coronary artery  on 1/24/2023). Course complicated by PNA, afib. Finished course of antibiotics for pneumonia.  Did receive some IV diuresis for CHF. Still had some SOB which hopefully would improve. Discharged to [facility]. Cardiology recommended eliquis and plavix.        Consults:   Consults (From admission, onward)          Status Ordering Provider     Inpatient consult to Registered Dietitian/Nutritionist  Once        Provider:  (Not yet assigned)    Completed CEDRIC WEAVER     Inpatient consult to   Once        Provider:  (Not yet assigned)    Completed SERGIO BUSTOS     Inpatient consult to Registered Dietitian/Nutritionist  Once        Provider:  (Not yet assigned)    Completed NESS BAUMAN     Inpatient consult to Neurology  Once        Provider:  Brigette Aparicio MD    Completed VIANEY PADGETT     Inpatient consult to Cardiology  Once        Provider:  Stefany Elizondo MD    Completed IRAM CLARKE     Inpatient consult to Intensivist  Once        Provider:  Vianey Padgett MD    Completed IRAM CLARKE            Final Active Diagnoses:    Diagnosis Date Noted POA    A-fib [I48.91] 01/27/2023 No    Anemia [D64.9] 01/01/2023 Yes    History of tobacco abuse [Z87.891] 12/27/2021 Not Applicable    Primary hypertension [I10] 12/27/2021 Yes    PVD (peripheral vascular  disease) with claudication [I73.9] 09/24/2020 Yes    PAD (peripheral artery disease) [I73.9] 05/01/2020 Yes      Problems Resolved During this Admission:    Diagnosis Date Noted Date Resolved POA    PRINCIPAL PROBLEM:  STEMI involving right coronary artery [I21.11] 01/02/2023 01/30/2023 Yes    Pneumonia of right lung due to methicillin susceptible Staphylococcus aureus (MSSA) [J15.211] 01/04/2023 01/21/2023 No    Shock liver [K72.00] 01/02/2023 01/21/2023 Yes    Cardiogenic shock [R57.0] 01/02/2023 01/21/2023 Yes    Cardiac arrest [I46.9] 01/01/2023 01/21/2023 Yes    Acute respiratory failure with hypoxia and hypercarbia [J96.01, J96.02] 01/01/2023 01/21/2023 Yes    Leukocytosis [D72.829] 01/01/2023 01/21/2023 Yes    Hyponatremia [E87.1] 01/01/2023 01/02/2023 Yes      Discharged Condition: fair    Disposition: Nursing Facility    Follow Up:   Follow-up Information       Stefany Elizondo MD Follow up in 2 week(s).    Specialty: Cardiology  Contact information:  [address]  [address]  [place] [address]  [phone]               Jamal Cornejo MD Follow up in 1 week(s).    Specialty: Internal Medicine  Contact information:  [address] [facility] [address] [facility] [address]  [phone]                           Patient Instructions:      Diet Cardiac     Notify your health care provider if you experience any of the following:  increased confusion or weakness     Notify your health care provider if you experience any of the following:  persistent nausea and vomiting or diarrhea     Notify your health care provider if you experience any of the following:  difficulty breathing or increased cough     Notify your health care provider if you experience any of the following:   Order Comments: >3lb weight gain     Activity as tolerated     Medications:  Reconciled Home Medications:      Medication List        START taking these medications      amiodarone 200 MG Tab  Commonly known as: PACERONE  Take 1 tablet (200 mg  total) by mouth once daily.  Start taking on: February 2, 2023     apixaban 5 mg Tab  Commonly known as: ELIQUIS  Take 1 tablet (5 mg total) by mouth 2 (two) times daily.     atorvastatin 40 MG tablet  Commonly known as: LIPITOR  Take 1 tablet (40 mg total) by mouth every evening.     bisacodyL 10 mg Supp  Commonly known as: DULCOLAX  Place 1 suppository (10 mg total) rectally daily as needed.     docusate sodium 100 MG capsule  Commonly known as: COLACE  Take 1 capsule (100 mg total) by mouth 2 (two) times daily.     EScitalopram oxalate 10 MG tablet  Commonly known as: LEXAPRO  Take 1 tablet (10 mg total) by mouth once daily.  Start taking on: February 2, 2023     furosemide 40 MG tablet  Commonly known as: LASIX  Take 1 tablet (40 mg total) by mouth once daily.  Start taking on: February 2, 2023     guaiFENesin 600 mg 12 hr tablet  Commonly known as: MUCINEX  Take 1 tablet (600 mg total) by mouth 2 (two) times daily.     magnesium oxide 400 mg (241.3 mg magnesium) tablet  Commonly known as: MAG-OX  Take 1 tablet (400 mg total) by mouth once daily.     melatonin 3 mg tablet  Commonly known as: MELATIN  Take 2 tablets (6 mg total) by mouth nightly as needed for Insomnia.     pantoprazole 40 MG tablet  Commonly known as: PROTONIX  Take 1 tablet (40 mg total) by mouth once daily.  Start taking on: February 2, 2023     polyethylene glycol 17 gram Pwpk  Commonly known as: GLYCOLAX  Take 17 g by mouth 2 (two) times daily.     potassium chloride SA 10 MEQ tablet  Commonly known as: K-DUR,KLOR-CON M  Take 2 tablets (20 mEq total) by mouth once daily.            CHANGE how you take these medications      amLODIPine 5 MG tablet  Commonly known as: NORVASC  Take 1 tablet (5 mg total) by mouth 2 (two) times daily.  What changed:   medication strength  how much to take  when to take this  Another medication with the same name was removed. Continue taking this medication, and follow the directions you see here.             CONTINUE taking these medications      albuterol 90 mcg/actuation inhaler  Commonly known as: PROVENTIL/VENTOLIN HFA  Inhale 1-2 puffs into the lungs every 6 (six) hours as needed for Shortness of Breath. Rescue     carvediloL 25 MG tablet  Commonly known as: COREG  Take 25 mg by mouth 2 (two) times daily with meals.     clopidogreL 75 mg tablet  Commonly known as: PLAVIX  Take 1 tablet (75 mg total) by mouth once daily. On hold for sx on 12/27/21     ezetimibe 10 mg tablet  Commonly known as: ZETIA  Take 10 mg by mouth once daily.     mupirocin 2 % ointment  Commonly known as: BACTROBAN  Apply topically 3 (three) times daily.     oxyCODONE-acetaminophen 7.5-325 mg per tablet  Commonly known as: PERCOCET  Take 1 tablet by mouth 2 (two) times daily as needed.            STOP taking these medications      aspirin 81 MG Chew     hydroCHLOROthiazide 12.5 MG Tab  Commonly known as: HYDRODIURIL     HYDROcodone-acetaminophen  mg per tablet  Commonly known as: NORCO     traMADoL 50 mg tablet  Commonly known as: ULTRAM     valsartan 160 MG tablet  Commonly known as: DIOVAN                Pending Diagnostic Studies:       None          Indwelling Lines/Drains at time of discharge:   Lines/Drains/Airways       None                   Time spent on the discharge of patient: 40 minutes         Kaushal Matias MD  Department of Hospital Medicine  [facility]

## 2023-02-15 ENCOUNTER — OFFICE VISIT (OUTPATIENT)
Dept: VASCULAR SURGERY | Facility: CLINIC | Age: 68
End: 2023-02-15
Payer: MEDICARE

## 2023-02-15 VITALS
HEART RATE: 65 BPM | BODY MASS INDEX: 24.6 KG/M2 | DIASTOLIC BLOOD PRESSURE: 73 MMHG | HEIGHT: 74 IN | SYSTOLIC BLOOD PRESSURE: 146 MMHG

## 2023-02-15 DIAGNOSIS — Z95.1 S/P CABG (CORONARY ARTERY BYPASS GRAFT): ICD-10-CM

## 2023-02-15 PROCEDURE — 1160F PR REVIEW ALL MEDS BY PRESCRIBER/CLIN PHARMACIST DOCUMENTED: ICD-10-PCS | Mod: CPTII,S$GLB,, | Performed by: THORACIC SURGERY (CARDIOTHORACIC VASCULAR SURGERY)

## 2023-02-15 PROCEDURE — 3044F PR MOST RECENT HEMOGLOBIN A1C LEVEL <7.0%: ICD-10-PCS | Mod: CPTII,S$GLB,, | Performed by: THORACIC SURGERY (CARDIOTHORACIC VASCULAR SURGERY)

## 2023-02-15 PROCEDURE — 1125F PR PAIN SEVERITY QUANTIFIED, PAIN PRESENT: ICD-10-PCS | Mod: CPTII,S$GLB,, | Performed by: THORACIC SURGERY (CARDIOTHORACIC VASCULAR SURGERY)

## 2023-02-15 PROCEDURE — 99024 PR POST-OP FOLLOW-UP VISIT: ICD-10-PCS | Mod: S$GLB,,, | Performed by: THORACIC SURGERY (CARDIOTHORACIC VASCULAR SURGERY)

## 2023-02-15 PROCEDURE — 3044F HG A1C LEVEL LT 7.0%: CPT | Mod: CPTII,S$GLB,, | Performed by: THORACIC SURGERY (CARDIOTHORACIC VASCULAR SURGERY)

## 2023-02-15 PROCEDURE — 1160F RVW MEDS BY RX/DR IN RCRD: CPT | Mod: CPTII,S$GLB,, | Performed by: THORACIC SURGERY (CARDIOTHORACIC VASCULAR SURGERY)

## 2023-02-15 PROCEDURE — 3008F PR BODY MASS INDEX (BMI) DOCUMENTED: ICD-10-PCS | Mod: CPTII,S$GLB,, | Performed by: THORACIC SURGERY (CARDIOTHORACIC VASCULAR SURGERY)

## 2023-02-15 PROCEDURE — 99024 POSTOP FOLLOW-UP VISIT: CPT | Mod: S$GLB,,, | Performed by: THORACIC SURGERY (CARDIOTHORACIC VASCULAR SURGERY)

## 2023-02-15 PROCEDURE — 4010F PR ACE/ARB THEARPY RXD/TAKEN: ICD-10-PCS | Mod: CPTII,S$GLB,, | Performed by: THORACIC SURGERY (CARDIOTHORACIC VASCULAR SURGERY)

## 2023-02-15 PROCEDURE — 3078F DIAST BP <80 MM HG: CPT | Mod: CPTII,S$GLB,, | Performed by: THORACIC SURGERY (CARDIOTHORACIC VASCULAR SURGERY)

## 2023-02-15 PROCEDURE — 1159F MED LIST DOCD IN RCRD: CPT | Mod: CPTII,S$GLB,, | Performed by: THORACIC SURGERY (CARDIOTHORACIC VASCULAR SURGERY)

## 2023-02-15 PROCEDURE — 1125F AMNT PAIN NOTED PAIN PRSNT: CPT | Mod: CPTII,S$GLB,, | Performed by: THORACIC SURGERY (CARDIOTHORACIC VASCULAR SURGERY)

## 2023-02-15 PROCEDURE — 1159F PR MEDICATION LIST DOCUMENTED IN MEDICAL RECORD: ICD-10-PCS | Mod: CPTII,S$GLB,, | Performed by: THORACIC SURGERY (CARDIOTHORACIC VASCULAR SURGERY)

## 2023-02-15 PROCEDURE — 3008F BODY MASS INDEX DOCD: CPT | Mod: CPTII,S$GLB,, | Performed by: THORACIC SURGERY (CARDIOTHORACIC VASCULAR SURGERY)

## 2023-02-15 PROCEDURE — 4010F ACE/ARB THERAPY RXD/TAKEN: CPT | Mod: CPTII,S$GLB,, | Performed by: THORACIC SURGERY (CARDIOTHORACIC VASCULAR SURGERY)

## 2023-02-15 PROCEDURE — 3077F PR MOST RECENT SYSTOLIC BLOOD PRESSURE >= 140 MM HG: ICD-10-PCS | Mod: CPTII,S$GLB,, | Performed by: THORACIC SURGERY (CARDIOTHORACIC VASCULAR SURGERY)

## 2023-02-15 PROCEDURE — 3077F SYST BP >= 140 MM HG: CPT | Mod: CPTII,S$GLB,, | Performed by: THORACIC SURGERY (CARDIOTHORACIC VASCULAR SURGERY)

## 2023-02-15 PROCEDURE — 3078F PR MOST RECENT DIASTOLIC BLOOD PRESSURE < 80 MM HG: ICD-10-PCS | Mod: CPTII,S$GLB,, | Performed by: THORACIC SURGERY (CARDIOTHORACIC VASCULAR SURGERY)

## 2023-02-15 RX ORDER — HYDROCHLOROTHIAZIDE 12.5 MG/1
1 TABLET ORAL EVERY MORNING
COMMUNITY
End: 2023-02-23

## 2023-02-15 RX ORDER — VALSARTAN 160 MG/1
1 TABLET ORAL
COMMUNITY
End: 2023-02-23

## 2023-02-15 NOTE — PROGRESS NOTES
This patient is status post complicated coronary artery bypass grafting.  He had a heavily calcified ascending thoracic aorta.  He is done well overall and has slowly improved.  He has been staying an LTAC facility.  Medicines are noted and part of the epic record.  His problem list was reviewed.    On exam vital signs are stable.  Surgical wounds are intact.  His chest tube stitches were removed.    He is scheduled to go home within the next few days.  He has made fairly good progress.    He can see us on an as-needed basis.  I would expect the fairly good long-term prognosis.  He should follow-up with the Cardiology service for medical management.

## 2023-02-15 NOTE — PT/OT/SLP DISCHARGE
Occupational Therapy Discharge Summary    Peyman Gr  MRN: [id number]   Principal Problem: STEMI involving right coronary artery      Patient Discharged from acute Occupational Therapy on 2/1/2023.  Please refer to prior OT note dated 1/23/2023 for functional status.    Assessment:      Patient appropriate for care in another setting.    Objective:     GOALS:   Multidisciplinary Problems       Occupational Therapy Goals       Not on file              Multidisciplinary Problems (Resolved)          Problem: Occupational Therapy    Goal Priority Disciplines Outcome Interventions   Occupational Therapy Goal   (Resolved)     OT, PT/OT Met    Description: Goals to be met by: 2/10/2023    Patient will increase functional independence with ADLs by performing:    UE Dressing with Stand-by Assistance.  LE Dressing with Stand-by Assistance.  Grooming while seated with Stand-by Assistance.  Toileting from bedside commode with Minimal Assistance for hygiene and clothing management.   Sitting at edge of bed x10 minutes with Supervision.  Supine to sit with Stand-by Assistance.  Toilet transfer to bedside commode with Minimal Assistance.  Upper extremity exercise program x10 reps per handout, with assistance as needed.                         Reasons for Discontinuation of Therapy Services  Transfer to alternate level of care.      Plan:     Patient Discharged to: Skilled Nursing Facility    2/15/2023

## 2023-02-22 ENCOUNTER — HOSPITAL ENCOUNTER (EMERGENCY)
Facility: HOSPITAL | Age: 68
Discharge: HOME | End: 2023-02-22
Attending: EMERGENCY MEDICINE
Payer: MEDICARE

## 2023-02-22 VITALS
DIASTOLIC BLOOD PRESSURE: 77 MMHG | OXYGEN SATURATION: 98 % | SYSTOLIC BLOOD PRESSURE: 173 MMHG | TEMPERATURE: 98 F | HEART RATE: 81 BPM | RESPIRATION RATE: 16 BRPM | WEIGHT: 174 LBS | BODY MASS INDEX: 22.33 KG/M2 | HEIGHT: 74 IN

## 2023-02-22 DIAGNOSIS — I50.9 CONGESTIVE HEART FAILURE, UNSPECIFIED HF CHRONICITY, UNSPECIFIED HEART FAILURE TYPE: ICD-10-CM

## 2023-02-22 DIAGNOSIS — R06.02 SOB (SHORTNESS OF BREATH): ICD-10-CM

## 2023-02-22 DIAGNOSIS — R06.00 DYSPNEA, UNSPECIFIED TYPE: Primary | ICD-10-CM

## 2023-02-22 LAB
ALBUMIN SERPL BCP-MCNC: 3.9 G/DL (ref 3.5–5.2)
ALP SERPL-CCNC: 143 U/L (ref 55–135)
ALT SERPL W/O P-5'-P-CCNC: 23 U/L (ref 10–44)
ANION GAP SERPL CALC-SCNC: 11 MMOL/L (ref 8–16)
AST SERPL-CCNC: 27 U/L (ref 10–40)
BASOPHILS # BLD AUTO: 0.04 K/UL (ref 0–0.2)
BASOPHILS NFR BLD: 0.6 % (ref 0–1.9)
BILIRUB SERPL-MCNC: 0.4 MG/DL (ref 0.1–1)
BNP SERPL-MCNC: 347 PG/ML (ref 0–99)
BUN SERPL-MCNC: 17 MG/DL (ref 8–23)
CALCIUM SERPL-MCNC: 9.9 MG/DL (ref 8.7–10.5)
CHLORIDE SERPL-SCNC: 99 MMOL/L (ref 95–110)
CO2 SERPL-SCNC: 25 MMOL/L (ref 23–29)
CREAT SERPL-MCNC: 1 MG/DL (ref 0.5–1.4)
DIFFERENTIAL METHOD: ABNORMAL
EOSINOPHIL # BLD AUTO: 0.1 K/UL (ref 0–0.5)
EOSINOPHIL NFR BLD: 1.4 % (ref 0–8)
ERYTHROCYTE [DISTWIDTH] IN BLOOD BY AUTOMATED COUNT: 13.7 % (ref 11.5–14.5)
EST. GFR  (NO RACE VARIABLE): >60 ML/MIN/1.73 M^2
GLUCOSE SERPL-MCNC: 102 MG/DL (ref 70–110)
HCT VFR BLD AUTO: 37.8 % (ref 40–54)
HCV AB SERPL QL IA: NORMAL
HGB BLD-MCNC: 12.5 G/DL (ref 14–18)
HIV 1+2 AB+HIV1 P24 AG SERPL QL IA: NORMAL
IMM GRANULOCYTES # BLD AUTO: 0.09 K/UL (ref 0–0.04)
IMM GRANULOCYTES NFR BLD AUTO: 1.4 % (ref 0–0.5)
LYMPHOCYTES # BLD AUTO: 1.4 K/UL (ref 1–4.8)
LYMPHOCYTES NFR BLD: 20.6 % (ref 18–48)
MCH RBC QN AUTO: 28.7 PG (ref 27–31)
MCHC RBC AUTO-ENTMCNC: 33.1 G/DL (ref 32–36)
MCV RBC AUTO: 87 FL (ref 82–98)
MONOCYTES # BLD AUTO: 0.8 K/UL (ref 0.3–1)
MONOCYTES NFR BLD: 11.7 % (ref 4–15)
NEUTROPHILS # BLD AUTO: 4.3 K/UL (ref 1.8–7.7)
NEUTROPHILS NFR BLD: 64.3 % (ref 38–73)
NRBC BLD-RTO: 0 /100 WBC
PLATELET # BLD AUTO: 322 K/UL (ref 150–450)
PMV BLD AUTO: 8.6 FL (ref 9.2–12.9)
POTASSIUM SERPL-SCNC: 4.2 MMOL/L (ref 3.5–5.1)
PROT SERPL-MCNC: 8.7 G/DL (ref 6–8.4)
RBC # BLD AUTO: 4.36 M/UL (ref 4.6–6.2)
SARS-COV-2 RDRP RESP QL NAA+PROBE: NEGATIVE
SODIUM SERPL-SCNC: 135 MMOL/L (ref 136–145)
TROPONIN I SERPL DL<=0.01 NG/ML-MCNC: 0.06 NG/ML (ref 0–0.03)
TROPONIN I SERPL DL<=0.01 NG/ML-MCNC: 0.07 NG/ML (ref 0–0.03)
WBC # BLD AUTO: 6.64 K/UL (ref 3.9–12.7)

## 2023-02-22 PROCEDURE — 96374 THER/PROPH/DIAG INJ IV PUSH: CPT

## 2023-02-22 PROCEDURE — 80053 COMPREHEN METABOLIC PANEL: CPT | Performed by: PHYSICIAN ASSISTANT

## 2023-02-22 PROCEDURE — 93005 ELECTROCARDIOGRAM TRACING: CPT

## 2023-02-22 PROCEDURE — 99291 CRITICAL CARE FIRST HOUR: CPT | Mod: 25

## 2023-02-22 PROCEDURE — 99285 EMERGENCY DEPT VISIT HI MDM: CPT | Mod: 25

## 2023-02-22 PROCEDURE — 36415 COLL VENOUS BLD VENIPUNCTURE: CPT | Performed by: EMERGENCY MEDICINE

## 2023-02-22 PROCEDURE — 87389 HIV-1 AG W/HIV-1&-2 AB AG IA: CPT | Performed by: EMERGENCY MEDICINE

## 2023-02-22 PROCEDURE — 84484 ASSAY OF TROPONIN QUANT: CPT | Performed by: PHYSICIAN ASSISTANT

## 2023-02-22 PROCEDURE — 93010 EKG 12-LEAD: ICD-10-PCS | Mod: ,,, | Performed by: SPECIALIST

## 2023-02-22 PROCEDURE — 85025 COMPLETE CBC W/AUTO DIFF WBC: CPT | Performed by: PHYSICIAN ASSISTANT

## 2023-02-22 PROCEDURE — U0002 COVID-19 LAB TEST NON-CDC: HCPCS | Performed by: PHYSICIAN ASSISTANT

## 2023-02-22 PROCEDURE — 84484 ASSAY OF TROPONIN QUANT: CPT | Mod: 91 | Performed by: EMERGENCY MEDICINE

## 2023-02-22 PROCEDURE — 93010 ELECTROCARDIOGRAM REPORT: CPT | Mod: ,,, | Performed by: SPECIALIST

## 2023-02-22 PROCEDURE — 63600175 PHARM REV CODE 636 W HCPCS: Performed by: EMERGENCY MEDICINE

## 2023-02-22 PROCEDURE — 83880 ASSAY OF NATRIURETIC PEPTIDE: CPT | Performed by: PHYSICIAN ASSISTANT

## 2023-02-22 PROCEDURE — 86803 HEPATITIS C AB TEST: CPT | Performed by: EMERGENCY MEDICINE

## 2023-02-22 RX ORDER — FUROSEMIDE 10 MG/ML
40 INJECTION INTRAMUSCULAR; INTRAVENOUS
Status: COMPLETED | OUTPATIENT
Start: 2023-02-22 | End: 2023-02-22

## 2023-02-22 RX ADMIN — FUROSEMIDE 40 MG: 10 INJECTION, SOLUTION INTRAMUSCULAR; INTRAVENOUS at 02:02

## 2023-02-22 NOTE — ED PROVIDER NOTES
Encounter Date: 2/22/2023    SCRIBE #1 NOTE: IDion, fauzia scribing for, and in the presence of,  Newton Garcia MD.   SCRIBE #2 NOTE: I, Samara Tay am scribing for, and in the presence of,  Newton Garcia MD.   History     Chief Complaint   Patient presents with    Shortness of Breath     Patient states he has been SOB since last night states denies cough, swelling      Time seen by provider: 12:54 PM on 02/22/2023    Peyman Gr is a 67 y.o. male with a PMHx of HTN, PVD, hypercholesteremia, and PAD who presents to the ED for evaluation of SOB since last night. The patient denies coughing, N/V, fever, blood in urine and stool, runny nose, sore throat, or any other symptoms at this time.  PSHx of creation of bypass from internal carotid artery to subclavian artery, left heart catheterization, insertion of pacemaker temporary transvenous, coronary angiography, percutaneous coronary intervention, and a triple bypass CABG. The triple bypass was completed on 1/24/2023 by Zechariah Douglas MD at [facility].    The history is provided by the patient.   Review of patient's allergies indicates:  No Known Allergies  Past Medical History:   Diagnosis Date    Arthritis     Chronic back pain     Chronic neck pain     Dry gangrene     RIGHT LITTLE TOE    Hypercholesteremia     Hypertension     Insomnia     Multiple falls     S/P BACK SURGERY- 1 FALL    PAD (peripheral artery disease)     Personal history of colonic polyps     PVD (peripheral vascular disease)      Past Surgical History:   Procedure Laterality Date    ANGIOGRAPHY OF LOWER EXTREMITY N/A 09/24/2020    Procedure: Angiogram Extremity Unilateral;  Surgeon: Luke Cabello MD;  Location: [facility] CATH;  Service: Cardiology;  Laterality: N/A;    BACK SURGERY      BUNIONECTOMY Bilateral     CARPAL TUNNEL RELEASE Bilateral     CHOLECYSTECTOMY      COLONOSCOPY N/A 5/9/2022    Procedure: COLONOSCOPY;  Surgeon: Braydon Durand MD;  Location: [facility] ENDO;   Service: Endoscopy;  Laterality: N/A;    COLONOSCOPY W/ POLYPECTOMY      CORONARY ANGIOGRAPHY N/A 1/1/2023    Procedure: ANGIOGRAM, CORONARY ARTERY;  Surgeon: Stefany Elizondo MD;  Location: [facility] CATH/EP LAB;  Service: Cardiology;  Laterality: N/A;    CORONARY ARTERY BYPASS GRAFT      CORONARY ARTERY BYPASS GRAFT (CABG) N/A 1/24/2023    Procedure: CORONARY ARTERY BYPASS GRAFT (CABG);  Surgeon: Zechariah Douglas MD;  Location: [facility] OR;  Service: Cardiothoracic;  Laterality: N/A;    CREATION OF BYPASS FROM INTERNAL CAROTID ARTERY TO SUBCLAVIAN ARTERY Right 12/27/2021    Procedure: CREATION, BYPASS, ARTERIAL, INTERNAL CAROTID TO SUBCLAVIAN;  Surgeon: Jeovany Goins MD;  Location: [facility] OR;  Service: Vascular;  Laterality: Right;    ELBOW ARTHROPLASTY Right     ELBOW SURGERY      INSERTION, PACEMAKER, TEMPORARY TRANSVENOUS  1/1/2023    Procedure: Insertion, Pacemaker, Temporary Transvenous;  Surgeon: Stefany Elizondo MD;  Location: [facility] CATH/EP LAB;  Service: Cardiology;;    IVUS, CORONARY  1/1/2023    Procedure: IVUS, Coronary;  Surgeon: Stefany Elizondo MD;  Location: [facility] CATH/EP LAB;  Service: Cardiology;;    LEFT HEART CATHETERIZATION Left 1/1/2023    Procedure: Left heart cath;  Surgeon: Stefany Elizondo MD;  Location: [facility] CATH/EP LAB;  Service: Cardiology;  Laterality: Left;    MINIMALLY INVASIVE FORAMINOTOMY OF  SPINE N/A 11/15/2018    Procedure: FORAMINOTOMY, SPINE, MINIMALLY INVASIVE DECOMPRESSION L4-5;  Surgeon: Iván Stevenson Jr., MD;  Location: [facility] OR;  Service: Orthopedics;  Laterality: N/A;    NECK SURGERY      acf    PERCUTANEOUS CORONARY INTERVENTION, ARTERY N/A 1/1/2023    Procedure: Percutaneous coronary intervention;  Surgeon: Stefany Elizondo MD;  Location: [facility] CATH/EP LAB;  Service: Cardiology;  Laterality: N/A;    PERCUTANEOUS TRANSLUMINAL ANGIOPLASTY (PTA) OF PERIPHERAL VESSEL Right 05/01/2020    Procedure: PTA, PERIPHERAL VESSEL of right  lower extremity;  Surgeon: Luke Cabello MD;  [facility];  Service: Cardiology;  Laterality: Right;    PERCUTANEOUS TRANSLUMINAL ANGIOPLASTY (PTA) OF PERIPHERAL VESSEL Left 09/10/2020    Procedure: PTA, PERIPHERAL VESSEL, Left lower extremity;  Surgeon: Luke Cabello MD;  [facility];  Service: Cardiology;  Laterality: Left;    PERCUTANEOUS TRANSLUMINAL ANGIOPLASTY (PTA) OF PERIPHERAL VESSEL Left 2021    Profunda and SFA     Family History   Problem Relation Age of Onset    COPD Mother     Hypertension Father     Heart disease Father     Heart attack Father     COPD Sister     Other Sister     Kidney failure Brother     Hypertension Brother     Diabetes Brother      Social History     Tobacco Use    Smoking status: Former     Packs/day: 2.00     Years: 60.00     Pack years: 120.00     Types: Cigarettes     Quit date: 2023     Years since quittin.1    Smokeless tobacco: Never   Substance Use Topics    Alcohol use: No    Drug use: No     Review of Systems   Constitutional:  Negative for fever.   HENT:  Negative for rhinorrhea and sore throat.    Respiratory:  Positive for shortness of breath. Negative for cough.    Cardiovascular:  Negative for chest pain.   Gastrointestinal:  Negative for blood in stool and nausea.   Genitourinary:  Negative for dysuria and hematuria.   Musculoskeletal:  Negative for back pain.   Skin:  Negative for rash.   Neurological:  Negative for weakness.   Hematological:  Does not bruise/bleed easily.     Physical Exam     Initial Vitals [23 1134]   BP Pulse Resp Temp SpO2   (!) 146/63 84 20 98.1 °F (36.7 °C) 98 %      MAP       --         Physical Exam    Nursing note and vitals reviewed.  Constitutional: He appears well-developed and well-nourished. He is not diaphoretic. No distress.   HENT:   Head: Normocephalic and atraumatic.   Eyes: EOM are normal. Pupils are equal, round, and reactive to light.   Neck: Neck supple.   Normal range of  motion.  Cardiovascular:  Normal rate, regular rhythm and intact distal pulses.     Exam reveals no gallop and no friction rub.       Murmur heard.  Pulmonary/Chest: Breath sounds normal. He has no wheezes. He has no rhonchi. He has no rales.   Abdominal: Abdomen is soft. Bowel sounds are normal. A surgical scar is present. There is no abdominal tenderness.   Healing midline scar on chest. There is no rebound and no guarding.   Musculoskeletal:         General: Normal range of motion.      Cervical back: Normal range of motion and neck supple.     Neurological: He is alert and oriented to person, place, and time.   Skin: Skin is warm.   Psychiatric: He has a normal mood and affect. His behavior is normal. Judgment and thought content normal.       ED Course   Critical Care    Date/Time: 2/22/2023 3:20 PM  Performed by: Newton Garcia MD  Authorized by: Newton Garcia MD   Direct patient critical care time: 18 minutes  Ordering / reviewing critical care time: 14 minutes  Documentation critical care time: 8 minutes  Consulting other physicians critical care time: 9 minutes  Total critical care time (exclusive of procedural time) : 49 minutes  Critical care was time spent personally by me on the following activities: development of treatment plan with patient or surrogate, evaluation of patient's response to treatment, examination of patient, obtaining history from patient or surrogate, ordering and performing treatments and interventions, ordering and review of laboratory studies, ordering and review of radiographic studies and re-evaluation of patient's condition.      Labs Reviewed   CBC W/ AUTO DIFFERENTIAL - Abnormal; Notable for the following components:       Result Value    RBC 4.36 (*)     Hemoglobin 12.5 (*)     Hematocrit 37.8 (*)     MPV 8.6 (*)     Immature Granulocytes 1.4 (*)     Immature Grans (Abs) 0.09 (*)     All other components within normal limits   COMPREHENSIVE METABOLIC PANEL -  Abnormal; Notable for the following components:    Sodium 135 (*)     Total Protein 8.7 (*)     Alkaline Phosphatase 143 (*)     All other components within normal limits   TROPONIN I - Abnormal; Notable for the following components:    Troponin I 0.075 (*)     All other components within normal limits   B-TYPE NATRIURETIC PEPTIDE - Abnormal; Notable for the following components:     (*)     All other components within normal limits   TROPONIN I - Abnormal; Notable for the following components:    Troponin I 0.055 (*)     All other components within normal limits   SARS-COV-2 RNA AMPLIFICATION, QUAL   HIV 1 / 2 ANTIBODY   HEPATITIS C ANTIBODY     EKG Readings: (Independently Interpreted)   Normal sinus rhythm at a rate of 69 bpm with RBBB and T wave inversions in lead III, AVF, V2, V3 and V4.  No acute ST segment changes.       Imaging Results              X-Ray Chest PA And Lateral (Final result)  Result time 02/22/23 13:53:55      Final result by Nick Law MD (02/22/23 13:53:55)                   Impression:      No acute cardiopulmonary abnormality.  COPD.      Electronically signed by: Nick Law  Date:    02/22/2023  Time:    13:53               Narrative:    EXAMINATION:  XR CHEST PA AND LATERAL    CLINICAL HISTORY:  Shortness of breath    TECHNIQUE:  PA and lateral views of the chest were performed.    COMPARISON:  01/29/2023    FINDINGS:  Increased lucency upper lung zones.  Normal size heart with prior CABG.  Aortic arch atherosclerosis.  Additional surgical clips project at the right lung apex and right lower neck.  No pleural effusion or pneumothorax.  Sternal wires.  Fusion hardware near the cervicothoracic junction.                                       Medications   furosemide injection 40 mg (40 mg Intravenous Given 2/22/23 1423)     Medical Decision Making:   History:   Old Medical Records: I decided to obtain old medical records.  Initial Assessment:   67-year-old male  presented with shortness of breath.  Differential Diagnosis:   Initial differential diagnosis included but not limited to CHF exacerbation, pneumonia, and viral illness.  Independently Interpreted Test(s):   I have ordered and independently interpreted EKG Reading(s) - see prior notes  Clinical Tests:   Lab Tests: Ordered and Reviewed  Radiological Study: Ordered and Reviewed  Medical Tests: Ordered and Reviewed  ED Management:  The patient was emergently evaluated in the emergency department, his evaluation was significant for an older male with a normal lung exam.  The patient's EKG showed no acute abnormalities per my independent interpretation.  The patient's chest x-ray showed no acute abnormalities per Radiology.        Scribe Attestation:   Scribe #1: I performed the above scribed service and the documentation accurately describes the services I performed. I attest to the accuracy of the note.  Scribe #2: I performed the above scribed service and the documentation accurately describes the services I performed. I attest to the accuracy of the note.      ED Course as of 02/22/23 1821   Wed Feb 22, 2023   1419 The patient's labs were noted to have a mildly elevated troponin and elevated BNP.  The case was discussed with the cardiologist on-call, Dr. Elizondo.  He is very familiar with the patient from his recent admission at [facility].  He recommends a repeat troponin in a few hours as well as giving the patient a dose of IV Lasix here.  As long as the patient troponin is not severely rising then he will be stable for discharge to home per him.  He will follow him up as an outpatient. [PE]   1705 The patient's repeat troponin is noted to be lower than the 1st 1.  The patient reports that he feels much better after a dose of IV Lasix here and is no longer short of breath.  The etiology of his dyspnea could be related to mild heart failure.  The patient is stable for discharge to home, and does not  require admission at this time.  He will follow up with Cardiology as an outpatient. [PE]      ED Course User Index  [PE] Newton Garcia MD             I, Dr. Newton Garcia, personally performed the services described in this documentation. All medical record entries made by the scribe were at my direction and in my presence.  I have reviewed the chart and agree that the record reflects my personal performance and is accurate and complete. Newton Garcia MD.  6:18 PM 02/22/2023      Clinical Impression:   Final diagnoses:  [R06.02] SOB (shortness of breath)  [R06.00] Dyspnea, unspecified type (Primary)  [I50.9] Congestive heart failure, unspecified HF chronicity, unspecified heart failure type        ED Disposition Condition    Discharge Stable          ED Prescriptions    None       Follow-up Information       Follow up With Specialties Details Why Contact Info    Stefany Elizondo MD Cardiology Schedule an appointment as soon as possible for a visit   [address]  [address]  [place] [address]  [phone]      Jamal Cornejo MD Internal Medicine Schedule an appointment as soon as possible for a visit   [address]  [place] [address]  [phone]               Newton Garcia MD  02/22/23 1729

## 2023-02-22 NOTE — FIRST PROVIDER EVALUATION
"Medical screening examination initiated.  I have conducted a focused provider triage encounter, findings are as follows:    Brief history of present illness:  Peyman Gr is a 67 y.o. male presenting for evaluation of shortness of breath and overall not feeling well since last night.  Some associated fatigue, but no cough.  Recent MI, but no chest pain or palpitations.  Cardiologist is Dr. Elizondo.      Vitals:    02/22/23 1134   BP: (!) 146/63   BP Location: Right arm   Patient Position: Sitting   Pulse: 84   Resp: 20   Temp: 98.1 °F (36.7 °C)   TempSrc: Oral   SpO2: 98%   Weight: 78.9 kg (174 lb)   Height: 6' 2" (1.88 m)         Brief workup plan:    Orders Placed This Encounter   Procedures    X-Ray Chest PA And Lateral    HIV 1/2 Ag/Ab (4th Gen)    Hepatitis C Antibody    CBC auto differential    Comprehensive metabolic panel    Troponin I    Brain natriuretic peptide    COVID-19 Rapid Screening    EKG 12-lead         Preliminary workup initiated; this workup will be continued and followed by the physician or advanced practice provider that is assigned to the patient when roomed.  "

## 2023-02-22 NOTE — ED NOTES
"Pt presents with SOB and fatigue that started last night. "I just don't feel well." Denies chest pain. Reports being SOB during rest and exertion. S/P CABG 1/24/23. No leg swelling. Ambulatory with walker. Reports compliance with medications.   "

## 2023-02-23 ENCOUNTER — OFFICE VISIT (OUTPATIENT)
Dept: CARDIOLOGY | Facility: CLINIC | Age: 68
End: 2023-02-23
Payer: MEDICARE

## 2023-02-23 VITALS
OXYGEN SATURATION: 99 % | WEIGHT: 180.75 LBS | DIASTOLIC BLOOD PRESSURE: 70 MMHG | HEART RATE: 76 BPM | SYSTOLIC BLOOD PRESSURE: 102 MMHG | HEIGHT: 74 IN | BODY MASS INDEX: 23.2 KG/M2

## 2023-02-23 DIAGNOSIS — Z95.1 S/P CABG (CORONARY ARTERY BYPASS GRAFT): ICD-10-CM

## 2023-02-23 DIAGNOSIS — I48.0 PAROXYSMAL ATRIAL FIBRILLATION: Primary | ICD-10-CM

## 2023-02-23 DIAGNOSIS — R06.09 DOE (DYSPNEA ON EXERTION): ICD-10-CM

## 2023-02-23 PROCEDURE — 1111F PR DISCHARGE MEDS RECONCILED W/ CURRENT OUTPATIENT MED LIST: ICD-10-PCS | Mod: CPTII,S$GLB,, | Performed by: INTERNAL MEDICINE

## 2023-02-23 PROCEDURE — 1159F PR MEDICATION LIST DOCUMENTED IN MEDICAL RECORD: ICD-10-PCS | Mod: CPTII,S$GLB,, | Performed by: INTERNAL MEDICINE

## 2023-02-23 PROCEDURE — 3078F DIAST BP <80 MM HG: CPT | Mod: CPTII,S$GLB,, | Performed by: INTERNAL MEDICINE

## 2023-02-23 PROCEDURE — 1101F PR PT FALLS ASSESS DOC 0-1 FALLS W/OUT INJ PAST YR: ICD-10-PCS | Mod: CPTII,S$GLB,, | Performed by: INTERNAL MEDICINE

## 2023-02-23 PROCEDURE — 3044F HG A1C LEVEL LT 7.0%: CPT | Mod: CPTII,S$GLB,, | Performed by: INTERNAL MEDICINE

## 2023-02-23 PROCEDURE — 3074F SYST BP LT 130 MM HG: CPT | Mod: CPTII,S$GLB,, | Performed by: INTERNAL MEDICINE

## 2023-02-23 PROCEDURE — 1111F DSCHRG MED/CURRENT MED MERGE: CPT | Mod: CPTII,S$GLB,, | Performed by: INTERNAL MEDICINE

## 2023-02-23 PROCEDURE — 99215 PR OFFICE/OUTPT VISIT, EST, LEVL V, 40-54 MIN: ICD-10-PCS | Mod: S$GLB,,, | Performed by: INTERNAL MEDICINE

## 2023-02-23 PROCEDURE — 1101F PT FALLS ASSESS-DOCD LE1/YR: CPT | Mod: CPTII,S$GLB,, | Performed by: INTERNAL MEDICINE

## 2023-02-23 PROCEDURE — 1126F AMNT PAIN NOTED NONE PRSNT: CPT | Mod: CPTII,S$GLB,, | Performed by: INTERNAL MEDICINE

## 2023-02-23 PROCEDURE — 3078F PR MOST RECENT DIASTOLIC BLOOD PRESSURE < 80 MM HG: ICD-10-PCS | Mod: CPTII,S$GLB,, | Performed by: INTERNAL MEDICINE

## 2023-02-23 PROCEDURE — 99215 OFFICE O/P EST HI 40 MIN: CPT | Mod: S$GLB,,, | Performed by: INTERNAL MEDICINE

## 2023-02-23 PROCEDURE — 1159F MED LIST DOCD IN RCRD: CPT | Mod: CPTII,S$GLB,, | Performed by: INTERNAL MEDICINE

## 2023-02-23 PROCEDURE — 99999 PR PBB SHADOW E&M-EST. PATIENT-LVL IV: ICD-10-PCS | Mod: PBBFAC,,, | Performed by: INTERNAL MEDICINE

## 2023-02-23 PROCEDURE — 3074F PR MOST RECENT SYSTOLIC BLOOD PRESSURE < 130 MM HG: ICD-10-PCS | Mod: CPTII,S$GLB,, | Performed by: INTERNAL MEDICINE

## 2023-02-23 PROCEDURE — 1126F PR PAIN SEVERITY QUANTIFIED, NO PAIN PRESENT: ICD-10-PCS | Mod: CPTII,S$GLB,, | Performed by: INTERNAL MEDICINE

## 2023-02-23 PROCEDURE — 3288F PR FALLS RISK ASSESSMENT DOCUMENTED: ICD-10-PCS | Mod: CPTII,S$GLB,, | Performed by: INTERNAL MEDICINE

## 2023-02-23 PROCEDURE — 3008F PR BODY MASS INDEX (BMI) DOCUMENTED: ICD-10-PCS | Mod: CPTII,S$GLB,, | Performed by: INTERNAL MEDICINE

## 2023-02-23 PROCEDURE — 99999 PR PBB SHADOW E&M-EST. PATIENT-LVL IV: CPT | Mod: PBBFAC,,, | Performed by: INTERNAL MEDICINE

## 2023-02-23 PROCEDURE — 3008F BODY MASS INDEX DOCD: CPT | Mod: CPTII,S$GLB,, | Performed by: INTERNAL MEDICINE

## 2023-02-23 PROCEDURE — 3288F FALL RISK ASSESSMENT DOCD: CPT | Mod: CPTII,S$GLB,, | Performed by: INTERNAL MEDICINE

## 2023-02-23 PROCEDURE — 3044F PR MOST RECENT HEMOGLOBIN A1C LEVEL <7.0%: ICD-10-PCS | Mod: CPTII,S$GLB,, | Performed by: INTERNAL MEDICINE

## 2023-02-23 RX ORDER — CARVEDILOL 25 MG/1
25 TABLET ORAL 2 TIMES DAILY WITH MEALS
Qty: 180 TABLET | Refills: 2 | Status: SHIPPED | OUTPATIENT
Start: 2023-02-23

## 2023-02-23 RX ORDER — CLOPIDOGREL BISULFATE 75 MG/1
75 TABLET ORAL DAILY
Qty: 90 TABLET | Refills: 2 | Status: SHIPPED | OUTPATIENT
Start: 2023-02-23

## 2023-02-23 RX ORDER — PANTOPRAZOLE SODIUM 40 MG/1
40 TABLET, DELAYED RELEASE ORAL DAILY
Qty: 90 TABLET | Refills: 2 | Status: SHIPPED | OUTPATIENT
Start: 2023-02-23 | End: 2023-10-04

## 2023-02-23 RX ORDER — LANOLIN ALCOHOL/MO/W.PET/CERES
400 CREAM (GRAM) TOPICAL DAILY
Qty: 90 TABLET | Refills: 3 | Status: CANCELLED | OUTPATIENT
Start: 2023-02-23

## 2023-02-23 RX ORDER — POTASSIUM CHLORIDE 750 MG/1
10 TABLET, EXTENDED RELEASE ORAL DAILY
Qty: 20 TABLET | Refills: 0 | Status: SHIPPED | OUTPATIENT
Start: 2023-02-23 | End: 2023-04-05

## 2023-02-23 RX ORDER — AMLODIPINE BESYLATE 5 MG/1
5 TABLET ORAL DAILY
Qty: 90 TABLET | Refills: 2 | Status: SHIPPED | OUTPATIENT
Start: 2023-02-23 | End: 2023-11-20

## 2023-02-23 RX ORDER — FUROSEMIDE 40 MG/1
40 TABLET ORAL DAILY
Qty: 90 TABLET | Refills: 2 | Status: SHIPPED | OUTPATIENT
Start: 2023-02-23 | End: 2023-10-04

## 2023-02-23 RX ORDER — AMIODARONE HYDROCHLORIDE 200 MG/1
200 TABLET ORAL DAILY
Qty: 90 TABLET | Refills: 2 | Status: SHIPPED | OUTPATIENT
Start: 2023-02-23 | End: 2023-11-20

## 2023-02-23 RX ORDER — ATORVASTATIN CALCIUM 40 MG/1
40 TABLET, FILM COATED ORAL NIGHTLY
Qty: 90 TABLET | Refills: 2 | Status: SHIPPED | OUTPATIENT
Start: 2023-02-23 | End: 2023-11-20

## 2023-02-23 RX ORDER — EZETIMIBE 10 MG/1
10 TABLET ORAL DAILY
Qty: 90 TABLET | Refills: 2 | Status: SHIPPED | OUTPATIENT
Start: 2023-02-23

## 2023-02-23 RX ORDER — POTASSIUM CHLORIDE 750 MG/1
20 TABLET, EXTENDED RELEASE ORAL DAILY
Qty: 90 TABLET | Refills: 3 | Status: CANCELLED | OUTPATIENT
Start: 2023-02-23

## 2023-02-24 ENCOUNTER — TELEPHONE (OUTPATIENT)
Dept: PULMONOLOGY | Facility: CLINIC | Age: 68
End: 2023-02-24
Payer: MEDICARE

## 2023-02-24 NOTE — TELEPHONE ENCOUNTER
Spoke to pt and got him with Dr. Trejo on Monday 2/27  ----- Message from Iraida Lakhani LPN sent at 2/24/2023  9:40 AM CST -----  Regarding: SOONER APPOINTMENT  Morning,  I have scheduled patient in first-available appointment slot, 3/30/2023 , and have added to wait-list.   Patient asks for sooner appointment due to cardiac arrest 1/2023 with continuing SOA.    If you could review chart and contact patient, for sooner appointment, your help is appreciated    Thanks,  Diane [facility] Ref[facility] LPN

## 2023-02-24 NOTE — TELEPHONE ENCOUNTER
Pulm referral scheduled with patient: 3/30/2023  [facility]. Staff message sent for chart review and sooner appointment due to cardiac arrest (continuing SOA) 1/2023.

## 2023-02-24 NOTE — PROGRESS NOTES
Subjective:    Patient ID:  Peyman Gr is a 67 y.o. male patient here for evaluation Atrial Fibrillation, Hypertension, and Peripheral Arterial Disease      History of Present Illness:  Seen  as a follow up after his hospital discharge.  He is a 67-year-old male with past medical history of hypertension, hyperlipidemia, peripheral vascular disease, chronic smoking 50 pack year, initially presented to [facility] ER on January 1, 2023 with inferior wall STEMI which was complicated by complete heart block, RV shock and cardiac arrest, respiratory failure requiring intubation. Patient was resuscitated for 70 minutes and underwent emergent coronary angiogram and successful PCI of ostial RCA flush occlusion which was culprit lesion.  Patient also had ostial left main disease and OM lesion which were managed medically at that time due to post cardiac arrest state and normal LV function.  Patient had a long hospital course however significantly improved clinically, extubated and had good neurological recovery. Patient was found to be in AFib and was started on anticoagulation.  Patient had good progress with physical therapy.  CT surgery was consulted regarding the left main lesion and patient successfully underwent CABG and recovered well after the surgery.  Patient is compliant with the medications.  Underwent brief rehab after surgery and currently at home.  Currently walking with a walker. Patient went to [facility]  emergency room on 02/22/2023 with dyspnea and discharged after negative workup.  Patient complains of dyspnea upon exertion which he experienced while he was in the hospital as well.     Review of patient's allergies indicates:  No Known Allergies    Past Medical History:   Diagnosis Date    Arthritis     Chronic back pain     Chronic neck pain     Dry gangrene     RIGHT LITTLE TOE    Hypercholesteremia     Hypertension     Insomnia     Multiple falls     S/P BACK SURGERY- 1 FALL    PAD (peripheral  artery disease)     Personal history of colonic polyps     PVD (peripheral vascular disease)      Past Surgical History:   Procedure Laterality Date    ANGIOGRAPHY OF LOWER EXTREMITY N/A 09/24/2020    Procedure: Angiogram Extremity Unilateral;  Surgeon: Luke Cabello MD;  Location: [facility] CATH;  Service: Cardiology;  Laterality: N/A;    BACK SURGERY      BUNIONECTOMY Bilateral     CARPAL TUNNEL RELEASE Bilateral     CHOLECYSTECTOMY      COLONOSCOPY N/A 5/9/2022    Procedure: COLONOSCOPY;  Surgeon: Braydon Durand MD;  Location: [facility] ENDO;  Service: Endoscopy;  Laterality: N/A;    COLONOSCOPY W/ POLYPECTOMY      CORONARY ANGIOGRAPHY N/A 1/1/2023    Procedure: ANGIOGRAM, CORONARY ARTERY;  Surgeon: Stefany Elizondo MD;  Location: [facility] CATH/EP LAB;  Service: Cardiology;  Laterality: N/A;    CORONARY ARTERY BYPASS GRAFT      CORONARY ARTERY BYPASS GRAFT (CABG) N/A 1/24/2023    Procedure: CORONARY ARTERY BYPASS GRAFT (CABG);  Surgeon: Zechariah Douglas MD;  Location: [facility] OR;  Service: Cardiothoracic;  Laterality: N/A;    CREATION OF BYPASS FROM INTERNAL CAROTID ARTERY TO SUBCLAVIAN ARTERY Right 12/27/2021    Procedure: CREATION, BYPASS, ARTERIAL, INTERNAL CAROTID TO SUBCLAVIAN;  Surgeon: Jeovany Goins MD;  Location: [facility] OR;  Service: Vascular;  Laterality: Right;    ELBOW ARTHROPLASTY Right     ELBOW SURGERY      INSERTION, PACEMAKER, TEMPORARY TRANSVENOUS  1/1/2023    Procedure: Insertion, Pacemaker, Temporary Transvenous;  Surgeon: Stefany Elizondo MD;  Location: [facility] CATH/EP LAB;  Service: Cardiology;;    IVUS, CORONARY  1/1/2023    Procedure: IVUS, Coronary;  Surgeon: Stefany Elizondo MD;  Location: [facility] CATH/EP LAB;  Service: Cardiology;;    LEFT HEART CATHETERIZATION Left 1/1/2023    Procedure: Left heart cath;  Surgeon: Stefany Elizondo MD;  Location: [facility] CATH/EP LAB;  Service: Cardiology;  Laterality: Left;    MINIMALLY INVASIVE FORAMINOTOMY OF  SPINE N/A  11/15/2018    Procedure: FORAMINOTOMY, SPINE, MINIMALLY INVASIVE DECOMPRESSION L4-5;  Surgeon: Iván Stevenson Jr., MD;  Location: [facility] OR;  Service: Orthopedics;  Laterality: N/A;    NECK SURGERY      acf    PERCUTANEOUS CORONARY INTERVENTION, ARTERY N/A 2023    Procedure: Percutaneous coronary intervention;  Surgeon: Stefany Elizondo MD;  Location: [facility] CATH/EP LAB;  Service: Cardiology;  Laterality: N/A;    PERCUTANEOUS TRANSLUMINAL ANGIOPLASTY (PTA) OF PERIPHERAL VESSEL Right 2020    Procedure: PTA, PERIPHERAL VESSEL of right lower extremity;  Surgeon: Luke Cabello MD;  Location: [facility] CATH;  Service: Cardiology;  Laterality: Right;    PERCUTANEOUS TRANSLUMINAL ANGIOPLASTY (PTA) OF PERIPHERAL VESSEL Left 09/10/2020    Procedure: PTA, PERIPHERAL VESSEL, Left lower extremity;  Surgeon: Luke Cabello MD;  Location: [facility] CATH;  Service: Cardiology;  Laterality: Left;    PERCUTANEOUS TRANSLUMINAL ANGIOPLASTY (PTA) OF PERIPHERAL VESSEL Left 2021    Profunda and SFA     Social History     Tobacco Use    Smoking status: Former     Packs/day: 2.00     Years: 60.00     Pack years: 120.00     Types: Cigarettes     Quit date: 2023     Years since quittin.1    Smokeless tobacco: Never   Substance Use Topics    Alcohol use: No    Drug use: No        Review of Systems   Negative except as mentioned in HPI         Objective        Vitals:    23 1401   BP: 102/70   Pulse:        LIPIDS - LAST 2   No results found for: CHOL, HDL, LDLCALC, TRIG, CHOLHDL    CBC - LAST 2  Lab Results   Component Value Date    WBC 6.64 2023    WBC 7.86 2023    RBC 4.36 (L) 2023    RBC 2.99 (L) 2023    HGB 12.5 (L) 2023    HGB 9.2 (L) 2023    HCT 37.8 (L) 2023    HCT 27.8 (L) 2023    MCV 87 2023    MCV 93 2023    MCH 28.7 2023    MCH 30.8 2023    MCHC 33.1 2023    MCHC 33.1 2023    RDW 13.7 2023    RDW 13.5  02/01/2023     02/22/2023     02/01/2023    MPV 8.6 (L) 02/22/2023    MPV 9.0 (L) 02/01/2023    GRAN 4.3 02/22/2023    GRAN 64.3 02/22/2023    LYMPH 1.4 02/22/2023    LYMPH 20.6 02/22/2023    MONO 0.8 02/22/2023    MONO 11.7 02/22/2023    BASO 0.04 02/22/2023    BASO 0.02 02/01/2023    NRBC 0 02/22/2023    NRBC 0 02/01/2023       CHEMISTRY & LIVER FUNCTION - LAST 2  Lab Results   Component Value Date     (L) 02/22/2023     (L) 02/01/2023    K 4.2 02/22/2023    K 3.4 (L) 02/01/2023    CL 99 02/22/2023    CL 98 02/01/2023    CO2 25 02/22/2023    CO2 27 02/01/2023    ANIONGAP 11 02/22/2023    ANIONGAP 6 (L) 02/01/2023    BUN 17 02/22/2023    BUN 13 02/01/2023    CREATININE 1.0 02/22/2023    CREATININE 0.8 02/01/2023     02/22/2023     02/01/2023    CALCIUM 9.9 02/22/2023    CALCIUM 8.0 (L) 02/01/2023    PH 7.335 (L) 01/25/2023    PH 7.346 (L) 01/24/2023    MG 1.8 02/01/2023    MG 2.1 01/31/2023    ALBUMIN 3.9 02/22/2023    ALBUMIN 2.8 (L) 02/01/2023    PROT 8.7 (H) 02/22/2023    PROT 6.2 02/01/2023    ALKPHOS 143 (H) 02/22/2023    ALKPHOS 85 02/01/2023    ALT 23 02/22/2023    ALT 17 02/01/2023    AST 27 02/22/2023    AST 17 02/01/2023    BILITOT 0.4 02/22/2023    BILITOT 0.6 02/01/2023        CARDIAC PROFILE - LAST 2  Lab Results   Component Value Date     (H) 02/22/2023     (H) 01/31/2023     12/23/2014    CPKMB 4.8 12/23/2014    TROPONINI 0.055 (H) 02/22/2023    TROPONINI 0.075 (H) 02/22/2023    TROPONINIHS 309579.9 (HH) 01/02/2023    TROPONINIHS 924295.9 (HH) 01/02/2023        COAGULATION - LAST 2  Lab Results   Component Value Date    LABPT 13.8 12/27/2021    LABPT 12.6 12/21/2021    INR 1.2 01/01/2023    INR 1.1 12/27/2021    APTT 30.3 01/01/2023    APTT 31.3 12/27/2021       ENDOCRINE & PSA - LAST 2  Lab Results   Component Value Date    HGBA1C 5.7 01/01/2023    PROCAL 0.20 01/13/2023    PROCAL 3.13 (H) 01/03/2023        ECHOCARDIOGRAM RESULTS  Results  for orders placed during the hospital encounter of 01/01/23    Echo Saline Bubble? No    Interpretation Summary  · The left ventricle is normal in size with mild concentric hypertrophy and normal systolic function.  · The estimated ejection fraction is 70%.  · Normal left ventricular diastolic function.  · Atrial fibrillation not observed.  · Normal right ventricular size with normal right ventricular systolic function.  · Normal central venous pressure (3 mmHg).      CURRENT/PREVIOUS VISIT EKG  Results for orders placed or performed during the hospital encounter of 02/22/23   EKG 12-lead    Collection Time: 02/22/23  1:45 PM    Narrative    Test Reason : I49.9    Vent. Rate : 069 BPM     Atrial Rate : 069 BPM     P-R Int : 160 ms          QRS Dur : 130 ms      QT Int : 462 ms       P-R-T Axes : 075 028 017 degrees     QTc Int : 495 ms    Normal sinus rhythm  Possible Left atrial enlargement  Right bundle branch block  Inferior infarct ,age undetermined  Anterior infarct (cited on or before 22-FEB-2023)  Abnormal ECG  When compared with ECG of 22-FEB-2023 12:42,  Questionable change in initial forces of Anterior leads  T wave inversion now evident in Inferior leads    Referred By: [email or web address]   SELF           Confirmed By:      No valid procedures specified.   No results found for this or any previous visit.    No valid procedures specified.          PREVIOUS STRESS TEST              PREVIOUS ANGIOGRAM        PHYSICAL EXAM    CONSTITUTIONAL: Well built, well nourished in no apparent distress  HEENT: No pallor  NECK: no JVD  LUNGS:  Decreased breath sounds in bilateral bases  HEART: regular rate and rhythm, S1, S2 normal, no murmur   ABDOMEN: soft, non-tender; bowel sounds normal  EXTREMITIES: No edema  NEURO: AAO X 3   SKIN:  No rash  Psych:  Normal affect    I HAVE REVIEWED :    The vital signs, nurses notes, and all the pertinent radiology and labs.        Current Outpatient Medications   Medication Instructions     albuterol (PROVENTIL/VENTOLIN HFA) 90 mcg/actuation inhaler 1-2 puffs, Inhalation, Every 6 hours PRN, Rescue    amiodarone (PACERONE) 200 mg, Oral, Daily    amLODIPine (NORVASC) 5 mg, Oral, Daily    apixaban (ELIQUIS) 5 mg, Oral, 2 times daily    atorvastatin (LIPITOR) 40 mg, Oral, Nightly    bisacodyL (DULCOLAX) 10 mg, Rectal, Daily PRN    carvediloL (COREG) 25 mg, Oral, 2 times daily with meals    clopidogreL (PLAVIX) 75 mg, Oral, Daily    docusate sodium (COLACE) 100 mg, Oral, 2 times daily    EScitalopram oxalate (LEXAPRO) 10 mg, Oral, Daily    ezetimibe (ZETIA) 10 mg, Oral, Daily    furosemide (LASIX) 40 mg, Oral, Daily    guaiFENesin (MUCINEX) 600 mg, Oral, 2 times daily    magnesium oxide (MAG-OX) 400 mg, Oral, Daily    melatonin (MELATIN) 6 mg, Oral, Nightly PRN    mupirocin (BACTROBAN) 2 % ointment Topical (Top), 3 times daily    oxyCODONE-acetaminophen (PERCOCET) 7.5-325 mg per tablet 1 tablet, Oral, 2 times daily PRN    pantoprazole (PROTONIX) 40 mg, Oral, Daily    polyethylene glycol (GLYCOLAX) 17 g, Oral, 2 times daily    potassium chloride SA (K-DUR,KLOR-CON M) 10 MEQ tablet 10 mEq, Oral, Daily        Assessment & Plan     Seen  as a follow up after his hospital discharge.  He is a 67-year-old male with past medical history of hypertension, hyperlipidemia, peripheral vascular disease, chronic smoking 50 pack year, initially presented to [facility] ER on January 1, 2023 with inferior wall STEMI which was complicated by complete heart block, RV shock and cardiac arrest, respiratory failure requiring intubation. Patient was resuscitated for a total of 70 minutes for cardiac arrest and underwent emergent coronary angiogram and successful PCI of ostial RCA flush occlusion which was culprit lesion.  Patient also had ostial left main disease and OM lesion which were managed medically at that time due to post cardiac arrest state and normal LV function.  Patient had a long hospital course however  significantly improved clinically, extubated and had good neurological recovery. Patient was found to be in AFib and was started on anticoagulation.  Patient had good progress with physical therapy.  CT surgery was consulted regarding the left main lesion and patient successfully underwent CABG during the same hospitalization and recovered well after the surgery.  Patient is compliant with the medications.  Underwent brief rehab after surgery and currently at home.  Currently walking with a walker. Patient went to [facility]  emergency room on 02/22/2023 with dyspnea and discharged after negative workup.  Patient complains of persistent dyspnea upon exertion which he experienced while he was in the hospital as well.     Dyspnea on exertion- patient is clinically euvolemic.  Postop anemia significantly improved. Exertional dyspnea is likely secondary to exercise intolerance and/or COPD.  Patient has 50 pack-year smoking history and never had workup for COPD.  Patient stopped smoking since his recent hospitalization.  We will refer patient to cardiac rehab.  Pulmonary referral for evaluation of COPD and PFTs.  Repeat blood work with PCP.  Meds refilled.  Continue Eliquis, Plavix, amiodarone, ezetimibe, atorvastatin, amlodipine, Coreg, Lasix.      Follow up in about 3 months (around 5/23/2023).

## 2023-02-27 ENCOUNTER — OFFICE VISIT (OUTPATIENT)
Dept: PULMONOLOGY | Facility: CLINIC | Age: 68
End: 2023-02-27
Payer: MEDICARE

## 2023-02-27 VITALS
WEIGHT: 186.06 LBS | HEIGHT: 74 IN | BODY MASS INDEX: 23.88 KG/M2 | HEART RATE: 53 BPM | OXYGEN SATURATION: 96 % | SYSTOLIC BLOOD PRESSURE: 119 MMHG | DIASTOLIC BLOOD PRESSURE: 73 MMHG

## 2023-02-27 DIAGNOSIS — J61 ASBESTOSIS: ICD-10-CM

## 2023-02-27 DIAGNOSIS — R06.09 DOE (DYSPNEA ON EXERTION): ICD-10-CM

## 2023-02-27 DIAGNOSIS — J44.9 CHRONIC OBSTRUCTIVE PULMONARY DISEASE, UNSPECIFIED COPD TYPE: Primary | ICD-10-CM

## 2023-02-27 DIAGNOSIS — Z87.891 PERSONAL HISTORY OF NICOTINE DEPENDENCE: ICD-10-CM

## 2023-02-27 PROCEDURE — 3288F FALL RISK ASSESSMENT DOCD: CPT | Mod: CPTII,S$GLB,, | Performed by: INTERNAL MEDICINE

## 2023-02-27 PROCEDURE — 3044F HG A1C LEVEL LT 7.0%: CPT | Mod: CPTII,S$GLB,, | Performed by: INTERNAL MEDICINE

## 2023-02-27 PROCEDURE — 1159F PR MEDICATION LIST DOCUMENTED IN MEDICAL RECORD: ICD-10-PCS | Mod: CPTII,S$GLB,, | Performed by: INTERNAL MEDICINE

## 2023-02-27 PROCEDURE — 3008F PR BODY MASS INDEX (BMI) DOCUMENTED: ICD-10-PCS | Mod: CPTII,S$GLB,, | Performed by: INTERNAL MEDICINE

## 2023-02-27 PROCEDURE — 1125F PR PAIN SEVERITY QUANTIFIED, PAIN PRESENT: ICD-10-PCS | Mod: CPTII,S$GLB,, | Performed by: INTERNAL MEDICINE

## 2023-02-27 PROCEDURE — 3074F SYST BP LT 130 MM HG: CPT | Mod: CPTII,S$GLB,, | Performed by: INTERNAL MEDICINE

## 2023-02-27 PROCEDURE — 1125F AMNT PAIN NOTED PAIN PRSNT: CPT | Mod: CPTII,S$GLB,, | Performed by: INTERNAL MEDICINE

## 2023-02-27 PROCEDURE — 3008F BODY MASS INDEX DOCD: CPT | Mod: CPTII,S$GLB,, | Performed by: INTERNAL MEDICINE

## 2023-02-27 PROCEDURE — 1159F MED LIST DOCD IN RCRD: CPT | Mod: CPTII,S$GLB,, | Performed by: INTERNAL MEDICINE

## 2023-02-27 PROCEDURE — 3078F PR MOST RECENT DIASTOLIC BLOOD PRESSURE < 80 MM HG: ICD-10-PCS | Mod: CPTII,S$GLB,, | Performed by: INTERNAL MEDICINE

## 2023-02-27 PROCEDURE — 1111F DSCHRG MED/CURRENT MED MERGE: CPT | Mod: CPTII,S$GLB,, | Performed by: INTERNAL MEDICINE

## 2023-02-27 PROCEDURE — 3078F DIAST BP <80 MM HG: CPT | Mod: CPTII,S$GLB,, | Performed by: INTERNAL MEDICINE

## 2023-02-27 PROCEDURE — 1111F PR DISCHARGE MEDS RECONCILED W/ CURRENT OUTPATIENT MED LIST: ICD-10-PCS | Mod: CPTII,S$GLB,, | Performed by: INTERNAL MEDICINE

## 2023-02-27 PROCEDURE — 99999 PR PBB SHADOW E&M-EST. PATIENT-LVL V: CPT | Mod: PBBFAC,,, | Performed by: INTERNAL MEDICINE

## 2023-02-27 PROCEDURE — 3288F PR FALLS RISK ASSESSMENT DOCUMENTED: ICD-10-PCS | Mod: CPTII,S$GLB,, | Performed by: INTERNAL MEDICINE

## 2023-02-27 PROCEDURE — 3074F PR MOST RECENT SYSTOLIC BLOOD PRESSURE < 130 MM HG: ICD-10-PCS | Mod: CPTII,S$GLB,, | Performed by: INTERNAL MEDICINE

## 2023-02-27 PROCEDURE — 99214 PR OFFICE/OUTPT VISIT, EST, LEVL IV, 30-39 MIN: ICD-10-PCS | Mod: S$GLB,,, | Performed by: INTERNAL MEDICINE

## 2023-02-27 PROCEDURE — 1101F PT FALLS ASSESS-DOCD LE1/YR: CPT | Mod: CPTII,S$GLB,, | Performed by: INTERNAL MEDICINE

## 2023-02-27 PROCEDURE — 99999 PR PBB SHADOW E&M-EST. PATIENT-LVL V: ICD-10-PCS | Mod: PBBFAC,,, | Performed by: INTERNAL MEDICINE

## 2023-02-27 PROCEDURE — 1101F PR PT FALLS ASSESS DOC 0-1 FALLS W/OUT INJ PAST YR: ICD-10-PCS | Mod: CPTII,S$GLB,, | Performed by: INTERNAL MEDICINE

## 2023-02-27 PROCEDURE — 99214 OFFICE O/P EST MOD 30 MIN: CPT | Mod: S$GLB,,, | Performed by: INTERNAL MEDICINE

## 2023-02-27 PROCEDURE — 3044F PR MOST RECENT HEMOGLOBIN A1C LEVEL <7.0%: ICD-10-PCS | Mod: CPTII,S$GLB,, | Performed by: INTERNAL MEDICINE

## 2023-02-27 RX ORDER — FLUTICASONE FUROATE, UMECLIDINIUM BROMIDE AND VILANTEROL TRIFENATATE 200; 62.5; 25 UG/1; UG/1; UG/1
1 POWDER RESPIRATORY (INHALATION) DAILY
Qty: 60 EACH | Refills: 11 | Status: SHIPPED | OUTPATIENT
Start: 2023-02-27

## 2023-02-27 RX ORDER — TIOTROPIUM BROMIDE AND OLODATEROL 3.124; 2.736 UG/1; UG/1
2 SPRAY, METERED RESPIRATORY (INHALATION) DAILY
Qty: 12 G | Refills: 3 | Status: SHIPPED | OUTPATIENT
Start: 2023-02-27

## 2023-02-27 NOTE — PATIENT INSTRUCTIONS
Use stiolto 2 puffs daily --may use trelegy one puff daily instead-- use whatever most effective.    Ct chest 9/2022 viewed with emphysema and calcified pleural plaque --you have mixture of copd and asbestosis.    You could have screening ct chest looking for lung cancer - may be reasonable. Will set up for September.    Would check oxygen sleeping to qualify for oxygen.     Walking with walker in office today -- you had very good oxygen saturation.

## 2023-02-27 NOTE — PROGRESS NOTES
"2023    Peyman Gr  New Patient Consult    Chief Complaint   Patient presents with    Shortness of Breath     Feels SOB all the time not just with exertion -87 after walking to room - jumped to 96 sitting  No oxygen at home  No rescue inhaler       HPI:     Worked [employer], worked [employer]  til neck/back surgeries started in .  Pt was smoker til mi 2023.  Mom and brother and sister  copd.  Pt uses wifes asthma albuterol inhaler with no help.  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  Uses roller walker -- has back pains limited.      No sinus problems.  Wife relates cough and wheezes worsening nightly.  Used 2 puffs albuterol.       Snores with occ apneas at times.      Pt had recent cardiology visit dr Elizondo 2023:"He is a 67-year-old male with past medical history of hypertension, hyperlipidemia, peripheral vascular disease, chronic smoking 50 pack year, initially presented to [facility] ER on 2023 with inferior wall STEMI which was complicated by complete heart block, RV shock and cardiac arrest, respiratory failure requiring intubation. Patient was resuscitated for a total of 70 minutes for cardiac arrest and underwent emergent coronary angiogram and successful PCI of ostial RCA flush occlusion which was culprit lesion.  Patient also had ostial left main disease and OM lesion which were managed medically at that time due to post cardiac arrest state and normal LV function.  Patient had a long hospital course however significantly improved clinically, extubated and had good neurological recovery. Patient was found to be in AFib and was started on anticoagulation.  Patient had good progress with physical therapy.  CT surgery was consulted regarding the left main lesion and patient successfully underwent CABG during the same hospitalization and recovered well after the surgery.  Patient is compliant with the medications.  Underwent brief rehab after " "surgery and currently at home.  Currently walking with a walker. Patient went to [facility]  emergency room on 02/22/2023 with dyspnea and discharged after negative workup.  Patient complains of persistent dyspnea upon exertion which he experienced while he was in the hospital as well. "      Ct chest 9/9/2022 with bilat post pleural calcification.  Empyhsema noted also     PFSH:  Past Medical History:   Diagnosis Date    Arthritis     Chronic back pain     Chronic neck pain     Dry gangrene     RIGHT LITTLE TOE    Hypercholesteremia     Hypertension     Insomnia     Multiple falls     S/P BACK SURGERY- 1 FALL    PAD (peripheral artery disease)     Personal history of colonic polyps     PVD (peripheral vascular disease)          Past Surgical History:   Procedure Laterality Date    ANGIOGRAPHY OF LOWER EXTREMITY N/A 09/24/2020    Procedure: Angiogram Extremity Unilateral;  Surgeon: Luke Cabello MD;  Location: [facility] CATH;  Service: Cardiology;  Laterality: N/A;    BACK SURGERY      BUNIONECTOMY Bilateral     CARPAL TUNNEL RELEASE Bilateral     CHOLECYSTECTOMY      COLONOSCOPY N/A 5/9/2022    Procedure: COLONOSCOPY;  Surgeon: Braydon Durand MD;  Location: [facility] ENDO;  Service: Endoscopy;  Laterality: N/A;    COLONOSCOPY W/ POLYPECTOMY      CORONARY ANGIOGRAPHY N/A 1/1/2023    Procedure: ANGIOGRAM, CORONARY ARTERY;  Surgeon: Stefany Elizondo MD;  Location: [facility] CATH/EP LAB;  Service: Cardiology;  Laterality: N/A;    CORONARY ARTERY BYPASS GRAFT      CORONARY ARTERY BYPASS GRAFT (CABG) N/A 1/24/2023    Procedure: CORONARY ARTERY BYPASS GRAFT (CABG);  Surgeon: Zechariah Douglas MD;  Location: [facility] OR;  Service: Cardiothoracic;  Laterality: N/A;    CREATION OF BYPASS FROM INTERNAL CAROTID ARTERY TO SUBCLAVIAN ARTERY Right 12/27/2021    Procedure: CREATION, BYPASS, ARTERIAL, INTERNAL CAROTID TO SUBCLAVIAN;  Surgeon: Jeovany Goins MD;  Location: [facility] OR;  Service: Vascular;  Laterality: Right;    ELBOW " ARTHROPLASTY Right     ELBOW SURGERY      INSERTION, PACEMAKER, TEMPORARY TRANSVENOUS  2023    Procedure: Insertion, Pacemaker, Temporary Transvenous;  Surgeon: Stefany Elizondo MD;  Location: [facility] CATH/EP LAB;  Service: Cardiology;;    IVUS, CORONARY  2023    Procedure: IVUS, Coronary;  Surgeon: Stefany Elizondo MD;  Location: [facility] CATH/EP LAB;  Service: Cardiology;;    LEFT HEART CATHETERIZATION Left 2023    Procedure: Left heart cath;  Surgeon: Stefany Elizondo MD;  Location: [facility] CATH/EP LAB;  Service: Cardiology;  Laterality: Left;    MINIMALLY INVASIVE FORAMINOTOMY OF  SPINE N/A 11/15/2018    Procedure: FORAMINOTOMY, SPINE, MINIMALLY INVASIVE DECOMPRESSION L4-5;  Surgeon: Iván Stevenson Jr., MD;  Location: [facility] OR;  Service: Orthopedics;  Laterality: N/A;    NECK SURGERY      acf    PERCUTANEOUS CORONARY INTERVENTION, ARTERY N/A 2023    Procedure: Percutaneous coronary intervention;  Surgeon: Stefany Elizondo MD;  Location: [facility] CATH/EP LAB;  Service: Cardiology;  Laterality: N/A;    PERCUTANEOUS TRANSLUMINAL ANGIOPLASTY (PTA) OF PERIPHERAL VESSEL Right 2020    Procedure: PTA, PERIPHERAL VESSEL of right lower extremity;  Surgeon: Luke Cabello MD;  Location: [facility] CATH;  Service: Cardiology;  Laterality: Right;    PERCUTANEOUS TRANSLUMINAL ANGIOPLASTY (PTA) OF PERIPHERAL VESSEL Left 09/10/2020    Procedure: PTA, PERIPHERAL VESSEL, Left lower extremity;  Surgeon: Luke Cabello MD;  Location: [facility] CATH;  Service: Cardiology;  Laterality: Left;    PERCUTANEOUS TRANSLUMINAL ANGIOPLASTY (PTA) OF PERIPHERAL VESSEL Left 2021    Profunda and SFA     Social History     Tobacco Use    Smoking status: Former     Packs/day: 2.00     Years: 60.00     Pack years: 120.00     Types: Cigarettes     Quit date: 2023     Years since quittin.1    Smokeless tobacco: Never   Substance Use Topics    Alcohol use: No    Drug use: No     Family History  "  Problem Relation Age of Onset    COPD Mother     Hypertension Father     Heart disease Father     Heart attack Father     COPD Sister     Other Sister     Kidney failure Brother     Hypertension Brother     Diabetes Brother      Review of patient's allergies indicates:  No Known Allergies       Review of Systems:  a review of eleven systems covering constitutional, Eye, HEENT, Psych, Respiratory, Cardiac, GI, , Musculoskeletal, Endocrine, Dermatologic was negative except for pertinent findings as listed ABOVE and below:  pertinent positives as above, rest good        Exam:Comprehensive exam done. /73 (BP Location: Left arm, Patient Position: Sitting, BP Method: Medium (Automatic))   Pulse (!) 53   Ht 6' 2" (1.88 m)   Wt 84.4 kg (186 lb 1.1 oz)   SpO2 96% Comment: on room air at rest  BMI 23.89 kg/m²   Exam included Vitals as listed, and patient's appearance and affect and alertness and mood, oral exam for yeast and hygiene and pharynx lesions and Mallapatti (M) score, neck with inspection for jvd and masses and thyroid abnormalities and lymph nodes (supraclavicular and infraclavicular nodes and axillary also examined and noted if abn), chest exam included symmetry and effort and fremitus and percussion and auscultation, cardiac exam included rhythm and gallops and murmur and rubs and jvd and edema, abdominal exam for mass and hepatosplenomegaly and tenderness and hernias and bowel sounds, Musculoskeletal exam with muscle tone and posture and mobility/gait and  strength, and skin for rashes and cyanosis and pallor and turgor, extremity for clubbing.  Findings were normal except for pertinent findings listed below:  M2, chest is symmetric, no distress, normal percussion, normal fremitus and good normal breath sounds  Uses roller walker to mobilize         Radiographs (ct chest and cxr) reviewed: view by direct vision      Labs reviewed           PFT will be done and results to be reviewed "       Plan:  Clinical impression is apparently straight forward and impression with management as below.    Peyman was seen today for shortness of breath.    Diagnoses and all orders for this visit:    Chronic obstructive pulmonary disease, unspecified COPD type  -     tiotropium-olodateroL (STIOLTO RESPIMAT) 2.5-2.5 mcg/actuation Mist; Inhale 2 puffs into the lungs once daily. Controller  -     fluticasone-umeclidin-vilanter (TRELEGY ELLIPTA) 200-62.5-25 mcg inhaler; Inhale 1 puff into the lungs once daily.  -     PULSE OXIMETRY OVERNIGHT; Future    STRONG (dyspnea on exertion)  -     Ambulatory referral/consult to Pulmonology    Asbestosis    Personal history of nicotine dependence  -     CT Chest Lung Screening Low Dose; Future        Follow up in about 7 months (around 9/19/2023).    Discussed with patient above for education the following:      Patient Instructions   Use stiolto 2 puffs daily --may use trelegy one puff daily instead-- use whatever most effective.    Ct chest 9/2022 viewed with emphysema and calcified pleural plaque --you have mixture of copd and asbestosis.    You could have screening ct chest looking for lung cancer - may be reasonable. Will set up for September.    Would check oxygen sleeping to qualify for oxygen.     Walking with walker in office today -- you had very good oxygen saturation.              Eval took 37min

## 2023-03-20 ENCOUNTER — TELEPHONE (OUTPATIENT)
Dept: CARDIAC REHAB | Facility: HOSPITAL | Age: 68
End: 2023-03-20

## 2023-04-11 ENCOUNTER — CLINICAL SUPPORT (OUTPATIENT)
Dept: CARDIAC REHAB | Facility: HOSPITAL | Age: 68
End: 2023-04-11
Payer: MEDICARE

## 2023-04-11 DIAGNOSIS — Z95.1 S/P CABG (CORONARY ARTERY BYPASS GRAFT): Primary | ICD-10-CM

## 2023-04-11 PROCEDURE — 93797 PHYS/QHP OP CAR RHAB WO ECG: CPT

## 2023-04-12 NOTE — ADDENDUM NOTE
Addendum  created 04/12/23 1150 by Chester De La Garza MD    Clinical Note Signed, Intraprocedure Blocks edited, SmartForm saved

## 2023-04-17 ENCOUNTER — CLINICAL SUPPORT (OUTPATIENT)
Dept: CARDIAC REHAB | Facility: HOSPITAL | Age: 68
End: 2023-04-17
Payer: MEDICARE

## 2023-04-17 DIAGNOSIS — Z95.1 S/P CABG (CORONARY ARTERY BYPASS GRAFT): Primary | ICD-10-CM

## 2023-04-17 PROCEDURE — 93798 PHYS/QHP OP CAR RHAB W/ECG: CPT

## 2023-04-19 ENCOUNTER — CLINICAL SUPPORT (OUTPATIENT)
Dept: CARDIAC REHAB | Facility: HOSPITAL | Age: 68
End: 2023-04-19
Payer: MEDICARE

## 2023-04-19 DIAGNOSIS — Z95.1 S/P CABG (CORONARY ARTERY BYPASS GRAFT): Primary | ICD-10-CM

## 2023-04-19 PROCEDURE — 93798 PHYS/QHP OP CAR RHAB W/ECG: CPT

## 2023-04-21 ENCOUNTER — CLINICAL SUPPORT (OUTPATIENT)
Dept: CARDIAC REHAB | Facility: HOSPITAL | Age: 68
End: 2023-04-21
Payer: MEDICARE

## 2023-04-21 DIAGNOSIS — Z95.1 S/P CABG (CORONARY ARTERY BYPASS GRAFT): Primary | ICD-10-CM

## 2023-04-21 PROCEDURE — 93798 PHYS/QHP OP CAR RHAB W/ECG: CPT

## 2023-04-24 ENCOUNTER — CLINICAL SUPPORT (OUTPATIENT)
Dept: CARDIAC REHAB | Facility: HOSPITAL | Age: 68
End: 2023-04-24
Payer: MEDICARE

## 2023-04-24 DIAGNOSIS — Z95.1 S/P CABG (CORONARY ARTERY BYPASS GRAFT): Primary | ICD-10-CM

## 2023-04-24 PROCEDURE — 93798 PHYS/QHP OP CAR RHAB W/ECG: CPT

## 2023-04-26 ENCOUNTER — CLINICAL SUPPORT (OUTPATIENT)
Dept: CARDIAC REHAB | Facility: HOSPITAL | Age: 68
End: 2023-04-26
Payer: MEDICARE

## 2023-04-26 DIAGNOSIS — Z95.1 S/P CABG (CORONARY ARTERY BYPASS GRAFT): Primary | ICD-10-CM

## 2023-04-26 PROCEDURE — 93798 PHYS/QHP OP CAR RHAB W/ECG: CPT

## 2023-04-28 ENCOUNTER — CLINICAL SUPPORT (OUTPATIENT)
Dept: CARDIAC REHAB | Facility: HOSPITAL | Age: 68
End: 2023-04-28
Payer: MEDICARE

## 2023-04-28 DIAGNOSIS — Z95.1 S/P CABG (CORONARY ARTERY BYPASS GRAFT): Primary | ICD-10-CM

## 2023-04-28 PROCEDURE — 93798 PHYS/QHP OP CAR RHAB W/ECG: CPT

## 2023-05-01 ENCOUNTER — CLINICAL SUPPORT (OUTPATIENT)
Dept: CARDIAC REHAB | Facility: HOSPITAL | Age: 68
End: 2023-05-01
Payer: MEDICARE

## 2023-05-01 DIAGNOSIS — Z95.1 S/P CABG (CORONARY ARTERY BYPASS GRAFT): Primary | ICD-10-CM

## 2023-05-01 PROCEDURE — 93798 PHYS/QHP OP CAR RHAB W/ECG: CPT

## 2023-05-03 ENCOUNTER — CLINICAL SUPPORT (OUTPATIENT)
Dept: CARDIAC REHAB | Facility: HOSPITAL | Age: 68
End: 2023-05-03
Payer: MEDICARE

## 2023-05-03 DIAGNOSIS — Z95.1 S/P CABG (CORONARY ARTERY BYPASS GRAFT): Primary | ICD-10-CM

## 2023-05-03 PROCEDURE — 93798 PHYS/QHP OP CAR RHAB W/ECG: CPT

## 2023-05-11 RX ORDER — POTASSIUM CHLORIDE 750 MG/1
TABLET, EXTENDED RELEASE ORAL
Qty: 90 TABLET | Refills: 3 | Status: SHIPPED | OUTPATIENT
Start: 2023-05-11

## 2023-05-15 ENCOUNTER — CLINICAL SUPPORT (OUTPATIENT)
Dept: CARDIAC REHAB | Facility: HOSPITAL | Age: 68
End: 2023-05-15
Payer: MEDICARE

## 2023-05-15 DIAGNOSIS — Z95.1 S/P CABG (CORONARY ARTERY BYPASS GRAFT): Primary | ICD-10-CM

## 2023-05-15 PROCEDURE — 93798 PHYS/QHP OP CAR RHAB W/ECG: CPT

## 2023-05-19 ENCOUNTER — OFFICE VISIT (OUTPATIENT)
Dept: CARDIOLOGY | Facility: CLINIC | Age: 68
End: 2023-05-19
Payer: MEDICARE

## 2023-05-19 ENCOUNTER — CLINICAL SUPPORT (OUTPATIENT)
Dept: CARDIAC REHAB | Facility: HOSPITAL | Age: 68
End: 2023-05-19
Payer: MEDICARE

## 2023-05-19 VITALS
HEIGHT: 74 IN | WEIGHT: 194 LBS | SYSTOLIC BLOOD PRESSURE: 128 MMHG | DIASTOLIC BLOOD PRESSURE: 82 MMHG | BODY MASS INDEX: 24.9 KG/M2 | HEART RATE: 72 BPM

## 2023-05-19 DIAGNOSIS — E78.5 DYSLIPIDEMIA: ICD-10-CM

## 2023-05-19 DIAGNOSIS — Z95.1 S/P CABG (CORONARY ARTERY BYPASS GRAFT): ICD-10-CM

## 2023-05-19 DIAGNOSIS — D64.9 ANEMIA, UNSPECIFIED TYPE: ICD-10-CM

## 2023-05-19 DIAGNOSIS — Z95.1 S/P CABG (CORONARY ARTERY BYPASS GRAFT): Primary | ICD-10-CM

## 2023-05-19 DIAGNOSIS — I21.11 ST ELEVATION MYOCARDIAL INFARCTION INVOLVING RIGHT CORONARY ARTERY: Primary | ICD-10-CM

## 2023-05-19 PROCEDURE — 3074F PR MOST RECENT SYSTOLIC BLOOD PRESSURE < 130 MM HG: ICD-10-PCS | Mod: CPTII,,, | Performed by: INTERNAL MEDICINE

## 2023-05-19 PROCEDURE — 99214 PR OFFICE/OUTPT VISIT, EST, LEVL IV, 30-39 MIN: ICD-10-PCS | Mod: ,,, | Performed by: INTERNAL MEDICINE

## 2023-05-19 PROCEDURE — 1160F RVW MEDS BY RX/DR IN RCRD: CPT | Mod: CPTII,,, | Performed by: INTERNAL MEDICINE

## 2023-05-19 PROCEDURE — 1101F PR PT FALLS ASSESS DOC 0-1 FALLS W/OUT INJ PAST YR: ICD-10-PCS | Mod: CPTII,,, | Performed by: INTERNAL MEDICINE

## 2023-05-19 PROCEDURE — 1160F PR REVIEW ALL MEDS BY PRESCRIBER/CLIN PHARMACIST DOCUMENTED: ICD-10-PCS | Mod: CPTII,,, | Performed by: INTERNAL MEDICINE

## 2023-05-19 PROCEDURE — 3008F PR BODY MASS INDEX (BMI) DOCUMENTED: ICD-10-PCS | Mod: CPTII,,, | Performed by: INTERNAL MEDICINE

## 2023-05-19 PROCEDURE — 3044F HG A1C LEVEL LT 7.0%: CPT | Mod: CPTII,,, | Performed by: INTERNAL MEDICINE

## 2023-05-19 PROCEDURE — 1126F AMNT PAIN NOTED NONE PRSNT: CPT | Mod: CPTII,,, | Performed by: INTERNAL MEDICINE

## 2023-05-19 PROCEDURE — 4010F PR ACE/ARB THEARPY RXD/TAKEN: ICD-10-PCS | Mod: CPTII,,, | Performed by: INTERNAL MEDICINE

## 2023-05-19 PROCEDURE — 3074F SYST BP LT 130 MM HG: CPT | Mod: CPTII,,, | Performed by: INTERNAL MEDICINE

## 2023-05-19 PROCEDURE — 1101F PT FALLS ASSESS-DOCD LE1/YR: CPT | Mod: CPTII,,, | Performed by: INTERNAL MEDICINE

## 2023-05-19 PROCEDURE — 99215 OFFICE O/P EST HI 40 MIN: CPT | Mod: PN | Performed by: INTERNAL MEDICINE

## 2023-05-19 PROCEDURE — 93798 PHYS/QHP OP CAR RHAB W/ECG: CPT

## 2023-05-19 PROCEDURE — 99999 PR PBB SHADOW E&M-EST. PATIENT-LVL V: ICD-10-PCS | Mod: PBBFAC,,, | Performed by: INTERNAL MEDICINE

## 2023-05-19 PROCEDURE — 1159F PR MEDICATION LIST DOCUMENTED IN MEDICAL RECORD: ICD-10-PCS | Mod: CPTII,,, | Performed by: INTERNAL MEDICINE

## 2023-05-19 PROCEDURE — 3288F PR FALLS RISK ASSESSMENT DOCUMENTED: ICD-10-PCS | Mod: CPTII,,, | Performed by: INTERNAL MEDICINE

## 2023-05-19 PROCEDURE — 1159F MED LIST DOCD IN RCRD: CPT | Mod: CPTII,,, | Performed by: INTERNAL MEDICINE

## 2023-05-19 PROCEDURE — 99999 PR PBB SHADOW E&M-EST. PATIENT-LVL V: CPT | Mod: PBBFAC,,, | Performed by: INTERNAL MEDICINE

## 2023-05-19 PROCEDURE — 1126F PR PAIN SEVERITY QUANTIFIED, NO PAIN PRESENT: ICD-10-PCS | Mod: CPTII,,, | Performed by: INTERNAL MEDICINE

## 2023-05-19 PROCEDURE — 3079F PR MOST RECENT DIASTOLIC BLOOD PRESSURE 80-89 MM HG: ICD-10-PCS | Mod: CPTII,,, | Performed by: INTERNAL MEDICINE

## 2023-05-19 PROCEDURE — 3079F DIAST BP 80-89 MM HG: CPT | Mod: CPTII,,, | Performed by: INTERNAL MEDICINE

## 2023-05-19 PROCEDURE — 99214 OFFICE O/P EST MOD 30 MIN: CPT | Mod: ,,, | Performed by: INTERNAL MEDICINE

## 2023-05-19 PROCEDURE — 3288F FALL RISK ASSESSMENT DOCD: CPT | Mod: CPTII,,, | Performed by: INTERNAL MEDICINE

## 2023-05-19 PROCEDURE — 3044F PR MOST RECENT HEMOGLOBIN A1C LEVEL <7.0%: ICD-10-PCS | Mod: CPTII,,, | Performed by: INTERNAL MEDICINE

## 2023-05-19 PROCEDURE — 4010F ACE/ARB THERAPY RXD/TAKEN: CPT | Mod: CPTII,,, | Performed by: INTERNAL MEDICINE

## 2023-05-19 PROCEDURE — 3008F BODY MASS INDEX DOCD: CPT | Mod: CPTII,,, | Performed by: INTERNAL MEDICINE

## 2023-05-19 NOTE — PROGRESS NOTES
Subjective:    Patient ID:  Peyman Gr is a 67 y.o. male patient here for evaluation Atrial Fibrillation, Hypertension, and Peripheral Vascular Disease      Follow up visit 5/19/23:  He is doing very well.  Undergoing cardiac rehab.  Compliant with medications.  Dyspnea improved.  Walking with a walker.       History of Present Illness during initial visit:  Seen  as a follow up after his hospital discharge.  He is a 67-year-old male with past medical history of hypertension, hyperlipidemia, peripheral vascular disease, chronic smoking 50 pack year, initially presented to [facility] ER on January 1, 2023 with inferior wall STEMI which was complicated by complete heart block, RV shock and cardiac arrest, respiratory failure requiring intubation. Patient was resuscitated for 70 minutes and underwent emergent coronary angiogram and successful PCI of ostial RCA flush occlusion which was culprit lesion.  Patient also had ostial left main disease and OM lesion which were managed medically at that time due to post cardiac arrest state and normal LV function.  Patient had a long hospital course however significantly improved clinically, extubated and had good neurological recovery. Patient was found to be in AFib and was started on anticoagulation.  Patient had good progress with physical therapy.  CT surgery was consulted regarding the left main lesion and patient successfully underwent CABG and recovered well after the surgery.  Patient is compliant with the medications.  Underwent brief rehab after surgery and currently at home.  Currently walking with a walker. Patient went to [facility]  emergency room on 02/22/2023 with dyspnea and discharged after negative workup.  Patient complains of dyspnea upon exertion which he experienced while he was in the hospital as well.     Review of patient's allergies indicates:  No Known Allergies    Past Medical History:   Diagnosis Date    Arthritis     Chronic back pain      Chronic neck pain     Dry gangrene     RIGHT LITTLE TOE    Hypercholesteremia     Hypertension     Insomnia     Multiple falls     S/P BACK SURGERY- 1 FALL    PAD (peripheral artery disease)     Personal history of colonic polyps     PVD (peripheral vascular disease)      Past Surgical History:   Procedure Laterality Date    ANGIOGRAPHY OF LOWER EXTREMITY N/A 09/24/2020    Procedure: Angiogram Extremity Unilateral;  Surgeon: Luke Cabello MD;  Location: [facility] CATH;  Service: Cardiology;  Laterality: N/A;    BACK SURGERY      BUNIONECTOMY Bilateral     CARPAL TUNNEL RELEASE Bilateral     CHOLECYSTECTOMY      COLONOSCOPY N/A 5/9/2022    Procedure: COLONOSCOPY;  Surgeon: Braydon Durand MD;  Location: [facility] ENDO;  Service: Endoscopy;  Laterality: N/A;    COLONOSCOPY W/ POLYPECTOMY      CORONARY ANGIOGRAPHY N/A 1/1/2023    Procedure: ANGIOGRAM, CORONARY ARTERY;  Surgeon: Stefany Elizondo MD;  Location: [facility] CATH/EP LAB;  Service: Cardiology;  Laterality: N/A;    CORONARY ARTERY BYPASS GRAFT      CORONARY ARTERY BYPASS GRAFT (CABG) N/A 1/24/2023    Procedure: CORONARY ARTERY BYPASS GRAFT (CABG);  Surgeon: Zechariah Douglas MD;  Location: [facility] OR;  Service: Cardiothoracic;  Laterality: N/A;    CREATION OF BYPASS FROM INTERNAL CAROTID ARTERY TO SUBCLAVIAN ARTERY Right 12/27/2021    Procedure: CREATION, BYPASS, ARTERIAL, INTERNAL CAROTID TO SUBCLAVIAN;  Surgeon: Jeovany Goins MD;  Location: [facility] OR;  Service: Vascular;  Laterality: Right;    ELBOW ARTHROPLASTY Right     ELBOW SURGERY      INSERTION, PACEMAKER, TEMPORARY TRANSVENOUS  1/1/2023    Procedure: Insertion, Pacemaker, Temporary Transvenous;  Surgeon: Stefany Elizondo MD;  Location: [facility] CATH/EP LAB;  Service: Cardiology;;    IVUS, CORONARY  1/1/2023    Procedure: IVUS, Coronary;  Surgeon: Stefany Elizondo MD;  Location: [facility] CATH/EP LAB;  Service: Cardiology;;    LEFT HEART CATHETERIZATION Left 1/1/2023    Procedure: Left  heart cath;  Surgeon: Stefany Elizondo MD;  Location: [facility] CATH/EP LAB;  Service: Cardiology;  Laterality: Left;    MINIMALLY INVASIVE FORAMINOTOMY OF  SPINE N/A 11/15/2018    Procedure: FORAMINOTOMY, SPINE, MINIMALLY INVASIVE DECOMPRESSION L4-5;  Surgeon: Iván Stevenson Jr., MD;  Location: [facility] OR;  Service: Orthopedics;  Laterality: N/A;    NECK SURGERY      acf    PERCUTANEOUS CORONARY INTERVENTION, ARTERY N/A 2023    Procedure: Percutaneous coronary intervention;  Surgeon: Stefany Elizondo MD;  Location: [facility] CATH/EP LAB;  Service: Cardiology;  Laterality: N/A;    PERCUTANEOUS TRANSLUMINAL ANGIOPLASTY (PTA) OF PERIPHERAL VESSEL Right 2020    Procedure: PTA, PERIPHERAL VESSEL of right lower extremity;  Surgeon: Luke Cabello MD;  Location: [facility] CATH;  Service: Cardiology;  Laterality: Right;    PERCUTANEOUS TRANSLUMINAL ANGIOPLASTY (PTA) OF PERIPHERAL VESSEL Left 09/10/2020    Procedure: PTA, PERIPHERAL VESSEL, Left lower extremity;  Surgeon: Luke Cabello MD;  Location: [facility] CATH;  Service: Cardiology;  Laterality: Left;    PERCUTANEOUS TRANSLUMINAL ANGIOPLASTY (PTA) OF PERIPHERAL VESSEL Left 2021    Pro[employer]da and SFA     Social History     Tobacco Use    Smoking status: Former     Packs/day: 2.00     Years: 60.00     Pack years: 120.00     Types: Cigarettes     Quit date: 2023     Years since quittin.3    Smokeless tobacco: Never   Substance Use Topics    Alcohol use: No    Drug use: No        Review of Systems   Negative except as mentioned in HPI         Objective        Vitals:    23 1120   BP: 128/82   Pulse: 72       LIPIDS - LAST 2   No results found for: CHOL, HDL, LDLCALC, TRIG, CHOLHDL    CBC - LAST 2  Lab Results   Component Value Date    WBC 6.64 2023    WBC 7.86 2023    RBC 4.36 (L) 2023    RBC 2.99 (L) 2023    HGB 12.5 (L) 2023    HGB 9.2 (L) 2023    HCT 37.8 (L) 2023    HCT 27.8 (L) 2023    MCV  87 02/22/2023    MCV 93 02/01/2023    MCH 28.7 02/22/2023    MCH 30.8 02/01/2023    MCHC 33.1 02/22/2023    MCHC 33.1 02/01/2023    RDW 13.7 02/22/2023    RDW 13.5 02/01/2023     02/22/2023     02/01/2023    MPV 8.6 (L) 02/22/2023    MPV 9.0 (L) 02/01/2023    GRAN 4.3 02/22/2023    GRAN 64.3 02/22/2023    LYMPH 1.4 02/22/2023    LYMPH 20.6 02/22/2023    MONO 0.8 02/22/2023    MONO 11.7 02/22/2023    BASO 0.04 02/22/2023    BASO 0.02 02/01/2023    NRBC 0 02/22/2023    NRBC 0 02/01/2023       CHEMISTRY & LIVER FUNCTION - LAST 2  Lab Results   Component Value Date     (L) 02/22/2023     (L) 02/01/2023    K 4.2 02/22/2023    K 3.4 (L) 02/01/2023    CL 99 02/22/2023    CL 98 02/01/2023    CO2 25 02/22/2023    CO2 27 02/01/2023    ANIONGAP 11 02/22/2023    ANIONGAP 6 (L) 02/01/2023    BUN 17 02/22/2023    BUN 13 02/01/2023    CREATININE 1.0 02/22/2023    CREATININE 0.8 02/01/2023     02/22/2023     02/01/2023    CALCIUM 9.9 02/22/2023    CALCIUM 8.0 (L) 02/01/2023    PH 7.335 (L) 01/25/2023    PH 7.346 (L) 01/24/2023    MG 1.8 02/01/2023    MG 2.1 01/31/2023    ALBUMIN 3.9 02/22/2023    ALBUMIN 2.8 (L) 02/01/2023    PROT 8.7 (H) 02/22/2023    PROT 6.2 02/01/2023    ALKPHOS 143 (H) 02/22/2023    ALKPHOS 85 02/01/2023    ALT 23 02/22/2023    ALT 17 02/01/2023    AST 27 02/22/2023    AST 17 02/01/2023    BILITOT 0.4 02/22/2023    BILITOT 0.6 02/01/2023        CARDIAC PROFILE - LAST 2  Lab Results   Component Value Date     (H) 02/22/2023     (H) 01/31/2023     12/23/2014    CPKMB 4.8 12/23/2014    TROPONINI 0.055 (H) 02/22/2023    TROPONINI 0.075 (H) 02/22/2023    TROPONINIHS 023454.9 (HH) 01/02/2023    TROPONINIHS 366764.9 (HH) 01/02/2023        COAGULATION - LAST 2  Lab Results   Component Value Date    LABPT 13.8 12/27/2021    LABPT 12.6 12/21/2021    INR 1.2 01/01/2023    INR 1.1 12/27/2021    APTT 30.3 01/01/2023    APTT 31.3 12/27/2021       ENDOCRINE & PSA -  LAST 2  Lab Results   Component Value Date    HGBA1C 5.7 01/01/2023    PROCAL 0.20 01/13/2023    PROCAL 3.13 (H) 01/03/2023        ECHOCARDIOGRAM RESULTS  Results for orders placed during the hospital encounter of 01/01/23    Echo Saline Bubble? No    Interpretation Summary  · The left ventricle is normal in size with mild concentric hypertrophy and normal systolic function.  · The estimated ejection fraction is 70%.  · Normal left ventricular diastolic function.  · Atrial fibrillation not observed.  · Normal right ventricular size with normal right ventricular systolic function.  · Normal central venous pressure (3 mmHg).      CURRENT/PREVIOUS VISIT EKG  Results for orders placed or performed during the hospital encounter of 02/22/23   EKG 12-lead    Collection Time: 02/22/23  1:45 PM    Narrative    Test Reason : I49.9    Vent. Rate : 069 BPM     Atrial Rate : 069 BPM     P-R Int : 160 ms          QRS Dur : 130 ms      QT Int : 462 ms       P-R-T Axes : 075 028 017 degrees     QTc Int : 495 ms    Normal sinus rhythm  Possible Left atrial enlargement  Right bundle branch block  Inferior infarct ,age undetermined  Anterior infarct (cited on or before 22-FEB-2023)  Abnormal ECG  When compared with ECG of 22-FEB-2023 12:42,  Questionable change in initial forces of Anterior leads  T wave inversion now evident in Inferior leads  Confirmed by Claude ZAFAR, Aurelio CAMPO ([id number]) on 2/25/2023 6:34:56 PM    Referred By: [email or web address]   SELF           Confirmed By:Aurelio Arce MD     No valid procedures specified.   No results found for this or any previous visit.    No valid procedures specified.          PREVIOUS STRESS TEST              PREVIOUS ANGIOGRAM        PHYSICAL EXAM    CONSTITUTIONAL: Well built, well nourished in no apparent distress  HEENT: No pallor  NECK: no JVD  LUNGS:  CTA b/l  HEART: regular rate and rhythm, S1, S2 normal, no murmur   ABDOMEN: soft, non-tender; bowel sounds normal  EXTREMITIES: No edema  NEURO:  AAO X 3   SKIN:  No rash  Psych:  Normal affect    I HAVE REVIEWED :    The vital signs, nurses notes, and all the pertinent radiology and labs.        Current Outpatient Medications   Medication Instructions    albuterol (PROVENTIL/VENTOLIN HFA) 90 mcg/actuation inhaler 1-2 puffs, Inhalation, Every 6 hours PRN, Rescue    amiodarone (PACERONE) 200 mg, Oral, Daily    amLODIPine (NORVASC) 5 mg, Oral, Daily    apixaban (ELIQUIS) 5 mg, Oral, 2 times daily    atorvastatin (LIPITOR) 40 mg, Oral, Nightly    bisacodyL (DULCOLAX) 10 mg, Rectal, Daily PRN    carvediloL (COREG) 25 mg, Oral, 2 times daily with meals    clopidogreL (PLAVIX) 75 mg, Oral, Daily    docusate sodium (COLACE) 100 mg, Oral, 2 times daily    EScitalopram oxalate (LEXAPRO) 10 mg, Oral, Daily    ezetimibe (ZETIA) 10 mg, Oral, Daily    fluticasone-umeclidin-vilanter (TRELEGY ELLIPTA) 200-62.5-25 mcg inhaler 1 puff, Inhalation, Daily    furosemide (LASIX) 40 mg, Oral, Daily    guaiFENesin (MUCINEX) 600 mg, Oral, 2 times daily    magnesium oxide (MAG-OX) 400 mg, Oral, Daily    melatonin (MELATIN) 6 mg, Oral, Nightly PRN    mupirocin (BACTROBAN) 2 % ointment Topical (Top), 3 times daily    oxyCODONE-acetaminophen (PERCOCET) 7.5-325 mg per tablet 1 tablet, Oral, 2 times daily PRN    pantoprazole (PROTONIX) 40 mg, Oral, Daily    polyethylene glycol (GLYCOLAX) 17 g, Oral, 2 times daily    potassium chloride SA (K-DUR,KLOR-CON M) 10 MEQ tablet TAKE 1 TABLET EVERY DAY    tiotropium-olodateroL (STIOLTO RESPIMAT) 2.5-2.5 mcg/actuation Mist 2 puffs, Inhalation, Daily, Controller        Assessment & Plan   STEMI/IWMI (January 1, 2023) complicated by prolonged cardiac arrest/ CHB/RV shock/ARF s/p PCI of ostial RCA and clinical recovery followed by staged CABG for residual left main disease  Afib on AC  PVD  HTN-controlled  DLD  Former smoker     Plan:  Continue current medications  Clinically euvolemic and denies any anginal symptoms  Continue cardiac rehab  He  continued to be abstinent from smoking  Will check labs.  CBC, CMP, BNP, lipid profile    Follow up in about 4 months (around 9/19/2023).

## 2023-05-31 ENCOUNTER — CLINICAL SUPPORT (OUTPATIENT)
Dept: CARDIAC REHAB | Facility: HOSPITAL | Age: 68
End: 2023-05-31
Payer: MEDICARE

## 2023-05-31 ENCOUNTER — LAB VISIT (OUTPATIENT)
Dept: LAB | Facility: HOSPITAL | Age: 68
End: 2023-05-31
Attending: INTERNAL MEDICINE
Payer: MEDICARE

## 2023-05-31 DIAGNOSIS — Z95.1 S/P CABG (CORONARY ARTERY BYPASS GRAFT): ICD-10-CM

## 2023-05-31 DIAGNOSIS — E78.5 DYSLIPIDEMIA: ICD-10-CM

## 2023-05-31 DIAGNOSIS — D64.9 ANEMIA, UNSPECIFIED TYPE: ICD-10-CM

## 2023-05-31 DIAGNOSIS — Z95.1 S/P CABG (CORONARY ARTERY BYPASS GRAFT): Primary | ICD-10-CM

## 2023-05-31 LAB
ALBUMIN SERPL BCP-MCNC: 3.9 G/DL (ref 3.5–5.2)
ALP SERPL-CCNC: 104 U/L (ref 55–135)
ALT SERPL W/O P-5'-P-CCNC: 18 U/L (ref 10–44)
ANION GAP SERPL CALC-SCNC: 5 MMOL/L (ref 8–16)
AST SERPL-CCNC: 20 U/L (ref 10–40)
BASOPHILS # BLD AUTO: 0.03 K/UL (ref 0–0.2)
BASOPHILS NFR BLD: 0.6 % (ref 0–1.9)
BILIRUB SERPL-MCNC: 0.8 MG/DL (ref 0.1–1)
BNP SERPL-MCNC: 113 PG/ML (ref 0–99)
BUN SERPL-MCNC: 24 MG/DL (ref 8–23)
CALCIUM SERPL-MCNC: 9.2 MG/DL (ref 8.7–10.5)
CHLORIDE SERPL-SCNC: 105 MMOL/L (ref 95–110)
CHOLEST SERPL-MCNC: 98 MG/DL (ref 120–199)
CHOLEST/HDLC SERPL: 2.3 {RATIO} (ref 2–5)
CO2 SERPL-SCNC: 26 MMOL/L (ref 23–29)
CREAT SERPL-MCNC: 1.2 MG/DL (ref 0.5–1.4)
DIFFERENTIAL METHOD: ABNORMAL
EOSINOPHIL # BLD AUTO: 0.1 K/UL (ref 0–0.5)
EOSINOPHIL NFR BLD: 1.1 % (ref 0–8)
ERYTHROCYTE [DISTWIDTH] IN BLOOD BY AUTOMATED COUNT: 19.2 % (ref 11.5–14.5)
EST. GFR  (NO RACE VARIABLE): >60 ML/MIN/1.73 M^2
GLUCOSE SERPL-MCNC: 112 MG/DL (ref 70–110)
HCT VFR BLD AUTO: 43.7 % (ref 40–54)
HDLC SERPL-MCNC: 43 MG/DL (ref 40–75)
HDLC SERPL: 43.9 % (ref 20–50)
HGB BLD-MCNC: 13.8 G/DL (ref 14–18)
IMM GRANULOCYTES # BLD AUTO: 0.03 K/UL (ref 0–0.04)
IMM GRANULOCYTES NFR BLD AUTO: 0.6 % (ref 0–0.5)
LDLC SERPL CALC-MCNC: 39.4 MG/DL (ref 63–159)
LYMPHOCYTES # BLD AUTO: 1.7 K/UL (ref 1–4.8)
LYMPHOCYTES NFR BLD: 32.1 % (ref 18–48)
MCH RBC QN AUTO: 27.3 PG (ref 27–31)
MCHC RBC AUTO-ENTMCNC: 31.6 G/DL (ref 32–36)
MCV RBC AUTO: 87 FL (ref 82–98)
MONOCYTES # BLD AUTO: 0.7 K/UL (ref 0.3–1)
MONOCYTES NFR BLD: 12 % (ref 4–15)
NEUTROPHILS # BLD AUTO: 2.9 K/UL (ref 1.8–7.7)
NEUTROPHILS NFR BLD: 53.6 % (ref 38–73)
NONHDLC SERPL-MCNC: 55 MG/DL
NRBC BLD-RTO: 0 /100 WBC
PLATELET # BLD AUTO: 229 K/UL (ref 150–450)
PMV BLD AUTO: 8.9 FL (ref 9.2–12.9)
POTASSIUM SERPL-SCNC: 4.1 MMOL/L (ref 3.5–5.1)
PROT SERPL-MCNC: 8.1 G/DL (ref 6–8.4)
RBC # BLD AUTO: 5.05 M/UL (ref 4.6–6.2)
SODIUM SERPL-SCNC: 136 MMOL/L (ref 136–145)
TRIGL SERPL-MCNC: 78 MG/DL (ref 30–150)
WBC # BLD AUTO: 5.42 K/UL (ref 3.9–12.7)

## 2023-05-31 PROCEDURE — 85025 COMPLETE CBC W/AUTO DIFF WBC: CPT | Performed by: INTERNAL MEDICINE

## 2023-05-31 PROCEDURE — 83880 ASSAY OF NATRIURETIC PEPTIDE: CPT | Performed by: INTERNAL MEDICINE

## 2023-05-31 PROCEDURE — 93798 PHYS/QHP OP CAR RHAB W/ECG: CPT

## 2023-05-31 PROCEDURE — 36415 COLL VENOUS BLD VENIPUNCTURE: CPT | Performed by: INTERNAL MEDICINE

## 2023-05-31 PROCEDURE — 80053 COMPREHEN METABOLIC PANEL: CPT | Performed by: INTERNAL MEDICINE

## 2023-05-31 PROCEDURE — 80061 LIPID PANEL: CPT | Performed by: INTERNAL MEDICINE

## 2023-06-02 ENCOUNTER — CLINICAL SUPPORT (OUTPATIENT)
Dept: CARDIAC REHAB | Facility: HOSPITAL | Age: 68
End: 2023-06-02
Payer: MEDICARE

## 2023-06-02 DIAGNOSIS — Z95.1 S/P CABG (CORONARY ARTERY BYPASS GRAFT): Primary | ICD-10-CM

## 2023-06-02 PROCEDURE — 93798 PHYS/QHP OP CAR RHAB W/ECG: CPT

## 2023-06-05 ENCOUNTER — CLINICAL SUPPORT (OUTPATIENT)
Dept: CARDIAC REHAB | Facility: HOSPITAL | Age: 68
End: 2023-06-05
Payer: MEDICARE

## 2023-06-05 DIAGNOSIS — Z95.1 S/P CABG (CORONARY ARTERY BYPASS GRAFT): Primary | ICD-10-CM

## 2023-06-05 PROCEDURE — 93798 PHYS/QHP OP CAR RHAB W/ECG: CPT

## 2023-06-07 ENCOUNTER — CLINICAL SUPPORT (OUTPATIENT)
Dept: CARDIAC REHAB | Facility: HOSPITAL | Age: 68
End: 2023-06-07
Payer: MEDICARE

## 2023-06-07 DIAGNOSIS — Z95.1 S/P CABG (CORONARY ARTERY BYPASS GRAFT): Primary | ICD-10-CM

## 2023-06-07 PROCEDURE — 93798 PHYS/QHP OP CAR RHAB W/ECG: CPT

## 2023-06-21 ENCOUNTER — TELEPHONE (OUTPATIENT)
Dept: CARDIAC REHAB | Facility: HOSPITAL | Age: 68
End: 2023-06-21

## 2023-06-28 ENCOUNTER — TELEPHONE (OUTPATIENT)
Dept: CARDIAC REHAB | Facility: HOSPITAL | Age: 68
End: 2023-06-28

## 2023-10-04 RX ORDER — FUROSEMIDE 40 MG/1
40 TABLET ORAL
Qty: 90 TABLET | Refills: 2 | Status: SHIPPED | OUTPATIENT
Start: 2023-10-04

## 2023-10-04 RX ORDER — PANTOPRAZOLE SODIUM 40 MG/1
40 TABLET, DELAYED RELEASE ORAL
Qty: 90 TABLET | Refills: 2 | Status: SHIPPED | OUTPATIENT
Start: 2023-10-04

## 2024-01-16 ENCOUNTER — TELEPHONE (OUTPATIENT)
Dept: CARDIOLOGY | Facility: CLINIC | Age: 69
End: 2024-01-16
Payer: MEDICARE

## 2024-12-24 NOTE — CARE UPDATE
ED culture call back follow-up:  70 year-old male evaluated on 12/22 for suprapubic fullness, difficulty urinating, and intermittent hematuria. CT scan of abdomen pelvis suspicious for severe acute emphysematous cystitis. Patient was advised to be admitted for aguayo catheter placement and observation; however, declined as wife was scheduled for surgery today. Patient had aguayo catheter placed and received ceftriaxone in the ED. Discharged on a 7-day course of cefpodoxime 200 mg BID for treatment of emphysematous cystitis. Of note, patient returned to the ED today requesting aguayo catheter removal which was done.    Preliminary urine culture is growing >100K CFU/mL Klebsiella pneumoniae, susceptibilities pending. Current therapy with cefpodoxime provides appropriate empiric treatment (calculated CrCl ~66.3 mL/min). Will await further results.    Liv Angeles, PharmD  12/23/2024 [facility] from [facility] submitting for auth today. CM to follow.

## (undated) DEVICE — SUTURE PROLENE 6-0 C-1 30 8706H

## (undated) DEVICE — CATHETER BALLOON EUPHORA 2.50X15MM

## (undated) DEVICE — SOLUTION NACL 0.9% 3000ML

## (undated) DEVICE — INTRODUCER PRELUDE ACT .038INX6FRX11CM

## (undated) DEVICE — TIP BOVIE 6.5 0014

## (undated) DEVICE — NEEDLE SAFETY COMBO 3ML 25G 5/8IN 305781

## (undated) DEVICE — Device

## (undated) DEVICE — CATHETER BIPOLAR PACING 5FR J-TIP

## (undated) DEVICE — SET AUTOTRANSFUSION FRESENIUS 9005104

## (undated) DEVICE — PREP CHLORA 26ML

## (undated) DEVICE — CONNECTOR STRAIGHT 3/8 050506000

## (undated) DEVICE — GUAZE QUIKCLOT CONTROL + 5X5 4 PLY

## (undated) DEVICE — GLOVE #7.5 BIOGEL 30475

## (undated) DEVICE — CLIP LIGACLIP LIGATING TITANIUM LG LT400

## (undated) DEVICE — CABLE PACING 6' DISPOSABLE  FL-601-97

## (undated) DEVICE — CATHETER BALLOON EUPHORA 2.00X15MM

## (undated) DEVICE — VALVE BLEEDBACK CONTROL 1003330

## (undated) DEVICE — SYSTEM VEIN HARVESTING VSP550

## (undated) DEVICE — SUTURE STEEL 6 18 M654G

## (undated) DEVICE — INTRODUCER KIT STICK MINI MAX 5F X 10

## (undated) DEVICE — TRAY CV ACCESS W AUX B

## (undated) DEVICE — CATHETER GUIDE LAUNCHER JR4 6FX110

## (undated) DEVICE — BULB DRAIN 100CC 70740

## (undated) DEVICE — TRAY SKIN PREP DRY

## (undated) DEVICE — CATHETER EAGLE EYE 5FR 85900P

## (undated) DEVICE — CATHETER THORACIC 28FR 8128

## (undated) DEVICE — DRAPE SPLIT CV 66350

## (undated) DEVICE — DRESSING POST OP MEPILEX AG  4X14

## (undated) DEVICE — TUBING M/M PRESSURE LL 72

## (undated) DEVICE — CATHETER DIAGNOSTIC DXTERITY 6FR JR 4.0

## (undated) DEVICE — DRAPE IOBAN 23X17 6650EZ

## (undated) DEVICE — SOLUTION SCRUB IODINE 4OZ

## (undated) DEVICE — BAG DECANTER 10-102

## (undated) DEVICE — DRESSING POST OP MEPILEX AG 4X6  498300

## (undated) DEVICE — DRAIN CHEST DRY SUCTION

## (undated) DEVICE — CONTAINER SPECIMEN 4 OZ STERILE 1053

## (undated) DEVICE — CATHETER DIAGNOSTIC DXTERITY 6FR JL 4

## (undated) DEVICE — CATHETER THORACIC 28FR 8028

## (undated) DEVICE — KIT INFLATION MERIT (IN8152, HP9200E)

## (undated) DEVICE — SHEATH INTRODUCER SLENDER 6FX10CM

## (undated) DEVICE — KIT SURGICAL SUTURE ECK

## (undated) DEVICE — RESERVOIR FRESENIUS 9108474

## (undated) DEVICE — GUIDEWIRE DOUBLE ENDED .035 DIA. 150CML

## (undated) DEVICE — INSERT OCTOBASE 28707

## (undated) DEVICE — MARKER SKIN W/ RULER 77734

## (undated) DEVICE — SUTURE PROLENE BLU MONO 4-30 SZ 7.0 DBLE ARMED BV175-6

## (undated) DEVICE — MARKER CORONARY BYPASS GRAFT 40149

## (undated) DEVICE — CANNULA SELECT 3D II 20FR 78520

## (undated) DEVICE — TUBING EASY SPRAY TISSEEL

## (undated) DEVICE — GUIDEWIRE RUNTHROUGH 180CM

## (undated) DEVICE — TRAY CV ACCESS W/AUX A

## (undated) DEVICE — BANDAGE ACE STERILE 6 REB3116

## (undated) DEVICE — HEMOSTAT OSTENE 2.5GR

## (undated) DEVICE — DRAPE BILATERAL LEG 84X80

## (undated) DEVICE — SUTURE PROLENE 7-0 BV175-6 30

## (undated) DEVICE — DRAPE IOBAN 23X33 6651EZ

## (undated) DEVICE — PAD DEFIB ADULT PRECONNECT

## (undated) DEVICE — BLANKET HYPOTHERMIA LARGE

## (undated) DEVICE — CANNULA VENOUS 33/43FR TF33430

## (undated) DEVICE — CANNULA 14FR RC2014

## (undated) DEVICE — SUTURE STEELPACING 2-0 SH 24 TPW32

## (undated) DEVICE — PACK PERFUSION

## (undated) DEVICE — CLIP APPLIER SM MCS20

## (undated) DEVICE — SOLUTION PREP IODINE 4OZ

## (undated) DEVICE — SPONGE XRAY DETECTABLE 4X4

## (undated) DEVICE — GUIDEWIRE WHOLEY 260CMX0.035IN

## (undated) DEVICE — INTRODUCER SUPER SHEATH 5FR 16035-05B

## (undated) DEVICE — TUBING SUCTION FRESENIUS 9108484

## (undated) DEVICE — CLIP APPLIER MEDIUM MSM20

## (undated) DEVICE — TUBING INSUFFLATION 10FT

## (undated) DEVICE — GLOVE SURG BIOGEL LTX PF ST SZ 6.5

## (undated) DEVICE — SHEATH PINNACLE 6FRX10CM W/GUIDEWIRE

## (undated) DEVICE — PAD BOVIE ADULT

## (undated) DEVICE — BANDAGE ACE STERILE 4 REB3114

## (undated) DEVICE — SUCTION YANKAUER 34970